# Patient Record
Sex: MALE | Race: BLACK OR AFRICAN AMERICAN
[De-identification: names, ages, dates, MRNs, and addresses within clinical notes are randomized per-mention and may not be internally consistent; named-entity substitution may affect disease eponyms.]

---

## 2017-08-26 ENCOUNTER — HOSPITAL ENCOUNTER (INPATIENT)
Dept: HOSPITAL 74 - JER | Age: 77
LOS: 10 days | Discharge: HOME | DRG: 271 | End: 2017-09-05
Attending: INTERNAL MEDICINE | Admitting: SPECIALIST
Payer: COMMERCIAL

## 2017-08-26 VITALS — BODY MASS INDEX: 29.7 KG/M2

## 2017-08-26 DIAGNOSIS — I48.0: ICD-10-CM

## 2017-08-26 DIAGNOSIS — G43.909: ICD-10-CM

## 2017-08-26 DIAGNOSIS — I25.5: ICD-10-CM

## 2017-08-26 DIAGNOSIS — N18.9: ICD-10-CM

## 2017-08-26 DIAGNOSIS — I48.4: ICD-10-CM

## 2017-08-26 DIAGNOSIS — I50.42: ICD-10-CM

## 2017-08-26 DIAGNOSIS — I42.2: ICD-10-CM

## 2017-08-26 DIAGNOSIS — I73.9: ICD-10-CM

## 2017-08-26 DIAGNOSIS — I13.0: ICD-10-CM

## 2017-08-26 DIAGNOSIS — I25.2: ICD-10-CM

## 2017-08-26 DIAGNOSIS — F17.210: ICD-10-CM

## 2017-08-26 DIAGNOSIS — I74.3: Primary | ICD-10-CM

## 2017-08-26 DIAGNOSIS — Z91.14: ICD-10-CM

## 2017-08-26 DIAGNOSIS — I25.10: ICD-10-CM

## 2017-08-26 LAB
ALBUMIN SERPL-MCNC: 2.8 G/DL (ref 3.4–5)
ALP SERPL-CCNC: 75 U/L (ref 45–117)
ALT SERPL-CCNC: 21 U/L (ref 12–78)
ANION GAP SERPL CALC-SCNC: 6 MMOL/L (ref 8–16)
AST SERPL-CCNC: 17 U/L (ref 15–37)
BASOPHILS # BLD: 0.6 % (ref 0–2)
BILIRUB SERPL-MCNC: 0.4 MG/DL (ref 0.2–1)
CALCIUM SERPL-MCNC: 8.7 MG/DL (ref 8.5–10.1)
CK SERPL-CCNC: 282 IU/L (ref 39–308)
CO2 SERPL-SCNC: 31 MMOL/L (ref 21–32)
CREAT SERPL-MCNC: 0.9 MG/DL (ref 0.7–1.3)
DEPRECATED RDW RBC AUTO: 14.9 % (ref 11.9–15.9)
EOSINOPHIL # BLD: 1 % (ref 0–4.5)
GLUCOSE SERPL-MCNC: 100 MG/DL (ref 74–106)
MCH RBC QN AUTO: 30.9 PG (ref 25.7–33.7)
MCHC RBC AUTO-ENTMCNC: 33.5 G/DL (ref 32–35.9)
MCV RBC: 92.4 FL (ref 80–96)
NEUTROPHILS # BLD: 68 % (ref 42.8–82.8)
PLATELET # BLD AUTO: 375 K/MM3 (ref 134–434)
PMV BLD: 8.2 FL (ref 7.5–11.1)
PROT SERPL-MCNC: 7.2 G/DL (ref 6.4–8.2)
TROPONIN I SERPL-MCNC: 0.63 NG/ML (ref 0–0.05)
WBC # BLD AUTO: 10.7 K/MM3 (ref 4–10)

## 2017-08-26 RX ADMIN — SODIUM CHLORIDE SCH MLS/HR: 9 INJECTION, SOLUTION INTRAVENOUS at 22:22

## 2017-08-26 NOTE — PDOC
History of Present Illness





- General


Chief Complaint: Pain


Stated Complaint: BI-LATERAL LEG PAIN


Time Seen by Provider: 08/26/17 16:16


History Source: Patient


Exam Limitations: No Limitations





- History of Present Illness


Initial Comments: 


08/26/17 17:29


CC:  5 day h/o B/L LE pain and difficulty ambulating





Patient is a 77 y.o. male with a PMH of MI who presents today c/o of a non-

qualifiable, 10/10 constant B/L LE pain.  Patient states he went to change his 

shoes on Monday, sat down and he felt B/L LE pain that has persisted since that 

time.  Patient states he has been using a dining room chair with wheels to 

ambulate around his house (patient lives alone) and decided to come to the 

hospital today because the pain became unbearable.  Patient denies any known 

trauma, shortness of breath, chest pain, nausea, vomiting, abdominal pain. 








Past surgical: s/p cardiac cath @ [facility] (01/2017)


Social: (+) nicotine - 10 cigarettes daily, (+) alcohol 2-3 pints hard liquor 

monthly; (-) marijuana/cocaine/heroin


NKDA


Patient does not have established PCP








Past History





- Past Medical History


Allergies/Adverse Reactions: 


 Allergies











Allergy/AdvReac Type Severity Reaction Status Date / Time


 


No Known Allergies Allergy   Verified 08/26/17 15:54











Home Medications: 


Ambulatory Orders





NK [No Known Home Medication]  08/26/17 








Diabetes:  (BORDERLINE)


HTN:  (BORDERLINE)


Other medical history: PT DENIES.





- Psycho/Social/Smoking Cessation Hx


Anxiety: No


Suicidal Ideation: No


Smoking History: Never smoked


Have you smoked in the past 12 months: Yes


If you are a former smoker, when did you quit?: 12.28.16


Information on smoking cessation initiated: No


Hx Alcohol Use: No


Drug/Substance Use Hx: No


Substance Use Type: None





**Review of Systems





- Review of Systems


Constitutional: No: Chills, Diaphoresis, Fever, Unexplained wgt Loss


HEENTM: No: Blurred Vision, Double Vision, Tinnitus, Hearing Loss


Respiratory: No: Cough, Orthopnea, Shortness of Breath, Hemoptysis


Cardiac (ROS): No: Chest Pain, Edema, Irregular Heart Rate, Lightheadedness, 

Palpitations


ABD/GI: No: Constipated, Diarrhea


: No: Burning, Dysuria


Musculoskeletal: Yes: Muscle Pain, Muscle Weakness.  No: Back Pain, Gout, Joint 

Pain, Neck Pain


Psychiatric: No: Anxiety, Depression


All Other Systems: Reviewed and Negative





*Physical Exam





- Vital Signs


 Last Vital Signs











Temp Pulse Resp BP Pulse Ox


 


 98.1 F   84   18   150/74   98 


 


 08/26/17 15:55  08/26/17 15:55  08/26/17 15:55  08/26/17 15:55  08/26/17 15:55














- Physical Exam


General Appearance: Yes: Nourished, Obese


HEENT: positive: EOMI, FUNMILAYO


Neck: positive: Trachea midline, Supple


Respiratory/Chest: positive: Lungs Clear, Normal Breath Sounds


Cardiovascular: positive: Regular Rate, S1, S2, Irregularly Irregular


Gastrointestinal/Abdominal: positive: Flat, Soft


Extremity: positive: Other (Weak < 2+ pedal pulses B/L; Cool LE B/L )


Integumentary: positive: Cold


Neurologic: positive: Fully Oriented, Alert





ED Treatment Course





- LABORATORY


CBC & Chemistry Diagram: 


 08/26/17 18:45





 08/26/17 18:45





Medical Decision Making





- Medical Decision Making


08/26/17 22:02


Patient is a 77 y.o. male with a PMH of MI (01/2017) and limited primary care 

who presents to our ED c/o 5 day h/o B/L LE pain and inability to ambulate.  On 

PE patient's B/L LE were cool to palpation and showed weak pulses.  Initial 

differential included vascular occlusion vs. disc herniation (less likely given 

no neurological deficit, no spinal tenderness).   As patient had a h/o MI, 

cardiac work-up was also initiated with Troponin at 0.63 and EKG showing HR 98, 

A fib with poor R wave progression in the anterior chest leads. Arterial DVT 

revealed (1) R sided occlusion from proximal superficial femoral artery and (2) 

L sided occlusion from the distal superficial femoral artery.  Patient was 

given a Heparin bolus and subsequent drip and admitted to inpatient medicine 

service, telemetry floor. 














*DC/Admit/Observation/Transfer


Diagnosis at time of Disposition: 


 Vascular occlusion





- Discharge Dispostion


Admit: Yes





- Attestations


Physician Attestion: 





08/26/17 22:42


I, Dr. Cate Sandoval, attest all medical decision making reflected in this note 

is under my auspices.

## 2017-08-26 NOTE — PDOC
Attending Attestation





- Resident


Resident Name: Cate Sandoval





- ED Attending Attestation


I have performed the following: I have examined & evaluated the patient, The 

case was reviewed & discussed with the resident, I agree w/resident's findings 

& plan, Exceptions are as noted





- HPI


HPI: 


78 yo M history CAD presents with B/L leg pain for the past ~1 week. He states 

that the pain was sudden onset, severe, limiting him from walking. He has been 

using a rolling chair in his house to get around. He denies weakness. He has 

numbness to the toes of both feet. Denies any trauma. No low back pain, chest 

pain, SOB. No prior similar symptoms.








- Physicial Exam


PE: 


GENERAL: Awake, alert, and fully oriented, in no acute distress


HEAD: No signs of trauma


EYES: PERRLA, EOMI, sclera anicteric, conjunctiva clear


ENT: Auricles normal inspection, hearing grossly normal, nares patent, 

oropharynx clear without exudates. Moist mucosa


NECK: Normal ROM, supple, no lymphadenopathy, JVD, or masses


LUNGS: Breath sounds equal, clear to auscultation bilaterally.  No wheezes, and 

no crackles


HEART: Regular rate and rhythm, normal S1 and S2, no murmurs, rubs or gallops


ABDOMEN: Soft, nontender, normoactive bowel sounds.  No guarding, no rebound.  

No masses


EXTREMITIES: Normal range of motion. Lower legs with no visible hair, +multiple 

varicosities visualized. Feet are tender to palpation, cool to the touch. Faint 

B/L DP pulses. No clubbing or cyanosis. No erythema.


NEUROLOGICAL: Cranial nerves II through XII grossly intact.  Normal speech, 

normal gait


SKIN: Warm, Dry, normal turgor, no rashes or lesions noted.








- Medical Decision Making


Patient with prior CAD, now presents with severe leg pain, unable to ambulate. 

Noted to have signs of PVD/PAD. Will obtain labs, venous and arterial dopplers.

## 2017-08-27 LAB
ALBUMIN SERPL-MCNC: 2.4 G/DL (ref 3.4–5)
ALP SERPL-CCNC: 65 U/L (ref 45–117)
ALT SERPL-CCNC: 17 U/L (ref 12–78)
ANION GAP SERPL CALC-SCNC: 10 MMOL/L (ref 8–16)
AST SERPL-CCNC: 15 U/L (ref 15–37)
BILIRUB SERPL-MCNC: 0.5 MG/DL (ref 0.2–1)
CALCIUM SERPL-MCNC: 8 MG/DL (ref 8.5–10.1)
CO2 SERPL-SCNC: 25 MMOL/L (ref 21–32)
CREAT SERPL-MCNC: 0.9 MG/DL (ref 0.7–1.3)
GLUCOSE SERPL-MCNC: 94 MG/DL (ref 74–106)
MAGNESIUM SERPL-MCNC: 2.1 MG/DL (ref 1.8–2.4)
PHOSPHATE SERPL-MCNC: 4.1 MG/DL (ref 2.5–4.9)
PROT SERPL-MCNC: 6.1 G/DL (ref 6.4–8.2)

## 2017-08-27 RX ADMIN — HEPARIN SODIUM PRN UNIT: 5000 INJECTION, SOLUTION INTRAVENOUS; SUBCUTANEOUS at 20:00

## 2017-08-27 RX ADMIN — CARVEDILOL SCH MG: 6.25 TABLET, FILM COATED ORAL at 21:17

## 2017-08-27 RX ADMIN — MORPHINE SULFATE PRN MG: 4 INJECTION, SOLUTION INTRAMUSCULAR; INTRAVENOUS at 01:33

## 2017-08-27 RX ADMIN — ATORVASTATIN CALCIUM SCH MG: 40 TABLET, FILM COATED ORAL at 21:17

## 2017-08-27 RX ADMIN — SODIUM CHLORIDE SCH MLS/HR: 9 INJECTION, SOLUTION INTRAVENOUS at 21:17

## 2017-08-27 RX ADMIN — ASPIRIN SCH MG: 81 TABLET, COATED ORAL at 10:51

## 2017-08-27 RX ADMIN — MORPHINE SULFATE PRN MG: 2 INJECTION, SOLUTION INTRAMUSCULAR; INTRAVENOUS at 20:26

## 2017-08-27 RX ADMIN — CARVEDILOL SCH MG: 6.25 TABLET, FILM COATED ORAL at 10:51

## 2017-08-27 RX ADMIN — VALSARTAN SCH MG: 80 TABLET, FILM COATED ORAL at 10:51

## 2017-08-27 RX ADMIN — MORPHINE SULFATE PRN MG: 4 INJECTION, SOLUTION INTRAMUSCULAR; INTRAVENOUS at 07:51

## 2017-08-27 NOTE — CONSULT
Consult





- Past Surgical History


Past Surgical History: Yes: Hernia Repair





- Alcohol/Substance Use


Hx Alcohol Use: Yes (SOCIALLY)


History of Substance Use: reports: None





- Smoking History


Smoking history: Current every day smoker


Have you smoked in the past 12 months: Yes


Aproximately how many cigarettes per day: 10


If you are a former smoker, when did you quit?: 12.28.16





Home Medications





- Allergies


Allergies/Adverse Reactions: 


 Allergies











Allergy/AdvReac Type Severity Reaction Status Date / Time


 


No Known Allergies Allergy   Verified 08/26/17 15:54














- Home Medications


Home Medications: 


Ambulatory Orders





NK [No Known Home Medication]  08/26/17 











Physical Exam


Vital Signs: 


 Vital Signs











Temperature  97.6 F   08/27/17 06:00


 


Pulse Rate  75   08/27/17 06:00


 


Respiratory Rate  20   08/27/17 06:00


 


Blood Pressure  144/91   08/27/17 06:00


 


O2 Sat by Pulse Oximetry (%)  96   08/27/17 00:24











Labs: 


 CBC, BMP





 08/27/17 06:30 











Assessment/Plan


Vascular Surgery 





Patient is a 77 y.o. male with a PMH of MI who presented with pain in both legs 

since last tuesday. Pt went ahead and placed johnson larios to his legs to make them 

feel better. He has afib, but does not take any meds for it -- because he has 

extensive dental work being done. He has a script for eliquis, but he never 

took it. Pt is a big time smoker - smoking 1/2 a pack a day for 60 years. 








Past surgical: s/p cardiac cath @ [facility] (01/2017)


Social: (+) nicotine - 10 cigarettes daily, (+) alcohol 2-3 pints hard liquor 

monthly; (-) marijuana/cocaine/heroin


NKDA


Patient does not have established PCP








Past History





- Past Medical History


Allergies/Adverse Reactions: 


 Allergies











Allergy/AdvReac Type Severity Reaction Status Date / Time


 


No Known Allergies Allergy   Verified 08/26/17 15:54











Home Medications: 


Ambulatory Orders





NK [No Known Home Medication]  08/26/17 








Diabetes:  (BORDERLINE)


HTN:  (BORDERLINE)


Other medical history: PT DENIES.





- Psycho/Social/Smoking Cessation Hx


Anxiety: No


Suicidal Ideation: No


Smoking History: Never smoked


Have you smoked in the past 12 months: Yes


If you are a former smoker, when did you quit?: 12.28.16


Information on smoking cessation initiated: No


Hx Alcohol Use: No


Drug/Substance Use Hx: No


Substance Use Type: None





PE


Head - NC/AT


Lung - CTA Bl 


Heart - RRR


abd - soft,nt,nd


ext - warm to distal calf, then cool. 


No mottling. Palpable femoral pulses. 


Motor and sensory intact in both limbs. 





A/P 


Acute on chronic PAD


1. Will order CTA


2. Cont IV heparin. 





Perry Chan DO

## 2017-08-27 NOTE — PN
Teaching Attending Note


Name of Resident: Henry Delgado


ATTENDING PHYSICIAN STATEMENT





I saw and evaluated the patient.


I reviewed the resident's note and discussed the case with the resident.


I agree with the resident's findings and plan as documented.








SUBJECTIVE:


77 year old male that presents to the ED on 17 c/o inability to ambulate 

and secondary to right lower extremity pain that has started 2-3 days ago and 

now has increased in intensity to 10/10 now. It is constant and exacerbated by 

walking . He denies prior episodes of pain like  this before. 





His medical history is significant for most recent hospitalization in 2017 due to acute diastolic congestive heart failure and NSTEMI  secondary to 

uncontrolled a flutter. At that time he underwent cardiac VITALY with 

cardioversion and was planned for discharge on oral anticoagulants due to high 

CHADS. However patient denies taking anticoagulants at this time. 





Initial workup during this ER visit revealed decreased LE pulses and  dopplers 

confirmed  R sided occlusion from proximal superficial femoral artery + L sided 

occlusion from the distal superficial femoral artery.  Patient was given a 

Heparin bolus and subsequent drip and admitted to inpatient medicine service, 

telemetry. 





PMH 





A Flutter 


CHF 


HTN 


Smoking 





PAST SURGICAL HISTORY:


VITALY / cardioversion 





Social History:


Smoking:  Smoke for 62 years, 1/2 ppd


Alcohol: 3-4 x /month, 


Drugs: pt denies





Family History:


mother  in her late 60s d/t cancer, had asthma


father  in his 60s, MI


brother  young in a fire


2 sisters alive and well





Allergies


No Known Allergies Allergy (Verified 17 00:59)











OBJECTIVE:


 Vital Signs











Temperature  97.9 F   17 02:00


 


Pulse Rate  84   17 02:00


 


Respiratory Rate  20   17 02:00


 


Blood Pressure  118/75   17 02:00


 


O2 Sat by Pulse Oximetry (%)  96   17 00:24








EARS, NOSE, THROAT: nares patent, oropharynx clear without exudates. Moist 

mucous membranes.


NECK: Normal range of motion, supple without lymphadenopathy, JVD, or masses.


LUNGS: Breath sounds equal, clear to auscultation bilaterally. No wheezes, and 

no crackles. No accessory muscle use.


HEART:irregular 


ABDOMEN: Soft, nontender, not distended, normoactive bowel sounds, no guarding, 

no rebound, no masses


UPPER EXTREMITIES: 2+ pulses, warm, well-perfused. No cyanosis. No clubbing. No 

peripheral edema.


LOWER EXTREMITIES: pain on palpation of RLE. 1+ pulses, cool, slow capillary 

refill. 





 CBC, BMP





 17 18:45 





 17 18:45 





EKG - a flutter 





ECHO -  - severe LV hypertrophy, moderately reduced systolic function 





ASSESSMENT AND PLAN:








1. Acute limb ischemia - likely secondary to arterial embolus secondary to 

ongoing atrial flutter /afib, lack of anticoagulation therapy and possibly 

formation of an atrial thrombus . Peripheral arterial thrombosis is less likely 

. 





- Heparin drip was initiated in ED and will continue 


- reperfusion per vascular surgery ( was contacted by ER as per ED resident ) 

will follow 


- Pain control provided 


- ECHO 





2. NSTEMI / elevated troponin - this is likely to A flutter/possible atrial 

thrombus  however CAD/ischemia can not be excluded completely . Patient has no 

chest pain . Rate is controlled. There is a very low yield in " trending 

troponins' as it would not change the management , however it will be ordered 

per hospital policy .  





- ECHO to be repeated 


- cardiology evaluation 


- heparin drip 


- telemetry 


- coreg 





3. A flutter / A fib - rate controlled. 


- Will need lifelong coag ( probable [id number] score between 4-5)

## 2017-08-27 NOTE — CONS
DATE OF CONSULTATION:  08/27/2017

 

Consultation requested by hospitalist.

 

CHIEF COMPLAINT:  Bilateral leg discomfort, evaluation of cardiac arrhythmia,

elevated troponin I level.

 

A 77-year-old male of  descent, with known history of coronary artery

disease, nonobstructive coronary artery disease on coronary angiography January 11, 2017, angina pectoris, systolic/diastolic left ventricular dysfunction with chronic

class 1 to 2 New York Heart Association classification left ventricular failure,

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

paroxysmal atypical atrial flutter, atrial tachycardia, post cardioversion,

hypertensive cardiovascular disease, chronic kidney disease, who apparently took it

upon himself to discontinue all therapies including anticoagulation therapy, who

presented to [facility] with 4 to 5 days of bilateral leg discomfort, right

greater than the left.  Patient reported sudden onset of bilateral leg discomfort as

noted above and, in addition, coldness.  Patient was unable to ambulate.  Upon

arrival to the emergency room, patient was noted to have cold legs, right greater

than the left, with absent distal pulses.  Vascular evaluation included duplex

examination, which revealed evidence of arterial occlusion bilaterally.  Patient

denied any chest discomfort.  Patient reports dyspnea with moderate physical

exertion.  Patient denies any orthopnea, paroxysmal nocturnal dyspnea, or peripheral

edema.  Patient denies any palpitation, dizziness, lightheadedness, or syncope. 

Patient, in addition, in the emergency room was noted to have evidence of atrial

fibrillation/atypical atrial flutter.

 

PAST MEDICAL HISTORY:  Nonobstructive coronary artery disease, angina pectoris,

systolic/diastolic left ventricular dysfunction, apical hypertrophic cardiomyopathy

with chronic class 1 to 2 New York Heart Association classification left ventricular

failure, paroxysmal atypical atrial flutter, atrial tachycardia post cardioversion,

CHADS-VASc score of 5 to 6, hypertensive cardiovascular disease, history of chronic

kidney disease, hernia repair, hydrocele repair.

 

SOCIAL HISTORY:  A smoker.

 

FAMILY HISTORY:  Positive coronary artery disease.

 

ALLERGIES:  None reported.

 

MEDICAL THERAPY AT HOME:  None.  As noted above, patient discontinued all therapies.

 

REVIEW OF SYSTEMS:

Head and Neck:  Denies headache, photophobia, blurring of vision.

Respiratory:  No cough or sputum production.

Cardiovascular:  As noted above.

Gastrointestinal:  Denies nausea, vomiting, diarrhea, abdominal discomfort.

Genitourinary:  No symptoms reported.

Musculoskeletal:  Bilateral leg discomfort.

 

PHYSICAL EXAMINATION:

Vital Signs:  Blood pressure is 144/91 mmHg.  Pulse rate is 75 beats per minute,

irregularly irregular.

Head and Neck:  Pupils equal, react to light and accommodation.  Extraocular muscles

are intact.  Anicteric sclerae.  Negative JVD.  No bruit appreciated. 

Chest:  Clear to auscultation and percussion.

Cardiovascular:  S1, S2 irregularly irregular.  Grade 2/6 systolic apical murmur.

Abdomen:  Soft, benign.  Normoactive bowel sound.

Extremities:  No edema.  No palpable distal pulses.  1+ bilateral femoral pulses.  No

calf tenderness. 

 

EKG reveals atypical atrial flutter, atrial tachycardia, with left axis deviation,

poor R-wave progression, and nonspecific interventricular conduction delay.  Chest

x-ray report was noted.  Vascular studies completed.  Report not available, but from

the emergency room note, evidence of bilateral occlusion.  CBC revealed white cell

count 10.7, hemoglobin 14.4, platelet count 375.  Basic metabolic profile revealed

sodium 140, potassium 4.2, BUN 17, creatinine 0.7, glucose 94.  .  Troponin I

0.63, repeat 0.58.

 

ASSESSMENT:  

1.  Bilateral leg discomfort with evidence of bilateral vascular occlusion, most

likely embolic disease related to the above-noted paroxysmal atrial

flutter/fibrillation, CHADS-VASc score of 6, currently on heparin therapy, on no

therapy at home.  Poor compliance with therapy administration and medical followup.

2.  Coronary artery disease with evidence of most likely related to the above-noted

arrhythmia.  History of nonobstructive coronary artery disease on right and left

heart cardiac catheterization and coronary angiography, angina pectoris.

3.  Left ventricular systolic/diastolic dysfunction, apical hypertrophic

cardiomyopathy, with chronic class 1 to 2 New York Heart Association classification

left ventricular failure, compensated/euvolemic.

4.  Paroxysmal apical atrial flutter, atrial tachycardia, post cardioversion,

CHADS-VASc score of 6, recurrent arrhythmia.  Patient will require long-term

anticoagulation.

5.  Hypertensive cardiovascular disease.

6.  History of chronic kidney disease.

7.  Poor compliance with medical therapy administration and medical followup.

8.  History of tobacco abuse.

 

PLAN:

1.  Continuation of heparin therapy, pending vascular evaluation and intervention,

and will require long-term anticoagulation with Coumadin or new oral anticoagulation

agent, considering the above-noted presentation and CHADS-VASc score of 6.

2.  Carvedilol and titration of dosage as tolerated.

3.  Diovan and titration of dosage as tolerated.

4.  Aspirin.

5.  Statin therapy.

6.  Recommend urgent vascular evaluation.  Dr. CK Chan was contacted and spoken to. 

He will evaluate the patient for urgent angiography and possible intervention.

7.  Patient was strongly counseled smoking cessation.

8.  Echocardiography for evaluation of left ventricular systolic function.

 

Thank you for the kind referral. 

 

 

ALIYAH SOLOMON M.D.

 

[id number]

DD: 08/27/2017 08:48

DT: 08/27/2017 14:29

Job #:  [id number]

## 2017-08-27 NOTE — PN
Progress Note (short form)





- Note


Progress Note: 


Chief Complaint: Events noted, notes reviewed, acute onset bilateral leg 

discomfort with evidence of vascular occlusion, denies any chest pain or dyspnea

, atypical atrial flutter/atrial fibrillation noted





History of Present Illness: 


Seen and examined on telemetry. Full consult dictated





- Current Medication List





 Current Medications





Aspirin (Ecotrin -)  81 mg PO DAILY [facility] hospital


Carvedilol (Coreg -)  6.25 mg PO BID [facility] hospital


Heparin Sodium (Porcine) (Heparin -)  5,000 unit IVPUSH PRN PRN


   PRN Reason: Heparin


Heparin Sodium (Porcine) 25, (000 unit/ Sodium Chloride)  500 mls @ 16 mls/hr 

IV TITR AVA; 800 UNIT/HR


   PRN Reason: Protocol


   Last Admin: 08/26/17 22:22 Dose:  16 mls/hr


Morphine Sulfate (Morphine Injection -)  2 mg IVPUSH Q4H PRN


   PRN Reason: PAIN


   Last Admin: 08/27/17 07:51 Dose:  2 mg


Valsartan (Diovan -)  80 mg PO DAILY [facility] hospital








Review of Systems


Constitutional: denies: Chills


Cardiovascular: As noted above


Respiratory: denies: Cough or Sputum Production


Gastrointestinal: denies: Nausea, Vomiting, Diarrhea, Constipation or Abdominal 

Pain


Musculoskeletal: reports: Bilateral Leg Discomfort 


Neurological: denies: Dizziness or Headache





- Objective


Vital Signs: 





 Last Vital Signs











Temp Pulse Resp BP Pulse Ox


 


 97.6 F   75   20   144/91   96 


 


 08/27/17 06:00  08/27/17 06:00  08/27/17 06:00  08/27/17 06:00  08/27/17 00:24








 Intake & Output











 08/24/17 08/25/17 08/26/17 08/27/17





 23:59 23:59 23:59 23:59


 


Intake Total    112


 


Balance    112


 


Weight   225 lb 215 lb 0.8 oz











Neck: Supple Negative JVD No Bruit


Cardiovascular: S1 S2 Irregularly Irregular Grade 2/6 Systolic Murmur


Chest: Clear to A&P Bilaterally


Gastrointestinal: Soft Benign Normal Bowel Sounds


Ext: No Edema No Palpable Distal Pulses 1+ Bilateral femoral Pulses





Labs: 





 CBC, BMP





 08/26/17 18:45 





 Hepatic Panel











Total Bilirubin  0.4 mg/dL (0.2-1.0)  D 08/26/17  18:45    


 


AST  17 U/L (15-37)  D 08/26/17  18:45    


 


ALT  21 U/L (12-78)  D 08/26/17  18:45    


 


Alkaline Phosphatase  75 U/L ()  D 08/26/17  18:45    


 


Albumin  2.8 g/dl (3.4-5.0)  L  08/26/17  18:45    








 Troponin, BNP











  08/26/17 08/27/17





  18:45 03:50


 


Troponin I  0.63 H* D  0.58 H











Assessment/Plan





ASSESSMENT:





1. Bilateral leg discomfort with evidence of vascular occlusion, most likely 

embolic disease related to PAF with [id number] score of 6 on Heparin currently

, on no therapy at home, poor compliance with therapy administration and 

medical F/U


2. CAD with evidence of demand ischemic injury, non [facility] 

coronary angiogrpahy angina pectoris


3. LV systolic/diastolic dysfunction related to apical hypertrophic cardio-

myopathy, chronic class I-II NYHA classification, compensated/euvolemic


4. Paroxysmal atypical atrial flutter/atrial tachycardia post cardioversion 

[id number] score of 6, recurrent arrhythmia, will require long term A/C


5. HTN


6. History of CKD


7. Poor compliance with medical therapy administration and medical F/U


8. Tobacco abuse





PLAN:





1. Continue Heparin pending vascular evaluation and intervention and will 

require long term A/C with Coumadin or NOAC's considering the above noted 

presentation and [id number] score 6 


2. Continue Carvedilol and titrate as tolerated


3. Continue Diovan and titrate as tolerated


4. Continue ASA


5. Add Lipitor


6. Recommend urgent vascular evaluation Dr. CK Chan contacted and will 

evaluate the patient for urgent angiography and possible intervention


7. Counselled smoking cessation and abstinence


8. Echocardiography to evaluate LV size and function





Discussed in detail with the patient 














Armand Mckenzie MD

## 2017-08-27 NOTE — HP
CHIEF COMPLAINT:


R leg pain


PCP:


Lorena


HISTORY OF PRESENT ILLNESS:


Pt is a 77M with PMH ischemic cardiomyopathy (moderate global hypokinesis & EF 

38% by echo), cardiac cath, afib who presents to ED with constant 10/10 

nonradiating intense pain of the right leg from the knee to the ankle which 

completely prevents him from walking. Pain is not associated with time of day 

and has not improved with home or hospital therapy including IV tylenol, and 

dilaudid.





Pt has a recent admission 





ER course was notable for:


(1) leukocytosis, pos trop, LE U/S, CXR,  


(2)Heparin protocol, IV tylenol, ASA


(3)





Recent Travel:


denies


PAST MEDICAL HISTORY:


Migraine, Hydrocele, Hernia, ischemic cardiomyopathy (moderate global 

hypokinesis & EF 38% by echo), cardiac cath, afib


PAST SURGICAL HISTORY:


as above


Social History:


Smoking: 10 cigarettes/day


Alcohol: 1 quart vodka every 2 weeks


Drugs: denies





Family History: MI in father at 60


Allergies





No Known Allergies Allergy (Verified 08/26/17 15:54)


 








HOME MEDICATIONS:


 Home Medications











 Medication  Instructions  Recorded


 


NK [No Known Home Medication]  08/26/17








REVIEW OF SYSTEMS


CONSTITUTIONAL: 


Absent:  fever, chills, diaphoresis, generalized weakness, malaise, loss of 

appetite, weight change


HEENT: 


Absent:  rhinorrhea, nasal congestion, throat pain, throat swelling, difficulty 

swallowing, mouth swelling, ear pain, eye pain, visual changes


CARDIOVASCULAR: 


Absent: chest pain, syncope, palpitations, irregular heart rate, lightheadedness

, peripheral edema


RESPIRATORY: 


Absent: cough, shortness of breath, dyspnea with exertion, orthopnea, wheezing, 

stridor, hemoptysis


GASTROINTESTINAL:


Absent: abdominal pain, abdominal distension, nausea, vomiting, diarrhea, 

constipation, melena, hematochezia


GENITOURINARY: 


Absent: dysuria, frequency, urgency, hesitancy, hematuria, flank pain, genital 

pain


MUSCULOSKELETAL: right leg pain


Absent: myalgia, arthralgia, joint swelling, back pain, neck pain


SKIN: 


Absent: rash, itching, pallor


HEMATOLOGIC/IMMUNOLOGIC: 


Absent: easy bleeding, easy bruising, lymphadenopathy, frequent infections


ENDOCRINE:


Absent: unexplained weight gain, unexplained weight loss, heat intolerance, 

cold intolerance


NEUROLOGIC: right big toe tingling


Absent: headache, focal weakness or paresthesias, dizziness, unsteady gait, 

seizure, mental status changes, bladder or bowel incontinence


PSYCHIATRIC: 


Absent: anxiety, depression, suicidal or homicidal ideation, hallucinations.








PHYSICAL EXAMINATION


 Vital Signs - 24 hr











  08/26/17





  23:38


 


Temperature 98.3 F


 


Pulse Rate [ 80





Apical] 


 


Respiratory 20





Rate 


 


Blood Pressure 142/76





[Left Arm] 


 


O2 Sat by Pulse 100





Oximetry (%) 











GENERAL: Awake, alert, and fully oriented, in no acute distress.


HEAD: Normal with no signs of trauma.


EYES: Pupils equal, round and reactive to light, extraocular movements intact, 

sclera anicteric, conjunctiva clear. No lid lag.


EARS, NOSE, THROAT: nares patent, oropharynx clear without exudates. Moist 

mucous membranes.


NECK: Normal range of motion, supple without lymphadenopathy, JVD, or masses.


LUNGS: Breath sounds equal, clear to auscultation bilaterally. No wheezes, and 

no crackles. No accessory muscle use.


HEART: Regular rate and rhythm, normal S1 and S2 without murmur, rub or gallop.


ABDOMEN: Soft, nontender, not distended, normoactive bowel sounds, no guarding, 

no rebound, no masses.  No hepatomegaly or  splenomegaly. 


MUSCULOSKELETAL: Normal range of motion at all joints. No bony deformities or 

tenderness. No CVA tenderness.


UPPER EXTREMITIES: 2+ pulses, warm, well-perfused. No cyanosis. No clubbing. No 

peripheral edema.


LOWER EXTREMITIES: pain on palpation of RLE. 1+ pulses, cool, slow capillary 

refill. No calf tenderness. No peripheral edema. 


NEUROLOGICAL:  Cranial nerves II-XII intact. Normal speech. Normal gait.


PSYCHIATRIC: Cooperative. Good eye contact. Appropriate mood and affect.


SKIN: Warm, dry, normal turgor, no rashes or lesions noted, normal capillary 

refill. 





ASSESSMENT/PLAN:


Pt is a 77M with PMH ischemic cardiomyopathy (moderate global hypokinesis & EF 

38% by echo), cardiac cath, afib who presents to ED with constant 10/10 

nonradiating intense pain of the right leg from the knee to the ankle which 

completely prevents him from walking.





#Leg arterial occlusion


-cold feet, diminished distal pulses


-pending U/S


-Heparin


-Morphine


-ASA


-Vascular consult





#Afib


-Unknown duration


-Cardio consult


-on AC with Heparin


-Echo result from Jan: moderate global hypokinesis, EF 38%





#HTN


-Diovan





#FEN


-not on fluid


-lytes wnl


-chol/fat controlled diet





#dispo : admit to inpt tele for afib and vascular disease





Henry Delgado MD PGY-1


























Visit type





- Emergency Visit


Emergency Visit: No





- New Patient


This patient is new to me today: No





- Critical Care


Critical Care patient: No

## 2017-08-27 NOTE — PN
Physical Exam: 


SUBJECTIVE: Patient seen and examined. He has pain in his right lower leg, but 

it is less severe.








OBJECTIVE:





 Vital Signs











 Period  Temp  Pulse  Resp  BP Sys/Varghese  Pulse Ox


 


 Last 24 Hr  97.6 F-98.7 F  75-84  20-20  104-144/59-91  











GENERAL: The patient is awake, alert, and fully oriented, in no acute distress.


LUNGS: Breath sounds equal, clear to auscultation bilaterally, no wheezes, no 

crackles, no accessory muscle use. 


HEART: Irregularly irregular, (+) 2/6 systolic murmur.


ABDOMEN: Soft, nontender, nondistended, normoactive bowel sounds, no guarding, 

no rebound, no hepatosplenomegaly, no masses.


EXTREMITIES: No edema. RLE cool from mid calf distally and right foot is cold. 

LLE cool from distal calf to foot.


PULSES: 1+ femoral B/L. B/L popliteal and B/L DP not palpable.














 Laboratory Results - last 24 hr











  08/27/17 08/27/17 08/27/17





  03:50 06:30 10:26


 


PTT (Actin FS)    32.3  D


 


Sodium   140 


 


Potassium   4.2 


 


Chloride   105 


 


Carbon Dioxide   25 


 


Anion Gap   10 


 


BUN   17 


 


Creatinine   0.9 


 


Creat Clearance w eGFR   > 60 


 


Random Glucose   94 


 


Calcium   8.0 L 


 


Phosphorus   4.1  D 


 


Magnesium   2.1 


 


Total Bilirubin   0.5  D 


 


AST   15 


 


ALT   17 


 


Alkaline Phosphatase   65 


 


Troponin I  0.58 H  


 


Total Protein   6.1 L 


 


Albumin   2.4 L 








Active Medications











Generic Name Dose Route Start Last Admin





  Trade Name Freq  PRN Reason Stop Dose Admin


 


Aspirin  81 mg 08/27/17 10:00 08/27/17 10:51





  Ecotrin -  PO   81 mg





  DAILY AVA   Administration


 


Carvedilol  6.25 mg 08/27/17 10:00 08/27/17 10:51





  Coreg -  PO   6.25 mg





  BID AVA   Administration


 


Heparin Sodium (Porcine)  5,000 unit 08/26/17 21:10  





  Heparin -  IVPUSH   





  PRN PRN   





  Heparin   


 


Heparin Sodium (Porcine) 25,  500 mls @ 16 mls/hr 08/26/17 21:15 08/26/17 22:22





  000 unit/ Sodium Chloride  IV   16 mls/hr





  TITR AVA   Administration





  Protocol   





  800 UNIT/HR   


 


Morphine Sulfate  2 mg 08/26/17 23:29 08/27/17 07:51





  Morphine Injection -  IVPUSH   2 mg





  Q4H PRN   Administration





  PAIN   


 


Valsartan  80 mg 08/27/17 10:00 08/27/17 10:51





  Diovan -  PO   80 mg





  DAILY AVA   Administration











ASSESSMENT/PLAN:


This is a 77 year old man with a history of hypertrophic cardiomyopathy, non-

obstructive CAD, chronic systolic/diastolic HF, HTN, PAF who presented to the 

ER with right leg pain.





1. RLE pain secondary to PAD, possible thromboembolic disease


   - Vascular surgery consult appreciated


   - Continue heparin IV


   - CTA of aorta with LE runoff ordered


2. Paroxysmal atrial fibrillation/flutter


   - Patient now reports he did not start taking Eliquis secondary to ongoing 

dental work


   - Continue Coreg, heparin IV


   - Will need long-term anticoagulation - will hold off on starting oral 

medication in case surgery is needed


3. CAD, non-obstructive


   - Continue aspirin, Coreg


   - Lipitor started


4. Chronic systolic and diastolic heart failure


   - Stable


   - Continue Coreg, Diovan


5. Hypertrophic cardiomyopathy


6. HTN


   - Continue Coreg, Diovan


7. Nicotine dependence


   - Discussed smoking cessation





Visit type





- Emergency Visit


Emergency Visit: Yes


ED Registration Date: 08/26/17


Care time: The patient presented to the Emergency Department on the above date 

and was hospitalized for further evaluation of their emergent condition.





- New Patient


This patient is new to me today: Yes


Date on this admission: 08/27/17





- Critical Care


Critical Care patient: No





- Discharge Referral


Referred to [facility] No

## 2017-08-27 NOTE — EKG
Test Reason : 

Blood Pressure : ***/*** mmHG

Vent. Rate : 098 BPM     Atrial Rate : 091 BPM

   P-R Int : 000 ms          QRS Dur : 100 ms

    QT Int : 368 ms       P-R-T Axes : 000 -64 107 degrees

   QTc Int : 469 ms

 

*** POOR DATA QUALITY, INTERPRETATION MAY BE ADVERSELY AFFECTED

ATRIAL FIBRILLATION

LEFT AXIS DEVIATION

INFERIOR INFARCT (CITED ON OR BEFORE 03-JAN-2017)

ANTERIOR INFARCT (CITED ON OR BEFORE 03-JAN-2017)

ABNORMAL ECG

WHEN COMPARED WITH ECG OF 05-JAN-2017 11:20,

ATRIAL FIBRILLATION HAS REPLACED SINUS RHYTHM

QUESTIONABLE CHANGE IN INITIAL FORCES OF LATERAL LEADS

NONSPECIFIC T WAVE ABNORMALITY, WORSE IN LATERAL LEADS

Confirmed by JESUS FITCH MD ([id number]) on 8/27/2017 8:33:28 PM

 

Referred By:             Confirmed By:JESUS FITCH MD

## 2017-08-28 LAB
ANION GAP SERPL CALC-SCNC: 8 MMOL/L (ref 8–16)
CALCIUM SERPL-MCNC: 7.9 MG/DL (ref 8.5–10.1)
CO2 SERPL-SCNC: 27 MMOL/L (ref 21–32)
CREAT SERPL-MCNC: 0.8 MG/DL (ref 0.7–1.3)
DEPRECATED RDW RBC AUTO: 14.8 % (ref 11.9–15.9)
GLUCOSE SERPL-MCNC: 90 MG/DL (ref 74–106)
MCH RBC QN AUTO: 30.6 PG (ref 25.7–33.7)
MCHC RBC AUTO-ENTMCNC: 33.1 G/DL (ref 32–35.9)
MCV RBC: 92.4 FL (ref 80–96)
PLATELET # BLD AUTO: 303 K/MM3 (ref 134–434)
PMV BLD: 9.2 FL (ref 7.5–11.1)
TROPONIN I SERPL-MCNC: 0.5 NG/ML (ref 0–0.05)
WBC # BLD AUTO: 9 K/MM3 (ref 4–10)

## 2017-08-28 RX ADMIN — SODIUM CHLORIDE SCH: 9 INJECTION, SOLUTION INTRAVENOUS at 21:15

## 2017-08-28 RX ADMIN — MORPHINE SULFATE PRN MG: 2 INJECTION, SOLUTION INTRAMUSCULAR; INTRAVENOUS at 20:44

## 2017-08-28 RX ADMIN — MORPHINE SULFATE PRN MG: 2 INJECTION, SOLUTION INTRAMUSCULAR; INTRAVENOUS at 08:40

## 2017-08-28 RX ADMIN — CARVEDILOL SCH MG: 6.25 TABLET, FILM COATED ORAL at 09:30

## 2017-08-28 RX ADMIN — MORPHINE SULFATE PRN MG: 2 INJECTION, SOLUTION INTRAMUSCULAR; INTRAVENOUS at 01:04

## 2017-08-28 RX ADMIN — HEPARIN SODIUM PRN UNIT: 5000 INJECTION, SOLUTION INTRAVENOUS; SUBCUTANEOUS at 17:38

## 2017-08-28 RX ADMIN — ATORVASTATIN CALCIUM SCH MG: 40 TABLET, FILM COATED ORAL at 21:34

## 2017-08-28 RX ADMIN — SODIUM CHLORIDE SCH MLS/HR: 9 INJECTION, SOLUTION INTRAVENOUS at 23:36

## 2017-08-28 RX ADMIN — CARVEDILOL SCH MG: 6.25 TABLET, FILM COATED ORAL at 21:34

## 2017-08-28 RX ADMIN — ASPIRIN SCH MG: 81 TABLET, COATED ORAL at 11:53

## 2017-08-28 RX ADMIN — SODIUM CHLORIDE SCH MLS/HR: 9 INJECTION, SOLUTION INTRAVENOUS at 17:38

## 2017-08-28 RX ADMIN — SODIUM CHLORIDE SCH MLS/HR: 9 INJECTION, SOLUTION INTRAVENOUS at 03:19

## 2017-08-28 RX ADMIN — HEPARIN SODIUM PRN UNIT: 5000 INJECTION, SOLUTION INTRAVENOUS; SUBCUTANEOUS at 08:36

## 2017-08-28 RX ADMIN — HEPARIN SODIUM PRN UNIT: 5000 INJECTION, SOLUTION INTRAVENOUS; SUBCUTANEOUS at 03:18

## 2017-08-28 RX ADMIN — VALSARTAN SCH MG: 80 TABLET, FILM COATED ORAL at 09:30

## 2017-08-28 RX ADMIN — MORPHINE SULFATE PRN MG: 2 INJECTION, SOLUTION INTRAMUSCULAR; INTRAVENOUS at 17:42

## 2017-08-28 RX ADMIN — SODIUM CHLORIDE SCH MLS/HR: 9 INJECTION, SOLUTION INTRAVENOUS at 02:02

## 2017-08-28 RX ADMIN — SODIUM CHLORIDE SCH MLS/HR: 9 INJECTION, SOLUTION INTRAVENOUS at 08:36

## 2017-08-28 NOTE — PN
Physical Exam: 


SUBJECTIVE: Patient seen and examined, EMR consulted. 





pt reports that pain is worse is worse today compared to yesterday, and still 

is located from right knee down to toes. He states that he is unable to walk 

due to the pain. He denies SOB, chest pain, dizziness, nausea, and emesis. 





OBJECTIVE:





 Vital Signs











 Period  Temp  Pulse  Resp  BP Sys/Varghese  Pulse Ox


 


 Last 24 Hr  98 F-98.9 F    18-20  /52-86  97-98











GENERAL: The patient is awake, alert, and fully oriented, in mild distress.


HEAD: Normal with no signs of trauma.


EYES: PERRL, extraocular movements intact, sclera anicteric, conjunctiva clear. 

No ptosis. 


ENT: Ears normal, nares patent, oropharynx clear without exudates, moist mucous 


membranes.


NECK: Trachea midline, full range of motion, supple. 


LUNGS: Breath sounds equal, clear to auscultation bilaterally, no wheezes, no 

crackles, no 


accessory muscle use. 


HEART: Regular rate and rhythm, S1, S2 without murmur, rub or gallop.


ABDOMEN: Soft, nontender, nondistended, normoactive bowel sounds, no guarding, 

no 


rebound, no hepatosplenomegaly, no masses.


EXTREMITIES: right lower leg is colder than left leg. posterior tibial and 

dorsalis pedis pulses were not palpated in both feet. sensation is intact b/l, 

motor strength is 4/5 on the right leg, 5/5 on the left leg. Extremely tender 

to palpation from knee to toes on right leg.  


NEUROLOGICAL: Cranial nerves II through XII grossly intact. Normal speech, gait 

not 


observed.


PSYCH: Normal mood, normal affect.














 Laboratory Results - last 24 hr











  08/27/17 08/28/17 08/28/17





  17:56 01:40 05:35


 


WBC    9.0


 


RBC    4.11


 


Hgb    12.6  D


 


Hct    38.0


 


MCV    92.4


 


MCH    30.6


 


MCHC    33.1


 


RDW    14.8


 


Plt Count    303


 


MPV    9.2  D


 


PTT (Actin FS)  43.0 H D  43.5 H 


 


Sodium   


 


Potassium   


 


Chloride   


 


Carbon Dioxide   


 


Anion Gap   


 


BUN   


 


Creatinine   


 


Random Glucose   


 


Calcium   


 


Troponin I   














  08/28/17 08/28/17 08/28/17





  05:35 05:35 15:10


 


WBC   


 


RBC   


 


Hgb   


 


Hct   


 


MCV   


 


MCH   


 


MCHC   


 


RDW   


 


Plt Count   


 


MPV   


 


PTT (Actin FS)  48.2 H   42.1 H


 


Sodium   139 


 


Potassium   4.5 


 


Chloride   104 


 


Carbon Dioxide   27 


 


Anion Gap   8 


 


BUN   14 


 


Creatinine   0.8 


 


Random Glucose   90 


 


Calcium   7.9 L 


 


Troponin I   0.50 H 








Active Medications











Generic Name Dose Route Start Last Admin





  Trade Name Freq  PRN Reason Stop Dose Admin


 


Aspirin  81 mg 08/27/17 10:00 08/28/17 11:53





  Ecotrin -  PO   81 mg





  DAILY AVA   Administration


 


Atorvastatin Calcium  40 mg 08/27/17 22:00 08/27/17 21:17





  Lipitor -  PO   40 mg





  HS AVA   Administration


 


Carvedilol  6.25 mg 08/27/17 10:00 08/28/17 09:30





  Coreg -  PO   6.25 mg





  BID AVA   Administration


 


Heparin Sodium (Porcine)  5,000 unit 08/26/17 21:10 08/27/17 12:12





  Heparin -  IVPUSH   5,000 unit





  PRN PRN   Administration





  Heparin   


 


Heparin Sodium (Porcine)  1,000 unit 08/27/17 19:54 08/28/17 17:38





  Heparin -  IVPUSH   1,000 unit





  PRN PRN   Administration





  Heparin   


 


Heparin Sodium (Porcine) 25,  500 mls @ 16 mls/hr 08/26/17 21:15 08/28/17 17:38





  000 unit/ Sodium Chloride  IV   27 mls/hr





  TITR AVA   Administration





  Protocol   





  800 UNIT/HR   


 


Morphine Sulfate  2 mg 08/27/17 20:20 08/28/17 17:42





  Morphine Injection -  IVPUSH   2 mg





  Q4H PRN   Administration





  PAIN   


 


Valsartan  80 mg 08/27/17 10:00 08/28/17 09:30





  Diovan -  PO   80 mg





  DAILY AVA   Administration











ASSESSMENT/PLAN:





77 year old man with a history of hypertrophic cardiomyopathy, non-obstructive 

CAD, chronic systolic/diastolic HF, HTN, PAF who presented to the ER with right 

leg pain likely secondary to thromboembolic disease.





#RLE pain secondary to PAD, possible thromboembolic disease


   -CTA of aorta with LE runoff


   - Vascular surgery consult appreciated, awaiting official CTA read by 

radiology. Will need angiogram, possible thrombectomy [place].


   - Continue heparin IV





#Paroxysmal atrial fibrillation/flutter


   - Patient now reports he did not start taking Eliquis secondary to ongoing 

dental work


   -troponin trended down to 0.5 from 0.63


   - Continue Coreg 6.25mg PO BID and valsartan 80mg PO qd


   - Will need long-term anticoagulation - will hold off on starting oral 

medication in case surgery is needed





#CAD, non-obstructive


   - Continue aspirin 81, Coreg 6.25mg BID, atorvastatin 40mg qd





#Chronic systolic and diastolic heart failure


   - Stable


   - Continue Coreg 6.25mg PO BID and valsartan 80mg PO qd





#Hypertrophic cardiomyopathy


   -Coreg 6.25mg PO BID and valsartan 80mg PO qd





#HTN


   - Continue Coreg 6.25mg PO BID and valsartan 80mg PO qd





#Nicotine dependence


   - Discussed smoking cessation





#FEN/GI


-not on fluids


-electrolytes wnl





#Dispo


-pending vascular 





--


Christiano Wong MD PGY1



































Problem List





- Problems


(1) Coronary artery disease


Code(s): I25.10 - ATHSCL HEART DISEASE OF NATIVE CORONARY ARTERY W/O ANG PCTRS 

  





(2) Heart failure


Code(s): I50.9 - HEART FAILURE, UNSPECIFIED   





(3) Hypertrophic cardiomyopathy


Code(s): I42.2 - OTHER HYPERTROPHIC CARDIOMYOPATHY





(4) Nicotine dependence


Code(s): F17.200 - NICOTINE DEPENDENCE, UNSPECIFIED, UNCOMPLICATED   








Visit type





- Emergency Visit


Emergency Visit: Yes


ED Registration Date: 08/26/17


Care time: The patient presented to the Emergency Department on the above date 

and was hospitalized for further evaluation of their emergent condition.





- New Patient


This patient is new to me today: Yes


Date on this admission: 08/28/17





- Critical Care


Critical Care patient: No

## 2017-08-28 NOTE — PN
Progress Note (short form)





- Note


Progress Note: 


VAscular Surgery 





awaiting official cTa read 


Looking at it - right sfa closed from origin, reconstitutes in tibial arteries. 


Left sfa closed in its mid portion and reconstitutes in tibial arteries. 


Will need angiogram, possible open thrombectomy. 





Perry Chan DO

## 2017-08-28 NOTE — PN
Progress Note, Physician


Chief Complaint: 


Events noted


Complains of left lower extremity pain


History of Present Illness: 


Patient was seen and examined. Awake and alert. Chart was reviewed


Denies chest pain, SOB or palpitations


As outlined regarding lower extremity pain





- Current Medication List


Current Medications: 


Active Medications





Aspirin (Ecotrin -)  81 mg PO DAILY [facility]


   Last Admin: 08/27/17 10:51 Dose:  81 mg


Atorvastatin Calcium (Lipitor -)  40 mg PO HS [facility]


   Last Admin: 08/27/17 21:17 Dose:  40 mg


Carvedilol (Coreg -)  6.25 mg PO BID [facility]


   Last Admin: 08/28/17 09:30 Dose:  6.25 mg


Heparin Sodium (Porcine) (Heparin -)  5,000 unit IVPUSH PRN PRN


   PRN Reason: Heparin


   Last Admin: 08/27/17 12:12 Dose:  5,000 unit


Heparin Sodium (Porcine) (Heparin -)  1,000 unit IVPUSH PRN PRN


   PRN Reason: Heparin


   Last Admin: 08/28/17 08:36 Dose:  1,000 unit


Heparin Sodium (Porcine) 25, (000 unit/ Sodium Chloride)  500 mls @ 16 mls/hr 

IV TITR AVA; 800 UNIT/HR


   PRN Reason: Protocol


   Last Admin: 08/28/17 08:36 Dose:  25 mls/hr


Morphine Sulfate (Morphine Injection -)  2 mg IVPUSH Q4H PRN


   PRN Reason: PAIN


   Last Admin: 08/28/17 08:40 Dose:  2 mg


Pneumococcal 13-Valent Conj Vacc (Prevnar 13 Syringe -)  0.5 ml IM .ONCE ONE


   Stop: 08/28/17 11:01


Valsartan (Diovan -)  80 mg PO DAILY [facility]


   Last Admin: 08/28/17 09:30 Dose:  80 mg











- Objective


Vital Signs: 


 Vital Signs











Temperature  98.1 F   08/28/17 05:40


 


Pulse Rate  96 H  08/28/17 05:40


 


Respiratory Rate  20   08/28/17 05:40


 


Blood Pressure  125/63   08/28/17 05:40


 


O2 Sat by Pulse Oximetry (%)  97   08/27/17 21:00











Neck: Yes: Supple


Cardiovascular: Yes: Regular Rate and Rhythm, Murmur (2/6 SM), S1, S2


Respiratory: Yes: CTA Bilaterally


Gastrointestinal: Yes: Normal Bowel Sounds, Soft.  No: Tenderness


Extremities: Yes: Calf Tenderness, Cool


Edema: No


Additional Findings/Remarks: 








Review of Systems


Constitutional: denies: Chills


Cardiovascular: As noted above


Respiratory: denies: Cough or Sputum Production


Gastrointestinal: denies: Nausea, Vomiting, Diarrhea, Constipation or Abdominal 

Pain


Musculoskeletal: reports: Bilateral Leg Discomfort 


Neurological: denies: Dizziness or Headache








Labs: 


 CBC, BMP





 08/28/17 05:35 





 08/28/17 05:35 











Problem List





- Problems


(1) Coronary artery disease


Code(s): I25.10 - ATHSCL HEART DISEASE OF NATIVE CORONARY ARTERY W/O ANG PCTRS 

  





(2) Hypertrophic cardiomyopathy


Code(s): I42.2 - OTHER HYPERTROPHIC CARDIOMYOPATHY





(3) Vascular occlusion


Code(s): I99.8 - OTHER DISORDER OF CIRCULATORY SYSTEM





(4) Atrial flutter


Code(s): I48.92 - UNSPECIFIED ATRIAL FLUTTER   Qualifiers: 


     Atrial flutter type: atypical        Qualified Code(s): I48.4 - Atypical 

atrial flutter  





(5) HTN (hypertension)


Code(s): I10 - ESSENTIAL (PRIMARY) HYPERTENSION   Qualifiers: 


     Hypertension type: essential hypertension        Qualified Code(s): I10 - 

Essential (primary) hypertension  





(6) Left ventricular systolic dysfunction


Code(s): I51.9 - HEART DISEASE, UNSPECIFIED








Assessment/Plan


1. Bilateral leg discomfort with evidence of vascular occlusion (occlusion of 

SFA bilaterally) likely embolic disease related to PAF with [id number] score 

of 6 on Heparin currently, on no therapy at home, poor compliance 


2. CAD with evidence of demand ischemic injury, non obstructive CAD on coronary 

angiogrpahy angina pectoris


3. LV systolic/diastolic dysfunction related to apical hypertrophic 

cardiomyopathy, chronic class I-II NYHA classification, compensated/euvolemic


4. Paroxysmal atypical atrial flutter/atrial tachycardia post cardioversion 

[id number] score of 6, recurrent arrhythmia, will require long term A/C


5. HTN


6. History of CKD


7. Poor compliance with medical therapy administration and medical F/U


8. Tobacco abuse





PLAN:


1. Continue Heparin pending vascular evaluation and intervention and will 

require long term A/C with Coumadin or NOAC's considering the above noted 

presentation and [id number] score 6 


2. Further vascular evaluation with angiography as per Vascular Surgery and 

possible thrombectomy


3. Continue Carvedilol and Diovan and titrate as tolerated


4. Continue ASA


5. Continue Lipitor


6. Counselled smoking cessation and abstinence


7. Echocardiography to evaluate LV size and function





Further plans are to follow


Olvin Hobbs MD

## 2017-08-28 NOTE — PN
Progress Note (short form)





- Note


Progress Note: 


Vascular Surgery 





Pt seen and examined. 


CTA still not read offiicially 


Will plan for open thrombectomy of bl lower ext [place]. 


Please optimize. 





Perry Chan DO

## 2017-08-29 LAB
ANION GAP SERPL CALC-SCNC: 6 MMOL/L (ref 8–16)
CALCIUM SERPL-MCNC: 8 MG/DL (ref 8.5–10.1)
CO2 SERPL-SCNC: 27 MMOL/L (ref 21–32)
CREAT SERPL-MCNC: 0.8 MG/DL (ref 0.7–1.3)
DEPRECATED RDW RBC AUTO: 14.7 % (ref 11.9–15.9)
GLUCOSE SERPL-MCNC: 80 MG/DL (ref 74–106)
MAGNESIUM SERPL-MCNC: 2.1 MG/DL (ref 1.8–2.4)
MCH RBC QN AUTO: 30.6 PG (ref 25.7–33.7)
MCHC RBC AUTO-ENTMCNC: 33 G/DL (ref 32–35.9)
MCV RBC: 92.6 FL (ref 80–96)
PHOSPHATE SERPL-MCNC: 3.5 MG/DL (ref 2.5–4.9)
PLATELET # BLD AUTO: 344 K/MM3 (ref 134–434)
PMV BLD: 8.9 FL (ref 7.5–11.1)
WBC # BLD AUTO: 9 K/MM3 (ref 4–10)

## 2017-08-29 PROCEDURE — 04CE0ZZ EXTIRPATION OF MATTER FROM RIGHT INTERNAL ILIAC ARTERY, OPEN APPROACH: ICD-10-PCS | Performed by: SURGERY

## 2017-08-29 PROCEDURE — B40FYZZ PLAIN RADIOGRAPHY OF RIGHT LOWER EXTREMITY ARTERIES USING OTHER CONTRAST: ICD-10-PCS | Performed by: SURGERY

## 2017-08-29 PROCEDURE — 04CN0ZZ EXTIRPATION OF MATTER FROM LEFT POPLITEAL ARTERY, OPEN APPROACH: ICD-10-PCS | Performed by: SURGERY

## 2017-08-29 PROCEDURE — 04CF0ZZ EXTIRPATION OF MATTER FROM LEFT INTERNAL ILIAC ARTERY, OPEN APPROACH: ICD-10-PCS | Performed by: SURGERY

## 2017-08-29 PROCEDURE — 04CS0ZZ EXTIRPATION OF MATTER FROM LEFT POSTERIOR TIBIAL ARTERY, OPEN APPROACH: ICD-10-PCS | Performed by: SURGERY

## 2017-08-29 PROCEDURE — B40GYZZ PLAIN RADIOGRAPHY OF LEFT LOWER EXTREMITY ARTERIES USING OTHER CONTRAST: ICD-10-PCS | Performed by: SURGERY

## 2017-08-29 RX ADMIN — CARVEDILOL SCH MG: 6.25 TABLET, FILM COATED ORAL at 22:44

## 2017-08-29 RX ADMIN — CARVEDILOL SCH MG: 6.25 TABLET, FILM COATED ORAL at 09:37

## 2017-08-29 RX ADMIN — HYDROMORPHONE HYDROCHLORIDE PRN MG: 1 INJECTION, SOLUTION INTRAMUSCULAR; INTRAVENOUS; SUBCUTANEOUS at 19:42

## 2017-08-29 RX ADMIN — HEPARIN SODIUM PRN UNIT: 5000 INJECTION, SOLUTION INTRAVENOUS; SUBCUTANEOUS at 09:35

## 2017-08-29 RX ADMIN — ASPIRIN SCH MG: 81 TABLET, COATED ORAL at 09:37

## 2017-08-29 RX ADMIN — HYDROMORPHONE HYDROCHLORIDE PRN MG: 1 INJECTION, SOLUTION INTRAMUSCULAR; INTRAVENOUS; SUBCUTANEOUS at 19:32

## 2017-08-29 RX ADMIN — HYDROMORPHONE HYDROCHLORIDE PRN MG: 1 INJECTION, SOLUTION INTRAMUSCULAR; INTRAVENOUS; SUBCUTANEOUS at 19:12

## 2017-08-29 RX ADMIN — VALSARTAN SCH MG: 80 TABLET, FILM COATED ORAL at 09:37

## 2017-08-29 RX ADMIN — HYDROMORPHONE HYDROCHLORIDE PRN MG: 1 INJECTION, SOLUTION INTRAMUSCULAR; INTRAVENOUS; SUBCUTANEOUS at 19:22

## 2017-08-29 RX ADMIN — SODIUM CHLORIDE SCH MLS/HR: 9 INJECTION, SOLUTION INTRAVENOUS at 09:35

## 2017-08-29 RX ADMIN — MORPHINE SULFATE PRN MG: 2 INJECTION, SOLUTION INTRAMUSCULAR; INTRAVENOUS at 01:54

## 2017-08-29 NOTE — PN
Progress Note, Physician


Chief Complaint: 


Events noted


Complains of right lower extremity pain





History of Present Illness: 


Patient was seen and examined. Awake and alert. Chart was reviewed


Denies chest pain, SOB or palpitations


As outlined regarding lower extremity pain


Await angiography





- Current Medication List


Current Medications: 


Active Medications





Aspirin (Ecotrin -)  81 mg PO DAILY [facility]


   Last Admin: 08/29/17 09:37 Dose:  81 mg


Atorvastatin Calcium (Lipitor -)  40 mg PO HS [facility]


   Last Admin: 08/28/17 21:34 Dose:  40 mg


Carvedilol (Coreg -)  6.25 mg PO BID [facility]


   Last Admin: 08/29/17 09:37 Dose:  6.25 mg


Heparin Sodium (Porcine) (Heparin -)  5,000 unit IVPUSH PRN PRN


   PRN Reason: Heparin


   Last Admin: 08/27/17 12:12 Dose:  5,000 unit


Heparin Sodium (Porcine) (Heparin -)  1,000 unit IVPUSH PRN PRN


   PRN Reason: Heparin


   Last Admin: 08/29/17 09:35 Dose:  1,000 unit


Heparin Sodium (Porcine) 25, (000 unit/ Sodium Chloride)  500 mls @ 16 mls/hr 

IV TITR AVA; 800 UNIT/HR


   PRN Reason: Protocol


   Last Admin: 08/29/17 09:35 Dose:  29 mls/hr


Morphine Sulfate (Morphine Injection -)  2 mg IVPUSH Q4H PRN


   PRN Reason: PAIN


   Last Admin: 08/29/17 01:54 Dose:  2 mg


Valsartan (Diovan -)  80 mg PO DAILY [facility]


   Last Admin: 08/29/17 09:37 Dose:  80 mg











- Objective


Vital Signs: 


 Vital Signs











Temperature  98.6 F   08/29/17 09:45


 


Pulse Rate  80   08/29/17 09:45


 


Respiratory Rate  18   08/29/17 09:45


 


Blood Pressure  120/86   08/29/17 09:45


 


O2 Sat by Pulse Oximetry (%)  96   08/28/17 20:11











Neck: Yes: Supple


Cardiovascular: Yes: Regular Rate and Rhythm, S1, S2


Respiratory: Yes: CTA Bilaterally


Gastrointestinal: Yes: Normal Bowel Sounds, Soft.  No: Tenderness


Edema: No


Additional Findings/Remarks: 








Review of Systems


Constitutional: denies: Chills


Cardiovascular: As noted above


Respiratory: denies: Cough or Sputum Production


Gastrointestinal: denies: Nausea, Vomiting, Diarrhea, Constipation or Abdominal 

Pain


Musculoskeletal: reports: Bilateral Leg Discomfort 


Neurological: denies: Dizziness or Headache








Labs: 


 CBC, BMP





 08/29/17 06:05 





 08/29/17 06:05 











Problem List





- Problems


(1) Coronary artery disease


Code(s): I25.10 - ATHSCL HEART DISEASE OF NATIVE CORONARY ARTERY W/O ANG PCTRS 

  





(2) Hypertrophic cardiomyopathy


Code(s): I42.2 - OTHER HYPERTROPHIC CARDIOMYOPATHY





(3) Vascular occlusion


Code(s): I99.8 - OTHER DISORDER OF CIRCULATORY SYSTEM





(4) Atrial flutter


Code(s): I48.92 - UNSPECIFIED ATRIAL FLUTTER   Qualifiers: 


     Atrial flutter type: atypical        Qualified Code(s): I48.4 - Atypical 

atrial flutter  





(5) HTN (hypertension)


Code(s): I10 - ESSENTIAL (PRIMARY) HYPERTENSION   Qualifiers: 


     Hypertension type: essential hypertension        Qualified Code(s): I10 - 

Essential (primary) hypertension  





(6) Left ventricular systolic dysfunction


Code(s): I51.9 - HEART DISEASE, UNSPECIFIED








Assessment/Plan


1. Bilateral leg discomfort with evidence of vascular occlusion (occlusion of 

SFA bilaterally) likely embolic disease related to PAF with [id number] score 

of 6 on Heparin currently, on no therapy at home, poor compliance 


2. CAD with evidence of demand ischemic injury, non obstructive CAD on coronary 

angiogrpahy angina pectoris


3. LV systolic/diastolic dysfunction related to apical hypertrophic 

cardiomyopathy, chronic class I-II NYHA classification, compensated/euvolemic. 

Cannot rule out infiltrative cardiomyopathy


4. Paroxysmal atypical atrial flutter/atrial tachycardia post cardioversion 

[id number] score of 6, recurrent arrhythmia, will require long term A/C


5. HTN


6. History of CKD


7. Poor compliance with medical therapy administration and medical F/U


8. Tobacco abuse





PLAN:


1. Continue Heparin pending vascular evaluation and intervention and will 

require long term A/C with Coumadin or NOAC considering the above noted 

presentation and [id number] score 6 


2. Further vascular evaluation with angiography as per Vascular Surgery and 

possible thrombectomy


3. Continue Carvedilol and Diovan and titrate as tolerated


4. Continue ASA


5. Continue Lipitor


6. Counselled smoking cessation and abstinence


7. Echocardiography report reviewed. 





Further plans are to follow


Olvin Hobbs MD

## 2017-08-29 NOTE — OP
Operative Note





- Note:


Operative Date: 08/29/17


Pre-Operative Diagnosis: bilateral lower extremity ischemia


Operation: Open thrombectomy bilateral iliac artery, sfa, popliteal artery,

tibial artery, with bilateral lower extremity angiogram


Findings: 


embolus bilateral lower ext 


Embolus sent to pathology 


Post-Operative Diagnosis: Same as Pre-op


Surgeon: Perry Chan


Anesthesia: General


Estimated Blood Loss (mls): 200


Operative Report Dictated: Yes

## 2017-08-29 NOTE — PN
Physical Exam: 


SUBJECTIVE: Patient seen and examined, EMR consulted. 





Overnight, pt had several episodes of non-sustained V-tach and PVCs, but nurse 

reports that pt was asymptomatic. Vascular note from yesterday appreciated. Pt 

still reports the same severity of right leg pain. He denies chest pain, SOB, 

or any other symptom. 








OBJECTIVE:





 Vital Signs











 Period  Temp  Pulse  Resp  BP Sys/Varghese  Pulse Ox


 


 Last 24 Hr  97.6 F-98.6 F    18-20  104-129/59-94  96-96











GENERAL: The patient is awake, alert, and fully oriented, in no acute distress.


HEAD: Normal with no signs of trauma.


EYES: PERRL, extraocular movements intact, sclera anicteric, conjunctiva clear. 

No ptosis. 


ENT: Ears normal, nares patent, oropharynx clear without exudates, moist mucous 


membranes.


NECK: Trachea midline, full range of motion, supple. 


LUNGS: Breath sounds equal, clear to auscultation bilaterally, no wheezes, no 

crackles, no 


accessory muscle use. 


HEART: irregularly irregular, no murmurs, rubs, or gallops


ABDOMEN: Soft, nontender, nondistended, normoactive bowel sounds, no guarding, 

no 


rebound, no hepatosplenomegaly, no masses.


EXTREMITIES: RLE and LLE are warmer compared to yesterday overall. Decreased 

temperature palpated at mid calf and distal. Sensation and motor strength is 

intact in both. 


NEUROLOGICAL: Cranial nerves II through XII grossly intact. Normal speech, gait 

not observed.


PSYCH: Normal mood, normal affect.


SKIN: Warm, dry, normal turgor, no rashes or lesions noted














 Laboratory Results - last 24 hr











  08/28/17 08/28/17 08/29/17





  15:10 21:54 06:05


 


WBC    9.0


 


RBC    4.14


 


Hgb    12.7


 


Hct    38.4


 


MCV    92.6


 


MCH    30.6


 


MCHC    33.0


 


RDW    14.7


 


Plt Count    344


 


MPV    8.9


 


PTT (Actin FS)  42.1 H  70.0 H D 


 


Sodium   


 


Potassium   


 


Chloride   


 


Carbon Dioxide   


 


Anion Gap   


 


BUN   


 


Creatinine   


 


Random Glucose   


 


Calcium   


 


Phosphorus   


 


Magnesium   














  08/29/17 08/29/17





  06:05 06:05


 


WBC  


 


RBC  


 


Hgb  


 


Hct  


 


MCV  


 


MCH  


 


MCHC  


 


RDW  


 


Plt Count  


 


MPV  


 


PTT (Actin FS)  49.0 H 


 


Sodium   138


 


Potassium   4.7


 


Chloride   105


 


Carbon Dioxide   27


 


Anion Gap   6 L


 


BUN   13


 


Creatinine   0.8


 


Random Glucose   80


 


Calcium   8.0 L


 


Phosphorus   3.5


 


Magnesium   2.1








Active Medications











Generic Name Dose Route Start Last Admin





  Trade Name Korin  PRN Reason Stop Dose Admin


 


Aspirin  81 mg 08/27/17 10:00 08/29/17 09:37





  Ecotrin -  PO   81 mg





  DAILY [facility]   Administration


 


Atorvastatin Calcium  40 mg 08/27/17 22:00 08/28/17 21:34





  Lipitor -  PO   40 mg





  HS AVA   Administration


 


Carvedilol  6.25 mg 08/27/17 10:00 08/29/17 09:37





  Coreg -  PO   6.25 mg





  BID [facility]   Administration


 


Heparin Sodium (Porcine)  5,000 unit 08/26/17 21:10 08/27/17 12:12





  Heparin -  IVPUSH   5,000 unit





  PRN PRN   Administration





  Heparin   


 


Heparin Sodium (Porcine)  1,000 unit 08/27/17 19:54 08/29/17 09:35





  Heparin -  IVPUSH   1,000 unit





  PRN PRN   Administration





  Heparin   


 


Heparin Sodium (Porcine) 25,  500 mls @ 16 mls/hr 08/26/17 21:15 08/29/17 09:35





  000 unit/ Sodium Chloride  IV   29 mls/hr





  TITR AVA   Administration





  Protocol   





  800 UNIT/HR   


 


Morphine Sulfate  2 mg 08/27/17 20:20 08/29/17 01:54





  Morphine Injection -  IVPUSH   2 mg





  Q4H PRN   Administration





  PAIN   


 


Valsartan  80 mg 08/27/17 10:00 08/29/17 09:37





  Diovan -  PO   80 mg





  DAILY [facility]   Administration











Echo: shows severe LVH, severe left atrial enlargement, moderate MR, moderate 

TR.





ASSESSMENT/PLAN:





77 year old man with a history of hypertrophic cardiomyopathy, non-obstructive 

CAD, chronic systolic/diastolic HF, HTN, PAF who presented to the ER with right 

leg pain likely secondary to thromboembolic disease.





#RLE pain secondary to PAD, possible thromboembolic disease


   -CTA of aorta with LE runoff


   - Vascular surgery consult appreciated, awaiting official CTA read by 

radiology, will need angiogram, pt made NPO last night for thrombectomy today.


   - Continue heparin IV





#Paroxysmal atrial fibrillation/flutter


   - Patient reported that he did not start taking Eliquis secondary to ongoing 

dental work


   -troponin trended down to 0.5 from 0.63


   - Continue Coreg 6.25mg PO BID and valsartan 80mg PO qd


   - Will need long-term anticoagulation - will hold off on starting oral 

medication until after surgery.





#CAD, non-obstructive


   - Continue aspirin 81, Coreg 6.25mg BID, atorvastatin 40mg qd





#Chronic systolic and diastolic heart failure


   - Stable


   - Continue Coreg 6.25mg PO BID and valsartan 80mg PO qd





#Hypertrophic cardiomyopathy


   -Coreg 6.25mg PO BID and valsartan 80mg PO qd





#HTN


   - Continue Coreg 6.25mg PO BID and valsartan 80mg PO qd





#Nicotine dependence


   - Discussed smoking cessation





#FEN/GI


-not on fluids


-electrolytes wnl





#Dispo


-pending thrombectomy by vascular 





--


Christiano Wong MD PGY1





Problem List





- Problems


(1) Coronary artery disease


Code(s): I25.10 - ATHSCL HEART DISEASE OF NATIVE CORONARY ARTERY W/O ANG PCTRS 

  





(2) Heart failure


Code(s): I50.9 - HEART FAILURE, UNSPECIFIED   





(3) Hypertrophic cardiomyopathy


Code(s): I42.2 - OTHER HYPERTROPHIC CARDIOMYOPATHY





(4) Nicotine dependence


Code(s): F17.200 - NICOTINE DEPENDENCE, UNSPECIFIED, UNCOMPLICATED   








Visit type





- Emergency Visit


Emergency Visit: Yes


ED Registration Date: 08/26/17


Care time: The patient presented to the Emergency Department on the above date 

and was hospitalized for further evaluation of their emergent condition.





- New Patient


This patient is new to me today: No





- Critical Care


Critical Care patient: No

## 2017-08-29 NOTE — PN
Teaching Attending Note


Name of Resident: Christiano Wong


ATTENDING PHYSICIAN STATEMENT





I saw and evaluated the patient.


I reviewed the resident's note and discussed the case with the resident.


I agree with the resident's findings and plan as documented.








SUBJECTIVE:


no fever ro chills, LE pain is better compared to yesterday





OBJECTIVE:


NAD 


CV: RRR


Lungs: CTAB 


ext: TTP to R calf , warm skin on legs and slightly cold on feet today. DP 

absent on both sides 














A/P 





This is a 77 year old man with a history of hypertrophic cardiomyopathy, non-

obstructive CAD, chronic systolic/diastolic HF, HTN, PAF who presented to the 

ER with right leg pain





1- RLE pain, due to arterial  occlusion ( thrombus vs embolus ) . 


CTA prelim report with possible thrombus in R common femoral artery and 

decreased flow on both sides. 





- COnt heparin 


- angiogram and thrmbectomy today


- long term AC choice to be d/w pt.  will call pharmacy to figure out insurance 

coverage  








2- A fib : 


- cont heparin gtt 


- needs long term AC, will discuss his options after procedure 


- cont coreg 





3- CAD: cont ASA and statin 





4- h/o chronic S/D heart failure . Euvolemic . echo reviewed,  cont to monitor 

. cont diovan 





HLOC

## 2017-08-29 NOTE — CONSULT
Consult


Consult Specialty:: Pulmonary critical care 


Referred by:: Dr. Chan 


Reason for Consultation:: s/p bilateral thrombectomy





- History of Present Illness


History of Present Illness: 


77 year old male with h/o MI (01/2017), HTN, PAF not on AC who presented to ED 

on 8/26 ? LE pain and inability to ambulate for the past 5 days. Was found to 

have bilateral femoral artery occlusion, now s/p bilateral open thrombectomy 

and angiogram. Admitted to ICU for monitoring. 





Pt initially presented on 8/26 ? severe LE pain for the past 5 days. As per 

notes states he was given a prescription for AC (Eliquis) back in january 

however didnt take it given extensive dental work. Initial work up in ER 

revealed decreased LE pulses and dopplers confirmed R sided occlusion from 

proximal superficial femoral artery and L sided occlusion from the distal 

superficial femoral artery.  He was started on heparin drip and admitted to 

telemetry service with vascular surgery following. Today underwent open 

thrombectomy of bilateral iliac artery, sfa, popliteal artery, tibial artery 

with bilateral lower extremity angiogram. He is now admitted to ICU for post-op 

monitoring. 





Active Medications





Aspirin (Ecotrin -)  81 mg PO DAILY [facility]


Atorvastatin Calcium (Lipitor -)  40 mg PO HS [facility]


Carvedilol (Coreg -)  6.25 mg PO BID [facility]


Heparin Sodium (Porcine) (Heparin -)  5,000 unit IVPUSH PRN PRN


   PRN Reason: Heparin


   Last Admin: 08/29/17 19:00 Dose:  5,000 unit


Heparin Sodium (Porcine) (Heparin -)  1,000 unit IVPUSH PRN PRN


   PRN Reason: Heparin


Hydromorphone HCl (Dilaudid Injection -)  1 mg IVPB Q6H PRN


   PRN Reason: PAIN


Heparin Sodium (Porcine) 25, (000 unit/ Sodium Chloride)  500 mls @ 20 mls/hr 

IV TITR AVA; 1,000 UNIT/HR


   PRN Reason: Protocol


Valsartan (Diovan -)  80 mg PO DAILY [facility]











- Past Medical History


Cardio/Vascular: Yes: AFIB, HTN, MI





- Past Surgical History


Past Surgical History: Yes: Hernia Repair





- Alcohol/Substance Use


Hx Alcohol Use: Yes (SOCIALLY)


History of Substance Use: reports: None





- Smoking History


Smoking history: Current every day smoker


Have you smoked in the past 12 months: Yes


Aproximately how many cigarettes per day: 10


If you are a former smoker, when did you quit?: 12.28.16





Home Medications





- Allergies


Allergies/Adverse Reactions: 


 Allergies











Allergy/AdvReac Type Severity Reaction Status Date / Time


 


No Known Allergies Allergy   Verified 08/26/17 15:54














- Home Medications


Home Medications: 


Ambulatory Orders





NK [No Known Home Medication]  08/26/17 











Physical Exam


Vital Signs: 


 Vital Signs











Temperature  97.9 F   08/29/17 21:40


 


Pulse Rate  78   08/29/17 21:40


 


Respiratory Rate  18   08/29/17 21:51


 


Blood Pressure  111/62   08/29/17 21:40


 


O2 Sat by Pulse Oximetry (%)  100   08/29/17 21:51











Cardiovascular: Yes: Pulse Irregular


Respiratory: Yes: CTA Bilaterally


Gastrointestinal: Yes: Normal Bowel Sounds, Abdomen, Obese


Extremities: Yes: Calf Tenderness (Tenderness below the knee), Cool (L cooler 

than R)


Peripheral Pulses WNL: No


Neurological: Yes: Alert, Oriented


Labs: 


 CBC, BMP





 08/29/17 06:05 





 08/29/17 06:05 





 Laboratory Results - last 24 hr











  08/28/17 08/29/17 08/29/17





  21:54 06:05 06:05


 


WBC   9.0 


 


RBC   4.14 


 


Hgb   12.7 


 


Hct   38.4 


 


MCV   92.6 


 


MCH   30.6 


 


MCHC   33.0 


 


RDW   14.7 


 


Plt Count   344 


 


MPV   8.9 


 


PTT (Actin FS)  70.0 H D   49.0 H


 


Sodium   


 


Potassium   


 


Chloride   


 


Carbon Dioxide   


 


Anion Gap   


 


BUN   


 


Creatinine   


 


Random Glucose   


 


Calcium   


 


Phosphorus   


 


Magnesium   














  08/29/17





  06:05


 


WBC 


 


RBC 


 


Hgb 


 


Hct 


 


MCV 


 


MCH 


 


MCHC 


 


RDW 


 


Plt Count 


 


MPV 


 


PTT (Actin FS) 


 


Sodium  138


 


Potassium  4.7


 


Chloride  105


 


Carbon Dioxide  27


 


Anion Gap  6 L


 


BUN  13


 


Creatinine  0.8


 


Random Glucose  80


 


Calcium  8.0 L


 


Phosphorus  3.5


 


Magnesium  2.1














Problem List





- Problems


(1) Heart failure


Code(s): I50.9 - HEART FAILURE, UNSPECIFIED   





(2) Hypertrophic cardiomyopathy


Code(s): I42.2 - OTHER HYPERTROPHIC CARDIOMYOPATHY





(3) Nicotine dependence


Code(s): F17.200 - NICOTINE DEPENDENCE, UNSPECIFIED, UNCOMPLICATED   





(4) Vascular occlusion


Code(s): I99.8 - OTHER DISORDER OF CIRCULATORY SYSTEM





(5) Atrial flutter


Code(s): I48.92 - UNSPECIFIED ATRIAL FLUTTER   Qualifiers: 


     Atrial flutter type: atypical        Qualified Code(s): I48.4 - Atypical 

atrial flutter  





(6) HTN (hypertension)


Code(s): I10 - ESSENTIAL (PRIMARY) HYPERTENSION   Qualifiers: 


     Hypertension type: essential hypertension        Qualified Code(s): I10 - 

Essential (primary) hypertension  





(7) NSTEMI (non-ST elevated myocardial infarction)


Code(s): I21.4 - NON-ST ELEVATION (NSTEMI) MYOCARDIAL INFARCTION








Assessment/Plan


Assessment: 77 year old male with h/o MI (01/2017), HTN, PAF not on AC who 

presented to ED on 8/26 with LE pain and inability to ambulate for the past 5 

days. Was found to have bilateral femoral artery occlusion, now s/p bilateral 

open thrombectomy and angiogram. Admitted to ICU for monitoring. 





PLAN: 


-vascular surgery following 


-pain meds prn 


-continue heparin drip


-cont aspirin


-frequent vascular checks 


-cont antiHTN meds

## 2017-08-30 LAB
ANION GAP SERPL CALC-SCNC: 9 MMOL/L (ref 8–16)
CALCIUM SERPL-MCNC: 7.7 MG/DL (ref 8.5–10.1)
CO2 SERPL-SCNC: 26 MMOL/L (ref 21–32)
CREAT SERPL-MCNC: 1 MG/DL (ref 0.7–1.3)
GLUCOSE SERPL-MCNC: 100 MG/DL (ref 74–106)

## 2017-08-30 RX ADMIN — CARVEDILOL SCH MG: 6.25 TABLET, FILM COATED ORAL at 09:21

## 2017-08-30 RX ADMIN — HYDROMORPHONE HYDROCHLORIDE PRN MG: 1 INJECTION, SOLUTION INTRAMUSCULAR; INTRAVENOUS; SUBCUTANEOUS at 21:08

## 2017-08-30 RX ADMIN — SODIUM CHLORIDE SCH MLS/HR: 9 INJECTION, SOLUTION INTRAVENOUS at 21:09

## 2017-08-30 RX ADMIN — ATORVASTATIN CALCIUM SCH MG: 40 TABLET, FILM COATED ORAL at 21:08

## 2017-08-30 NOTE — PN
Progress Note, Physician


Chief Complaint: 


Events noted


Post bilateral thrombectomy


Hemodynamically stable





History of Present Illness: 


Patient was seen and examined. Awake and alert. Chart was reviewed


Denies chest pain, SOB or palpitations


Lower extremity warm





- Current Medication List


Current Medications: 


Active Medications





Aspirin (Ecotrin -)  81 mg PO DAILY [facility]


   Last Admin: 08/30/17 09:21 Dose:  81 mg


Atorvastatin Calcium (Lipitor -)  40 mg PO HS [facility]


   Last Admin: 08/29/17 22:44 Dose:  40 mg


Carvedilol (Coreg -)  6.25 mg PO BID [facility]


   Last Admin: 08/30/17 09:21 Dose:  6.25 mg


Heparin Sodium (Porcine) (Heparin -)  5,000 unit IVPUSH PRN PRN


   PRN Reason: Heparin


   Last Admin: 08/29/17 19:00 Dose:  5,000 unit


Heparin Sodium (Porcine) (Heparin -)  1,000 unit IVPUSH PRN PRN


   PRN Reason: Heparin


Hydromorphone HCl (Dilaudid Injection -)  1 mg IVPB Q6H PRN


   PRN Reason: PAIN


   Last Admin: 08/30/17 09:20 Dose:  1 mg


Heparin Sodium (Porcine) 25, (000 unit/ Sodium Chloride)  500 mls @ 20 mls/hr 

IV TITR AVA; 1,000 UNIT/HR


   PRN Reason: Protocol


   Last Admin: 08/29/17 18:55 Dose:  20 mls/hr


Valsartan (Diovan -)  80 mg PO DAILY [facility]


   Last Admin: 08/30/17 09:21 Dose:  80 mg











- Objective


Vital Signs: 


 Vital Signs











Temperature  98.7 F   08/30/17 10:00


 


Pulse Rate  88   08/30/17 11:47


 


Respiratory Rate  18   08/30/17 11:47


 


Blood Pressure  93/60   08/30/17 11:47


 


O2 Sat by Pulse Oximetry (%)  100   08/30/17 08:42











Neck: Yes: Supple


Cardiovascular: Yes: Pulse Irregular, S1, S2


Respiratory: Yes: CTA Bilaterally


Gastrointestinal: Yes: Normal Bowel Sounds, Soft.  No: Tenderness


Edema: No


Additional Findings/Remarks: 








Review of Systems


Constitutional: denies: Chills


Cardiovascular: As noted above


Respiratory: denies: Cough or Sputum Production


Gastrointestinal: denies: Nausea, Vomiting, Diarrhea, Constipation or Abdominal 

Pain


Musculoskeletal: reports: Bilateral Leg Discomfort 


Neurological: denies: Dizziness or Headache








Labs: 


 CBC, BMP





 08/29/17 06:05 





 08/30/17 05:20 











Problem List





- Problems


(1) Coronary artery disease


Code(s): I25.10 - ATHSCL HEART DISEASE OF NATIVE CORONARY ARTERY W/O ANG PCTRS 

  





(2) Hypertrophic cardiomyopathy


Code(s): I42.2 - OTHER HYPERTROPHIC CARDIOMYOPATHY





(3) Vascular occlusion


Code(s): I99.8 - OTHER DISORDER OF CIRCULATORY SYSTEM





(4) Atrial flutter


Code(s): I48.92 - UNSPECIFIED ATRIAL FLUTTER   Qualifiers: 


     Atrial flutter type: atypical        Qualified Code(s): I48.4 - Atypical 

atrial flutter  





(5) HTN (hypertension)


Code(s): I10 - ESSENTIAL (PRIMARY) HYPERTENSION   Qualifiers: 


     Hypertension type: essential hypertension        Qualified Code(s): I10 - 

Essential (primary) hypertension  





(6) Left ventricular systolic dysfunction


Code(s): I51.9 - HEART DISEASE, UNSPECIFIED








Assessment/Plan


1. Bilateral leg discomfort with evidence of vascular occlusion (occlusion of 

SFA bilaterally) likely embolic disease related to PAF with [id number] score 

of 6 on Heparin currently, on no therapy at home, poor compliance  s/p 

angiogram + bilateral thrombectomy


2. CAD with evidence of demand ischemic injury, non obstructive CAD on coronary 

angiogrpahy angina pectoris


3. LV systolic/diastolic dysfunction, cannot rule out infiltrative 

cardiomyopathy, chronic class I-II NYHA classification, compensated/euvolemic.


4. Paroxysmal atypical atrial flutter/atrial tachycardia post cardioversion 

[id number] score of 6, recurrent arrhythmia, will require long term A/C


5. HTN


6. History of CKD


7. Poor compliance with medical therapy administration and medical F/U


8. Tobacco abuse





PLAN:


1. Continue Heparin and will require long term A/C with Coumadin or NOAC 

considering the above noted presentation and [id number] score 6 


2. Vascular surgery follow up


3. Continue Carvedilol and Diovan and titrate as tolerated


4. Continue ASA


5. Continue Lipitor


6. Counselled smoking cessation and abstinence


7. Further cardiac evaluation can be done as outpatient 





Further plans are to follow


Olvin Hobbs MD

## 2017-08-30 NOTE — PN
Physical Exam: 


SUBJECTIVE: Patient seen and examined by me 


- No complaints. No major events overnight. Denies fever, chills, HA, vision 

changes, lightheadness, CP, SOB, abdominal pain, weakness. 


- Endorses mild tenderness in R foot. BL leg pain much improved post-procedure. 

BL LE well-perfused w/ 2+ pulses


- Plan for transfer to med-surg today. 


- Further management per surgical team.  


- Hypotensive to high 80s in AM during rounds. Coreg held. 1L NS given. 





OBJECTIVE:


 Intake & Output











 08/27/17 08/28/17 08/29/17 08/30/17





 23:59 23:59 23:59 23:59


 


Intake Total 1202 1379 1674 180


 


Output Total  400


 


Balance 352 429 274 -220


 


Weight 97.545 kg   











 Vital Signs











 Period  Temp  Pulse  Resp  BP Sys/Varghese  Pulse Ox


 


 Last 24 Hr  97.9 F-98.6 F  71-90  16-22  /44-90  











GENERAL: The patient is awake, alert, and fully oriented, in no acute distress.


HEAD: Normal with no signs of trauma. Alopecia


EYES: PERRL, extraocular movements intact, sclera anicteric, conjunctiva clear. 

No ptosis. 


ENT: Ears normal, nares patent, oropharynx clear without exudates, moist mucous 

membranes.


NECK: Trachea midline, full range of motion, supple. 


LUNGS: Breath sounds equal, clear to auscultation bilaterally, no wheezes, no 

crackles, no accessory muscle use. 


HEART: Regular rate and rhythm, S1, S2 without murmur, rub or gallop.


ABDOMEN: Soft, nontender, nondistended, normoactive bowel sounds, no guarding, 

no rebound, no hepatosplenomegaly, no masses.


EXTREMITIES: 2+ DP, PT pulses BL. Extremities warm and well perfused


NEUROLOGICAL: Cranial nerves II through XII grossly intact. Normal speech, gait 

not observed. Preserved sensation BL in LEs. 5/5 strength in both legs. 


PSYCH: Normal mood, normal affect.


: Bilateral inguinal dressings at arterial puncture sites. No evidence of 

significant hemorrhage, hematoma or drainage. 

















 Laboratory Results - last 24 hr


 CBC, BMP





 08/29/17 06:05 





 08/30/17 05:20 














  08/29/17 08/30/17 08/30/17





  06:05 01:00 05:20


 


PTT (Actin FS)   65.5 H D 


 


Sodium  138   139


 


Potassium  4.7   5.2 H


 


Chloride  105   104


 


Carbon Dioxide  27   26


 


Anion Gap  6 L   9


 


BUN  13   17  D


 


Creatinine  0.8   1.0  D


 


Random Glucose  80   100  D


 


Calcium  8.0 L   7.7 L


 


Phosphorus  3.5  


 


Magnesium  2.1  








 Laboratory Results - last 24 hr











  08/29/17 08/30/17 08/30/17





  06:05 01:00 05:20


 


PTT (Actin FS)   65.5 H D 


 


Sodium  138   139


 


Potassium  4.7   5.2 H


 


Chloride  105   104


 


Carbon Dioxide  27   26


 


Anion Gap  6 L   9


 


BUN  13   17  D


 


Creatinine  0.8   1.0  D


 


Random Glucose  80   100  D


 


Calcium  8.0 L   7.7 L


 


Phosphorus  3.5  


 


Magnesium  2.1  











Active Medications











Generic Name Dose Route Start Last Admin





  Trade Name Freq  PRN Reason Stop Dose Admin


 


Aspirin  81 mg 08/30/17 10:00  





  Ecotrin -  PO   





  DAILY [facility]   


 


Atorvastatin Calcium  40 mg 08/29/17 22:00 08/29/17 22:44





  Lipitor -  PO   40 mg





  HS [facility]   Administration


 


Carvedilol  6.25 mg 08/29/17 22:00 08/29/17 22:44





  Coreg -  PO   6.25 mg





  BID [facility]   Administration


 


Heparin Sodium (Porcine)  5,000 unit 08/29/17 18:53 08/29/17 19:00





  Heparin -  IVPUSH   5,000 unit





  PRN PRN   Administration





  Heparin   


 


Heparin Sodium (Porcine)  1,000 unit 08/29/17 18:54  





  Heparin -  IVPUSH   





  PRN PRN   





  Heparin   


 


Hydromorphone HCl  1 mg 08/29/17 20:25  





  Dilaudid Injection -  IVPB   





  Q6H PRN   





  PAIN   


 


Heparin Sodium (Porcine) 25,  500 mls @ 20 mls/hr 08/29/17 19:00 08/29/17 18:55





  000 unit/ Sodium Chloride  IV   20 mls/hr





  TITR [facility]   Administration





  Protocol   





  1,000 UNIT/HR   


 


Valsartan  80 mg 08/30/17 10:00  





  Diovan -  PO   





  DAILY [facility]   








CTA- Abdomen (8/29): 





IMPRESSION: Occlusive clot seen throughout the right and left SFA's, popliteal 

and infrapopliteal arteries with minimal flow seen in the distal right leg via 

the right peroneal artery and on the left via the posterior tibial artery. No 

appreciable flow seen to the right and left feet. Findings are suspicious for 

emboli versus in situ thrombi. 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 

common iliac arteries. Eccentric noncalcified thrombi in the infrarenal 

abdominal aorta with suggestion of ulcerated plaque. Occluded АНДРЕЙ with 

restitution flow in the АНДРЕЙ branches via the SMA branches. Celiac trunk and SMA 

with a branches are grossly patent. Renal arteries are widely patent and no 

renal infarcts seen. Evaluation of the spleen for infarcts is limited due to 

heterogeneous phase of enhancement. Splenic artery is grossly patent. 1 cm 

right lung base focal atelectasis versus pleural-based nodule. Follow-up CT of 

the chest in 3 months is suggested. Small to moderate pericardial effusion. 

Correlation with echocardiography is suggested. Diffuse colonic diverticulosis 

most extensive in the descending and sigmoid colon. Dilated CBD up to 1 cm but 

with no intrahepatic biliary ductal dilatation. Correlation with LFTs and 

bilirubin level is needed. If clinically indicated further evaluation with MRCP 

may be obtained. Small left inguinal fat-containing hernia with small amount of 

fluid in the left inguinal canal and bilateral hydrocele. Correlation with 

clinical history is needed. If clinically indicated further evaluation with 

scrotal ultrasound may be obtained. 1 cm left renal lesion measuring around 40 

Hounsfield unit. This can be further evaluated with MRI without and with 

gadolinium. 





Echo (8/28): Normal LV size, severe LV concentric hypertrophy, mild LV 

hypokinesis. L atrium dilated. Moderate MR and TR. Mild pulm valve 

regurgitation. 





BL LE Arterial duplex (8/26): 


Evaluation of the right leg demonstrates no definite flow within right 

superficial femoral artery at the level of the proximal, mid and distal thirds. 

There is also no definite flow at the level of the right popliteal or posterior 

tibial arteries. 


Evaluation of the left leg demonstrates no definite flow within the distal 

third of the superficial femoral artery. There is also no definite flow within 

the popliteal or posterior tibial arteries. 


Impression: Bilateral arterial occlusion is seen as discussed above. 








ASSESSMENT/PLAN:


76 yo male w/ h/o MI (Jan 2017), HTN, PAF (currently not on AC), who came to ED 

on 8/26 w/ persistent LE pain and inability to ambulate. Discovered to have 

bilateral femoral arterial occlusions on arterial duplex and confirmed with CTA 

of Ab/LEs. Now s/p bilateral thrombectomy and angiogram, currently admitted to 

the ICU for post-op monitoring. Pt doing well, with no complaints, endorsing 

significant improvement in bilateral LE pain. Still endorses mild residual pain 

in R foot. LEs warm and well-perfused with palpable pulses. Remaining PE 

unremarkable. Good pain control with Dilaudid 1mg IV Q6H. Pt hypotensive during 

morning rounds, coreg held, bolused 1L NS. Will trend vitals. Plan for transfer 

to med-surg for further care. 





#Neuro


Dilaudid 1mg IV Q6h for pain control








#Cardiac


Coreg held due to hypotension. 


Valsartan 80 PO daily


Statin 40mg PO


Vascular surgery following


Q4h pulse checks





#Pulm


O2 support with 2L NC


Monitor sats


Titrate to >94% sat





#Heme


ASA 81 mg PO 


Heparin gtt


OOB today in PM if vascular surgery approves





#Renal


PO fluids


Daily lytes





#PPx


Heparin gtt for DVT ppx





FEN


Fluid: Bolus 1L NS


Electrolytes: Monitor lytes w/ daily BMP


Nutrition: Cardiac diet





Dispo: Transfer to med-surg for further management. Plan per vascular surgery 

team. 





Orlando August, PGY1





Plan discussed with attending, Dr. Middleton











Visit type





- Emergency Visit


Emergency Visit: No





- New Patient


This patient is new to me today: Yes


Date on this admission: 08/30/17





- Critical Care


Critical Care patient: Yes


Total Critical Care Time (in minutes): 35


Critical Care Statement: The care of this patient involved high complexity 

decision making to prevent further life threatening deterioration of the patient

's condition and/or to evaluate & treat vital organ system(s) failure or risk 

of failure.

## 2017-08-30 NOTE — PN
Physical Exam: 


SUBJECTIVE: Patient seen and examined, EMR consulted. 





Pt underwent a b/l thrombectomy yesterday afternoon. Overnight resident 

performed a post-op check on pt, and found the pt to be stable with groin 

incision sites showing no signs of infection. No acute events overnight. Pt 

reports no more leg pain. He states that he just feels "like something was done 

to his feet." He denies SOB, chest pain, nausea, emesis, and lightheadedness. 

He endorses having an appetite, and states that he is urinating ok without 

dysuria. 








OBJECTIVE:





 Vital Signs











 Period  Temp  Pulse  Resp  BP Sys/Varghese  Pulse Ox


 


 Last 24 Hr  97.9 F-98.7 F  71-90  16-22  /44-90  











GENERAL: The patient is awake, alert, and fully oriented, in no acute distress.


HEAD: Normal with no signs of trauma.


EYES: PERRL, extraocular movements intact, sclera anicteric, conjunctiva clear. 

No ptosis. 


ENT: Ears normal, nares patent, oropharynx clear without exudates, moist mucous 


membranes.


NECK: Trachea midline, full range of motion, supple. 


LUNGS: Breath sounds equal, clear to auscultation bilaterally, no wheezes, no 

crackles, no 


accessory muscle use. 


HEART: Regular rate and rhythm, S1, S2 without murmur, rub or gallop.


ABDOMEN: Soft, nontender, nondistended, normoactive bowel sounds, no guarding, 

no 


rebound, no hepatosplenomegaly, no masses.


EXTREMITIES: bandages over both groins, non-tender to palpation. Both legs are 

warm to touch, feet are slightly cold but markedly warmer than yesterday. 

Sensory and motor function is intact in both feet and legs. Distal pulses were 

difficult to palpate, doppler not used.


NEUROLOGICAL: Cranial nerves II through XII grossly intact. Normal speech, gait 

not observed.


PSYCH: Normal mood, normal affect.


SKIN: Warm, dry, normal turgor, no rashes or lesions noted














 Laboratory Results - last 24 hr











  08/30/17 08/30/17





  01:00 05:20


 


PTT (Actin FS)  65.5 H D 


 


Sodium   139


 


Potassium   5.2 H


 


Chloride   104


 


Carbon Dioxide   26


 


Anion Gap   9


 


BUN   17  D


 


Creatinine   1.0  D


 


Random Glucose   100  D


 


Calcium   7.7 L








Active Medications











Generic Name Dose Route Start Last Admin





  Trade Name Korin  PRN Reason Stop Dose Admin


 


Aspirin  81 mg 08/30/17 10:00 08/30/17 09:21





  Ecotrin -  PO   81 mg





  DAILY [facility]   Administration


 


Atorvastatin Calcium  40 mg 08/29/17 22:00 08/29/17 22:44





  Lipitor -  PO   40 mg





  HS [facility]   Administration


 


Carvedilol  6.25 mg 08/29/17 22:00 08/30/17 09:21





  Coreg -  PO   6.25 mg





  BID [facility]   Administration


 


Heparin Sodium (Porcine)  5,000 unit 08/29/17 18:53 08/29/17 19:00





  Heparin -  IVPUSH   5,000 unit





  PRN PRN   Administration





  Heparin   


 


Heparin Sodium (Porcine)  1,000 unit 08/29/17 18:54  





  Heparin -  IVPUSH   





  PRN PRN   





  Heparin   


 


Hydromorphone HCl  1 mg 08/29/17 20:25 08/30/17 09:20





  Dilaudid Injection -  IVPB   1 mg





  Q6H PRN   Administration





  PAIN   


 


Heparin Sodium (Porcine) 25,  500 mls @ 20 mls/hr 08/29/17 19:00 08/29/17 18:55





  000 unit/ Sodium Chloride  IV   20 mls/hr





  TITR [facility]   Administration





  Protocol   





  1,000 UNIT/HR   


 


Valsartan  80 mg 08/30/17 10:00 08/30/17 09:21





  Diovan -  PO   80 mg





  DAILY [facility]   Administration











ASSESSMENT/PLAN:





77 year old man with a history of hypertrophic cardiomyopathy, non-obstructive 

CAD, chronic systolic/diastolic HF, HTN, PAF who presented to the ER with right 

leg pain found to be 2/2 PAD/thromboembolic disease, s/p b/l leg thrombectomy, 

currently stable and improving. 





#RLE pain secondary to PAD/thromboembolic disease


   - s/p b/l thrombectomy on 8/29


   - pain control with dilaudid 1mg IV q6h PRN


   - Continue heparin IV


   - discussed risks, benefits, and different aspects of various AC drugs with 

pt. After considering all options, pt amenable to starting xarelto.


   - will touch base with vascular about best choice of AC and when to D/C pt





#Paroxysmal atrial fibrillation/flutter


   - Patient reported that he did not start taking Eliquis secondary to ongoing 

dental work


   - Continue Coreg 6.25mg PO BID and valsartan 80mg PO qd


   - AC discussed with pt, will touch base with vascular as well





#CAD, non-obstructive


   - Continue aspirin 81, Coreg 6.25mg BID, atorvastatin 40mg qd





#Chronic systolic and diastolic heart failure


   - Stable


   - Continue Coreg 6.25mg PO BID and valsartan 80mg PO qd





#Hypertrophic cardiomyopathy


   -Coreg 6.25mg PO BID and valsartan 80mg PO qd





#HTN


   - Continue Coreg 6.25mg PO BID and valsartan 80mg PO qd





#Nicotine dependence


   - Discussed smoking cessation





#FEN/GI


-not on fluids


-electrolytes wnl


-diabetic diet


-not on GI ppx





#Dispo


-pending vascular 


--


Christiano Wong MD PGY1








Problem List





- Problems


(1) Coronary artery disease


Code(s): I25.10 - ATHSCL HEART DISEASE OF NATIVE CORONARY ARTERY W/O ANG PCTRS 

  





(2) Heart failure


Code(s): I50.9 - HEART FAILURE, UNSPECIFIED   





(3) Hypertrophic cardiomyopathy


Code(s): I42.2 - OTHER HYPERTROPHIC CARDIOMYOPATHY





(4) Nicotine dependence


Code(s): F17.200 - NICOTINE DEPENDENCE, UNSPECIFIED, UNCOMPLICATED   








Visit type





- Emergency Visit


Emergency Visit: Yes


ED Registration Date: 08/26/17


Care time: The patient presented to the Emergency Department on the above date 

and was hospitalized for further evaluation of their emergent condition.





- New Patient


This patient is new to me today: No





- Critical Care


Critical Care patient: No

## 2017-08-30 NOTE — PN
Progress Note, Physician


Chief Complaint: 


Pt. comfortable, pain controlled, no anesthesia complications.








- Current Medication List


Current Medications: 


Active Medications





Aspirin (Ecotrin -)  81 mg PO DAILY [facility]


Atorvastatin Calcium (Lipitor -)  40 mg PO HS [facility]


   Last Admin: 08/29/17 22:44 Dose:  40 mg


Carvedilol (Coreg -)  6.25 mg PO BID [facility]


   Last Admin: 08/29/17 22:44 Dose:  6.25 mg


Heparin Sodium (Porcine) (Heparin -)  5,000 unit IVPUSH PRN PRN


   PRN Reason: Heparin


   Last Admin: 08/29/17 19:00 Dose:  5,000 unit


Heparin Sodium (Porcine) (Heparin -)  1,000 unit IVPUSH PRN PRN


   PRN Reason: Heparin


Hydromorphone HCl (Dilaudid Injection -)  1 mg IVPB Q6H PRN


   PRN Reason: PAIN


Heparin Sodium (Porcine) 25, (000 unit/ Sodium Chloride)  500 mls @ 20 mls/hr 

IV TITR [facility]; 1,000 UNIT/HR


   PRN Reason: Protocol


   Last Admin: 08/29/17 18:55 Dose:  20 mls/hr


Valsartan (Diovan -)  80 mg PO DAILY [facility]











- Objective


Vital Signs: 


 Vital Signs











Temperature  98.2 F   08/30/17 06:00


 


Pulse Rate  84   08/30/17 06:00


 


Respiratory Rate  16   08/30/17 06:00


 


Blood Pressure  108/78   08/30/17 06:00


 


O2 Sat by Pulse Oximetry (%)  100   08/29/17 21:51











Constitutional: Yes: Well Nourished, No Distress, Calm


Neurological: Yes: WNL, Alert, Oriented


...Motor Strength: WNL


Labs: 


 CBC, BMP





 08/29/17 06:05 





 08/30/17 05:20 











Assessment/Plan


POD#1 s/p Bilateral lower extremity open embolectomy. Doing well.


D/C from anesthesia care.

## 2017-08-30 NOTE — PN
Progress Note (short form)





- Note


Progress Note: 


POD #1





Alert. Doing well. C/o incisional (groins) tenderness. Pain controlled well via 

PRN meds. States the pain in his feet has greatly diminished s/p surgery. On 

Heparin gtt . Denies n/v/f/c, CP or SOB.





 Last Vital Signs











Temp Pulse Resp BP Pulse Ox


 


 98.2 F   84   16   108/78   100 


 


 08/30/17 06:00  08/30/17 06:00  08/30/17 06:00  08/30/17 06:00  08/29/17 21:51








 CBC, BMP





 08/29/17 06:05 





 08/30/17 05:20 





PE


Gen: alert. nad.


Abd: soft. NT. ND.


LE: b/l groin incisions intact. Minimal oozing to rt dressing. No hematoma b/l. 

Feet/toes warm b/l. Dopplerable PT/DP b/l. Mild edema to LE b/l (most likely 

from re-perfusion).





Problem List





- Problems


(1) Vascular occlusion


Assessment/Plan: 


POD #1 s/p  Open thrombectomy b/l iliac artery, sfa, popliteal artery,tibial 

artery, with b/l lower extremity angiogram





Cont Hep gtt


Cont ICU management


Downgrade to floor at ICU discretion


Code(s): I99.8 - OTHER DISORDER OF CIRCULATORY SYSTEM

## 2017-08-30 NOTE — PN
Teaching Attending Note


Name of Resident: Christiano Wong


ATTENDING PHYSICIAN STATEMENT





I saw and evaluated the patient.


I reviewed the resident's note and discussed the case with the resident.


I agree with the resident's findings and plan as documented.








SUBJECTIVE:





Patient is comfortable with no acute distress, no shortness of breath, no 

nausea or vomiting, no further lower extremity pain.


OBJECTIVE:


 Vital Signs











Temperature  98.8 F   08/30/17 13:20


 


Pulse Rate  85   08/30/17 13:20


 


Respiratory Rate  18   08/30/17 13:20


 


Blood Pressure  88/62   08/30/17 13:20


 


O2 Sat by Pulse Oximetry (%)  100   08/30/17 08:42








 CBCD











WBC  9.0 K/mm3 (4.0-10.0)   08/29/17  06:05    


 


RBC  4.14 M/mm3 (4.00-5.60)   08/29/17  06:05    


 


Hgb  12.7 GM/dL (11.7-16.9)   08/29/17  06:05    


 


Hct  38.4 % (35.4-49)   08/29/17  06:05    


 


MCV  92.6 fl (80-96)   08/29/17  06:05    


 


MCHC  33.0 g/dl (32.0-35.9)   08/29/17  06:05    


 


RDW  14.7 % (11.9-15.9)   08/29/17  06:05    


 


Plt Count  344 K/MM3 (134-434)   08/29/17  06:05    


 


MPV  8.9 fl (7.5-11.1)   08/29/17  06:05    








 CMP











Sodium  139 mmol/L (136-145)   08/30/17  05:20    


 


Potassium  5.2 mmol/L (3.5-5.1)  H  08/30/17  05:20    


 


Chloride  104 mmol/L ()   08/30/17  05:20    


 


Carbon Dioxide  26 mmol/L (21-32)   08/30/17  05:20    


 


Anion Gap  9  (8-16)   08/30/17  05:20    


 


BUN  17 mg/dL (7-18)  D 08/30/17  05:20    


 


Creatinine  1.0 mg/dL (0.7-1.3)  D 08/30/17  05:20    


 


Creat Clearance w eGFR  > 60  (>60)   08/27/17  06:30    


 


Random Glucose  100 mg/dL ()  D 08/30/17  05:20    


 


Calcium  7.7 mg/dL (8.5-10.1)  L  08/30/17  05:20    


 


Total Bilirubin  0.5 mg/dL (0.2-1.0)  D 08/27/17  06:30    


 


AST  15 U/L (15-37)   08/27/17  06:30    


 


ALT  17 U/L (12-78)   08/27/17  06:30    


 


Alkaline Phosphatase  65 U/L ()   08/27/17  06:30    


 


Total Protein  6.1 g/dl (6.4-8.2)  L  08/27/17  06:30    


 


Albumin  2.4 g/dl (3.4-5.0)  L  08/27/17  06:30    








 CARDIAC ENZYMES











Creatine Kinase  282 IU/L ()   08/26/17  18:45    


 


Troponin I  0.50 ng/ml (0.00-0.05)  H  08/28/17  05:35    








 Current Medications











Generic Name Dose Route Start Last Admin





  Trade Name Freq  PRN Reason Stop Dose Admin


 


Aspirin  81 mg 08/30/17 10:00 08/30/17 09:21





  Ecotrin -  PO   81 mg





  DAILY [facility]   Administration


 


Atorvastatin Calcium  40 mg 08/29/17 22:00 08/29/17 22:44





  Lipitor -  PO   40 mg





  HS [facility]   Administration


 


Carvedilol  6.25 mg 08/29/17 22:00 08/30/17 09:21





  Coreg -  PO   6.25 mg





  BID [facility]   Administration


 


Heparin Sodium (Porcine)  5,000 unit 08/29/17 18:53 08/29/17 19:00





  Heparin -  IVPUSH   5,000 unit





  PRN PRN   Administration





  Heparin   


 


Heparin Sodium (Porcine)  1,000 unit 08/29/17 18:54  





  Heparin -  IVPUSH   





  PRN PRN   





  Heparin   


 


Hydromorphone HCl  1 mg 08/29/17 20:25 08/30/17 09:20





  Dilaudid Injection -  IVPB   1 mg





  Q6H PRN   Administration





  PAIN   


 


Heparin Sodium (Porcine) 25,  500 mls @ 20 mls/hr 08/29/17 19:00 08/29/17 18:55





  000 unit/ Sodium Chloride  IV   20 mls/hr





  TITR [facility]   Administration





  Protocol   





  1,000 UNIT/HR   


 


Sodium Chloride  1,000 mls @ 75 mls/hr 08/30/17 13:30  





  Normal Saline -  IV   





  ASDIR [facility]   


 


Valsartan  80 mg 08/30/17 10:00 08/30/17 09:21





  Diovan -  PO   80 mg





  DAILY [facility]   Administration








 Home Medications











 Medication  Instructions  Recorded


 


NK [No Known Home Medication]  08/26/17








PE: palpable pulses of lower extremities, warm to touch.


rest of PE per resident's note





CTA report with possible thrombus in R common femoral artery and decreased flow 

on both sides. 


 


ASSESSMENT AND PLAN:


This is a 77 year old man with a history of hypertrophic cardiomyopathy, non-

obstructive CAD, chronic systolic/diastolic HF, HTN, PAF who presented to the 

ER with right leg pain





# Acute arterial occlusion of Right lower extremity s/p b/l thrombectomy (8/29) 

by Vascular DrCheo Chan , on Heparin drip. 


patient needs  long term AC , will need to check with his pharmacy which NOac 

will be covered , ON IV dilaudid prn.





# A fib :  cont heparin gtt , on coreg continue, needs long term AC





# CAD: cont ASA and statin 





#  Hx of  chronic Systolic and diastolic  heart failure . Euvolemic . echo 

reviewed,  cont to monitor . cont diovan 





DVT PX: heparin drip

## 2017-08-30 NOTE — PN
Teaching Attending Note


Name of Resident: Orlando August


ATTENDING PHYSICIAN STATEMENT





I saw and evaluated the patient.


I reviewed the resident's note and discussed the case with the resident.


I agree with the resident's findings and plan as documented.








SUBJECTIVE:





Pt seen and examined in the ICU. Denies pain, shortness of breath or chest pain.





OBJECTIVE:





 Last Vital Signs











Temp Pulse Resp BP Pulse Ox


 


 98.7 F   80   18   86/68   100 


 


 08/30/17 10:00  08/30/17 10:00  08/30/17 10:00  08/30/17 10:00  08/30/17 08:42








 Intake & Output











 08/27/17 08/28/17 08/29/17 08/30/17





 23:59 23:59 23:59 23:59


 


Intake Total 1202 1379 1674 180


 


Output Total  400


 


Balance 352 429 274 -220


 


Weight 215 lb 0.8 oz   








Gen:  NAD at rest


Heart: RRR


Lung: decreased breath sounds at the bases


Abd: soft, nontender


Ext: no edema, +DP





 CBC, BMP





 08/29/17 06:05 





 08/30/17 05:20 





Active Medications





Aspirin (Ecotrin -)  81 mg PO DAILY [facility]


   Last Admin: 08/30/17 09:21 Dose:  81 mg


Atorvastatin Calcium (Lipitor -)  40 mg PO HS [facility]


   Last Admin: 08/29/17 22:44 Dose:  40 mg


Carvedilol (Coreg -)  6.25 mg PO BID [facility]


   Last Admin: 08/30/17 09:21 Dose:  6.25 mg


Heparin Sodium (Porcine) (Heparin -)  5,000 unit IVPUSH PRN PRN


   PRN Reason: Heparin


   Last Admin: 08/29/17 19:00 Dose:  5,000 unit


Heparin Sodium (Porcine) (Heparin -)  1,000 unit IVPUSH PRN PRN


   PRN Reason: Heparin


Hydromorphone HCl (Dilaudid Injection -)  1 mg IVPB Q6H PRN


   PRN Reason: PAIN


   Last Admin: 08/30/17 09:20 Dose:  1 mg


Heparin Sodium (Porcine) 25, (000 unit/ Sodium Chloride)  500 mls @ 20 mls/hr 

IV TITR [facility]; 1,000 UNIT/HR


   PRN Reason: Protocol


   Last Admin: 08/29/17 18:55 Dose:  20 mls/hr


Sodium Chloride (Normal Saline -)  1,000 mls @ 1,000 mls/hr IV ASDIR STA


   Stop: 08/30/17 11:49


   Last Admin: 08/30/17 11:14 Dose:  1,000 mls/hr


Valsartan (Diovan -)  80 mg PO DAILY [facility]


   Last Admin: 08/30/17 09:21 Dose:  80 mg








ASSESSMENT AND PLAN:


SFA Occlusion/Emboli


s/p angiogram/bilateral open thrombectomy


Paroxysmal Atrial Fibrillation


CAD


LV Diastolic/Systolic Dysfunction


HTN


CKD


Smoker





-  continue anticoagulation


-  pulse checks


-  ASA


-  smoking cessation


-  OOB when ok with surgery


-  can monitor on floor

## 2017-08-31 LAB
ANION GAP SERPL CALC-SCNC: 9 MMOL/L (ref 8–16)
CALCIUM SERPL-MCNC: 7.7 MG/DL (ref 8.5–10.1)
CO2 SERPL-SCNC: 25 MMOL/L (ref 21–32)
CREAT SERPL-MCNC: 1 MG/DL (ref 0.7–1.3)
DEPRECATED RDW RBC AUTO: 14.8 % (ref 11.9–15.9)
GLUCOSE SERPL-MCNC: 99 MG/DL (ref 74–106)
MCH RBC QN AUTO: 31 PG (ref 25.7–33.7)
MCHC RBC AUTO-ENTMCNC: 33.1 G/DL (ref 32–35.9)
MCV RBC: 93.7 FL (ref 80–96)
PLATELET # BLD AUTO: 307 K/MM3 (ref 134–434)
PMV BLD: 9.6 FL (ref 7.5–11.1)
WBC # BLD AUTO: 10.6 K/MM3 (ref 4–10)

## 2017-08-31 RX ADMIN — VALSARTAN SCH: 80 TABLET, FILM COATED ORAL at 10:00

## 2017-08-31 RX ADMIN — HYDROMORPHONE HYDROCHLORIDE PRN MG: 1 INJECTION, SOLUTION INTRAMUSCULAR; INTRAVENOUS; SUBCUTANEOUS at 16:19

## 2017-08-31 RX ADMIN — HYDROMORPHONE HYDROCHLORIDE PRN MG: 1 INJECTION, SOLUTION INTRAMUSCULAR; INTRAVENOUS; SUBCUTANEOUS at 03:40

## 2017-08-31 RX ADMIN — SODIUM CHLORIDE SCH MLS/HR: 9 INJECTION, SOLUTION INTRAVENOUS at 09:24

## 2017-08-31 RX ADMIN — HYDROMORPHONE HYDROCHLORIDE PRN MG: 1 INJECTION, SOLUTION INTRAMUSCULAR; INTRAVENOUS; SUBCUTANEOUS at 22:23

## 2017-08-31 RX ADMIN — ATORVASTATIN CALCIUM SCH MG: 40 TABLET, FILM COATED ORAL at 22:24

## 2017-08-31 RX ADMIN — ASPIRIN SCH MG: 81 TABLET, COATED ORAL at 09:16

## 2017-08-31 RX ADMIN — RIVAROXABAN SCH MG: 20 TABLET, FILM COATED ORAL at 15:14

## 2017-08-31 RX ADMIN — HYDROMORPHONE HYDROCHLORIDE PRN MG: 1 INJECTION, SOLUTION INTRAMUSCULAR; INTRAVENOUS; SUBCUTANEOUS at 09:31

## 2017-08-31 NOTE — PATH
Surgical Pathology Report



Patient Name:  UNA YOUSSEF

Accession #:  [id number]

[facility]. Rec. #:  [id number]                                                        

   /Age/Gender:  1940 (Age: 77) / M

Account:  [id number]                                                          

             Location: [facility] PEDS/ADOL

Taken:  2017

Received:  2017

Reported:  2017

Physicians:  Perry Chan

  



Specimen(s) Received

 EMBOLUS 





Clinical History

Vascular occlusion







Final Diagnosis

BLOOD VESSEL, SITE UNSPECIFIED, THROMBECTOMY:

CLOTTED BLOOD.





***Electronically Signed***

Israel Colby M.D.





Gross Description

Received in formalin labeled "embolus," is an 8.0 x 6.2 x 1.3 cm aggregate of

multiple fragments of red-brown blood clot. Representative sections are

submitted in one cassette.

/2017

## 2017-08-31 NOTE — PN
Progress Note, Physician


Chief Complaint: 


Events noted


Post bilateral thrombectomy


Hemodynamically stable





History of Present Illness: 


Patient was seen and examined. Awake and alert. Chart was reviewed


Denies chest pain, SOB or palpitations





- Current Medication List


Current Medications: 


Active Medications





Aspirin (Ecotrin -)  81 mg PO DAILY [facility]


   Last Admin: 08/31/17 09:16 Dose:  81 mg


Atorvastatin Calcium (Lipitor -)  40 mg PO HS [facility]


   Last Admin: 08/30/17 21:08 Dose:  40 mg


Heparin Sodium (Porcine) (Heparin -)  5,000 unit IVPUSH PRN PRN


   PRN Reason: Heparin


   Last Admin: 08/31/17 09:25 Dose:  5,000 unit


Heparin Sodium (Porcine) (Heparin -)  1,000 unit IVPUSH PRN PRN


   PRN Reason: Heparin


Hydromorphone HCl (Dilaudid Injection -)  1 mg IVPB Q6H PRN


   PRN Reason: PAIN


   Last Admin: 08/31/17 09:31 Dose:  1 mg


Heparin Sodium (Porcine) 25, (000 unit/ Sodium Chloride)  500 mls @ 20 mls/hr 

IV TITR AVA; 1,000 UNIT/HR


   PRN Reason: Protocol


   Last Admin: 08/31/17 09:24 Dose:  23 mls/hr


Valsartan (Diovan -)  80 mg PO DAILY [facility]











- Objective


Vital Signs: 


 Vital Signs











Temperature  98 F   08/31/17 06:30


 


Pulse Rate  75   08/31/17 06:30


 


Respiratory Rate  17   08/31/17 06:30


 


Blood Pressure  121/55   08/31/17 06:30


 


O2 Sat by Pulse Oximetry (%)  100   08/30/17 19:30











Cardiovascular: Yes: Pulse Irregular, S1, S2


Respiratory: Yes: CTA Bilaterally


Gastrointestinal: Yes: Normal Bowel Sounds, Soft.  No: Tenderness


Edema: No


Additional Findings/Remarks: 








Review of Systems


Constitutional: denies: Chills


Cardiovascular: As noted above


Respiratory: denies: Cough or Sputum Production


Gastrointestinal: denies: Nausea, Vomiting, Diarrhea, Constipation or Abdominal 

Pain


Musculoskeletal: reports: Bilateral Leg Discomfort 


Neurological: denies: Dizziness or Headache








Labs: 


 CBC, BMP





 08/31/17 05:20 





 08/31/17 05:20 











Problem List





- Problems


(1) Coronary artery disease


Code(s): I25.10 - ATHSCL HEART DISEASE OF NATIVE CORONARY ARTERY W/O ANG PCTRS 

  Qualifiers: 


     Coronary Disease-Associated Artery/Lesion type: native artery     Native 

vs. transplanted heart: native heart     Associated angina: without angina     

   Qualified Code(s): I25.10 - Atherosclerotic heart disease of native coronary 

artery without angina pectoris  





(2) Hypertrophic cardiomyopathy


Code(s): I42.2 - OTHER HYPERTROPHIC CARDIOMYOPATHY





(3) Vascular occlusion


Code(s): I99.8 - OTHER DISORDER OF CIRCULATORY SYSTEM





(4) Atrial flutter


Code(s): I48.92 - UNSPECIFIED ATRIAL FLUTTER   Qualifiers: 


     Atrial flutter type: atypical        Qualified Code(s): I48.4 - Atypical 

atrial flutter  





(5) HTN (hypertension)


Code(s): I10 - ESSENTIAL (PRIMARY) HYPERTENSION   Qualifiers: 


     Hypertension type: essential hypertension        Qualified Code(s): I10 - 

Essential (primary) hypertension  





(6) Left ventricular systolic dysfunction


Code(s): I51.9 - HEART DISEASE, UNSPECIFIED








Assessment/Plan


1. Bilateral leg discomfort with evidence of vascular occlusion (occlusion of 

SFA bilaterally) likely embolic disease related to persistent AF with 

[id number] score of 6 on Heparin currently, on no therapy at home, poor 

compliance  s/p angiogram + bilateral thrombectomy


2. CAD with evidence of demand ischemic injury, non obstructive CAD on coronary 

angiogrpahy angina pectoris


3. LV systolic/diastolic dysfunction, cannot rule out infiltrative 

cardiomyopathy, chronic class I-II NYHA classification, compensated/euvolemic.


4. Paroxysmal atypical atrial flutter/atrial tachycardia post cardioversion 

[id number] score of 6, recurrent arrhythmia, will require long term A/C


5. HTN


6. History of CKD


7. Poor compliance with medical therapy administration and medical F/U


8. Tobacco abuse





PLAN:


1. Continue Heparin and will require long term A/C with Coumadin or NOAC 

considering the above noted presentation and [id number] score 6. Consider 

Eliquis 


2. Vascular surgery follow up


3. Continue Carvedilol and Diovan and titrate as tolerated


4. Continue ASA


5. Continue Lipitor


6. Counselled smoking cessation and abstinence


7. Further cardiac evaluation can be done as outpatient 





Further plans are to follow


Olvin Hobbs MD

## 2017-08-31 NOTE — PN
Teaching Attending Note


Name of Resident: Christiano Wong


ATTENDING PHYSICIAN STATEMENT





I saw and evaluated the patient.


I reviewed the resident's note and discussed the case with the resident.


I agree with the resident's findings and plan as documented.








SUBJECTIVE:


Patient is feeling better. no fever or chills.





OBJECTIVE:


 Vital Signs











Temperature  98.7 F   08/31/17 16:00


 


Pulse Rate  92 H  08/31/17 16:00


 


Respiratory Rate  17   08/31/17 16:00


 


Blood Pressure  99/58   08/31/17 16:00


 


O2 Sat by Pulse Oximetry (%)  98   08/31/17 10:00








 CBCD











WBC  10.6 K/mm3 (4.0-10.0)  H  08/31/17  05:20    


 


RBC  3.78 M/mm3 (4.00-5.60)  L  08/31/17  05:20    


 


Hgb  11.7 GM/dL (11.7-16.9)   08/31/17  05:20    


 


Hct  35.4 % (35.4-49)   08/31/17  05:20    


 


MCV  93.7 fl (80-96)   08/31/17  05:20    


 


MCHC  33.1 g/dl (32.0-35.9)   08/31/17  05:20    


 


RDW  14.8 % (11.9-15.9)   08/31/17  05:20    


 


Plt Count  307 K/MM3 (134-434)   08/31/17  05:20    


 


MPV  9.6 fl (7.5-11.1)   08/31/17  05:20    








 CMP











Sodium  139 mmol/L (136-145)   08/31/17  05:20    


 


Potassium  4.8 mmol/L (3.5-5.1)   08/31/17  05:20    


 


Chloride  105 mmol/L ()   08/31/17  05:20    


 


Carbon Dioxide  25 mmol/L (21-32)   08/31/17  05:20    


 


Anion Gap  9  (8-16)   08/31/17  05:20    


 


BUN  22 mg/dL (7-18)  H D 08/31/17  05:20    


 


Creatinine  1.0 mg/dL (0.7-1.3)   08/31/17  05:20    


 


Creat Clearance w eGFR  > 60  (>60)   08/27/17  06:30    


 


Random Glucose  99 mg/dL ()   08/31/17  05:20    


 


Calcium  7.7 mg/dL (8.5-10.1)  L  08/31/17  05:20    


 


Total Bilirubin  0.5 mg/dL (0.2-1.0)  D 08/27/17  06:30    


 


AST  15 U/L (15-37)   08/27/17  06:30    


 


ALT  17 U/L (12-78)   08/27/17  06:30    


 


Alkaline Phosphatase  65 U/L ()   08/27/17  06:30    


 


Total Protein  6.1 g/dl (6.4-8.2)  L  08/27/17  06:30    


 


Albumin  2.4 g/dl (3.4-5.0)  L  08/27/17  06:30    








 CARDIAC ENZYMES











Creatine Kinase  282 IU/L ()   08/26/17  18:45    


 


Troponin I  0.50 ng/ml (0.00-0.05)  H  08/28/17  05:35    








 Current Medications











Generic Name Dose Route Start Last Admin





  Trade Name Korin  PRN Reason Stop Dose Admin


 


Aspirin  81 mg 08/31/17 10:00 08/31/17 09:16





  Ecotrin -  PO   81 mg





  DAILY AVA   Administration


 


Atorvastatin Calcium  40 mg 08/30/17 22:00 08/30/17 21:08





  Lipitor -  PO   40 mg





  HS AVA   Administration


 


Heparin Sodium (Porcine)  5,000 unit 08/30/17 19:28 08/31/17 09:25





  Heparin -  IVPUSH   5,000 unit





  PRN PRN   Administration





  Heparin   


 


Heparin Sodium (Porcine)  1,000 unit 08/30/17 19:28  





  Heparin -  IVPUSH   





  PRN PRN   





  Heparin   


 


Hydromorphone HCl  1 mg 08/30/17 19:28 08/31/17 16:19





  Dilaudid Injection -  IVPB   1 mg





  Q6H PRN   Administration





  PAIN   


 


Heparin Sodium (Porcine) 25,  500 mls @ 20 mls/hr 08/30/17 19:28 08/31/17 09:24





  000 unit/ Sodium Chloride  IV   23 mls/hr





  TITR AVA   Administration





  Protocol   





  1,000 UNIT/HR   


 


Rivaroxaban  20 mg 08/31/17 14:00 08/31/17 15:14





  Xarelto -  PO   20 mg





  DAILY AVA   Administration


 


Valsartan  80 mg 08/31/17 10:00 08/31/17 10:00





  Diovan -  PO   Not Given





  DAILY AVA   











 


 Home Medications











 Medication  Instructions  Recorded


 


Rivaroxaban [Xarelto -] 20 mg PO DAILY #30 tablet 08/31/17

















PE: palpable pulses of lower extremities, warm to touch. Bl shabana of LEs site 

is clean


rest of PE per resident's note





CTA report with possible thrombus in R common femoral artery and decreased flow 

on both sides. 


 


ASSESSMENT AND PLAN:


This is a 77 year old man with a history of hypertrophic cardiomyopathy, non-

obstructive CAD, chronic systolic/diastolic HF, HTN, PAF who presented to the 

ER with right leg pain





# Acute arterial occlusion of Right lower extremity s/p b/l thrombectomy (8/29) 

by Vascular Dr.Nirav Chan , will discontinue Heparin drip and start the 

patient on Xarelto ,patient needs  long term AC. As per Vascular to send the 

patient on Xarelto , his insurance covers Xarelto.





# A fib : on Xarelto discontinue heparin drip  , on coreg continue, needs long 

term AC





# CAD: cont ASA and statin 





#  Hx of  chronic Systolic and diastolic  heart failure . Euvolemic . echo 

reviewed,  cont to monitor . cont diovan 





DVT PX: Xarelto

## 2017-08-31 NOTE — PN
Progress Note (short form)





- Note


Progress Note: 


Vascular Surgery 





Pt seen and examined. 


Bl lower ext warm. Minimal pain. 


Pt needs PT to ambulate 


Pt on xarelto for AC





Perry marrero DO

## 2017-08-31 NOTE — PN
Physical Exam: 


SUBJECTIVE: Patient seen and examined, EMR consulted. 





Overnight pt had mild groin pain at site of thrombectomy procedure, was given 

1mg dilaudid IV and pain resolved. Pt reports no complaints this am, and he 

denies SOB, chest pain, or leg pain. He does endorse some new right ankle 

swelling. Pt states that he has not walked yet.








OBJECTIVE:





 Vital Signs











 Period  Temp  Pulse  Resp  BP Sys/Varghese  Pulse Ox


 


 Last 24 Hr  98 F-99.6 F  75-98  16-20  /37-87  











GENERAL: The patient is awake, alert, and fully oriented, in no acute distress.


HEAD: Normal with no signs of trauma.


EYES: PERRL, extraocular movements intact, sclera anicteric, conjunctiva clear. 

No ptosis. 


ENT: Ears normal, nares patent, oropharynx clear without exudates, moist mucous 


membranes.


NECK: Trachea midline, full range of motion, supple. 


LUNGS: Breath sounds equal, clear to auscultation bilaterally, no wheezes, no 

crackles, no 


accessory muscle use. 


HEART: Regular rate and rhythm, S1, S2 without murmur, rub or gallop.


ABDOMEN: Soft, nontender, nondistended, normoactive bowel sounds, no guarding, 

no 


rebound, no hepatosplenomegaly, no masses.


EXTREMITIES: b/l approximately 8cm groin incisions closed with staples are clean

, dry, and intact without erythema or other signs of infection. both legs are 

warm to touch with intact motor and sensory function. Right foot and ankle 

demonstrates 1+ pitting edema. b/l 1+ dorsalis pedis pulses palpated.   


NEUROLOGICAL: Cranial nerves II through XII grossly intact. Normal speech, gait 

not 


observed.


PSYCH: Normal mood, normal affect.


SKIN: Warm, dry, normal turgor, no rashes or lesions noted














 Laboratory Results - last 24 hr











  08/31/17 08/31/17 08/31/17





  05:20 05:20 05:20


 


WBC   10.6 H 


 


RBC   3.78 L 


 


Hgb   11.7 


 


Hct   35.4 


 


MCV   93.7 


 


MCH   31.0 


 


MCHC   33.1 


 


RDW   14.8 


 


Plt Count   307 


 


MPV   9.6 


 


PTT (Actin FS)  34.4  D  


 


Sodium    139


 


Potassium    4.8


 


Chloride    105


 


Carbon Dioxide    25


 


Anion Gap    9


 


BUN    22 H D


 


Creatinine    1.0


 


Random Glucose    99


 


Calcium    7.7 L








Active Medications











Generic Name Dose Route Start Last Admin





  Trade Name Nedq  PRN Reason Stop Dose Admin


 


Aspirin  81 mg 08/31/17 10:00 08/31/17 09:16





  Ecotrin -  PO   81 mg





  DAILY AVA   Administration


 


Atorvastatin Calcium  40 mg 08/30/17 22:00 08/30/17 21:08





  Lipitor -  PO   40 mg





  HS AVA   Administration


 


Heparin Sodium (Porcine)  5,000 unit 08/30/17 19:28 08/31/17 09:25





  Heparin -  IVPUSH   5,000 unit





  PRN PRN   Administration





  Heparin   


 


Heparin Sodium (Porcine)  1,000 unit 08/30/17 19:28  





  Heparin -  IVPUSH   





  PRN PRN   





  Heparin   


 


Hydromorphone HCl  1 mg 08/30/17 19:28 08/31/17 09:31





  Dilaudid Injection -  IVPB   1 mg





  Q6H PRN   Administration





  PAIN   


 


Heparin Sodium (Porcine) 25,  500 mls @ 20 mls/hr 08/30/17 19:28 08/31/17 09:24





  000 unit/ Sodium Chloride  IV   23 mls/hr





  TITR AVA   Administration





  Protocol   





  1,000 UNIT/HR   


 


Rivaroxaban  20 mg 08/31/17 14:00  





  Xarelto -  PO   





  DAILY [facility]   


 


Valsartan  80 mg 08/31/17 10:00  





  Diovan -  PO   





  DAILY [facility]   











ASSESSMENT/PLAN:





77 year old man with a history of hypertrophic cardiomyopathy, non-obstructive 

CAD, chronic systolic/diastolic HF, HTN, PAF who presented to the ER with right 

leg pain found to be 2/2 PAD/thromboembolic disease, post-op day #2 s/p b/l leg 

thrombectomy, currently stable and improving. 





#RLE pain secondary to PAD/thromboembolic disease


   - s/p b/l thrombectomy on 8/29


   - pain control with dilaudid 1mg IV q6h PRN


   - cardio agrees that xarelto is a suitable option for pt


   - heparin IV discontinued, xarelto started


   - awaiting prior authorization for xarelto to discharge pt home on it


   - f/u PT evaluation 





#Paroxysmal atrial fibrillation/flutter


   - Patient reported that he did not start taking Eliquis secondary to ongoing 

dental work


   - Continue Coreg 6.25mg PO BID and valsartan 80mg PO qd


   - xarelto started





#CAD, non-obstructive


   - Continue aspirin 81, Coreg 6.25mg BID, atorvastatin 40mg qd





#Chronic systolic and diastolic heart failure


   - Stable


   - Continue Coreg 6.25mg PO BID and valsartan 80mg PO qd





#Hypertrophic cardiomyopathy


   -Coreg 6.25mg PO BID and valsartan 80mg PO qd





#HTN


   - Continue Coreg 6.25mg PO BID and valsartan 80mg PO qd





#Nicotine dependence


   - Discussed smoking cessation





#FEN/GI


-not on fluids


-electrolytes wnl


-diabetic diet


-not on GI ppx





#Dispo


-pending PT eval and xarelto prior authorization





--


Christiano Wong MD PGY1





Problem List





- Problems


(1) Coronary artery disease


Code(s): I25.10 - ATHSCL HEART DISEASE OF NATIVE CORONARY ARTERY W/O ANG PCTRS 

  Qualifiers: 


     Coronary Disease-Associated Artery/Lesion type: native artery     Native 

vs. transplanted heart: native heart     Associated angina: without angina     

   Qualified Code(s): I25.10 - Atherosclerotic heart disease of native coronary 

artery without angina pectoris  





(2) Heart failure


Code(s): I50.9 - HEART FAILURE, UNSPECIFIED   





(3) Hypertrophic cardiomyopathy


Code(s): I42.2 - OTHER HYPERTROPHIC CARDIOMYOPATHY





(4) Nicotine dependence


Code(s): F17.200 - NICOTINE DEPENDENCE, UNSPECIFIED, UNCOMPLICATED   








Visit type





- Emergency Visit


Emergency Visit: Yes


ED Registration Date: 08/26/17


Care time: The patient presented to the Emergency Department on the above date 

and was hospitalized for further evaluation of their emergent condition.





- New Patient


This patient is new to me today: No





- Critical Care


Critical Care patient: No

## 2017-09-01 RX ADMIN — HYDROMORPHONE HYDROCHLORIDE PRN MG: 1 INJECTION, SOLUTION INTRAMUSCULAR; INTRAVENOUS; SUBCUTANEOUS at 06:42

## 2017-09-01 RX ADMIN — VALSARTAN SCH MG: 80 TABLET, FILM COATED ORAL at 10:55

## 2017-09-01 RX ADMIN — HYDROMORPHONE HYDROCHLORIDE PRN MG: 1 INJECTION, SOLUTION INTRAMUSCULAR; INTRAVENOUS; SUBCUTANEOUS at 17:07

## 2017-09-01 RX ADMIN — ATORVASTATIN CALCIUM SCH MG: 40 TABLET, FILM COATED ORAL at 21:10

## 2017-09-01 RX ADMIN — CARVEDILOL SCH MG: 6.25 TABLET, FILM COATED ORAL at 10:55

## 2017-09-01 RX ADMIN — RIVAROXABAN SCH MG: 20 TABLET, FILM COATED ORAL at 10:55

## 2017-09-01 RX ADMIN — ASPIRIN SCH MG: 81 TABLET, COATED ORAL at 10:54

## 2017-09-01 RX ADMIN — CARVEDILOL SCH MG: 6.25 TABLET, FILM COATED ORAL at 21:10

## 2017-09-01 NOTE — PN
Progress Note, Physician


Chief Complaint: 


Events noted


Post bilateral thrombectomy


Hemodynamically stable


Not in distress


History of Present Illness: 


Patient was seen and examined. Awake and alert. Chart was reviewed


Denies chest pain, SOB or palpitations





- Current Medication List


Current Medications: 


Active Medications





Aspirin (Ecotrin -)  81 mg PO DAILY [facility]


   Last Admin: 08/31/17 09:16 Dose:  81 mg


Atorvastatin Calcium (Lipitor -)  40 mg PO HS [facility]


   Last Admin: 08/31/17 22:24 Dose:  40 mg


Carvedilol (Coreg -)  6.25 mg PO BID [facility]


Hydromorphone HCl (Dilaudid Injection -)  1 mg IVPB Q6H PRN


   PRN Reason: PAIN


   Last Admin: 09/01/17 06:42 Dose:  1 mg


Rivaroxaban (Xarelto -)  20 mg PO DAILY [facility]


   Last Admin: 08/31/17 15:14 Dose:  20 mg


Valsartan (Diovan -)  80 mg PO DAILY [facility]


   Last Admin: 08/31/17 10:00 Dose:  Not Given











- Objective


Vital Signs: 


 Vital Signs











Temperature  97.7 F   09/01/17 06:00


 


Pulse Rate  85   09/01/17 06:00


 


Respiratory Rate  18   09/01/17 06:00


 


Blood Pressure  99/65   09/01/17 06:00


 


O2 Sat by Pulse Oximetry (%)  92 L  08/31/17 21:00











Neck: Yes: Supple


Cardiovascular: Yes: Pulse Irregular, S1, S2


Respiratory: Yes: CTA Bilaterally


Gastrointestinal: Yes: Normal Bowel Sounds, Soft.  No: Tenderness


Edema: No


Additional Findings/Remarks: 








Review of Systems


Constitutional: denies: Chills


Cardiovascular: As noted above


Respiratory: denies: Cough or Sputum Production


Gastrointestinal: denies: Nausea, Vomiting, Diarrhea, Constipation or Abdominal 

Pain


Musculoskeletal: reports: Bilateral Leg Discomfort - improved


Neurological: denies: Dizziness or Headache








Labs: 


 CBC, BMP





 08/31/17 05:20 





 08/31/17 05:20 











Problem List





- Problems


(1) Coronary artery disease


Code(s): I25.10 - ATHSCL HEART DISEASE OF NATIVE CORONARY ARTERY W/O ANG PCTRS 

  Qualifiers: 


     Coronary Disease-Associated Artery/Lesion type: native artery     Native 

vs. transplanted heart: native heart     Associated angina: without angina     

   Qualified Code(s): I25.10 - Atherosclerotic heart disease of native coronary 

artery without angina pectoris  





(2) Hypertrophic cardiomyopathy


Code(s): I42.2 - OTHER HYPERTROPHIC CARDIOMYOPATHY





(3) Vascular occlusion


Code(s): I99.8 - OTHER DISORDER OF CIRCULATORY SYSTEM





(4) Atrial flutter


Code(s): I48.92 - UNSPECIFIED ATRIAL FLUTTER   Qualifiers: 


     Atrial flutter type: atypical        Qualified Code(s): I48.4 - Atypical 

atrial flutter  





(5) HTN (hypertension)


Code(s): I10 - ESSENTIAL (PRIMARY) HYPERTENSION   Qualifiers: 


     Hypertension type: essential hypertension        Qualified Code(s): I10 - 

Essential (primary) hypertension  





(6) Left ventricular systolic dysfunction


Code(s): I51.9 - HEART DISEASE, UNSPECIFIED








Assessment/Plan


1. Bilateral leg discomfort with evidence of vascular occlusion (occlusion of 

SFA bilaterally) likely embolic disease related to persistent AF with 

[id number] score of 6 on no therapy at home, poor compliance  s/p angiogram + 

bilateral thrombectomy now on Xarelto


2. CAD with evidence of demand ischemic injury, non obstructive CAD on coronary 

angiogrpahy angina pectoris


3. LV systolic/diastolic dysfunction, cannot rule out infiltrative 

cardiomyopathy, chronic class I-II NYHA classification, compensated/euvolemic.


4. Paroxysmal atypical atrial flutter/atrial tachycardia post cardioversion 

[id number] score of 6, recurrent arrhythmia (AF)


5. HTN


6. History of CKD


7. Poor compliance with medical therapy administration and medical F/U


8. Tobacco abuse





PLAN:


1. Currently on Xarelto (as he wants once a day regimen for compliance) 

considering the above noted presentation and [id number] score 6. 


2. Vascular surgery follow up - post op care


3. Continue Carvedilol and Diovan and titrate as tolerated


4. Continue ASA


5. Continue Lipitor


6. Counselled smoking cessation and abstinence


7. Further cardiac evaluation can be done as outpatient 





Further plans are to follow


Olvin Hobbs MD

## 2017-09-01 NOTE — PN
Physical Exam: 


SUBJECTIVE: Patient seen and examined.





Pt was able to walk about 30 feet with PT yesterday without pain. No acute 

events overnight. Pt reports no new complaints. He denies SOB, chest pain, or 

leg pain. He states that his feet are still slightly swollen since the 

procedure.  








OBJECTIVE:





 Vital Signs











 Period  Temp  Pulse  Resp  BP Sys/Varghese  Pulse Ox


 


 Last 24 Hr  97.7 F-99.3 F    16-20  /50-80  92











GENERAL: The patient is awake, alert, and fully oriented, in no acute distress.


HEAD: Normal with no signs of trauma.


EYES: PERRL, extraocular movements intact, sclera anicteric, conjunctiva clear. 

No ptosis. 


ENT: Ears normal, nares patent, oropharynx clear without exudates, moist mucous 


membranes.


NECK: Trachea midline, full range of motion, supple. 


LUNGS: Breath sounds equal, clear to auscultation bilaterally, no wheezes, no 

crackles, no 


accessory muscle use. 


HEART: Regular rate and rhythm, S1, S2 without murmur, rub or gallop.


ABDOMEN: Soft, nontender, nondistended, normoactive bowel sounds, no guarding, 

no rebound, no hepatosplenomegaly, no masses. B/l groin incision sites are 

about 8cm with staples, are clean, dry, and intact without signs of infection.


EXTREMITIES: B/l lower extremities are warm to touch without tenderness to 

palpation. motor function and sensory function are normal. dorsalis pedis 

pulses 1+ b/l. 


NEUROLOGICAL: Cranial nerves II through XII grossly intact. Normal speech, gait 

not 


observed.


PSYCH: Normal mood, normal affect.


SKIN: Warm, dry, normal turgor, no rashes or lesions noted














 Laboratory Results - last 24 hr











  08/31/17 09/01/17





  14:45 07:00


 


PTT (Actin FS)  36.1 H  35.0 H








Active Medications











Generic Name Dose Route Start Last Admin





  Trade Name Freq  PRN Reason Stop Dose Admin


 


Aspirin  81 mg 08/31/17 10:00 09/01/17 10:54





  Ecotrin -  PO   81 mg





  DAILY [facility]   Administration


 


Atorvastatin Calcium  40 mg 08/30/17 22:00 08/31/17 22:24





  Lipitor -  PO   40 mg





  HS [facility]   Administration


 


Carvedilol  6.25 mg 09/01/17 10:00 09/01/17 10:55





  Coreg -  PO   6.25 mg





  BID [facility]   Administration


 


Enoxaparin Sodium  100 mg 09/02/17 09:00  





  Lovenox -  SQ   





  BID [facility], 1928[facility]   


 


Hydromorphone HCl  1 mg 08/30/17 19:28 09/01/17 06:42





  Dilaudid Injection -  IVPB   1 mg





  Q6H PRN   Administration





  PAIN   


 


Valsartan  80 mg 08/31/17 10:00 09/01/17 10:55





  Diovan -  PO   80 mg





  DAILY [facility]   Administration


 


Warfarin Sodium  5 mg 09/01/17 18:00  





  Coumadin -  PO   





  DAILY@1800 [facility], [facility]   











ASSESSMENT/PLAN:





77 year old man with a history of hypertrophic cardiomyopathy, non-obstructive 

CAD, chronic systolic/diastolic HF, HTN, PAF who presented to the ER with right 

leg pain found to be 2/2 PAD/thromboembolic disease, post-op day #3 s/p b/l leg 

thrombectomy, currently stable and improving. 





#RLE pain secondary to PAD/thromboembolic disease


   - s/p b/l thrombectomy on 8/29


   - pain control with dilaudid 1mg IV q6h PRN


   - pt's insurance does not cover xarelto or other NOACs at affordable price 

for pt, so coumadin will be used for long-term AC.  


   -consulted with cardio. coumadin 5mg started tonight and lovenox 1mg/kg BID 

started lawrence morning.


   -serial INRs until therapeutic INR  


   - f/u PT evaluation 





#Paroxysmal atrial fibrillation/flutter


   - Patient reported that he did not start taking Eliquis secondary to ongoing 

dental work


   - Continue Coreg 6.25mg PO BID and valsartan 80mg PO qd





#CAD, non-obstructive


   - Continue aspirin 81, Coreg 6.25mg BID, atorvastatin 40mg qd





#Chronic systolic and diastolic heart failure


   - Stable


   - Continue Coreg 6.25mg PO BID and valsartan 80mg PO qd





#Hypertrophic cardiomyopathy


   -Coreg 6.25mg PO BID and valsartan 80mg PO qd





#HTN


   - Continue Coreg 6.25mg PO BID and valsartan 80mg PO qd





#Nicotine dependence


   - Discussed smoking cessation





#FEN/GI


-not on fluids


-electrolytes wnl


-diabetic diet


-not on GI ppx





#Dispo


-pending PT eval and obtaining therapeutic INR for d/c on coumadin





--


Christiano Wong MD PGY1





Problem List





- Problems


(1) Coronary artery disease


Code(s): I25.10 - ATHSCL HEART DISEASE OF NATIVE CORONARY ARTERY W/O ANG PCTRS 

  Qualifiers: 


     Coronary Disease-Associated Artery/Lesion type: native artery     Native 

vs. transplanted heart: native heart     Associated angina: without angina     

   Qualified Code(s): I25.10 - Atherosclerotic heart disease of native coronary 

artery without angina pectoris  





(2) Heart failure


Code(s): I50.9 - HEART FAILURE, UNSPECIFIED   





(3) Hypertrophic cardiomyopathy


Code(s): I42.2 - OTHER HYPERTROPHIC CARDIOMYOPATHY





(4) Nicotine dependence


Code(s): F17.200 - NICOTINE DEPENDENCE, UNSPECIFIED, UNCOMPLICATED   








Visit type





- Emergency Visit


Emergency Visit: Yes


ED Registration Date: 08/26/17


Care time: The patient presented to the Emergency Department on the above date 

and was hospitalized for further evaluation of their emergent condition.





- New Patient


This patient is new to me today: No





- Critical Care


Critical Care patient: No

## 2017-09-01 NOTE — PN
Teaching Attending Note


Name of Resident: Christiano Wong


ATTENDING PHYSICIAN STATEMENT





I saw and evaluated the patient.


I reviewed the resident's note and discussed the case with the resident.


I agree with the resident's findings and plan as documented.








SUBJECTIVE:


Feeling better, legs are improving.





OBJECTIVE:


 Vital Signs











Temperature  98.9 F   09/01/17 14:00


 


Pulse Rate  93 H  09/01/17 15:35


 


Respiratory Rate  20   09/01/17 15:35


 


Blood Pressure  118/53   09/01/17 15:35


 


O2 Sat by Pulse Oximetry (%)  92 L  08/31/17 21:00








 CBCD











WBC  10.6 K/mm3 (4.0-10.0)  H  08/31/17  05:20    


 


RBC  3.78 M/mm3 (4.00-5.60)  L  08/31/17  05:20    


 


Hgb  11.7 GM/dL (11.7-16.9)   08/31/17  05:20    


 


Hct  35.4 % (35.4-49)   08/31/17  05:20    


 


MCV  93.7 fl (80-96)   08/31/17  05:20    


 


MCHC  33.1 g/dl (32.0-35.9)   08/31/17  05:20    


 


RDW  14.8 % (11.9-15.9)   08/31/17  05:20    


 


Plt Count  307 K/MM3 (134-434)   08/31/17  05:20    


 


MPV  9.6 fl (7.5-11.1)   08/31/17  05:20    








 CMP











Sodium  139 mmol/L (136-145)   08/31/17  05:20    


 


Potassium  4.8 mmol/L (3.5-5.1)   08/31/17  05:20    


 


Chloride  105 mmol/L ()   08/31/17  05:20    


 


Carbon Dioxide  25 mmol/L (21-32)   08/31/17  05:20    


 


Anion Gap  9  (8-16)   08/31/17  05:20    


 


BUN  22 mg/dL (7-18)  H D 08/31/17  05:20    


 


Creatinine  1.0 mg/dL (0.7-1.3)   08/31/17  05:20    


 


Creat Clearance w eGFR  > 60  (>60)   08/27/17  06:30    


 


Random Glucose  99 mg/dL ()   08/31/17  05:20    


 


Calcium  7.7 mg/dL (8.5-10.1)  L  08/31/17  05:20    


 


Total Bilirubin  0.5 mg/dL (0.2-1.0)  D 08/27/17  06:30    


 


AST  15 U/L (15-37)   08/27/17  06:30    


 


ALT  17 U/L (12-78)   08/27/17  06:30    


 


Alkaline Phosphatase  65 U/L ()   08/27/17  06:30    


 


Total Protein  6.1 g/dl (6.4-8.2)  L  08/27/17  06:30    


 


Albumin  2.4 g/dl (3.4-5.0)  L  08/27/17  06:30    








 CARDIAC ENZYMES











Creatine Kinase  282 IU/L ()   08/26/17  18:45    


 


Troponin I  0.50 ng/ml (0.00-0.05)  H  08/28/17  05:35    








 Current Medications











Generic Name Dose Route Start Last Admin





  Trade Name Korin  PRN Reason Stop Dose Admin


 


Aspirin  81 mg 08/31/17 10:00 09/01/17 10:54





  Ecotrin -  PO   81 mg





  DAILY [facility]   Administration


 


Atorvastatin Calcium  40 mg 08/30/17 22:00 08/31/17 22:24





  Lipitor -  PO   40 mg





  HS [facility]   Administration


 


Carvedilol  6.25 mg 09/01/17 10:00 09/01/17 10:55





  Coreg -  PO   6.25 mg





  BID [facility]   Administration


 


Enoxaparin Sodium  100 mg 09/02/17 09:00  





  Lovenox -  SQ   





  BID@0900,2100 [facility]   


 


Hydromorphone HCl  1 mg 08/30/17 19:28 09/01/17 17:07





  Dilaudid Injection -  IVPB   1 mg





  Q6H PRN   Administration





  PAIN   


 


Valsartan  80 mg 08/31/17 10:00 09/01/17 10:55





  Diovan -  PO   80 mg





  DAILY [facility]   Administration


 


Warfarin Sodium  5 mg 09/01/17 18:00  





  Coumadin -  PO   





  DAILY@1800 [facility]   








 Home Medications











 Medication  Instructions  Recorded


 


Rivaroxaban [Xarelto -] 20 mg PO DAILY #30 tablet 08/31/17

















PE: palpable pulses of lower extremities, warm to touch. Bl shabana of LEs site 

is clean


rest of PE per resident's note





CTA report with possible thrombus in R common femoral artery and decreased flow 

on both sides. 


 


ASSESSMENT AND PLAN:


This is a 77 year old man with a history of hypertrophic cardiomyopathy, non-

obstructive CAD, chronic systolic/diastolic HF, HTN, PAF who presented to the 

ER with right leg pain





# Acute arterial occlusion of Right lower extremity s/p b/l thrombectomy (8/29) 

by Vascular Dr.Nirav Chan , will discontinue Heparin drip and start the 

patient on Coumadin with Lovenox bridging  now since insurance is not covering 

the Xarelto ,patient needs  long term AC. As per Vascular to send the patient 

home on coumadin, Lovenox  to bridge





# A fib : on Coumadin/lovenox , coreg continue, needs long term AC





# CAD: cont ASA and statin 





#  Hx of  chronic Systolic and diastolic  heart failure . Euvolemic . echo 

reviewed,  cont to monitor . cont diovan 





DVT PX: lovenox, coumadin

## 2017-09-02 LAB
INR BLD: 1.44 (ref 0.82–1.09)
PT PNL PPP: 16 SEC (ref 9.98–11.88)

## 2017-09-02 RX ADMIN — ATORVASTATIN CALCIUM SCH MG: 40 TABLET, FILM COATED ORAL at 21:19

## 2017-09-02 RX ADMIN — HYDROMORPHONE HYDROCHLORIDE PRN MG: 1 INJECTION, SOLUTION INTRAMUSCULAR; INTRAVENOUS; SUBCUTANEOUS at 00:23

## 2017-09-02 RX ADMIN — VALSARTAN SCH MG: 80 TABLET, FILM COATED ORAL at 09:17

## 2017-09-02 RX ADMIN — ASPIRIN SCH MG: 81 TABLET, COATED ORAL at 09:16

## 2017-09-02 RX ADMIN — ENOXAPARIN SODIUM SCH MG: 100 INJECTION SUBCUTANEOUS at 21:19

## 2017-09-02 RX ADMIN — ENOXAPARIN SODIUM SCH MG: 100 INJECTION SUBCUTANEOUS at 09:16

## 2017-09-02 RX ADMIN — CARVEDILOL SCH MG: 6.25 TABLET, FILM COATED ORAL at 09:16

## 2017-09-02 RX ADMIN — CARVEDILOL SCH MG: 6.25 TABLET, FILM COATED ORAL at 21:19

## 2017-09-02 RX ADMIN — HYDROMORPHONE HYDROCHLORIDE PRN MG: 1 INJECTION, SOLUTION INTRAMUSCULAR; INTRAVENOUS; SUBCUTANEOUS at 08:22

## 2017-09-02 RX ADMIN — HYDROMORPHONE HYDROCHLORIDE PRN MG: 1 INJECTION, SOLUTION INTRAMUSCULAR; INTRAVENOUS; SUBCUTANEOUS at 15:36

## 2017-09-02 NOTE — PN
Progress Note, Physician


Chief Complaint: 


Events noted


Post bilateral thrombectomy


Hemodynamically stable


Not in distress


History of Present Illness: 


Patient was seen and examined. Awake and alert. Chart was reviewed


Denies chest pain, SOB or palpitations





- Current Medication List


Current Medications: 


Active Medications





Aspirin (Ecotrin -)  81 mg PO DAILY [facility]


   Last Admin: 09/02/17 09:16 Dose:  81 mg


Atorvastatin Calcium (Lipitor -)  40 mg PO HS [facility]


   Last Admin: 09/01/17 21:10 Dose:  40 mg


Carvedilol (Coreg -)  6.25 mg PO BID [facility]


   Last Admin: 09/02/17 09:16 Dose:  6.25 mg


Enoxaparin Sodium (Lovenox -)  100 mg SQ BID@0900,2100 [facility]


   Last Admin: 09/02/17 09:16 Dose:  100 mg


Hydromorphone HCl (Dilaudid Injection -)  1 mg IVPB Q6H PRN


   PRN Reason: PAIN


   Last Admin: 09/02/17 08:22 Dose:  1 mg


Valsartan (Diovan -)  80 mg PO DAILY [facility]


   Last Admin: 09/02/17 09:17 Dose:  80 mg


Warfarin Sodium (Coumadin -)  5 mg PO DAILY@1800 [facility]


   Last Admin: 09/01/17 18:20 Dose:  5 mg











- Objective


Vital Signs: 


 Vital Signs











Temperature  98.6 F   09/02/17 10:00


 


Pulse Rate  87   09/02/17 10:00


 


Respiratory Rate  20   09/02/17 10:00


 


Blood Pressure  116/75   09/02/17 10:00


 


O2 Sat by Pulse Oximetry (%)  92 L  08/31/17 21:00











Neck: Yes: Supple


Cardiovascular: Yes: Pulse Irregular, S1, S2


Respiratory: Yes: CTA Bilaterally


Gastrointestinal: Yes: Normal Bowel Sounds, Soft.  No: Tenderness


Edema: Yes


Edema: RLE: Trace


Additional Findings/Remarks: 








Review of Systems


Constitutional: denies: Chills


Cardiovascular: As noted above


Respiratory: denies: Cough or Sputum Production


Gastrointestinal: denies: Nausea, Vomiting, Diarrhea, Constipation or Abdominal 

Pain


Musculoskeletal: reports: Bilateral Leg Discomfort - improved


Neurological: denies: Dizziness or Headache











Labs: 


 INR, PTT











INR  1.44  (0.82-1.09)  H D 09/02/17  06:00    














Problem List





- Problems


(1) Coronary artery disease


Code(s): I25.10 - ATHSCL HEART DISEASE OF NATIVE CORONARY ARTERY W/O ANG PCTRS 

  Qualifiers: 


     Coronary Disease-Associated Artery/Lesion type: native artery     Native 

vs. transplanted heart: native heart     Associated angina: without angina     

   Qualified Code(s): I25.10 - Atherosclerotic heart disease of native coronary 

artery without angina pectoris  





(2) Hypertrophic cardiomyopathy


Code(s): I42.2 - OTHER HYPERTROPHIC CARDIOMYOPATHY





(3) Vascular occlusion


Code(s): I99.8 - OTHER DISORDER OF CIRCULATORY SYSTEM





(4) Atrial flutter


Code(s): I48.92 - UNSPECIFIED ATRIAL FLUTTER   Qualifiers: 


     Atrial flutter type: atypical        Qualified Code(s): I48.4 - Atypical 

atrial flutter  





(5) HTN (hypertension)


Code(s): I10 - ESSENTIAL (PRIMARY) HYPERTENSION   Qualifiers: 


     Hypertension type: essential hypertension        Qualified Code(s): I10 - 

Essential (primary) hypertension  





(6) Left ventricular systolic dysfunction


Code(s): I51.9 - HEART DISEASE, UNSPECIFIED








Assessment/Plan


1. Bilateral leg discomfort with evidence of vascular occlusion (occlusion of 

SFA bilaterally) likely embolic disease related to persistent AF with 

[id number] score of 6 on no therapy at home, poor compliance  s/p angiogram + 

bilateral thrombectomy now on Xarelto


2. CAD with evidence of demand ischemic injury, non obstructive CAD on coronary 

angiogrpahy angina pectoris


3. LV systolic/diastolic dysfunction, cannot rule out infiltrative 

cardiomyopathy, chronic class I-II NYHA classification, compensated/euvolemic.


4. Paroxysmal atypical atrial flutter/atrial tachycardia post cardioversion 

[id number] score of 6, recurrent arrhythmia (AF)


5. HTN


6. History of CKD


7. Poor compliance with medical therapy administration and medical F/U


8. Tobacco abuse





PLAN:


1. Currently on Xarelto (as he wants once a day regimen for compliance) 

considering the above noted presentation and [id number] score 6. 


2. Vascular surgery follow up - Conitnue post op care


3. Continue Carvedilol and Diovan and titrate as tolerated


4. Continue ASA


5. Continue Lipitor


6. Counselled smoking cessation and abstinence


7. Further cardiac evaluation can be done as outpatient 





Further plans are to follow


Olvin Hobbs MD

## 2017-09-02 NOTE — PN
Progress Note (short form)





- Note


Progress Note: 


Patient is doing better, with no acute distress, legs are better





 Vital Signs











Temperature  97.5 F L  09/02/17 14:00


 


Pulse Rate  80   09/02/17 14:00


 


Respiratory Rate  18   09/02/17 14:00


 


Blood Pressure  137/95   09/02/17 14:00


 


O2 Sat by Pulse Oximetry (%)  96   09/02/17 09:00








GENERAL: The patient is awake, alert, and fully oriented, in no acute distress.


HEAD: Normal with no signs of trauma.


EYES: PERRL, extraocular movements intact, sclera anicteric, conjunctiva clear. 


ENT: Ears normal,  oropharynx clear without exudates, moist mucous membranes.


NECK: Trachea midline, full range of motion, supple. 


LUNGS: Breath sounds equal, clear to auscultation bilaterally, no wheezes, no 

crackles, no accessory muscle use. 


HEART: Regular rate and rhythm, S1, S2 without murmur, rub or gallop.


ABDOMEN: Soft, nontender, nondistended, normoactive bowel sounds, no guarding, 

no rebound, no hepatosplenomegaly, no masses.


EXTREMITIES: b/l approximately 8cm groin incisions closed with staples are clean

, dry, and intact without erythema or other signs of infection. both legs are 

warm to touch with intact motor and sensory function. pulses are POsitive  


NEUROLOGICAL: Cranial nerves II through XII grossly intact. Normal speech, gait 

not observed.


PSYCH: Normal mood, normal affect.


SKIN: Warm, dry, normal turgor, no rashes or lesions noted





 CBCD











WBC  10.6 K/mm3 (4.0-10.0)  H  08/31/17  05:20    


 


RBC  3.78 M/mm3 (4.00-5.60)  L  08/31/17  05:20    


 


Hgb  11.7 GM/dL (11.7-16.9)   08/31/17  05:20    


 


Hct  35.4 % (35.4-49)   08/31/17  05:20    


 


MCV  93.7 fl (80-96)   08/31/17  05:20    


 


MCHC  33.1 g/dl (32.0-35.9)   08/31/17  05:20    


 


RDW  14.8 % (11.9-15.9)   08/31/17  05:20    


 


Plt Count  307 K/MM3 (134-434)   08/31/17  05:20    


 


MPV  9.6 fl (7.5-11.1)   08/31/17  05:20    








 CMP











Sodium  139 mmol/L (136-145)   08/31/17  05:20    


 


Potassium  4.8 mmol/L (3.5-5.1)   08/31/17  05:20    


 


Chloride  105 mmol/L ()   08/31/17  05:20    


 


Carbon Dioxide  25 mmol/L (21-32)   08/31/17  05:20    


 


Anion Gap  9  (8-16)   08/31/17  05:20    


 


BUN  22 mg/dL (7-18)  H D 08/31/17  05:20    


 


Creatinine  1.0 mg/dL (0.7-1.3)   08/31/17  05:20    


 


Creat Clearance w eGFR  > 60  (>60)   08/27/17  06:30    


 


Random Glucose  99 mg/dL ()   08/31/17  05:20    


 


Calcium  7.7 mg/dL (8.5-10.1)  L  08/31/17  05:20    


 


Total Bilirubin  0.5 mg/dL (0.2-1.0)  D 08/27/17  06:30    


 


AST  15 U/L (15-37)   08/27/17  06:30    


 


ALT  17 U/L (12-78)   08/27/17  06:30    


 


Alkaline Phosphatase  65 U/L ()   08/27/17  06:30    


 


Total Protein  6.1 g/dl (6.4-8.2)  L  08/27/17  06:30    


 


Albumin  2.4 g/dl (3.4-5.0)  L  08/27/17  06:30    








 CARDIAC ENZYMES











Creatine Kinase  282 IU/L ()   08/26/17  18:45    


 


Troponin I  0.50 ng/ml (0.00-0.05)  H  08/28/17  05:35    








 Current Medications











Generic Name Dose Route Start Last Admin





  Trade Name Freq  PRN Reason Stop Dose Admin


 


Aspirin  81 mg 08/31/17 10:00 09/02/17 09:16





  Ecotrin -  PO   81 mg





  DAILY [facility]   Administration


 


Atorvastatin Calcium  40 mg 08/30/17 22:00 09/01/17 21:10





  Lipitor -  PO   40 mg





  HS [facility]   Administration


 


Carvedilol  6.25 mg 09/01/17 10:00 09/02/17 09:16





  Coreg -  PO   6.25 mg





  BID [facility]   Administration


 


Enoxaparin Sodium  100 mg 09/02/17 09:00 09/02/17 09:16





  Lovenox -  SQ   100 mg





  BID@0900,2100 [facility]   Administration


 


Hydromorphone HCl  1 mg 08/30/17 19:28 09/02/17 15:36





  Dilaudid Injection -  IVPB   1 mg





  Q6H PRN   Administration





  PAIN   


 


Valsartan  80 mg 08/31/17 10:00 09/02/17 09:17





  Diovan -  PO   80 mg





  DAILY [facility]   Administration


 


Warfarin Sodium  7.5 mg 09/02/17 18:00  





  Coumadin -  PO 09/02/17 18:01  





  ONCE@1800 [facility]   








 Home Medications











 Medication  Instructions  Recorded


 


Rivaroxaban [Xarelto -] 20 mg PO DAILY #30 tablet 08/31/17











INR 1.44


CTA report with possible thrombus in R common femoral artery and decreased flow 

on both sides. 


 


ASSESSMENT AND PLAN:


This is a 77 year old man with a history of hypertrophic cardiomyopathy, non-

obstructive CAD, chronic systolic/diastolic HF, HTN, PAF who presented to the 

ER with right leg pain





# Acute arterial occlusion of Right lower extremity s/p b/l thrombectomy (8/29) 

by Vascular Dr.Nirav Chan , will discontinue Heparin drip and start the 

patient on coumadin with bridgening Lovenox ,patient needs  long term AC. As 

per Vascular to send the patient on coumadin 





# A fib : on coumadin 7.5mg tonight, PT/INR daily discontinue heparin drip  , 

on coreg continue, needs long term AC





# CAD: cont ASA and statin 





#  Hx of  chronic Systolic and diastolic  heart failure . Euvolemic . echo 

reviewed,  cont to monitor . cont diovan 





DVT PX: 





Visit type





- Emergency Visit


Emergency Visit: Yes


ED Registration Date: 08/26/17


Care time: The patient presented to the Emergency Department on the above date 

and was hospitalized for further evaluation of their emergent condition.





- New Patient


This patient is new to me today: No





- Critical Care


Critical Care patient: No

## 2017-09-03 LAB
INR BLD: 1.42 (ref 0.82–1.09)
PT PNL PPP: 15.7 SEC (ref 9.98–11.88)

## 2017-09-03 RX ADMIN — ENOXAPARIN SODIUM SCH MG: 100 INJECTION SUBCUTANEOUS at 09:12

## 2017-09-03 RX ADMIN — CARVEDILOL SCH MG: 6.25 TABLET, FILM COATED ORAL at 09:12

## 2017-09-03 RX ADMIN — WARFARIN SCH MG: 10 TABLET ORAL at 17:06

## 2017-09-03 RX ADMIN — ENOXAPARIN SODIUM SCH MG: 100 INJECTION SUBCUTANEOUS at 21:47

## 2017-09-03 RX ADMIN — ASPIRIN SCH MG: 81 TABLET, COATED ORAL at 09:12

## 2017-09-03 RX ADMIN — VALSARTAN SCH MG: 80 TABLET, FILM COATED ORAL at 09:12

## 2017-09-03 RX ADMIN — CARVEDILOL SCH MG: 6.25 TABLET, FILM COATED ORAL at 21:48

## 2017-09-03 RX ADMIN — ATORVASTATIN CALCIUM SCH MG: 40 TABLET, FILM COATED ORAL at 21:48

## 2017-09-03 NOTE — PN
Progress Note, Physician


Chief Complaint: 


Hemodynamically stable


Not in distress


History of Present Illness: 


Patient was seen and examined. Awake and alert. Chart was reviewed


Denies chest pain, SOB or palpitations





- Current Medication List


Current Medications: 


Active Medications





Aspirin (Ecotrin -)  81 mg PO DAILY [facility]


   Last Admin: 09/03/17 09:12 Dose:  81 mg


Atorvastatin Calcium (Lipitor -)  40 mg PO HS [facility]


   Last Admin: 09/02/17 21:19 Dose:  40 mg


Carvedilol (Coreg -)  6.25 mg PO BID [facility]


   Last Admin: 09/03/17 09:12 Dose:  6.25 mg


Enoxaparin Sodium (Lovenox -)  100 mg SQ BID@0900,2100 [facility]


   Last Admin: 09/03/17 09:12 Dose:  100 mg


Valsartan (Diovan -)  80 mg PO DAILY [facility]


   Last Admin: 09/03/17 09:12 Dose:  80 mg











- Objective


Vital Signs: 


 Vital Signs











Temperature  97.8 F   09/03/17 09:20


 


Pulse Rate  70   09/03/17 09:20


 


Respiratory Rate  16   09/03/17 09:20


 


Blood Pressure  100/69   09/03/17 09:20


 


O2 Sat by Pulse Oximetry (%)  98   09/02/17 20:40











Cardiovascular: Yes: Pulse Irregular, S1, S2


Respiratory: Yes: CTA Bilaterally


Gastrointestinal: Yes: Normal Bowel Sounds, Soft.  No: Tenderness


Edema: Yes


Edema: RLE: Trace


Additional Findings/Remarks: 








Review of Systems


Constitutional: denies: Chills


Cardiovascular: As noted above


Respiratory: denies: Cough or Sputum Production


Gastrointestinal: denies: Nausea, Vomiting, Diarrhea, Constipation or Abdominal 

Pain


Musculoskeletal: reports: Bilateral Leg Discomfort - improved


Neurological: denies: Dizziness or Headache








Labs: 


 INR, PTT











INR  1.42  (0.82-1.09)  H  09/03/17  06:30    














Problem List





- Problems


(1) Coronary artery disease


Code(s): I25.10 - ATHSCL HEART DISEASE OF NATIVE CORONARY ARTERY W/O ANG PCTRS 

  Qualifiers: 


     Coronary Disease-Associated Artery/Lesion type: native artery     Native 

vs. transplanted heart: native heart     Associated angina: without angina     

   Qualified Code(s): I25.10 - Atherosclerotic heart disease of native coronary 

artery without angina pectoris  





(2) Hypertrophic cardiomyopathy


Code(s): I42.2 - OTHER HYPERTROPHIC CARDIOMYOPATHY





(3) Vascular occlusion


Code(s): I99.8 - OTHER DISORDER OF CIRCULATORY SYSTEM





(4) Atrial flutter


Code(s): I48.92 - UNSPECIFIED ATRIAL FLUTTER   Qualifiers: 


     Atrial flutter type: atypical        Qualified Code(s): I48.4 - Atypical 

atrial flutter  





(5) HTN (hypertension)


Code(s): I10 - ESSENTIAL (PRIMARY) HYPERTENSION   Qualifiers: 


     Hypertension type: essential hypertension        Qualified Code(s): I10 - 

Essential (primary) hypertension  





(6) Left ventricular systolic dysfunction


Code(s): I51.9 - HEART DISEASE, UNSPECIFIED








Assessment/Plan


1. Bilateral leg discomfort with evidence of vascular occlusion (occlusion of 

SFA bilaterally) likely embolic disease related to persistent AF with 

[id number] score of 6 on no therapy at home, poor compliance  s/p angiogram + 

bilateral thrombectomy on Xarelto


2. CAD with evidence of demand ischemic injury, non obstructive CAD on coronary 

angiogrpahy angina pectoris


3. LV systolic/diastolic dysfunction, cannot rule out infiltrative 

cardiomyopathy, chronic class I-II NYHA classification, compensated/euvolemic.


4. Paroxysmal atypical atrial flutter/atrial tachycardia post cardioversion 

[id number] score of 6, recurrent arrhythmia (AF)


5. HTN


6. History of CKD


7. Poor compliance with medical therapy administration and medical F/U


8. Tobacco abuse





PLAN:


1. Currently on Xarelto (as he wants once a day regimen for compliance) 

considering the above noted presentation and [id number] score 6. 


2. Vascular surgery follow up - Conitnue post op care


3. Continue Carvedilol and Diovan and titrate as tolerated


4. Continue ASA


5. Continue Lipitor


6. Counselled smoking cessation and abstinence


7. Further cardiac evaluation can be done as outpatient - consider synchronized 

cardioversion at some point, but can be done as outpatient





Further plans are to follow


Olvin Hobbs MD

## 2017-09-04 LAB
INR BLD: 1.86 (ref 0.82–1.09)
PT PNL PPP: 20.7 SEC (ref 9.98–11.88)

## 2017-09-04 RX ADMIN — WARFARIN SCH MG: 10 TABLET ORAL at 17:12

## 2017-09-04 RX ADMIN — VALSARTAN SCH: 80 TABLET, FILM COATED ORAL at 09:32

## 2017-09-04 RX ADMIN — ENOXAPARIN SODIUM SCH MG: 100 INJECTION SUBCUTANEOUS at 21:56

## 2017-09-04 RX ADMIN — CARVEDILOL SCH: 6.25 TABLET, FILM COATED ORAL at 09:32

## 2017-09-04 RX ADMIN — ATORVASTATIN CALCIUM SCH MG: 40 TABLET, FILM COATED ORAL at 21:56

## 2017-09-04 RX ADMIN — ASPIRIN SCH MG: 81 TABLET, COATED ORAL at 09:28

## 2017-09-04 RX ADMIN — CARVEDILOL SCH MG: 6.25 TABLET, FILM COATED ORAL at 21:56

## 2017-09-04 RX ADMIN — ENOXAPARIN SODIUM SCH MG: 100 INJECTION SUBCUTANEOUS at 09:27

## 2017-09-04 NOTE — PN
Progress Note, Physician


Chief Complaint: 


Hemodynamically stable


Not in distress


History of Present Illness: 


Patient was seen and examined. Awake and alert. Chart was reviewed


Denies chest pain, SOB or palpitations





- Current Medication List


Current Medications: 


Active Medications





Aspirin (Ecotrin -)  81 mg PO DAILY [facility]


   Last Admin: 09/04/17 09:28 Dose:  81 mg


Atorvastatin Calcium (Lipitor -)  40 mg PO HS [facility]


   Last Admin: 09/03/17 21:48 Dose:  40 mg


Carvedilol (Coreg -)  6.25 mg PO BID [facility]


   Last Admin: 09/04/17 09:32 Dose:  Not Given


Enoxaparin Sodium (Lovenox -)  100 mg SQ BID@0900,2100 [facility]


   Last Admin: 09/04/17 09:27 Dose:  100 mg


Tramadol HCl (Ultram -)  25 mg PO Q6H PRN


   PRN Reason: PAIN


   Last Admin: 09/04/17 04:07 Dose:  25 mg


Valsartan (Diovan -)  80 mg PO DAILY [facility]


   Last Admin: 09/04/17 09:32 Dose:  Not Given


Warfarin Sodium (Coumadin -)  10 mg PO DAILY@1800 [facility]


   Last Admin: 09/03/17 17:06 Dose:  10 mg











- Objective


Vital Signs: 


 Vital Signs











Temperature  98.5 F   09/04/17 05:00


 


Pulse Rate  67   09/04/17 10:05


 


Respiratory Rate  16   09/04/17 05:00


 


Blood Pressure  105/56   09/04/17 05:00


 


O2 Sat by Pulse Oximetry (%)  95   09/04/17 10:05











Neck: Yes: Supple


Cardiovascular: Yes: Pulse Irregular, S1, S2


Respiratory: Yes: CTA Bilaterally


Gastrointestinal: Yes: Normal Bowel Sounds, Soft.  No: Tenderness


Edema: No


Additional Findings/Remarks: 








Review of Systems


Constitutional: denies: Chills


Cardiovascular: As noted above


Respiratory: denies: Cough or Sputum Production


Gastrointestinal: denies: Nausea, Vomiting, Diarrhea, Constipation or Abdominal 

Pain


Musculoskeletal: reports: Bilateral Leg Discomfort - improved


Neurological: denies: Dizziness or Headache








Labs: 


 INR, PTT











INR  1.86  (0.82-1.09)  H D 09/04/17  06:00    














Problem List





- Problems


(1) Coronary artery disease


Code(s): I25.10 - ATHSCL HEART DISEASE OF NATIVE CORONARY ARTERY W/O ANG PCTRS 

  Qualifiers: 


     Coronary Disease-Associated Artery/Lesion type: native artery     Native 

vs. transplanted heart: native heart     Associated angina: without angina     

   Qualified Code(s): I25.10 - Atherosclerotic heart disease of native coronary 

artery without angina pectoris  





(2) Hypertrophic cardiomyopathy


Code(s): I42.2 - OTHER HYPERTROPHIC CARDIOMYOPATHY





(3) Vascular occlusion


Code(s): I99.8 - OTHER DISORDER OF CIRCULATORY SYSTEM





(4) Atrial flutter


Code(s): I48.92 - UNSPECIFIED ATRIAL FLUTTER   Qualifiers: 


     Atrial flutter type: atypical        Qualified Code(s): I48.4 - Atypical 

atrial flutter  





(5) HTN (hypertension)


Code(s): I10 - ESSENTIAL (PRIMARY) HYPERTENSION   Qualifiers: 


     Hypertension type: essential hypertension        Qualified Code(s): I10 - 

Essential (primary) hypertension  





(6) Left ventricular systolic dysfunction


Code(s): I51.9 - HEART DISEASE, UNSPECIFIED








Assessment/Plan


1. Bilateral leg discomfort with evidence of vascular occlusion (occlusion of 

SFA bilaterally) likely embolic disease related to persistent AF with 

[id number] score of 6 on no therapy at home, poor compliance  s/p angiogram + 

bilateral thrombectomy on Xarelto


2. CAD with evidence of demand ischemic injury, non obstructive CAD on coronary 

angiogrpahy angina pectoris


3. LV systolic/diastolic dysfunction, cannot rule out infiltrative 

cardiomyopathy, chronic class I-II NYHA classification, compensated/euvolemic.


4. Paroxysmal atypical atrial flutter/atrial tachycardia post cardioversion 

[id number] score of 6, recurrent arrhythmia (AF)


5. HTN


6. History of CKD


7. Poor compliance with medical therapy administration and medical F/U


8. Tobacco abuse





PLAN:


1. Currently on Xarelto (as he wants once a day regimen for compliance) 

considering the above noted presentation and [id number] score 6. 


2. Post op care


3. Continue Carvedilol and Diovan and titrate as tolerated


4. Continue ASA


5. Continue Lipitor


6. Counselled smoking cessation and abstinence


7. Further cardiac evaluation can be done as outpatient - consider synchronized 

cardioversion at some point, but can be done as outpatient





Further plans are to follow


Olvin Hobbs MD

## 2017-09-04 NOTE — PN
Progress Note (short form)





- Note


Progress Note: 


 


Comfortable with no acute distress, no shortness of breath

















Temperature  98.7 F   09/04/17 14:00


 


Pulse Rate  81   09/04/17 14:00


 


Respiratory Rate  18   09/04/17 14:00


 


Blood Pressure  106/68   09/04/17 14:00


 


O2 Sat by Pulse Oximetry (%)  95   09/04/17 10:05








GENERAL: The patient is awake, alert, and fully oriented, in no acute distress.


HEAD: Normal with no signs of trauma.


EYES: PERRL, extraocular movements intact, sclera anicteric, conjunctiva clear. 


ENT: Ears normal,  oropharynx clear without exudates, moist mucous membranes.


NECK: Trachea midline, full range of motion, supple. 


LUNGS: Breath sounds equal, clear to auscultation bilaterally, no wheezes, no 

crackles, no accessory muscle use. 


HEART: Regular rate and rhythm, S1, S2 without murmur, rub or gallop.


ABDOMEN: Soft, nontender, nondistended, normoactive bowel sounds, no guarding, 

no rebound, no hepatosplenomegaly, no masses.


EXTREMITIES: b/l approximately 8cm groin incisions closed with staples are clean

, dry, and intact without erythema or other signs of infection. both legs are 

warm to touch with intact motor and sensory function. pulses are POsitive  


NEUROLOGICAL: Cranial nerves II through XII grossly intact. Normal speech, gait 

not observed.


PSYCH: Normal mood, normal affect.


SKIN: Warm, dry, normal turgor, no rashes or lesions noted


 CBCD











WBC  10.6 K/mm3 (4.0-10.0)  H  08/31/17  05:20    


 


RBC  3.78 M/mm3 (4.00-5.60)  L  08/31/17  05:20    


 


Hgb  11.7 GM/dL (11.7-16.9)   08/31/17  05:20    


 


Hct  35.4 % (35.4-49)   08/31/17  05:20    


 


MCV  93.7 fl (80-96)   08/31/17  05:20    


 


MCHC  33.1 g/dl (32.0-35.9)   08/31/17  05:20    


 


RDW  14.8 % (11.9-15.9)   08/31/17  05:20    


 


Plt Count  307 K/MM3 (134-434)   08/31/17  05:20    


 


MPV  9.6 fl (7.5-11.1)   08/31/17  05:20    








 CMP











Sodium  139 mmol/L (136-145)   08/31/17  05:20    


 


Potassium  4.8 mmol/L (3.5-5.1)   08/31/17  05:20    


 


Chloride  105 mmol/L ()   08/31/17  05:20    


 


Carbon Dioxide  25 mmol/L (21-32)   08/31/17  05:20    


 


Anion Gap  9  (8-16)   08/31/17  05:20    


 


BUN  22 mg/dL (7-18)  H D 08/31/17  05:20    


 


Creatinine  1.0 mg/dL (0.7-1.3)   08/31/17  05:20    


 


Creat Clearance w eGFR  > 60  (>60)   08/27/17  06:30    


 


Random Glucose  99 mg/dL ()   08/31/17  05:20    


 


Calcium  7.7 mg/dL (8.5-10.1)  L  08/31/17  05:20    


 


Total Bilirubin  0.5 mg/dL (0.2-1.0)  D 08/27/17  06:30    


 


AST  15 U/L (15-37)   08/27/17  06:30    


 


ALT  17 U/L (12-78)   08/27/17  06:30    


 


Alkaline Phosphatase  65 U/L ()   08/27/17  06:30    


 


Total Protein  6.1 g/dl (6.4-8.2)  L  08/27/17  06:30    


 


Albumin  2.4 g/dl (3.4-5.0)  L  08/27/17  06:30    








 CARDIAC ENZYMES











Creatine Kinase  282 IU/L ()   08/26/17  18:45    


 


Troponin I  0.50 ng/ml (0.00-0.05)  H  08/28/17  05:35    








 Current Medications











Generic Name Dose Route Start Last Admin





  Trade Name Freq  PRN Reason Stop Dose Admin


 


Aspirin  81 mg 08/31/17 10:00 09/04/17 09:28





  Ecotrin -  PO   81 mg





  DAILY [facility]   Administration


 


Atorvastatin Calcium  40 mg 08/30/17 22:00 09/03/17 21:48





  Lipitor -  PO   40 mg





  HS [facility]   Administration


 


Carvedilol  6.25 mg 09/01/17 10:00 09/04/17 09:32





  Coreg -  PO   Not Given





  BID [facility]   


 


Enoxaparin Sodium  100 mg 09/02/17 09:00 09/04/17 09:27





  Lovenox -  SQ   100 mg





  BID@0900,2100 [facility]   Administration


 


Tramadol HCl  25 mg 09/03/17 14:41 09/04/17 04:07





  Ultram -  PO   25 mg





  Q6H PRN   Administration





  PAIN   


 


Valsartan  80 mg 08/31/17 10:00 09/04/17 09:32





  Diovan -  PO   Not Given





  DAILY [facility]   


 


Warfarin Sodium  10 mg 09/03/17 18:00 09/04/17 17:12





  Coumadin -  PO   10 mg





  DAILY@1800 [facility]   Administration








INR 1.44-->1.42 -->1.86 today


CTA report with possible thrombus in R common femoral artery and decreased flow 

on both sides. 


 


ASSESSMENT AND PLAN:


This is a 77 year old man with a history of hypertrophic cardiomyopathy, non-

obstructive CAD, chronic systolic/diastolic HF, HTN, PAF who presented to the 

ER with right leg pain





# Acute arterial occlusion of Right lower extremity s/p b/l thrombectomy (8/29) 

by Vascular Dr.Nirav Chan ,On lovenox sq and coumadin 10mg daily to  bridge 

Lovenox with coumadin  ,patient needs  long term AC. As per Vascular to send 

the patient on coumadin 





# A fib : on coumadin 10mg  tonight, PT/INR daily off  heparin drip  , on coreg 

continue, needs long term AC





# CAD: cont ASA and statin 





#  Hx of  chronic Systolic and diastolic  heart failure . Euvolemic . echo 

reviewed,  cont to monitor . cont diovan 





DVT PX: Coumadin





Visit type





- Emergency Visit


Emergency Visit: Yes


ED Registration Date: 08/26/17


Care time: The patient presented to the Emergency Department on the above date 

and was hospitalized for further evaluation of their emergent condition.





- New Patient


This patient is new to me today: No





- Critical Care


Critical Care patient: No

## 2017-09-05 VITALS — TEMPERATURE: 98.7 F

## 2017-09-05 VITALS — HEART RATE: 71 BPM | SYSTOLIC BLOOD PRESSURE: 117 MMHG | DIASTOLIC BLOOD PRESSURE: 60 MMHG

## 2017-09-05 LAB
ALBUMIN SERPL-MCNC: 2.3 G/DL (ref 3.4–5)
ALP SERPL-CCNC: 65 U/L (ref 45–117)
ALT SERPL-CCNC: 20 U/L (ref 12–78)
ANION GAP SERPL CALC-SCNC: 6 MMOL/L (ref 8–16)
AST SERPL-CCNC: 13 U/L (ref 15–37)
BILIRUB SERPL-MCNC: 0.3 MG/DL (ref 0.2–1)
CALCIUM SERPL-MCNC: 8.3 MG/DL (ref 8.5–10.1)
CO2 SERPL-SCNC: 30 MMOL/L (ref 21–32)
CREAT SERPL-MCNC: 0.9 MG/DL (ref 0.7–1.3)
DEPRECATED RDW RBC AUTO: 14.8 % (ref 11.9–15.9)
GLUCOSE SERPL-MCNC: 94 MG/DL (ref 74–106)
INR BLD: 2.71 (ref 0.82–1.09)
MAGNESIUM SERPL-MCNC: 2 MG/DL (ref 1.8–2.4)
MCH RBC QN AUTO: 30.9 PG (ref 25.7–33.7)
MCHC RBC AUTO-ENTMCNC: 33.6 G/DL (ref 32–35.9)
MCV RBC: 91.9 FL (ref 80–96)
PHOSPHATE SERPL-MCNC: 3.8 MG/DL (ref 2.5–4.9)
PLATELET # BLD AUTO: 353 K/MM3 (ref 134–434)
PMV BLD: 8.4 FL (ref 7.5–11.1)
PROT SERPL-MCNC: 6.1 G/DL (ref 6.4–8.2)
PT PNL PPP: 30.4 SEC (ref 9.98–11.88)
WBC # BLD AUTO: 8.5 K/MM3 (ref 4–10)

## 2017-09-05 RX ADMIN — CARVEDILOL SCH MG: 6.25 TABLET, FILM COATED ORAL at 09:13

## 2017-09-05 RX ADMIN — VALSARTAN SCH: 80 TABLET, FILM COATED ORAL at 09:13

## 2017-09-05 RX ADMIN — ENOXAPARIN SODIUM SCH MG: 100 INJECTION SUBCUTANEOUS at 09:13

## 2017-09-05 RX ADMIN — ASPIRIN SCH MG: 81 TABLET, COATED ORAL at 09:13

## 2017-09-05 NOTE — PN
Teaching Attending Note


Name of Resident: Christiano Wong


ATTENDING PHYSICIAN STATEMENT





I saw and evaluated the patient.


I reviewed the resident's note and discussed the case with the resident.


I agree with the resident's findings and plan as documented.








SUBJECTIVE:








OBJECTIVE:


 Vital Signs











Temperature  98.7 F   09/05/17 14:34


 


Pulse Rate  71   09/05/17 14:34


 


Respiratory Rate  18   09/05/17 14:34


 


Blood Pressure  117/60   09/05/17 14:34


 


O2 Sat by Pulse Oximetry (%)  95   09/05/17 10:37








 CBCD











WBC  8.5 K/mm3 (4.0-10.0)   09/05/17  06:30    


 


RBC  3.84 M/mm3 (4.00-5.60)  L  09/05/17  06:30    


 


Hgb  11.9 GM/dL (11.7-16.9)   09/05/17  06:30    


 


Hct  35.3 % (35.4-49)  L  09/05/17  06:30    


 


MCV  91.9 fl (80-96)   09/05/17  06:30    


 


MCHC  33.6 g/dl (32.0-35.9)   09/05/17  06:30    


 


RDW  14.8 % (11.9-15.9)   09/05/17  06:30    


 


Plt Count  353 K/MM3 (134-434)   09/05/17  06:30    


 


MPV  8.4 fl (7.5-11.1)  D 09/05/17  06:30    








 CMP











Sodium  141 mmol/L (136-145)   09/05/17  06:30    


 


Potassium  4.8 mmol/L (3.5-5.1)   09/05/17  06:30    


 


Chloride  105 mmol/L ()   09/05/17  06:30    


 


Carbon Dioxide  30 mmol/L (21-32)   09/05/17  06:30    


 


Anion Gap  6  (8-16)  L  09/05/17  06:30    


 


BUN  16 mg/dL (7-18)  D 09/05/17  06:30    


 


Creatinine  0.9 mg/dL (0.7-1.3)   09/05/17  06:30    


 


Creat Clearance w eGFR  > 60  (>60)   09/05/17  06:30    


 


Random Glucose  94 mg/dL ()   09/05/17  06:30    


 


Calcium  8.3 mg/dL (8.5-10.1)  L  09/05/17  06:30    


 


Total Bilirubin  0.3 mg/dL (0.2-1.0)  D 09/05/17  06:30    


 


AST  13 U/L (15-37)  L  09/05/17  06:30    


 


ALT  20 U/L (12-78)   09/05/17  06:30    


 


Alkaline Phosphatase  65 U/L ()   09/05/17  06:30    


 


Total Protein  6.1 g/dl (6.4-8.2)  L  09/05/17  06:30    


 


Albumin  2.3 g/dl (3.4-5.0)  L  09/05/17  06:30    








 CARDIAC ENZYMES











Creatine Kinase  282 IU/L ()   08/26/17  18:45    


 


Troponin I  0.50 ng/ml (0.00-0.05)  H  08/28/17  05:35    








 Home Medications











 Medication  Instructions  Recorded


 


Aspirin Coated [Ecotrin -] 81 mg PO DAILY #30 tab 09/05/17


 


Atorvastatin Ca [Lipitor] 40 mg PO HS #30 tablet 09/05/17


 


Cane 1 each MC ONCE #1 each 09/05/17


 


Carvedilol [Coreg -] 6.25 mg PO BID #60 tablet 09/05/17


 


Valsartan [Diovan] 80 mg PO DAILY #30 tablet 09/05/17


 


Warfarin Na [Coumadin -] 5 mg PO DAILY@1800 #30 tablet 09/05/17

















INR 1.44-->1.42 -->1.86 today


CTA report with possible thrombus in R common femoral artery and decreased flow 

on both sides. 


 


ASSESSMENT AND PLAN:


This is a 77 year old man with a history of hypertrophic cardiomyopathy, non-

obstructive CAD, chronic systolic/diastolic HF, HTN, PAF who presented to the 

ER with right leg pain





# Acute arterial occlusion of Right lower extremity s/p b/l thrombectomy (8/29) 

by V[facility] Dr.Nirav Chan ,On lovenox sq and coumadin 10mg daily to  bridge 

Lovenox with coumadin  ,patient needs  long term AC. As per [facility]ascular to send 

the patient on coumadin 





# A fib : on coumadin 10mg  tonight, PT/INR daily off  heparin drip  , on coreg 

continue, needs long term AC





# CAD: cont ASA and statin 





#  Hx of  chronic Systolic and diastolic  heart failure . Euvolemic . echo 

reviewed,  cont to monitor . cont diovan 





DVT PX: Coumadin

## 2017-09-05 NOTE — DS
Physical Exam: 


SUBJECTIVE: Patient seen and examined.





No acute events overnight. This am, pt reports no complaints. The swelling in 

his feet has decreased after elevating his legs while lying down. He reports 

that he has been walking down the halls unassisted without any pain or 

problems. He denies SOB, chest pain, n/v/d/c, or dysuria. 








OBJECTIVE:





 Vital Signs











 Period  Temp  Pulse  Resp  BP Sys/Varghese  Pulse Ox


 


 Last 24 Hr  98.1 F-98.7 F  71-92  16-18  106-118/62-73  95-98








PHYSICAL EXAM





GENERAL: The patient is awake, alert, and fully oriented, in no acute distress.


HEAD: Normal with no signs of trauma.


EYES: PERRL, extraocular movements intact, sclera anicteric, conjunctiva clear. 


ENT: Ears normal, nares patent, oropharynx clear without exudates, moist mucous 

membranes.


NECK: Trachea midline, full range of motion, supple. 


LUNGS: Breath sounds equal, clear to auscultation bilaterally, no wheezes, no 

crackles, no accessory muscle use. 


HEART: irregularly irregular without murmur, rub or gallop.


ABDOMEN: Soft, nontender, nondistended, normoactive bowel sounds, no guarding, 

no rebound, no hepatosplenomegaly, no masses.


EXTREMITIES: 1+ pulses, warm, well-perfused, 1+ edema in b/l ankles. 


NEUROLOGICAL: Cranial nerves II through XII grossly intact. Normal speech, gait 

not observed.


PSYCH: Normal mood, normal affect.


SKIN: Warm, dry, normal turgor, no rashes or lesions noted.





LABS


 Laboratory Results - last 24 hr











  09/05/17 09/05/17 09/05/17





  06:30 06:30 06:30


 


WBC   8.5 


 


RBC   3.84 L 


 


Hgb   11.9 


 


Hct   35.3 L 


 


MCV   91.9 


 


MCH   30.9 


 


MCHC   33.6 


 


RDW   14.8 


 


Plt Count   353 


 


MPV   8.4  D 


 


INR   


 


PTT (Actin FS)  38.9 H  


 


Sodium    141


 


Potassium    4.8


 


Chloride    105


 


Carbon Dioxide    30


 


Anion Gap    6 L


 


BUN    16  D


 


Creatinine    0.9


 


Creat Clearance w eGFR    > 60


 


Random Glucose    94


 


Calcium    8.3 L


 


Phosphorus    3.8


 


Magnesium    2.0


 


Total Bilirubin    0.3  D


 


AST    13 L


 


ALT    20


 


Alkaline Phosphatase    65


 


Total Protein    6.1 L


 


Albumin    2.3 L














  09/05/17





  08:38


 


WBC 


 


RBC 


 


Hgb 


 


Hct 


 


MCV 


 


MCH 


 


MCHC 


 


RDW 


 


Plt Count 


 


MPV 


 


INR  2.71 H D


 


PTT (Actin FS) 


 


Sodium 


 


Potassium 


 


Chloride 


 


Carbon Dioxide 


 


Anion Gap 


 


BUN 


 


Creatinine 


 


Creat Clearance w eGFR 


 


Random Glucose 


 


Calcium 


 


Phosphorus 


 


Magnesium 


 


Total Bilirubin 


 


AST 


 


ALT 


 


Alkaline Phosphatase 


 


Total Protein 


 


Albumin 











HOSPITAL COURSE:





77M w/ hx of p-afib, HTN, CAD, CHF, and hypertrophic cardiomyopathy who 

presented with right leg pain, was found to have b/l significant PAD/

thromboembolic disease, and underwent a b/l thrombectomy with marked 

improvement. Pt was to be transitioned to a NOAC, but his insurance did not 

cover it. Instead, pt was bridged to coumadin with lovenox. Today, pt's INR is 

therapeutic at 2.71, he has no complaints, his vitals are stable, he is able to 

ambulate without leg pain, and he is stable for discharge. Pt is being sent 

home on coumadin 5mg with an appointment in the [facility] coumadin clinic on 9/ 8/2017 and referrals to a PCP and hematologist. 





Date of Admission:08/26/17





Date of Discharge: 09/05/17














<Christiano Wong - Last Filed: 09/05/17 13:49>


Physical Exam: 


SUBJECTIVE: Patient seen and examined








Correction his appointment is at [facility] on 9/7/2017. [facility] 

[facility] 











Minutes to complete discharge: 50





<Brad Sadler - Last Filed: 09/07/17 08:44>





Discharge Summary


Reason For Visit: VASCULAR OCCLUSION


Current Active Problems





Chronic thromboembolic disease (Acute) 


Nicotine dependence (Acute) 


Vascular occlusion (Acute) 


Coronary artery disease (Chronic) 


HTN (hypertension) (Chronic) 


Hypertrophic cardiomyopathy (Chronic) 


Paroxysmal atrial fibrillation (Chronic) 


Peripheral arterial disease (Chronic) 











- Home Medications


Comprehensive Discharge Medication List: 


Ambulatory Orders





Aspirin Coated [Ecotrin -] 81 mg PO DAILY #30 tab 09/05/17 


Atorvastatin Ca [Lipitor] 40 mg PO HS #30 tablet 09/05/17 


Cane 1 each MC ONCE #1 each 09/05/17 


Carvedilol [Coreg -] 6.25 mg PO BID #60 tablet 09/05/17 


Valsartan [Diovan] 80 mg PO DAILY #30 tablet 09/05/17 


Warfarin Na [Coumadin -] 5 mg PO DAILY@1800 #30 tablet 09/05/17 











<Christiano Wong - Last Filed: 09/05/17 13:49>





- Home Medications


Comprehensive Discharge Medication List: 


Ambulatory Orders





Aspirin Coated [Ecotrin -] 81 mg PO DAILY #30 tab 09/05/17 


Atorvastatin Ca [Lipitor] 40 mg PO HS #30 tablet 09/05/17 


Cane 1 each MC ONCE #1 each 09/05/17 


Carvedilol [Coreg -] 6.25 mg PO BID #60 tablet 09/05/17 


Valsartan [Diovan] 80 mg PO DAILY #30 tablet 09/05/17 


Warfarin Na [Coumadin -] 5 mg PO DAILY@1800 #30 tablet 09/05/17 











<Brad Sadler - Last Filed: 09/07/17 08:44>


Condition: Improved





- Instructions


Diet, Activity, Other Instructions: 


You were found to have arterial clots in both legs, and you received a 

procedure called a thrombectomy to remove those clots. Because of your 

peripheral arterial disease and atrial fibrillation, you need long term 

anticoagulation, and you were started on coumadin. 





You need to follow up with the coumadin clinic at [facility] to check your INR 

which tells us that your coumadin dosage is therapeutic. Your appointment is on 

9/8 at 9am, and it is located at [address] [place] [address], 4th floor, room 460, [place] 

[place] [address] ([phone]) 





Follow up with your new PCP, Dr. Norton on 09/07/2017 at 12pm at [facility], [address] [place] [address] ([phone]).





Please see Dr. Je Cuellar, a hematologist, within 2 weeks.  


Referrals: 


Je Cuellar MD [Staff Physician] - 


Rosalie Staley MD [Staff Physician] - 


Disposition: HOME





Problem List





- Problems


(1) Coronary artery disease


Code(s): I25.10 - ATHSCL HEART DISEASE OF NATIVE CORONARY ARTERY W/O ANG PCTRS 

  Qualifiers: 


     Coronary Disease-Associated Artery/Lesion type: native artery     Native 

vs. transplanted heart: native heart     Associated angina: without angina     

   Qualified Code(s): I25.10 - Atherosclerotic heart disease of native coronary 

artery without angina pectoris  





(2) Heart failure


Code(s): I50.9 - HEART FAILURE, UNSPECIFIED   





(3) Hypertrophic cardiomyopathy


Code(s): I42.2 - OTHER HYPERTROPHIC CARDIOMYOPATHY





(4) Nicotine dependence


Code(s): F17.200 - NICOTINE DEPENDENCE, UNSPECIFIED, UNCOMPLICATED   








<Christiano Wong - Last Filed: 09/05/17 13:49>


This patient is new to me today: No


Emergency Visit: Yes


ED Registration Date: 08/26/17


Care time: The patient presented to the Emergency Department on the above date 

and was hospitalized for further evaluation of their emergent condition.


Critical Care patient: No





- Discharge Referral


Referred to [facility] No


Physician Referral: Bryan Carrillo MD ([facility])





<Christiano Wong - Last Filed: 09/05/17 13:49>

## 2017-09-05 NOTE — PN
Progress Note (short form)





- Note


Progress Note: 


Chief Complaint: Events noted, notes reviewed, denies any leg discomfort, 

denies any chest pain or dyspnea, atypical atrial flutter/atrial fibrillation 

on A/C





History of Present Illness: 


Seen and examined on telemetry. Events noted, notes reviewed, denies any leg 

discomfort, denies any chest pain or dyspnea, atypical atrial flutter/atrial 

fibrillation on A/C





- Current Medication List





 Current Medications





Aspirin (Ecotrin -)  81 mg PO DAILY [facility]


   Last Admin: 09/04/17 09:28 Dose:  81 mg


Atorvastatin Calcium (Lipitor -)  40 mg PO HS [facility]


   Last Admin: 09/04/17 21:56 Dose:  40 mg


Carvedilol (Coreg -)  6.25 mg PO BID [facility]


   Last Admin: 09/04/17 21:56 Dose:  6.25 mg


Enoxaparin Sodium (Lovenox -)  100 mg SQ BID@0900,2100 [facility]


   Last Admin: 09/04/17 21:56 Dose:  100 mg


Tramadol HCl (Ultram -)  25 mg PO Q6H PRN


   PRN Reason: PAIN


   Last Admin: 09/04/17 21:56 Dose:  25 mg


Valsartan (Diovan -)  80 mg PO DAILY [facility]


   Last Admin: 09/04/17 09:32 Dose:  Not Given


Warfarin Sodium (Coumadin -)  10 mg PO DAILY@1800 [facility]


   Last Admin: 09/04/17 17:12 Dose:  10 mg





Review of Systems


Constitutional: denies: Chills


Cardiovascular: As noted above


Respiratory: denies: Cough or Sputum Production


Gastrointestinal: denies: Nausea, Vomiting, Diarrhea, Constipation or Abdominal 

Pain


Musculoskeletal: denies: Leg Discomfort 


Neurological: denies: Dizziness or Headache





- Objective


Vital Signs: 





 Last Vital Signs











Temp Pulse Resp BP Pulse Ox


 


 98.1 F   83   16   110/63   95 


 


 09/05/17 06:00  09/05/17 06:00  09/05/17 06:00  09/05/17 06:00  09/04/17 21:00








 Intake & Output











 09/02/17 09/03/17 09/04/17 09/05/17





 23:59 23:59 23:59 23:59


 


Intake Total 860 320 850 150


 


Output Total 2200 2750 2450 150


 


Balance -1340 -2430 -1600 0


 


Weight 223 lb 6.4 oz   











Neck: Supple Negative JVD No Bruit


Cardiovascular: S1 S2 Irregularly Irregular Grade 2/6 Systolic Murmur


Chest: Clear to A&P Bilaterally


Gastrointestinal: Soft Benign Normal Bowel Sounds


Ext: No Edema 1+ Bilateral femoral Pulses





Labs: 


Blood test from this AM pending





Assessment/Plan





ASSESSMENT:





1. Bilateral leg discomfort with evidence of vascular occlusion, bilateral SFA 

occlusion post thrombectomy, most likely embolic disease related to PAF with 

[id number] score of 6 on Lovenox/Coumadin, history of poor compliance with 

therapy administration and medical F/U


2. CAD with evidence of demand ischemic injury, non obstructive CAD on R&c 

coronary angiogrpahy angina pectoris


3. LV systolic/diastolic dysfunction related to apical hypertrophic cardio-

myopathy, chronic class I-II NYHA classification, compensated/euvolemic


4. Paroxysmal atypical atrial flutter/atrial tachycardia post cardioversion 

[id number] score of 6, recurrent arrhythmia, will require long term A/C


5. HTN


6. History of CKD


7. Poor compliance with medical therapy administration and medical F/U


8. Tobacco abuse





PLAN:





1. Continue Lovenox/Coumadin and patient will require long term A/C (NOAC's 

deferred related to cost issue) considering the above noted presentation and 

[id number] score 6 


2. Continue Carvedilol 


3. Continue Diovan 


4. Continue ASA in conjunction with Coumadin


5. Continue Lipitor


6. Counselled smoking cessation and abstinence


7. Plan to D/C home once INR is therapeutic, between 2-3 and advised F/U in the 

office, will consider outpatient DCCV for the above noted atrial arrhythmia 

after 3-4 weeks of adequate A/C 





Discussed in detail with the patient 














Armand Mckenzie MD

## 2017-09-14 NOTE — OP
DATE OF OPERATION:  08/29/2017

 

PREOPERATIVE DIAGNOSIS:  Bilateral lower extremity ischemia.

 

POSTOPERATIVE DIAGNOSIS:  Bilateral lower extremity ischemia.

 

PROCEDURES:  Open thrombectomy, bilateral iliac arteries, superficial femoral artery,

popliteal artery, tibial artery, with bilateral lower extremity angiogram.

 

FINDINGS:  Embolus, bilateral lower extremities.  Embolus sent to Pathology.

 

SURGEON:  Perry Still DO

 

ANESTHESIA:  General.

 

ESTIMATED BLOOD LOSS:  200 mL.

 

INDICATIONS FOR PROCEDURE:  The patient is a 77-year-old male who comes in with about

a 7-to-8-day history of bilateral lower extremity pain upon walking.  He claims that

last Tuesday, he started developing some pain in his leg and that now the pain is

worse, that he cannot walk anymore.  He decided to come in to the hospital.  Vascular

Surgery was consulted.  The patient was seen and examined.  The patient had bilateral

lower extremities that were warm, but cool below the knee.  The patient had good

motor and sensory function intact.  It was decided that the patient would need a CTA.

 A CTA was performed showing that the patient has bilateral SFA disease with

occlusions.  The patient had an EKG performed showing that the patient does have

active atrial fibrillation and the patient is not on any medications for the atrial

fibrillation.  It was highly suspected that the patient has bilateral lower extremity

embolus from his heart and it was decided that he would need open thrombectomy.  The

patient understood that he would be getting two transverse incisions in each groin

and we will try to get all of the clot out.  The patient was consented for the

procedure, understanding all risks, benefits and alternatives.

 

DESCRIPTION OF PROCEDURE:  He was then brought to the operating room.  Once in the

operating room, he was laid on the operating table in a supine manner and the

bilateral lower extremities were prepped and draped in a sterile surgical manner and

we prepped all the way up to the belly button.

 

We then went ahead and started on the right side.  Under ultrasound guidance, we then

marked off our common femoral artery bifurcation and it was marked and a transverse

incision was drawn above and below that jennifer.  We then went ahead and made a 7-cm

incision using a 15 blade.  The patient did receive general anesthesia.  Once we made

the incision, Bovie electrocautery was used and we were able to go through all the

subcutaneous tissue and get down into the femoral sheath.  The femoral sheath was

then dissected and we dissected out our common femoral artery, SFA, profunda and

vessel loops were placed around it.  We then went ahead and administered 5000 units

of IV heparin.  We then went ahead and got proximal and distal control on our

arteries.  Using a number-11 blade, we made a transverse incision on the common

femoral artery.  We then went ahead and used a number 3 Nelson and placed the

Nelson down to the knee under fluoroscopy, and when we brought the Nelson back,

there was a tremendous amount of embolus and clot that was removed.  We did this with

three passes and then there was good backbleeding in our SFA.  We opened our profunda

and the profunda had good backbleeding.  We then went ahead and placed a number 3

Nelson up in the iliac arteries and we were able to open the inflow as clot was

removed.  We then went ahead and placed a short 6-Vatican citizen sheath into the common

femoral artery and shot an angiogram of the right lower extremity showing that the

patient had good blood flow down into the foot.  At this point, we went ahead and

using 6-0 Prolene double-arm, we did a running stitch and closed our arteriotomy. 

Once closed, we then opened the distal artery first and then the proximal artery and

there was a good pulse in our artery.  At this point, we put some wet gauze into the

groin.

 

We went ahead and moved over to the left groin and in the same manner, using

ultrasound guidance, we then marked our common femoral artery bifurcation and using a

skin marker, we were able to draw a vertical incision.  Using a 15-blade, we then

made a 7-cm incision in the left groin.  Bovie electrocautery was used to control

hemostasis.  Using Bovie electrocautery, we were able to dissect all the way down to

the femoral sheath.  The femoral sheath was then dissected and we isolated the common

femoral artery, the femoral artery and the SFA using vessel loops.  We then went

ahead and gave an additional 2000 units of IV heparin.  We then went ahead and got

proximal and distal control on our arteries.  At this point, we then went ahead and

made a transverse incision using a number-11 blade on the common femoral artery.  In

the same manner, we went ahead and used a number 3 Nelson and we were able to place

it down under fluoroscopy all the way into the tibial vessels such as the peroneal

artery and the posterior tibial artery, as well.  We were able to perform

thrombectomy of the tibials, the popliteal, the SFA and all the clot was removed. 

Multiple passes were made and we had good backbleeding in the SFA then.  We then went

ahead and used our number 3 Nelson and placed it up into the iliac arteries and we

were able to remove embolus and clot from there, as well, and now we had good inflow.

 At this point, we placed a short 6-V[place] citizen sheath into the arteriotomy and shot an

angiogram of the left lower extremity and there was good flow into the foot and the

blood vessels were patent.  At this point, we went ahead and using a 6-0 Prolene

double-arm, we made a running stitch and we closed our arteriotomy.  We then opened

the distal artery first and then the proximal artery and there was no bleeding.  We

then irrigated both groin incisions copiously.  We then used 3-0 Vicryl and the

subcutaneous tissue was approximated in an interrupted manner for both groins and the

skin was closed with skin staples.  The areas were then dried, 4 x 4's and Tegaderms

were placed.  The patient tolerated the procedure with no complications.  At the end

of the procedure, the patient had good dopplerable DP and PT pulses.  The patient was

transferred to PACU in stable condition and was started on IV heparin.

 

 

PERRY STILL DO [id number]

DD: 09/13/2017 12:56

DT: 09/14/2017 09:35

Job #:  [id number]

## 2017-12-24 ENCOUNTER — HOSPITAL ENCOUNTER (INPATIENT)
Dept: HOSPITAL 74 - JER | Age: 77
LOS: 3 days | Discharge: HOME | DRG: 916 | End: 2017-12-27
Attending: INTERNAL MEDICINE | Admitting: INTERNAL MEDICINE
Payer: COMMERCIAL

## 2017-12-24 VITALS — BODY MASS INDEX: 30.3 KG/M2

## 2017-12-24 DIAGNOSIS — I25.10: ICD-10-CM

## 2017-12-24 DIAGNOSIS — I48.92: ICD-10-CM

## 2017-12-24 DIAGNOSIS — T78.09XA: Primary | ICD-10-CM

## 2017-12-24 DIAGNOSIS — I50.32: ICD-10-CM

## 2017-12-24 DIAGNOSIS — I24.8: ICD-10-CM

## 2017-12-24 DIAGNOSIS — F10.10: ICD-10-CM

## 2017-12-24 DIAGNOSIS — R79.1: ICD-10-CM

## 2017-12-24 DIAGNOSIS — I48.1: ICD-10-CM

## 2017-12-24 DIAGNOSIS — I25.5: ICD-10-CM

## 2017-12-24 DIAGNOSIS — E11.9: ICD-10-CM

## 2017-12-24 DIAGNOSIS — F17.210: ICD-10-CM

## 2017-12-24 DIAGNOSIS — I11.0: ICD-10-CM

## 2017-12-24 LAB
ALBUMIN SERPL-MCNC: 3 G/DL (ref 3.4–5)
ALP SERPL-CCNC: 92 U/L (ref 45–117)
ALT SERPL-CCNC: 18 U/L (ref 12–78)
ANION GAP SERPL CALC-SCNC: 7 MMOL/L (ref 8–16)
AST SERPL-CCNC: 16 U/L (ref 15–37)
BASOPHILS # BLD: 0.4 % (ref 0–2)
BILIRUB SERPL-MCNC: 0.2 MG/DL (ref 0.2–1)
BNP SERPL-MCNC: 2326.12 PG/ML (ref 5–450)
BUN SERPL-MCNC: 13 MG/DL (ref 7–18)
CALCIUM SERPL-MCNC: 7.6 MG/DL (ref 8.5–10.1)
CHLORIDE SERPL-SCNC: 112 MMOL/L (ref 98–107)
CO2 SERPL-SCNC: 24 MMOL/L (ref 21–32)
CREAT SERPL-MCNC: 1.1 MG/DL (ref 0.7–1.3)
DEPRECATED RDW RBC AUTO: 15.2 % (ref 11.9–15.9)
EOSINOPHIL # BLD: 0.5 % (ref 0–4.5)
GLUCOSE SERPL-MCNC: 122 MG/DL (ref 74–106)
HCT VFR BLD CALC: 41.4 % (ref 35.4–49)
HGB BLD-MCNC: 13.6 GM/DL (ref 11.7–16.9)
INR BLD: 7.33 (ref 0.82–1.09)
LYMPHOCYTES # BLD: 18.7 % (ref 8–40)
MAGNESIUM SERPL-MCNC: 1.9 MG/DL (ref 1.8–2.4)
MCH RBC QN AUTO: 30.4 PG (ref 25.7–33.7)
MCHC RBC AUTO-ENTMCNC: 32.7 G/DL (ref 32–35.9)
MCV RBC: 92.8 FL (ref 80–96)
MONOCYTES # BLD AUTO: 5.6 % (ref 3.8–10.2)
NEUTROPHILS # BLD: 74.8 % (ref 42.8–82.8)
PLATELET # BLD AUTO: 232 K/MM3 (ref 134–434)
PMV BLD: 8.7 FL (ref 7.5–11.1)
POTASSIUM SERPLBLD-SCNC: 4.4 MMOL/L (ref 3.5–5.1)
PROT SERPL-MCNC: 6.3 G/DL (ref 6.4–8.2)
PT PNL PPP: 82.8 SEC (ref 9.98–11.88)
RBC # BLD AUTO: 4.46 M/MM3 (ref 4–5.6)
SODIUM SERPL-SCNC: 143 MMOL/L (ref 136–145)
WBC # BLD AUTO: 9.2 K/MM3 (ref 4–10)

## 2017-12-24 PROCEDURE — G0378 HOSPITAL OBSERVATION PER HR: HCPCS

## 2017-12-24 NOTE — PDOC
History of Present Illness





<Carter Nye - Last Filed: 12/24/17 22:31>





- General


History Source: Patient


Exam Limitations: No Limitations





- History of Present Illness


Initial Comments: 





12/24/17 22:59


The patient is a 77 year old M hx NSTEMI, AF on coumadin, HTN, CAD, CHF, HOCM, 

chronic thromboembolic disease, who presents to the emergency department with 

allergic reaction.   He reports he ate chinese food from a new restaurant prior 

to the onset of his symptoms. Patient has bilateral swelling of the eyes after 

which he developed shortness of breath and activated EMS. PD arrived on the 

scene first and gave epi at 7:25p. EMS then transported pt to ED. Patient 

reports he consumed a pint and half of alcohol today. 





He denies any recent fevers, chills, headache or dizziness. He denies any 

recent nausea, vomit, diarrhea or constipation. He denies any recent chest 

pain. He denies any recent dysuria, frequency, urgency or hematuria.





Allergies: NKA


Past surgical history: None reported.


Social History: Denies recreational drug use.


Primary Care Physician: Dr. Rosalie Rodriguez 








<Sarah Hood - Last Filed: 12/24/17 23:01>





- General


Stated Complaint: ALLERGIC REACTION


Time Seen by Provider: 12/24/17 19:58





Past History





- Past Medical History


Anemia: No


Asthma: No


Cancer: No


Cardiac Disorders: Yes (PAROXYSMAL A.FIBRILLATION/A.FLUTTER)


CVA: No


COPD: No


CHF: No


Dementia: No


Diabetes: Yes (BORDERLINE)


GI Disorders: No


 Disorders: No


HTN: Yes (BORDERLINE)


Hypercholesterolemia: Yes


Liver Disease: No


Seizures: No


Thyroid Disease: No





- Surgical History


Abdominal Surgery: Yes (HERNIA REPAIR)


Cardiac Surgery: Yes (CARDIAC CATHERIZATION/CORONARY ANGIOGRAPHY)


Cholecystectomy: No


Lung Surgery: No


Neurologic Surgery: No


Orthopedic Surgery: No





- Suicide/Smoking/Psychosocial Hx


Smoking History: Current every day smoker


Have you smoked in the past 12 months: Yes


Number of Cigarettes Smoked Daily: 10


If you are a former smoker, when did you quit?: 12.28.16


Hx Alcohol Use: Yes (SOCIALLY)


Drug/Substance Use Hx: No


Substance Use Type: None


Hx Substance Use Treatment: No





<Carter Nye - Last Filed: 12/24/17 22:31>





<Sarah Hood - Last Filed: 12/24/17 23:01>





- Past Medical History


Allergies/Adverse Reactions: 


 Allergies











Allergy/AdvReac Type Severity Reaction Status Date / Time


 


No Known Allergies Allergy   Verified 08/26/17 15:54











Home Medications: 


Ambulatory Orders





Aspirin Coated [Ecotrin -] 81 mg PO DAILY #30 tab 09/05/17 


Atorvastatin Ca [Lipitor] 40 mg PO HS #30 tablet 09/05/17 


Carvedilol [Coreg -] 6.25 mg PO BID #60 tablet 09/05/17 


Valsartan [Diovan] 80 mg PO DAILY #30 tablet 09/05/17 


Warfarin Sodium [Jantoven] 6 mg PO DAILY 10/23/17 











**Review of Systems





- Review of Systems


Able to Perform ROS?: Yes


Comments:: 





12/24/17 23:00


GENERAL/CONSTITUTIONAL: No fever or chills. No weakness.


HEAD, EYES, EARS, NOSE AND THROAT: +Bilateral eye swelling. No change in 

vision. No ear pain or discharge. No sore throat.


GASTROINTESTINAL: No nausea, vomiting, diarrhea or constipation.


GENITOURINARY: No dysuria, frequency, or change in urination.


CARDIOVASCULAR: +Shortness of breath. No chest pain.


RESPIRATORY: No cough, wheezing, or hemoptysis.


MUSCULOSKELETAL: No joint or muscle swelling or pain. No neck or back pain.


SKIN: No rash


NEUROLOGIC: No headache, vertigo, loss of consciousness, or change in strength/

sensation.


ENDOCRINE: No increased thirst. No abnormal weight change.


HEMATOLOGIC/LYMPHATIC: No anemia, easy bleeding, or history of blood clots.


ALLERGIC/IMMUNOLOGIC: +Allergic Reaction.





All Other Systems: Reviewed and Negative





<Sarah Hood - Last Filed: 12/24/17 23:01>





*Physical Exam





- Vital Signs


 Last Vital Signs











Temp Pulse Resp BP Pulse Ox


 


 98.3 F   103 H  18   136/93   100 


 


 12/24/17 20:10  12/24/17 20:10  12/24/17 20:10  12/24/17 20:10  12/24/17 20:10














- Physical Exam


Comments: 





12/24/17 23:00


GENERAL: Alert in resp distress


HEAD: No signs of trauma


EYES: +Significant bilateral eyelid watery edema. 


ENT: +Mild glossal edema.  Malampati II


NECK: Normal ROM, supple, no lymphadenopathy, JVD, or masses


LUNGS: moderate air movement, diffuse wheezing, increased WOB


HEART: +Tachycardic. +Irregularly irregular heart rate of 110. Normal S1 and S2

, no murmurs, rubs or gallops


ABDOMEN: Soft, nontender, normoactive bowel sounds. No guarding, no rebound. No 

masses


EXTREMITIES: Normal range of motion, no edema. No clubbing or cyanosis. No cords

, erythema, or tenderness


BACK: No midline spinal tenderness in cervical/thoracic/lumbar region


NEUROLOGICAL: Cranial nerves grossly intact, negative pronator drift, 5/5 

strength in all 4 extremities, normal sensation to light touch in all 4 

extremities.


SKIN: Warm, Dry, normal turgor, +erythematous edematous wheals to sternum and 

forehead








<Sarah Hood - Last Filed: 12/24/17 23:01>





ED Treatment Course





- LABORATORY


CBC & Chemistry Diagram: 


 12/24/17 20:42





 12/24/17 20:42





- RADIOLOGY


Radiology Studies Ordered: 














 Category Date Time Status


 


 CHEST X-RAY PORTABLE* [RAD] Stat Radiology  12/24/17 20:01 Ordered














<Carter Nye - Last Filed: 12/24/17 22:31>





- LABORATORY


CBC & Chemistry Diagram: 


 12/24/17 20:42





 12/24/17 20:42





- ADDITIONAL ORDERS


Additional order review: 


 Laboratory  Results











  12/24/17 12/24/17 12/24/17





  20:42 20:42 20:42


 


PT with INR    82.80 H


 


INR    7.33 H* D


 


PTT (Actin FS)   


 


Sodium   


 


Potassium   


 


Chloride   


 


Carbon Dioxide   


 


Anion Gap   


 


BUN   


 


Creatinine   


 


Creat Clearance w eGFR   


 


Random Glucose   


 


Calcium   


 


Magnesium   Cancelled 


 


Total Bilirubin   


 


AST   


 


ALT   


 


Alkaline Phosphatase   


 


Troponin I   Cancelled 


 


B-Natriuretic Peptide   


 


Total Protein   


 


Albumin   


 


Alcohol, Quantitative  < 5.0  


 


Blood Type   


 


Antibody Screen   














  12/24/17 12/24/17 12/24/17





  20:42 20:42 20:42


 


PT with INR   


 


INR   


 


PTT (Actin FS)   


 


Sodium    143


 


Potassium    4.4


 


Chloride    112 H


 


Carbon Dioxide    24


 


Anion Gap    7 L


 


BUN    13


 


Creatinine    1.1  D


 


Creat Clearance w eGFR    > 60


 


Random Glucose    122 H D


 


Calcium    7.6 L


 


Magnesium    1.9


 


Total Bilirubin    0.2  D


 


AST    16  D


 


ALT    18


 


Alkaline Phosphatase    92  D


 


Troponin I    0.59 H


 


B-Natriuretic Peptide  Cancelled   2326.12 H


 


Total Protein    6.3 L


 


Albumin    3.0 L D


 


Alcohol, Quantitative   


 


Blood Type   B POSITIVE 


 


Antibody Screen   Negative 














  12/24/17





  20:42


 


PT with INR 


 


INR 


 


PTT (Actin FS)  46.6 H


 


Sodium 


 


Potassium 


 


Chloride 


 


Carbon Dioxide 


 


Anion Gap 


 


BUN 


 


Creatinine 


 


Creat Clearance w eGFR 


 


Random Glucose 


 


Calcium 


 


Magnesium 


 


Total Bilirubin 


 


AST 


 


ALT 


 


Alkaline Phosphatase 


 


Troponin I 


 


B-Natriuretic Peptide 


 


Total Protein 


 


Albumin 


 


Alcohol, Quantitative 


 


Blood Type 


 


Antibody Screen 








 











  12/24/17





  20:42


 


RBC  4.46


 


MCV  92.8


 


MCHC  32.7


 


RDW  15.2


 


MPV  8.7


 


Neutrophils %  74.8


 


Lymphocytes %  18.7


 


Monocytes %  5.6


 


Eosinophils %  0.5


 


Basophils %  0.4














- Medications


Given in the ED: 


ED Medications














Discontinued Medications














Generic Name Dose Route Start Last Admin





  Trade Name Freq  PRN Reason Stop Dose Admin


 


Aspirin  325 mg  12/24/17 22:22  12/24/17 22:44





  Asa -  PO  12/24/17 22:23  325 mg





  ONCE ONE   Administration














<Sarah Hood - Last Filed: 12/24/17 23:01>





Medical Decision Making





- Critical Care Time


Total Critical Care Time (minutes): 60


Critical Care Statement: The care of this patient involved high complexity 

decision making to prevent further life threatening deterioration of the patient

's condition and/or to evaluate & treat vital organ system(s) failure or risk 

of failure.





- Medical Decision Making





12/24/17 20:47


77-year-old male with multiple medical problems presents with anaphylaxis 

status post epi. On arrival to the emergency department patient received 

steroids, Benadryl, Pepcid and fluids and placed on the monitor. Patient 

initially satting 100%, but given wheezing, respiratory distress and increased 

work of breathing, will give nebs and keep epi at the bedside as well as airway 

equipment.We'll also order a chest x-ray and send labs.





12/24/17 22:50


Lung sounds much improved after nebs and period of observation. The patient 

reports improvement in his shortness of breath. Lungs sound much more clear 

with only mild wheezing now. Labs remarkable for troponin of 0.6, upon review 

of EMR the patient frequently has an elevated troponin however epinephrine may 

have contributed to this. Patient also has an elevated BNP and chest x-ray 

concerning for CHF. Will admit the patient for further observation and 

management. Discussed the case with the admitting nurse practitioner.





Case discussed in detail with admitting physician Dr. Mccrary including history, 

physical exam and ancillary studies.





Admitting physician has assumed care for the patient, will follow all pending 

diagnostics and will complete the evaluation and treatment.  





<Carter Nye - Last Filed: 12/24/17 22:31>





*DC/Admit/Observation/Transfer





- Discharge Dispostion


Admit: Yes





- Attestations


Physician Attestion: 





12/24/17 22:53








I, Dr. Carter Nye MD, attest that this document has been prepared under my 

direction and personally reviewed by me in its entirety.   I further attest, 

that it accurately reflects all work, treatment, procedures and medical decision

-making performed by me.  





<Carter Nye - Last Filed: 12/24/17 22:31>





- Attestations


Scribe Attestion: 





12/24/17 23:01





Documentation prepared by Sarah Hood, acting as medical scribe for 

Carter Nye MD.





<Sarah Hood - Last Filed: 12/24/17 23:01>


Diagnosis at time of Disposition: 


 Anaphylaxis, NSTEMI (non-ST elevated myocardial infarction), Pulmonary 

vascular congestion








- Discharge Dispostion


Condition at time of disposition: Stable





- Referrals


Referrals: 


Rosalie Staley MD [Primary Care Provider] - 





- Patient Instructions





- Post Discharge Activity

## 2017-12-25 LAB
ANION GAP SERPL CALC-SCNC: 12 MMOL/L (ref 8–16)
BASOPHILS # BLD: 0.1 % (ref 0–2)
BUN SERPL-MCNC: 17 MG/DL (ref 7–18)
CALCIUM SERPL-MCNC: 8.2 MG/DL (ref 8.5–10.1)
CHLORIDE SERPL-SCNC: 111 MMOL/L (ref 98–107)
CHOLEST SERPL-MCNC: 133 MG/DL (ref 50–200)
CO2 SERPL-SCNC: 20 MMOL/L (ref 21–32)
CREAT SERPL-MCNC: 1.3 MG/DL (ref 0.7–1.3)
DEPRECATED RDW RBC AUTO: 15.3 % (ref 11.9–15.9)
EOSINOPHIL # BLD: 0.1 % (ref 0–4.5)
GLUCOSE SERPL-MCNC: 234 MG/DL (ref 74–106)
HCT VFR BLD CALC: 41.7 % (ref 35.4–49)
HDLC SERPL-MCNC: 48 MG/DL (ref 40–60)
HGB BLD-MCNC: 13.4 GM/DL (ref 11.7–16.9)
INR BLD: 7.37 (ref 0.82–1.09)
LDLC SERPL CALC-MCNC: 71 MG/DL (ref 5–100)
LYMPHOCYTES # BLD: 7.6 % (ref 8–40)
MAGNESIUM SERPL-MCNC: 1.8 MG/DL (ref 1.8–2.4)
MCH RBC QN AUTO: 30 PG (ref 25.7–33.7)
MCHC RBC AUTO-ENTMCNC: 32.2 G/DL (ref 32–35.9)
MCV RBC: 93.1 FL (ref 80–96)
MONOCYTES # BLD AUTO: 0.6 % (ref 3.8–10.2)
NEUTROPHILS # BLD: 91.6 % (ref 42.8–82.8)
PHOSPHATE SERPL-MCNC: 2.5 MG/DL (ref 2.5–4.9)
PLATELET # BLD AUTO: 206 K/MM3 (ref 134–434)
PMV BLD: 8.8 FL (ref 7.5–11.1)
POTASSIUM SERPLBLD-SCNC: 4.5 MMOL/L (ref 3.5–5.1)
PT PNL PPP: 83.3 SEC (ref 9.98–11.88)
RBC # BLD AUTO: 4.48 M/MM3 (ref 4–5.6)
SODIUM SERPL-SCNC: 143 MMOL/L (ref 136–145)
TRIGL SERPL-MCNC: 88 MG/DL (ref 35–160)
WBC # BLD AUTO: 10.9 K/MM3 (ref 4–10)

## 2017-12-25 RX ADMIN — CARVEDILOL SCH MG: 6.25 TABLET, FILM COATED ORAL at 21:36

## 2017-12-25 RX ADMIN — VALSARTAN SCH MG: 80 TABLET, FILM COATED ORAL at 13:53

## 2017-12-25 RX ADMIN — FAMOTIDINE SCH MLS/HR: 10 INJECTION, SOLUTION INTRAVENOUS at 21:36

## 2017-12-25 RX ADMIN — FAMOTIDINE SCH MLS/HR: 10 INJECTION, SOLUTION INTRAVENOUS at 13:53

## 2017-12-25 RX ADMIN — ATORVASTATIN CALCIUM SCH MG: 40 TABLET, FILM COATED ORAL at 21:35

## 2017-12-25 RX ADMIN — ASPIRIN 81 MG SCH MG: 81 TABLET ORAL at 13:53

## 2017-12-25 NOTE — EKG
Test Reason : 

Blood Pressure : ***/*** mmHG

Vent. Rate : 092 BPM     Atrial Rate : 129 BPM

   P-R Int : 000 ms          QRS Dur : 144 ms

    QT Int : 434 ms       P-R-T Axes : 000 256 -23 degrees

   QTc Int : 536 ms

 

*** SUSPECT ARM LEAD REVERSAL, INTERPRETATION ASSUMES NO REVERSAL

ATRIAL FIBRILLATION

RIGHT BUNDLE BRANCH BLOCK

POSSIBLE LATERAL INFARCT (CITED ON OR BEFORE 03-JAN-2017)

ABNORMAL ECG

WHEN COMPARED WITH ECG OF 26-AUG-2017 21:03,

RIGHT BUNDLE BRANCH BLOCK IS NOW PRESENT

CRITERIA FOR INFERIOR INFARCT ARE NO LONGER PRESENT

QUESTIONABLE CHANGE IN INITIAL FORCES OF ANTEROLATERAL LEADS

Confirmed by VAL ADAMSON MD ([id number]) on 12/25/2017 3:25:55 PM

 

Referred By:             Confirmed By:VAL ADAMSON MD

## 2017-12-25 NOTE — HP
CHIEF COMPLAINT:  Swollen Eyes, SOB





PCP: Dr. Nathalie Ramos





HISTORY OF PRESENT ILLNESS:


This is a 78 y/o man with a PMHx of Paroxysmal Afib (on Coumadin), Diabetes 

Mellitus, Ischemic Cardiomyopathy, Alcohol Abuse. Who presents to the ED with 

anaphylaxis after eating Chinese Food- shrimp and boneless spare ribs. Patient 

reports having itchy eyes, and after rubbing them he noted both eyes were 

swollen. Patient reports having tongue and lip swelling. he called EMS, on 

their arrival he was given Epinehrine x1 enroute, Patient also reports being 

constipated then having foul smelling stools. Patient does not recall  having 

baseline Colonoscopy. Patient denies fever, chills, dizziness, CP, N/V/D, 

dysuria.





ER course was notable for:


(1) Supratherapeutic INR 7.33


(2) NSTEMI- Troponin I 0.59 (less then baseline)


(3) Anaphylaxis Protocol- Solumederol, Pepcid, Benadryl given in ED





Recent Travel: None





PAST MEDICAL HISTORY:


See HPI





PAST SURGICAL HISTORY:


Hernia Repair





Social History:


Smoking: Cigarettes < 10/PPD greater than 60+ yrs


Alcohol: Hx Alcohol Abuse- pt reports drinking socially


Drugs:   None





Family History:


Father: MI  age 60


Mother: Stroke





Allergies





No Known Allergies Allergy (Verified 17 15:54)


 








HOME MEDICATIONS:


 Home Medications











 Medication  Instructions  Recorded


 


Aspirin Coated [Ecotrin -] 81 mg PO DAILY #30 tab 17


 


Atorvastatin Ca [Lipitor] 40 mg PO HS #30 tablet 17


 


Carvedilol [Coreg -] 6.25 mg PO BID #60 tablet 17


 


Valsartan [Diovan] 80 mg PO DAILY #30 tablet 17


 


Warfarin Sodium [Jantoven] 6 mg PO DAILY 10/23/17








REVIEW OF SYSTEMS


CONSTITUTIONAL: generalized weakness, malaise, loss of appetite, 


Absent:  fever, chills, diaphoresis, weight change


HEENT: swollen eyes, throat/tonsilar swelling


Absent:  rhinorrhea, nasal congestion, throat pain, throat swelling, difficulty 

swallowing, mouth swelling, ear pain, eye pain, visual changes


CARDIOVASCULAR: 


Absent: chest pain, syncope, palpitations, irregular heart rate, lightheadedness

, peripheral edema


RESPIRATORY: 


Absent: cough, shortness of breath, dyspnea with exertion, orthopnea, wheezing, 

stridor, hemoptysis


GASTROINTESTINAL:


Absent: abdominal pain, abdominal distension, nausea, vomiting, diarrhea, 

constipation, melena, hematochezia


GENITOURINARY: 


Absent: dysuria, frequency, urgency, hesitancy, hematuria, flank pain, genital 

pain


MUSCULOSKELETAL: 


Absent: myalgia, arthralgia, joint swelling, back pain, neck pain


SKIN: 


Absent: rash, itching, pallor


HEMATOLOGIC/IMMUNOLOGIC: 


Absent: easy bleeding, easy bruising, lymphadenopathy, frequent infections


ENDOCRINE:


Absent: unexplained weight gain, unexplained weight loss, heat intolerance, 

cold intolerance


NEUROLOGIC: 


Absent: headache, focal weakness or paresthesias, dizziness, unsteady gait, 

seizure, mental status changes, bladder or bowel incontinence


PSYCHIATRIC: 


Absent: anxiety, depression, suicidal or homicidal ideation, hallucinations.








PHYSICAL EXAMINATION


 Vital Signs - 24 hr











  17





  20:10 22:33


 


Temperature 98.3 F 97.8 F


 


Pulse Rate 103 H 


 


Pulse Rate [  66





Left Radial]  


 


Respiratory 18 18





Rate  


 


Blood Pressure 136/93 


 


Blood Pressure  141/86





[Left Arm]  


 


O2 Sat by Pulse 100 100





Oximetry (%)  











GENERAL: Asleep but arousable, and fully oriented, in no acute distress.


HEAD: Normal with no signs of trauma.


EYES: Pupils equal, round and reactive to light, extraocular movements intact, 

sclera anicteric, conjunctiva injected. No lid lag.


EARS, NOSE, THROAT: Ears normal, nares patent, oropharynx clear without 

exudates. Dry mucous membranes.


NECK: Normal range of motion, supple without lymphadenopathy, JVD, or masses.


LUNGS: Scattered diffuse wheeze bilaterally. no crackles. No accessory muscle 

use.


HEART: Regular rate and rhythm, normal S1 and S2 without murmur, rub or gallop.


ABDOMEN: Soft, nontender, not distended, normoactive bowel sounds, no guarding, 

no rebound, no masses.  No hepatomegaly or  splenomegaly. 


MUSCULOSKELETAL: Normal range of motion at all joints. No bony deformities or 

tenderness. No CVA tenderness.


UPPER EXTREMITIES: 2+ pulses, warm, well-perfused. No cyanosis. No clubbing. No 

peripheral edema.


LOWER EXTREMITIES: 2+ pulses, warm, well-perfused. No calf tenderness. +1 L>R 

peripheral edema. 


NEUROLOGICAL:  Cranial nerves II-XII intact. Normal speech. Gait not observed.


PSYCHIATRIC: Cooperative. Good eye contact. Appropriate mood and affect.


SKIN: Warm, dry, normal turgor, no rashes or lesions noted, normal capillary 

refill. 





 Laboratory Results - last 24 hr











  17





  20:42 20:42 20:42


 


WBC  9.2  


 


RBC  4.46  


 


Hgb  13.6  D  


 


Hct  41.4  D  


 


MCV  92.8  


 


MCH  30.4  


 


MCHC  32.7  


 


RDW  15.2  


 


Plt Count  232  D  


 


MPV  8.7  


 


Neutrophils %  74.8  


 


Lymphocytes %  18.7  


 


Monocytes %  5.6  


 


Eosinophils %  0.5  


 


Basophils %  0.4  


 


PT with INR   


 


INR   


 


PTT (Actin FS)   46.6 H 


 


Sodium    143


 


Potassium    4.4


 


Chloride    112 H


 


Carbon Dioxide    24


 


Anion Gap    7 L


 


BUN    13


 


Creatinine    1.1  D


 


Creat Clearance w eGFR    > 60


 


Random Glucose    122 H D


 


Calcium    7.6 L


 


Magnesium    1.9


 


Total Bilirubin    0.2  D


 


AST    16  D


 


ALT    18


 


Alkaline Phosphatase    92  D


 


Troponin I    0.59 H


 


B-Natriuretic Peptide    2326.12 H


 


Total Protein    6.3 L


 


Albumin    3.0 L D


 


Alcohol, Quantitative   


 


Blood Type   


 


Antibody Screen   














  17





  20:42 20:42 20:42


 


WBC   


 


RBC   


 


Hgb   


 


Hct   


 


MCV   


 


MCH   


 


MCHC   


 


RDW   


 


Plt Count   


 


MPV   


 


Neutrophils %   


 


Lymphocytes %   


 


Monocytes %   


 


Eosinophils %   


 


Basophils %   


 


PT with INR    82.80 H


 


INR    7.33 H* D


 


PTT (Actin FS)   


 


Sodium   


 


Potassium   


 


Chloride   


 


Carbon Dioxide   


 


Anion Gap   


 


BUN   


 


Creatinine   


 


Creat Clearance w eGFR   


 


Random Glucose   


 


Calcium   


 


Magnesium   


 


Total Bilirubin   


 


AST   


 


ALT   


 


Alkaline Phosphatase   


 


Troponin I   


 


B-Natriuretic Peptide   Cancelled 


 


Total Protein   


 


Albumin   


 


Alcohol, Quantitative   


 


Blood Type  B POSITIVE  


 


Antibody Screen  Negative  














  17





  20:42 20:42


 


WBC  


 


RBC  


 


Hgb  


 


Hct  


 


MCV  


 


MCH  


 


MCHC  


 


RDW  


 


Plt Count  


 


MPV  


 


Neutrophils %  


 


Lymphocytes %  


 


Monocytes %  


 


Eosinophils %  


 


Basophils %  


 


PT with INR  


 


INR  


 


PTT (Actin FS)  


 


Sodium  


 


Potassium  


 


Chloride  


 


Carbon Dioxide  


 


Anion Gap  


 


BUN  


 


Creatinine  


 


Creat Clearance w eGFR  


 


Random Glucose  


 


Calcium  


 


Magnesium  Cancelled 


 


Total Bilirubin  


 


AST  


 


ALT  


 


Alkaline Phosphatase  


 


Troponin I  Cancelled 


 


B-Natriuretic Peptide  


 


Total Protein  


 


Albumin  


 


Alcohol, Quantitative   < 5.0


 


Blood Type  


 


Antibody Screen  











ASSESSMENT/PLAN:


This is a 78 y/o man with a PMHx of Paroxysmal Afib/Flutter (on Coumadin), DM, 

Ischemic Cardiomyopathy, Alcohol Abuse. Placed in Tele Observation Anaphylaxis, 

NSTEMI.





Plan:





1. Anaphylaxis- Possibly from Shrimp vs Sparerib ingestion vs Ethanol Abuse. 

Patient received Epinephrine, Solumederol, Pepcid, Benadryl, per protocol. 

Continue Benadryl, Pepcid, Prednisone, Continue Cardiac monitoring, O2, 





2. NSTEMI- Serial Enzymes, Appreciate Cardiology Consult, Asa, EKG - Afib rate 

controlled. RBBB, Lateral infarct, age undetermined.





3. Supratherapeutic INR- Possibly due to Chronic Alcohol Abuse. Will Hold 

Coumadin, patient is not actively bleeding, no need for Vit K at this time, 

serial INRs.





4. A-Fib- Hold Coumadin secondary to Supratherapeutic INR, EKG- Afib rate 

controlled





5. Ischemic Cardiomyopathy- Continue home meds, continue to monitor and treat 

with interventions accordingly





6. Diabetes Mellitus- BGMs, Hold ISS secondary to NPO, for anaphylaxis will 

adjust when meals resumed.





7. Alcohol Abuse- ETOH level- pending, Alcohol cessation discussed with patient 

who is not amendable. CIWAr- 1, Librium prn 





8. FEN- NS@42ml/hr, Replete lytes prn, NPO





9. DVT Prophylaxis- SCDs, no ACs- Supratherapeutic INR





Code Status: Full Code





Dispo: Observation





Problem List





- Problem


(1) Anaphylaxis


Code(s): T78.2XXA - ANAPHYLACTIC SHOCK, UNSPECIFIED, INITIAL ENCOUNTER   





(2) NSTEMI (non-ST elevated myocardial infarction)


Code(s): I21.4 - NON-ST ELEVATION (NSTEMI) MYOCARDIAL INFARCTION   





(3) Left ventricular systolic dysfunction


Code(s): I51.9 - HEART DISEASE, UNSPECIFIED   





(4) Coronary artery disease


Code(s): I25.10 - ATHSCL HEART DISEASE OF NATIVE CORONARY ARTERY W/O ANG PCTRS 

  


Qualifiers: 


   Coronary Disease-Associated Artery/Lesion type: native artery   Native vs. 

transplanted heart: native heart   Associated angina: without angina   

Qualified Code(s): I25.10 - Atherosclerotic heart disease of native coronary 

artery without angina pectoris   





(5) HTN (hypertension)


Code(s): I10 - ESSENTIAL (PRIMARY) HYPERTENSION   


Qualifiers: 


   Hypertension type: essential hypertension   Qualified Code(s): I10 - 

Essential (primary) hypertension   





(6) Hypertrophic cardiomyopathy


Code(s): I42.2 - OTHER HYPERTROPHIC CARDIOMYOPATHY   





(7) Paroxysmal atrial fibrillation


Code(s): I48.0 - PAROXYSMAL ATRIAL FIBRILLATION   





(8) DVT prophylaxis


Code(s): FDW5592 -    





Visit type





- Emergency Visit


Emergency Visit: Yes


ED Registration Date: 17


Care time: The patient presented to the Emergency Department on the above date 

and was hospitalized for further evaluation of their emergent condition.





- New Patient


This patient is new to me today: Yes


Date on this admission: 17

## 2017-12-25 NOTE — CON.CARD
Consult


Consult Specialty:: Cardiology


Referred by:: Hospitalist Medicine


Reason for Consultation:: Afib





- History of Present Illness


Chief Complaint: Food allergy


History of Present Illness: 


This is a 76 y/o man with a PMHx of Persistent Afib [id number] with 

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 

Hypertrophic Cardiomyopathy, Alcohol Abuse, nonobstructive CAD who presents to 

the ED with anaphylaxis after eating Chinese Food- shrimp and boneless spare 

ribs. Patient reports having itchy eyes, and after rubbing them he noted both 

eyes were swollen. Patient reports having tongue and lip swelling, called EMS, 

on their arrival he was given Epinehrine x1 enroute, and anaphylaxis protocol 

in ER, noted to have supratherapeutic INR 7.33. He is asymptomatic from 

cardiovascular standpoint and denies chest pain, dyspnea, near or true syncope, 

orthopnea, PND, LE edema or change in exercise capacity.














- History Source


History Provided By: Patient


Limitations to Obtaining History: No Limitations





- Past Medical History


Cardio/Vascular: Yes: AFIB, HTN, MI





- Past Surgical History


Past Surgical History: Yes: Hernia Repair





- Alcohol/Substance Use


Hx Alcohol Use: Yes (SOCIALLY)


History of Substance Use: reports: None





- Smoking History


Smoking history: Current every day smoker


Have you smoked in the past 12 months: Yes


Aproximately how many cigarettes per day: 10


If you are a former smoker, when did you quit?: 12.28.16





Home Medications





- Allergies


Allergies/Adverse Reactions: 


 Allergies











Allergy/AdvReac Type Severity Reaction Status Date / Time


 


No Known Allergies Allergy   Verified 08/26/17 15:54














- Home Medications


Home Medications: 


Ambulatory Orders





Aspirin Coated [Ecotrin -] 81 mg PO DAILY #30 tab 09/05/17 


Atorvastatin Ca [Lipitor] 40 mg PO HS #30 tablet 09/05/17 


Carvedilol [Coreg -] 6.25 mg PO BID #60 tablet 09/05/17 


Valsartan [Diovan] 80 mg PO DAILY #30 tablet 09/05/17 


Warfarin Sodium [Jantoven] 6 mg PO DAILY 10/23/17 











Review of Systems


Unable to obtain ROS, reason: Eye lid sweliing


Vital Signs: 


 Vital Signs











Temperature  97.8 F   12/24/17 22:33


 


Pulse Rate  81   12/25/17 14:27


 


Respiratory Rate  18   12/25/17 14:27


 


Blood Pressure  138/72   12/25/17 14:27


 


O2 Sat by Pulse Oximetry (%)  100   12/25/17 14:27











Constitutional: Yes: No Distress, Calm


Neck: Yes: Supple


Respiratory: Yes: Regular, Diminished


Gastrointestinal: Yes: Normal Bowel Sounds, Soft


Cardiovascular: Yes: Pulse Irregular


JVD: No


Carotid Bruit: No


Heart Sounds: Yes: S1, S2


Murmur: Yes: Systolic Murmur, Grade 1


Edema: No





- Other Data


Labs, Other Data: 


 CBC, BMP





 12/25/17 08:15 





 12/25/17 08:15 





 INR, PTT











INR  7.37  (0.82-1.09)  H*  12/25/17  08:15    








 Troponin, BNP











  12/24/17 12/24/17 12/24/17





  20:42 20:42 20:42


 


Troponin I  0.59 H   Cancelled


 


B-Natriuretic Peptide  2326.12 H  Cancelled 














  12/25/17 12/25/17





  05:00 08:15


 


Troponin I  0.66 H*  0.64 H*


 


B-Natriuretic Peptide  








 Troponin, BNP











  12/24/17 12/24/17 12/24/17





  20:42 20:42 20:42


 


Troponin I  0.59 H   Cancelled


 


B-Natriuretic Peptide  2326.12 H  Cancelled 














  12/25/17 12/25/17





  05:00 08:15


 


Troponin I  0.66 H*  0.64 H*


 


B-Natriuretic Peptide  














Afib @ 92 RBBB





Problem List





- Problems


(1) Demand ischemia


Code(s): I24.8 - OTHER FORMS OF ACUTE ISCHEMIC HEART DISEASE   





(2) Anaphylaxis


Code(s): T78.2XXA - ANAPHYLACTIC SHOCK, UNSPECIFIED, INITIAL ENCOUNTER   


Qualifiers: 


   Encounter type: initial encounter   Qualified Code(s): T78.2XXA - 

Anaphylactic shock, unspecified, initial encounter   





(3) Chronic thromboembolic disease


Code(s): I74.9 - EMBOLISM AND THROMBOSIS OF UNSPECIFIED ARTERY   





(4) Coronary artery disease


Code(s): I25.10 - ATHSCL HEART DISEASE OF NATIVE CORONARY ARTERY W/O ANG PCTRS 

  


Qualifiers: 


   Coronary Disease-Associated Artery/Lesion type: native artery   Native vs. 

transplanted heart: native heart   Associated angina: without angina   

Qualified Code(s): I25.10 - Atherosclerotic heart disease of native coronary 

artery without angina pectoris   





(5) HTN (hypertension)


Code(s): I10 - ESSENTIAL (PRIMARY) HYPERTENSION   


Qualifiers: 


   Hypertension type: essential hypertension   Qualified Code(s): I10 - 

Essential (primary) hypertension   





(6) Hypertrophic cardiomyopathy


Code(s): I42.2 - OTHER HYPERTROPHIC CARDIOMYOPATHY   





(7) Peripheral arterial disease


Code(s): I73.9 - PERIPHERAL VASCULAR DISEASE, UNSPECIFIED   





(8) Anticoagulant long-term use


Code(s): Z79.01 - LONG TERM (CURRENT) USE OF ANTICOAGULANTS   





(9) Persistent atrial fibrillation


Code(s): I48.1 - PERSISTENT ATRIAL FIBRILLATION   





(10) Diastolic dysfunction without heart failure


Code(s): I51.9 - HEART DISEASE, UNSPECIFIED   





Assessment/Plan


R&LHc at [facility] 1/11/2017 showing nonobstructive CAD, severe LV apical 

hypertrophic cardiomyopathy, mildly elevated right sided pressures, Mynx 

deployed right CFA access site. Study is consistent with apical hypertrophy (

spade-like) variant of hypertrophic cardiomyopathy, planned for optimal medical 

therapy. 





1. Food anaphylaxis resolved


2. H/o bilateral SFA occlusion post thrombectomy, most likely embolic disease 

related to afib with [id number] score of 6, history of poor compliance with 

therapy administration and medical F/U


3. CAD with evidence of demand ischemic injury, non obstructive CAD on R&LHc 

coronary angiogrpahy angina pectoris


4. 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 

chronic class I-II NYHA classification, compensated/euvolemic


5. Persistent atrial fibrillation [id number] score of 6, with supratherapeutic 

INR


6. HTN


7. Poor compliance with medical therapy administration and medical F/U


8. Tobacco abuse





PLAN:





1. Hold coumadin per INR and [id number] score 6 


2. Continue Carvedilol 6.25 bid


3. Continue Diovan 80 qd


4. Continue ASA in conjunction with Coumadin


5. Continue Lipitor 40 qhs


6. Counselled smoking cessation and abstinence


7. Will consider outpatient DCCV for the above noted atrial arrhythmia after 3-

4 weeks of adequate A/C if able to arrange transportation


8. Thank you for consultative opportunity

## 2017-12-25 NOTE — HOSP
Subjective





- Review of Symptoms


Events since last encounter: 








Patient stated that after eating Chinese food started to develop swelling of 

the eyes with itchiness. He had food from this place before but had different 

food this time.





 Vital Signs











Temperature  97.8 F   12/24/17 22:33


 


Pulse Rate  66   12/24/17 22:33


 


Respiratory Rate  18   12/24/17 22:33


 


Blood Pressure  141/86   12/24/17 22:33


 


O2 Sat by Pulse Oximetry (%)  100   12/24/17 22:33








GENERAL: The patient is awake, alert, and fully oriented, in no acute distress.


HEAD: Normal with no signs of trauma.


EYES: PERRL, extraocular movements intact, sclera anicteric, conjunctiva clear. 

eyelids are swollen bl, able to see the cornea bl


ENT: Ears normal,  oropharynx clear without exudates, moist mucous membranes.


NECK: Trachea midline, full range of motion, supple. 


LUNGS: Breath sounds equal, clear to auscultation bilaterally, no wheezes, no 

crackles, no accessory muscle use. 


HEART: Regular rate and rhythm, S1, S2 , ELIU 2/6.


ABDOMEN: Soft, nontender, nondistended, normoactive bowel sounds, no guarding, 

no rebound, no hepatosplenomegaly, no mass APPRECIATED.


EXTREMITIES: pulses are positive


NEUROLOGICAL: Cranial nerves II through XII grossly intact. Normal speech, gait 

not observed.


PSYCH: Normal mood, normal affect.


SKIN: Warm, dry, normal turgor, no rashes or lesions noted





 CBCD











WBC  10.9 K/mm3 (4.0-10.0)  H  12/25/17  08:15    


 


RBC  4.48 M/mm3 (4.00-5.60)   12/25/17  08:15    


 


Hgb  13.4 GM/dL (11.7-16.9)   12/25/17  08:15    


 


Hct  41.7 % (35.4-49)   12/25/17  08:15    


 


MCV  93.1 fl (80-96)   12/25/17  08:15    


 


MCHC  32.2 g/dl (32.0-35.9)   12/25/17  08:15    


 


RDW  15.3 % (11.9-15.9)   12/25/17  08:15    


 


Plt Count  206 K/MM3 (134-434)   12/25/17  08:15    


 


MPV  8.8 fl (7.5-11.1)   12/25/17  08:15    








 CMP











Sodium  143 mmol/L (136-145)   12/25/17  08:15    


 


Potassium  4.5 mmol/L (3.5-5.1)   12/25/17  08:15    


 


Chloride  111 mmol/L ()  H  12/25/17  08:15    


 


Carbon Dioxide  20 mmol/L (21-32)  L  12/25/17  08:15    


 


Anion Gap  12  (8-16)   12/25/17  08:15    


 


BUN  17 mg/dL (7-18)  D 12/25/17  08:15    


 


Creatinine  1.3 mg/dL (0.7-1.3)   12/25/17  08:15    


 


Creat Clearance w eGFR  > 60  (>60)   12/24/17  20:42    


 


Random Glucose  234 mg/dL ()  H D 12/25/17  08:15    


 


Calcium  8.2 mg/dL (8.5-10.1)  L  12/25/17  08:15    


 


Total Bilirubin  0.2 mg/dL (0.2-1.0)  D 12/24/17  20:42    


 


AST  16 U/L (15-37)  D 12/24/17  20:42    


 


ALT  18 U/L (12-78)   12/24/17  20:42    


 


Alkaline Phosphatase  92 U/L ()  D 12/24/17  20:42    


 


Total Protein  6.3 g/dl (6.4-8.2)  L  12/24/17  20:42    


 


Albumin  3.0 g/dl (3.4-5.0)  L D 12/24/17  20:42    








 CARDIAC ENZYMES











Troponin I  0.64 ng/ml (0.00-0.05)  H*  12/25/17  08:15    








 Current Medications











Generic Name Dose Route Start Last Admin





  Trade Name Freq  PRN Reason Stop Dose Admin


 


Aspirin  81 mg  12/25/17 10:00  





  Asa -  PO   





  DAILY [facility]   


 


Atorvastatin Calcium  40 mg  12/25/17 22:00  





  Lipitor -  PO   





  HS AVA   


 


Carvedilol  6.25 mg  12/25/17 22:00  





  Coreg -  PO   





  BID AVA   


 


Diphenhydramine HCl  12.5 mg  12/25/17 06:34  





  Benadryl Injection -  IVPUSH   





  Q4H PRN   





  FOR ITCHING   


 


Famotidine  20 mg in 12 mls @ 144 mls/hr  12/25/17 10:00  





  Pepcid 20 Mg/12 Ml Push  IVPUSH   





  BID AVA   


 


Sodium Chloride  1,000 mls @ 42 mls/hr  12/25/17 08:00  





  Normal Saline -  IV   





  ASDIR AVA   


 


Prednisone  60 mg  12/26/17 10:00  





  Deltasone -  PO   





  DAILY AVA   


 


Valsartan  80 mg  12/25/17 12:00  





  Diovan -  PO   





  DAILY [facility]   








 Home Medications











 Medication  Instructions  Recorded


 


Aspirin Coated [Ecotrin -] 81 mg PO DAILY #30 tab 09/05/17


 


Atorvastatin Ca [Lipitor] 40 mg PO HS #30 tablet 09/05/17


 


Carvedilol [Coreg -] 6.25 mg PO BID #60 tablet 09/05/17


 


Valsartan [Diovan] 80 mg PO DAILY #30 tablet 09/05/17


 


Warfarin Sodium [Jantoven] 6 mg PO DAILY 10/23/17











PE: eyes are less swollen today


rest PE per resident's note





A/P:


This is a 77 year old man with a history of hypertrophic cardiomyopathy, non-

obstructive CAD, chronic systolic/diastolic HF, HTN, PAF who presented to the 

ER with right leg pain





#Anaphylaxis- resolving. acute reaction post eating Chinese food can't r/o food 

allergy, follow with Immunologist upon discharge, to have allergy testing. on 

steroid, benadryl, and pepcid. eyes less swollen, no SOB


UPON discharge please give  prescriptions for Epipen, zantac,  prednisone 40mg( 

5 day supply), and benadryl





#demand ischemia:trops: 0.59 --> 0.66 --> 0.64, cardio consult appreciated. 

Will consider DCCV after 3-4 weeks of AC


continue ASA, carvedilol, valsartan, lipitor





#Supratherapeutic INR continue to hold coumadin 7.37-->6.19, daily PT/INR


   


# A-Fib rate controlled





#Diabetes Mellitus on BGMs, ISS





#Alcohol Abuse counselled 





DVT Px: Coumadin supratherapeutic INR





Physical Examination


Vital Signs: 


 Vital Signs











Temperature  97.8 F   12/24/17 22:33


 


Pulse Rate  66   12/24/17 22:33


 


Respiratory Rate  18   12/24/17 22:33


 


Blood Pressure  141/86   12/24/17 22:33


 


O2 Sat by Pulse Oximetry (%)  100   12/24/17 22:33











Labs: 


 CBC, BMP





 12/25/17 08:15 





 12/25/17 08:15

## 2017-12-26 LAB
ALBUMIN SERPL-MCNC: 2.8 G/DL (ref 3.4–5)
ALP SERPL-CCNC: 76 U/L (ref 45–117)
ALT SERPL-CCNC: 22 U/L (ref 12–78)
ANION GAP SERPL CALC-SCNC: 9 MMOL/L (ref 8–16)
AST SERPL-CCNC: 22 U/L (ref 15–37)
BASOPHILS # BLD: 0.2 % (ref 0–2)
BILIRUB SERPL-MCNC: 0.2 MG/DL (ref 0.2–1)
BUN SERPL-MCNC: 21 MG/DL (ref 7–18)
CALCIUM SERPL-MCNC: 8.1 MG/DL (ref 8.5–10.1)
CHLORIDE SERPL-SCNC: 110 MMOL/L (ref 98–107)
CO2 SERPL-SCNC: 24 MMOL/L (ref 21–32)
CREAT SERPL-MCNC: 0.9 MG/DL (ref 0.7–1.3)
DEPRECATED RDW RBC AUTO: 15.2 % (ref 11.9–15.9)
EOSINOPHIL # BLD: 0.1 % (ref 0–4.5)
GLUCOSE SERPL-MCNC: 106 MG/DL (ref 74–106)
HCT VFR BLD CALC: 36.2 % (ref 35.4–49)
HGB BLD-MCNC: 12 GM/DL (ref 11.7–16.9)
INR BLD: 6.19 (ref 0.82–1.09)
LYMPHOCYTES # BLD: 15.3 % (ref 8–40)
MAGNESIUM SERPL-MCNC: 1.9 MG/DL (ref 1.8–2.4)
MCH RBC QN AUTO: 30.7 PG (ref 25.7–33.7)
MCHC RBC AUTO-ENTMCNC: 33.1 G/DL (ref 32–35.9)
MCV RBC: 92.6 FL (ref 80–96)
MONOCYTES # BLD AUTO: 6.5 % (ref 3.8–10.2)
NEUTROPHILS # BLD: 77.9 % (ref 42.8–82.8)
PHOSPHATE SERPL-MCNC: 2.7 MG/DL (ref 2.5–4.9)
PLATELET # BLD AUTO: 214 K/MM3 (ref 134–434)
PMV BLD: 9.1 FL (ref 7.5–11.1)
POTASSIUM SERPLBLD-SCNC: 4.8 MMOL/L (ref 3.5–5.1)
PROT SERPL-MCNC: 5.7 G/DL (ref 6.4–8.2)
PT PNL PPP: 70 SEC (ref 9.98–11.88)
RBC # BLD AUTO: 3.91 M/MM3 (ref 4–5.6)
SODIUM SERPL-SCNC: 143 MMOL/L (ref 136–145)
WBC # BLD AUTO: 11.8 K/MM3 (ref 4–10)

## 2017-12-26 RX ADMIN — VALSARTAN SCH MG: 80 TABLET, FILM COATED ORAL at 09:34

## 2017-12-26 RX ADMIN — FAMOTIDINE SCH MLS/HR: 10 INJECTION, SOLUTION INTRAVENOUS at 21:10

## 2017-12-26 RX ADMIN — ASPIRIN 81 MG SCH MG: 81 TABLET ORAL at 09:34

## 2017-12-26 RX ADMIN — ATORVASTATIN CALCIUM SCH MG: 40 TABLET, FILM COATED ORAL at 21:10

## 2017-12-26 RX ADMIN — INSULIN ASPART SCH UNITS: 100 INJECTION, SOLUTION INTRAVENOUS; SUBCUTANEOUS at 21:15

## 2017-12-26 RX ADMIN — FAMOTIDINE SCH MLS/HR: 10 INJECTION, SOLUTION INTRAVENOUS at 09:39

## 2017-12-26 RX ADMIN — CARVEDILOL SCH MG: 6.25 TABLET, FILM COATED ORAL at 09:35

## 2017-12-26 RX ADMIN — INSULIN ASPART SCH: 100 INJECTION, SOLUTION INTRAVENOUS; SUBCUTANEOUS at 16:55

## 2017-12-26 RX ADMIN — PREDNISONE SCH MG: 20 TABLET ORAL at 09:34

## 2017-12-26 RX ADMIN — CARVEDILOL SCH MG: 6.25 TABLET, FILM COATED ORAL at 21:10

## 2017-12-26 NOTE — PN
Physical Exam: 


SUBJECTIVE: Patient seen and examined.





No acute events overnight. Pt reports that his eyes are less swollen. He denies 

SOB, swollen tongue, pruritis, or any other subjective complaints. 








OBJECTIVE:





 Vital Signs











 Period  Temp  Pulse  Resp  BP Sys/Varghese  Pulse Ox


 


 Last 24 Hr  97.5 F-98.1 F  64-74  18-18  101-139/60-87  98-99











GENERAL: elderly pleasant male, sitting up in NAD, AAOx3


HEENT: eyelids still swollen but decreased from yesterday, tongue and lips 

normal in size


NECK: Trachea midline, full range of motion, supple. 


LUNGS: Breath sounds equal, clear to auscultation bilaterally, no wheezes, no 

crackles, no 


accessory muscle use. 


HEART: Regular rate and rhythm, S1, S2 without murmur, rub or gallop.


ABDOMEN: Soft, nontender, nondistended, normoactive bowel sounds, no guarding, 

no 


rebound, no hepatosplenomegaly, no masses.


EXTREMITIES: 2+ pulses, warm, well-perfused, no edema. 


NEUROLOGICAL: Cranial nerves II through XII grossly intact. Normal speech, gait 

not 


observed.


PSYCH: Normal mood, normal affect.


SKIN: Warm, dry, normal turgor, no rashes or lesions noted














 Laboratory Results - last 24 hr











  12/26/17 12/26/17 12/26/17





  05:47 06:40 06:40


 


WBC   11.8 H 


 


RBC   3.91 L 


 


Hgb   12.0  D 


 


Hct   36.2 


 


MCV   92.6 


 


MCH   30.7 


 


MCHC   33.1 


 


RDW   15.2 


 


Plt Count   214 


 


MPV   9.1 


 


Neutrophils %   77.9 


 


Lymphocytes %   15.3  D 


 


Monocytes %   6.5  D 


 


Eosinophils %   0.1 


 


Basophils %   0.2 


 


PT with INR   


 


INR   


 


Sodium    143


 


Potassium    4.8


 


Chloride    110 H


 


Carbon Dioxide    24


 


Anion Gap    9


 


BUN    21 H D


 


Creatinine    0.9  D


 


Creat Clearance w eGFR    > 60


 


POC Glucometer  113  


 


Random Glucose    106  D


 


Calcium    8.1 L


 


Phosphorus    2.7


 


Magnesium    1.9


 


Total Bilirubin    0.2


 


AST    22  D


 


ALT    22  D


 


Alkaline Phosphatase    76


 


Total Protein    5.7 L


 


Albumin    2.8 L














  12/26/17





  06:40


 


WBC 


 


RBC 


 


Hgb 


 


Hct 


 


MCV 


 


MCH 


 


MCHC 


 


RDW 


 


Plt Count 


 


MPV 


 


Neutrophils % 


 


Lymphocytes % 


 


Monocytes % 


 


Eosinophils % 


 


Basophils % 


 


PT with INR  70.00 H


 


INR  6.19 H*


 


Sodium 


 


Potassium 


 


Chloride 


 


Carbon Dioxide 


 


Anion Gap 


 


BUN 


 


Creatinine 


 


Creat Clearance w eGFR 


 


POC Glucometer 


 


Random Glucose 


 


Calcium 


 


Phosphorus 


 


Magnesium 


 


Total Bilirubin 


 


AST 


 


ALT 


 


Alkaline Phosphatase 


 


Total Protein 


 


Albumin 








Active Medications











Generic Name Dose Route Start Last Admin





  Trade Name Nedq  PRN Reason Stop Dose Admin


 


Aspirin  81 mg  12/25/17 10:00  12/26/17 09:34





  Asa -  PO   81 mg





  DAILY AVA   Administration


 


Atorvastatin Calcium  40 mg  12/25/17 22:00  12/25/17 21:35





  Lipitor -  PO   40 mg





  HS AVA   Administration


 


Carvedilol  6.25 mg  12/25/17 22:00  12/26/17 09:35





  Coreg -  PO   6.25 mg





  BID AVA   Administration


 


Diphenhydramine HCl  12.5 mg  12/25/17 06:34  





  Benadryl Injection -  IVPUSH   





  Q4H PRN   





  FOR ITCHING   


 


Famotidine  20 mg in 12 mls @ 144 mls/hr  12/25/17 10:00  12/26/17 09:39





  Pepcid 20 Mg/12 Ml Push  IVPUSH   144 mls/hr





  BID AVA   Administration


 


Sodium Chloride  1,000 mls @ 42 mls/hr  12/25/17 08:00  12/26/17 08:06





  Normal Saline -  IV   42 mls/hr





  ASDIR AVA   Administration


 


Prednisone  60 mg  12/26/17 10:00  12/26/17 09:34





  Deltasone -  PO   60 mg





  DAILY AVA   Administration


 


Valsartan  80 mg  12/25/17 12:00  12/26/17 09:34





  Diovan -  PO   80 mg





  DAILY AVA   Administration











ASSESSMENT/PLAN:





77M w/ hx of Paroxysmal Afib/Flutter (on Coumadin), DM, Ischemic Cardiomyopathy

, and Alcohol Abuse who presented with SOB and swollen eyes after eating chines 

food, found to have anaphylaxis, demand ischemia, and supratherapeutic INR. 





#Anaphylaxis- resolving. eyes less swollen, no SOB


   -likely from chinese food


   -continue Benadryl, Pepcid, Prednisone


   -Continue cardiac monitoring 


   -discharge with prescriptions for epipen, zantac, 5 day supply of prednisone 

40mg, and benadryl





#demand ischemia


   -trops: 0.59 --> 0.66 --> 0.64


   -cardio on board, recs appreciated. Will consider DCCV after 3-4 weeks of AC


   -continue ASA, carvedilol, valsartan, lipitor





#Supratherapeutic INR


   -Possibly due to Chronic Alcohol Abuse


   -INR of 6.19 down from 7.37


   -continue to hold coumadin until therapeutic INR


   -trend INR





4. A-Fib


   -Afib rate controlled





#Diabetes Mellitus


   -BGMs


   -ISS





#Alcohol Abuse


   -counselled 





#FEN/ppx


   - NS@42ml/hr


   - electrolytes wnl


   - sodium controlled diet


   - famotidine


   - pt has supratherapeutic INR on coumadin





Code Status: Full Code





-Christiano Wong MD PGY1








Visit type





- Emergency Visit


Emergency Visit: Yes


ED Registration Date: 12/25/17


Care time: The patient presented to the Emergency Department on the above date 

and was hospitalized for further evaluation of their emergent condition.





- New Patient


This patient is new to me today: Yes


Date on this admission: 12/26/17





- Critical Care


Critical Care patient: No

## 2017-12-26 NOTE — PN
Teaching Attending Note


Name of Resident: Christiano Wong





ATTENDING PHYSICIAN STATEMENT





I saw and evaluated the patient.


I reviewed the resident's note and discussed the case with the resident.


I agree with the resident's findings and plan as documented.








SUBJECTIVE:


Patient feels better today. eyes are better.





OBJECTIVE:


 Vital Signs











Temperature  98.1 F   12/26/17 19:57


 


Pulse Rate  93 H  12/26/17 19:57


 


Respiratory Rate  18   12/26/17 19:58


 


Blood Pressure  117/68   12/26/17 19:57


 


O2 Sat by Pulse Oximetry (%)  99   12/26/17 19:58








 CBCD











WBC  11.8 K/mm3 (4.0-10.0)  H  12/26/17  06:40    


 


RBC  3.91 M/mm3 (4.00-5.60)  L  12/26/17  06:40    


 


Hgb  12.0 GM/dL (11.7-16.9)  D 12/26/17  06:40    


 


Hct  36.2 % (35.4-49)   12/26/17  06:40    


 


MCV  92.6 fl (80-96)   12/26/17  06:40    


 


MCHC  33.1 g/dl (32.0-35.9)   12/26/17  06:40    


 


RDW  15.2 % (11.9-15.9)   12/26/17  06:40    


 


Plt Count  214 K/MM3 (134-434)   12/26/17  06:40    


 


MPV  9.1 fl (7.5-11.1)   12/26/17  06:40    








 CMP











Sodium  143 mmol/L (136-145)   12/26/17  06:40    


 


Potassium  4.8 mmol/L (3.5-5.1)   12/26/17  06:40    


 


Chloride  110 mmol/L ()  H  12/26/17  06:40    


 


Carbon Dioxide  24 mmol/L (21-32)   12/26/17  06:40    


 


Anion Gap  9  (8-16)   12/26/17  06:40    


 


BUN  21 mg/dL (7-18)  H D 12/26/17  06:40    


 


Creatinine  0.9 mg/dL (0.7-1.3)  D 12/26/17  06:40    


 


Creat Clearance w eGFR  > 60  (>60)   12/26/17  06:40    


 


Random Glucose  106 mg/dL ()  D 12/26/17  06:40    


 


Calcium  8.1 mg/dL (8.5-10.1)  L  12/26/17  06:40    


 


Total Bilirubin  0.2 mg/dL (0.2-1.0)   12/26/17  06:40    


 


AST  22 U/L (15-37)  D 12/26/17  06:40    


 


ALT  22 U/L (12-78)  D 12/26/17  06:40    


 


Alkaline Phosphatase  76 U/L ()   12/26/17  06:40    


 


Total Protein  5.7 g/dl (6.4-8.2)  L  12/26/17  06:40    


 


Albumin  2.8 g/dl (3.4-5.0)  L  12/26/17  06:40    








 CARDIAC ENZYMES











Troponin I  0.64 ng/ml (0.00-0.05)  H*  12/25/17  08:15    








 Current Medications











Generic Name Dose Route Start Last Admin





  Trade Name Freq  PRN Reason Stop Dose Admin


 


Aspirin  81 mg  12/25/17 10:00  12/26/17 09:34





  Asa -  PO   81 mg





  DAILY AVA   Administration


 


Atorvastatin Calcium  40 mg  12/25/17 22:00  12/26/17 21:10





  Lipitor -  PO   40 mg





  HS AVA   Administration


 


Carvedilol  6.25 mg  12/25/17 22:00  12/26/17 21:10





  Coreg -  PO   6.25 mg





  BID AVA   Administration


 


Diphenhydramine HCl  12.5 mg  12/25/17 06:34  





  Benadryl Injection -  IVPUSH   





  Q4H PRN   





  FOR ITCHING   


 


Famotidine  20 mg in 12 mls @ 144 mls/hr  12/25/17 10:00  12/26/17 21:10





  Pepcid 20 Mg/12 Ml Push  IVPUSH   144 mls/hr





  BID AVA   Administration


 


Sodium Chloride  1,000 mls @ 42 mls/hr  12/25/17 08:00  12/26/17 08:06





  Normal Saline -  IV   42 mls/hr





  ASDIR AVA   Administration


 


Insulin Aspart  1 vial  12/26/17 16:30  12/26/17 21:15





  Novolog Vial Sliding Scale -  SQ   2 units





  ACHS AVA   Administration





  Protocol   


 


Prednisone  60 mg  12/26/17 10:00  12/26/17 09:34





  Deltasone -  PO   60 mg





  DAILY AVA   Administration


 


Valsartan  80 mg  12/25/17 12:00  12/26/17 09:34





  Diovan -  PO   80 mg





  DAILY AVA   Administration








 Home Medications











 Medication  Instructions  Recorded


 


Aspirin Coated [Ecotrin -] 81 mg PO DAILY #30 tab 09/05/17


 


Atorvastatin Ca [Lipitor] 40 mg PO HS #30 tablet 09/05/17


 


Carvedilol [Coreg -] 6.25 mg PO BID #60 tablet 09/05/17


 


Valsartan [Diovan] 80 mg PO DAILY #30 tablet 09/05/17


 


Warfarin Sodium [Jantoven] 6 mg PO DAILY 10/23/17











 Laboratory Tests











  12/24/17 12/24/17 12/25/17





  20:42 20:42 08:15


 


INR   7.33 H* D  7.37 H*


 


PTT (Actin FS)  46.6 H  














  12/26/17





  06:40


 


INR  6.19 H*


 


PTT (Actin FS) 














PE: eyes are less swollen today


rest PE per resident's note





ASSESSMENT AND PLAN:


This is a 77 year old man with a history of hypertrophic cardiomyopathy, non-

obstructive CAD, chronic systolic/diastolic HF, HTN, PAF who presented to the 

ER with right leg pain





#Anaphylaxis- resolving. acute reaction post eating Chinese food can't r/o food 

allergy, follow with Immunologist upon discharge, to have allergy testing. on 

steroid, benadryl, and pepcid. eyes less swollen, no SOB


UPON discharge please give  prescriptions for Epipen, zantac,  prednisone 40mg( 

5 day supply), and benadryl





#demand ischemia:trops: 0.59 --> 0.66 --> 0.64, cardio consult appreciated. 

Will consider DCCV after 3-4 weeks of AC


continue ASA, carvedilol, valsartan, lipitor





#Supratherapeutic INR continue to hold coumadin 7.37-->6.19, daily PT/INR


   


# A-Fib rate controlled on coumadin with supratherapeutic INR , will trend it 

down





#Diabetes Mellitus on BGMs, ISS





#Alcohol Abuse counselled 





DVT Px: Coumadin supratherapeutic INR

## 2017-12-26 NOTE — PN
Progress Note (short form)





- Note


Progress Note: 


Chief Complaint: Events noted, notes reviewed, denies any chest pain or dyspnea

, atrial flutter/atrial fibrillation persists rate controlled on A/C





History of Present Illness: 


Seen and examined on telemetry. Events noted, notes reviewed, denies any chest 

pain or dyspnea, atrial flutter/atrial fibrillation persists rate controlled on 

A/C





R&c coronary angiography performed 1/11/2017 revealed non-obstructive CAD, 

severe LV apical hypertrophic cardiomyopathy, mildly elevated right sided 

pressures/study is consistent with apical hypertrophy (spade-like) variant of 

hypertrophic cardiomyopathy 





- Current Medication List


 Current Medications





Aspirin (Asa -)  81 mg PO DAILY [facility]


   Last Admin: 12/25/17 13:53 Dose:  81 mg


Atorvastatin Calcium (Lipitor -)  40 mg PO HS [facility]


   Last Admin: 12/25/17 21:35 Dose:  40 mg


Carvedilol (Coreg -)  6.25 mg PO BID [facility]


   Last Admin: 12/25/17 21:36 Dose:  6.25 mg


Diphenhydramine HCl (Benadryl Injection -)  12.5 mg IVPUSH Q4H PRN


   PRN Reason: FOR ITCHING


Famotidine (Pepcid 20 Mg/12 Ml Push)  20 mg in 12 mls @ 144 mls/hr IVPUSH BID 

[facility]


   Last Admin: 12/25/17 21:36 Dose:  144 mls/hr


Sodium Chloride (Normal Saline -)  1,000 mls @ 42 mls/hr IV ASDIR [facility]


Prednisone (Deltasone -)  60 mg PO DAILY [facility]


Valsartan (Diovan -)  80 mg PO DAILY [facility]


   Last Admin: 12/25/17 13:53 Dose:  80 mg





Review of Systems


Constitutional: denies: Chills


Cardiovascular: As noted above


Respiratory: denies: Cough or Sputum Production


Gastrointestinal: denies: Nausea, Vomiting, Diarrhea, Constipation or Abdominal 

Pain


Musculoskeletal: No Symptoms Reported 


Neurological: denies: Dizziness or Headache





- Objective


Vital Signs: 


 Last Vital Signs











Temp Pulse Resp BP Pulse Ox


 


 98.1 F   74   18   101/60   98 


 


 12/26/17 05:50  12/26/17 05:50  12/26/17 05:50  12/26/17 05:50  12/25/17 23:50








 Intake & Output











 12/23/17 12/24/17 12/25/17 12/26/17





 23:59 23:59 23:59 23:59


 


Weight  230 lb 230 lb 











Neck: Supple Negative JVD No Bruit


Cardiovascular: S1 S2 Irregularly Irregular Grade 2/6 Systolic Murmur


Chest: Clear to A&P Bilaterally


Gastrointestinal: Soft Benign Normal Bowel Sounds


Ext: No Edema 1+ Bilateral femoral Pulses





Labs: 


 CBC, BMP





 12/26/17 06:40 





 12/26/17 06:40 








Assessment/Plan





ASSESSMENT:





1. Food anaphylaxis resolved


2. 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 

disease related to persistent atrial fibrillation with [id number] score of 6, 

history of poor compliance with therapy administration and medical F/U


3. CAD with evidence of demand ischemic injury, non obstructive CAD on R&LHc 

coronary angiogrpahy angina pectoris


4. 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 

chronic class I-II NYHA classification, compensated/euvolemic


5. Persistent atrial fibrillation [id number] score of 6 on Coumadin therapy, 

with supra-therapeutic INR


6. HTN


7. Poor compliance with medical therapy administration and medical F/U


8. Tobacco abuse





PLAN:





1. Hold Coumadin as per INR and resume once INR is appropriate, therapy to be 

continued unless it is absolutely contraindicated considering his [id number] 

score of 6 


2. Continue Carvedilol


3. Continue Diovan 


4. Continue ASA in conjunction with Coumadin and close monitoring of CBC and INR


5. Continue Lipitor 


6. Patient counselled smoking cessation and abstinence


7. Will consider outpatient DCCV for the above noted atrial arrhythmia after 3-

4 weeks of adequate A/C 





Discussed in detail with the patient 














Armand Mckenzie MD

## 2017-12-27 VITALS — DIASTOLIC BLOOD PRESSURE: 72 MMHG | HEART RATE: 76 BPM | SYSTOLIC BLOOD PRESSURE: 117 MMHG | TEMPERATURE: 97.6 F

## 2017-12-27 LAB
ANION GAP SERPL CALC-SCNC: 10 MMOL/L (ref 8–16)
BUN SERPL-MCNC: 24 MG/DL (ref 7–18)
CALCIUM SERPL-MCNC: 8.3 MG/DL (ref 8.5–10.1)
CHLORIDE SERPL-SCNC: 109 MMOL/L (ref 98–107)
CO2 SERPL-SCNC: 25 MMOL/L (ref 21–32)
CREAT SERPL-MCNC: 1 MG/DL (ref 0.7–1.3)
DEPRECATED RDW RBC AUTO: 15.2 % (ref 11.9–15.9)
GLUCOSE SERPL-MCNC: 97 MG/DL (ref 74–106)
HCT VFR BLD CALC: 40.5 % (ref 35.4–49)
HGB BLD-MCNC: 12.9 GM/DL (ref 11.7–16.9)
INR BLD: 3.27 (ref 0.82–1.09)
MCH RBC QN AUTO: 29.7 PG (ref 25.7–33.7)
MCHC RBC AUTO-ENTMCNC: 31.9 G/DL (ref 32–35.9)
MCV RBC: 92.9 FL (ref 80–96)
PLATELET # BLD AUTO: 229 K/MM3 (ref 134–434)
PMV BLD: 9 FL (ref 7.5–11.1)
POTASSIUM SERPLBLD-SCNC: 4.7 MMOL/L (ref 3.5–5.1)
PT PNL PPP: 36.9 SEC (ref 9.98–11.88)
RBC # BLD AUTO: 4.36 M/MM3 (ref 4–5.6)
SODIUM SERPL-SCNC: 144 MMOL/L (ref 136–145)
WBC # BLD AUTO: 12.3 K/MM3 (ref 4–10)

## 2017-12-27 RX ADMIN — PREDNISONE SCH MG: 20 TABLET ORAL at 09:42

## 2017-12-27 RX ADMIN — INSULIN ASPART SCH: 100 INJECTION, SOLUTION INTRAVENOUS; SUBCUTANEOUS at 06:32

## 2017-12-27 RX ADMIN — ASPIRIN 81 MG SCH MG: 81 TABLET ORAL at 09:41

## 2017-12-27 RX ADMIN — CARVEDILOL SCH MG: 6.25 TABLET, FILM COATED ORAL at 09:41

## 2017-12-27 RX ADMIN — FAMOTIDINE SCH MLS/HR: 10 INJECTION, SOLUTION INTRAVENOUS at 11:55

## 2017-12-27 RX ADMIN — INSULIN ASPART SCH: 100 INJECTION, SOLUTION INTRAVENOUS; SUBCUTANEOUS at 11:55

## 2017-12-27 RX ADMIN — VALSARTAN SCH MG: 80 TABLET, FILM COATED ORAL at 09:41

## 2017-12-27 NOTE — PN
Teaching Attending Note


Name of Resident: Christiano Wong





ATTENDING PHYSICIAN STATEMENT





I saw and evaluated the patient.


I reviewed the resident's note and discussed the case with the resident.


I agree with the resident's findings and plan as documented.








SUBJECTIVE:


No fever or chills, denies SOB or swelling in mouth or throat. 





OBJECTIVE:


NAD 


CV: irreg irreg 


Lung s: ACTB 


ext: no edema 


HEENT: no facial swelling , no edema in oropharynx,  tongue or ubvula 





ASSESSMENT AND PLAN:





Pleasant 78 y/o man with h/o CAD, chronic heart failure, , hypertrophic 

cardiomyopathy , HTN, A fib who presented with swelling in face and was found 

to have an allergic reaction 





1- Allergic reaction  possible angioedema vs anaphylaxis . 


possible reaction to ARB. 


dc valsartan


cont steroids , H1, and H2 blockers x 1 more week 


provide with epi-pen 


allergy testing as out pt


pt was instructed not to take ARB or ACEI as outpt  





2- A fib : 


- cont AC 


- possible cardioversion as out pt 


- INR 3.2, will resume coumadin at a lower dose tonight .  PCP called and 

confirmed that pt dose was increased to 9 mg from 6 . he was advised to take 6 

q PM and INR in 2 days ( Friday , ) to be faxed to PC P


- cont coreg 





3- CAD , with trop leak: 


cont ASa and BB 





4- HTN: cont BB and start norvasc in place of Valsartan 





5- dc home . f/u with card, PCP , allergist.

## 2017-12-27 NOTE — PN
Progress Note, Physician


History of Present Illness: 


Facial swelling resolved, remains in rate-controlled afib. INR decreasing.








- Current Medication List


Current Medications: 


Active Medications





Aspirin (Asa -)  81 mg PO DAILY [facility]


   Last Admin: 12/27/17 09:41 Dose:  81 mg


Atorvastatin Calcium (Lipitor -)  40 mg PO HS [facility]


   Last Admin: 12/26/17 21:10 Dose:  40 mg


Carvedilol (Coreg -)  6.25 mg PO BID [facility]


   Last Admin: 12/27/17 09:41 Dose:  6.25 mg


Diphenhydramine HCl (Benadryl Injection -)  12.5 mg IVPUSH Q4H PRN


   PRN Reason: FOR ITCHING


Famotidine (Pepcid 20 Mg/12 Ml Push)  20 mg in 12 mls @ 144 mls/hr IVPUSH BID 

[facility]


   Last Admin: 12/26/17 21:10 Dose:  144 mls/hr


Sodium Chloride (Normal Saline -)  1,000 mls @ 42 mls/hr IV ASDIR [facility]


   Last Admin: 12/26/17 08:06 Dose:  42 mls/hr


Insulin Aspart (Novolog Vial Sliding Scale -)  1 vial SQ ACHS [facility]


   PRN Reason: Protocol


   Last Admin: 12/27/17 06:32 Dose:  Not Given


Prednisone (Deltasone -)  60 mg PO DAILY [facility]


   Last Admin: 12/27/17 09:42 Dose:  60 mg


Valsartan (Diovan -)  80 mg PO DAILY [facility]


   Last Admin: 12/27/17 09:41 Dose:  80 mg











- Objective


Vital Signs: 


 Vital Signs











Temperature  97.7 F   12/27/17 06:00


 


Pulse Rate  69   12/27/17 06:00


 


Respiratory Rate  19   12/27/17 06:00


 


Blood Pressure  134/74   12/27/17 06:00


 


O2 Sat by Pulse Oximetry (%)  99   12/27/17 00:00











Constitutional: Yes: No Distress, Calm


Neck: Yes: Supple


Cardiovascular: Yes: Pulse Irregular


Respiratory: Yes: Regular, CTA Bilaterally


Gastrointestinal: Yes: Normal Bowel Sounds, Soft


Edema: No


Labs: 


 CBC, BMP





 12/27/17 05:05 





 12/27/17 05:05 





 INR, PTT











INR  3.27  (0.82-1.09)  H D 12/27/17  05:05    














- ....Imaging


EKG: Report Reviewed (Tele: Rate-controlled afib)





Problem List





- Problems


(1) Demand ischemia


Code(s): I24.8 - OTHER FORMS OF ACUTE ISCHEMIC HEART DISEASE   





(2) Anaphylaxis


Code(s): T78.2XXA - ANAPHYLACTIC SHOCK, UNSPECIFIED, INITIAL ENCOUNTER   


Qualifiers: 


   Encounter type: initial encounter   Qualified Code(s): T78.2XXA - 

Anaphylactic shock, unspecified, initial encounter   





(3) Chronic thromboembolic disease


Code(s): I74.9 - EMBOLISM AND THROMBOSIS OF UNSPECIFIED ARTERY   





(4) Coronary artery disease


Code(s): I25.10 - ATHSCL HEART DISEASE OF NATIVE CORONARY ARTERY W/O ANG PCTRS 

  


Qualifiers: 


   Coronary Disease-Associated Artery/Lesion type: native artery   Native vs. 

transplanted heart: native heart   Associated angina: without angina   

Qualified Code(s): I25.10 - Atherosclerotic heart disease of native coronary 

artery without angina pectoris   





(5) HTN (hypertension)


Code(s): I10 - ESSENTIAL (PRIMARY) HYPERTENSION   


Qualifiers: 


   Hypertension type: essential hypertension   Qualified Code(s): I10 - 

Essential (primary) hypertension   





(6) Hypertrophic cardiomyopathy


Code(s): I42.2 - OTHER HYPERTROPHIC CARDIOMYOPATHY   





(7) Peripheral arterial disease


Code(s): I73.9 - PERIPHERAL VASCULAR DISEASE, UNSPECIFIED   





(8) Anticoagulant long-term use


Code(s): Z79.01 - LONG TERM (CURRENT) USE OF ANTICOAGULANTS   





(9) Persistent atrial fibrillation


Code(s): I48.1 - PERSISTENT ATRIAL FIBRILLATION   





(10) Diastolic dysfunction without heart failure


Code(s): I51.9 - HEART DISEASE, UNSPECIFIED   





Assessment/Plan


R&LHc at [facility] 1/11/2017 showing nonobstructive CAD, severe LV apical 

hypertrophic cardiomyopathy, mildly elevated right sided pressures, Mynx 

deployed right CFA access site. Study is consistent with apical hypertrophy (

spade-like) variant of hypertrophic cardiomyopathy, planned for optimal medical 

therapy. 





1. Food anaphylaxis resolved


2. 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 

disease related to persistent atrial fibrillation with [id number] score of 6, 

history of poor compliance with therapy administration and medical F/U


3. CAD with evidence of demand ischemic injury, non obstructive CAD on R&LHc 

coronary angiogrpahy angina pectoris


4. 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 

chronic class I-II NYHA classification, compensated/euvolemic


5. Persistent atrial fibrillation [id number] score of 6 on Coumadin therapy, 

with supratherapeutic INR


6. HTN


7. Poor compliance with medical therapy administration and medical F/U


8. Tobacco abuse





PLAN:





1. Resume coumadin tomorrow per INR 2-3 given [id number] score 6 


2. Continue Carvedilol 6.25 bid


3. Continue Diovan 80 qd


4. Continue ASA 81 qd in conjunction with Coumadin


5. Continue Lipitor 40 qhs


6. Counselled smoking cessation and abstinence


7. Will consider outpatient DCCV for the above noted atrial arrhythmia after 3-

4 weeks of adequate A/C 


8. May d/c with f/u with Dr. Hobbs [phone]

## 2017-12-28 NOTE — DS
Physical Exam: 


SUBJECTIVE: Patient seen and examined





No acute events overnight. Pt reports that his eyes are no longer swollen. He 

denies SOB, swollen tongue, pruritis, or any other subjective complaints. 





OBJECTIVE:





 Vital Signs











 Period  Temp  Pulse  Resp  BP Sys/Varghese  Pulse Ox


 


 Last 24 Hr  97.6 F  76  18-19  117/72  99








PHYSICAL EXAM





GENERAL: elderly pleasant male, sitting up in NAD, AAOx3


HEENT: eyelid swelling almost completely resolved, tongue and lips normal in 

size


NECK: Trachea midline, full range of motion, supple. 


LUNGS: mild expiratory rhonchi 


HEART: Regular rate and rhythm, S1, S2 without murmur, rub or gallop.


ABDOMEN: Soft, nontender, nondistended, normoactive bowel sounds, no guarding, 

no 


rebound, no hepatosplenomegaly, no masses.


EXTREMITIES: 2+ pulses, warm, well-perfused, no edema. 


NEUROLOGICAL: Cranial nerves II through XII grossly intact. Normal speech, gait 

not 


observed.


PSYCH: Normal mood, normal affect.


SKIN: Warm, dry, normal turgor, no rashes or lesions noted








LABS


 Laboratory Results - last 24 hr











  12/27/17 12/27/17 12/27/17





  05:05 05:05 05:05


 


WBC  12.3 H  


 


RBC  4.36  


 


Hgb  12.9  


 


Hct  40.5  


 


MCV  92.9  


 


MCH  29.7  


 


MCHC  31.9 L  


 


RDW  15.2  


 


Plt Count  229  


 


MPV  9.0  


 


PT with INR   36.90 H 


 


INR   3.27 H D 


 


Sodium    144


 


Potassium    4.7


 


Chloride    109 H


 


Carbon Dioxide    25


 


Anion Gap    10


 


BUN    24 H


 


Creatinine    1.0


 


POC Glucometer   


 


Random Glucose    97


 


Calcium    8.3 L














  12/27/17





  11:29


 


WBC 


 


RBC 


 


Hgb 


 


Hct 


 


MCV 


 


MCH 


 


MCHC 


 


RDW 


 


Plt Count 


 


MPV 


 


PT with INR 


 


INR 


 


Sodium 


 


Potassium 


 


Chloride 


 


Carbon Dioxide 


 


Anion Gap 


 


BUN 


 


Creatinine 


 


POC Glucometer  98


 


Random Glucose 


 


Calcium 











HOSPITAL COURSE:





Date of Admission:12/25/17





Date of Discharge: 12/28/17





77M w/ hx of Paroxysmal Afib/Flutter (on Coumadin), DM, Ischemic Cardiomyopathy

, and Alcohol Abuse who presented with SOB and swollen eyes after eating 

chinese food, found to have anaphylaxis, demand ischemia, and supratherapeutic 

INR. His anaphylaxis was treated with benadryl, Pepcid, Prednisone, and his 

symptoms resolved. His troponins peaked at 0.66. He was seen by cardio who 

stated that they will consider DCCV after 3-4 weeks of AC. Pt's coumadin dose 

was held and restarted at 6mg once INR came down.





Today, pt has normal vitals, no subjective complaints, markedly improved 

physical exam, and is ready for discharge home. Pt instructed to continue 

taking benadryl, ranitidine, and prednisone for 1 more week, was given an epi-

pen for the future, and told to f/u with an allergist. Pt instructed to avoid 

chinese food, and to stop taking ACEI and ARBs in future. Pt restarted on 

coumadin 6mg, and given a script for INR check in 2 days and told to f/u with 

PCP. Pt also given a glucometer and lancets, told to check his sugar levels 

after he finishes his prednisone, and told to go back to his PCP if his sugar 

levels are elevated.





-Christiano Wong MD PGY1


Minutes to complete discharge: 37





Discharge Summary


Reason For Visit: ANAPHYLAXIS, MI, PULMONARY VENOUS CONGES


Condition: Improved





- Instructions


Diet, Activity, Other Instructions: 


You presented to the hospital with shortness of breath and swollen eyes after 

eating chinese food and were found to have anaphylaxis (severe allergic reaction

) and a very high INR (coumadin level). You were treated with prednisone, 

famotidine, and benadryl, and your eye swelling and breathing improved. Your 

coumadin medication was held, and your INR has decreased to an almost normal 

level. 





Continue taking all previous medications except:





1. Stop taking valsartan. Do not take any ace inhibitors or angiotensin 

receptor blockers including valsartan, losartan,  lisinopril, and meds from the 

same family 





2. Start taking norvasc 10mg once a day for your hypertension





3. Continue taking benadryl 25mg twice a day for 7 days for your anaphylaxis





4. Continue taking ranitidine 150mg twice a day for 7 days for your anaphylaxis





5. Continue taking prednisone 60mg once a day for 7 days for your anaphylaxis. 

After that, take 40mg once a day for 3 days followed by 20mg once a day for 3 

days.





6. Use an epipen if you develop any similar allergic symptoms in the future.





7. Stop taking 9mg of coumadin. Start taking 6mg of coumadin tonight . Please 

get your INR checked in 2 days and fax results to your PCP, Dr. Galvan. 

your dose might need to be changed depending on next I NR  





Followups:





1. Please follow up with your PCP within one week.





2. Please follow up with an allergist, Dr. Milton, to evaluate you for any 

specific allergies. 





Avoid: 





Chinese food as it might have caused your anaphylactic reaction.








If you develop any shortness of breath, chest pain, or any other concerning 

symptoms, return to the ED.


Referrals: 


Renuka Milton MD [Staff Physician] - 


Olvin Hobbs MD [Staff Physician] - 


Rosalie Staley MD [Primary Care Provider] - 


Disposition: HOME





- Home Medications


Comprehensive Discharge Medication List: 


Ambulatory Orders





Aspirin Coated [Ecotrin -] 81 mg PO DAILY #30 tab 09/05/17 


Atorvastatin Ca [Lipitor] 40 mg PO HS #30 tablet 09/05/17 


Carvedilol [Coreg -] 6.25 mg PO BID #60 tablet 09/05/17 


Amlodipine Besylate [Norvasc -] 10 mg PO DAILY #30 tablet 12/27/17 


Diphenhydramine HCl [Benadryl Capsule -] 25 mg PO Q12H PRN #14 capsule 12/27/17 


Epinephrine [Epipen] 0.3 mg IJ ONCE #1 auto.injct 12/27/17 


Lancets 1 each MC TID #20 each 12/27/17 


Miscellaneous Drug Not In Syst [Outpatient Lab Test] 1 each MC ASDIR #1 misc 12/ 27/17 


Miscellaneous Medical Supply [Glucometer Device] 1 each SQ ASDIR #1 kit 12/27/ 17 


Miscellaneous Medical Supply [Glucometer Test Strips #50] 1 each SQ ASDIR #1 

box 12/27/17 


Prednisone 10 mg PO DAILY #17 tablet 12/27/17 


Ranitidine HCl 150 mg PO BID #14 tablet 12/27/17 


Warfarin Sodium [Coumadin] 6 mg PO DAILY #14 tablet 12/27/17 








This patient is new to me today: No


Emergency Visit: Yes


ED Registration Date: 12/25/17


Care time: The patient presented to the Emergency Department on the above date 

and was hospitalized for further evaluation of their emergent condition.


Critical Care patient: No





- Discharge Referral


Referred to [facility] No

## 2018-05-25 ENCOUNTER — HOSPITAL ENCOUNTER (INPATIENT)
Dept: HOSPITAL 74 - JER | Age: 78
LOS: 13 days | Discharge: HOME | DRG: 253 | End: 2018-06-07
Attending: INTERNAL MEDICINE | Admitting: HOSPITALIST
Payer: COMMERCIAL

## 2018-05-25 VITALS — BODY MASS INDEX: 28.3 KG/M2

## 2018-05-25 DIAGNOSIS — Z72.0: ICD-10-CM

## 2018-05-25 DIAGNOSIS — N18.9: ICD-10-CM

## 2018-05-25 DIAGNOSIS — I48.92: ICD-10-CM

## 2018-05-25 DIAGNOSIS — I73.9: ICD-10-CM

## 2018-05-25 DIAGNOSIS — I74.3: Primary | ICD-10-CM

## 2018-05-25 DIAGNOSIS — I42.2: ICD-10-CM

## 2018-05-25 DIAGNOSIS — N17.9: ICD-10-CM

## 2018-05-25 DIAGNOSIS — L03.116: ICD-10-CM

## 2018-05-25 DIAGNOSIS — F10.10: ICD-10-CM

## 2018-05-25 DIAGNOSIS — E87.5: ICD-10-CM

## 2018-05-25 DIAGNOSIS — I25.10: ICD-10-CM

## 2018-05-25 DIAGNOSIS — I12.9: ICD-10-CM

## 2018-05-25 DIAGNOSIS — R79.1: ICD-10-CM

## 2018-05-25 LAB
ANION GAP SERPL CALC-SCNC: 9 MMOL/L (ref 8–16)
APPEARANCE UR: CLEAR
BASOPHILS # BLD: 0.5 % (ref 0–2)
BILIRUB UR STRIP.AUTO-MCNC: NEGATIVE MG/DL
BNP SERPL-MCNC: 7675.8 PG/ML (ref 5–450)
BUN SERPL-MCNC: 16 MG/DL (ref 7–18)
CALCIUM SERPL-MCNC: 8 MG/DL (ref 8.5–10.1)
CHLORIDE SERPL-SCNC: 104 MMOL/L (ref 98–107)
CO2 SERPL-SCNC: 26 MMOL/L (ref 21–32)
COLOR UR: YELLOW
CREAT SERPL-MCNC: 1.7 MG/DL (ref 0.7–1.3)
DEPRECATED RDW RBC AUTO: 15 % (ref 11.9–15.9)
EOSINOPHIL # BLD: 0.2 % (ref 0–4.5)
GLUCOSE SERPL-MCNC: 92 MG/DL (ref 74–106)
HCT VFR BLD CALC: 37.5 % (ref 35.4–49)
HGB BLD-MCNC: 12.4 GM/DL (ref 11.7–16.9)
INR BLD: 3.97 (ref 0.82–1.09)
KETONES UR QL STRIP: NEGATIVE
LEUKOCYTE ESTERASE UR QL STRIP.AUTO: NEGATIVE
LYMPHOCYTES # BLD: 11.3 % (ref 8–40)
MCH RBC QN AUTO: 30.9 PG (ref 25.7–33.7)
MCHC RBC AUTO-ENTMCNC: 33.1 G/DL (ref 32–35.9)
MCV RBC: 93.4 FL (ref 80–96)
MONOCYTES # BLD AUTO: 12 % (ref 3.8–10.2)
MUCOUS THREADS URNS QL MICRO: (no result)
NEUTROPHILS # BLD: 76 % (ref 42.8–82.8)
NITRITE UR QL STRIP: NEGATIVE
PH UR: 7 [PH] (ref 5–8)
PLATELET # BLD AUTO: 320 K/MM3 (ref 134–434)
PMV BLD: 8.7 FL (ref 7.5–11.1)
POTASSIUM SERPLBLD-SCNC: 4.8 MMOL/L (ref 3.5–5.1)
PROT UR QL STRIP: (no result)
PROT UR QL STRIP: NEGATIVE
PT PNL PPP: 44.9 SEC (ref 9.7–13)
RBC # BLD AUTO: 4.01 M/MM3 (ref 4–5.6)
RBC # UR STRIP: NEGATIVE /UL
SODIUM SERPL-SCNC: 139 MMOL/L (ref 136–145)
SP GR UR: 1.02 (ref 1–1.03)
UROBILINOGEN UR STRIP-MCNC: (no result) MG/DL (ref 0.2–1)
WBC # BLD AUTO: 10.8 K/MM3 (ref 4–10)

## 2018-05-25 NOTE — PDOC
History of Present Illness





- General


History Source: Patient


Exam Limitations: No Limitations





- History of Present Illness


Initial Comments: 





05/25/18 23:51


The patient is a 78 year old male, with a significant PMH of alcohol abuse, 

NSTEMI, AF on coumadin, HTN, CAD, CHF, HOCM, and chronic thromboembolic disease 

who presents to the emergency department with pain to the left side of his body 

for the past three days. The patient reports the pain radiates from his toes on 

his left foot up to his left underarm. The patient is complaining of associated 

numbness and tingling to his left toes. The patient endorses difficulty 

ambulating secondary to his pain. Patient is argumentative and easily aggitated 

during examination. 





The patient denies chest pain, shortness of breath, headache and dizziness. 


Denies fever, chills, nausea, vomit, diarrhea and constipation. 


Denies dysuria, frequency, urgency and hematuria.








Allergies: NKA


Past surgical: s/p cardiac cath @ [facility] (01/2017)


Social: (+) nicotine - 10 cigarettes daily, (+) alcohol 2-3 pints hard liquor 

monthly; (-) marijuana/cocaine/heroin





<Jenniffer Wang - Last Filed: 05/26/18 04:03>





<Liliana Miles - Last Filed: 05/26/18 07:52>





- General


Chief Complaint: Pain


Stated Complaint: WEAKNESS


Time Seen by Provider: 05/25/18 22:09





Past History





<Jenniffer Wang - Last Filed: 05/26/18 04:03>





- Past Medical History


Anemia: No


Asthma: No


Cancer: No


Cardiac Disorders: Yes (PAROXYSMAL A.FIBRILLATION/A.FLUTTER)


CVA: No


COPD: No


CHF: No


Dementia: No


Diabetes: Yes (BORDERLINE)


GI Disorders: No


 Disorders: No


HTN: Yes (BORDERLINE)


Hypercholesterolemia: Yes


Liver Disease: No


Seizures: No


Thyroid Disease: No





- Surgical History


Abdominal Surgery: Yes (HERNIA REPAIR)


Cardiac Surgery: Yes (CARDIAC CATHERIZATION/CORONARY ANGIOGRAPHY)


Cholecystectomy: No


Lung Surgery: No


Neurologic Surgery: No


Orthopedic Surgery: No





- Suicide/Smoking/Psychosocial Hx


Smoking History: Never smoked


Have you smoked in the past 12 months: No


Number of Cigarettes Smoked Daily: 10


If you are a former smoker, when did you quit?: 12.28.16


Information on smoking cessation initiated: No


Hx Alcohol Use: No


Drug/Substance Use Hx: No


Substance Use Type: None


Hx Substance Use Treatment: No





<Liliana Miles - Last Filed: 05/26/18 07:52>





- Past Medical History


Allergies/Adverse Reactions: 


 Allergies











Allergy/AdvReac Type Severity Reaction Status Date / Time


 


valsartan Allergy Severe  Verified 05/25/18 21:18











Home Medications: 


Ambulatory Orders





Aspirin [Aspirin EC] 81 mg PO DAILY 05/25/18 


Atorvastatin Calcium 40 mg PO DAILY 05/25/18 


Carvedilol 6.25 mg PO DAILY 05/25/18 


Warfarin Sodium 6 mg PO DAILY 05/25/18 











**Review of Systems





- Review of Systems


Able to Perform ROS?: Yes


Comments:: 





05/25/18 23:52


See HPI. All other systems reviewed and unremarkable.





<Jenniffer Wang - Last Filed: 05/26/18 04:03>





*Physical Exam





- Vital Signs


 Last Vital Signs











Temp Pulse Resp BP Pulse Ox


 


 97.8 F   82   18   104/60   97 


 


 05/25/18 21:14  05/25/18 21:14  05/25/18 21:14  05/25/18 21:14  05/25/18 21:14














- Physical Exam


Comments: 





05/26/18 04:03


"


General Physical Exam:


NAD


EOMI, FUNMILAYO


MMM, OP WNL


NCAT, no midline cervical tenderness


RRR, nl s1/s2, no m/r/g


(+) Diffuse crackles. no w/r/r


Soft, NTND


(+) LLE cooler than right. (+) FROM of ankles and toes (+) 1+ pitting edema of 

the BL lower extremities. WWP, no rash


Neuro grossly intact, gait WNL, moving all 4


A&O x 3, mood/affect WNL.


"





<Jenniffer Wang - Last Filed: 05/26/18 04:03>





- Vital Signs


 Last Vital Signs











Temp Pulse Resp BP Pulse Ox


 


 97.8 F   82   18   104/60   97 


 


 05/25/18 21:14  05/25/18 21:14  05/25/18 21:14  05/25/18 21:14  05/25/18 21:14














<Liliana Miles - Last Filed: 05/26/18 07:52>





ED Treatment Course





- LABORATORY


CBC & Chemistry Diagram: 


 05/25/18 22:50





 05/25/18 22:50





- ADDITIONAL ORDERS


Additional order review: 


 Laboratory  Results











  05/25/18 05/25/18 05/25/18





  22:50 22:50 22:50


 


PT with INR  44.90 H  


 


INR  3.97 H  


 


Sodium    139


 


Potassium    4.8


 


Chloride    104


 


Carbon Dioxide    26


 


Anion Gap    9


 


BUN    16  D


 


Creatinine    1.7 H D


 


Random Glucose    92


 


Calcium    8.0 L


 


Urine Color   Yellow 


 


Urine Appearance   Clear 


 


Urine pH   7.0  D 


 


Ur Specific Gravity   1.019 


 


Urine Protein   2+ H 


 


Urine Glucose (UA)   Negative 


 


Urine Ketones   Negative 


 


Urine Blood   Negative 


 


Urine Nitrite   Negative 


 


Urine Bilirubin   Negative 


 


Urine Urobilinogen   4.0 e.u/dl 


 


Ur Leukocyte Esterase   Negative 


 


Urine WBC (Auto)   2 


 


Urine RBC (Auto)   5 


 


Urine Mucus   Rare 








 











  05/25/18





  22:50


 


RBC  4.01


 


MCV  93.4


 


MCHC  33.1


 


RDW  15.0


 


MPV  8.7


 


Neutrophils %  76.0


 


Lymphocytes %  11.3  D


 


Monocytes %  12.0 H D


 


Eosinophils %  0.2  D


 


Basophils %  0.5














- Medications


Given in the ED: 


ED Medications














Discontinued Medications














Generic Name Dose Route Start Last Admin





  Trade Name Freq  PRN Reason Stop Dose Admin


 


Morphine Sulfate  4 mg  05/25/18 22:34  05/25/18 23:09





  Morphine Injection -  IVPUSH  05/25/18 22:35  4 mg





  ONCE ONE   Administration





     





     





     





     














<Jenniffer Wang - Last Filed: 05/26/18 04:03>





- LABORATORY


CBC & Chemistry Diagram: 


 05/25/18 22:50





 05/25/18 22:50





- RADIOLOGY


Radiology Studies Ordered: 














 Category Date Time Status


 


 CHEST PA & LAT [RAD] Stat Radiology  05/25/18 22:33 Ordered














<Liliana Miles - Last Filed: 05/26/18 07:52>





Medical Decision Making





- Medical Decision Making





05/26/18 02:07


78yoM hx of etoh abuse, ICM, NSTEMI, prior artieral thromboembolic disease to 

lower extremities presnets w/ L side pain from axilla to toes, tingling to toes 

on L. Prior presentations always w/ elevated troponins. 


- labs


- ekg


- pain control


-reeval





 Laboratory Tests











  05/25/18 05/25/18 05/25/18





  22:50 22:50 22:50


 


WBC  10.8 H  


 


Hgb  12.4  


 


Hct  37.5  


 


Plt Count  320  D  


 


INR    3.97 H


 


Creatinine   1.7 H D 


 


Calcium   8.0 L 


 


Troponin I   


 


B-Natriuretic Peptide   


 


Alcohol, Quantitative   














  05/25/18 05/25/18





  22:50 22:50


 


WBC  


 


Hgb  


 


Hct  


 


Plt Count  


 


INR  


 


Creatinine  


 


Calcium  


 


Troponin I  0.37 H D 


 


B-Natriuretic Peptide  7675.8 H 


 


Alcohol, Quantitative   < 5.0








Troponin lower than usual. 


BNP higher than usual


Cr c/w HAM. 





Pt w/ cardiac stretch and HAM. 


plan for admission


pt is still pending UA, CXR. 


05/26/18 02:09








<Liliana Miles - Last Filed: 05/26/18 07:52>





*DC/Admit/Observation/Transfer





- Attestations


Scribe Attestion: 





05/25/18 23:52





Documentation prepared by Jenniffer Wang, acting as medical scribe for Liliana Miles MD.





<Jenniffer Wang - Last Filed: 05/26/18 04:03>





- Discharge Dispostion


Decision to Admit order: Yes





<Liliana Miles - Last Filed: 05/26/18 07:52>





- Discharge Dispostion


Condition at time of disposition: Fair





- Referrals


Referrals: 


ON STAFF,NOT [Primary Care Provider] - 





- Patient Instructions





- Post Discharge Activity

## 2018-05-26 LAB
ALBUMIN SERPL-MCNC: 2.2 G/DL (ref 3.4–5)
ALP SERPL-CCNC: 73 U/L (ref 45–117)
ALT SERPL-CCNC: 70 U/L (ref 12–78)
ANION GAP SERPL CALC-SCNC: 7 MMOL/L (ref 8–16)
ANION GAP SERPL CALC-SCNC: 7 MMOL/L (ref 8–16)
APTT BLD: 39.9 SECONDS (ref 26.9–34.4)
AST SERPL-CCNC: 85 U/L (ref 15–37)
BASOPHILS # BLD: 0.6 % (ref 0–2)
BILIRUB SERPL-MCNC: 0.6 MG/DL (ref 0.2–1)
BNP SERPL-MCNC: 8969.4 PG/ML (ref 5–450)
BUN SERPL-MCNC: 18 MG/DL (ref 7–18)
BUN SERPL-MCNC: 22 MG/DL (ref 7–18)
CALCIUM SERPL-MCNC: 7.3 MG/DL (ref 8.5–10.1)
CALCIUM SERPL-MCNC: 7.5 MG/DL (ref 8.5–10.1)
CHLORIDE SERPL-SCNC: 108 MMOL/L (ref 98–107)
CHLORIDE SERPL-SCNC: 108 MMOL/L (ref 98–107)
CO2 SERPL-SCNC: 25 MMOL/L (ref 21–32)
CO2 SERPL-SCNC: 25 MMOL/L (ref 21–32)
CREAT SERPL-MCNC: 1.9 MG/DL (ref 0.7–1.3)
CREAT SERPL-MCNC: 1.9 MG/DL (ref 0.7–1.3)
CREAT UR-MCNC: 345 MG/DL (ref 20–370)
DEPRECATED RDW RBC AUTO: 15.1 % (ref 11.9–15.9)
DEPRECATED RDW RBC AUTO: 15.2 % (ref 11.9–15.9)
EOSINOPHIL # BLD: 0.3 % (ref 0–4.5)
GLUCOSE SERPL-MCNC: 85 MG/DL (ref 74–106)
GLUCOSE SERPL-MCNC: 86 MG/DL (ref 74–106)
HCT VFR BLD CALC: 34.2 % (ref 35.4–49)
HCT VFR BLD CALC: 36.4 % (ref 35.4–49)
HGB BLD-MCNC: 11.3 GM/DL (ref 11.7–16.9)
HGB BLD-MCNC: 12 GM/DL (ref 11.7–16.9)
INR BLD: 4.15 (ref 0.82–1.09)
INR BLD: 4.88 (ref 0.82–1.09)
LYMPHOCYTES # BLD: 10.1 % (ref 8–40)
MCH RBC QN AUTO: 30.6 PG (ref 25.7–33.7)
MCH RBC QN AUTO: 30.6 PG (ref 25.7–33.7)
MCHC RBC AUTO-ENTMCNC: 32.9 G/DL (ref 32–35.9)
MCHC RBC AUTO-ENTMCNC: 33 G/DL (ref 32–35.9)
MCV RBC: 92.7 FL (ref 80–96)
MCV RBC: 92.8 FL (ref 80–96)
MONOCYTES # BLD AUTO: 13 % (ref 3.8–10.2)
NEUTROPHILS # BLD: 76 % (ref 42.8–82.8)
PLATELET # BLD AUTO: 293 K/MM3 (ref 134–434)
PLATELET # BLD AUTO: 308 K/MM3 (ref 134–434)
PMV BLD: 8.2 FL (ref 7.5–11.1)
PMV BLD: 8.5 FL (ref 7.5–11.1)
POTASSIUM SERPLBLD-SCNC: 4.2 MMOL/L (ref 3.5–5.1)
POTASSIUM SERPLBLD-SCNC: 4.3 MMOL/L (ref 3.5–5.1)
PROT SERPL-MCNC: 6 G/DL (ref 6.4–8.2)
PT PNL PPP: 46.9 SEC (ref 9.7–13)
PT PNL PPP: 55.2 SEC (ref 9.7–13)
RBC # BLD AUTO: 3.69 M/MM3 (ref 4–5.6)
RBC # BLD AUTO: 3.92 M/MM3 (ref 4–5.6)
SODIUM SERPL-SCNC: 140 MMOL/L (ref 136–145)
SODIUM SERPL-SCNC: 140 MMOL/L (ref 136–145)
WBC # BLD AUTO: 14.3 K/MM3 (ref 4–10)
WBC # BLD AUTO: 14.5 K/MM3 (ref 4–10)

## 2018-05-26 RX ADMIN — ASPIRIN SCH MG: 81 TABLET, COATED ORAL at 09:43

## 2018-05-26 RX ADMIN — SODIUM CHLORIDE SCH MLS/HR: 9 INJECTION, SOLUTION INTRAVENOUS at 11:58

## 2018-05-26 RX ADMIN — ATORVASTATIN CALCIUM SCH MG: 40 TABLET, FILM COATED ORAL at 09:43

## 2018-05-26 RX ADMIN — ACETAMINOPHEN PRN MG: 325 TABLET ORAL at 21:29

## 2018-05-26 RX ADMIN — SODIUM CHLORIDE SCH MLS/HR: 9 INJECTION, SOLUTION INTRAVENOUS at 06:32

## 2018-05-26 RX ADMIN — NICOTINE SCH: 14 PATCH, EXTENDED RELEASE TRANSDERMAL at 11:41

## 2018-05-26 RX ADMIN — CARVEDILOL SCH MG: 6.25 TABLET, FILM COATED ORAL at 09:43

## 2018-05-26 NOTE — HOSP
Subjective





- Review of Symptoms


Subjective: 





evaluated pt who is complaining of L foot pain. progressively worsening over 

the past week. worse on exertion. noted some swelling and came to the ER. 

states medications have been changing frequently and get confused with his 

medications but states he take coumadin 1.5mg MWF and 1mg on the rest of the 

days. 





 Current Medications











Generic Name Dose Route Start Last Admin





  Trade Name Korin  PRN Reason Stop Dose Admin


 


Aspirin  81 mg  05/26/18 10:00  05/26/18 09:43





  Ecotrin -  PO   81 mg





  DAILY AVA   Administration





     





     





     





     


 


Atorvastatin Calcium  40 mg  05/26/18 10:00  05/26/18 09:43





  Lipitor -  PO   40 mg





  DAILY AVA   Administration





     





     





     





     


 


Carvedilol  6.25 mg  05/26/18 10:00  05/26/18 09:43





  Coreg -  PO   6.25 mg





  DAILY AVA   Administration





     





     





     





     


 


Ceftriaxone Sodium 1 gm/  50 mls @ 200 mls/hr  05/26/18 10:00  05/26/18 09:43





  Dextrose  IVPB   200 mls/hr





  DAILY AVA   Administration





     





     





  Protocol   





     


 


Sodium Chloride  1,000 mls @ 100 mls/hr  05/26/18 18:30  





  Normal Saline -  IV   





  ASDIR AVA   





     





     





     





     


 


Morphine Sulfate  2 mg  05/26/18 06:38  





  Morphine Sulfate  IVPUSH   





  Q4H PRN   





  PAIN LEVEL 4 - 6   





     





     





     


 


Nicotine  14 mg  05/26/18 10:00  05/26/18 11:41





  Nicoderm Patch -  TD   Not Given





  DAILY AVA   





     





     





     





     








 Last Vital Signs











Temp Pulse Resp BP Pulse Ox


 


 98.7 F   97 H  20   122/77   98 


 


 05/26/18 18:05  05/26/18 18:05  05/26/18 18:05  05/26/18 18:05  05/26/18 14:05








General NAD


Extremities L foot is exquisitely tender to light touch, both feet are cold. 

unable to appreciate pulses on either foot. R groin pulse palpable. L groin 

barely palpable





 CBCD











WBC  14.5 K/mm3 (4.0-10.0)  H D 05/26/18  08:25    


 


RBC  3.92 M/mm3 (4.00-5.60)  L  05/26/18  08:25    


 


Hgb  12.0 GM/dL (11.7-16.9)   05/26/18  08:25    


 


Hct  36.4 % (35.4-49)   05/26/18  08:25    


 


MCV  92.8 fl (80-96)   05/26/18  08:25    


 


MCHC  33.0 g/dl (32.0-35.9)   05/26/18  08:25    


 


RDW  15.1 % (11.9-15.9)   05/26/18  08:25    


 


Plt Count  293 K/MM3 (134-434)   05/26/18  08:25    


 


MPV  8.2 fl (7.5-11.1)   05/26/18  08:25    








 CMP











Sodium  140 mmol/L (136-145)   05/26/18  08:25    


 


Potassium  4.3 mmol/L (3.5-5.1)   05/26/18  08:25    


 


Chloride  108 mmol/L ()  H  05/26/18  08:25    


 


Carbon Dioxide  25 mmol/L (21-32)   05/26/18  08:25    


 


Anion Gap  7  (8-16)  L  05/26/18  08:25    


 


BUN  18 mg/dL (7-18)   05/26/18  08:25    


 


Creatinine  1.9 mg/dL (0.7-1.3)  H  05/26/18  08:25    


 


Calcium  7.5 mg/dL (8.5-10.1)  L  05/26/18  08:25    








A/P 79yo M with PMH continuous ETOH abuse, HTN, CAD, Afib on coumadin, PAD s/p B

/L thrombectomy 2017 presented to the ER for L foot pain


1. Acute L foot pain- concern for ischemia. foot is cold, tender without pulse 

on doppler. unable to obtain CTA due to HAM. will obtain arterial and vascular 

doppler. pt has supratherapeutic INR. spoke with Dr Chan who is aware and will 

see patient int he AM. was started on Ceftriaxone for suspected cellulitis but 

have low suspicion at this time. will cont for the time being


2. HAM- normal baseline kidney function. will increase IVF, monitor respiratory 

status closely. check Ulytes, Ucr


3. Elevated troponin- likely due to HAM. flat trend of troponin 0.37, 0.35. no 

cardiac workup indicated at this time


4. Supratherapeutic INR- will hold coumadin. monitor INR daily. start heparin 

gtt once <2 if surgery is necessary


5. SIRS- with possible cellulitis (Tm 100.6 with leukocytosis). likely reactive 

however will treat at this time. switch ceftriaxone to clindamycin. monitor. 

consider stopping if there is ischemia


6. DVT ppx- elevated INR








Physical Examination


Vital Signs: 


 Vital Signs











Temperature  98.7 F   05/26/18 18:05


 


Pulse Rate  97 H  05/26/18 18:05


 


Respiratory Rate  20   05/26/18 18:05


 


Blood Pressure  122/77   05/26/18 18:05


 


O2 Sat by Pulse Oximetry (%)  98   05/26/18 14:05











Labs: 


 CBC, BMP





 05/26/18 08:25 





 05/26/18 08:25

## 2018-05-26 NOTE — PN
Teaching Attending Note


Name of Resident: Sylvia Tejada





ATTENDING PHYSICIAN STATEMENT





I saw and evaluated the patient.


I reviewed the resident's note and discussed the case with the resident.


I agree with the resident's findings and plan as documented.








SUBJECTIVE:








OBJECTIVE:








ASSESSMENT AND PLAN:


this is a 79 y/o male patient presented to the ER with leg swelling and pain in 

the left leg, according to the patient this tends to occur from time to time, 

he stated that the pain can be worse and more swelling than today. patient was 

noted to have HAM with his symptoms. being admitted for med/surg for the 

management of the lower ext pain possible cellulits vs chronic limb ischemia.  





plan:


patient with suprathrepeutic INR - hold warfarin for today 


repeat INR tomorrow


doppler of the lower ext 


X-Ray of the foot 


vascular consult 


Creatinine Kinase level 


start ceftriaxone daily 


IVF hydration for HAM 


c/w medication

## 2018-05-26 NOTE — EKG
Test Reason : 

Blood Pressure : ***/*** mmHG

Vent. Rate : 093 BPM     Atrial Rate : 322 BPM

   P-R Int : 000 ms          QRS Dur : 142 ms

    QT Int : 418 ms       P-R-T Axes : 000 252 047 degrees

   QTc Int : 519 ms

 

*** SUSPECT ARM LEAD REVERSAL, INTERPRETATION ASSUMES NO REVERSAL

ATRIAL FLUTTER WITH VARIABLE A-V BLOCK

RIGHT BUNDLE BRANCH BLOCK

ABNORMAL ECG

WHEN COMPARED WITH ECG OF 24-DEC-2017 21:35,

ATRIAL FLUTTER HAS REPLACED ATRIAL FIBRILLATION

BORDERLINE CRITERIA FOR LATERAL INFARCT ARE NO LONGER PRESENT

NONSPECIFIC T WAVE ABNORMALITY NO LONGER EVIDENT IN ANTERIOR LEADS

Confirmed by SISI ALBERT, PREM ([id number]) on 5/26/2018 8:53:48 AM

 

Referred By:             Confirmed By:PREM LAIRD MD

## 2018-05-27 LAB
ANION GAP SERPL CALC-SCNC: 10 MMOL/L (ref 8–16)
APPEARANCE UR: CLEAR
BACTERIA #/AREA URNS HPF: (no result) /HPF
BASOPHILS # BLD: 0.3 % (ref 0–2)
BILIRUB UR STRIP.AUTO-MCNC: NEGATIVE MG/DL
BUN SERPL-MCNC: 22 MG/DL (ref 7–18)
CALCIUM SERPL-MCNC: 7.1 MG/DL (ref 8.5–10.1)
CHLORIDE SERPL-SCNC: 110 MMOL/L (ref 98–107)
CO2 SERPL-SCNC: 22 MMOL/L (ref 21–32)
COLOR UR: YELLOW
CREAT SERPL-MCNC: 1.8 MG/DL (ref 0.7–1.3)
DEPRECATED RDW RBC AUTO: 15.3 % (ref 11.9–15.9)
EOSINOPHIL # BLD: 0.2 % (ref 0–4.5)
GLUCOSE SERPL-MCNC: 78 MG/DL (ref 74–106)
HCT VFR BLD CALC: 32.7 % (ref 35.4–49)
HGB BLD-MCNC: 11 GM/DL (ref 11.7–16.9)
INR BLD: 5.08 (ref 0.82–1.09)
KETONES UR QL STRIP: (no result)
LEUKOCYTE ESTERASE UR QL STRIP.AUTO: NEGATIVE
LYMPHOCYTES # BLD: 8.4 % (ref 8–40)
MCH RBC QN AUTO: 31.2 PG (ref 25.7–33.7)
MCHC RBC AUTO-ENTMCNC: 33.5 G/DL (ref 32–35.9)
MCV RBC: 93.2 FL (ref 80–96)
MONOCYTES # BLD AUTO: 12 % (ref 3.8–10.2)
MUCOUS THREADS URNS QL MICRO: (no result)
NEUTROPHILS # BLD: 79.1 % (ref 42.8–82.8)
NITRITE UR QL STRIP: NEGATIVE
PH UR: 6 [PH] (ref 5–8)
PLATELET # BLD AUTO: 266 K/MM3 (ref 134–434)
PMV BLD: 8.5 FL (ref 7.5–11.1)
POTASSIUM SERPLBLD-SCNC: 4.2 MMOL/L (ref 3.5–5.1)
PROT UR QL STRIP: (no result)
PROT UR QL STRIP: NEGATIVE
PT PNL PPP: 57.4 SEC (ref 9.7–13)
RBC # BLD AUTO: 3.51 M/MM3 (ref 4–5.6)
RBC # UR STRIP: (no result) /UL
SODIUM SERPL-SCNC: 142 MMOL/L (ref 136–145)
SP GR UR: 1.02 (ref 1–1.03)
UROBILINOGEN UR STRIP-MCNC: (no result) MG/DL (ref 0.2–1)
WBC # BLD AUTO: 14.7 K/MM3 (ref 4–10)

## 2018-05-27 RX ADMIN — CLINDAMYCIN PHOSPHATE SCH: 300 INJECTION, SOLUTION INTRAVENOUS at 17:48

## 2018-05-27 RX ADMIN — ACETAMINOPHEN PRN MG: 325 TABLET ORAL at 16:48

## 2018-05-27 RX ADMIN — SODIUM CHLORIDE SCH: 9 INJECTION, SOLUTION INTRAVENOUS at 20:44

## 2018-05-27 RX ADMIN — SODIUM CHLORIDE SCH MLS/HR: 9 INJECTION, SOLUTION INTRAVENOUS at 01:19

## 2018-05-27 RX ADMIN — ASPIRIN SCH MG: 81 TABLET, COATED ORAL at 10:24

## 2018-05-27 RX ADMIN — NICOTINE SCH: 14 PATCH, EXTENDED RELEASE TRANSDERMAL at 10:24

## 2018-05-27 RX ADMIN — CLINDAMYCIN PHOSPHATE SCH MLS/HR: 300 INJECTION, SOLUTION INTRAVENOUS at 01:19

## 2018-05-27 RX ADMIN — CLINDAMYCIN PHOSPHATE SCH MLS/HR: 300 INJECTION, SOLUTION INTRAVENOUS at 10:23

## 2018-05-27 RX ADMIN — ATORVASTATIN CALCIUM SCH MG: 40 TABLET, FILM COATED ORAL at 10:23

## 2018-05-27 RX ADMIN — CLINDAMYCIN PHOSPHATE SCH MLS/HR: 300 INJECTION, SOLUTION INTRAVENOUS at 16:48

## 2018-05-27 RX ADMIN — SODIUM CHLORIDE SCH MLS/HR: 9 INJECTION, SOLUTION INTRAVENOUS at 01:32

## 2018-05-27 RX ADMIN — CARVEDILOL SCH MG: 6.25 TABLET, FILM COATED ORAL at 10:23

## 2018-05-27 RX ADMIN — ACETAMINOPHEN PRN MG: 325 TABLET ORAL at 02:31

## 2018-05-27 RX ADMIN — SODIUM CHLORIDE SCH: 9 INJECTION, SOLUTION INTRAVENOUS at 01:19

## 2018-05-27 NOTE — PN
Progress Note (short form)





- Note


Progress Note: 





Vascular surgery 





Pt seen and examined. 


Complains of left lower ext pain for over a month. The pain is 


off and on. 


Last august pt had bl lower ext open thrombectomy due to embolus. Pt was in 

afib. 


Pt now on AC with INR of 5. 


Cr is 1.9 yest. 





On physical exam 


bl lower ext with motor and sensory intact. 


Pt sitting up in bed having breakfast. 


Both legs are warm. 


No palpable pulses. 


Will get doppler to check pulses. 





Pt will need imaging - pt being hydrated to bring 


Cr down. Once normal can do angiogram or cta. 


Also INR is 5, will need to bring down before any 


intervention. 


Left leg is stable. 


will follow





Perry marrero DO

## 2018-05-27 NOTE — PN
Teaching Attending Note


Name of Resident: uRma Guevara





ATTENDING PHYSICIAN STATEMENT





I saw and evaluated the patient.


I reviewed the resident's note and discussed the case with the resident.


I agree with the resident's findings and plan as documented.








SUBJECTIVE:states L foot pain is better than yesterday. refuses to walk on it 

in fear of causing pain. denies CP, SOB, fever, chills, cough, N/V/C/D








OBJECTIVE:





 Last Vital Signs











Temp Pulse Resp BP Pulse Ox


 


 98.9 F   97 H  20   113/61   95 


 


 05/27/18 06:00  05/27/18 06:00  05/27/18 06:00  05/27/18 06:00  05/26/18 21:00








Refused my physical exam as he was already examined today


General NAD








ASSESSMENT AND PLAN:


79yo M with PMH continuous ETOH abuse, HTN, CAD, Afib on coumadin, PAD s/p B/L 

thrombectomy 2017 presented to the ER for L foot pain


1. Acute L foot pain- concern for ischemia.awaiting arterial dopplers. will 

need CTA once renal function improves. spoke with vascular surgery and no 

intervention at this time awaiting imaging studies and for INR to trend down. 

will keep Clinda for cellulitis although low suspicion.


2. SEpsis due to suspected cellulitis- Tm 101.1. sepsis workup sent. will f/u. 

possible cellulitis on admission and on clindamycin will cont for now until Cx 

reported. will likely stop in 24-48H if no repeat fevers and leukcoytosis 

improves. 


3. HAM- normal baseline kidney function. Fena 0.24. slow trend down. will 

monitor for now. if no improvement by tomorrow will consider nephro consult. 

cont IVF for hydration


4. Elevated troponin- likely due to HAM. flat trend of troponin 0.37, 0.35. no 

cardiac workup indicated at this time


5. Supratherapeutic INR- he last took medications day of admission (friday) 

took 9mg. INR trending up. will cont hold at this time. will wait on giving 

vitamin K in setting of afib 


6. DVT ppx- elevated INR

## 2018-05-27 NOTE — PN
Physical Exam: 


SUBJECTIVE: Patient seen and examined at home. Overnight, pt febrile to 101 F - 

received Tylenol x 1. Today, pt eating breakfast, states that the pain in his L 

foot is the same as yesterday. Denies HA, fever, chills, SOB, chest pain or 

pressure, or changes in urinary or bowel function.





OBJECTIVE:





 Vital Signs











 Period  Temp  Pulse  Resp  BP Sys/Varghese  Pulse Ox


 


 Last 24 Hr  98.7 F-101.1 F    18-20  110-123/61-77  95-98











GENERAL: Pleasant. awake, alert, and fully oriented, in no acute distress.


HEAD: Normal with no signs of trauma.


EYES: PERRL, extraocular movements intact, sclera anicteric, conjunctiva clear. 


ENT: Ears normal, nares patent, oropharynx clear without exudates, moist mucous 

membranes


NECK: Trachea midline, supple. 


LUNGS: decreased breath sounds, clear to auscultation bilaterally, no wheezes, 

no crackles, no accessory muscle use. 


HEART: Regular rate and rhythm, S1, S2 without murmur, rub or gallop.


ABDOMEN: Soft, nontender, nondistended, normoactive bowel sounds, no guarding


LOWER EXTREMITIES: unable to appreciate dp or pt pulses in lower extremities. +

cool, without edema. +L foot-mottled with discoloration, diffusely TTP - 

increased in forefoot. Difficult to assess ROM and motor strength d/t foot pain


NEUROLOGICAL: Cranial nerves II through XII grossly intact. Normal speech. Gait 

not observed


PSYCH: Normal mood, normal affect.


SKIN: Warm, dry, normal turgor, no rashes or lesions noted








 Laboratory Results - last 24 hr











  05/26/18 05/26/18 05/26/18





  13:25 21:19 21:19


 


WBC   14.3 H 


 


RBC   3.69 L 


 


Hgb   11.3 L 


 


Hct   34.2 L 


 


MCV   92.7 


 


MCH   30.6 


 


MCHC   32.9 


 


RDW   15.2 


 


Plt Count   308 


 


MPV   8.5 


 


Neutrophils %   76.0 


 


Lymphocytes %   10.1 


 


Monocytes %   13.0 H 


 


Eosinophils %   0.3 


 


Basophils %   0.6 


 


Nucleated RBC %   0 


 


PT with INR    55.20 H


 


INR    4.88 H*


 


PTT (Actin FS)    39.9 H


 


Sodium   


 


Ur Random Sodium  61  


 


Ur Random Potassium  87.1  


 


Ur Random Chloride  84  


 


Urine Creatinine  345.0  














  05/26/18 05/26/18 05/27/18





  21:19 21:19 06:00


 


WBC    14.7 H


 


RBC    3.51 L


 


Hgb    11.0 L


 


Hct    32.7 L


 


MCV    93.2


 


MCH    31.2


 


MCHC    33.5


 


RDW    15.3


 


Plt Count    266


 


MPV    8.5


 


Neutrophils %    79.1


 


Lymphocytes %    8.4


 


Monocytes %    12.0 H


 


Eosinophils %    0.2


 


Basophils %    0.3


 


Nucleated RBC %    0


 


Sodium  140  


 


Potassium  4.2  


 


Chloride  108 H  


 


Carbon Dioxide  25  


 


Anion Gap  7 L  


 


BUN  22 H D  


 


Creatinine  1.9 H  


 


Creat Clearance w eGFR  34.37  


 


Random Glucose  85  


 


Lactic Acid   1.0 


 


Calcium  7.3 L  


 


Total Bilirubin  0.6  D  


 


AST  85 H D  


 


ALT  70  D  


 


Alkaline Phosphatase  73  


 


Total Protein  6.0 L  


 


Albumin  2.2 L D  


 


Urine Color   


 


Urine Creatinine   














  05/27/18 05/27/18 05/27/18





  06:00 06:00 10:30


 


Nucleated RBC %   


 


PT with INR  57.40 H  


 


INR  5.08 H*  


 


PTT (Actin FS)   


 


Sodium   142 


 


Potassium   4.2 


 


Chloride   110 H 


 


Carbon Dioxide   22 


 


Anion Gap   10 


 


BUN   22 H 


 


Creatinine   1.8 H 


 


Creat Clearance w eGFR   


 


Random Glucose   78 


 


Lactic Acid   


 


Calcium   7.1 L 


 


Albumin   


 


Urine Color    Yellow


 


Urine Appearance    Clear


 


Urine pH    6.0


 


Ur Specific Gravity    1.021


 


Urine Protein    1+ H


 


Urine Glucose (UA)    Negative


 


Urine Ketones    Trace H


 


Urine Blood    1+ H


 


Urine Nitrite    Negative


 


Urine Bilirubin    Negative


 


Urine Urobilinogen    4.0 e.u/dl


 


Ur Leukocyte Esterase    Negative


 


Urine WBC (Auto)    3


 


Urine RBC (Auto)    9


 


Urine Bacteria    Few


 


Urine Mucus    Rare








Active Medications











Generic Name Dose Route Start Last Admin





  Trade Name Korin  PRN Reason Stop Dose Admin


 


Acetaminophen  650 mg  05/26/18 20:32  05/27/18 02:31





  Tylenol -  PO   650 mg





  Q6H PRN   Administration





  FEVER   


 


Aspirin  81 mg  05/26/18 10:00  05/27/18 10:24





  Ecotrin -  PO   81 mg





  DAILY AVA   Administration





     


 


Atorvastatin Calcium  40 mg  05/26/18 10:00  05/27/18 10:23





  Lipitor -  PO   40 mg





  DAILY AVA   Administration


 


Carvedilol  6.25 mg  05/26/18 10:00  05/27/18 10:23





  Coreg -  PO   6.25 mg





  DAILY AVA   Administration


 


Sodium Chloride  1,000 mls @ 100 mls/hr  05/26/18 18:30  05/27/18 01:32





  Normal Saline -  IV   100 mls/hr





  ASDIR AVA   Administration





     


 


Clindamycin Phosphate  300 mg in 50 mls @ 100 mls/hr  05/27/18 02:00  05/27/18 

10:23





  Cleocin 300 Mg Premix Ivpb  IVPB   100 mls/hr





  Q8H-IV AVA   Administration





     





  Protocol   


 


Morphine Sulfate  2 mg  05/26/18 06:38  





  Morphine Sulfate  IVPUSH   





  Q4H PRN   





  PAIN LEVEL 4 - 6   


 


Nicotine  14 mg  05/26/18 10:00  05/27/18 10:24





  Nicoderm Patch -  TD   Not Given





  DAILY AVA   








IMAGING





-5/26/18: Duplex, arterial duplex lower ext: no evidence of DVT in both lower 

extremities. compared to prior b/l lower extremity duplex arterial US from 8/26/ 17, in the RLE there is now normal triphasic waveform in the common femoral 

artery, superficial femoral, popliteal, posterior tibial a. atheromatous 

plaques are present throughout. in the LLE, there is normal triphasic waveform 

in L common femoral a. there is no definite flow within the L superficial 

femoral, popliteal, and posterior tibial a. consistent with occlusion. diffuse 

atheromatous plaques are present throughout.





-5/26/18: CXR: since prior study 5/26/18, new congestive changes with 

persistently elevated L hemidiaphragm and some bibasilar atelctatic/

infiltrative changes with the L more affected than the R. there may be some L 

fluid. correlation recommended.





-5/26/18: L foot XR: pes planus deformity, exostosis/degenertive change of 

navicular bone. bunion formation of 1st MTP with extenive arthritic changes. 

the toes are partially flexed with arthritic changes. no sign of calcaneal 

spurring. blastic changes are not seen. there appears to be a hypodene/cystic 

change at the base of the proximal phalynx of the 2nd metatarsal. correlation 

recommended. if symptoms persist, further imaging and ortho consultation may be 

needed.





Microbiology





-5/27/18: urine cx- pending


-5/27/18: blood cx - pending 





ASSESSMENT/PLAN:





79 y/o M with PMH alcohol abuse, HTN, CAD, afib (on coumadin), PAD s/p 

thrombectomy 2017, who presented to the ED with acute L foot pain. Pt admitted 

for acute L foot pain, r/o ischemia.





#Acute L foot pain, r/o ischemia


-pt without palpable pt, dp pulses in lower extremities 


-duplex, arterial duplex lower extremities: no definite flow within the L 

superficial femoral, popliteal, posterior tibial a


-consistent with occlusion


-will get CTA once HAM resolves and INR trends down; currently Cr 1.8 (from 1.9)

, INR 5.08


-Vascular- Dr. Chan; no intervention until above





#Possible cellulitis


-currently with white count (14.7 from 14.3), trending up. with mild tachy 97HR


-continue clinda 300mg q8h (Today is Day 1)


-F/u ucx, blood cx 





#HAM likely 2/2 prerenal 


-FEna 0.2


-Continue  cc/hr


-Continue to monitor; once normalizes can undergo CTA





#Supratherapeutic INR


-Has been trending up; 4.88>5.08


-expect will take 2-3 days before comes down


-will not use vit K at this time as no sign active bleeding, and INR not >9


-additionally, as pt with afib do not want to reverse quickly or will increase 

chance of clot





#F/E/N


Encourage PO intake


Continue to follow lytes


Low Na controlled diet 





#PPX


-Supratherapeutic INR





#Dispo


-cont'd monitoring on med surg


-await normalization INR, HAM before CTA and vascular intervention.








Visit type





- Emergency Visit


Emergency Visit: No





- New Patient


This patient is new to me today: Yes


Date on this admission: 05/27/18





- Critical Care


Critical Care patient: No

## 2018-05-28 LAB
ANION GAP SERPL CALC-SCNC: 8 MMOL/L (ref 8–16)
BASOPHILS # BLD: 0.3 % (ref 0–2)
BUN SERPL-MCNC: 21 MG/DL (ref 7–18)
CALCIUM SERPL-MCNC: 7 MG/DL (ref 8.5–10.1)
CHLORIDE SERPL-SCNC: 110 MMOL/L (ref 98–107)
CO2 SERPL-SCNC: 23 MMOL/L (ref 21–32)
CREAT SERPL-MCNC: 1.6 MG/DL (ref 0.7–1.3)
DEPRECATED RDW RBC AUTO: 15.3 % (ref 11.9–15.9)
EOSINOPHIL # BLD: 0.9 % (ref 0–4.5)
GLUCOSE SERPL-MCNC: 86 MG/DL (ref 74–106)
HCT VFR BLD CALC: 30.4 % (ref 35.4–49)
HGB BLD-MCNC: 10 GM/DL (ref 11.7–16.9)
INR BLD: 4.28 (ref 0.82–1.09)
LYMPHOCYTES # BLD: 10.7 % (ref 8–40)
MCH RBC QN AUTO: 30.7 PG (ref 25.7–33.7)
MCHC RBC AUTO-ENTMCNC: 33 G/DL (ref 32–35.9)
MCV RBC: 93.2 FL (ref 80–96)
MONOCYTES # BLD AUTO: 11 % (ref 3.8–10.2)
NEUTROPHILS # BLD: 77.1 % (ref 42.8–82.8)
PLATELET # BLD AUTO: 264 K/MM3 (ref 134–434)
PMV BLD: 8.9 FL (ref 7.5–11.1)
POTASSIUM SERPLBLD-SCNC: 4.4 MMOL/L (ref 3.5–5.1)
PT PNL PPP: 48.4 SEC (ref 9.7–13)
RBC # BLD AUTO: 3.26 M/MM3 (ref 4–5.6)
SODIUM SERPL-SCNC: 141 MMOL/L (ref 136–145)
WBC # BLD AUTO: 12 K/MM3 (ref 4–10)

## 2018-05-28 RX ADMIN — ASPIRIN SCH MG: 81 TABLET, COATED ORAL at 10:14

## 2018-05-28 RX ADMIN — ATORVASTATIN CALCIUM SCH MG: 40 TABLET, FILM COATED ORAL at 10:14

## 2018-05-28 RX ADMIN — NICOTINE SCH MG: 14 PATCH, EXTENDED RELEASE TRANSDERMAL at 10:14

## 2018-05-28 RX ADMIN — ACETAMINOPHEN PRN MG: 325 TABLET ORAL at 02:32

## 2018-05-28 RX ADMIN — SODIUM CHLORIDE SCH MLS/HR: 9 INJECTION, SOLUTION INTRAVENOUS at 15:26

## 2018-05-28 RX ADMIN — NICOTINE SCH: 14 PATCH, EXTENDED RELEASE TRANSDERMAL at 10:25

## 2018-05-28 RX ADMIN — CLINDAMYCIN PHOSPHATE SCH MLS/HR: 300 INJECTION, SOLUTION INTRAVENOUS at 01:05

## 2018-05-28 RX ADMIN — CARVEDILOL SCH MG: 6.25 TABLET, FILM COATED ORAL at 10:14

## 2018-05-28 NOTE — PN
Progress Note (short form)





- Note


Progress Note: 





continues to haev L foot pain but controlled iwth pain medication. denies Cp, 

SOB, fever, chills, N/V/C/D or cough


 Current Medications











Generic Name Dose Route Start Last Admin





  Trade Name Freq  PRN Reason Stop Dose Admin


 


Acetaminophen  650 mg  05/26/18 20:32  05/28/18 02:32





  Tylenol -  PO   650 mg





  Q6H PRN   Administration





  FEVER   





     





     





     


 


Aspirin  81 mg  05/26/18 10:00  05/27/18 10:24





  Ecotrin -  PO   81 mg





  DAILY AVA   Administration





     





     





     





     


 


Atorvastatin Calcium  40 mg  05/26/18 10:00  05/27/18 10:23





  Lipitor -  PO   40 mg





  DAILY AVA   Administration





     





     





     





     


 


Carvedilol  6.25 mg  05/26/18 10:00  05/27/18 10:23





  Coreg -  PO   6.25 mg





  DAILY AVA   Administration





     





     





     





     


 


Sodium Chloride  1,000 mls @ 100 mls/hr  05/26/18 18:30  05/27/18 20:44





  Normal Saline -  IV   Not Given





  ASDIR AVA   





     





     





     





     


 


Clindamycin Phosphate  300 mg in 50 mls @ 100 mls/hr  05/27/18 02:00  05/28/18 

01:05





  Cleocin 300 Mg Premix Ivpb  IVPB   100 mls/hr





  Q8H-IV AVA   Administration





     





     





  Protocol   





     


 


Morphine Sulfate  2 mg  05/26/18 06:38  05/28/18 01:13





  Morphine Sulfate  IVPUSH   2 mg





  Q4H PRN   Administration





  PAIN LEVEL 4 - 6   





     





     





     


 


Nicotine  14 mg  05/26/18 10:00  05/27/18 10:24





  Nicoderm Patch -  TD   Not Given





  DAILY [facility]   





     





     





     





     








 Last Vital Signs











Temp Pulse Resp BP Pulse Ox


 


 99.4 F   90   20   106/62   98 


 


 05/28/18 06:00  05/28/18 06:00  05/28/18 06:00  05/28/18 06:00  05/27/18 21:00











General NAD


CV S1 S2+


Lungs CTA B/L no wheezng/rales/rhonchi


Abdomen soft NT/ND obese


Extremities LLE cool, unable to palpate pulse, tender foot





 CBCD











WBC  12.0 K/mm3 (4.0-10.0)  H  05/28/18  07:00    


 


RBC  3.26 M/mm3 (4.00-5.60)  L  05/28/18  07:00    


 


Hgb  10.0 GM/dL (11.7-16.9)  L  05/28/18  07:00    


 


Hct  30.4 % (35.4-49)  L  05/28/18  07:00    


 


MCV  93.2 fl (80-96)   05/28/18  07:00    


 


MCHC  33.0 g/dl (32.0-35.9)   05/28/18  07:00    


 


RDW  15.3 % (11.9-15.9)   05/28/18  07:00    


 


Plt Count  264 K/MM3 (134-434)   05/28/18  07:00    


 


MPV  8.9 fl (7.5-11.1)   05/28/18  07:00    








 CMP











Sodium  141 mmol/L (136-145)   05/28/18  07:00    


 


Potassium  4.4 mmol/L (3.5-5.1)   05/28/18  07:00    


 


Chloride  110 mmol/L ()  H  05/28/18  07:00    


 


Carbon Dioxide  23 mmol/L (21-32)   05/28/18  07:00    


 


Anion Gap  8  (8-16)   05/28/18  07:00    


 


BUN  21 mg/dL (7-18)  H  05/28/18  07:00    


 


Creatinine  1.6 mg/dL (0.7-1.3)  H  05/28/18  07:00    


 


Creat Clearance w eGFR  34.37  (>60)   05/26/18  21:19    


 


Calcium  7.0 mg/dL (8.5-10.1)  L  05/28/18  07:00    


 


Total Bilirubin  0.6 mg/dL (0.2-1.0)  D 05/26/18  21:19    


 


AST  85 U/L (15-37)  H D 05/26/18  21:19    


 


ALT  70 U/L (12-78)  D 05/26/18  21:19    


 


Alkaline Phosphatase  73 U/L ()   05/26/18  21:19    


 


Total Protein  6.0 g/dl (6.4-8.2)  L  05/26/18  21:19    


 


Albumin  2.2 g/dl (3.4-5.0)  L D 05/26/18  21:19    














ASSESSMENT AND PLAN:


79yo M with PMH continuous ETOH abuse, HTN, CAD, Afib on coumadin, PAD s/p B/L 

thrombectomy 2017 presented to the ER for L foot pain


1. Acute L foot pain- arterial doppler showing obstruction in superficial 

femoral. popliteal and posterior tibial. INR supratherapeutic yesterday 

awaiting todays labs. will need intervention by Vascular. will await plans. 


2. SEpsis due to suspected cellulitis- afebrile. low suspicon for cellulitis. 

will d/c abx. monitor off abx. 


3. HAM- normal baseline kidney function. Fena 0.24. slowly trend down. monitor 

breathing status while on IVF. avoid nephrotoxic medications


4. Elevated troponin- likely due to HAM. flat trend of troponin 0.37, 0.35. no 

cardiac workup indicated at this time


5. Supratherapeutic INR- he last took medications day of admission (friday) 

took 9mg. awaiting INR level today. concern giving vitamin K. consider 

hematology workup to determine why pt is developing blockage while 

anticoagulated. will need to call PMD to evaluate if has had periods of 

subtherapeutic iNR.


6. DVT ppx- elevated INR





Visit type





- Emergency Visit


Emergency Visit: Yes


ED Registration Date: 05/26/18


Care time: The patient presented to the Emergency Department on the above date 

and was hospitalized for further evaluation of their emergent condition.





- New Patient


This patient is new to me today: No





- Critical Care


Critical Care patient: No





- Discharge Referral


Referred to [facility] No

## 2018-05-28 NOTE — PN
Progress Note (short form)





- Note


Progress Note: 





Vascular Surgery 





Arterial study reviewed. 


Left leg occlusion of sfa. 


Cr is 1.6


INR - 4.28





Pt will probably need angiogram of left lower ext. 


Cont to hydrate to bring Cr down. 


INR coming down . 





Perry Chan DO

## 2018-05-29 LAB
ANION GAP SERPL CALC-SCNC: 8 MMOL/L (ref 8–16)
BASOPHILS # BLD: 0.3 % (ref 0–2)
BUN SERPL-MCNC: 18 MG/DL (ref 7–18)
CALCIUM SERPL-MCNC: 7.4 MG/DL (ref 8.5–10.1)
CHLORIDE SERPL-SCNC: 109 MMOL/L (ref 98–107)
CO2 SERPL-SCNC: 24 MMOL/L (ref 21–32)
CREAT SERPL-MCNC: 1.5 MG/DL (ref 0.7–1.3)
DEPRECATED RDW RBC AUTO: 15.7 % (ref 11.9–15.9)
EOSINOPHIL # BLD: 0.9 % (ref 0–4.5)
GLUCOSE SERPL-MCNC: 84 MG/DL (ref 74–106)
HCT VFR BLD CALC: 31.7 % (ref 35.4–49)
HGB BLD-MCNC: 10.4 GM/DL (ref 11.7–16.9)
INR BLD: 2.58 (ref 0.82–1.09)
LYMPHOCYTES # BLD: 14.2 % (ref 8–40)
MCH RBC QN AUTO: 30.7 PG (ref 25.7–33.7)
MCHC RBC AUTO-ENTMCNC: 32.8 G/DL (ref 32–35.9)
MCV RBC: 93.5 FL (ref 80–96)
MONOCYTES # BLD AUTO: 11.1 % (ref 3.8–10.2)
NEUTROPHILS # BLD: 73.5 % (ref 42.8–82.8)
PLATELET # BLD AUTO: 287 K/MM3 (ref 134–434)
PMV BLD: 8.6 FL (ref 7.5–11.1)
POTASSIUM SERPLBLD-SCNC: 4.4 MMOL/L (ref 3.5–5.1)
PT PNL PPP: 29.1 SEC (ref 9.7–13)
RBC # BLD AUTO: 3.39 M/MM3 (ref 4–5.6)
SODIUM SERPL-SCNC: 141 MMOL/L (ref 136–145)
WBC # BLD AUTO: 9.2 K/MM3 (ref 4–10)

## 2018-05-29 RX ADMIN — SODIUM CHLORIDE SCH MLS/HR: 9 INJECTION, SOLUTION INTRAVENOUS at 18:17

## 2018-05-29 RX ADMIN — SODIUM CHLORIDE SCH: 9 INJECTION, SOLUTION INTRAVENOUS at 01:54

## 2018-05-29 RX ADMIN — SODIUM CHLORIDE SCH MLS/HR: 9 INJECTION, SOLUTION INTRAVENOUS at 07:45

## 2018-05-29 RX ADMIN — ASPIRIN SCH MG: 81 TABLET, COATED ORAL at 09:24

## 2018-05-29 RX ADMIN — NICOTINE SCH: 14 PATCH, EXTENDED RELEASE TRANSDERMAL at 09:25

## 2018-05-29 RX ADMIN — ATORVASTATIN CALCIUM SCH MG: 40 TABLET, FILM COATED ORAL at 09:24

## 2018-05-29 RX ADMIN — CARVEDILOL SCH MG: 6.25 TABLET, FILM COATED ORAL at 09:25

## 2018-05-29 NOTE — PN
Physical Exam: 


SUBJECTIVE: Patient seen and examined at bedside. Overnight, pt received 

morphine for lower extremity pain. This AM, pt drowsy and confused, unaware of 

his location. Otherwise denies HA, fever, chills, SOB, or changes in urinary or 

bowel function. 





OBJECTIVE:





 Vital Signs











 Period  Temp  Pulse  Resp  BP Sys/Varghese  Pulse Ox


 


 Last 24 Hr  97.8 F-98.9 F    16-20  133-139/63-78  96











GENERAL: The patient is sitting comfortably in bed. AAOx2 (self, time). In no 

acute distress 


HEAD: Normal with no signs of trauma.


EYES: PERRL, extraocular movements intact, sclera anicteric, conjunctiva clear. 


ENT: Ears normal, nares patent, oropharynx clear without exudates, moist mucous 

membranes.


NECK: Trachea midline, supple. 


LUNGS: Breath sounds equal, clear to auscultation bilaterally, no wheezes, no 

crackles, no accessory muscle use. 


HEART: Regular rate and rhythm- in sinus, S1, S2 without murmur, rub or gallop.


ABDOMEN: Soft, nontender, nondistended, normoactive bowel sounds, no guarding, 

no rebound


EXTREMITIES: no dp, or pt pulses appreciable, +cool to touch, without edema. +

LLE- mottled - jace in forefoot. +diffusely TTP


NEUROLOGICAL: Cranial nerves II through XII grossly intact. Normal speech


PSYCH: Normal mood, normal affect.


SKIN: Warm, dry, normal turgor








 Laboratory Results - last 24 hr











  05/29/18 05/29/18 05/29/18





  06:43 06:43 06:43


 


WBC  9.2  


 


RBC  3.39 L  


 


Hgb  10.4 L  


 


Hct  31.7 L  


 


MCV  93.5  


 


MCH  30.7  


 


MCHC  32.8  


 


RDW  15.7  


 


Plt Count  287  


 


MPV  8.6  


 


Neutrophils %  73.5  


 


Lymphocytes %  14.2  D  


 


Monocytes %  11.1 H  


 


Eosinophils %  0.9  


 


Basophils %  0.3  


 


Nucleated RBC %  0  


 


PT with INR   29.10 H 


 


INR   2.58 H D 


 


Sodium    141


 


Potassium    4.4


 


Chloride    109 H


 


Carbon Dioxide    24


 


Anion Gap    8


 


BUN    18


 


Creatinine    1.5 H


 


Random Glucose    84


 


Calcium    7.4 L








Active Medications











Generic Name Dose Route Start Last Admin





  Trade Name Freq  PRN Reason Stop Dose Admin


 


Acetaminophen  650 mg  05/26/18 20:32  05/28/18 02:32





  Tylenol -  PO   650 mg





  Q6H PRN   Administration





  FEVER   


 


Aspirin  81 mg  05/26/18 10:00  05/29/18 09:24





  Ecotrin -  PO   81 mg





  DAILY AVA   Administration


 


Atorvastatin Calcium  40 mg  05/26/18 10:00  05/29/18 09:24





  Lipitor -  PO   40 mg





  DAILY AVA   Administration





     


 


Carvedilol  6.25 mg  05/26/18 10:00  05/29/18 09:25





  Coreg -  PO   6.25 mg





  DAILY AVA   Administration


 


Sodium Chloride  1,000 mls @ 100 mls/hr  05/26/18 18:30  05/29/18 07:45





  Normal Saline -  IV   100 mls/hr





  ASDIR AVA   Administration


 


Morphine Sulfate  2 mg  05/26/18 06:38  05/29/18 06:44





  Morphine Sulfate  IVPUSH   2 mg





  Q4H PRN   Administration





  PAIN LEVEL 4 - 6   


 


Nicotine  14 mg  05/26/18 10:00  05/29/18 09:25





  Nicoderm Patch -  TD   Not Given





  DAILY [facility]   








MAGING





-5/26/18: Duplex, arterial duplex lower ext: no evidence of DVT in both lower 

extremities. compared to prior b/l lower extremity duplex arterial US from 8/26/ 17, in the RLE there is now normal triphasic waveform in the common femoral 

artery, superficial femoral, popliteal, posterior tibial a. atheromatous 

plaques are present throughout. in the LLE, there is normal triphasic waveform 

in L common femoral a. there is no definite flow within the L superficial 

femoral, popliteal, and posterior tibial a. consistent with occlusion. diffuse 

atheromatous plaques are present throughout.





-5/26/18: CXR: since prior study 5/26/18, new congestive changes with 

persistently elevated L hemidiaphragm and some bibasilar atelctatic/

infiltrative changes with the L more affected than the R. there may be some L 

fluid. correlation recommended.





-5/26/18: L foot XR: pes planus deformity, exostosis/degenerative change of 

navicular bone. bunion formation of 1st MTP with extenive arthritic changes. 

the toes are partially flexed with arthritic changes. no sign of calcaneal 

spurring. blastic changes are not seen. there appears to be a hypodene/cystic 

change at the base of the proximal phalynx of the 2nd metatarsal. correlation 

recommended. if symptoms persist, further imaging and ortho consultation may be 

needed.





Microbiology





05/27/18 10:30   Urine - Urine Clean Catch   Urine Culture - Final


                              NO GROWTH OBTAINED


05/26/18 21:40   Blood - Peripheral Venous   Blood Culture - Preliminary


                              NO GROWTH OBTAINED AFTER 48 HOURS, INCUBATION TO 

CONTINUE


                              FOR 3 DAYS.


05/26/18 21:19   Blood - Peripheral Venous   Blood Culture - Preliminary


                              NO GROWTH OBTAINED AFTER 48 HOURS, INCUBATION TO 

CONTINUE


                              FOR 3 DAYS.





ASSESSMENT/PLAN:





77 y/o M with PMH alcohol abuse, HTN, CAD, afib (on coumadin), PAD s/p 

thrombectomy 2017, who presented to the ED with acute L foot pain. Pt admitted 

for acute L foot pain, r/o ischemia.





#Acute L foot pain, r/o ischemia


-pt without palpable pt, dp pulses in lower extremities 


-duplex, arterial duplex lower extremities: no definite flow within the L 

superficial femoral, popliteal, posterior tibial a


-consistent with occlusion


-will get CTA once HAM resolves and INR trends down; currently Cr 1.5 (from 1.6)

, INR 2.58


-will start hep gtt once INR <2


-Vascular- Dr. Chan





#Possible cellulitis


-improved white count (9.2-WNL from 12)


-received clinda -currently monitoring off abx


-ucx, blood cx - NGTD 





#HAM likely 2/2 prerenal 


-FEna 0.2


-Continue  cc/hr


-Continue to monitor; once normalizes can undergo CTA. 


-slowly trending down 





#Supratherapeutic INR- improving


-Has improved gradually; currently 2.58. Follow level tomorrow 


-will not use vit K at this time as no sign active bleeding, and INR not >9


-additionally, as pt with afib do not want to reverse quickly or will increase 

chance of clot





#F/E/N


Encourage PO intake


Continue to follow lytes


Low Na controlled diet 





#PPX


-Supratherapeutic INR





#Dispo


-cont'd monitoring on med surg


-await normalization INR, HAM before CTA and vascular intervention.








Visit type





- Emergency Visit


Emergency Visit: No





- New Patient


This patient is new to me today: No





- Critical Care


Critical Care patient: No

## 2018-05-29 NOTE — PN
Progress Note (short form)





- Note


Progress Note: 





VAscular Surgery 





Pt seen and examined. 


INR is 2.58. Please start IV heparin once 


INR falls below 2. 





Creatinine today is 1.5. Slowly coming down. 


Once both values are normal, will proceed for angiogram. 








Perry Chan DO

## 2018-05-29 NOTE — PN
Teaching Attending Note


Name of Resident: Ruma Guevara





ATTENDING PHYSICIAN STATEMENT





I saw and evaluated the patient.


I reviewed the resident's note and discussed the case with the resident.


I agree with the resident's findings and plan as documented.








SUBJECTIVE: Pain in left foot is controlled.








OBJECTIVE:


 Vital Signs











 Period  Temp  Pulse  Resp  BP Sys/Varghese  Pulse Ox


 


 Last 24 Hr  97.8 F-98.9 F    16-20  133-139/63-78  96








HEART: Irregular


LUNGS: Clear


ABDOMEN: Soft, non-tender, non-distended, normal BS


EXTREMITIES: Left foot swollen, cold and tender. Right foot cool.





 Laboratory Results - last 24 hr











  05/29/18 05/29/18 05/29/18





  06:43 06:43 06:43


 


WBC  9.2  


 


RBC  3.39 L  


 


Hgb  10.4 L  


 


Hct  31.7 L  


 


MCV  93.5  


 


MCH  30.7  


 


MCHC  32.8  


 


RDW  15.7  


 


Plt Count  287  


 


MPV  8.6  


 


Neutrophils %  73.5  


 


Lymphocytes %  14.2  D  


 


Monocytes %  11.1 H  


 


Eosinophils %  0.9  


 


Basophils %  0.3  


 


Nucleated RBC %  0  


 


PT with INR   29.10 H 


 


INR   2.58 H D 


 


Sodium    141


 


Potassium    4.4


 


Chloride    109 H


 


Carbon Dioxide    24


 


Anion Gap    8


 


BUN    18


 


Creatinine    1.5 H


 


Random Glucose    84


 


Calcium    7.4 L








 Current Medications











Generic Name Dose Route Start Last Admin





  Trade Name Freq  PRN Reason Stop Dose Admin


 


Acetaminophen  650 mg  05/26/18 20:32  05/28/18 02:32





  Tylenol -  PO   650 mg





  Q6H PRN   Administration





  FEVER   





     





     





     


 


Aspirin  81 mg  05/26/18 10:00  05/29/18 09:24





  Ecotrin -  PO   81 mg





  DAILY AVA   Administration





     





     





     





     


 


Atorvastatin Calcium  40 mg  05/26/18 10:00  05/29/18 09:24





  Lipitor -  PO   40 mg





  DAILY AVA   Administration





     





     





     





     


 


Carvedilol  6.25 mg  05/26/18 10:00  05/29/18 09:25





  Coreg -  PO   6.25 mg





  DAILY AVA   Administration





     





     





     





     


 


Sodium Chloride  1,000 mls @ 100 mls/hr  05/26/18 18:30  05/29/18 07:45





  Normal Saline -  IV   100 mls/hr





  ASDIR AVA   Administration





     





     





     





     


 


Morphine Sulfate  2 mg  05/26/18 06:38  05/29/18 06:44





  Morphine Sulfate  IVPUSH   2 mg





  Q4H PRN   Administration





  PAIN LEVEL 4 - 6   





     





     





     


 


Nicotine  14 mg  05/26/18 10:00  05/29/18 09:25





  Nicoderm Patch -  TD   Not Given





  DAILY [facility]   





     





     





     





     














ASSESSMENT AND PLAN:


This is a 78 year old man with a history of alcohol abuse, HTN, CAD, atrial fib

, PAD who presented to the ED with left foot pain.





1. PAD with left foot ischemia


   - Arterial duplex shows occlusion of left SFA, popliteal artery, PTA


   - Plan for angiogram once INR and creatinine improve


2. SIRS secondary to left foot ischemia


   - No evidence of sepsis


3. Acute kidney injury


   - Improving


   - Continue IV fluid


   - Continue to monitor creatinine


4. CAD with elevated troponin


   - No chest pain, SOB


   - Continue aspirin, Coreg, Lipitor


   - Cardiology evaluation prior to invasive procedures/surgery


5. Permanent atrial fibrillation


   - Continue Coreg


   - Coumadin held secondary to high INR/planned procedure


   - Start heparin IV drip if when INR < 2.0


6. HTN


   - Continue Coreg


7. Nicotine dependence


   - Continue nicotine patch


8. History of alcohol abuse

## 2018-05-30 LAB
ANION GAP SERPL CALC-SCNC: 7 MMOL/L (ref 8–16)
BASOPHILS # BLD: 0.4 % (ref 0–2)
BUN SERPL-MCNC: 16 MG/DL (ref 7–18)
CALCIUM SERPL-MCNC: 7.8 MG/DL (ref 8.5–10.1)
CHLORIDE SERPL-SCNC: 110 MMOL/L (ref 98–107)
CO2 SERPL-SCNC: 24 MMOL/L (ref 21–32)
CREAT SERPL-MCNC: 1.3 MG/DL (ref 0.7–1.3)
DEPRECATED RDW RBC AUTO: 15.4 % (ref 11.9–15.9)
EOSINOPHIL # BLD: 0.7 % (ref 0–4.5)
GLUCOSE SERPL-MCNC: 90 MG/DL (ref 74–106)
HCT VFR BLD CALC: 31.1 % (ref 35.4–49)
HGB BLD-MCNC: 10.5 GM/DL (ref 11.7–16.9)
INR BLD: 1.79 (ref 0.82–1.09)
LYMPHOCYTES # BLD: 13.5 % (ref 8–40)
MAGNESIUM SERPL-MCNC: 2.2 MG/DL (ref 1.8–2.4)
MCH RBC QN AUTO: 31.4 PG (ref 25.7–33.7)
MCHC RBC AUTO-ENTMCNC: 33.7 G/DL (ref 32–35.9)
MCV RBC: 93.1 FL (ref 80–96)
MONOCYTES # BLD AUTO: 10.6 % (ref 3.8–10.2)
NEUTROPHILS # BLD: 74.8 % (ref 42.8–82.8)
PHOSPHATE SERPL-MCNC: 3.3 MG/DL (ref 2.5–4.9)
PLATELET # BLD AUTO: 321 K/MM3 (ref 134–434)
PMV BLD: 8.4 FL (ref 7.5–11.1)
POTASSIUM SERPLBLD-SCNC: 5 MMOL/L (ref 3.5–5.1)
PT PNL PPP: 20.2 SEC (ref 9.7–13)
RBC # BLD AUTO: 3.34 M/MM3 (ref 4–5.6)
SODIUM SERPL-SCNC: 141 MMOL/L (ref 136–145)
WBC # BLD AUTO: 9.4 K/MM3 (ref 4–10)

## 2018-05-30 RX ADMIN — HEPARIN SODIUM SCH MLS/HR: 5000 INJECTION, SOLUTION INTRAVENOUS at 16:51

## 2018-05-30 RX ADMIN — CARVEDILOL SCH MG: 6.25 TABLET, FILM COATED ORAL at 22:04

## 2018-05-30 RX ADMIN — SODIUM CHLORIDE SCH: 9 INJECTION, SOLUTION INTRAVENOUS at 22:00

## 2018-05-30 RX ADMIN — CARVEDILOL SCH: 6.25 TABLET, FILM COATED ORAL at 11:56

## 2018-05-30 RX ADMIN — ASPIRIN SCH MG: 81 TABLET, COATED ORAL at 11:54

## 2018-05-30 RX ADMIN — NICOTINE SCH MG: 14 PATCH, EXTENDED RELEASE TRANSDERMAL at 11:54

## 2018-05-30 RX ADMIN — SODIUM CHLORIDE SCH MLS/HR: 9 INJECTION, SOLUTION INTRAVENOUS at 16:53

## 2018-05-30 RX ADMIN — ATORVASTATIN CALCIUM SCH MG: 40 TABLET, FILM COATED ORAL at 11:54

## 2018-05-30 NOTE — CON.CARD
Consult


Consult Specialty:: Cardiology


Referred by:: Hospitalist Medicine


Reason for Consultation:: Pre-procedural cardiovascular evaluation





- History of Present Illness


Chief Complaint: Left LE pain and swelling


History of Present Illness: 


This is a 79 y/o man with a PMHx of Persistent Aflutter [id number] with 

peripheral arterial embolism (on Coumadin) with supratherapeutic INR, Diabetes 

Mellitus, Apical Hypertrophic Cardiomyopathy, Alcohol Abuse, nonobstructive CAD 

h/o anaphylaxis after eating Chinese Food- shrimp and boneless spare ribs 

presented for intermittent LLE pain. He is asymptomatic from cardiovascular 

standpoint and denies chest pain, dyspnea, near or true syncope, orthopnea, PND

, or edema, exercise capacity limited by left claudication. Noted with INR 5, 

Cr 1.9, imaging shows left SFA occlusion, without DVT bilaterally.











- History Source


History Provided By: Patient


Limitations to Obtaining History: No Limitations





- Past Medical History


Cardio/Vascular: Yes: AFIB, HTN, MI





- Past Surgical History


Past Surgical History: Yes: Hernia Repair





- Alcohol/Substance Use


Hx Alcohol Use: Yes (Social Drinker)


History of Substance Use: reports: None





- Smoking History


Smoking history: Current some day smoker


Have you smoked in the past 12 months: Yes


Aproximately how many cigarettes per day: 10


If you are a former smoker, when did you quit?: 12.28.16





Home Medications





- Allergies


Allergies/Adverse Reactions: 


 Allergies











Allergy/AdvReac Type Severity Reaction Status Date / Time


 


valsartan Allergy Severe  Verified 05/25/18 21:18














- Home Medications


Home Medications: 


Ambulatory Orders





Aspirin [Aspirin EC] 81 mg PO DAILY 05/25/18 


Atorvastatin Calcium 40 mg PO DAILY 05/25/18 


Carvedilol 6.25 mg PO DAILY 05/25/18 


Warfarin Sodium 6 mg PO DAILY 05/25/18 


Albuterol Sulfate Inhaler - [Ventolin HFA Inhaler -] 90 mcg PO QID PRN 05/26/18 


Fluticasone/Vilanterol [Breo Ellipta 200-25 Mcg INH] 1 each IH DAILY 05/26/18 


Ipratropium/Albuterol Sulfate [Combivent Respimat Inhal Spray] 4 gm IH QID 05/26 /18 











Review of Systems





- Review of Systems


Musculoskeletal: reports: Extremity Pain (Left lower extremity)


Vital Signs: 


 Vital Signs











Temperature  98.2 F   05/30/18 06:59


 


Pulse Rate  84   05/30/18 06:59


 


Respiratory Rate  20   05/30/18 06:59


 


Blood Pressure  113/60   05/30/18 06:59


 


O2 Sat by Pulse Oximetry (%)  96   05/29/18 21:00











Constitutional: Yes: No Distress, Calm


Neck: Yes: Supple


Respiratory: Yes: Regular, CTA Bilaterally


Gastrointestinal: Yes: Normal Bowel Sounds, Soft


Cardiovascular: Yes: Regular Rate and Rhythm


JVD: No


Carotid Bruit: No


Heart Sounds: Yes: S1, S2


Edema: No





- Other Data


Labs, Other Data: 


 CBC, BMP





 05/30/18 06:00 





 05/30/18 06:00 





 INR, PTT











INR  2.58  (0.82-1.09)  H D 05/29/18  06:43    














Aflutter @ 93 RBBB


Ejection Fraction %: LVEF > or = 40 %





Imaging





- Results


Chest X-ray: Report Reviewed (NAD)


Ultrasound: Report Reviewed (Left SFA occlusion, no DVT bilaterally)





Problem List





- Problems


(1) Pre-procedural cardiovascular examination


Code(s): Z01.810 - ENCOUNTER FOR PREPROCEDURAL CARDIOVASCULAR EXAMINATION   





(2) Acute-on-chronic kidney injury


Code(s): N17.9 - ACUTE KIDNEY FAILURE, UNSPECIFIED; N18.9 - CHRONIC KIDNEY 

DISEASE, UNSPECIFIED   


Qualifiers: 


   Chronic kidney disease stage: stage 2 (mild) 





(3) Demand ischemia


Code(s): I24.8 - OTHER FORMS OF ACUTE ISCHEMIC HEART DISEASE   





(4) Anticoagulant long-term use


Code(s): Z79.01 - LONG TERM (CURRENT) USE OF ANTICOAGULANTS   





(5) Coronary artery disease


Code(s): I25.10 - ATHSCL HEART DISEASE OF NATIVE CORONARY ARTERY W/O ANG PCTRS 

  


Qualifiers: 


   Coronary Disease-Associated Artery/Lesion type: native artery   Native vs. 

transplanted heart: native heart   Associated angina: without angina   

Qualified Code(s): I25.10 - Atherosclerotic heart disease of native coronary 

artery without angina pectoris   





(6) Diastolic dysfunction without heart failure


Code(s): I51.9 - HEART DISEASE, UNSPECIFIED   





(7) HTN (hypertension)


Code(s): I10 - ESSENTIAL (PRIMARY) HYPERTENSION   


Qualifiers: 


   Hypertension type: essential hypertension   Qualified Code(s): I10 - 

Essential (primary) hypertension   





(8) Hypertrophic cardiomyopathy


Code(s): I42.2 - OTHER HYPERTROPHIC CARDIOMYOPATHY   





(9) Peripheral arterial disease


Code(s): I73.9 - PERIPHERAL VASCULAR DISEASE, UNSPECIFIED   





(10) Atrial flutter


Code(s): I48.92 - UNSPECIFIED ATRIAL FLUTTER   


Qualifiers: 


   Atrial flutter type: typical   Qualified Code(s): I48.3 - Typical atrial 

flutter   





(11) Vascular occlusion


Code(s): I99.8 - OTHER DISORDER OF CIRCULATORY SYSTEM   





Assessment/Plan





R&LHc at [facility] 1/11/2017 showing nonobstructive CAD, severe LV apical 

hypertrophic cardiomyopathy, mildly elevated right sided pressures, Mynx 

deployed right CFA access site. Study is consistent with apical hypertrophy (

spade-like) variant of hypertrophic cardiomyopathy, planned for optimal medical 

therapy. 





1. PAD with left limb ischemia referable to SFA occlusion


2. 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 

disease related to persistent atrial fibrillation with [id number] score of 6, 

history of poor compliance with therapy administration and medical F/U


3. CAD with demand ischemic injury, non obstructive CAD on R&LHc coronary 

angiogrpahy angina pectoris


4. 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 

chronic class I-II NYHA classification, compensated/euvolemic


5. Persistent atrial flutter [id number] score of 6 on Coumadin therapy, with 

therapeutic INR


6. HTN


7. Acute on CKD improving


8. Poor compliance with medical therapy administration and medical F/U


9. Tobacco abuse


10. H/o food anaphylaxis 





PLAN:





1. Given cardiac cath 1/11/2017 showing nonobstructive CAD without new sxs 

suggestive of acute coronary syndrome, decompensated CHF or malignant arrhythmia

, may proceed with endovascular and open peripheral intervention as clinically 

warranted without further testing once INR and Cr acceptable.


2. Hold coumadin pre-procedure, but resume once post-op hemostasis assured 

given [id number] score 6, start heparin gtt once INR<2.0


3. Increase Carvedilol 6.25 bid


4. Judicious hydration and hold Diovan 80 qd pending renal fxn stabilization


5. Hold ASA 81 qd pre-procedure,  but resume once post-op hemostasis assured


6. Continue Lipitor 40 qhs


7. Counselled smoking cessation and abstinence


8. Will consider outpatient DCCV for the above noted atrial arrhythmia after 3-

4 weeks of adequate A/C 


9. Thank you for consultative opportunity

## 2018-05-30 NOTE — PN
Progress Note (short form)





- Note


Progress Note: 





Vascular surgery 





Will do Left lower ext angiogram on friday afternoon. 


Cont IV heparin. 


Cr coming down nicely. 





Perry Chan DO

## 2018-05-30 NOTE — PN
Teaching Attending Note


Name of Resident: Ruma Guevara





ATTENDING PHYSICIAN STATEMENT





I saw and evaluated the patient.


I reviewed the resident's note and discussed the case with the resident.


I agree with the resident's findings and plan as documented.








SUBJECTIVE:refused interview and exam 











ASSESSMENT AND PLAN:





77 y/o man with h/o PAD, CAD, HTN, hypertrophic cardiomyopathy,  b/l SFA 

occlsion s/p thrombectomy, A fib, non compliance and other medical problems who 

presented with foot painand was found to have L LE arterial occlusion








1- acute Limb ischemia :  L SFA , popliteal, and posterior tibial arteries 

occlusion . 


unfortunately refused exam


-INR 1,.7 . start heparin gtt . 


- Cr NL. needs angiogram. vascular notified 











2- HAM: likely prerenal, might have some degree of CKD. 


- cr improved with IVF 


- monitor and cont IVF 





3- A fib : cont coreg and heparin gtt . 


f/u wit card as out pt 





4- CAD: last cath with non obstructive disease .


-cont lipitpr , asa and coreg 





5-Nicotine dependence : cont patch 





dispo : OC

## 2018-05-30 NOTE — PN
Progress Note (short form)





- Note


Progress Note: 


78M h/o occlusion of the left SFA, seen at bedside.  Pt complaining of Left leg 

pain.  





 CBC, BMP





 05/30/18 06:00 





 05/30/18 06:00 





 Last Vital Signs











Temp Pulse Resp BP Pulse Ox


 


 98.4 F   101 H  18   135/72   96 


 


 05/30/18 15:14  05/30/18 15:14  05/30/18 15:14  05/30/18 15:14  05/29/18 21:00











PE:


Pt refused physical exam.











Problem List





- Problems


(1) Demand ischemia


Assessment/Plan: 


Plan


   -INR down to 1.8 should place on heparin for anticoagulation. Creat 1.6


   -will attempt to schedule pt for angiogram tomorrow or friday. 


   -


Will discuss with Dr. Chan


Code(s): I24.8 - OTHER FORMS OF ACUTE ISCHEMIC HEART DISEASE

## 2018-05-30 NOTE — PN
Physical Exam: 


SUBJECTIVE: Patient seen and examined at bedside. No acute events overnight. 

This AM, pt lethargic and confused. However, without physical complaint. Denies 

HA, fever, chills, SOB, chest pain or pressure, or changes in urinary or bowel 

function. 





OBJECTIVE:





 Vital Signs











 Period  Temp  Pulse  Resp  BP Sys/Varghese  Pulse Ox


 


 Last 24 Hr  98.2 F-98.7 F    16-20  113-145/60-76  96











GENERAL: The patient is lethargic. AAOx2 (self and place), in no acute distress.


HEAD: Normal with no signs of trauma.


EYES: PERRL, extraocular movements intact, sclera anicteric, conjunctiva clear. 


ENT: Ears normal, nares patent, oropharynx clear without exudates, moist mucous 

membranes.


NECK: Trachea midline, supple. 


LUNGS: Breath sounds equal, clear to auscultation bilaterally, no wheezes, no 

crackles, no accessory muscle use. 


HEART: Regular rate and rhythm- sinus, S1, S2 without murmur, rub or gallop.


ABDOMEN: Soft, nontender, nondistended, normoactive bowel sounds, no guarding


EXTREMITIES: LLE - unable to appreciate dp or pt pulses. Mottled LLE, tender 

forefoot- unchanged.  1+ pulses dp, pt RLE, however faint.


NEUROLOGICAL: Cranial nerves II through XII grossly intact. 


PSYCH: Confused 


SKIN: Warm, dry, normal turgor








 Laboratory Results - last 24 hr











  05/30/18 05/30/18 05/30/18





  06:00 06:00 09:50


 


WBC  9.4  


 


RBC  3.34 L  


 


Hgb  10.5 L  


 


Hct  31.1 L  


 


MCV  93.1  


 


MCH  31.4  


 


MCHC  33.7  


 


RDW  15.4  


 


Plt Count  321  


 


MPV  8.4  


 


Neutrophils %  74.8  


 


Lymphocytes %  13.5  


 


Monocytes %  10.6 H  


 


Eosinophils %  0.7  


 


Basophils %  0.4  


 


Nucleated RBC %  0  


 


PT with INR    20.20 H


 


INR    1.79 H D


 


Sodium   141 


 


Potassium   5.0 


 


Chloride   110 H 


 


Carbon Dioxide   24 


 


Anion Gap   7 L 


 


BUN   16 


 


Creatinine   1.3 


 


Random Glucose   90 


 


Calcium   7.8 L 


 


Phosphorus   3.3  D 


 


Magnesium   2.2 








Active Medications











Generic Name Dose Route Start Last Admin





  Trade Name Freq  PRN Reason Stop Dose Admin


 


Acetaminophen  650 mg  05/26/18 20:32  05/28/18 02:32





  Tylenol -  PO   650 mg





  Q6H PRN   Administration





  FEVER   


 


Aspirin  81 mg  05/26/18 10:00  05/30/18 11:54





  Ecotrin -  PO   81 mg





  DAILY AVA   Administration





     


 


Atorvastatin Calcium  40 mg  05/26/18 10:00  05/30/18 11:54





  Lipitor -  PO   40 mg





  DAILY AVA   Administration





     


 


Carvedilol  6.25 mg  05/30/18 22:00  





  Coreg -  PO   





  BID AVA   





     





     


 


Heparin Sodium (Porcine)  1,000 unit  05/30/18 12:01  





  Heparin -  IVPUSH   





  PRN PRN   





  Heparin   


 


Heparin Sodium (Porcine)  5,000 unit  05/30/18 12:01  





  Heparin -  IVPUSH   





  PRN PRN   





  Heparin   


 


Sodium Chloride  1,000 mls @ 100 mls/hr  05/26/18 18:30  05/30/18 16:53





  Normal Saline -  IV   100 mls/hr





  ASDIR AVA   Administration





     





     


 


HEPARIN SOD,PORK IN 0.45% NACL  25,000 units in 500 mls @ 20 mls/hr  05/30/18 12

:23  05/30/18 16:51





  Heparin-1/2ns 25,000 Units/500  IVPB   1,000 unit/hr





  TITR AVA   20 mls/hr





     Administration





  Protocol   





  1,000 UNIT/HR   


 


Morphine Sulfate  2 mg  05/26/18 06:38  05/30/18 12:13





  Morphine Sulfate  IVPUSH   2 mg





  Q4H PRN   Administration





  PAIN LEVEL 4 - 6   


 


Nicotine  14 mg  05/26/18 10:00  05/30/18 11:54





  Nicoderm Patch -  TD   14 mg





  DAILY AVA   Administration





IMAGING





-5/26/18: Duplex, arterial duplex lower ext: no evidence of DVT in both lower 

extremities. compared to prior b/l lower extremity duplex arterial US from 8/26/ 17, in the RLE there is now normal triphasic waveform in the common femoral 

artery, superficial femoral, popliteal, posterior tibial a. atheromatous 

plaques are present throughout. in the LLE, there is normal triphasic waveform 

in L common femoral a. there is no definite flow within the L superficial 

femoral, popliteal, and posterior tibial a. consistent with occlusion. diffuse 

atheromatous plaques are present throughout.





-5/26/18: CXR: since prior study 5/26/18, new congestive changes with 

persistently elevated L hemidiaphragm and some bibasilar atelctatic/

infiltrative changes with the L more affected than the R. there may be some L 

fluid. correlation recommended.





-5/26/18: L foot XR: pes planus deformity, exostosis/degenerative change of 

navicular bone. bunion formation of 1st MTP with extenive arthritic changes. 

the toes are partially flexed with arthritic changes. no sign of calcaneal 

spurring. blastic changes are not seen. there appears to be a hypodene/cystic 

change at the base of the proximal phalynx of the 2nd metatarsal. correlation 

recommended. if symptoms persist, further imaging and ortho consultation may be 

needed.





Microbiology





05/27/18 10:30   Urine - Urine Clean Catch   Urine Culture - Final


                              NO GROWTH OBTAINED


05/26/18 21:40   Blood - Peripheral Venous   Blood Culture - Preliminary


                              NO GROWTH OBTAINED AFTER 48 HOURS, INCUBATION TO 

CONTINUE


                              FOR 3 DAYS.


05/26/18 21:19   Blood - Peripheral Venous   Blood Culture - Preliminary


                              NO GROWTH OBTAINED AFTER 48 HOURS, INCUBATION TO 

CONTINUE


                              FOR 3 DAYS.





ASSESSMENT/PLAN:





77 y/o M with PMH alcohol abuse, HTN, CAD, afib (on coumadin), PAD s/p 

thrombectomy 2017, who presented to the ED with acute L foot pain. Pt admitted 

for acute L foot pain, r/o ischemia.





#Acute L foot pain, r/o ischemia


-pt without palpable pt, dp pulses in lower extremities 


-duplex, arterial duplex lower extremities: no definite flow within the L 

superficial femoral, popliteal, posterior tibial a


-consistent with occlusion


-INR 1.79; started on hep gtt (5/30/18)


-Continue to follow INR, PTT 


-improving HAM, recent Cr 1.3


-as per cardio, before procedure hold diovan, coumadin, aspirin 81mg


-D/w Dr. Chan; will go for CTA, procedure - Friday afternoon





#HAM likely 2/2 prerenal 


-FEna 0.2


-Continue  cc/hr


-Continue to monitor; once normalizes can undergo CTA. 


-trending down; recent Cr 1.3 





#Afib - currently in sinus


-continue coreg 6.25mg PO BID


-F/u with cardiology as outpt





#HLD


-Continue lipitor 40mg PO qd


-F/u lipid panel





#Smoking cessation


-Continue nicotine patch 14mg TD daily





#Supratherapeutic INR-resolved





#Possible cellulitis-resolved


-received clinda -currently monitoring off abx


-ucx, blood cx - NGTD 





#F/E/N


Encourage PO intake


Continue to follow lytes


Low Na controlled diet 





#PPX


-DVT: on hep gtt 





#Dispo


-cont'd monitoring on med surg


-for vascular intervention - Friday 


-will need outpatient DC cardioversion 3-4 weeks after on a/c


-as well as cardio f/u 








Visit type





- Emergency Visit


Emergency Visit: No





- New Patient


This patient is new to me today: No





- Critical Care


Critical Care patient: No

## 2018-05-31 LAB
ANION GAP SERPL CALC-SCNC: 4 MMOL/L (ref 8–16)
ANION GAP SERPL CALC-SCNC: 6 MMOL/L (ref 8–16)
BUN SERPL-MCNC: 14 MG/DL (ref 7–18)
BUN SERPL-MCNC: 14 MG/DL (ref 7–18)
CALCIUM SERPL-MCNC: 7.7 MG/DL (ref 8.5–10.1)
CALCIUM SERPL-MCNC: 7.9 MG/DL (ref 8.5–10.1)
CHLORIDE SERPL-SCNC: 108 MMOL/L (ref 98–107)
CHLORIDE SERPL-SCNC: 110 MMOL/L (ref 98–107)
CHOLEST SERPL-MCNC: 76 MG/DL (ref 50–200)
CO2 SERPL-SCNC: 26 MMOL/L (ref 21–32)
CO2 SERPL-SCNC: 27 MMOL/L (ref 21–32)
CREAT SERPL-MCNC: 1.2 MG/DL (ref 0.7–1.3)
CREAT SERPL-MCNC: 1.3 MG/DL (ref 0.7–1.3)
GLUCOSE SERPL-MCNC: 94 MG/DL (ref 74–106)
GLUCOSE SERPL-MCNC: 95 MG/DL (ref 74–106)
HDLC SERPL-MCNC: 23 MG/DL (ref 40–60)
INR BLD: 1.46 (ref 0.82–1.09)
MAGNESIUM SERPL-MCNC: 2.1 MG/DL (ref 1.8–2.4)
PHOSPHATE SERPL-MCNC: 3.5 MG/DL (ref 2.5–4.9)
POTASSIUM SERPLBLD-SCNC: 5 MMOL/L (ref 3.5–5.1)
POTASSIUM SERPLBLD-SCNC: 5.4 MMOL/L (ref 3.5–5.1)
PT PNL PPP: 16.5 SEC (ref 9.7–13)
SODIUM SERPL-SCNC: 140 MMOL/L (ref 136–145)
SODIUM SERPL-SCNC: 141 MMOL/L (ref 136–145)
TRIGL SERPL-MCNC: 120 MG/DL (ref 35–160)

## 2018-05-31 RX ADMIN — SODIUM CHLORIDE SCH: 9 INJECTION, SOLUTION INTRAVENOUS at 21:02

## 2018-05-31 RX ADMIN — SODIUM CHLORIDE SCH MLS/HR: 9 INJECTION, SOLUTION INTRAVENOUS at 17:55

## 2018-05-31 RX ADMIN — HEPARIN SODIUM PRN UNIT: 5000 INJECTION, SOLUTION INTRAVENOUS; SUBCUTANEOUS at 15:05

## 2018-05-31 RX ADMIN — HEPARIN SODIUM PRN UNIT: 5000 INJECTION, SOLUTION INTRAVENOUS; SUBCUTANEOUS at 23:42

## 2018-05-31 RX ADMIN — NICOTINE SCH: 14 PATCH, EXTENDED RELEASE TRANSDERMAL at 11:10

## 2018-05-31 RX ADMIN — CARVEDILOL SCH MG: 6.25 TABLET, FILM COATED ORAL at 11:10

## 2018-05-31 RX ADMIN — ATORVASTATIN CALCIUM SCH MG: 40 TABLET, FILM COATED ORAL at 11:10

## 2018-05-31 RX ADMIN — ASPIRIN SCH MG: 81 TABLET, COATED ORAL at 11:10

## 2018-05-31 RX ADMIN — CARVEDILOL SCH MG: 6.25 TABLET, FILM COATED ORAL at 21:02

## 2018-05-31 NOTE — PN
Physical Exam: 


SUBJECTIVE: Patient seen and examined at bedside. Overnight, pt with mood 

swings. As per nurse, pt acting bizarre, ripping out IV and handing it to her. 

Today, pt continues to be confused. States that his LLE pain is unchanged. 

Denies HA, fever, chills, SOB, chest pain or pressure, or changes in urinary or 

bowel function. 





OBJECTIVE:





 Vital Signs











 Period  Temp  Pulse  Resp  BP Sys/Varghese  Pulse Ox


 


 Last 24 Hr  97.9 F-98.2 F  70-96  16-18  110-140/60-84  96











GENERAL: The patient is sitting comfortably. awake, alert, and fully oriented, 

in no acute distress.


HEAD: Normal with no signs of trauma.


EYES: PERRL, extraocular movements intact, sclera anicteric, conjunctiva clear.


ENT: Ears normal, nares patent, oropharynx clear without exudates, moist mucous 

membranes.


NECK: Trachea midline, supple. 


LUNGS: Breath sounds equal, clear to auscultation bilaterally, no wheezes, no 

crackles, no accessory muscle use. 


HEART: Regular rate and rhythm, S1, S2 without murmur, rub or gallop.


ABDOMEN: Soft, nontender, nondistended, normoactive bowel sounds, no guarding, 

no rebound


EXTREMITIES: without palpable LLE pulses, +LLE-mottled, painful forefoot. would 

not allow ROM 


NEUROLOGICAL: Cranial nerves II through XII grossly intact. Normal speech


PSYCH: confused 


SKIN: Warm, dry, normal turgor, no rashes or lesions noted








 Laboratory Results - last 24 hr











  05/31/18 05/31/18 05/31/18





  06:20 09:15 10:28


 


PT with INR   16.50 H 


 


INR   1.46 H 


 


PTT (Actin FS)    41.7 H


 


Sodium  141  


 


Potassium  5.4 H  


 


Chloride  110 H  


 


Carbon Dioxide  27  


 


Anion Gap  4 L  


 


BUN  14  


 


Creatinine  1.3  


 


Random Glucose  94  


 


Calcium  7.7 L  


 


Phosphorus  3.5  


 


Magnesium  2.1  


 


Triglycerides  120  D  


 


Cholesterol  76  D  


 


Total LDL Cholesterol  51  D  


 


HDL Cholesterol  23 L D  














  05/31/18





  12:20


 


PT with INR 


 


Sodium  140


 


Potassium  5.0


 


Chloride  108 H


 


Carbon Dioxide  26


 


Anion Gap  6 L


 


BUN  14


 


Creatinine  1.2


 


Random Glucose  95


 


Calcium  7.9 L


 


Phosphorus 


 


HDL Cholesterol 








Active Medications











Generic Name Dose Route Start Last Admin





  Trade Name Freq  PRN Reason Stop Dose Admin


 


Acetaminophen  650 mg  05/26/18 20:32  05/28/18 02:32





  Tylenol -  PO   650 mg





  Q6H PRN   Administration





  FEVER   


 


Aspirin  81 mg  05/26/18 10:00  05/31/18 11:10





  Ecotrin -  PO   81 mg





  DAILY [facility]   Administration





     


 


Atorvastatin Calcium  40 mg  05/26/18 10:00  05/31/18 11:10





  Lipitor -  PO   40 mg





  DAILY [facility]   Administration


 


Carvedilol  6.25 mg  05/30/18 22:00  05/31/18 11:10





  Coreg -  PO   6.25 mg





  BID [facility]   Administration





     


 


Heparin Sodium (Porcine)  1,000 unit  05/30/18 12:01  05/31/18 15:05





  Heparin -  IVPUSH   1,000 unit





  PRN PRN   Administration





  Heparin   


 


Heparin Sodium (Porcine)  5,000 unit  05/30/18 12:01  





  Heparin -  IVPUSH   





  PRN PRN   





  Heparin   


 


Sodium Chloride  1,000 mls @ 100 mls/hr  05/26/18 18:30  05/30/18 22:00





  Normal Saline -  IV   Not Given





  ASDIR [facility]   





     





     


 


HEPARIN SOD,PORK IN 0.45% NACL  25,000 units in 500 mls @ 20 mls/hr  05/30/18 12

:23  05/30/18 16:51





  Heparin-1/2ns 25,000 Units/500  IVPB   1,000 unit/hr





  TITR AVA   20 mls/hr





     Administration





  Protocol   





  1,000 UNIT/HR   


 


Morphine Sulfate  2 mg  05/26/18 06:38  05/31/18 03:12





  Morphine Sulfate  IVPUSH   2 mg





  Q4H PRN   Administration





  PAIN LEVEL 4 - 6   





     


 


Nicotine  14 mg  05/26/18 10:00  05/31/18 11:10





  Nicoderm Patch -  TD   Not Given





  DAILY [facility]   





IMAGING





-5/26/18: Duplex, arterial duplex lower ext: no evidence of DVT in both lower 

extremities. compared to prior b/l lower extremity duplex arterial US from 8/26/ 17, in the RLE there is now normal triphasic waveform in the common femoral 

artery, superficial femoral, popliteal, posterior tibial a. atheromatous 

plaques are present throughout. in the LLE, there is normal triphasic waveform 

in L common femoral a. there is no definite flow within the L superficial 

femoral, popliteal, and posterior tibial a. consistent with occlusion. diffuse 

atheromatous plaques are present throughout.





-5/26/18: CXR: since prior study 5/26/18, new congestive changes with 

persistently elevated L hemidiaphragm and some bibasilar atelctatic/

infiltrative changes with the L more affected than the R. there may be some L 

fluid. correlation recommended.





-5/26/18: L foot XR: pes planus deformity, exostosis/degenerative change of 

navicular bone. bunion formation of 1st MTP with extenive arthritic changes. 

the toes are partially flexed with arthritic changes. no sign of calcaneal 

spurring. blastic changes are not seen. there appears to be a hypodene/cystic 

change at the base of the proximal phalynx of the 2nd metatarsal. correlation 

recommended. if symptoms persist, further imaging and ortho consultation may be 

needed.





Microbiology





05/27/18 10:30   Urine - Urine Clean Catch   Urine Culture - Final


                              NO GROWTH OBTAINED


05/26/18 21:40   Blood - Peripheral Venous   Blood Culture - Preliminary


                              NO GROWTH OBTAINED AFTER 96 HOURS, INCUBATION TO 

CONTINUE


                              FOR 1 DAYS.


05/26/18 21:19   Blood - Peripheral Venous   Blood Culture - Preliminary


                              NO GROWTH OBTAINED AFTER 96 HOURS, INCUBATION TO 

CONTINUE


                              FOR 1 DAYS.





ASSESSMENT/PLAN:





79 y/o M with PMH alcohol abuse, HTN, CAD, afib (on coumadin), PAD s/p 

thrombectomy 2017, who presented to the ED with acute L foot pain. Pt admitted 

for acute L foot pain, r/o ischemia.





#Acute L foot pain, r/o ischemia


-pt without palpable pt, dp pulses in lower extremities 


-duplex, arterial duplex lower extremities: no definite flow within the L 

superficial femoral, popliteal, posterior tibial a


-consistent with occlusion


-current INR 1.46; on hep gtt (started 5/30/18)


-Continue to follow INR, PTT 


-improving HAM, recent Cr 1.3


-as per cardio, before procedure hold diovan, coumadin, aspirin 81mg


-D/w Dr. Chan; will go for CTA, procedure - Friday afternoon





#HAM likely 2/2 prerenal 


-FEna 0.2


-Continue  cc/hr


-Continue to monitor; once normalizes can undergo CTA. 


-trending down; recent Cr 1.2 





#Hyperkalemia- resolving


-Initial K 5.4, has improved to 5


-will continue to monitor level 





#Afib - currently in sinus


-continue coreg 6.25mg PO BID


-F/u with cardiology as outpt





#HLD


-Continue lipitor 40mg PO qd





#Smoking cessation


-Continue nicotine patch 14mg TD daily





#Supratherapeutic INR-resolved





#Possible cellulitis-resolved


-received clinda -currently monitoring off abx


-ucx, blood cx - NGTD 





#F/E/N


Encourage PO intake


Continue to follow lytes


Low Na controlled diet 





#PPX


-DVT: on hep gtt 





#Dispo


-cont'd monitoring on med surg


-for vascular intervention - Friday afternoon


-will need outpatient DC cardioversion 3-4 weeks after on a/c


-as well as cardio f/u 





Visit type





- Emergency Visit


Emergency Visit: No





- New Patient


This patient is new to me today: No





- Critical Care


Critical Care patient: No

## 2018-05-31 NOTE — PN
Teaching Attending Note


Name of Resident: Ruma Guevara





ATTENDING PHYSICIAN STATEMENT





I saw and evaluated the patient.


I reviewed the resident's note and discussed the case with the resident.


I agree with the resident's findings and plan as documented.








patient Refused interview and exam again today 





ASSESSMENT AND PLAN:








77 y/o man with h/o PAD, CAD, HTN, hypertrophic cardiomyopathy,  b/l SFA 

occlsion s/p thrombectomy, A fib, non compliance and other medical problems who 

presented with foot pain and was found to have L LE arterial occlusion








1- Acute Limb ischemia :  L SFA , popliteal, and posterior tibial arteries 

occlusion . 


Continue to refuse exam 


- INR 1.4. cont heparin gtt 


- Cr NL.


- angio to left LE tomorrow


- LDl 51. connt lipitor  





2- HAM: likely prerenal, might have some degree of CKD. 


- cr improved with IVF. cont  








3- A fib : cont coreg and heparin gtt . 


f/u wit card as out pt 





4- CAD: last cath with non obstructive disease .


-cont lipitpr , asa and coreg 





5-Nicotine dependence : cont patch 





dispo : HLOC

## 2018-05-31 NOTE — PN
Progress Note, Physician


History of Present Illness: 


Awaiting LLE angio for left CLI now that INR and Cr acceptable.











- Current Medication List


Current Medications: 


Active Medications





Acetaminophen (Tylenol -)  650 mg PO Q6H PRN


   PRN Reason: FEVER


   Last Admin: 05/28/18 02:32 Dose:  650 mg


Aspirin (Ecotrin -)  81 mg PO DAILY [facility]


   Last Admin: 05/30/18 11:54 Dose:  81 mg


Atorvastatin Calcium (Lipitor -)  40 mg PO DAILY [facility]


   Last Admin: 05/30/18 11:54 Dose:  40 mg


Carvedilol (Coreg -)  6.25 mg PO BID [facility]


   Last Admin: 05/30/18 22:04 Dose:  6.25 mg


Heparin Sodium (Porcine) (Heparin -)  1,000 unit IVPUSH PRN PRN


   PRN Reason: Heparin


Heparin Sodium (Porcine) (Heparin -)  5,000 unit IVPUSH PRN PRN


   PRN Reason: Heparin


Sodium Chloride (Normal Saline -)  1,000 mls @ 100 mls/hr IV ASDIR [facility]


   Last Admin: 05/30/18 22:00 Dose:  Not Given


HEPARIN SOD,PORK IN 0.45% NACL (Heparin-1/2ns 25,000 Units/500)  25,000 units 

in 500 mls @ 20 mls/hr IVPB TITR [facility]; Protocol


   Last Admin: 05/30/18 16:51 Dose:  1,000 unit/hr, 20 mls/hr


Morphine Sulfate (Morphine Sulfate)  2 mg IVPUSH Q4H PRN


   PRN Reason: PAIN LEVEL 4 - 6


   Last Admin: 05/31/18 03:12 Dose:  2 mg


Nicotine (Nicoderm Patch -)  14 mg TD DAILY [facility]


   Last Admin: 05/30/18 11:54 Dose:  14 mg











- Objective


Vital Signs: 


 Vital Signs











Temperature  97.9 F   05/31/18 06:00


 


Pulse Rate  90   05/31/18 06:00


 


Respiratory Rate  18   05/31/18 06:00


 


Blood Pressure  110/60   05/31/18 06:00


 


O2 Sat by Pulse Oximetry (%)  96   05/30/18 21:00











Constitutional: Yes: No Distress, Calm


Neck: Yes: Supple


Cardiovascular: Yes: Pulse Irregular


Respiratory: Yes: Regular, CTA Bilaterally


Gastrointestinal: Yes: Normal Bowel Sounds, Soft


Edema: No


Labs: 


 CBC, BMP





 05/30/18 06:00 





 05/31/18 06:20 





 INR, PTT











INR  1.79  (0.82-1.09)  H D 05/30/18  09:50    














Problem List





- Problems


(1) Pre-procedural cardiovascular examination


Code(s): Z01.810 - ENCOUNTER FOR PREPROCEDURAL CARDIOVASCULAR EXAMINATION   





(2) Acute-on-chronic kidney injury


Code(s): N17.9 - ACUTE KIDNEY FAILURE, UNSPECIFIED; N18.9 - CHRONIC KIDNEY 

DISEASE, UNSPECIFIED   


Qualifiers: 


   Chronic kidney disease stage: stage 2 (mild) 





(3) Demand ischemia


Code(s): I24.8 - OTHER FORMS OF ACUTE ISCHEMIC HEART DISEASE   





(4) Anticoagulant long-term use


Code(s): Z79.01 - LONG TERM (CURRENT) USE OF ANTICOAGULANTS   





(5) Coronary artery disease


Code(s): I25.10 - ATHSCL HEART DISEASE OF NATIVE CORONARY ARTERY W/O ANG PCTRS 

  


Qualifiers: 


   Coronary Disease-Associated Artery/Lesion type: native artery   Native vs. 

transplanted heart: native heart   Associated angina: without angina   

Qualified Code(s): I25.10 - Atherosclerotic heart disease of native coronary 

artery without angina pectoris   





(6) Diastolic dysfunction without heart failure


Code(s): I51.9 - HEART DISEASE, UNSPECIFIED   





(7) HTN (hypertension)


Code(s): I10 - ESSENTIAL (PRIMARY) HYPERTENSION   


Qualifiers: 


   Hypertension type: essential hypertension   Qualified Code(s): I10 - 

Essential (primary) hypertension   





(8) Hypertrophic cardiomyopathy


Code(s): I42.2 - OTHER HYPERTROPHIC CARDIOMYOPATHY   





(9) Peripheral arterial disease


Code(s): I73.9 - PERIPHERAL VASCULAR DISEASE, UNSPECIFIED   





(10) Atrial flutter


Code(s): I48.92 - UNSPECIFIED ATRIAL FLUTTER   


Qualifiers: 


   Atrial flutter type: typical   Qualified Code(s): I48.3 - Typical atrial 

flutter   





(11) Vascular occlusion


Code(s): I99.8 - OTHER DISORDER OF CIRCULATORY SYSTEM   





Assessment/Plan





R&LHc at [facility] 1/11/2017 showing nonobstructive CAD, severe LV apical 

hypertrophic cardiomyopathy, mildly elevated right sided pressures, Mynx 

deployed right CFA access site. Study is consistent with apical hypertrophy (

spade-like) variant of hypertrophic cardiomyopathy, planned for optimal medical 

therapy. 





1. PAD with left limb ischemia referable to SFA occlusion


2. 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 

disease related to persistent atrial fibrillation with [id number] score of 6, 

history of poor compliance with therapy administration and medical F/U


3. CAD with demand ischemic injury, non obstructive CAD on [facility] coronary 

angiogrpahy angina pectoris


4. 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 

chronic class I-II NYHA classification, compensated/euvolemic


5. Persistent atrial flutter [id number] score of 6 on Coumadin therapy, with 

subtherapeutic INR


6. HTN


7. Acute on CKD improving


8. Poor compliance with medical therapy administration and medical F/U


9. Tobacco abuse


10. H/o food anaphylaxis 





PLAN:





1. Given cardiac cath 1/11/2017 showing nonobstructive CAD without new sxs 

suggestive of acute coronary syndrome, decompensated CHF or malignant arrhythmia

, may proceed with endovascular and open peripheral intervention as clinically 

warranted without further testing now that INR and Cr acceptable.


2. Hold coumadin pre-procedure, but resume once post-op hemostasis assured 

given [id number] score 6, placed on heparin gtt with INR<2.0


3. Continue Carvedilol 6.25 bid


4. Judicious hydration and hold Diovan 80 qd pending renal fxn stabilization


5. Continue ASA 81 qd 


6. Continue Lipitor 40 qhs


7. Counselled smoking cessation and abstinence


8. Will consider outpatient DCCV for the above noted atrial arrhythmia after 3-

4 weeks of adequate A/C

## 2018-06-01 LAB
ANION GAP SERPL CALC-SCNC: 6 MMOL/L (ref 8–16)
BUN SERPL-MCNC: 15 MG/DL (ref 7–18)
CALCIUM SERPL-MCNC: 7.9 MG/DL (ref 8.5–10.1)
CHLORIDE SERPL-SCNC: 110 MMOL/L (ref 98–107)
CO2 SERPL-SCNC: 24 MMOL/L (ref 21–32)
CREAT SERPL-MCNC: 1.2 MG/DL (ref 0.7–1.3)
DEPRECATED RDW RBC AUTO: 15.6 % (ref 11.9–15.9)
DEPRECATED RDW RBC AUTO: 15.7 % (ref 11.9–15.9)
GLUCOSE SERPL-MCNC: 83 MG/DL (ref 74–106)
HCT VFR BLD CALC: 30.5 % (ref 35.4–49)
HCT VFR BLD CALC: 31.9 % (ref 35.4–49)
HGB BLD-MCNC: 10.1 GM/DL (ref 11.7–16.9)
HGB BLD-MCNC: 10.6 GM/DL (ref 11.7–16.9)
INR BLD: 1.4 (ref 0.82–1.09)
MCH RBC QN AUTO: 30.5 PG (ref 25.7–33.7)
MCH RBC QN AUTO: 31.2 PG (ref 25.7–33.7)
MCHC RBC AUTO-ENTMCNC: 33.1 G/DL (ref 32–35.9)
MCHC RBC AUTO-ENTMCNC: 33.1 G/DL (ref 32–35.9)
MCV RBC: 92.3 FL (ref 80–96)
MCV RBC: 94.1 FL (ref 80–96)
PLATELET # BLD AUTO: 306 K/MM3 (ref 134–434)
PLATELET # BLD AUTO: 309 K/MM3 (ref 134–434)
PMV BLD: 8.5 FL (ref 7.5–11.1)
PMV BLD: 8.5 FL (ref 7.5–11.1)
POTASSIUM SERPLBLD-SCNC: 5 MMOL/L (ref 3.5–5.1)
PT PNL PPP: 15.8 SEC (ref 9.7–13)
RBC # BLD AUTO: 3.31 M/MM3 (ref 4–5.6)
RBC # BLD AUTO: 3.39 M/MM3 (ref 4–5.6)
SODIUM SERPL-SCNC: 140 MMOL/L (ref 136–145)
WBC # BLD AUTO: 11.4 K/MM3 (ref 4–10)
WBC # BLD AUTO: 9 K/MM3 (ref 4–10)

## 2018-06-01 RX ADMIN — ASPIRIN SCH MG: 81 TABLET, COATED ORAL at 10:07

## 2018-06-01 RX ADMIN — CARVEDILOL SCH MG: 6.25 TABLET, FILM COATED ORAL at 10:07

## 2018-06-01 RX ADMIN — MUPIROCIN SCH APPLIC: 20 OINTMENT TOPICAL at 21:20

## 2018-06-01 RX ADMIN — NICOTINE SCH MG: 14 PATCH, EXTENDED RELEASE TRANSDERMAL at 10:08

## 2018-06-01 RX ADMIN — SODIUM CHLORIDE SCH MLS: 9 INJECTION, SOLUTION INTRAVENOUS at 16:35

## 2018-06-01 RX ADMIN — NICOTINE SCH: 14 PATCH, EXTENDED RELEASE TRANSDERMAL at 10:08

## 2018-06-01 RX ADMIN — SODIUM CHLORIDE SCH MLS/HR: 9 INJECTION, SOLUTION INTRAVENOUS at 13:57

## 2018-06-01 RX ADMIN — HEPARIN SODIUM SCH: 5000 INJECTION, SOLUTION INTRAVENOUS at 10:05

## 2018-06-01 RX ADMIN — CARVEDILOL SCH MG: 6.25 TABLET, FILM COATED ORAL at 21:20

## 2018-06-01 RX ADMIN — SODIUM CHLORIDE SCH MLS/HR: 9 INJECTION, SOLUTION INTRAVENOUS at 04:20

## 2018-06-01 RX ADMIN — ATORVASTATIN CALCIUM SCH MG: 40 TABLET, FILM COATED ORAL at 10:08

## 2018-06-01 NOTE — PN
Progress Note, Physician


History of Present Illness: 


Awaiting LLE angio for left CLI now that INR and Cr acceptable.











- Current Medication List


Current Medications: 


Active Medications





Acetaminophen (Tylenol -)  650 mg PO Q6H PRN


   PRN Reason: FEVER


   Last Admin: 05/28/18 02:32 Dose:  650 mg


Aspirin (Ecotrin -)  81 mg PO DAILY [facility]


   Last Admin: 06/01/18 10:07 Dose:  81 mg


Atorvastatin Calcium (Lipitor -)  40 mg PO DAILY [facility]


   Last Admin: 06/01/18 10:08 Dose:  40 mg


Carvedilol (Coreg -)  6.25 mg PO BID [facility]


   Last Admin: 06/01/18 10:07 Dose:  6.25 mg


Heparin Sodium (Porcine) (Heparin -)  1,000 unit IVPUSH PRN PRN


   PRN Reason: Heparin


   Last Admin: 05/31/18 23:42 Dose:  1,000 unit


Heparin Sodium (Porcine) (Heparin -)  5,000 unit IVPUSH PRN PRN


   PRN Reason: Heparin


Sodium Chloride (Normal Saline -)  1,000 mls @ 100 mls/hr IV ASDIR [facility]


   Last Admin: 06/01/18 04:20 Dose:  100 mls/hr


Heparin Sodium (Porcine) 25, (000 unit/ Sodium Chloride)  500 mls @ 20 mls/hr 

IV TITR [facility]; Protocol


   Last Titration: 06/01/18 07:39 Dose:  1,300 unit/hr, 26 mls/hr


Morphine Sulfate (Morphine Sulfate)  2 mg IVPUSH Q4H PRN


   PRN Reason: PAIN LEVEL 4 - 6


   Last Admin: 06/01/18 03:33 Dose:  2 mg


Nicotine (Nicoderm Patch -)  14 mg TD DAILY [facility]


   Last Admin: 06/01/18 10:08 Dose:  Not Given











- Objective


Vital Signs: 


 Vital Signs











Temperature  98.8 F   06/01/18 10:00


 


Pulse Rate  85   06/01/18 10:00


 


Respiratory Rate  20   06/01/18 10:00


 


Blood Pressure  138/77   06/01/18 10:00


 


O2 Sat by Pulse Oximetry (%)  96   05/30/18 21:00











Constitutional: Yes: No Distress, Calm, Thin


Neck: Yes: Supple


Cardiovascular: Yes: Regular Rate and Rhythm


Respiratory: Yes: Regular, CTA Bilaterally


Gastrointestinal: Yes: Normal Bowel Sounds, Soft


Edema: No


Labs: 


 CBC, BMP





 06/01/18 06:10 





 06/01/18 06:10 





 INR, PTT











INR  1.40  (0.82-1.09)  H  06/01/18  09:30    














Problem List





- Problems


(1) Pre-procedural cardiovascular examination


Code(s): Z01.810 - ENCOUNTER FOR PREPROCEDURAL CARDIOVASCULAR EXAMINATION   





(2) Acute-on-chronic kidney injury


Code(s): N17.9 - ACUTE KIDNEY FAILURE, UNSPECIFIED; N18.9 - CHRONIC KIDNEY 

DISEASE, UNSPECIFIED   


Qualifiers: 


   Chronic kidney disease stage: stage 2 (mild) 





(3) Demand ischemia


Code(s): I24.8 - OTHER FORMS OF ACUTE ISCHEMIC HEART DISEASE   





(4) Anticoagulant long-term use


Code(s): Z79.01 - LONG TERM (CURRENT) USE OF ANTICOAGULANTS   





(5) Coronary artery disease


Code(s): I25.10 - ATHSCL HEART DISEASE OF NATIVE CORONARY ARTERY W/O ANG PCTRS 

  


Qualifiers: 


   Coronary Disease-Associated Artery/Lesion type: native artery   Native vs. 

transplanted heart: native heart   Associated angina: without angina   

Qualified Code(s): I25.10 - Atherosclerotic heart disease of native coronary 

artery without angina pectoris   





(6) Diastolic dysfunction without heart failure


Code(s): I51.9 - HEART DISEASE, UNSPECIFIED   





(7) HTN (hypertension)


Code(s): I10 - ESSENTIAL (PRIMARY) HYPERTENSION   


Qualifiers: 


   Hypertension type: essential hypertension   Qualified Code(s): I10 - 

Essential (primary) hypertension   





(8) Hypertrophic cardiomyopathy


Code(s): I42.2 - OTHER HYPERTROPHIC CARDIOMYOPATHY   





(9) Peripheral arterial disease


Code(s): I73.9 - PERIPHERAL VASCULAR DISEASE, UNSPECIFIED   





(10) Atrial flutter


Code(s): I48.92 - UNSPECIFIED ATRIAL FLUTTER   


Qualifiers: 


   Atrial flutter type: typical   Qualified Code(s): I48.3 - Typical atrial 

flutter   





(11) Vascular occlusion


Code(s): I99.8 - OTHER DISORDER OF CIRCULATORY SYSTEM   





Assessment/Plan





R&LHc at [facility] 1/11/2017 showing nonobstructive CAD, severe LV apical 

hypertrophic cardiomyopathy, mildly elevated right sided pressures, Mynx 

deployed right CFA access site. Study is consistent with apical hypertrophy (

spade-like) variant of hypertrophic cardiomyopathy, planned for optimal medical 

therapy. 





1. PAD with left limb ischemia referable to SFA occlusion


2. 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 

disease related to persistent atrial fibrillation with [id number] score of 6, 

history of poor compliance with therapy administration and medical F/U


3. CAD with demand ischemic injury, non obstructive CAD on R&c coronary 

angiogrpahy angina pectoris


4. 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 

chronic class I-II NYHA classification, compensated/euvolemic


5. Persistent atrial flutter [id number] score of 6 on Coumadin therapy, with 

subtherapeutic INR


6. HTN


7. Acute on CKD improving


8. Poor compliance with medical therapy administration and medical F/U


9. Tobacco abuse


10. H/o food anaphylaxis 





PLAN:





1. Given cardiac cath 1/11/2017 showing nonobstructive CAD without new sxs 

suggestive of acute coronary syndrome, decompensated CHF or malignant arrhythmia

, may proceed with endovascular and open peripheral intervention as clinically 

warranted without further testing now that INR and Cr acceptable.


2. Hold coumadin pre-procedure, but resume once post-op hemostasis assured 

given [id number] score 6, placed on heparin gtt with INR<2.0


3. Continue Carvedilol 6.25 bid


4. Judicious hydration and hold Diovan 80 qd pending renal fxn stabilization


5. Continue ASA 81 qd 


6. Continue Lipitor 40 qhs


7. Counselled smoking cessation and abstinence


8. Will consider outpatient DCCV for the above noted atrial arrhythmia after 3-

4 weeks of adequate A/C

## 2018-06-01 NOTE — PN
Physical Exam: 


SUBJECTIVE: Patient seen and examined at bedside. Overnight, pt requesting 

morphine for pain. This AM, pt with continued LLE pain that is unchanged. Went 

to OR today with Dr. Chan; pt with extensive clot, received tPA and hep gtt 

simultaneously while in PACU. Will return to OR tomorrow for continued 

procedure.





OBJECTIVE:





 Vital Signs











 Period  Temp  Pulse  Resp  BP Sys/Varghese  Pulse Ox


 


 Last 24 Hr  96.0 F-98.8 F  78-98  12-20  131-160/74-93  








Examination from AM:


GENERAL: The patient is awake,confused, in no acute distress.


HEAD: Normal with no signs of trauma.


EYES: PERRL, extraocular movements intact, sclera anicteric, conjunctiva clear. 


ENT: Ears normal, nares patent, oropharynx clear without exudates


NECK: Trachea midline, supple. 


LUNGS: Breath sounds equal, clear to auscultation bilaterally


HEART: Regular rate and rhythm- in sinus, S1, S2 without murmur, rub or gallop.


ABDOMEN: Soft, nontender, nondistended, normoactive bowel sounds


EXTREMITIES: non palpable LLE dp pulse, +LLE mottled- increased pain in forefoot


NEUROLOGICAL: would not permit assessment 


PSYCH: Normal mood, normal affect.


SKIN: Warm, dry, normal turgor, no rashes or lesions








 Laboratory Results - last 24 hr











  05/31/18 06/01/18 06/01/18





  22:30 06:10 06:10


 


WBC   9.0 


 


RBC   3.39 L 


 


Hgb   10.6 L 


 


Hct   31.9 L 


 


MCV   94.1 


 


MCH   31.2 


 


MCHC   33.1 


 


RDW   15.7 


 


Plt Count   309 


 


MPV   8.5 


 


PT with INR   


 


INR   


 


PTT (Actin FS)  44.6 H   46.3 H


 


Sodium   














  06/01/18 06/01/18 06/01/18





  06:10 09:30 12:50


 


RDW   


 


PT with INR   15.80 H 


 


INR   1.40 H 


 


PTT (Actin FS)    55.5 H


 


Sodium  140  


 


Potassium  5.0  


 


Chloride  110 H  


 


Carbon Dioxide  24  


 


Anion Gap  6 L  


 


BUN  15  


 


Creatinine  1.2  


 


Random Glucose  83  


 


Calcium  7.9 L  








Active Medications











Generic Name Dose Route Start Last Admin





  Trade Name Freq  PRN Reason Stop Dose Admin


 


Acetaminophen  650 mg  06/01/18 17:44  





  Tylenol -  PO   





  Q6H PRN   





  FEVER   





     


 


Aspirin  81 mg  06/02/18 10:00  





  Ecotrin -  PO   





  DAILY [facility]   





     





     


 


Atorvastatin Calcium  40 mg  06/02/18 22:00  





  Lipitor -  PO   





  HS [facility]   





     


 


Carvedilol  6.25 mg  06/01/18 22:00  





  Coreg -  PO   





  BID [facility]   





     


 


Chlorhexidine Gluconate  1 applic  06/01/18 22:00  





  Hibiclens For Decolonization -  TP   





  HS [facility]   





     


 


Alteplase, Recombinant 25 mg/  250 mls @ 250 mls/hr  06/01/18 17:44  





  Sodium Chloride  IVPB  06/01/18 18:43  





  ONCE ONE   





     


 


Lactated Ringer's  1,000 mls @ 75 mls/hr  06/01/18 17:44  





  Lactated Ringers Solution  IV   





  ASDIR [facility]   





     


 


Morphine Sulfate  2 mg  06/01/18 17:44  





  Morphine Sulfate  IVPUSH   





  Q4H PRN   





  PAIN LEVEL 6-10   





     


 


Mupirocin  1 applic  06/01/18 22:00  





  Bactroban Ointment (For Decolonization) -  NS  06/06/18 21:59  





  BID [facility]   





     


 


Nicotine  14 mg  06/02/18 10:00  





  Nicoderm Patch -  TD   





  DAILY [facility]   





     


 


Ondansetron HCl  4 mg  06/01/18 17:44  





  Zofran Injection  IVPUSH  06/02/18 03:00  





  Q6H PRN   





  NAUSEA AND/OR VOMITING   





     


 


Oxycodone HCl  5 mg  06/01/18 17:44  





  Roxicodone -  PO  06/02/18 16:50  





  Q4H PRN   





  PAIN LEVEL 1-5   





     


 


Promethazine HCl  12.5 mg  06/01/18 17:44  





  Phenergan Injection -  IVPB  06/02/18 03:00  





  Q6H PRN   





  NAUSEA-FOR RESCUE AFTER 15 MIN   








IMAGING





-5/26/18: Duplex, arterial duplex lower ext: no evidence of DVT in both lower 

extremities. compared to prior b/l lower extremity duplex arterial US from 8/26/ 17, in the RLE there is now normal triphasic waveform in the common femoral 

artery, superficial femoral, popliteal, posterior tibial a. atheromatous 

plaques are present throughout. in the LLE, there is normal triphasic waveform 

in L common femoral a. there is no definite flow within the L superficial 

femoral, popliteal, and posterior tibial a. consistent with occlusion. diffuse 

atheromatous plaques are present throughout.





-5/26/18: CXR: since prior study 5/26/18, new congestive changes with 

persistently elevated L hemidiaphragm and some bibasilar atelctatic/

infiltrative changes with the L more affected than the R. there may be some L 

fluid. correlation recommended.





-5/26/18: L foot XR: pes planus deformity, exostosis/degenerative change of 

navicular bone. bunion formation of 1st MTP with extenive arthritic changes. 

the toes are partially flexed with arthritic changes. no sign of calcaneal 

spurring. blastic changes are not seen. there appears to be a hypodene/cystic 

change at the base of the proximal phalynx of the 2nd metatarsal. correlation 

recommended. if symptoms persist, further imaging and ortho consultation may be 

needed.





Microbiology





05/27/18 10:30   Urine - Urine Clean Catch   Urine Culture - Final


                              NO GROWTH OBTAINED


05/26/18 21:40   Blood - Peripheral Venous   Blood Culture - Preliminary


                              NO GROWTH OBTAINED AFTER 96 HOURS, INCUBATION TO 

CONTINUE


                              FOR 1 DAYS.


05/26/18 21:19   Blood - Peripheral Venous   Blood Culture - Preliminary


                              NO GROWTH OBTAINED AFTER 96 HOURS, INCUBATION TO 

CONTINUE


                              FOR 1 DAYS.


ASSESSMENT/PLAN:





77 y/o M with PMH alcohol abuse, HTN, CAD, afib (on coumadin), PAD s/p 

thrombectomy 2017, who presented to the ED with acute L foot pain. Pt admitted 

for acute L foot pain, r/o ischemia.





#Acute L foot pain, r/o ischemia


-PO Day 0 s/p aortogram, LLE angiogram, thrombolysis


-extensive clot needed continuation of hep gtt (started 5/30/18) and tPA


-to return to OR tomorrow for procedure


-when stable and approved by vasc, will bridge with coumadin


-Continue to follow INR, PTT 


-d/w Dr. Chan





#HAM likely 2/2 prerenal 


-FEna 0.2


-Continue IV LR 75cc/hr


-received contrast for procedure





#Hyperkalemia- resolved





#Afib - currently in sinus


-continue coreg 6.25mg PO BID


-F/u with cardiology as outpt





#HLD


-Continue lipitor 40mg PO qd





#Smoking cessation


-Continue nicotine patch 14mg TD daily





#Supratherapeutic INR-resolved





#Possible cellulitis-resolved


-received clinda -currently monitoring off abx


-ucx, blood cx - NGTD 





#F/E/N


LR 75 cc/hr


Continue to follow sushma


NPO for procedure tomorrow





#PPX


hep gtt/tPA





#Dispo


-continued monitoring post-op ICU


-will need outpatient DC cardioversion 3-4 weeks after on a/c


-as well as cardio f/u 





Visit type





- Emergency Visit


Emergency Visit: No





- New Patient


This patient is new to me today: No





- Critical Care


Critical Care patient: Yes


Total Critical Care Time (in minutes): 45


Critical Care Statement: The care of this patient involved high complexity 

decision making to prevent further life threatening deterioration of the patient

's condition and/or to evaluate & treat vital organ system(s) failure or risk 

of failure.

## 2018-06-01 NOTE — PN
Teaching Attending Note


Name of Resident: Ruma Guevara





ATTENDING PHYSICIAN STATEMENT





I saw and evaluated the patient.


I reviewed the resident's note and discussed the case with the resident.


I agree with the resident's findings and plan as documented.








Refused my interview and exam today again





OBJECTIVE:





77 y/o man with h/o PAD, CAD, HTN, hypertrophic cardiomyopathy,  b/l SFA 

occlsion s/p thrombectomy, A fib, non compliance and other medical problems who 

presented with foot pain and was found to have L LE arterial occlusion








1- Acute Limb ischemia :  L SFA , popliteal, and posterior tibial arteries 

occlusion . 


Continue to refuse exam 


- for Angiogram today 


- cont lipitor. 


- when OK with surgery, will resume heparin and bridge to coumadin 





2- HAM: likely prerenal, might have some degree of CKD. 


- cr improved with IVF. cont  for now especially with contrast administration 

today 








3- A fib : cont coreg and heparin gtt . 


f/u wit card as out pt 





4- CAD: last cath with non obstructive disease .


-cont lipitpr , asa and coreg 





5-Nicotine dependence : cont patch 





dispo : HLOC

## 2018-06-01 NOTE — OP
Operative Note





- Note:


Operative Date: 06/01/18


Pre-Operative Diagnosis: LLE ischemia


Operation: Aortogram, LLE angiogram, Thrombolysis


Findings: 





thrombosis LLe 


Post-Operative Diagnosis: Same as Pre-op


Surgeon: Perry Chan


Anesthesia: Fractional


Estimated Blood Loss (mls): 150


Operative Report Dictated: Yes

## 2018-06-02 LAB
ALBUMIN SERPL-MCNC: 2.1 G/DL (ref 3.4–5)
ALBUMIN SERPL-MCNC: 2.1 G/DL (ref 3.4–5)
ALP SERPL-CCNC: 109 U/L (ref 45–117)
ALP SERPL-CCNC: 113 U/L (ref 45–117)
ALT SERPL-CCNC: 37 U/L (ref 12–78)
ALT SERPL-CCNC: 42 U/L (ref 12–78)
ANION GAP SERPL CALC-SCNC: 12 MMOL/L (ref 8–16)
ANION GAP SERPL CALC-SCNC: 9 MMOL/L (ref 8–16)
APTT BLD: 30.2 SECONDS (ref 26.9–34.4)
AST SERPL-CCNC: 26 U/L (ref 15–37)
AST SERPL-CCNC: 27 U/L (ref 15–37)
BILIRUB SERPL-MCNC: 0.5 MG/DL (ref 0.2–1)
BILIRUB SERPL-MCNC: 0.6 MG/DL (ref 0.2–1)
BUN SERPL-MCNC: 16 MG/DL (ref 7–18)
BUN SERPL-MCNC: 16 MG/DL (ref 7–18)
CALCIUM SERPL-MCNC: 7.6 MG/DL (ref 8.5–10.1)
CALCIUM SERPL-MCNC: 7.9 MG/DL (ref 8.5–10.1)
CHLORIDE SERPL-SCNC: 109 MMOL/L (ref 98–107)
CHLORIDE SERPL-SCNC: 110 MMOL/L (ref 98–107)
CO2 SERPL-SCNC: 19 MMOL/L (ref 21–32)
CO2 SERPL-SCNC: 21 MMOL/L (ref 21–32)
CREAT SERPL-MCNC: 1.1 MG/DL (ref 0.7–1.3)
CREAT SERPL-MCNC: 1.2 MG/DL (ref 0.7–1.3)
DEPRECATED RDW RBC AUTO: 15.8 % (ref 11.9–15.9)
FIBRINOGEN PPP-MCNC: 205 MG/DL (ref 238–498)
FIBRINOGEN PPP-MCNC: 456 MG/DL (ref 238–498)
GLUCOSE SERPL-MCNC: 68 MG/DL (ref 74–106)
GLUCOSE SERPL-MCNC: 70 MG/DL (ref 74–106)
HCT VFR BLD CALC: 32 % (ref 35.4–49)
HGB BLD-MCNC: 10.5 GM/DL (ref 11.7–16.9)
INR BLD: 1.35 (ref 0.82–1.09)
INR BLD: 1.48 (ref 0.82–1.09)
MCH RBC QN AUTO: 30.7 PG (ref 25.7–33.7)
MCHC RBC AUTO-ENTMCNC: 32.8 G/DL (ref 32–35.9)
MCV RBC: 93.6 FL (ref 80–96)
PLATELET # BLD AUTO: 257 K/MM3 (ref 134–434)
PMV BLD: 8.9 FL (ref 7.5–11.1)
POTASSIUM SERPLBLD-SCNC: 5 MMOL/L (ref 3.5–5.1)
POTASSIUM SERPLBLD-SCNC: 5.1 MMOL/L (ref 3.5–5.1)
PROT SERPL-MCNC: 6.3 G/DL (ref 6.4–8.2)
PROT SERPL-MCNC: 6.3 G/DL (ref 6.4–8.2)
PT PNL PPP: 15.3 SEC (ref 9.7–13)
PT PNL PPP: 16.7 SEC (ref 9.7–13)
RBC # BLD AUTO: 3.41 M/MM3 (ref 4–5.6)
SODIUM SERPL-SCNC: 139 MMOL/L (ref 136–145)
SODIUM SERPL-SCNC: 141 MMOL/L (ref 136–145)
WBC # BLD AUTO: 11.3 K/MM3 (ref 4–10)

## 2018-06-02 PROCEDURE — 047L3DZ DILATION OF LEFT FEMORAL ARTERY WITH INTRALUMINAL DEVICE, PERCUTANEOUS APPROACH: ICD-10-PCS | Performed by: SURGERY

## 2018-06-02 PROCEDURE — 3E03317 INTRODUCTION OF OTHER THROMBOLYTIC INTO PERIPHERAL VEIN, PERCUTANEOUS APPROACH: ICD-10-PCS | Performed by: SURGERY

## 2018-06-02 PROCEDURE — B40DYZZ PLAIN RADIOGRAPHY OF AORTA AND BILATERAL LOWER EXTREMITY ARTERIES USING OTHER CONTRAST: ICD-10-PCS | Performed by: SURGERY

## 2018-06-02 RX ADMIN — HEPARIN SODIUM PRN UNIT: 5000 INJECTION, SOLUTION INTRAVENOUS; SUBCUTANEOUS at 23:47

## 2018-06-02 RX ADMIN — ATORVASTATIN CALCIUM SCH: 40 TABLET, FILM COATED ORAL at 21:55

## 2018-06-02 RX ADMIN — CHLORHEXIDINE GLUCONATE SCH: 213 SOLUTION TOPICAL at 21:56

## 2018-06-02 RX ADMIN — HEPARIN SODIUM PRN UNIT: 5000 INJECTION, SOLUTION INTRAVENOUS; SUBCUTANEOUS at 17:06

## 2018-06-02 RX ADMIN — CARVEDILOL SCH: 6.25 TABLET, FILM COATED ORAL at 21:57

## 2018-06-02 RX ADMIN — WARFARIN SCH MG: 7.5 TABLET ORAL at 17:39

## 2018-06-02 RX ADMIN — MUPIROCIN SCH: 20 OINTMENT TOPICAL at 19:43

## 2018-06-02 RX ADMIN — MUPIROCIN SCH: 20 OINTMENT TOPICAL at 21:56

## 2018-06-02 RX ADMIN — HEPARIN SODIUM SCH MLS/HR: 5000 INJECTION, SOLUTION INTRAVENOUS at 13:22

## 2018-06-02 RX ADMIN — CARVEDILOL SCH: 6.25 TABLET, FILM COATED ORAL at 19:44

## 2018-06-02 NOTE — OP
Operative Note





- Note:


Operative Date: 06/02/18


Pre-Operative Diagnosis: LLE thrombosis, ischemia


Operation: LLE angiogram, SFA angioplasty with stent placement.


Findings: 





PT runoff 


Post-Operative Diagnosis: Same as Pre-op


Surgeon: Perry Chan


Anesthesia: Fractional


Estimated Blood Loss (mls): 30


Operative Report Dictated: Yes

## 2018-06-02 NOTE — PN
Progress Note, Physician


Chief Complaint: 





Events noted


Feels better


History of Present Illness: 





Patient was seen and examined. Awake and alert. Chart was reviewed


Denies chest pain, SOB or palpitations


Post vascular intervention hemodynamically stable





- Current Medication List


Current Medications: 


Active Medications





Acetaminophen (Tylenol -)  650 mg PO Q6H PRN


   PRN Reason: FEVER


Aspirin (Ecotrin -)  81 mg PO DAILY [facility]


Atorvastatin Calcium (Lipitor -)  40 mg PO HS [facility]


Carvedilol (Coreg -)  6.25 mg PO BID [facility]


Chlorhexidine Gluconate (Hibiclens For Decolonization -)  1 applic TP HS [facility]


Fentanyl (Sublimaze Injection -)  50 mcg IVPUSH [id number] PRN


   PRN Reason: PAIN-PACU ORDER X 4 DOSES ONLY


Heparin Sodium (Porcine) (Heparin -)  1,000 unit IVPUSH PRN PRN


   PRN Reason: Heparin


Heparin Sodium (Porcine) (Heparin -)  5,000 unit IVPUSH PRN PRN


   PRN Reason: Heparin


HEPARIN SOD,PORK IN 0.45% NACL (Heparin-1/2ns 25,000 Units/500)  25,000 units 

in 500 mls @ 20 mls/hr IVPB TITR [facility]; Protocol


   Last Admin: 06/02/18 13:22 Dose:  1,000 units/hr, 20 mls/hr


Sodium Chloride (Normal Saline -)  1,000 mls @ 100 mls/hr IV ASDIR [facility]


   Last Admin: 06/02/18 13:19 Dose:  100 mls/hr


Morphine Sulfate (Morphine Injection -)  2 mg IVPUSH Q4H PRN


   PRN Reason: PAIN LEVEL 6-10


Mupirocin (Bactroban Ointment (For Decolonization) -)  1 applic NS BID [facility]


   Stop: 06/06/18 21:59


Nicotine (Nicoderm Patch -)  14 mg TD DAILY [facility]


Ondansetron HCl (Zofran Injection)  4 mg IVPUSH Q6H PRN


   PRN Reason: NAUSEA AND/OR VOMITING


Oxycodone HCl (Roxicodone -)  5 mg PO Q4H PRN


   PRN Reason: PAIN LEVEL 1-5


   Stop: 06/02/18 16:50


Promethazine HCl (Phenergan Injection -)  12.5 mg IVPUSH Q6H PRN


   PRN Reason: NAUSEA-FOR RESCUE AFTER 15 MIN


Warfarin Sodium (Coumadin -)  7.5 mg PO DAILY@1800 [facility]











- Objective


Vital Signs: 


 Vital Signs











Temperature  97.2 F L  06/02/18 12:10


 


Pulse Rate  103 H  06/02/18 12:10


 


Respiratory Rate  22   06/02/18 12:10


 


Blood Pressure  142/92   06/02/18 12:10


 


O2 Sat by Pulse Oximetry (%)  98   06/02/18 12:10











Eyes: Yes: PERRL


HENT: Yes: Atraumatic


Neck: Yes: Supple


Cardiovascular: Yes: Tachycardia, Pulse Irregular, S1, S2


Respiratory: Yes: Diminished


Gastrointestinal: Yes: Normal Bowel Sounds, Soft.  No: Tenderness


Edema: Yes


Labs: 


 CBC, BMP





 06/02/18 07:00 





 06/02/18 07:00 





 INR, PTT











INR  1.48  (0.82-1.09)  H  06/02/18  07:00    


 


Fibrinogen  205.0 mg/dL (238-498)  L D 06/02/18  07:00    














Problem List





- Problems


(1) Acute-on-chronic kidney injury


Code(s): N17.9 - ACUTE KIDNEY FAILURE, UNSPECIFIED; N18.9 - CHRONIC KIDNEY 

DISEASE, UNSPECIFIED   


Qualifiers: 


   Chronic kidney disease stage: stage 2 (mild) 





(2) Demand ischemia


Code(s): I24.8 - OTHER FORMS OF ACUTE ISCHEMIC HEART DISEASE   





(3) Coronary artery disease


Code(s): I25.10 - ATHSCL HEART DISEASE OF NATIVE CORONARY ARTERY W/O ANG PCTRS 

  


Qualifiers: 


   Coronary Disease-Associated Artery/Lesion type: native artery   Native vs. 

transplanted heart: native heart   Associated angina: without angina   

Qualified Code(s): I25.10 - Atherosclerotic heart disease of native coronary 

artery without angina pectoris   





(4) Diastolic dysfunction without heart failure


Code(s): I51.9 - HEART DISEASE, UNSPECIFIED   





(5) HTN (hypertension)


Code(s): I10 - ESSENTIAL (PRIMARY) HYPERTENSION   


Qualifiers: 


   Hypertension type: essential hypertension   Qualified Code(s): I10 - 

Essential (primary) hypertension   





(6) Hypertrophic cardiomyopathy


Code(s): I42.2 - OTHER HYPERTROPHIC CARDIOMYOPATHY   





(7) Left ventricular systolic dysfunction


Code(s): I51.9 - HEART DISEASE, UNSPECIFIED   





(8) Nicotine dependence


Code(s): F17.200 - NICOTINE DEPENDENCE, UNSPECIFIED, UNCOMPLICATED   





(9) Paroxysmal atrial fibrillation


Code(s): I48.0 - PAROXYSMAL ATRIAL FIBRILLATION   





(10) Peripheral arterial disease


Code(s): I73.9 - PERIPHERAL VASCULAR DISEASE, UNSPECIFIED   





(11) Atrial flutter


Code(s): I48.92 - UNSPECIFIED ATRIAL FLUTTER   


Qualifiers: 


   Atrial flutter type: typical   Qualified Code(s): I48.3 - Typical atrial 

flutter   





(12) Pulmonary vascular congestion


Code(s): R09.89 - OTH SYMPTOMS AND SIGNS INVOLVING THE CIRC AND RESP SYSTEMS   





Assessment/Plan





1. PAD with left limb ischemia referable to SFA occlusion


2. 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 

disease related to persistent atrial fibrillation with [id number] score of 6, 

history of poor compliance with therapy administration and medical follow up


3. CAD with demand ischemic injury, non obstructive CAD on [facility] coronary 

angiogrpahy angina pectoris


4. 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 

chronic class I-II NYHA classification, compensated/euvolemic


5. Persistent atrial flutter [id number] score of 6 on Coumadin therapy, with 

subtherapeutic INR


6. HTN


7. Acute on CKD improving


8. Poor compliance with medical therapy administration and medical F/U


9. Tobacco abuse





PLAN:


1. Continue current medical therapy


2. Resume Coumadin once post-op hemostasis assured given [id number] score 6, 

placed on heparin gtt bridge


3. Continue Carvedilol 6.25 bid


4. Judicious hydration and hold Diovan 80 qd pending renal function 

stabilization


5. Continue ASA 81 qd 


6. Continue Lipitor 40 qhs


7. Consider outpatient DCCV for the above noted atrial arrhythmia once 

clinically stabilized. Patient was advised to follow up in office (previously 

been to office)





Further plans are to follow. Patient was seen, counseled and examined for 35 

minutes


Olvin Hobbs MD

## 2018-06-02 NOTE — PN
Teaching Attending Note


Name of Resident: Sylvia Tejada





ATTENDING PHYSICIAN STATEMENT





I saw and evaluated the patient.


I reviewed the resident's note and discussed the case with the resident.


I agree with the resident's findings and plan as documented.








SUBJECTIVE:


No fever or chills, L foot pain is better , denies SOB 








OBJECTIVE:


NAD , cooperative today 


CV; RRR


Lungs: CTAB 


LE : warm feet. TTP over L forefoot , won't allow L toes exam. DP 1+ on left, 

non palpable on R. PT not palpable on both sides 


dry small wound  on L dorsal foot  





ASSESSMENT AND PLAN:








77 y/o man with h/o PAD, CAD, HTN, hypertrophic cardiomyopathy,  b/l SFA 

occlsion s/p thrombectomy, A fib, non compliance and other medical problems who 

presented with foot pain and was found to have L LE arterial occlusion








1- Acute Limb ischemia:  L SFA , popliteal, and posterior tibial arteries 

occlusion/thrombosis  . 


s/p angio and now on TPA infusion and heprain gtt 


- cont TPA and heparin and ASA 


- cont lipitor 


- vascular f/u 





2- HAM: likely prerenal, might have some degree of CKD. 


- monitor renal function after IV dye 








3- A fib : cont coreg and heparin gtt. 


f/u wit card as out pt 





4- CAD: last cath with non obstructive disease .


-cont lipitpr , asa and coreg 





5-Nicotine dependence : cont patch 





dispo : ICU monitoring 











Critical Care


Total Critical Care Time (in minutes): 30


Critical Care Statement: The care of this patient involved high complexity 

decision making to prevent further life threatening deterioration of the patient

's condition and/or to evaluate & treat vital organ system(s) failure or risk 

of failure.

## 2018-06-02 NOTE — PN
Physical Exam: 


SUBJECTIVE: Patient seen and examined by me at bedside. Overnight patient was 

combative and pulling out his IV lines, as per nurse.  Patient states his Left 

leg pain is better than yesterday but still requires pain control.  Otherwise, 

patient denies fever, chills, nausea, vomiting, chest pain, palpitations, 

shortness of breath, abdominal pain, acute vision changes, dysuria, hematuria. 








OBJECTIVE:





 Vital Signs











 Period  Temp  Pulse  Resp  BP Sys/Varghese  Pulse Ox


 


 Last 24 Hr  96.0 F-98.8 F  76-98  12-20  122-160/  








GENERAL: The patient is awake,confused, in no acute distress.


EYES: Sclera anicteric, conjunctiva clear. 


LUNGS: Breath sounds equal, clear to auscultation bilaterally


HEART: Regular rate and rhythm- in sinus, S1, S2 without murmur, rub or gallop.


ABDOMEN: Soft, nontender, nondistended, normoactive bowel sounds


EXTREMITIES: non palpable LLE dp pulse or pt, +LLE erythema of the dorsal 

aspect of the foot with TTP











 Laboratory Results - last 24 hr











 CBC, BMP





 06/02/18 07:00 





 06/02/18 07:00 





 











  06/02/18 06/02/18





  07:00 07:00


 


INR   1.48 H


 


PTT (Actin FS)  32.2 


 


Fibrinogen   205.0 L D











Active Medications











Generic Name Dose Route Start Last Admin





  Trade Name Freq  PRN Reason Stop Dose Admin


 


Acetaminophen  650 mg  06/01/18 17:44  06/01/18 21:14





  Tylenol -  PO   650 mg





  Q6H PRN   Administration





  FEVER   





     





     





     


 


Aspirin  81 mg  06/02/18 10:00  





  Ecotrin -  PO   





  DAILY [facility]   





     





     





     





     


 


Atorvastatin Calcium  40 mg  06/02/18 22:00  





  Lipitor -  PO   





  HS [facility]   





     





     





     





     


 


Carvedilol  6.25 mg  06/01/18 22:00  06/01/18 21:20





  Coreg -  PO   6.25 mg





  BID [facility]   Administration





     





     





     





     


 


Chlorhexidine Gluconate  1 applic  06/01/18 22:00  06/01/18 21:15





  Hibiclens For Decolonization -  TP   1 applic





  HS [facility]   Administration





     





     





     





     


 


Fentanyl  50 mcg  06/02/18 08:18  





  Sublimaze Injection -  IVPUSH   





  [id number] PRN   





  PAIN-PACU ORDER X 4 DOSES ONLY   





     





     





     


 


Alteplase, Recombinant 25 mg/  250 mls @ 10 mls/hr  06/01/18 17:44  06/01/18 21:

20





  Sodium Chloride  IVPB  06/02/18 18:43  Not Given





  ONCE ONE   





     





     





     





     


 


Lactated Ringer's  1,000 mls @ 125 mls/hr  06/02/18 08:30  





  Lactated Ringers Solution  IV   





  ASDIR [facility]   





     





     





     





     


 


Morphine Sulfate  2 mg  06/01/18 17:44  06/01/18 22:28





  Morphine Sulfate  IVPUSH   2 mg





  Q4H PRN   Administration





  PAIN LEVEL 6-10   





     





     





     


 


Mupirocin  1 applic  06/01/18 22:00  06/01/18 21:20





  Bactroban Ointment (For Decolonization) -  NS  06/06/18 21:59  1 applic





  BID [facility]   Administration





     





     





     





     


 


Nicotine  14 mg  06/02/18 10:00  





  Nicoderm Patch -  TD   





  DAILY [facility]   





     





     





     





     


 


Ondansetron HCl  4 mg  06/02/18 08:18  





  Zofran Injection  IVPUSH   





  Q6H PRN   





  NAUSEA AND/OR VOMITING   





     





     





     


 


Oxycodone HCl  5 mg  06/01/18 17:44  06/01/18 21:14





  Roxicodone -  PO  06/02/18 16:50  5 mg





  Q4H PRN   Administration





  PAIN LEVEL 1-5   





     





     





     


 


Promethazine HCl  12.5 mg  06/02/18 08:18  





  Phenergan Injection -  IVPUSH   





  Q6H PRN   





  NAUSEA-FOR RESCUE AFTER 15 MIN   





     





     





     











IMAGING





-5/26/18: Duplex, arterial duplex lower ext: no evidence of DVT in both lower 

extremities. compared to prior b/l lower extremity duplex arterial US from 8/26/ 17, in the RLE there is now normal triphasic waveform in the common femoral 

artery, superficial femoral, popliteal, posterior tibial a. atheromatous 

plaques are present throughout. in the LLE, there is normal triphasic waveform 

in L common femoral a. there is no definite flow within the L superficial 

femoral, popliteal, and posterior tibial a. consistent with occlusion. diffuse 

atheromatous plaques are present throughout.





-5/26/18: CXR: since prior study 5/26/18, new congestive changes with 

persistently elevated L hemidiaphragm and some bibasilar atelctatic/

infiltrative changes with the L more affected than the R. there may be some L 

fluid. correlation recommended.





-5/26/18: L foot XR: pes planus deformity, exostosis/degenerative change of 

navicular bone. bunion formation of 1st MTP with extenive arthritic changes. 

the toes are partially flexed with arthritic changes. no sign of calcaneal 

spurring. blastic changes are not seen. there appears to be a hypodene/cystic 

change at the base of the proximal phalynx of the 2nd metatarsal. correlation 

recommended. if symptoms persist, further imaging and ortho consultation may be 

needed.





Microbiology





05/27/18 10:30   Urine - Urine Clean Catch   Urine Culture - Final


                              NO GROWTH OBTAINED


05/26/18 21:40   Blood - Peripheral Venous   Blood Culture - Preliminary


                              NO GROWTH OBTAINED AFTER 96 HOURS, INCUBATION TO 

CONTINUE


                              FOR 1 DAYS.


05/26/18 21:19   Blood - Peripheral Venous   Blood Culture - Preliminary


                              NO GROWTH OBTAINED AFTER 96 HOURS, INCUBATION TO 

CONTINUE


                              FOR 1 DAYS.


ASSESSMENT/PLAN:





77 y/o M with PMH alcohol abuse, HTN, CAD, afib (on coumadin), PAD s/p 

thrombectomy 2017, who presented to the ED with acute L foot pain. Pt admitted 

for acute L foot pain, r/o ischemia.





#Acute Left Limb Ischemia


-PO Day 1 s/p aortogram, LLE angiogram, thrombolysis.  


-In the OR today for LLE angiogram, SFA angioplasty with stent placement.


-Continue with Heparin drip and begin bridging with coumadin tonight with 7.5mg 

PO 


-Continue to follow INR, PTT 





#HAM likely 2/2 prerenal- Resolved


-Discontinue LR due to slightly elevated Potassium.  Will give NS @75mls/hr


-Continue to monitor BMP





#Hyperkalemia- resolved





#Afib - currently in sinus


-continue coreg 6.25mg PO BID


-F/u with cardiology as outpt 





#HLD


-Continue lipitor 40mg PO qd





#Smoking cessation


-Continue nicotine patch 14mg TD daily





#Supratherapeutic INR-resolved





#Possible cellulitis-resolved


-received clinda -currently monitoring off abx


-ucx, blood cx - NGTD 





#F/E/N


NS @75 cc/hr


Continue to follow lytes


Diabetic sodium controlled diet 





#PPX


hep gtt/tPA





#Dispo


-S/P LLE angiogram, SFA angioplasty with stent placement.  Will need to bridge 

with heparin and coumadin


-Full Code





Visit type





- Emergency Visit


Emergency Visit: Yes


ED Registration Date: 05/26/18


Care time: The patient presented to the Emergency Department on the above date 

and was hospitalized for further evaluation of their emergent condition.





- New Patient


This patient is new to me today: Yes


Date on this admission: 06/02/18





- Critical Care


Critical Care patient: Yes


Total Critical Care Time (in minutes): 45


Critical Care Statement: The care of this patient involved high complexity 

decision making to prevent further life threatening deterioration of the patient

's condition and/or to evaluate & treat vital organ system(s) failure or risk 

of failure.

## 2018-06-03 LAB
ANION GAP SERPL CALC-SCNC: 7 MMOL/L (ref 8–16)
BUN SERPL-MCNC: 16 MG/DL (ref 7–18)
CALCIUM SERPL-MCNC: 7.5 MG/DL (ref 8.5–10.1)
CHLORIDE SERPL-SCNC: 110 MMOL/L (ref 98–107)
CO2 SERPL-SCNC: 24 MMOL/L (ref 21–32)
CREAT SERPL-MCNC: 1.5 MG/DL (ref 0.7–1.3)
DEPRECATED RDW RBC AUTO: 15.4 % (ref 11.9–15.9)
GLUCOSE SERPL-MCNC: 86 MG/DL (ref 74–106)
HCT VFR BLD CALC: 29.2 % (ref 35.4–49)
HGB BLD-MCNC: 9.8 GM/DL (ref 11.7–16.9)
INR BLD: 1.4 (ref 0.82–1.09)
MCH RBC QN AUTO: 31 PG (ref 25.7–33.7)
MCHC RBC AUTO-ENTMCNC: 33.4 G/DL (ref 32–35.9)
MCV RBC: 92.9 FL (ref 80–96)
PLATELET # BLD AUTO: 282 K/MM3 (ref 134–434)
PMV BLD: 8.3 FL (ref 7.5–11.1)
POTASSIUM SERPLBLD-SCNC: 4.7 MMOL/L (ref 3.5–5.1)
PT PNL PPP: 15.8 SEC (ref 9.7–13)
RBC # BLD AUTO: 3.14 M/MM3 (ref 4–5.6)
SODIUM SERPL-SCNC: 141 MMOL/L (ref 136–145)
WBC # BLD AUTO: 8.4 K/MM3 (ref 4–10)

## 2018-06-03 RX ADMIN — MORPHINE SULFATE PRN MG: 2 INJECTION, SOLUTION INTRAMUSCULAR; INTRAVENOUS at 09:06

## 2018-06-03 RX ADMIN — CHLORHEXIDINE GLUCONATE SCH: 213 SOLUTION TOPICAL at 21:21

## 2018-06-03 RX ADMIN — MUPIROCIN SCH: 20 OINTMENT TOPICAL at 21:21

## 2018-06-03 RX ADMIN — HEPARIN SODIUM SCH MLS/HR: 5000 INJECTION, SOLUTION INTRAVENOUS at 11:08

## 2018-06-03 RX ADMIN — WARFARIN SCH MG: 7.5 TABLET ORAL at 18:01

## 2018-06-03 RX ADMIN — MUPIROCIN SCH: 20 OINTMENT TOPICAL at 11:03

## 2018-06-03 RX ADMIN — NICOTINE SCH: 14 PATCH, EXTENDED RELEASE TRANSDERMAL at 09:06

## 2018-06-03 RX ADMIN — MORPHINE SULFATE PRN MG: 2 INJECTION, SOLUTION INTRAMUSCULAR; INTRAVENOUS at 20:20

## 2018-06-03 RX ADMIN — HEPARIN SODIUM SCH MLS/HR: 5000 INJECTION, SOLUTION INTRAVENOUS at 01:57

## 2018-06-03 RX ADMIN — CARVEDILOL SCH MG: 6.25 TABLET, FILM COATED ORAL at 21:40

## 2018-06-03 RX ADMIN — HEPARIN SODIUM PRN UNIT: 5000 INJECTION, SOLUTION INTRAVENOUS; SUBCUTANEOUS at 11:11

## 2018-06-03 RX ADMIN — ASPIRIN SCH MG: 81 TABLET, COATED ORAL at 09:06

## 2018-06-03 RX ADMIN — CARVEDILOL SCH MG: 6.25 TABLET, FILM COATED ORAL at 09:06

## 2018-06-03 RX ADMIN — ATORVASTATIN CALCIUM SCH MG: 40 TABLET, FILM COATED ORAL at 21:40

## 2018-06-03 RX ADMIN — SODIUM CHLORIDE SCH MLS/HR: 9 INJECTION, SOLUTION INTRAVENOUS at 18:37

## 2018-06-03 NOTE — PN
Progress Note (short form)





- Note


Progress Note: 





Subjective:


no fever or chills , pain in L foot but improved 





Objective:








Vital Signs:


 Last Vital Signs











Temp Pulse Resp BP Pulse Ox


 


 98.3 F   110 H  18   121/72   97 


 


 06/02/18 20:52  06/03/18 13:00  06/03/18 13:00  06/03/18 13:00  06/03/18 09:00








 Laboratory Results - last 24 hr











  06/02/18 06/02/18 06/03/18





  16:15 21:40 06:00


 


WBC   


 


RBC   


 


Hgb   


 


Hct   


 


MCV   


 


MCH   


 


MCHC   


 


RDW   


 


Plt Count   


 


MPV   


 


PT with INR    15.80 H


 


INR    1.40 H


 


PTT (Actin FS)  46.6 H D  44.2 H 


 


Sodium   


 


Potassium   


 


Chloride   


 


Carbon Dioxide   


 


Anion Gap   


 


BUN   


 


Creatinine   


 


Random Glucose   


 


Calcium   














  06/03/18 06/03/18 06/03/18





  06:30 06:30 06:30


 


WBC  8.4  


 


RBC  3.14 L  


 


Hgb  9.8 L  


 


Hct  29.2 L  


 


MCV  92.9  


 


MCH  31.0  


 


MCHC  33.4  


 


RDW  15.4  


 


Plt Count  282  


 


MPV  8.3  


 


PT with INR   


 


INR   


 


PTT (Actin FS)   41.1 H 


 


Sodium    141


 


Potassium    4.7


 


Chloride    110 H


 


Carbon Dioxide    24


 


Anion Gap    7 L


 


BUN    16


 


Creatinine    1.5 H D


 


Random Glucose    86  D


 


Calcium    7.5 L














  06/03/18





  15:15


 


WBC 


 


RBC 


 


Hgb 


 


Hct 


 


MCV 


 


MCH 


 


MCHC 


 


RDW 


 


Plt Count 


 


MPV 


 


PT with INR 


 


INR 


 


PTT (Actin FS)  57.3 H D


 


Sodium 


 


Potassium 


 


Chloride 


 


Carbon Dioxide 


 


Anion Gap 


 


BUN 


 


Creatinine 


 


Random Glucose 


 


Calcium 














Physical Exam:


NAD, cooperative today 


CV; RRR


Lungs: CTAB 


LE : warm feet. TTP over L forefoot , erythema on anterior dorsal aspect of L 

fot . DP 1+ on left, 1+ on R . PT not palpable on both sides 





ASSESSMENT AND PLAN:








77 y/o man with h/o PAD, CAD, HTN, hypertrophic cardiomyopathy,  b/l SFA 

occlsion s/p thrombectomy, A fib, non compliance and other medical problems who 

presented with foot pain and was found to have L LE arterial occlusion





1- Acute Limb ischemia:  L SFA , popliteal, and posterior tibial arteries 

occlusion/thrombosis. 


s/p angio and TPA  


-cont heparin gtt and coumadin bridge 


- cont ASA 


- cont lipitor 


- vascular f/u 





2- HAM: concern for development of MORGAN 


- increase IVF to 125 cc/hr 


- if renal function cont to worsen then will consult renal 





3- A fib : cont coreg and heparin gtt. 


f/u wit card as out pt 





4- CAD: last cath with non obstructive disease .


-cont lipitpr , asa and coreg 





5-Nicotine dependence : cont patch 





Dispo: HLOC 


























Visit type





- Emergency Visit


Emergency Visit: Yes


ED Registration Date: 05/26/18


Care time: The patient presented to the Emergency Department on the above date 

and was hospitalized for further evaluation of their emergent condition.





- New Patient


This patient is new to me today: No





- Critical Care


Critical Care patient: No

## 2018-06-03 NOTE — PN
Progress Note (short form)





- Note


Progress Note: 





Anesthesia Post op


Pt seen and examined


S:alert and awake


O:


 Vital Signs











Temperature  98.3 F   06/02/18 20:52


 


Pulse Rate  94 H  06/02/18 20:52


 


Respiratory Rate  20   06/02/18 20:52


 


Blood Pressure  143/83   06/02/18 20:52


 


O2 Sat by Pulse Oximetry (%)  97   06/02/18 21:00








 CBC, BMP





 06/02/18 07:00 





 06/02/18 07:00 








A/P:


Current Active Problems





Acute-on-chronic kidney injury (Acute)


Pre-procedural cardiovascular examination (Acute)








s/p  Aniogram angioplast left LE


Doing well post op


Continue current care


Asim Roa MD

## 2018-06-04 LAB
ANION GAP SERPL CALC-SCNC: 5 MMOL/L (ref 8–16)
BUN SERPL-MCNC: 12 MG/DL (ref 7–18)
CALCIUM SERPL-MCNC: 7.6 MG/DL (ref 8.5–10.1)
CHLORIDE SERPL-SCNC: 111 MMOL/L (ref 98–107)
CO2 SERPL-SCNC: 27 MMOL/L (ref 21–32)
CREAT SERPL-MCNC: 1.4 MG/DL (ref 0.7–1.3)
DEPRECATED RDW RBC AUTO: 15.7 % (ref 11.9–15.9)
GLUCOSE SERPL-MCNC: 96 MG/DL (ref 74–106)
HCT VFR BLD CALC: 30.8 % (ref 35.4–49)
HGB BLD-MCNC: 10.4 GM/DL (ref 11.7–16.9)
INR BLD: 1.35 (ref 0.82–1.09)
MCH RBC QN AUTO: 31.2 PG (ref 25.7–33.7)
MCHC RBC AUTO-ENTMCNC: 33.6 G/DL (ref 32–35.9)
MCV RBC: 92.8 FL (ref 80–96)
PLATELET # BLD AUTO: 333 K/MM3 (ref 134–434)
PMV BLD: 8.2 FL (ref 7.5–11.1)
POTASSIUM SERPLBLD-SCNC: 4.8 MMOL/L (ref 3.5–5.1)
PT PNL PPP: 15.3 SEC (ref 9.7–13)
RBC # BLD AUTO: 3.32 M/MM3 (ref 4–5.6)
SODIUM SERPL-SCNC: 143 MMOL/L (ref 136–145)
WBC # BLD AUTO: 8.3 K/MM3 (ref 4–10)

## 2018-06-04 RX ADMIN — ATORVASTATIN CALCIUM SCH MG: 40 TABLET, FILM COATED ORAL at 21:59

## 2018-06-04 RX ADMIN — MUPIROCIN SCH: 20 OINTMENT TOPICAL at 22:00

## 2018-06-04 RX ADMIN — SODIUM CHLORIDE SCH MLS/HR: 9 INJECTION, SOLUTION INTRAVENOUS at 02:30

## 2018-06-04 RX ADMIN — MUPIROCIN SCH: 20 OINTMENT TOPICAL at 10:55

## 2018-06-04 RX ADMIN — HEPARIN SODIUM SCH MLS/HR: 5000 INJECTION, SOLUTION INTRAVENOUS at 01:00

## 2018-06-04 RX ADMIN — CARVEDILOL SCH MG: 6.25 TABLET, FILM COATED ORAL at 21:59

## 2018-06-04 RX ADMIN — CARVEDILOL SCH MG: 6.25 TABLET, FILM COATED ORAL at 10:55

## 2018-06-04 RX ADMIN — MORPHINE SULFATE PRN MG: 2 INJECTION, SOLUTION INTRAMUSCULAR; INTRAVENOUS at 19:07

## 2018-06-04 RX ADMIN — HEPARIN SODIUM SCH MLS/HR: 5000 INJECTION, SOLUTION INTRAVENOUS at 18:44

## 2018-06-04 RX ADMIN — ASPIRIN SCH MG: 81 TABLET, COATED ORAL at 10:56

## 2018-06-04 RX ADMIN — WARFARIN SCH MG: 7.5 TABLET ORAL at 18:07

## 2018-06-04 RX ADMIN — NICOTINE SCH: 14 PATCH, EXTENDED RELEASE TRANSDERMAL at 10:57

## 2018-06-04 RX ADMIN — SODIUM CHLORIDE SCH MLS/HR: 9 INJECTION, SOLUTION INTRAVENOUS at 18:12

## 2018-06-04 RX ADMIN — HEPARIN SODIUM SCH MLS/HR: 5000 INJECTION, SOLUTION INTRAVENOUS at 09:00

## 2018-06-04 RX ADMIN — CHLORHEXIDINE GLUCONATE SCH: 213 SOLUTION TOPICAL at 22:00

## 2018-06-04 NOTE — PN
Teaching Attending Note


Name of Resident: Ruma Guevara





ATTENDING PHYSICIAN STATEMENT





I saw and evaluated the patient.


I reviewed the resident's note and discussed the case with the resident.


I agree with the resident's findings and plan as documented.








SUBJECTIVE:


No fever or chills. No SOB. minimal pain in foot 








OBJECTIVE:


NAD, cooperative today , allowed exam 


CV; RRR


Lungs: CTAB 


LE : warm feet. TTP over L forefoot , erythema on anterior dorsal aspect of L 

foot . DP was not felt . PT not palpable on both sides 





ASSESSMENT AND PLAN:








79 y/o man with h/o PAD, CAD, HTN, hypertrophic cardiomyopathy,  b/l SFA 

occlsion s/p thrombectomy, A fib, non compliance and other medical problems who 

presented with foot pain and was found to have L LE arterial occlusion





1- Acute Limb ischemia: L SFA , popliteal, and posterior tibial arteries 

occlusion/thrombosis. 


s/p angio and TPA  


- cont heparin gtt and coumadin bridge 


- cont ASA 


- cont lipitor 


- vascular f/u 





2- HAM: concern MORGAN 


- Cont increased  cc/hr 


- Stable renal function . cont to hold ARB 





3- A fib: cont coreg and heparin gtt. 


f/u wit card as out pt 





4- CAD: last cath with non obstructive disease .


-cont lipitor, asa and coreg 





5-Nicotine dependence: cont patch 





Dispo: OC

## 2018-06-04 NOTE — PN
Physical Exam: 


SUBJECTIVE: Patient seen and examined at bedside. Overnight, pt c/o pain, 

received morphine 2mg x 1 . Continued on heparin gtt. Pt in good spirits. 

Denies HA, fever, chills, or changes in urinary or bowel function.





OBJECTIVE:





 Vital Signs











 Period  Temp  Pulse  Resp  BP Sys/Varghese  Pulse Ox


 


 Last 24 Hr  97.4 F-97.6 F  77-91  20-20  143-153/71-89  95-98











GENERAL: The patient is resting comfortably in bed. awake, alert, and fully 

oriented, in no acute distress. 


HEAD: Normal with no signs of trauma.


EYES: PERRL, extraocular movements intact, sclera anicteric, conjunctiva clear. 


ENT: Ears normal, nares patent


NECK: Trachea midline, supple. 


LUNGS: Breath sounds equal, clear to auscultation bilaterally, no wheezes, no 

crackles, no accessory muscle use. 


HEART: Regular rate and rhythm-in sinus, S1, S2 without murmur, rub or gallop.


ABDOMEN: Soft, nontender, nondistended, normoactive bowel sounds, no guarding


EXTREMITIES: 1+ dorsalis pedis pulse LLE, with increased erythema and swelling 

in forefoot. 1+ dorsalis pedis RLE 


NEUROLOGICAL: Cranial nerves II through XII grossly intact. with decreased ROM 

in LLE 2/2 pain


PSYCH: Positive mood, normal affect.


SKIN: Warm, dry, normal turgor








 Laboratory Results - last 24 hr











  06/04/18 06/04/18 06/04/18





  07:26 07:42 07:42


 


WBC   8.3 


 


RBC   3.32 L 


 


Hgb   10.4 L 


 


Hct   30.8 L 


 


MCV   92.8 


 


MCH   31.2 


 


MCHC   33.6 


 


RDW   15.7 


 


Plt Count   333 


 


MPV   8.2 


 


PT with INR  15.30 H  


 


INR  1.35 H  


 


PTT (Actin FS)    55.6 H


 


Potassium   


 


Calcium   














  06/04/18





  07:42


 


PTT (Actin FS) 


 


Sodium  143


 


Potassium  4.8


 


Chloride  111 H


 


Carbon Dioxide  27


 


Anion Gap  5 L


 


BUN  12  D


 


Creatinine  1.4 H


 


Random Glucose  96


 


Calcium  7.6 L








Active Medications











Generic Name Dose Route Start Last Admin





  Trade Name Freq  PRN Reason Stop Dose Admin


 


Acetaminophen  650 mg  06/02/18 10:20  





  Tylenol -  PO   





  Q6H PRN   





  FEVER   


 


Aspirin  81 mg  06/03/18 10:00  06/04/18 10:56





  Ecotrin -  PO   81 mg





  DAILY [facility]   Administration





     


 


Atorvastatin Calcium  40 mg  06/02/18 22:00  06/03/18 21:40





  Lipitor -  PO   40 mg





  HS [facility]   Administration





     


 


Carvedilol  6.25 mg  06/02/18 22:00  06/04/18 10:55





  Coreg -  PO   6.25 mg





  BID [facility]   Administration





     


 


Chlorhexidine Gluconate  1 applic  06/02/18 22:00  06/03/18 21:21





  Hibiclens For Decolonization -  TP   Not Given





  HS [facility]   





     


 


Fentanyl  50 mcg  06/02/18 10:20  





  Sublimaze Injection -  IVPUSH   





  [id number] PRN   





  PAIN-PACU ORDER X 4 DOSES ONLY   





     


 


Heparin Sodium (Porcine)  1,000 unit  06/02/18 09:41  06/03/18 11:11





  Heparin -  IVPUSH   1,000 unit





  PRN PRN   Administration





  Heparin   





     


 


Heparin Sodium (Porcine)  5,000 unit  06/02/18 09:41  





  Heparin -  IVPUSH   





  PRN PRN   





  Heparin   





     


 


HEPARIN SOD,PORK IN 0.45% NACL  25,000 units in 500 mls @ 20 mls/hr  06/02/18 09

:45  06/04/18 09:00





  Heparin-1/2ns 25,000 Units/500  IVPB   1,300 units/hr





  TITR [facility]   26 mls/hr





     Administration





     





  Protocol   





  1,000 UNITS/HR   


 


Sodium Chloride  1,000 mls @ 125 mls/hr  06/03/18 16:45  06/04/18 18:12





  Normal Saline -  IV   125 mls/hr





  ASDIR [facility]   Administration





     


 


Morphine Sulfate  2 mg  06/02/18 10:20  06/03/18 20:20





  Morphine Injection -  IVPUSH   2 mg





  Q4H PRN   Administration





  PAIN LEVEL 6-10   





     


 


Mupirocin  1 applic  06/02/18 22:00  06/04/18 10:55





  Bactroban Ointment (For Decolonization) -  NS  06/06/18 21:59  Not Given





  BID [facility]   





     


 


Nicotine  14 mg  06/03/18 10:00  06/04/18 10:57





  Nicoderm Patch -  TD   Not Given





  DAILY [facility]   





     


 


Ondansetron HCl  4 mg  06/02/18 10:20  





  Zofran Injection  IVPUSH   





  Q6H PRN   





  NAUSEA AND/OR VOMITING   





     


 


Promethazine HCl  12.5 mg  06/02/18 10:20  





  Phenergan Injection -  IVPUSH   





  Q6H PRN   





  NAUSEA-FOR RESCUE AFTER 15 MIN   





     


 


Warfarin Sodium  7.5 mg  06/02/18 18:00  06/04/18 18:07





  Coumadin -  PO   7.5 mg





  DAILY@1800 [facility]   Administration





     








IMAGING





-5/26/18: Duplex, arterial duplex lower ext: no evidence of DVT in both lower 

extremities. compared to prior b/l lower extremity duplex arterial US from 8/26/ 17, in the RLE there is now normal triphasic waveform in the common femoral 

artery, superficial femoral, popliteal, posterior tibial a. atheromatous 

plaques are present throughout. in the LLE, there is normal triphasic waveform 

in L common femoral a. there is no definite flow within the L superficial 

femoral, popliteal, and posterior tibial a. consistent with occlusion. diffuse 

atheromatous plaques are present throughout.





-5/26/18: CXR: since prior study 5/26/18, new congestive changes with 

persistently elevated L hemidiaphragm and some bibasilar atelctatic/

infiltrative changes with the L more affected than the R. there may be some L 

fluid. correlation recommended.





-5/26/18: L foot XR: pes planus deformity, exostosis/degenerative change of 

navicular bone. bunion formation of 1st MTP with extenive arthritic changes. 

the toes are partially flexed with arthritic changes. no sign of calcaneal 

spurring. blastic changes are not seen. there appears to be a hypodene/cystic 

change at the base of the proximal phalynx of the 2nd metatarsal. correlation 

recommended. if symptoms persist, further imaging and ortho consultation may be 

needed.





Microbiology





05/27/18 10:30   Urine - Urine Clean Catch   Urine Culture - Final


                              NO GROWTH OBTAINED


05/26/18 21:40   Blood - Peripheral Venous   Blood Culture - Final


                              NO GROWTH AFTER 5 DAYS INCUBATION


05/26/18 21:19   Blood - Peripheral Venous   Blood Culture - Final


                              NO GROWTH AFTER 5 DAYS INCUBATION





ASSESSMENT/PLAN:





77 y/o M with PMH alcohol abuse, HTN, CAD, afib (on coumadin), PAD s/p 

thrombectomy 2017, who presented to the ED with acute L foot pain. Pt admitted 

for acute L foot pain, r/o ischemia.





#Acute L foot pain, r/o ischemia


-s/p tPA, aortogram, LLE angiogram, thrombolysis


-extensive clot needed continuation of hep gtt (started 5/30/18) and tPA


-on hep gtt, bridging with coumadin 7.5mg PO qd


-INR sub-therapeutic 1.35. 


-Continue asa 81, lipitor 40mg PO qd 


-Continue to follow INR, PTT 


-d/w Dr. Chan





#HAM likely 2/2 prerenal 


-Continue IV  cc/hr


-may be able to decrease tomorrow 


-received contrast for procedure x 2





#Hyperkalemia- resolved





#Afib - currently in sinus


-continue coreg 6.25mg PO BID


-F/u with cardiology as outpt





#HLD


-Continue lipitor 40mg PO qd





#Smoking cessation


-Continue nicotine patch 14mg TD daily





#Supratherapeutic INR-resolved





#Possible cellulitis-resolved


-received clinda -currently monitoring off abx


-ucx, blood cx - NGTD 





#F/E/N


IV  cc/hr


Continue to follow lytes


diabetic/sodium controlled diet 





#PPX


s/p tPA


bridging hep gtt, coumadin





#Dispo


-continued monitoring med-surg 


-will need outpatient DC cardioversion 3-4 weeks after on a/c


-as well as cardio f/u 





Visit type





- Emergency Visit


Emergency Visit: No





- New Patient


This patient is new to me today: No





- Critical Care


Critical Care patient: No

## 2018-06-04 NOTE — PN
Progress Note, Physician


History of Present Illness: 


LLE ischemic discomfort improved post left SFA PTA and stent.











- Current Medication List


Current Medications: 


Active Medications





Acetaminophen (Tylenol -)  650 mg PO Q6H PRN


   PRN Reason: FEVER


Aspirin (Ecotrin -)  81 mg PO DAILY [facility]


   Last Admin: 06/04/18 10:56 Dose:  81 mg


Atorvastatin Calcium (Lipitor -)  40 mg PO HS [facility]


   Last Admin: 06/03/18 21:40 Dose:  40 mg


Carvedilol (Coreg -)  6.25 mg PO BID [facility]


   Last Admin: 06/04/18 10:55 Dose:  6.25 mg


Chlorhexidine Gluconate (Hibiclens For Decolonization -)  1 applic TP HS [facility]


   Last Admin: 06/03/18 21:21 Dose:  Not Given


Fentanyl (Sublimaze Injection -)  50 mcg IVPUSH [id number] PRN


   PRN Reason: PAIN-PACU ORDER X 4 DOSES ONLY


Heparin Sodium (Porcine) (Heparin -)  1,000 unit IVPUSH PRN PRN


   PRN Reason: Heparin


   Last Admin: 06/03/18 11:11 Dose:  1,000 unit


Heparin Sodium (Porcine) (Heparin -)  5,000 unit IVPUSH PRN PRN


   PRN Reason: Heparin


HEPARIN SOD,PORK IN 0.45% NACL (Heparin-1/2ns 25,000 Units/500)  25,000 units 

in 500 mls @ 20 mls/hr IVPB TITR [facility]; Protocol


   Last Admin: 06/04/18 09:00 Dose:  1,300 units/hr, 26 mls/hr


Sodium Chloride (Normal Saline -)  1,000 mls @ 125 mls/hr IV ASDIR [facility]


   Last Admin: 06/04/18 02:30 Dose:  125 mls/hr


Morphine Sulfate (Morphine Injection -)  2 mg IVPUSH Q4H PRN


   PRN Reason: PAIN LEVEL 6-10


   Last Admin: 06/03/18 20:20 Dose:  2 mg


Mupirocin (Bactroban Ointment (For Decolonization) -)  1 applic NS BID [facility]


   Stop: 06/06/18 21:59


   Last Admin: 06/04/18 10:55 Dose:  Not Given


Nicotine (Nicoderm Patch -)  14 mg TD DAILY [facility]


   Last Admin: 06/04/18 10:57 Dose:  Not Given


Ondansetron HCl (Zofran Injection)  4 mg IVPUSH Q6H PRN


   PRN Reason: NAUSEA AND/OR VOMITING


Promethazine HCl (Phenergan Injection -)  12.5 mg IVPUSH Q6H PRN


   PRN Reason: NAUSEA-FOR RESCUE AFTER 15 MIN


Warfarin Sodium (Coumadin -)  7.5 mg PO DAILY@1800 [facility]


   Last Admin: 06/03/18 18:01 Dose:  7.5 mg











- Objective


Vital Signs: 


 Vital Signs











Temperature  97.6 F   06/04/18 06:00


 


Pulse Rate  77   06/04/18 06:00


 


Respiratory Rate  20   06/04/18 06:00


 


Blood Pressure  152/84   06/04/18 06:00


 


O2 Sat by Pulse Oximetry (%)  98   06/03/18 21:00











Constitutional: Yes: No Distress, Calm, Thin


Neck: Yes: Supple


Cardiovascular: Yes: Regular Rate and Rhythm


Respiratory: Yes: Regular, Diminished


Gastrointestinal: Yes: Normal Bowel Sounds, Soft


Edema: No


Labs: 


 CBC, BMP





 06/04/18 07:42 





 06/04/18 07:42 





 INR, PTT











INR  1.35  (0.82-1.09)  H  06/04/18  07:26    


 


Fibrinogen  205.0 mg/dL (238-498)  L D 06/02/18  07:00    














Problem List





- Problems


(1) Acute-on-chronic kidney injury


Code(s): N17.9 - ACUTE KIDNEY FAILURE, UNSPECIFIED; N18.9 - CHRONIC KIDNEY 

DISEASE, UNSPECIFIED   


Qualifiers: 


   Chronic kidney disease stage: stage 2 (mild) 





(2) Demand ischemia


Code(s): I24.8 - OTHER FORMS OF ACUTE ISCHEMIC HEART DISEASE   





(3) Anticoagulant long-term use


Code(s): Z79.01 - LONG TERM (CURRENT) USE OF ANTICOAGULANTS   





(4) Coronary artery disease


Code(s): I25.10 - ATHSCL HEART DISEASE OF NATIVE CORONARY ARTERY W/O ANG PCTRS 

  


Qualifiers: 


   Coronary Disease-Associated Artery/Lesion type: native artery   Native vs. 

transplanted heart: native heart   Associated angina: without angina   

Qualified Code(s): I25.10 - Atherosclerotic heart disease of native coronary 

artery without angina pectoris   





(5) Diastolic dysfunction without heart failure


Code(s): I51.9 - HEART DISEASE, UNSPECIFIED   





(6) HTN (hypertension)


Code(s): I10 - ESSENTIAL (PRIMARY) HYPERTENSION   


Qualifiers: 


   Hypertension type: essential hypertension   Qualified Code(s): I10 - 

Essential (primary) hypertension   





(7) Hypertrophic cardiomyopathy


Code(s): I42.2 - OTHER HYPERTROPHIC CARDIOMYOPATHY   





(8) Peripheral arterial disease


Code(s): I73.9 - PERIPHERAL VASCULAR DISEASE, UNSPECIFIED   





Assessment/Plan


[facility] nonobstructive CAD, severe LV apical 

hypertrophic cardiomyopathy, mildly elevated right sided pressures, Mynx 

deployed right CFA access site. Study is consistent with apical hypertrophy (

spade-like) variant of hypertrophic cardiomyopathy, planned for optimal medical 

therapy. 





1. PAD with left limb ischemia referable to SFA occlusion s/p PTA and stent


2. 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 

disease related to persistent atrial fibrillation with [id number] score of 6, 

history of poor compliance with therapy administration and medical F/U


3. CAD with demand ischemic injury, non obstructive CAD on R&LHc coronary 

angiogrpahy angina pectoris


4. 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 

chronic class I-II NYHA classification, compensated/euvolemic


5. Persistent atrial flutter [id number] score of 6 on Coumadin therapy, with 

subtherapeutic INR


6. HTN


7. Acute on CKD improving


8. Poor compliance with medical therapy administration and medical F/U


9. Tobacco abuse


10. H/o food anaphylaxis 





PLAN:





1. Resumed coumadin post-procedure given [id number] score 6, placed on heparin 

gtt with INR<2.0


2. Continue Carvedilol 6.25 bid


3. Judicious hydration and hold Diovan 80 qd pending renal fxn stabilization


4. Continue ASA 81 qd 


5. Continue Lipitor 40 qhs


6. Counselled smoking cessation and abstinence


7. Will consider outpatient DCCV for the above noted atrial arrhythmia after 3-

4 weeks of adequate A/C

## 2018-06-05 LAB
ANION GAP SERPL CALC-SCNC: 6 MMOL/L (ref 8–16)
BUN SERPL-MCNC: 11 MG/DL (ref 7–18)
CALCIUM SERPL-MCNC: 7.5 MG/DL (ref 8.5–10.1)
CHLORIDE SERPL-SCNC: 111 MMOL/L (ref 98–107)
CO2 SERPL-SCNC: 25 MMOL/L (ref 21–32)
CREAT SERPL-MCNC: 1.3 MG/DL (ref 0.7–1.3)
DEPRECATED RDW RBC AUTO: 15.5 % (ref 11.9–15.9)
GLUCOSE SERPL-MCNC: 91 MG/DL (ref 74–106)
HCT VFR BLD CALC: 29.8 % (ref 35.4–49)
HGB BLD-MCNC: 10.1 GM/DL (ref 11.7–16.9)
INR BLD: 1.65 (ref 0.82–1.09)
MAGNESIUM SERPL-MCNC: 1.9 MG/DL (ref 1.8–2.4)
MCH RBC QN AUTO: 31.4 PG (ref 25.7–33.7)
MCHC RBC AUTO-ENTMCNC: 33.8 G/DL (ref 32–35.9)
MCV RBC: 92.8 FL (ref 80–96)
PHOSPHATE SERPL-MCNC: 3.5 MG/DL (ref 2.5–4.9)
PLATELET # BLD AUTO: 321 K/MM3 (ref 134–434)
PMV BLD: 8.3 FL (ref 7.5–11.1)
POTASSIUM SERPLBLD-SCNC: 4.3 MMOL/L (ref 3.5–5.1)
PT PNL PPP: 18.7 SEC (ref 9.7–13)
RBC # BLD AUTO: 3.21 M/MM3 (ref 4–5.6)
SODIUM SERPL-SCNC: 142 MMOL/L (ref 136–145)
WBC # BLD AUTO: 9 K/MM3 (ref 4–10)

## 2018-06-05 RX ADMIN — CHLORHEXIDINE GLUCONATE SCH: 213 SOLUTION TOPICAL at 21:01

## 2018-06-05 RX ADMIN — ATORVASTATIN CALCIUM SCH MG: 40 TABLET, FILM COATED ORAL at 21:01

## 2018-06-05 RX ADMIN — CARVEDILOL SCH MG: 6.25 TABLET, FILM COATED ORAL at 10:28

## 2018-06-05 RX ADMIN — NICOTINE SCH: 14 PATCH, EXTENDED RELEASE TRANSDERMAL at 10:29

## 2018-06-05 RX ADMIN — WARFARIN SCH MG: 7.5 TABLET ORAL at 18:56

## 2018-06-05 RX ADMIN — SODIUM CHLORIDE SCH: 9 INJECTION, SOLUTION INTRAVENOUS at 19:31

## 2018-06-05 RX ADMIN — MUPIROCIN SCH: 20 OINTMENT TOPICAL at 10:29

## 2018-06-05 RX ADMIN — HEPARIN SODIUM SCH: 5000 INJECTION, SOLUTION INTRAVENOUS at 10:28

## 2018-06-05 RX ADMIN — ASPIRIN SCH MG: 81 TABLET, COATED ORAL at 10:28

## 2018-06-05 RX ADMIN — MUPIROCIN SCH: 20 OINTMENT TOPICAL at 21:01

## 2018-06-05 RX ADMIN — CARVEDILOL SCH MG: 6.25 TABLET, FILM COATED ORAL at 21:00

## 2018-06-05 NOTE — PN
Teaching Attending Note


Name of Resident: Ruma Guevara





ATTENDING PHYSICIAN STATEMENT





I saw and evaluated the patient.


I reviewed the resident's note and discussed the case with the resident.


I agree with the resident's findings and plan as documented.








SUBJECTIVE:


Refused exam and interview





ASSESSMENT AND PLAN:








79 y/o man with h/o PAD, CAD, HTN, hypertrophic cardiomyopathy,  b/l SFA 

occlsion s/p thrombectomy, A fib, non compliance and other medical problems who 

presented with foot pain and was found to have L LE arterial occlusion





1- Acute Limb ischemia: L SFA , popliteal, and posterior tibial arteries 

occlusion/thrombosis. 


s/p angio and TPA  


- cont heparin gtt and coumadin bridge . give 10 mg of coumadin today then 7.5 

again tomorrow 


- cont ASA 


- cont lipitor 


- vascular f/u 





2- HAM: concern MORGAN 


- Cr improved 


- cont to hold ARB for now . 


- decrease IVF . 





3- A fib: cont coreg and heparin gtt. 


f/u wit card as out pt 





4- CAD: last cath with non obstructive disease .


-cont lipitor, asa and coreg 





5-Nicotine dependence: cont patch 





Dispo: [facility]LOC

## 2018-06-05 NOTE — PN
Physical Exam: 


SUBJECTIVE: Patient seen and examined at bedside. Overnight, pt received 

morphine for pain. Today, pt in good spirits. States that the erythema in his L 

foot is improving. With better ROM. Denies HA, fever, chills, SOB, or changes 

in urinary or bowel function.








OBJECTIVE:





 Vital Signs











 Period  Temp  Pulse  Resp  BP Sys/Varghese  Pulse Ox


 


 Last 24 Hr  97.8 F-98.2 F    18-20  138-154/  98-98











GENERAL: The patient is in good spirits. awake, alert, and fully oriented, in 

no acute distress.


HEAD: Normal with no signs of trauma.


EYES: PERRL, extraocular movements intact, sclera anicteric, conjunctiva clear. 


ENT: Ears normal, nares patent, oropharynx clear without exudates, moist mucous 

membranes.


NECK: Trachea midline, supple. 


LUNGS: Breath sounds equal, clear to auscultation bilaterally, no wheezes, no 

crackles, no accessory muscle use. 


HEART: Regular rate and rhythm-in sinus. S1, S2 without murmur, rub or gallop.


ABDOMEN: Soft, nontender, nondistended, normoactive bowel sounds, no guarding


EXTREMITIES: 1+ dorsalis pedis pulses appreciated b/l. +L foot- improved 

erythema, ROM. still with dark discoloration, nail changes


NEUROLOGICAL: Cranial nerves II through XII grossly intact.


PSYCH: Normal mood, normal affect.


SKIN: Warm, dry, normal turgor








 Laboratory Results - last 24 hr











  06/05/18 06/05/18 06/05/18





  06:20 06:30 06:30


 


WBC   9.0 


 


RBC   3.21 L 


 


Hgb   10.1 L 


 


Hct   29.8 L 


 


MCV   92.8 


 


MCH   31.4 


 


MCHC   33.8 


 


RDW   15.5 


 


Plt Count   321 


 


MPV   8.3 


 


INR   


 


PTT (Actin FS)    62.7 H


 


Sodium  142  


 


Potassium  4.3  


 


Chloride  111 H  


 


Carbon Dioxide  25  


 


Anion Gap  6 L  


 


BUN  11  


 


Creatinine  1.3  


 


Random Glucose  91  


 


Calcium  7.5 L  


 


Phosphorus  3.5  


 


Magnesium  1.9  














  06/05/18





  06:30


 


MPV 


 


PT with INR  18.70 H


 


INR  1.65 H


 


Random Glucose 








Active Medications











Generic Name Dose Route Start Last Admin





  Trade Name Freq  PRN Reason Stop Dose Admin


 


Acetaminophen  650 mg  06/02/18 10:20  





  Tylenol -  PO   





  Q6H PRN   





  FEVER   





     


 


Aspirin  81 mg  06/03/18 10:00  06/05/18 10:28





  Ecotrin -  PO   81 mg





  DAILY [facility]   Administration





     


 


Atorvastatin Calcium  40 mg  06/02/18 22:00  06/04/18 21:59





  Lipitor -  PO   40 mg





  HS [facility]   Administration





     


 


Carvedilol  6.25 mg  06/02/18 22:00  06/05/18 10:28





  Coreg -  PO   6.25 mg





  BID [facility]   Administration





     


 


Chlorhexidine Gluconate  1 applic  06/02/18 22:00  06/04/18 22:00





  Hibiclens For Decolonization -  TP   Not Given





  HS [facility]   





     


 


Fentanyl  50 mcg  06/02/18 10:20  





  Sublimaze Injection -  IVPUSH   





  [id number] PRN   





  PAIN-PACU ORDER X 4 DOSES ONLY   





     


 


Heparin Sodium (Porcine)  1,000 unit  06/02/18 09:41  06/03/18 11:11





  Heparin -  IVPUSH   1,000 unit





  PRN PRN   Administration





  Heparin   





     


 


Heparin Sodium (Porcine)  5,000 unit  06/02/18 09:41  





  Heparin -  IVPUSH   





  PRN PRN   





  Heparin   





     


 


HEPARIN SOD,PORK IN 0.45% NACL  25,000 units in 500 mls @ 20 mls/hr  06/02/18 09

:45  06/05/18 10:28





  Heparin-1/2ns 25,000 Units/500  IVPB   Not Given





  TITR [facility]   





     





  Protocol   





  1,000 UNITS/HR   


 


Sodium Chloride  1,000 mls @ 75 mls/hr  06/05/18 17:44  06/05/18 18:56





  Normal Saline -  IV   75 mls/hr





  ASDIR [facility]   Administration





     


 


Morphine Sulfate  2 mg  06/05/18 10:37  





  Morphine Sulfate  IVPUSH   





  Q6H PRN   





     


 


Mupirocin  1 applic  06/02/18 22:00  06/05/18 10:29





  Bactroban Ointment (For Decolonization) -  NS  06/06/18 21:59  Not Given





  BID [facility]   





     


 


Nicotine  14 mg  06/03/18 10:00  06/05/18 10:29





  Nicoderm Patch -  TD   Not Given





  DAILY [facility]   





     


 


Ondansetron HCl  4 mg  06/02/18 10:20  





  Zofran Injection  IVPUSH   





  Q6H PRN   





  NAUSEA AND/OR VOMITING   





     


 


Promethazine HCl  12.5 mg  06/02/18 10:20  





  Phenergan Injection -  IVPUSH   





  Q6H PRN   





  NAUSEA-FOR RESCUE AFTER 15 MIN   


 


Warfarin Sodium  7.5 mg  06/02/18 18:00  06/05/18 18:56





  Coumadin -  PO   7.5 mg





  DAILY@1800 [facility]   Administration





     








IMAGING





-5/26/18: Duplex, arterial duplex lower ext: no evidence of DVT in both lower 

extremities. compared to prior b/l lower extremity duplex arterial US from 8/26/ 17, in the RLE there is now normal triphasic waveform in the common femoral 

artery, superficial femoral, popliteal, posterior tibial a. atheromatous 

plaques are present throughout. in the LLE, there is normal triphasic waveform 

in L common femoral a. there is no definite flow within the L superficial 

femoral, popliteal, and posterior tibial a. consistent with occlusion. diffuse 

atheromatous plaques are present throughout.





-5/26/18: CXR: since prior study 5/26/18, new congestive changes with 

persistently elevated L hemidiaphragm and some bibasilar atelctatic/

infiltrative changes with the L more affected than the R. there may be some L 

fluid. correlation recommended.





-5/26/18: L foot XR: pes planus deformity, exostosis/degenerative change of 

navicular bone. bunion formation of 1st MTP with extenive arthritic changes. 

the toes are partially flexed with arthritic changes. no sign of calcaneal 

spurring. blastic changes are not seen. there appears to be a hypodene/cystic 

change at the base of the proximal phalynx of the 2nd metatarsal. correlation 

recommended. if symptoms persist, further imaging and ortho consultation may be 

needed.





Microbiology





05/27/18 10:30   Urine - Urine Clean Catch   Urine Culture - Final


                              NO GROWTH OBTAINED


05/26/18 21:40   Blood - Peripheral Venous   Blood Culture - Final


                              NO GROWTH AFTER 5 DAYS INCUBATION


05/26/18 21:19   Blood - Peripheral Venous   Blood Culture - Final


                              NO GROWTH AFTER 5 DAYS INCUBATION





ASSESSMENT/PLAN:





79 y/o M with PMH alcohol abuse, HTN, CAD, afib (on coumadin), PAD s/p 

thrombectomy 2017, who presented to the ED with acute L foot pain. Pt admitted 

for acute L foot pain, r/o ischemia.





#Acute L foot pain, r/o ischemia


-s/p tPA, aortogram, LLE angiogram, thrombolysis


-extensive clot needed continuation of hep gtt (started 5/30/18) and tPA


-on hep gtt, bridging with coumadin 7.5mg PO qd. 


-will add additional dose coumadin 2.5mg PO x1 tonight


-INR sub-therapeutic 1.65, however gradual trend up


-Continue asa 81, lipitor 40mg PO qd 


-Continue to follow INR, PTT 


-d/w Dr. Chan





#HAM likely 2/2 prerenal 


-Continue IV NS 75 cc/hr


-received contrast for procedure x 2


-Cr improved 1.3





#Hyperkalemia- resolved





#Afib - currently in sinus


-continue coreg 6.25mg PO BID


-F/u with cardiology as outpt





#HLD


-Continue lipitor 40mg PO qd





#Smoking cessation


-Continue nicotine patch 14mg TD daily





#Supratherapeutic INR-resolved





#Possible cellulitis-resolved


-received clinda -currently monitoring off abx


-ucx, blood cx - NGTD 





#F/E/N


IV NS 75 cc/hr


Continue to follow lytes


diabetic/sodium controlled diet 





#PPX


s/p tPA


bridging hep gtt, coumadin





#Dispo


-continued monitoring med-surg . awaiting improvement INR


-has been refusing PT


-will need outpatient DC cardioversion 3-4 weeks after on a/c


-as well as cardio f/u 





Visit type





- Emergency Visit


Emergency Visit: No





- New Patient


This patient is new to me today: No





- Critical Care


Critical Care patient: No





- Discharge Referral


Referred to [facility] No

## 2018-06-05 NOTE — PN
Progress Note, Physician


Chief Complaint: 





Events noted


Feels better


Not in distress


History of Present Illness: 





Patient was seen and examined. Awake and alert. Chart was reviewed


Denies chest pain, SOB or palpitations


Post vascular intervention left SFA stent, hemodynamically stable





- Current Medication List


Current Medications: 


Active Medications





Acetaminophen (Tylenol -)  650 mg PO Q6H PRN


   PRN Reason: FEVER


Aspirin (Ecotrin -)  81 mg PO DAILY [facility]


   Last Admin: 06/04/18 10:56 Dose:  81 mg


Atorvastatin Calcium (Lipitor -)  40 mg PO HS [facility]


   Last Admin: 06/04/18 21:59 Dose:  40 mg


Carvedilol (Coreg -)  6.25 mg PO BID [facility]


   Last Admin: 06/04/18 21:59 Dose:  6.25 mg


Chlorhexidine Gluconate (Hibiclens For Decolonization -)  1 applic TP HS [facility]


   Last Admin: 06/04/18 22:00 Dose:  Not Given


Fentanyl (Sublimaze Injection -)  50 mcg IVPUSH [id number] PRN


   PRN Reason: PAIN-PACU ORDER X 4 DOSES ONLY


Heparin Sodium (Porcine) (Heparin -)  1,000 unit IVPUSH PRN PRN


   PRN Reason: Heparin


   Last Admin: 06/03/18 11:11 Dose:  1,000 unit


Heparin Sodium (Porcine) (Heparin -)  5,000 unit IVPUSH PRN PRN


   PRN Reason: Heparin


HEPARIN SOD,PORK IN 0.45% NACL (Heparin-1/2ns 25,000 Units/500)  25,000 units 

in 500 mls @ 20 mls/hr IVPB TITR [facility]; Protocol


   Last Admin: 06/04/18 18:44 Dose:  1,300 units/hr, 26 mls/hr


Sodium Chloride (Normal Saline -)  1,000 mls @ 125 mls/hr IV ASDIR [facility]


   Last Admin: 06/04/18 18:12 Dose:  125 mls/hr


Morphine Sulfate (Morphine Injection -)  2 mg IVPUSH Q4H PRN


   PRN Reason: PAIN LEVEL 6-10


   Last Admin: 06/04/18 19:07 Dose:  2 mg


Mupirocin (Bactroban Ointment (For Decolonization) -)  1 applic NS BID [facility]


   Stop: 06/06/18 21:59


   Last Admin: 06/04/18 22:00 Dose:  Not Given


Nicotine (Nicoderm Patch -)  14 mg TD DAILY [facility]


   Last Admin: 06/04/18 10:57 Dose:  Not Given


Ondansetron HCl (Zofran Injection)  4 mg IVPUSH Q6H PRN


   PRN Reason: NAUSEA AND/OR VOMITING


Promethazine HCl (Phenergan Injection -)  12.5 mg IVPUSH Q6H PRN


   PRN Reason: NAUSEA-FOR RESCUE AFTER 15 MIN


Warfarin Sodium (Coumadin -)  7.5 mg PO DAILY@1800 [facility]


   Last Admin: 06/04/18 18:07 Dose:  7.5 mg











- Objective


Vital Signs: 


 Vital Signs











Temperature  98.0 F   06/05/18 06:25


 


Pulse Rate  98 H  06/05/18 06:25


 


Respiratory Rate  20   06/05/18 06:25


 


Blood Pressure  144/98   06/05/18 06:25


 


O2 Sat by Pulse Oximetry (%)  98   06/04/18 21:00











Eyes: Yes: PERRL


HENT: Yes: Atraumatic


Neck: Yes: Supple


Cardiovascular: Yes: Pulse Irregular, Murmur (SM), S1, S2


Respiratory: Yes: CTA Bilaterally


Gastrointestinal: Yes: Normal Bowel Sounds, Soft.  No: Tenderness


Edema: No


Labs: 


 CBC, BMP





 06/05/18 06:30 





 06/05/18 06:20 





 INR, PTT











INR  1.65  (0.82-1.09)  H  06/05/18  06:30    


 


Fibrinogen  205.0 mg/dL (238-498)  L D 06/02/18  07:00    














Problem List





- Problems


(1) Acute-on-chronic kidney injury


Code(s): N17.9 - ACUTE KIDNEY FAILURE, UNSPECIFIED; N18.9 - CHRONIC KIDNEY 

DISEASE, UNSPECIFIED   


Qualifiers: 


   Chronic kidney disease stage: stage 2 (mild) 





(2) Demand ischemia


Code(s): I24.8 - OTHER FORMS OF ACUTE ISCHEMIC HEART DISEASE   





(3) Coronary artery disease


Code(s): I25.10 - ATHSCL HEART DISEASE OF NATIVE CORONARY ARTERY W/O ANG PCTRS 

  


Qualifiers: 


   Coronary Disease-Associated Artery/Lesion type: native artery   Native vs. 

transplanted heart: native heart   Associated angina: without angina   

Qualified Code(s): I25.10 - Atherosclerotic heart disease of native coronary 

artery without angina pectoris   





(4) Diastolic dysfunction without heart failure


Code(s): I51.9 - HEART DISEASE, UNSPECIFIED   





(5) HTN (hypertension)


Code(s): I10 - ESSENTIAL (PRIMARY) HYPERTENSION   


Qualifiers: 


   Hypertension type: essential hypertension   Qualified Code(s): I10 - 

Essential (primary) hypertension   





(6) Hypertrophic cardiomyopathy


Code(s): I42.2 - OTHER HYPERTROPHIC CARDIOMYOPATHY   





(7) Left ventricular systolic dysfunction


Code(s): I51.9 - HEART DISEASE, UNSPECIFIED   





(8) Nicotine dependence


Code(s): F17.200 - NICOTINE DEPENDENCE, UNSPECIFIED, UNCOMPLICATED   





(9) Paroxysmal atrial fibrillation


Code(s): I48.0 - PAROXYSMAL ATRIAL FIBRILLATION   





(10) Peripheral arterial disease


Code(s): I73.9 - PERIPHERAL VASCULAR DISEASE, UNSPECIFIED   





(11) Atrial flutter


Code(s): I48.92 - UNSPECIFIED ATRIAL FLUTTER   


Qualifiers: 


   Atrial flutter type: typical   Qualified Code(s): I48.3 - Typical atrial 

flutter   





(12) Pulmonary vascular congestion


Code(s): R09.89 - OTH SYMPTOMS AND SIGNS INVOLVING THE CIRC AND RESP SYSTEMS   





Assessment/Plan





1. PAD with left limb ischemia referable to SFA occlusion


2. 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 

disease related to persistent atrial fibrillation with [id number] score of 6, 

history of poor compliance with therapy administration and medical follow up


3. CAD with demand ischemic injury, non obstructive CAD on [facility] coronary 

angiogrpahy angina pectoris


4. 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 

chronic class I-II NYHA classification, compensated/euvolemic


5. Persistent atrial flutter [id number] score of 6 on Coumadin therapy, with 

subtherapeutic INR


6. HTN


7. Acute on CKD improving


8. Poor compliance with medical therapy administration and medical F/U


9. Tobacco abuse





PLAN:


1. Continue current medical therapy


2. Coumadin per INR


3. Continue Carvedilol 6.25 bid


4. Restart Diovan 80 qd pending renal function stabilization


5. Continue ASA 81 qd 


6. Continue Lipitor 40 qhs


7. Consider outpatient DCCV for the above noted atrial arrhythmia once 

clinically stabilized. Patient was advised to follow up in office (previously 

been to office)


8. PT





Further plans are to follow. 


Olvin Hobbs MD

## 2018-06-06 LAB
ANION GAP SERPL CALC-SCNC: 8 MMOL/L (ref 8–16)
BUN SERPL-MCNC: 13 MG/DL (ref 7–18)
CALCIUM SERPL-MCNC: 7.9 MG/DL (ref 8.5–10.1)
CHLORIDE SERPL-SCNC: 112 MMOL/L (ref 98–107)
CO2 SERPL-SCNC: 22 MMOL/L (ref 21–32)
CREAT SERPL-MCNC: 1.3 MG/DL (ref 0.7–1.3)
DEPRECATED RDW RBC AUTO: 15.8 % (ref 11.9–15.9)
GLUCOSE SERPL-MCNC: 96 MG/DL (ref 74–106)
HCT VFR BLD CALC: 30.1 % (ref 35.4–49)
HGB BLD-MCNC: 10 GM/DL (ref 11.7–16.9)
INR BLD: 2.27 (ref 0.82–1.09)
MCH RBC QN AUTO: 31.1 PG (ref 25.7–33.7)
MCHC RBC AUTO-ENTMCNC: 33.4 G/DL (ref 32–35.9)
MCV RBC: 93.3 FL (ref 80–96)
PLATELET # BLD AUTO: 320 K/MM3 (ref 134–434)
PMV BLD: 8.6 FL (ref 7.5–11.1)
POTASSIUM SERPLBLD-SCNC: 4.7 MMOL/L (ref 3.5–5.1)
PT PNL PPP: 25.6 SEC (ref 9.7–13)
RBC # BLD AUTO: 3.23 M/MM3 (ref 4–5.6)
SODIUM SERPL-SCNC: 142 MMOL/L (ref 136–145)
WBC # BLD AUTO: 10.4 K/MM3 (ref 4–10)

## 2018-06-06 RX ADMIN — WARFARIN SCH MG: 7.5 TABLET ORAL at 18:40

## 2018-06-06 RX ADMIN — MUPIROCIN SCH: 20 OINTMENT TOPICAL at 11:25

## 2018-06-06 RX ADMIN — NICOTINE SCH: 14 PATCH, EXTENDED RELEASE TRANSDERMAL at 11:25

## 2018-06-06 RX ADMIN — ASPIRIN SCH MG: 81 TABLET, COATED ORAL at 11:24

## 2018-06-06 RX ADMIN — HEPARIN SODIUM SCH MLS/HR: 5000 INJECTION, SOLUTION INTRAVENOUS at 12:30

## 2018-06-06 RX ADMIN — CARVEDILOL SCH MG: 6.25 TABLET, FILM COATED ORAL at 11:25

## 2018-06-06 RX ADMIN — CHLORHEXIDINE GLUCONATE SCH: 213 SOLUTION TOPICAL at 21:40

## 2018-06-06 RX ADMIN — ATORVASTATIN CALCIUM SCH MG: 40 TABLET, FILM COATED ORAL at 21:40

## 2018-06-06 RX ADMIN — CARVEDILOL SCH MG: 6.25 TABLET, FILM COATED ORAL at 21:40

## 2018-06-06 NOTE — PN
Physical Exam: 


SUBJECTIVE: Patient seen and examined at bedside. Overnight, pt refusing 

physical therapy. Today, pt in good spirits. Very pleasant, compliant with 

physical exam. Denies HA, fever, or chills. 





OBJECTIVE:





 Vital Signs











 Period  Temp  Pulse  Resp  BP Sys/Varghese  Pulse Ox


 


 Last 24 Hr  97.5 F-98.6 F    18-20  144-154/87-97  98











GENERAL: Very pleasant today. awake, alert, and fully oriented, in no acute 

distress.


HEAD: Normal with no signs of trauma.


EYES: PERRL, extraocular movements intact, sclera anicteric, conjunctiva clear. 


ENT: Ears normal, nares patent, oropharynx clear without exudates


NECK: Trachea midline, supple. 


LUNGS: Breath sounds equal, clear to auscultation bilaterally, no wheezes, no 

crackles, no accessory muscle use. 


HEART: Regular rate and rhythm, S1, S2-in sinus without murmur, rub or gallop.


ABDOMEN: Soft, nontender, nondistended, normoactive bowel sounds, no guarding, 

no rebound


EXTREMITIES: 1+ dp pulses bilaterally. Improved L foot- less erythema, less 

edema. 1+ dp pulse more appreciable today . +psoriatic rash medially. 


NEUROLOGICAL: Cranial nerves II through XII grossly intact. Normal speech


PSYCH: Normal mood, normal affect.


SKIN: Warm, dry, normal turgor








 Laboratory Results - last 24 hr











  06/06/18 06/06/18 06/06/18





  06:00 06:00 06:00


 


WBC  10.4 H  


 


RBC  3.23 L  


 


Hgb  10.0 L  


 


Hct  30.1 L  


 


MCV  93.3  


 


MCH  31.1  


 


MCHC  33.4  


 


RDW  15.8  


 


Plt Count  320  


 


MPV  8.6  


 


PT with INR   


 


INR   


 


PTT (Actin FS)   71.5 H 


 


Sodium    142


 


Potassium    4.7


 


Chloride    112 H


 


Carbon Dioxide    22


 


Anion Gap    8


 


BUN    13


 


Creatinine    1.3


 


Random Glucose    96


 


Calcium    7.9 L














  06/06/18





  06:00


 


MPV 


 


PT with INR  25.60 H


 


INR  2.27 H D


 


Creatinine 








Active Medications











Generic Name Dose Route Start Last Admin





  Trade Name Freq  PRN Reason Stop Dose Admin


 


Acetaminophen  650 mg  06/02/18 10:20  06/05/18 21:01





  Tylenol -  PO   650 mg





  Q6H PRN   Administration





  FEVER   


 


Aspirin  81 mg  06/03/18 10:00  06/06/18 11:24





  Ecotrin -  PO   81 mg





  DAILY [facility]   Administration





     


 


Atorvastatin Calcium  40 mg  06/02/18 22:00  06/05/18 21:01





  Lipitor -  PO   40 mg





  HS [facility]   Administration





     


 


Carvedilol  6.25 mg  06/02/18 22:00  06/06/18 11:25





  Coreg -  PO   6.25 mg





  BID [facility]   Administration





     


 


Chlorhexidine Gluconate  1 applic  06/02/18 22:00  06/05/18 21:01





  Hibiclens For Decolonization -  TP   Not Given





  HS [facility]   





     


 


Fentanyl  50 mcg  06/02/18 10:20  





  Sublimaze Injection -  IVPUSH   





  [id number] PRN   





  PAIN-PACU ORDER X 4 DOSES ONLY   





     


 


Sodium Chloride  1,000 mls @ 75 mls/hr  06/05/18 17:44  06/05/18 18:56





  Normal Saline -  IV   75 mls/hr





  ASDIR [facility]   Administration





     


 


Morphine Sulfate  2 mg  06/05/18 10:37  





  Morphine Sulfate  IVPUSH   





  Q6H PRN   





  PAIN LEVEL 7 - 10   


 


Mupirocin  1 applic  06/02/18 22:00  06/06/18 11:25





  Bactroban Ointment (For Decolonization) -  NS  06/06/18 21:59  Not Given





  BID [facility]   





     


 


Nicotine  14 mg  06/03/18 10:00  06/06/18 11:25





  Nicoderm Patch -  TD   Not Given





  DAILY [facility]   





     


 


Ondansetron HCl  4 mg  06/02/18 10:20  





  Zofran Injection  IVPUSH   





  Q6H PRN   





  NAUSEA AND/OR VOMITING   


 


Promethazine HCl  12.5 mg  06/02/18 10:20  





  Phenergan Injection -  IVPUSH   





  Q6H PRN   





  NAUSEA-FOR RESCUE AFTER 15 MIN   


 


Warfarin Sodium  7.5 mg  06/02/18 18:00  06/05/18 18:56





  Coumadin -  PO   7.5 mg





  DAILY@1800 [facility]   Administration








IMAGING





-5/26/18: Duplex, arterial duplex lower ext: no evidence of DVT in both lower 

extremities. compared to prior b/l lower extremity duplex arterial US from 8/26/ 17, in the RLE there is now normal triphasic waveform in the common femoral 

artery, superficial femoral, popliteal, posterior tibial a. atheromatous 

plaques are present throughout. in the LLE, there is normal triphasic waveform 

in L common femoral a. there is no definite flow within the L superficial 

femoral, popliteal, and posterior tibial a. consistent with occlusion. diffuse 

atheromatous plaques are present throughout.





-5/26/18: CXR: since prior study 5/26/18, new congestive changes with 

persistently elevated L hemidiaphragm and some bibasilar atelctatic/

infiltrative changes with the L more affected than the R. there may be some L 

fluid. correlation recommended.





-5/26/18: L foot XR: pes planus deformity, exostosis/degenerative change of 

navicular bone. bunion formation of 1st MTP with extenive arthritic changes. 

the toes are partially flexed with arthritic changes. no sign of calcaneal 

spurring. blastic changes are not seen. there appears to be a hypodene/cystic 

change at the base of the proximal phalynx of the 2nd metatarsal. correlation 

recommended. if symptoms persist, further imaging and ortho consultation may be 

needed.





Microbiology





05/27/18 10:30   Urine - Urine Clean Catch   Urine Culture - Final


                              NO GROWTH OBTAINED


05/26/18 21:40   Blood - Peripheral Venous   Blood Culture - Final


                              NO GROWTH AFTER 5 DAYS INCUBATION


05/26/18 21:19   Blood - Peripheral Venous   Blood Culture - Final


                              NO GROWTH AFTER 5 DAYS INCUBATION





ASSESSMENT/PLAN:





77 y/o M with PMH alcohol abuse, HTN, CAD, afib (on coumadin), PAD s/p 

thrombectomy 2017, who presented to the ED with acute L foot pain. Pt admitted 

for acute L foot pain, r/o ischemia.





#Acute L foot pain, r/o ischemia


-s/p tPA, aortogram, LLE angiogram, thrombolysis


-extensive clot needed continuation of hep gtt (started 5/30/18) and tPA


-on hep gtt, bridging with coumadin 7.5mg PO qd. 


-received additional dose coumadin 2.5mg PO yesterday (6/5)


-INR improved from 1.65 to 2.27*


-will need consistent level, to prevent additional clot develop


-Continue asa 81, lipitor 40mg PO qd 


-Continue to follow INR, PTT 


-d/w Dr. Chan





#HAM likely 2/2 prerenal 


-Continue IV NS 75 cc/hr


-received contrast for procedure x 2


-Cr improved to 1.3





#Hyperkalemia- resolved





#Afib - currently in sinus


-continue coreg 6.25mg PO BID


-aspirin 81mg PO qd 


-F/u with cardiology as outpt





#HLD


-Continue lipitor 40mg PO qd





#Smoking cessation


-Continue nicotine patch 14mg TD daily





#Supratherapeutic INR-resolved





#Possible cellulitis-resolved


-received clinda -currently monitoring off abx


-ucx, blood cx - NGTD 





#F/E/N


IV NS 75 cc/hr


Continue to follow lytes


diabetic/sodium controlled diet 





#PPX


s/p tPA


bridging hep gtt, coumadin 





#Dispo


-continued monitoring med-surg . improved INR, will need consistent 

stabilization prior to d/c 


-has been refusing PT


-will need to speak to tomorrow about VNS 


-will need outpatient DC cardioversion 3-4 weeks after on a/c


-at home coumadin dose is 6mg ** 


-as well as cardio f/u 





Visit type





- Emergency Visit


Emergency Visit: No





- New Patient


This patient is new to me today: No





- Critical Care


Critical Care patient: No

## 2018-06-06 NOTE — OP
DATE OF OPERATION:  06/02/2018

 

PREOPERATIVE DIAGNOSIS:  Left lower extremity ischemia.

 

POSTOPERATIVE DIAGNOSIS:  Left lower extremity ischemia.

 

PROCEDURE:  Left lower extremity angiogram, superficial femoral artery angioplasty

with stent placement.

 

SURGEON:  Perry Still DO

 

ANESTHESIA:  Fractional.

 

BLOOD LOSS:  50 mL.

 

The patient is a 78-year-old male who came in with left lower extremity ischemia.  He

had restarted thrombolysis the day before and now comes in for a thrombolysis check. 

Patient's entire foot is nice and warm with palpable DP and PT pulses and it was

decided now the patient needs an angiogram and to take the catheters out.  Patient

was consented for the procedure understanding all risks, benefits and alternatives.

 

He was then taken to the operating room.  Once in the operating room he was laid on

the operating table in the supine manner and the area of the left and right groin was

prepped and draped in a sterile surgical manner.  Using Betadine we were able to prep

the catheters in a sterile surgical manner.  We then went ahead and placed a 0.035

floppy guidewire into our Ekos catheter and the Ekos catheter was removed.  We then

shot an angiogram of the left lower extremity via hand injection showing that the

common femoral artery, the profunda and the SFA are patent.  The mid to distal SFA

has an 80% stenosis.  The distal SFA is patent.  The popliteal artery is patent and

the patient's main runoff is via his PTRE going right into his foot.

 

At this point we administered 5000 units of IV heparin to the patient.  We went ahead

and used a 6 x 8 LifeStent, placed a 6 x 8 LifeStent and ballooned it in place using

a 5 x 8 Ultraverse balloon.  Completion angiogram now showed that the SFA was now

patent, there was no recoil and there was good brisk flow down into the foot.  At

this point  we brought our sheath up and over and [employer] device was successfully

deployed in the right common femoral artery.  Pressure was held for 5 minutes.  After

there was no bleeding.  The area was wet and dried and Dermabond was placed.  Patient

tolerated the procedure with no complication.  Patient transferred to PACU in stable

condition where patient has good dopplerable DP and PT pulses.

 

 

PERRY STILL DO

 

NP/[id number]

DD: 06/06/2018 09:06

DT: 06/06/2018 09:28

Job #:  [id number]

## 2018-06-06 NOTE — PN
Progress Note, Physician


History of Present Illness: 


LLE ischemic discomfort improved post left SFA PTA and stent.











- Current Medication List


Current Medications: 


Active Medications





Acetaminophen (Tylenol -)  650 mg PO Q6H PRN


   PRN Reason: FEVER


   Last Admin: 06/05/18 21:01 Dose:  650 mg


Aspirin (Ecotrin -)  81 mg PO DAILY [facility]


   Last Admin: 06/06/18 11:24 Dose:  81 mg


Atorvastatin Calcium (Lipitor -)  40 mg PO HS [facility]


   Last Admin: 06/05/18 21:01 Dose:  40 mg


Carvedilol (Coreg -)  6.25 mg PO BID [facility]


   Last Admin: 06/06/18 11:25 Dose:  6.25 mg


Chlorhexidine Gluconate (Hibiclens For Decolonization -)  1 applic TP [facility]


   Last Admin: 06/05/18 21:01 Dose:  Not Given


Fentanyl (Sublimaze Injection -)  50 mcg IVPUSH [id number] PRN


   PRN Reason: PAIN-PACU ORDER X 4 DOSES ONLY


Heparin Sodium (Porcine) (Heparin -)  1,000 unit IVPUSH PRN PRN


   PRN Reason: Heparin


   Last Admin: 06/03/18 11:11 Dose:  1,000 unit


Heparin Sodium (Porcine) (Heparin -)  5,000 unit IVPUSH PRN PRN


   PRN Reason: Heparin


HEPARIN SOD,PORK IN 0.45% NACL (Heparin-1/2ns 25,000 Units/500)  25,000 units 

in 500 mls @ 20 mls/hr IVPB TITR [facility]; Protocol


   Last Admin: 06/06/18 12:30 Dose:  1,300 units/hr, 26 mls/hr


Sodium Chloride (Normal Saline -)  1,000 mls @ 75 mls/hr IV ASDIR [facility]


   Last Admin: 06/05/18 18:56 Dose:  75 mls/hr


Morphine Sulfate (Morphine Sulfate)  2 mg IVPUSH Q6H PRN


   PRN Reason: PAIN LEVEL 7 - 10


Mupirocin (Bactroban Ointment (For Decolonization) -)  1 applic NS BID [facility]


   Stop: 06/06/18 21:59


   Last Admin: 06/06/18 11:25 Dose:  Not Given


Nicotine (Nicoderm Patch -)  14 mg TD DAILY [facility]


   Last Admin: 06/06/18 11:25 Dose:  Not Given


Ondansetron HCl (Zofran Injection)  4 mg IVPUSH Q6H PRN


   PRN Reason: NAUSEA AND/OR VOMITING


Promethazine HCl (Phenergan Injection -)  12.5 mg IVPUSH Q6H PRN


   PRN Reason: NAUSEA-FOR RESCUE AFTER 15 MIN


Warfarin Sodium (Coumadin -)  7.5 mg PO DAILY@1800 [facility]


   Last Admin: 06/05/18 18:56 Dose:  7.5 mg











- Objective


Vital Signs: 


 Vital Signs











Temperature  98.6 F   06/06/18 13:17


 


Pulse Rate  101 H  06/06/18 13:17


 


Respiratory Rate  18   06/06/18 13:17


 


Blood Pressure  144/97   06/06/18 13:17


 


O2 Sat by Pulse Oximetry (%)  98   06/05/18 21:00











Constitutional: Yes: No Distress, Calm


Neck: Yes: Supple


Cardiovascular: Yes: Regular Rate and Rhythm


Respiratory: Yes: Regular, CTA Bilaterally


Gastrointestinal: Yes: Normal Bowel Sounds, Soft


Edema: Yes


Edema: LLE: Trace, RLE: Trace


Labs: 


 CBC, BMP





 06/06/18 06:00 





 06/06/18 06:00 





 INR, PTT











INR  2.27  (0.82-1.09)  H D 06/06/18  06:00    


 


Fibrinogen  205.0 mg/dL (238-498)  L D 06/02/18  07:00    














Problem List





- Problems


(1) Acute-on-chronic kidney injury


Code(s): N17.9 - ACUTE KIDNEY FAILURE, UNSPECIFIED; N18.9 - CHRONIC KIDNEY 

DISEASE, UNSPECIFIED   


Qualifiers: 


   Chronic kidney disease stage: stage 2 (mild) 





(2) Demand ischemia


Code(s): I24.8 - OTHER FORMS OF ACUTE ISCHEMIC HEART DISEASE   





(3) Anticoagulant long-term use


Code(s): Z79.01 - LONG TERM (CURRENT) USE OF ANTICOAGULANTS   





(4) Coronary artery disease


Code(s): I25.10 - ATHSCL HEART DISEASE OF NATIVE CORONARY ARTERY W/O ANG PCTRS 

  


Qualifiers: 


   Coronary Disease-Associated Artery/Lesion type: native artery   Native vs. 

transplanted heart: native heart   Associated angina: without angina   

Qualified Code(s): I25.10 - Atherosclerotic heart disease of native coronary 

artery without angina pectoris   





(5) Diastolic dysfunction without heart failure


Code(s): I51.9 - HEART DISEASE, UNSPECIFIED   





(6) HTN (hypertension)


Code(s): I10 - ESSENTIAL (PRIMARY) HYPERTENSION   


Qualifiers: 


   Hypertension type: essential hypertension   Qualified Code(s): I10 - 

Essential (primary) hypertension   





(7) Hypertrophic cardiomyopathy


Code(s): I42.2 - OTHER HYPERTROPHIC CARDIOMYOPATHY   





(8) Peripheral arterial disease


Code(s): I73.9 - PERIPHERAL VASCULAR DISEASE, UNSPECIFIED   





Assessment/Plan


R&LHc at [facility] 1/11/2017 showing nonobstructive CAD, severe LV apical 

hypertrophic cardiomyopathy, mildly elevated right sided pressures, Mynx 

deployed right CFA access site. Study is consistent with apical hypertrophy (

spade-like) variant of hypertrophic cardiomyopathy, planned for optimal medical 

therapy. 





1. PAD with left limb ischemia referable to SFA occlusion s/p PTA and stent


2. 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 

disease related to persistent atrial fibrillation with [id number] score of 6, 

history of poor compliance with therapy administration and medical F/U


3. CAD with demand ischemic injury, non obstructive CAD on R&LHc coronary 

angiogrpahy angina pectoris


4. 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 

chronic class I-II NYHA classification, compensated/euvolemic


5. Persistent atrial flutter [id number] score of 6 on Coumadin therapy, with 

therapeutic INR


6. HTN


7. Acute on CKD improving


8. Poor compliance with medical therapy administration and medical F/U


9. Tobacco abuse








PLAN:





1. Coumadin per INR, d/c heparin gtt


2. Continue Carvedilol 6.25 bid


3. Continue ASA 81 qd 


4. Continue Lipitor 40 qhs


5. Consider outpatient DCCV for the above noted atrial arrhythmia once 

clinically stabilized. Patient was advised to follow up in office (previously 

been to office)


6. D/c planning

## 2018-06-06 NOTE — PN
Teaching Attending Note


Name of Resident: Ruma Guevara





ATTENDING PHYSICIAN STATEMENT





I saw and evaluated the patient.


I reviewed the resident's note and discussed the case with the resident.


I agree with the resident's findings and plan as documented.








SUBJECTIVE:





Patient is feeling better. No fever or chills, 


OBJECTIVE:





 Vital Signs











Temperature  97.9 F   06/06/18 06:00


 


Pulse Rate  83   06/06/18 06:00


 


Respiratory Rate  20   06/06/18 06:00


 


Blood Pressure  154/92   06/06/18 06:00


 


O2 Sat by Pulse Oximetry (%)  98   06/05/18 21:00








 CBCD











WBC  10.4 K/mm3 (4.0-10.0)  H  06/06/18  06:00    


 


RBC  3.23 M/mm3 (4.00-5.60)  L  06/06/18  06:00    


 


Hgb  10.0 GM/dL (11.7-16.9)  L  06/06/18  06:00    


 


Hct  30.1 % (35.4-49)  L  06/06/18  06:00    


 


MCV  93.3 fl (80-96)   06/06/18  06:00    


 


MCHC  33.4 g/dl (32.0-35.9)   06/06/18  06:00    


 


RDW  15.8 % (11.9-15.9)   06/06/18  06:00    


 


Plt Count  320 K/MM3 (134-434)   06/06/18  06:00    


 


MPV  8.6 fl (7.5-11.1)   06/06/18  06:00    








 CMP











Sodium  142 mmol/L (136-145)   06/06/18  06:00    


 


Potassium  4.7 mmol/L (3.5-5.1)   06/06/18  06:00    


 


Chloride  112 mmol/L ()  H  06/06/18  06:00    


 


Carbon Dioxide  22 mmol/L (21-32)   06/06/18  06:00    


 


Anion Gap  8  (8-16)   06/06/18  06:00    


 


BUN  13 mg/dL (7-18)   06/06/18  06:00    


 


Creatinine  1.3 mg/dL (0.7-1.3)   06/06/18  06:00    


 


Creat Clearance w eGFR  58.40  (>60)   06/02/18  07:00    


 


Random Glucose  96 mg/dL ()   06/06/18  06:00    


 


Calcium  7.9 mg/dL (8.5-10.1)  L  06/06/18  06:00    


 


Total Bilirubin  0.6 mg/dL (0.2-1.0)   06/02/18  07:00    


 


AST  26 U/L (15-37)   06/02/18  07:00    


 


ALT  37 U/L (12-78)   06/02/18  07:00    


 


Alkaline Phosphatase  109 U/L ()   06/02/18  07:00    


 


Total Protein  6.3 g/dl (6.4-8.2)  L  06/02/18  07:00    


 


Albumin  2.1 g/dl (3.4-5.0)  L  06/02/18  07:00    








 CARDIAC ENZYMES











Creatine Kinase  227 IU/L ()   05/25/18  22:50    


 


Troponin I  0.35 ng/ml (0.00-0.05)  H  05/26/18  08:25    








 Current Medications











Generic Name Dose Route Start Last Admin





  Trade Name Freq  PRN Reason Stop Dose Admin


 


Acetaminophen  650 mg  06/02/18 10:20  06/05/18 21:01





  Tylenol -  PO   650 mg





  Q6H PRN   Administration





  FEVER   





     





     





     


 


Aspirin  81 mg  06/03/18 10:00  06/06/18 11:24





  Ecotrin -  PO   81 mg





  DAILY AVA   Administration





     





     





     





     


 


Atorvastatin Calcium  40 mg  06/02/18 22:00  06/05/18 21:01





  Lipitor -  PO   40 mg





  HS AVA   Administration





     





     





     





     


 


Carvedilol  6.25 mg  06/02/18 22:00  06/06/18 11:25





  Coreg -  PO   6.25 mg





  BID AVA   Administration





     





     





     





     


 


Chlorhexidine Gluconate  1 applic  06/02/18 22:00  06/05/18 21:01





  Hibiclens For Decolonization -  TP   Not Given





  HS AVA   





     





     





     





     


 


Fentanyl  50 mcg  06/02/18 10:20  





  Sublimaze Injection -  IVPUSH   





  [id number] PRN   





  PAIN-PACU ORDER X 4 DOSES ONLY   





     





     





     


 


Heparin Sodium (Porcine)  1,000 unit  06/02/18 09:41  06/03/18 11:11





  Heparin -  IVPUSH   1,000 unit





  PRN PRN   Administration





  Heparin   





     





     





     


 


Heparin Sodium (Porcine)  5,000 unit  06/02/18 09:41  





  Heparin -  IVPUSH   





  PRN PRN   





  Heparin   





     





     





     


 


HEPARIN SOD,PORK IN 0.45% NACL  25,000 units in 500 mls @ 20 mls/hr  06/02/18 09

:45  06/05/18 10:28





  Heparin-1/2ns 25,000 Units/500  IVPB   Not Given





  TITR [facility]   





     





     





  Protocol   





  1,000 UNITS/HR   


 


Sodium Chloride  1,000 mls @ 75 mls/hr  06/05/18 17:44  06/05/18 18:56





  Normal Saline -  IV   75 mls/hr





  ASDIR AVA   Administration





     





     





     





     


 


Morphine Sulfate  2 mg  06/05/18 10:37  





  Morphine Sulfate  IVPUSH   





  Q6H PRN   





  PAIN LEVEL 7 - 10   





     





     





     


 


Mupirocin  1 applic  06/02/18 22:00  06/06/18 11:25





  Bactroban Ointment (For Decolonization) -  NS  06/06/18 21:59  Not Given





  BID [facility]   





     





     





     





     


 


Nicotine  14 mg  06/03/18 10:00  06/06/18 11:25





  Nicoderm Patch -  TD   Not Given





  DAILY [facility]   





     





     





     





     


 


Ondansetron HCl  4 mg  06/02/18 10:20  





  Zofran Injection  IVPUSH   





  Q6H PRN   





  NAUSEA AND/OR VOMITING   





     





     





     


 


Promethazine HCl  12.5 mg  06/02/18 10:20  





  Phenergan Injection -  IVPUSH   





  Q6H PRN   





  NAUSEA-FOR RESCUE AFTER 15 MIN   





     





     





     


 


Warfarin Sodium  7.5 mg  06/02/18 18:00  06/05/18 18:56





  Coumadin -  PO   7.5 mg





  DAILY@1800 [facility]   Administration





     





     





     





     








 Home Medications











 Medication  Instructions  Recorded


 


Aspirin [Aspirin EC] 81 mg PO DAILY 05/25/18


 


Atorvastatin Calcium 40 mg PO DAILY 05/25/18


 


Carvedilol 6.25 mg PO DAILY 05/25/18


 


Warfarin Sodium 6 mg PO DAILY 05/25/18


 


Albuterol Sulfate Inhaler - 90 mcg PO QID PRN 05/26/18





[Ventolin HFA Inhaler -]  


 


Fluticasone/Vilanterol [Breo 1 each IH DAILY 05/26/18





Ellipta 200-25 Mcg INH]  


 


Ipratropium/Albuterol Sulfate 4 gm IH QID 05/26/18





[Combivent Respimat Inhal Spray]  





PE: per resident's note








ASSESSMENT AND PLAN:


Patient is a 79 y/o man with h/o PAD, CAD, HTN, hypertrophic cardiomyopathy,  b/

l SFA occlsion s/p thrombectomy, A fib, non compliance who presented with foot 

pain and was found to have L LE arterial occlusion





# Acute Limb ischemia: L SFA , popliteal, and posterior tibial arteries 

occlusion/thrombosis s/p angio and TPA  , On heparin gtt and coumadin bridge , 

INR 2.29 today , will give 7.5mg today.  cont ASA . Lipitor .  is on 

the case





# HAM:  Cr improved , continue to hold ARB for now . 





# A fib: cont coreg and heparin gtt. f/u wit card as out pt 





# CAD: last cath with non obstructive disease .continue Lipitor/asa and coreg





#-Nicotine dependence: cont patch 





Dispo: HLOC

## 2018-06-07 VITALS — DIASTOLIC BLOOD PRESSURE: 63 MMHG | HEART RATE: 90 BPM | TEMPERATURE: 98.6 F | SYSTOLIC BLOOD PRESSURE: 121 MMHG

## 2018-06-07 LAB
ANION GAP SERPL CALC-SCNC: 9 MMOL/L (ref 8–16)
BASOPHILS # BLD: 0.3 % (ref 0–2)
BUN SERPL-MCNC: 15 MG/DL (ref 7–18)
CALCIUM SERPL-MCNC: 8.2 MG/DL (ref 8.5–10.1)
CHLORIDE SERPL-SCNC: 110 MMOL/L (ref 98–107)
CO2 SERPL-SCNC: 22 MMOL/L (ref 21–32)
CREAT SERPL-MCNC: 1.3 MG/DL (ref 0.7–1.3)
DEPRECATED RDW RBC AUTO: 15.8 % (ref 11.9–15.9)
EOSINOPHIL # BLD: 0.3 % (ref 0–4.5)
GLUCOSE SERPL-MCNC: 88 MG/DL (ref 74–106)
HCT VFR BLD CALC: 30.2 % (ref 35.4–49)
HGB BLD-MCNC: 10 GM/DL (ref 11.7–16.9)
INR BLD: 3.27 (ref 0.82–1.09)
LYMPHOCYTES # BLD: 17.3 % (ref 8–40)
MAGNESIUM SERPL-MCNC: 1.9 MG/DL (ref 1.8–2.4)
MCH RBC QN AUTO: 31 PG (ref 25.7–33.7)
MCHC RBC AUTO-ENTMCNC: 33.3 G/DL (ref 32–35.9)
MCV RBC: 93 FL (ref 80–96)
MONOCYTES # BLD AUTO: 6.7 % (ref 3.8–10.2)
NEUTROPHILS # BLD: 75.4 % (ref 42.8–82.8)
PHOSPHATE SERPL-MCNC: 3.1 MG/DL (ref 2.5–4.9)
PLATELET # BLD AUTO: 320 K/MM3 (ref 134–434)
PMV BLD: 8.5 FL (ref 7.5–11.1)
POTASSIUM SERPLBLD-SCNC: 4.6 MMOL/L (ref 3.5–5.1)
PT PNL PPP: 37 SEC (ref 9.7–13)
RBC # BLD AUTO: 3.24 M/MM3 (ref 4–5.6)
SODIUM SERPL-SCNC: 141 MMOL/L (ref 136–145)
WBC # BLD AUTO: 11.1 K/MM3 (ref 4–10)

## 2018-06-07 RX ADMIN — NICOTINE SCH: 14 PATCH, EXTENDED RELEASE TRANSDERMAL at 10:06

## 2018-06-07 RX ADMIN — CARVEDILOL SCH MG: 6.25 TABLET, FILM COATED ORAL at 10:06

## 2018-06-07 RX ADMIN — ASPIRIN SCH MG: 81 TABLET, COATED ORAL at 10:06

## 2018-06-07 NOTE — PN
Progress Note, Physician


History of Present Illness: 


LLE ischemic discomfort improved post left SFA PTA and stent.











- Current Medication List


Current Medications: 


Active Medications





Acetaminophen (Tylenol -)  650 mg PO Q6H PRN


   PRN Reason: FEVER


   Last Admin: 06/05/18 21:01 Dose:  650 mg


Aspirin (Ecotrin -)  81 mg PO DAILY [facility]


   Last Admin: 06/07/18 10:06 Dose:  81 mg


Atorvastatin Calcium (Lipitor -)  40 mg PO [facility]


   Last Admin: 06/06/18 21:40 Dose:  40 mg


Carvedilol (Coreg -)  6.25 mg PO BID [facility]


   Last Admin: 06/07/18 10:06 Dose:  6.25 mg


Chlorhexidine Gluconate (Hibiclens For Decolonization -)  1 applic TP [facility]


   Last Admin: 06/06/18 21:40 Dose:  Not Given


Fentanyl (Sublimaze Injection -)  50 mcg IVPUSH [id number] PRN


   PRN Reason: PAIN-PACU ORDER X 4 DOSES ONLY


Morphine Sulfate (Morphine Sulfate)  2 mg IVPUSH Q6H PRN


   PRN Reason: PAIN LEVEL 7 - 10


Nicotine (Nicoderm Patch -)  14 mg TD DAILY [facility]


   Last Admin: 06/07/18 10:06 Dose:  Not Given


Ondansetron HCl (Zofran Injection)  4 mg IVPUSH Q6H PRN


   PRN Reason: NAUSEA AND/OR VOMITING


Promethazine HCl (Phenergan Injection -)  12.5 mg IVPUSH Q6H PRN


   PRN Reason: NAUSEA-FOR RESCUE AFTER 15 MIN


Warfarin Sodium (Coumadin -)  7.5 mg PO DAILY@1800 [facility]


   Last Admin: 06/06/18 18:40 Dose:  7.5 mg











- Objective


Vital Signs: 


 Vital Signs











Temperature  99 F   06/07/18 06:00


 


Pulse Rate  86   06/07/18 06:00


 


Respiratory Rate  20   06/07/18 06:00


 


Blood Pressure  119/95   06/07/18 06:00


 


O2 Sat by Pulse Oximetry (%)  98   06/06/18 21:00











Constitutional: Yes: No Distress, Calm


Neck: Yes: Supple


Cardiovascular: Yes: Pulse Irregular


Respiratory: Yes: Regular, CTA Bilaterally


Gastrointestinal: Yes: Normal Bowel Sounds, Soft


Edema: No


Labs: 


 CBC, BMP





 06/07/18 06:20 





 06/07/18 06:20 





 INR, PTT











INR  3.27  (0.82-1.09)  H D 06/07/18  06:20    


 


Fibrinogen  205.0 mg/dL (238-498)  L D 06/02/18  07:00    














Problem List





- Problems


(1) Acute-on-chronic kidney injury


Code(s): N17.9 - ACUTE KIDNEY FAILURE, UNSPECIFIED; N18.9 - CHRONIC KIDNEY 

DISEASE, UNSPECIFIED   


Qualifiers: 


   Chronic kidney disease stage: stage 2 (mild) 





(2) Demand ischemia


Code(s): I24.8 - OTHER FORMS OF ACUTE ISCHEMIC HEART DISEASE   





(3) Anticoagulant long-term use


Code(s): Z79.01 - LONG TERM (CURRENT) USE OF ANTICOAGULANTS   





(4) Coronary artery disease


Code(s): I25.10 - ATHSCL HEART DISEASE OF NATIVE CORONARY ARTERY W/O ANG PCTRS 

  


Qualifiers: 


   Coronary Disease-Associated Artery/Lesion type: native artery   Native vs. 

transplanted heart: native heart   Associated angina: without angina   

Qualified Code(s): I25.10 - Atherosclerotic heart disease of native coronary 

artery without angina pectoris   





(5) Diastolic dysfunction without heart failure


Code(s): I51.9 - HEART DISEASE, UNSPECIFIED   





(6) HTN (hypertension)


Code(s): I10 - ESSENTIAL (PRIMARY) HYPERTENSION   


Qualifiers: 


   Hypertension type: essential hypertension   Qualified Code(s): I10 - 

Essential (primary) hypertension   





(7) Hypertrophic cardiomyopathy


Code(s): I42.2 - OTHER HYPERTROPHIC CARDIOMYOPATHY   





(8) Peripheral arterial disease


Code(s): I73.9 - PERIPHERAL VASCULAR DISEASE, UNSPECIFIED   





Assessment/Plan


R&LHc at [facility] 1/11/2017 showing nonobstructive CAD, severe LV apical 

hypertrophic cardiomyopathy, mildly elevated right sided pressures, Mynx 

deployed right CFA access site. Study is consistent with apical hypertrophy (

spade-like) variant of hypertrophic cardiomyopathy, planned for optimal medical 

therapy. 





1. PAD with left limb ischemia referable to SFA occlusion s/p PTA and stent


2. 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 

disease related to persistent atrial fibrillation with [id number] score of 6, 

history of poor compliance with therapy administration and medical F/U


3. CAD with demand ischemic injury, non obstructive CAD on R&LHc coronary 

angiogrpahy angina pectoris


4. 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 

chronic class I-II NYHA classification, compensated/euvolemic


5. Persistent atrial flutter [id number] score of 6 on Coumadin therapy, with 

supratherapeutic INR


6. HTN


7. Acute on CKD improving


8. Poor compliance with medical therapy administration and medical F/U


9. Tobacco abuse








PLAN:





1. D/w primary team, plan to change from Coumadin to Xarelto 20 qd once INR<3.0


2. Continue Carvedilol 6.25 bid


3. Continue ASA 81 qd 


4. Continue Lipitor 40 qhs


5. Consider outpatient DCCV for the above noted atrial arrhythmia once 

clinically stabilized. Patient was advised to follow up in office (previously 

been to office)


6. D/c planning

## 2018-06-07 NOTE — DS
Physical Exam: 


SUBJECTIVE: Patient seen and examined at bedside. Overnight, pt with elevated 

BP (154/109) thus received norvasc 5mg PO x 1. Today, pt in good spirits, 

without complaint. OOB in chair, talking with neighbor. Discharge plan 

discussed at length with Mr. Avila, expressed verbal understanding. 





OBJECTIVE:





 Vital Signs











 Period  Temp  Pulse  Resp  BP Sys/Varghese  Pulse Ox


 


 Last 24 Hr  98.3 F-99 F    18-20  119-154/  98-98








PHYSICAL EXAM





GENERAL: The patient is OOB in a chair, awake, alert, and fully oriented, in no 

acute distress. Talking with neighbor


HEAD: Normal with no signs of trauma.


EYES: PERRL, extraocular movements intact, sclera anicteric, conjunctiva clear. 


ENT: Ears normal, nares patent, oropharynx clear without exudates, moist mucous 

membranes.


NECK: Trachea midline, supple. 


LUNGS: Breath sounds equal, clear to auscultation bilaterally, no wheezes, no 

crackles, no accessory muscle use. +Increased expiratory phase 


HEART: Regular rate and rhythm-in sinus , S1, S2 without murmur, rub or gallop.


ABDOMEN: Soft, nontender, nondistended, normoactive bowel sounds, no guarding, 

no rebound


EXTREMITIES: 1+ b/l dp pulses. improved edema, erythema in L foot. +psoriatic 

rash-medial, instep


NEUROLOGICAL: Cranial nerves II through XII grossly intact. Able to cherry, 

invert ankle


PSYCH: Positive mood, normal affect.


SKIN: Warm, dry, normal turgor





LABS


                       PTT TREND








  05/25/18 05/26/18 05/26/18





  22:50 08:25 21:19


 


PTT (Actin FS)  38.6 H  41.6 H  39.9 H














  05/31/18 05/31/18 06/01/18





  10:28 22:30 06:10


 


PTT (Actin FS)  41.7 H  44.6 H  46.3 H














  06/01/18 06/01/18 06/02/18





  12:50 23:00 07:00


 


PTT (Actin FS)  55.5 H  30.2  D  32.2














  06/02/18 06/02/18 06/03/18





  16:15 21:40 06:30


 


PTT (Actin FS)  46.6 H D  44.2 H  41.1 H














  06/03/18 06/03/18 06/04/18





  15:15 16:50 07:42


 


PTT (Actin FS)  57.3 H D  55.3 H  55.6 H














  06/05/18 06/06/18 06/07/18





  06:30 06:00 06:20


 


PTT (Actin FS)  62.7 H  71.5 H  35.2 H D








 INR TREND








  05/25/18 05/26/18 05/26/18





  22:50 08:25 21:19


 


INR  3.97 H  4.15 H*  4.88 H*














  05/27/18 05/28/18 05/29/18





  06:00 08:55 06:43


 


INR  5.08 H*  4.28 H*  2.58 H D














  05/30/18 05/31/18 06/01/18





  09:50 09:15 09:30


 


INR  1.79 H D  1.46 H  1.40 H














  06/02/18 06/03/18 06/04/18





  07:00 06:00 07:26


 


INR  1.48 H  1.40 H  1.35 H














  06/05/18 06/06/18 06/07/18





  06:30 06:00 06:20


 


INR  1.65 H  2.27 H D  3.27 H D








 CBC TREND








  05/27/18 05/28/18 05/29/18





  06:00 07:00 06:43


 


WBC  14.7 H  12.0 H  9.2


 


Hgb  11.0 L  10.0 L  10.4 L


 


Hct  32.7 L  30.4 L  31.7 L


 


RDW   


 


Plt Count  266  264  287














  06/02/18 06/03/18 06/06/18





  07:00 06:30 06:00


 


WBC  11.3 H  8.4  10.4 H


 


Hgb  10.5 L  9.8 L  10.0 L


 


Hct  32.0 L  29.2 L  30.1 L


 


RDW  15.8   15.8


 


Plt Count   282 














  06/07/18





  06:20


 


WBC  11.1 H


 


Hgb  10.0 L


 


Hct  30.2 L


 


RDW  15.8


 


Plt Count 





 


 RENAL FUNCTION








  05/25/18 05/26/18 05/26/18





  22:50 08:25 21:19


 


BUN  16  D  18  22 H D


 


Creatinine  1.7 H D  1.9 H  1.9 H














  05/27/18 05/28/18 05/29/18





  06:00 07:00 06:43


 


BUN  22 H  21 H  18


 


Creatinine  1.8 H  1.6 H  1.5 H














  05/31/18 05/31/18 06/01/18





  06:20 12:20 06:10


 


BUN  14  14  15


 


Creatinine  1.3  1.2  1.2














  06/02/18 06/02/18 06/03/18





  00:20 07:00 06:30


 


BUN  16  16  16


 


Creatinine  1.1  1.2  1.5 H D














  06/04/18 06/05/18 06/07/18





  07:42 06:20 06:20


 


BUN  12  D  11  15


 


Creatinine  1.4 H  1.3  1.3





 


 URINE/MISCELLANEOUS








  05/25/18 05/27/18





  22:50 10:30


 


Urine Protein   1+ H


 


Urine Glucose (UA)   Negative


 


Urine Ketones   Trace H


 


Urine Blood   1+ H


 


Urine Nitrite   Negative


 


Ur Leukocyte Esterase   Negative


 


Alcohol, Quantitative  < 5.0 








IMAGING





-5/26/18: Duplex, arterial duplex lower ext: no evidence of DVT in both lower 

extremities. compared to prior b/l lower extremity duplex arterial US from 8/26/ 17, in the RLE there is now normal triphasic waveform in the common femoral 

artery, superficial femoral, popliteal, posterior tibial a. atheromatous 

plaques are present throughout. in the LLE, there is normal triphasic waveform 

in L common femoral a. there is no definite flow within the L superficial 

femoral, popliteal, and posterior tibial a. consistent with occlusion. diffuse 

atheromatous plaques are present throughout.





-5/26/18: CXR: since prior study 5/26/18, new congestive changes with 

persistently elevated L hemidiaphragm and some bibasilar atelctatic/

infiltrative changes with the L more affected than the R. there may be some L 

fluid. correlation recommended.





-5/26/18: L foot XR: pes planus deformity, exostosis/degenerative change of 

navicular bone. bunion formation of 1st MTP with extenive arthritic changes. 

the toes are partially flexed with arthritic changes. no sign of calcaneal 

spurring. blastic changes are not seen. there appears to be a hypodene/cystic 

change at the base of the proximal phalynx of the 2nd metatarsal. correlation 

recommended. if symptoms persist, further imaging and ortho consultation may be 

needed.





Microbiology





05/27/18 10:30   Urine - Urine Clean Catch   Urine Culture - Final


                              NO GROWTH OBTAINED


05/26/18 21:40   Blood - Peripheral Venous   Blood Culture - Final


                              NO GROWTH AFTER 5 DAYS INCUBATION


05/26/18 21:19   Blood - Peripheral Venous   Blood Culture - Final


                              NO GROWTH AFTER 5 DAYS INCUBATION





Procedures





-6/1/18: Pre-operative diagnosis: LLE ischemia. Operation: Aortogram, LLE 

angiogram, thrombolysis. Findings: Thrombosis LLE. Surgeon: Dr. Perry Chan





-6/2/18: Pre-operative diagnosis: LLE thrombosis, ischemia. Operation: LLE 

angiogram, SFA Angioplasty with stent placement. Surgeon: Dr. Perry Chan





HOSPITAL COURSE:





Date of Admission:05/26/18





Date of Discharge: 06/07/18





Admit diagnosis: Acute L foot pain r/o ischemia





79 y/o male with a PMH of Alcohol abuse, HTN, CAD, ischemic cardiomyopathy, 

Afib on coumadin 6mg , PAD who presented to the ED c/o severe L foot pain. As 

per pt, on admission, pain was associated with edema that started three days 

prior. During this time, he described the pain as 10/10, and radiating to his 

toes. Pain was so severe that he has had trouble ambulating. Pt admitted for 

acute L foot pain, r/o ischemia.





While pt was hospitalized, he was managed for the following:





Acute L foot pain, r/o ischemia


While in the hospital, d/t patient's decreased posterior tibial and dorsalis 

pedis pulses in L foot, as well as erythema and edema in the area, he underwent 

arterial duplex which revealed: no evidence of DVT in both lower extremities. 

in the LLE, no definite flow within the L superficial femoral, popliteal, and 

posterior tibial a. consistent with occlusion. Pt was seen by vascular and 

medicine teams, and placed on heparin gtt. He also received tPA for thrombolysis

, as pt was found to have an extensive clot in LLE. Pt was subsequently bridged 

with coumadin, 7.5mg PO qd and his INR was followed daily. When supratherapeutic

, it was held. On discharge, pt with INR of 3.27. Discussed at length with 

patient, importance of checking INR tomorrow morning (6/8/18) at hospital on 

first floor. Given prescription for lab. Pt will check INR and see Dr. Chan 

for follow-up in order to authorize pick-up of xarelto 20mg qd, which patient 

will be switched to. Instructions clearly written in packet, discussed with 

patient, and pharmacy ([facility]) called, d/w pharmacist at length. 

Pager number given for follow-up. During this time, pt will also continue on 

aspirin 81mg PO qd, and lipitor 40mg PO qd.





Acute kidney injury likely 2/2 prerenal causes


During his hospitalization, pt's Creatinine was found to be elevated reaching a 

max of 1.9. As pt underwent two procedures with contrast, he was given 

sufficient IVF to avoid contrast induced nephropathy. Initially he was placed 

on IV  cc/hr, however this was later reduced to 75 cc/hr. Day prior to 

discharge, pt removed his IV and was thus encouraged PO intake. His Creatinine 

improved to 1.3 on d/c.





Atrial fibrillation


Patient was seen by the cardiology team, and was continued on coreg 6.25mg PO 

BID, aspirin 81mg PO qd and will need to follow up as an outpatient. Pt will 

likely need DC conversion 3-4 weeks after he is on anticoagulation. 





Possible cellulitis, LLE


Upon admission, pt was thought to have cellulitis due to his lower extremity's 

presentation of erythema, edema. He received clindamycin, however it was 

discontinued shortly after. Urine cx and blood cx were negative for growth. 








Minutes to complete discharge: 50





Discharge Summary


Reason For Visit: PERIPHERAL ARTERIAL DISEASE


Current Active Problems





Acute-on-chronic kidney injury (Acute)


Pre-procedural cardiovascular examination (Acute)


Vascular occlusion (Acute)








Condition: Fair





- Instructions


Diet, Activity, Other Instructions: 


You were in the hospital because you had pain in your left foot. You had 

imaging tests done which showed that you had a clot in your left leg. 

Specifically, the clot blocked blood flow to the left superficial femoral, 

popliteal, and posterior tibial arteries in your leg. You had a procedure done 

with the vascular surgeon, Dr. Chan for this reason to help blood flow.





Your visit


you were seen by the vascular surgery, medicine, and cardiology teams.





Procedures


-On June 2nd, 2018, you had a procedure called a Left leg angiogram, with 

superficial femoral artery angioplasty with stent placement.


this was done with your vascular surgeon, Dr. Chan. 





Labs


VERY IMPORTANT: You must have your INR checked tomorrow (6/8/18). This can be 

done on the 1st Floor of the hospital in the registration center. If you are 

unsure, please go to Dr. Chan's office on floor 5. (located at [address]). We 

will give you a prescription for this. This must be done tomorrow to check your 

INR  level and see if it is high.





Medications


-You may continue your home medications.


-Please note the following important change:


   -Coumadin/Warfarin has been stopped


   -continue the following medications:


      -Aspirin 81mg daily


      -Lipitor 40mg daily at night


      -Coreg 6.25mg twice a day


      -Nicotine patch apply daily


      -Albuterol and Breo inhalers 


-You must have your INR checked tomorrow morning. Then you will be started on 

Xarelto 20 mg daily. This is a new blood thinner medication. However you must 

NOT start this medication until you have your INR checked tomorrow, and see Dr. Chan.


   


Follow-up


-Please follow up with Dr. Perry Chan tomorrow, 6/8/18 9AM. 


-You must have your INR checked tomorrow morning


-only after you are cleared by Dr. Chan, THEN you will start xarelto 20mg 

daily. 





-Please also follow-up with your heart doctor, Dr. Hobbs in 1 week. He saw you 

while you were in the hospital.


-Please also follow up with Dr. Vidal Fischer at the [facility] on 

Tuesday 6/12/18 to follow up. 





We hope you feel better soon. Good luck


Referrals: 


Vidal Fischer MD [Staff Physician] - 06/12/18 10:00 am


Olvin Hobbs MD [Staff Physician] - 1 Week


Perry Chan MD [Non Staff, Medical] - 06/08/18 9:00 am


Disposition: HOME





- Home Medications


Comprehensive Discharge Medication List: 


Ambulatory Orders





Aspirin [Aspirin EC] 81 mg PO DAILY 05/25/18 


Atorvastatin Calcium 40 mg PO DAILY 05/25/18 


Albuterol Sulfate Inhaler - [Ventolin HFA Inhaler -] 90 mcg PO QID PRN 05/26/18 


Fluticasone/Vilanterol [Breo Ellipta 200-25 Mcg INH] 1 each IH DAILY 05/26/18 


Ipratropium/Albuterol Sulfate [Combivent Respimat  Mcg] 4 gm IH QID 05/26/ 18 


Carvedilol [Coreg -] 6.25 mg PO BID #60 tablet 06/07/18 


Miscellaneous Drug Not In Syst [Outpatient Lab Test] 1 each  ASDIR #1 misc 06/ 07/18 


Nicotine Patch [Nicoderm Patch -] 14 mg TD DAILY 30 Days #30 patch 06/07/18 


Rivaroxaban [Xarelto -] 20 mg PO DAILY #30 tablet 06/07/18 








This patient is new to me today: No


Emergency Visit: No


Critical Care patient: No





- Discharge Referral


Referred to [facility] No

## 2018-06-07 NOTE — PN
Teaching Attending Note


Name of Resident: Ruma Guevara





ATTENDING PHYSICIAN STATEMENT





I saw and evaluated the patient.


I reviewed the resident's note and discussed the case with the resident.


I agree with the resident's findings and plan as documented.








SUBJECTIVE:


Patient wants to go home. No fever or chills, no shortness of breath.





OBJECTIVE:


 Vital Signs











Temperature  99 F   06/07/18 06:00


 


Pulse Rate  86   06/07/18 06:00


 


Respiratory Rate  20   06/07/18 06:00


 


Blood Pressure  119/95   06/07/18 06:00


 


O2 Sat by Pulse Oximetry (%)  98   06/06/18 21:00








 CBCD











WBC  11.1 K/mm3 (4.0-10.0)  H  06/07/18  06:20    


 


RBC  3.24 M/mm3 (4.00-5.60)  L  06/07/18  06:20    


 


Hgb  10.0 GM/dL (11.7-16.9)  L  06/07/18  06:20    


 


Hct  30.2 % (35.4-49)  L  06/07/18  06:20    


 


MCV  93.0 fl (80-96)   06/07/18  06:20    


 


MCHC  33.3 g/dl (32.0-35.9)   06/07/18  06:20    


 


RDW  15.8 % (11.9-15.9)   06/07/18  06:20    


 


Plt Count  320 K/MM3 (134-434)   06/07/18  06:20    


 


MPV  8.5 fl (7.5-11.1)   06/07/18  06:20    








 CMP











Sodium  141 mmol/L (136-145)   06/07/18  06:20    


 


Potassium  4.6 mmol/L (3.5-5.1)   06/07/18  06:20    


 


Chloride  110 mmol/L ()  H  06/07/18  06:20    


 


Carbon Dioxide  22 mmol/L (21-32)   06/07/18  06:20    


 


Anion Gap  9  (8-16)   06/07/18  06:20    


 


BUN  15 mg/dL (7-18)   06/07/18  06:20    


 


Creatinine  1.3 mg/dL (0.7-1.3)   06/07/18  06:20    


 


Creat Clearance w eGFR  58.40  (>60)   06/02/18  07:00    


 


Random Glucose  88 mg/dL ()   06/07/18  06:20    


 


Calcium  8.2 mg/dL (8.5-10.1)  L  06/07/18  06:20    


 


Total Bilirubin  0.6 mg/dL (0.2-1.0)   06/02/18  07:00    


 


AST  26 U/L (15-37)   06/02/18  07:00    


 


ALT  37 U/L (12-78)   06/02/18  07:00    


 


Alkaline Phosphatase  109 U/L ()   06/02/18  07:00    


 


Total Protein  6.3 g/dl (6.4-8.2)  L  06/02/18  07:00    


 


Albumin  2.1 g/dl (3.4-5.0)  L  06/02/18  07:00    








 CARDIAC ENZYMES











Creatine Kinase  227 IU/L ()   05/25/18  22:50    


 


Troponin I  0.35 ng/ml (0.00-0.05)  H  05/26/18  08:25    








 Current Medications











Generic Name Dose Route Start Last Admin





  Trade Name Freq  PRN Reason Stop Dose Admin


 


Acetaminophen  650 mg  06/02/18 10:20  06/05/18 21:01





  Tylenol -  PO   650 mg





  Q6H PRN   Administration





  FEVER   





     





     





     


 


Aspirin  81 mg  06/03/18 10:00  06/06/18 11:24





  Ecotrin -  PO   81 mg





  DAILY AVA   Administration





     





     





     





     


 


Atorvastatin Calcium  40 mg  06/02/18 22:00  06/06/18 21:40





  Lipitor -  PO   40 mg





  HS AVA   Administration





     





     





     





     


 


Carvedilol  6.25 mg  06/02/18 22:00  06/06/18 21:40





  Coreg -  PO   6.25 mg





  BID AVA   Administration





     





     





     





     


 


Chlorhexidine Gluconate  1 applic  06/02/18 22:00  06/06/18 21:40





  Hibiclens For Decolonization -  TP   Not Given





  HS [facility]   





     





     





     





     


 


Fentanyl  50 mcg  06/02/18 10:20  





  Sublimaze Injection -  IVPUSH   





  [id number] PRN   





  PAIN-PACU ORDER X 4 DOSES ONLY   





     





     





     


 


Morphine Sulfate  2 mg  06/05/18 10:37  





  Morphine Sulfate  IVPUSH   





  Q6H PRN   





  PAIN LEVEL 7 - 10   





     





     





     


 


Nicotine  14 mg  06/03/18 10:00  06/06/18 11:25





  Nicoderm Patch -  TD   Not Given





  DAILY [facility]   





     





     





     





     


 


Ondansetron HCl  4 mg  06/02/18 10:20  





  Zofran Injection  IVPUSH   





  Q6H PRN   





  NAUSEA AND/OR VOMITING   





     





     





     


 


Promethazine HCl  12.5 mg  06/02/18 10:20  





  Phenergan Injection -  IVPUSH   





  Q6H PRN   





  NAUSEA-FOR RESCUE AFTER 15 MIN   





     





     





     


 


Warfarin Sodium  7.5 mg  06/02/18 18:00  06/06/18 18:40





  Coumadin -  PO   7.5 mg





  DAILY@1800 AVA   Administration





     





     





     





     








 Home Medications











 Medication  Instructions  Recorded


 


Aspirin [Aspirin EC] 81 mg PO DAILY 05/25/18


 


Atorvastatin Calcium 40 mg PO DAILY 05/25/18


 


Carvedilol 6.25 mg PO DAILY 05/25/18


 


Warfarin Sodium 6 mg PO DAILY 05/25/18


 


Albuterol Sulfate Inhaler - 90 mcg PO QID PRN 05/26/18





[Ventolin HFA Inhaler -]  


 


Fluticasone/Vilanterol [Breo 1 each IH DAILY 05/26/18





Ellipta 200-25 Mcg INH]  


 


Ipratropium/Albuterol Sulfate 4 gm IH QID 05/26/18





[Combivent Respimat Inhal Spray]  














 Laboratory Tests











  12/24/17 12/24/17 12/25/17





  20:42 20:42 08:15


 


INR   7.33 H* D  7.37 H*


 


PTT (Actin FS)  46.6 H  














  12/26/17 06/04/18 06/05/18





  06:40 07:26 06:30


 


INR  6.19 H*  1.35 H  1.65 H


 


PTT (Actin FS)   














  06/06/18 06/07/18





  06:00 06:20


 


INR  2.27 H D  3.27 H D


 


PTT (Actin FS)  





PE: per resident's note








ASSESSMENT AND PLAN:


Patient is a 77 y/o man with h/o PAD, CAD, HTN, hypertrophic cardiomyopathy,  b/

l SFA occlsion s/p thrombectomy, A fib, non compliance who presented with foot 

pain and was found to have L LE arterial occlusion





# Acute Limb ischemia: L SFA , popliteal, and posterior tibial arteries 

occlusion/thrombosis s/p angio and TPA  ,off  heparin gtt and INR is 3.27, 

discussed with , will repeat INR in am , patient will see him in the 

office and start him on Xarelto since it's covered by his insurance. Patient 

start the medication if INR below 3.0. Will switch to xarelto since patient has 

a compliance issue.





# HAM:  Cr improved , will continue ARB  . 





# A fib: cont coreg and will place him  on Xarelto  





# CAD: last cath with non obstructive disease .continue Lipitor/asa and coreg





#-Nicotine dependence: cont patch 





patient will follow up with  in am , and check his INR if below 3.o 

 will give him an Rx for Xarelto.

## 2018-07-26 NOTE — PN
Problem: Occupational Therapy Goal  Goal: Occupational Therapy Goal  No OT goals established.  Outcome: Outcome(s) achieved Date Met: 07/26/18  No OT goals established.       Progress Note (short form)





- Note


Progress Note: 


 


Comfortable with no acute distress.

















Temperature  100 F H  09/03/17 17:43


 


Pulse Rate  90   09/03/17 17:43


 


Respiratory Rate  20   09/03/17 17:43


 


Blood Pressure  164/110   09/03/17 17:43


 


O2 Sat by Pulse Oximetry (%)  98   09/03/17 09:00








GENERAL: The patient is awake, alert, and fully oriented, in no acute distress.


HEAD: Normal with no signs of trauma.


EYES: PERRL, extraocular movements intact, sclera anicteric, conjunctiva clear. 


ENT: Ears normal,  oropharynx clear without exudates, moist mucous membranes.


NECK: Trachea midline, full range of motion, supple. 


LUNGS: Breath sounds equal, clear to auscultation bilaterally, no wheezes, no 

crackles, no accessory muscle use. 


HEART: Regular rate and rhythm, S1, S2 positive, ELIU 1/6, rub or gallop.


ABDOMEN: Soft, nontender, nondistended, normoactive bowel sounds, no guarding, 

no rebound, no hepatosplenomegaly, no masses.


EXTREMITIES: b/l approximately 8cm groin incisions closed with staples are clean

, dry, and intact without erythema or other signs of infection. both legs are 

warm to touch with intact motor and sensory function. pulses are POsitive  


NEUROLOGICAL: Cranial nerves II through XII grossly intact. Normal speech, gait 

not observed.


PSYCH: Normal mood, normal affect.


SKIN: Warm, dry, normal turgor, no rashes or lesions noted


 CBCD











WBC  10.6 K/mm3 (4.0-10.0)  H  08/31/17  05:20    


 


RBC  3.78 M/mm3 (4.00-5.60)  L  08/31/17  05:20    


 


Hgb  11.7 GM/dL (11.7-16.9)   08/31/17  05:20    


 


Hct  35.4 % (35.4-49)   08/31/17  05:20    


 


MCV  93.7 fl (80-96)   08/31/17  05:20    


 


MCHC  33.1 g/dl (32.0-35.9)   08/31/17  05:20    


 


RDW  14.8 % (11.9-15.9)   08/31/17  05:20    


 


Plt Count  307 K/MM3 (134-434)   08/31/17  05:20    


 


MPV  9.6 fl (7.5-11.1)   08/31/17  05:20    








 CMP











Sodium  139 mmol/L (136-145)   08/31/17  05:20    


 


Potassium  4.8 mmol/L (3.5-5.1)   08/31/17  05:20    


 


Chloride  105 mmol/L ()   08/31/17  05:20    


 


Carbon Dioxide  25 mmol/L (21-32)   08/31/17  05:20    


 


Anion Gap  9  (8-16)   08/31/17  05:20    


 


BUN  22 mg/dL (7-18)  H D 08/31/17  05:20    


 


Creatinine  1.0 mg/dL (0.7-1.3)   08/31/17  05:20    


 


Creat Clearance w eGFR  > 60  (>60)   08/27/17  06:30    


 


Random Glucose  99 mg/dL ()   08/31/17  05:20    


 


Calcium  7.7 mg/dL (8.5-10.1)  L  08/31/17  05:20    


 


Total Bilirubin  0.5 mg/dL (0.2-1.0)  D 08/27/17  06:30    


 


AST  15 U/L (15-37)   08/27/17  06:30    


 


ALT  17 U/L (12-78)   08/27/17  06:30    


 


Alkaline Phosphatase  65 U/L ()   08/27/17  06:30    


 


Total Protein  6.1 g/dl (6.4-8.2)  L  08/27/17  06:30    


 


Albumin  2.4 g/dl (3.4-5.0)  L  08/27/17  06:30    








 CARDIAC ENZYMES











Creatine Kinase  282 IU/L ()   08/26/17  18:45    


 


Troponin I  0.50 ng/ml (0.00-0.05)  H  08/28/17  05:35    








 Current Medications











Generic Name Dose Route Start Last Admin





  Trade Name Korin  PRN Reason Stop Dose Admin


 


Aspirin  81 mg 08/31/17 10:00 09/03/17 09:12





  Ecotrin -  PO   81 mg





  DAILY AVA   Administration


 


Atorvastatin Calcium  40 mg 08/30/17 22:00 09/02/17 21:19





  Lipitor -  PO   40 mg





  HS AVA   Administration


 


Carvedilol  6.25 mg 09/01/17 10:00 09/03/17 09:12





  Coreg -  PO   6.25 mg





  BID AVA   Administration


 


Enoxaparin Sodium  100 mg 09/02/17 09:00 09/03/17 09:12





  Lovenox -  SQ   100 mg





  BID@0900,2100 AVA   Administration


 


Tramadol HCl  25 mg 09/03/17 14:41  





  Ultram -  PO   





  Q6H PRN   





  PAIN   


 


Valsartan  80 mg 08/31/17 10:00 09/03/17 09:12





  Diovan -  PO   80 mg





  DAILY AVA   Administration


 


Warfarin Sodium  10 mg 09/03/17 18:00 09/03/17 17:06





  Coumadin -  PO   10 mg





  DAILY@1800 AVA   Administration











INR 1.44-->1.42 today


CTA report with possible thrombus in R common femoral artery and decreased flow 

on both sides. 


 


ASSESSMENT AND PLAN:


This is a 77 year old man with a history of hypertrophic cardiomyopathy, non-

obstructive CAD, chronic systolic/diastolic HF, HTN, PAF who presented to the 

ER with right leg pain





# Acute arterial occlusion of Right lower extremity s/p b/l thrombectomy (8/29) 

by [facility]ascular DrCheo Chan , will discontinue Heparin drip and start the 

patient on coumadin with bridgening Lovenox ,patient needs  long term AC. As 

per Vascular to send the patient home on coumadin 





# A fib : on coumadin 10mg  tonight on Lovenox as well , PT/INR daily 

discontinue heparin drip  , on coreg continue, needs long term AC





# CAD: cont ASA and statin 





#  Hx of  chronic Systolic and diastolic  heart failure . Euvolemic . echo 

reviewed,  cont to monitor . cont diovan 





DVT PX: Coumadin





PT


coumadin 10mg tonight 


daily PT/INR 





Visit type





- Emergency Visit


Emergency Visit: Yes


ED Registration Date: 08/26/17


Care time: The patient presented to the Emergency Department on the above date 

and was hospitalized for further evaluation of their emergent condition.





- New Patient


This patient is new to me today: No





- Critical Care


Critical Care patient: No

## 2018-09-30 NOTE — PN
Teaching Attending Note


Name of Resident: Christiano Wong


ATTENDING PHYSICIAN STATEMENT





I saw and evaluated the patient.


I reviewed the resident's note and discussed the case with the resident.


I agree with the resident's findings and plan as documented.








SUBJECTIVE:


no fever or chills, cont to have pain in R LE . no CP or SOB 





OBJECTIVE:





NAD 


CV: RRR


LUngs: CTAB 


ext: TTP to R cald , cold skin from knee down R > L. DP 0 on R , 1+ on L . nl 

sensation to light touch, nl range of motion in LE . no erythema 














A/P 





This is a 77 year old man with a history of hypertrophic cardiomyopathy, non-

obstructive CAD, chronic systolic/diastolic HF, HTN, PAF who presented to the 

ER with right leg pain





1- RLE pain, due to arterial  occlusion ( thrombus vs embolus ) . 


CTA prelim report withpossible thrombus in R common femoral artery and 

decreased flow on both sides. 





- ocnt heparin 


- NPO after mid night for  angiogram and thrmbectomy 


- case d/w Dr. Chan by team 





2- A fib : 


- cont heparin gtt 


- needs long term AC, will discuss his options after procedure 


- cont coreg 





3- CAD: cont ASA and statin 





4- h/o chronic S/D heart failure . Euvolemic . cont to monitor . cont diovan all other ROS negative except as per HPI

## 2018-10-14 ENCOUNTER — HOSPITAL ENCOUNTER (INPATIENT)
Dept: HOSPITAL 74 - JER | Age: 78
LOS: 3 days | Discharge: HOME HEALTH | DRG: 683 | End: 2018-10-17
Attending: FAMILY MEDICINE | Admitting: FAMILY MEDICINE
Payer: COMMERCIAL

## 2018-10-14 VITALS — BODY MASS INDEX: 25.7 KG/M2

## 2018-10-14 DIAGNOSIS — I48.1: ICD-10-CM

## 2018-10-14 DIAGNOSIS — J45.901: ICD-10-CM

## 2018-10-14 DIAGNOSIS — Z87.891: ICD-10-CM

## 2018-10-14 DIAGNOSIS — N17.9: Primary | ICD-10-CM

## 2018-10-14 DIAGNOSIS — Z79.01: ICD-10-CM

## 2018-10-14 DIAGNOSIS — I73.9: ICD-10-CM

## 2018-10-14 DIAGNOSIS — I42.2: ICD-10-CM

## 2018-10-14 DIAGNOSIS — R73.03: ICD-10-CM

## 2018-10-14 DIAGNOSIS — E78.5: ICD-10-CM

## 2018-10-14 DIAGNOSIS — I48.92: ICD-10-CM

## 2018-10-14 DIAGNOSIS — N18.9: ICD-10-CM

## 2018-10-14 DIAGNOSIS — Z91.11: ICD-10-CM

## 2018-10-14 DIAGNOSIS — I25.10: ICD-10-CM

## 2018-10-14 DIAGNOSIS — I45.10: ICD-10-CM

## 2018-10-14 DIAGNOSIS — I50.32: ICD-10-CM

## 2018-10-14 DIAGNOSIS — E83.52: ICD-10-CM

## 2018-10-14 DIAGNOSIS — I25.5: ICD-10-CM

## 2018-10-14 DIAGNOSIS — Z98.61: ICD-10-CM

## 2018-10-14 DIAGNOSIS — I48.0: ICD-10-CM

## 2018-10-14 DIAGNOSIS — R68.0: ICD-10-CM

## 2018-10-14 DIAGNOSIS — I13.0: ICD-10-CM

## 2018-10-14 LAB
ALBUMIN SERPL-MCNC: 2 G/DL (ref 3.4–5)
ALP SERPL-CCNC: 69 U/L (ref 45–117)
ALT SERPL-CCNC: 14 U/L (ref 13–61)
ANION GAP SERPL CALC-SCNC: 6 MMOL/L (ref 8–16)
APTT BLD: 31.5 SECONDS (ref 25.2–36.5)
AST SERPL-CCNC: 19 U/L (ref 15–37)
BASOPHILS # BLD: 0.4 % (ref 0–2)
BILIRUB SERPL-MCNC: 0.3 MG/DL (ref 0.2–1)
BUN SERPL-MCNC: 31 MG/DL (ref 7–18)
CALCIUM SERPL-MCNC: 10.2 MG/DL (ref 8.5–10.1)
CHLORIDE SERPL-SCNC: 110 MMOL/L (ref 98–107)
CO2 SERPL-SCNC: 19 MMOL/L (ref 21–32)
CREAT SERPL-MCNC: 2.2 MG/DL (ref 0.55–1.3)
DEPRECATED RDW RBC AUTO: 16.5 % (ref 11.9–15.9)
EOSINOPHIL # BLD: 0.4 % (ref 0–4.5)
GLUCOSE SERPL-MCNC: 104 MG/DL (ref 74–106)
HCT VFR BLD CALC: 34.5 % (ref 35.4–49)
HGB BLD-MCNC: 11.8 GM/DL (ref 11.7–16.9)
INR BLD: 3.14 (ref 0.83–1.09)
LYMPHOCYTES # BLD: 19.3 % (ref 8–40)
MCH RBC QN AUTO: 30.8 PG (ref 25.7–33.7)
MCHC RBC AUTO-ENTMCNC: 34.1 G/DL (ref 32–35.9)
MCV RBC: 90.2 FL (ref 80–96)
MONOCYTES # BLD AUTO: 5.7 % (ref 3.8–10.2)
NEUTROPHILS # BLD: 74.2 % (ref 42.8–82.8)
PH BLDV: 7.4 [PH] (ref 7.32–7.42)
PLATELET # BLD AUTO: 294 K/MM3 (ref 134–434)
PMV BLD: 8.8 FL (ref 7.5–11.1)
POTASSIUM SERPLBLD-SCNC: 4.8 MMOL/L (ref 3.5–5.1)
PROT SERPL-MCNC: 11 G/DL (ref 6.4–8.2)
PT PNL PPP: 37.5 SEC (ref 9.7–13)
RBC # BLD AUTO: 3.83 M/MM3 (ref 4–5.6)
SODIUM SERPL-SCNC: 135 MMOL/L (ref 136–145)
VENOUS PC02: 29.6 MMHG (ref 38–52)
VENOUS PO2: 110 MMHG (ref 28–48)
WBC # BLD AUTO: 4.6 K/MM3 (ref 4–10)

## 2018-10-14 NOTE — HP
CHIEF COMPLAINT: Weakness





PCP: Dr. Davalos





HISTORY OF PRESENT ILLNESS:


78 year-old male with a PMH significant for HTN, HLD, CAD, paroxysmal A-fib on 

Xarelto, PVD with recent left arterial occlusion with stent placement, CKD.   

Presents to ED today with complaint of nausea and weakness. Patient was 

shopping in a grocery store and had to stop multiple times because of weakness. 

Store management called 911. EMS reportedly found patient hypotensive. Patient 

denies headache, cough, fever, sweats, chills; denies vomiting, diarrhea; 

denies chest pain, SOB, palpitations. No bleeding episodes. 





ER course was notable for:


(1) T 95.0, repeat 95.6, BP 94/64, p79, RR 18, SpO2 98% room air


(2) BUN 31/Cr 2.2 (baseline 1.2)


(3) Corrected calcium 11.8





Recent Travel:


No





PAST MEDICAL HISTORY:


Hypertension


Hyperlipidemia


Coronary artery disease


Ischemic cardiomyopathy


Paroxysmal atrial fibrillation


Peripheral vascular disease





PAST SURGICAL HISTORY:


Left popliteal artery stenting (08/2018)





Social History:


Smoking: quit 2016


Alcohol: yes


Drugs: denies





Family History:


Allergies


valsartan Allergy (Severe, Verified 10/14/18 18:52)


 








HOME MEDICATIONS:


 Home Medications











 Medication  Instructions  Recorded


 


Aspirin Coated [Ecotrin -] 81 mg PO DAILY #30 tablet.ec 07/12/18


 


Atorvastatin Ca [Lipitor] 40 mg PO HS #30 tablet 07/12/18


 


Carvedilol [Coreg -] 6.25 mg PO BID #60 tablet 07/12/18


 


Rivaroxaban [Xarelto -] 20 mg PO DAILY@1800 #30 tablet 07/12/18


 


Aspirin [ASA -] 1 tab PO DAILY 08/20/18








REVIEW OF SYSTEMS


CONSTITUTIONAL: 


+Weakness


Absent:  fever, chills, diaphoresis, generalized weakness, malaise, loss of 

appetite, weight change


HEENT: 


Absent:  rhinorrhea, nasal congestion, throat pain, throat swelling, difficulty 

swallowing, mouth swelling, ear pain, eye pain, visual changes


CARDIOVASCULAR: 


Absent: chest pain, syncope, palpitations, irregular heart rate, lightheadedness

, peripheral edema


RESPIRATORY: 


Absent: cough, shortness of breath, dyspnea with exertion, orthopnea, wheezing, 

stridor, hemoptysis


GASTROINTESTINAL:


+Nausea


Absent: abdominal pain, abdominal distension, nausea, vomiting, diarrhea, 

constipation, melena, hematochezia


GENITOURINARY: 


Absent: dysuria, frequency, urgency, hesitancy, hematuria, flank pain, genital 

pain


MUSCULOSKELETAL: 


Absent: myalgia, arthralgia, joint swelling, back pain, neck pain


SKIN: 


Absent: rash, itching, pallor


HEMATOLOGIC/IMMUNOLOGIC: 


Absent: easy bleeding, easy bruising, lymphadenopathy, frequent infections


ENDOCRINE:


Absent: unexplained weight gain, unexplained weight loss, heat intolerance, 

cold intolerance


NEUROLOGIC: 


Absent: headache, focal weakness or paresthesias, dizziness, unsteady gait, 

seizure, mental status changes, bladder or bowel incontinence


PSYCHIATRIC: 


Absent: anxiety, depression, suicidal or homicidal ideation, hallucinations.








PHYSICAL EXAMINATION


 Vital Signs - 24 hr











  10/14/18 10/14/18





  18:49 19:00


 


Temperature 95.6 F L 


 


Pulse Rate 79 


 


Respiratory 18 





Rate  


 


Blood Pressure 94/64 


 


O2 Sat by Pulse 98 100





Oximetry (%)  











GENERAL: Awake, alert, and fully oriented, in no acute distress. Poor dentition.


HEAD: Normal with no signs of trauma.


EYES: Pupils equal, round and reactive to light, extraocular movements intact, 

sclera anicteric, conjunctiva clear. No lid lag.


EARS, NOSE, THROAT: Ears normal, nares patent, oropharynx clear without 

exudates. Moist mucous membranes.


NECK: Normal range of motion, supple without lymphadenopathy, JVD, or masses.


LUNGS: Breath sounds equal, clear to auscultation bilaterally. No wheezes, and 

no crackles. No accessory muscle use.


HEART: Regular rate and rhythm, normal S1 and S2 without murmur, rub or gallop.


ABDOMEN: Soft, nontender, not distended, normoactive bowel sounds, no guarding, 

no rebound, no masses.  No hepatomegaly or  splenomegaly. 


MUSCULOSKELETAL: Normal range of motion at all joints. No bony deformities or 

tenderness. No CVA tenderness.


UPPER EXTREMITIES: 2+ pulses, warm, well-perfused. No cyanosis. No clubbing. No 

peripheral edema.


LOWER EXTREMITIES: 2+ pulses, warm, well-perfused. No calf tenderness. No 

peripheral edema. 


NEUROLOGICAL:  Cranial nerves II-XII intact. Normal speech. Normal gait.


PSYCHIATRIC: Cooperative. Good eye contact. Appropriate mood and affect.








 Laboratory Results - last 24 hr











  10/14/18 10/14/18 10/14/18





  20:15 20:15 20:15


 


WBC  4.6  


 


RBC  3.83 L  


 


Hgb  11.8  


 


Hct  34.5 L D  


 


MCV  90.2  


 


MCH  30.8  


 


MCHC  34.1  


 


RDW  16.5 H  


 


Plt Count  294  


 


MPV  8.8  


 


Absolute Neuts (auto)  3.4  


 


Neutrophils %  74.2  


 


Lymphocytes %  19.3  


 


Monocytes %  5.7  


 


Eosinophils %  0.4  


 


Basophils %  0.4  


 


Nucleated RBC %  0  


 


PT with INR   37.50 H 


 


INR   3.14 H 


 


PTT (Actin FS)   31.5 


 


VBG pH    7.40


 


POC VBG pCO2    29.6 L


 


POC VBG pO2    110.0 H


 


Mixed VBG HCO3    18.2 L


 


Sodium   


 


Potassium   


 


Chloride   


 


Carbon Dioxide   


 


Anion Gap   


 


BUN   


 


Creatinine   


 


Creat Clearance w eGFR   


 


Random Glucose   


 


Lactic Acid   


 


Calcium   


 


Total Bilirubin   


 


AST   


 


ALT   


 


Alkaline Phosphatase   


 


Creatine Kinase   


 


CK-MB (CK-2)   


 


Troponin I   


 


Total Protein   


 


Albumin   


 


Blood Type   


 


Antibody Screen   














  10/14/18 10/14/18 10/14/18





  20:15 20:15 20:15


 


WBC   


 


RBC   


 


Hgb   


 


Hct   


 


MCV   


 


MCH   


 


MCHC   


 


RDW   


 


Plt Count   


 


MPV   


 


Absolute Neuts (auto)   


 


Neutrophils %   


 


Lymphocytes %   


 


Monocytes %   


 


Eosinophils %   


 


Basophils %   


 


Nucleated RBC %   


 


PT with INR   


 


INR   


 


PTT (Actin FS)   


 


VBG pH   


 


POC VBG pCO2   


 


POC VBG pO2   


 


Mixed VBG HCO3   


 


Sodium  135 L  


 


Potassium  4.8  


 


Chloride  110 H  


 


Carbon Dioxide  19 L  


 


Anion Gap  6 L  


 


BUN  31 H  


 


Creatinine  2.2 H  


 


Creat Clearance w eGFR  29.02  


 


Random Glucose  104  


 


Lactic Acid   1.1 


 


Calcium  10.2 H  


 


Total Bilirubin  0.3  


 


AST  19  


 


ALT  14  


 


Alkaline Phosphatase  69  


 


Creatine Kinase  116  


 


CK-MB (CK-2)  4.8 H  


 


Troponin I   


 


Total Protein  11.0 H  


 


Albumin  2.0 L  


 


Blood Type    B POSITIVE


 


Antibody Screen    Negative














  10/14/18





  20:15


 


WBC 


 


RBC 


 


Hgb 


 


Hct 


 


MCV 


 


MCH 


 


MCHC 


 


RDW 


 


Plt Count 


 


MPV 


 


Absolute Neuts (auto) 


 


Neutrophils % 


 


Lymphocytes % 


 


Monocytes % 


 


Eosinophils % 


 


Basophils % 


 


Nucleated RBC % 


 


PT with INR 


 


INR 


 


PTT (Actin FS) 


 


VBG pH 


 


POC VBG pCO2 


 


POC VBG pO2 


 


Mixed VBG HCO3 


 


Sodium 


 


Potassium 


 


Chloride 


 


Carbon Dioxide 


 


Anion Gap 


 


BUN 


 


Creatinine 


 


Creat Clearance w eGFR 


 


Random Glucose 


 


Lactic Acid 


 


Calcium 


 


Total Bilirubin 


 


AST 


 


ALT 


 


Alkaline Phosphatase 


 


Creatine Kinase 


 


CK-MB (CK-2) 


 


Troponin I  0.23 H


 


Total Protein 


 


Albumin 


 


Blood Type 


 


Antibody Screen 











ASSESSMENT/PLAN


78 year-old male with a PMH significant for HTN, HLD, CAD, diastolic HF, 

persistent afib on Xarelto, PVD with recent left arterial occlusion with stent 

placement, CKD. Admitted for hypothermia, HAM.





Hypothermia


   --no other SIRS criteria met


   --teto shen in ED and temp improved to 97.5





HAM


Azotemia


   --Cr 2.2 on admission, baseline 1.2


   --NS x 3L ordered in ED, ongoing infusion 


   --not on home diuretics, indication of heavy NSAID use on previous admission


   --renal consult





Hypercalcemia


   --IV fluids





Hypertension


   --presently hypotensive 


   --hold home carvedilol


   --IV fluids





Persistent atrial fibrillation


   --tele monitoring


   --daily ECGs


   --rate controlled but monitor closely while off carvedilol


   --h/o poor compliance with medications


   --continue Xarelto


   


Coronary artery disease


   --R&LHc at [facility] 1/11/2017 showing nonobstructive CAD, severe LV 

apical hypertrophic cardiomyopathy, mildly elevated right sided pressures


   --continue ASA, statin





Peripheral arterial disease


   --h/o limb ischemia and embolism


   --recent stent


   --US to r/o DVT





LV diastolic dysfunction


   --not on diuretics 








DVT prophylaxis: on Xarelto











Visit type





- Emergency Visit


Emergency Visit: Yes


ED Registration Date: 10/14/18


Care time: The patient presented to the Emergency Department on the above date 

and was hospitalized for further evaluation of their emergent condition.





- New Patient


This patient is new to me today: Yes


Date on this admission: 10/15/18





- Critical Care


Critical Care patient: No

## 2018-10-14 NOTE — PDOC
History of Present Illness





- General


Chief Complaint: Weakness


Stated Complaint: WEAKNESS


Time Seen by Provider: 10/14/18 18:56


History Source: Patient


Exam Limitations: No Limitations





- History of Present Illness


Initial Comments: 





10/14/18 19:25





This is a 78 YOM with h/o CAD, HTN, HLD, paroxysmal A-fib, HAM (baseline Cr 1.2)

, PVD distal left foot ischemia (admitted here in 8/2018), left popliteal 

artery stenting with multiple prior occlusions of stent, nonadherence to 

medications for this, also EtOH use and prior smoker, who p/w new onset nausea 

and weakness on exertion today which he denies ever having experienced before. 

He explains that he was shopping in the grocery store and had to stop multiple 

times because of the weakness. The store management called 911 because they 

thought he needed medical attention. EMS measured his BP to be hypotensive but 

he was a/ox4 for them. The patient denies any recent f/c, vomiting, diarrhea, 

constipation, black/bloody stool, white stool, headache, vision change, SOB, 

wheezing, cough, chest pain, abdominal pain, dysuria, hematuria, or other 

symptoms.





Past History





- Past Medical History


Allergies/Adverse Reactions: 


 Allergies











Allergy/AdvReac Type Severity Reaction Status Date / Time


 


valsartan Allergy Severe  Verified 10/14/18 18:52











Home Medications: 


Ambulatory Orders





Aspirin Coated [Ecotrin -] 81 mg PO DAILY #30 tablet.ec 07/12/18 


Atorvastatin Ca [Lipitor] 40 mg PO HS #30 tablet 07/12/18 


Carvedilol [Coreg -] 6.25 mg PO BID #60 tablet 07/12/18 


Rivaroxaban [Xarelto -] 20 mg PO DAILY@1800 #30 tablet 07/12/18 


Aspirin [ASA -] 1 tab PO DAILY 08/20/18 








Anemia: No


Asthma: Yes


Cancer: No


Cardiac Disorders: Yes (PAROXYSMAL A.FIBRILLATION/A.FLUTTER)


COPD: No


Diabetes: Yes (BORDERLINE)


HTN: Yes (BORDERLINE)


Hypercholesterolemia: Yes





- Surgical History


Abdominal Surgery: Yes (HERNIA REPAIR)


Cardiac Surgery: Yes (CARDIAC CATHERIZATION/CORONARY ANGIOGRAPHY)


Orthopedic Surgery: Yes (Bone marrow biopsy)





- Immunization History


Immunization Up to Date: Yes





- Suicide/Smoking/Psychosocial Hx


Smoking History: Former smoker


Have you smoked in the past 12 months: No


Number of Cigarettes Smoked Daily: 10


If you are a former smoker, when did you quit?: 12.28.16


Information on smoking cessation initiated: No


'Breaking Loose' booklet given: 05/26/18


Hx Alcohol Use: No


Drug/Substance Use Hx: No


Substance Use Type: None


Hx Substance Use Treatment: No





**Review of Systems





- Review of Systems


Able to Perform ROS?: Yes


Comments:: 





10/14/18 19:45


GEN: generalized weakness, malaise, no fever, chills, change in activity level, 

unintentional weight change, loss of appetite, or difficulty sleeping


HEENT: no ear pain, congestion, sore throat, rhinorrhea, nosebleed, vision 

change, or eye pain


CV: no chest pain, palpitations, lightheadedess, or syncope


RESP: no cough, wheezing, or SOB


GI: nausea, no vomiting, diarrhea, constipation, black/bloody stool, abdominal 

pain, or appetite change


: no dysuria, hematuria, frequency, incontinence, retention, pruritis, 

bleeding, or discharge


MSK: weakness, no joint swelling, limping, joint pain, or muscle pain


NEURO: no headaches, seizures, or head trauma


PSYCH: no insomnia, or depression


SKIN: no jaundice, rashes, cuts, bruises, scars, or lesions





*Physical Exam





- Vital Signs


 Last Vital Signs











Temp Pulse Resp BP Pulse Ox


 


 95.6 F L  79   18   94/64   98 


 


 10/14/18 18:49  10/14/18 18:49  10/14/18 18:49  10/14/18 18:49  10/14/18 18:49











10/14/18 19:47


GENERAL: nontoxic appearing, A/Ox4, no acute distress, speaking in full 

sentences with some speech latency, otherwise answers questions appropriately, +

sense of humor


HEENT: Poor dentition, PERRLA, EOMI, moist mucous membranes, no posterior 

pharyngeal erythema, no tonsillar swelling or exudates, no cervical 

lymphadenopathy


NECK: No midline ttp, no spinal stepoff or deformity, full ROM, supple


CARDIOVASCULAR: Regular rate and rhythm, normal S1S2, no MGR, capillary refill <

2 seconds, thorax with normal temperature but extremities are cool and clammy (

BLE worse than BUE)


Chest wall: Normal appearance, no rash, no bruising, no costal stepoff or 

deformity, nontender to compression


LUNGS/RESPIRATORY: No respiratory distress, normal and symmetric chest 

movements during respirations, lungs CTA bilaterally, equal breath sounds, no 

cyanosis, no nail clubbing


GI/ABDOMEN: Normal symmetric appearance, normoactive bowel sounds, soft, no 

tenderness to palpation, no midline pulsatile masses, no palpated organomegaly


: No CVA tenderness


BACK: No midline ttp or stepoff or deformity of thoracic or lumbar spine


EXTREMITIES: DP pulses not palpable, bilateral feet cold but equally so, no LE 

edema


SKIN: Dry, no pallor, no jaundice, no bruising, no rash, no skin breakdown, no 

cuts, no lesions, no ulcers


NEUROLOGICAL: GCS 15, CN II-XII grossly intact, gait not tested, moving all 

extremities, 5/5 strength proximally and distally, no facial droop, no 

decreased sensation (including toes with full sensation and able to wiggle all 

digits)





**Heart Score/ECG Review


  ** #1





10/14/18 19:04


A-fib, rate of 80, with RBBB, no ischemic ST-T changes





ED Treatment Course





- LABORATORY


CBC & Chemistry Diagram: 


 10/14/18 20:15





 10/14/18 20:15





- RADIOLOGY


Radiology Studies Ordered: 














 Category Date Time Status


 


 CHEST X-RAY PORTABLE* [RAD] Stat Radiology  10/14/18 19:24 Ordered














Medical Decision Making





- Medical Decision Making


Pt p/w nausea, fatigue, generalized weakness; EMS noted hypotension.





 Initial Vital Signs











Temp Pulse Resp BP Pulse Ox


 


 95.6 F L  79   18   94/64   98 


 


 10/14/18 18:49  10/14/18 18:49  10/14/18 18:49  10/14/18 18:49  10/14/18 18:49











Exam: As noted in Physical Exam section.


DDX IBNLT: sepss, anemia (e.g. from GIB, menorrhagia, other blood loss), 

hypothyroidism, hyperparathyroidism, Parkinsons disease, CVA, HIV, cancer (jace 

CNS neoplasm), medications/drugs (any sedative e.g. EtOH or withdrawal from a 

stimulant, methyldopa, clonidine, beta blockers, Haldol, prochlorperaine, 

metocloperamide, long-term steroid use, IFN-alpha), psychiatric (depression, 

dysthymia, bipolar disorder, adjustment disorder with depressed mood, 

premenstrual dysphoric disorder).


W/U ordered: EKG CXR Labs as noted below


TX ordered: IVF





EKG: Reviewed; results as noted in ECG Review section.


CXR: Nothing acute on my preliminary read.


Patient unable to give urine sample at this time.





 Laboratory Tests











  10/14/18 10/14/18 10/14/18





  20:15 20:15 20:15


 


WBC  4.6  


 


RBC  3.83 L  


 


Hgb  11.8  


 


Hct  34.5 L D  


 


MCV  90.2  


 


MCH  30.8  


 


MCHC  34.1  


 


RDW  16.5 H  


 


Plt Count  294  


 


MPV  8.8  


 


Absolute Neuts (auto)  3.4  


 


Neutrophils %  74.2  


 


Lymphocytes %  19.3  


 


Monocytes %  5.7  


 


Eosinophils %  0.4  


 


Basophils %  0.4  


 


Nucleated RBC %  0  


 


PT with INR   37.50 H 


 


INR   3.14 H 


 


PTT (Actin FS)   31.5 


 


VBG pH    7.40


 


POC VBG pCO2    29.6 L


 


POC VBG pO2    110.0 H


 


Mixed VBG HCO3    18.2 L


 


Sodium   


 


Potassium   


 


Chloride   


 


Carbon Dioxide   


 


Anion Gap   


 


BUN   


 


Creatinine   


 


Creat Clearance w eGFR   


 


Random Glucose   


 


Lactic Acid   


 


Calcium   


 


Total Bilirubin   


 


AST   


 


ALT   


 


Alkaline Phosphatase   


 


Creatine Kinase   


 


CK-MB (CK-2)   


 


Troponin I   


 


Total Protein   


 


Albumin   














  10/14/18 10/14/18 10/14/18





  20:15 20:15 20:15


 


WBC   


 


RBC   


 


Hgb   


 


Hct   


 


MCV   


 


MCH   


 


MCHC   


 


RDW   


 


Plt Count   


 


MPV   


 


Absolute Neuts (auto)   


 


Neutrophils %   


 


Lymphocytes %   


 


Monocytes %   


 


Eosinophils %   


 


Basophils %   


 


Nucleated RBC %   


 


PT with INR   


 


INR   


 


PTT (Actin FS)   


 


VBG pH   


 


POC VBG pCO2   


 


POC VBG pO2   


 


Mixed VBG HCO3   


 


Sodium  135 L  


 


Potassium  4.8  


 


Chloride  110 H  


 


Carbon Dioxide  19 L  


 


Anion Gap  6 L  


 


BUN  31 H  


 


Creatinine  2.2 H  


 


Creat Clearance w eGFR  29.02  


 


Random Glucose  104  


 


Lactic Acid   1.1 


 


Calcium  10.2 H  


 


Total Bilirubin  0.3  


 


AST  19  


 


ALT  14  


 


Alkaline Phosphatase  69  


 


Creatine Kinase  116  


 


CK-MB (CK-2)  4.8 H  


 


Troponin I    0.23 H


 


Total Protein  11.0 H  


 


Albumin  2.0 L  














Reassessment: Patient states feels unchanged, mild nausea.


Repeat exam unchanged.


Patient still unable to give UA sample; now drinking PO fluids as well as IVF.





10/14/18 21:02


The Pt is unsafe for discharge at this time.


They require further hospital observation, workup, and treatment.


PCP is Dr. Davalos; goes to hospitalist now.


Microblog sent to [facility] for admission.


Blank Decision to Admit order is placed per ED protocol.





10/14/18 23:12


Blank decision to Admit corrected with Dr. Davalos's name at Tasha Morris's 

recommendation.





*DC/Admit/Observation/Transfer


Diagnosis at time of Disposition: 


 Generalized weakness, Hypoalbuminemia





Acute-on-chronic kidney injury


Qualifiers:


 Acute renal failure type: unspecified Chronic kidney disease stage: 

unspecified stage Qualified Code(s): N17.9 - Acute kidney failure, unspecified








- Discharge Dispostion


Condition at time of disposition: Guarded


Decision to Admit order: Yes





- Referrals


Referrals: 


Kareen Davalos MD [Primary Care Provider] - 





- Patient Instructions





- Post Discharge Activity

## 2018-10-14 NOTE — PDOC
Attending Attestation





- HPI


HPI: 





10/14/18 19:40





The patient is a 78 year old male, with a significant past medical history of 

htn, atrial fibrillation, CAD, PVD (s/p thrombectomy,  s/p popliteal stent, 

currently taking Xarelto and last admitted in August), who presents to the 

emergency department with weakness and nausea while walking in the store today. 

He states he has never experienced these symptoms in the past. 





The patient denies chest pain, shortness of breath, headache and dizziness. The 

patient denies fever, chills, nausea, vomit, diarrhea and constipation. The 

patient denies dysuria, frequency, urgency and hematuria. 





Allergies: Valsartan


Past surgical history: multiple vascular Sx


Social history: former smoker, reports ETOH use


PCP - Dr. Kareen Davalos








- Medical Decision Making





10/14/18 19:40





Documentation prepared by Tanya Collins, acting as medical scribe for 

Slime Preciado MD





<Tanya Collins - Last Filed: 10/14/18 19:40>





- Resident


Resident Name: Rosario Troy





- ED Attending Attestation


I have performed the following: I have examined & evaluated the patient, The 

case was reviewed & discussed with the resident, I agree w/resident's findings 

& plan, Exceptions are as noted





- Physicial Exam


PE: 





GENERAL: Awake, alert, and fully oriented, in no acute distress


HEAD: No signs of trauma


EYES: PERRLA, EOMI, sclera anicteric, conjunctiva clear


ENT: Auricles normal inspection, hearing grossly normal, nares patent, 

oropharynx clear without exudates. Dry mucosa


NECK: Normal ROM, supple, no lymphadenopathy, JVD, or masses


LUNGS: Breath sounds equal, clear to auscultation bilaterally.  No wheezes, and 

no crackles


HEART: Regular rate and rhythm, normal S1 and S2, no murmurs, rubs or gallops


ABDOMEN: Soft, nontender, normoactive bowel sounds.  No guarding, no rebound.  

No masses


EXTREMITIES: Normal range of motion, no edema.  No clubbing or cyanosis. No 

cords, erythema, or tenderness


NEUROLOGICAL: Cranial nerves II through XII grossly intact.  Normal speech. 

Motor and sensation intact. 


SKIN: Cool, Dry, normal turgor, no rashes or lesions noted.











- Medical Decision Making





Pt found to have hypothermia, unknown etiology. Sepsis protocol initiated, will 

place under teto hugger. Will plan for admission.








<Slime Preciado - Last Filed: 10/14/18 21:21>

## 2018-10-15 RX ADMIN — RIVAROXABAN SCH MG: 20 TABLET, FILM COATED ORAL at 17:17

## 2018-10-15 RX ADMIN — ATORVASTATIN CALCIUM SCH MG: 40 TABLET, FILM COATED ORAL at 22:05

## 2018-10-15 NOTE — CON.CARD
Consult


Consult Specialty:: Cardiology


Referred by:: Hospitalist


Reason for Consultation:: Afib h/o thromboembolism, demand ischemia





- History of Present Illness


Chief Complaint: Nausea, weakness, near syncope


History of Present Illness: 


This is a 77 y/o man with a PMHx of Persistent Afib [id number] with recurrent 

peripheral arterial embolism due to previous noncompliance with Xarelto due to 

lack of social support, Diabetes Mellitus, Apical Hypertrophic Cardiomyopathy, 

Alcohol Abuse, nonobstructive CAD h/o anaphylaxis after eating [employer]- 

shrimp and boneless spare ribs presented for weakness, nausea w/o emesis, near 

without true syncope, flushing without diaphoresis and HAM. He is asymptomatic 

from cardiovascular standpoint and denies chest pain, dyspnea, orthopnea, PND, 

or edema, exercise capacity limited by left claudication. Last seen in office 10

/13/17, sxs improving with IV hydration.








- History Source


History Provided By: Patient


Limitations to Obtaining History: No Limitations





- Past Medical History


Cardio/Vascular: Yes: AFIB, HTN, MI


Gastrointestinal: No: GI Bleed, Irritable Bowel Disease, Ulcerative Colitis


Renal/: No: Renal Inusuff, Cancer, Hematuria, Hemodialysis





- Past Surgical History


Past Surgical History: Yes: Hernia Repair





- Alcohol/Substance Use


Hx Alcohol Use: No


History of Substance Use: reports: None





- Smoking History


Smoking history: Former smoker


Have you smoked in the past 12 months: No


Aproximately how many cigarettes per day: 10


If you are a former smoker, when did you quit?: 12.28.16





Home Medications





- Allergies


Allergies/Adverse Reactions: 


 Allergies











Allergy/AdvReac Type Severity Reaction Status Date / Time


 


valsartan Allergy Severe  Verified 10/14/18 18:52














- Home Medications


Home Medications: 


Ambulatory Orders





Aspirin Coated [Ecotrin -] 81 mg PO DAILY #30 tablet.ec 07/12/18 


Atorvastatin Ca [Lipitor] 40 mg PO HS #30 tablet 07/12/18 


Carvedilol [Coreg -] 6.25 mg PO BID #60 tablet 07/12/18 


Rivaroxaban [Xarelto -] 20 mg PO DAILY@1800 #30 tablet 07/12/18 


Aspirin [ASA -] 1 tab PO DAILY 08/20/18 











Family Disease History





- Family Disease History


Family History: Unremarkable





Review of Systems





- Review of Systems


Constitutional: reports: Weakness


HENT: reports: No Symptoms


Neck: reports: No Symptoms


Cardiovascular: reports: No Symptoms


Respiratory: reports: No Symptoms


Gastrointestinal: reports: No Symptoms


Genitourinary: reports: No Symptoms


Musculoskeletal: reports: No Symptoms


Integumentary: reports: No Symptoms


Neurological: reports: Dizziness, Incoordination


Endocrine: reports: No Symptoms


Vital Signs: 


 Vital Signs











Temperature  98.4 F   10/15/18 05:27


 


Pulse Rate  73   10/15/18 05:27


 


Respiratory Rate  18   10/15/18 09:00


 


Blood Pressure  109/53 L  10/15/18 05:27


 


O2 Sat by Pulse Oximetry (%)  100   10/15/18 09:00











Constitutional: Yes: No Distress, Calm


Neck: Yes: Supple


Respiratory: Yes: Regular, CTA Bilaterally


Gastrointestinal: Yes: Normal Bowel Sounds, Soft


Cardiovascular: Yes: Regular Rate and Rhythm


JVD: No


Carotid Bruit: No


Heart Sounds: Yes: S1, S2


Murmur: Yes: Systolic Murmur, Grade 2


Edema: No





- Other Data


Labs, Other Data: 


 CBC, BMP





 10/14/18 20:15 





 10/14/18 20:15 





 INR, PTT











INR  3.14  (0.83-1.09)  H  10/14/18  20:15    








 Troponin, BNP











  10/14/18 10/15/18





  20:15 12:28


 


Troponin I  0.23 H  0.21 H








 Troponin, BNP











  10/14/18 10/15/18





  20:15 12:28


 


Troponin I  0.23 H  0.21 H














Afib @ 80 RBBB


Echo: Report Reviewed


Ejection Fraction %: LVEF > or = 40 %





Imaging





- Results


Chest X-ray: Report Reviewed (NAD)





Problem List





- Problems


(1) Acute-on-chronic kidney injury


Code(s): N17.9 - ACUTE KIDNEY FAILURE, UNSPECIFIED; N18.9 - CHRONIC KIDNEY 

DISEASE, UNSPECIFIED   


Qualifiers: 


   Acute renal failure type: unspecified   Chronic kidney disease stage: 

unspecified stage   Qualified Code(s): N17.9 - Acute kidney failure, unspecified

; N18.9 - Chronic kidney disease, unspecified   





(2) Hypercalcemia


Code(s): E83.52 - HYPERCALCEMIA   





(3) Demand ischemia


Code(s): I24.8 - OTHER FORMS OF ACUTE ISCHEMIC HEART DISEASE   





(4) Nonadherence to medication


Code(s): Z91.14 - PATIENT'S OTHER NONCOMPLIANCE WITH MEDICATION REGIMEN   





(5) Persistent atrial fibrillation


Code(s): I48.1 - PERSISTENT ATRIAL FIBRILLATION   





(6) Anticoagulant long-term use


Code(s): Z79.01 - LONG TERM (CURRENT) USE OF ANTICOAGULANTS   





(7) Coronary artery disease


Code(s): I25.10 - ATHSCL HEART DISEASE OF NATIVE CORONARY ARTERY W/O ANG PCTRS 

  


Qualifiers: 


   Coronary Disease-Associated Artery/Lesion type: native artery   Native vs. 

transplanted heart: native heart   Associated angina: without angina   

Qualified Code(s): I25.10 - Atherosclerotic heart disease of native coronary 

artery without angina pectoris   





(8) Diastolic dysfunction without heart failure


Code(s): I51.9 - HEART DISEASE, UNSPECIFIED   





(9) Hypertrophic cardiomyopathy


Code(s): I42.2 - OTHER HYPERTROPHIC CARDIOMYOPATHY   





(10) Peripheral arterial disease


Code(s): I73.9 - PERIPHERAL VASCULAR DISEASE, UNSPECIFIED   





(11) Atrial flutter


Code(s): I48.92 - UNSPECIFIED ATRIAL FLUTTER   


Qualifiers: 


   Atrial flutter type: typical   Qualified Code(s): I48.3 - Typical atrial 

flutter   





(12) Vasovagal near syncope


Code(s): R55 - SYNCOPE AND COLLAPSE   





Assessment/Plan


R&LHc at [facility] 1/11/2017 showing nonobstructive CAD, severe LV apical 

hypertrophic cardiomyopathy, mildly elevated right sided pressures, Mynx 

deployed right CFA access site. Study is consistent with apical hypertrophy (

spade-like) variant of hypertrophic cardiomyopathy, planned for optimal medical 

therapy. 


Echocardiogram: 07/11/2018 Mod cLVH, severe DYANA, mod TR RVSP 50-60 mmHg, mod-

severe MR





1. Vasovagal near syncope with typical prodromal sxs


2. Acute on CKD (pre-renal)


3. History of bilateral SFA occlusion post thrombectomy, referable to embolic 

disease related to persistent atrial fibrillation with [id number] score of 6, 

history of poor compliance with anticoagulation and medical F/U


4. Non obstructive CAD on R&LHc coronary angiography


5. 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 

subendocardial ischemia, compensated/euvolemic


6. Persistent atrial fibrillation [id number] score of 6 on Xarelto 20 qd


8. Labile HTN


9. Poor compliance with medical therapy administration and medical F/U


10. Tobacco abuse


11. Hypercalcemia, paraproteinemia-> possible multiple myeloma





PLAN:





1. Continue Xarelto 20 qd, Lipitor 40 qd and d/c concomitant ASA 81 qd, trops 

downtrending


2. Resume Carvedilol 6.25 bid as hemodynamics tolerate


3. Consider outpatient DCCV for the above noted atrial arrhythmia once 

clinically stabilized and compliance with anticoagulation is assured. Patient 

was advised to follow up in office [phone] (previously been to office)


4. Hematology input


5. Judicious IVF with monitor renal recovery


6. Thank you for consultative opportunity

## 2018-10-15 NOTE — CONSULT
Consult


Consult Specialty:: Nephrology ( felicia/ Bernardo)


Referred by:: Dr. orozco


Reason for Consultation:: Hypercalcemia





- History of Present Illness


History of Present Illness: 





78 year-old male with a PMH significant for HTN, HLD, CAD, ischemic 

cardiomyopathy, paroxysmal A-fib on Xarelto, PVD with recent left arterial 

occlusion with stent placement, CKD, and ETOH abuse. Presented to ED today with 

complaints of nausea and weakness. 


Patient was shopping in a grocery store and had to stop multiple times because 

of weakness. Store management called 911. 


Patient denies headache, cough, Fever, Sweats, Chills; Denies vomiting, Diarrhea

; Denies chest pain, SOB, Palpitations. 





- History Source


History Provided By: Patient





- Past Medical History


Cardio/Vascular: Yes: AFIB, HTN, MI


Gastrointestinal: No: GI Bleed, Irritable Bowel Disease, Ulcerative Colitis


Renal/: No: Renal Inusuff, Cancer, Hematuria, Hemodialysis





- Past Surgical History


Past Surgical History: Yes: Hernia Repair





- Alcohol/Substance Use


Hx Alcohol Use: No


History of Substance Use: reports: None





- Smoking History


Smoking history: Former smoker


Have you smoked in the past 12 months: No


Aproximately how many cigarettes per day: 10


If you are a former smoker, when did you quit?: 12.28.16





Home Medications





- Allergies


Allergies/Adverse Reactions: 


 Allergies











Allergy/AdvReac Type Severity Reaction Status Date / Time


 


valsartan Allergy Severe  Verified 10/14/18 18:52














- Home Medications


Home Medications: 


Ambulatory Orders





Aspirin Coated [Ecotrin -] 81 mg PO DAILY #30 tablet.ec 07/12/18 


Atorvastatin Ca [Lipitor] 40 mg PO HS #30 tablet 07/12/18 


Carvedilol [Coreg -] 6.25 mg PO BID #60 tablet 07/12/18 


Rivaroxaban [Xarelto -] 20 mg PO DAILY@1800 #30 tablet 07/12/18 


Aspirin [ASA -] 1 tab PO DAILY 08/20/18 











Review of Systems





- Review of Systems


Constitutional: reports: Weakness


Eyes: denies: Double Vision


HENT: denies: Difficult Swallowing


Neck: reports: Stiffness.  denies: Decreased ROM


Respiratory: denies: Exercise Intolerance


Gastrointestinal: denies: Abdominal Pain, Bloating, Indigestion


Genitourinary: denies: Burning


Musculoskeletal: reports: Back Pain, Extremity Pain


Neurological: reports: Dizziness





Physical Exam


Vital Signs: 


 Vital Signs











Temperature  98.4 F   10/15/18 05:27


 


Pulse Rate  73   10/15/18 05:27


 


Respiratory Rate  18   10/15/18 09:00


 


Blood Pressure  109/53 L  10/15/18 05:27


 


O2 Sat by Pulse Oximetry (%)  100   10/15/18 09:00











Constitutional: Yes: No Distress, Anxious


Eyes: Yes: Conjunctiva Clear


HENT: Yes: Normocephalic


Neck: Yes: Trachea Midline


Cardiovascular: Yes: Pulse Irregular, S1, S2


Respiratory: Yes: CTA Bilaterally, Diminished


Gastrointestinal: Yes: Normal Bowel Sounds, Soft


Renal/: No: Anuria, Bladder Distention, CVA Tenderness - Left, CVA Tenderness 

- Right, Hematuria


Edema: No


Labs: 


 CBC, BMP





 10/14/18 20:15 





 10/14/18 20:15 











Problem List





- Problems


(1) Hypercalcemia


Code(s): E83.52 - HYPERCALCEMIA   





(2) Paraproteinemia


Code(s): D89.2 - HYPERGAMMAGLOBULINEMIA, UNSPECIFIED   





(3) Generalized weakness


Code(s): R53.1 - WEAKNESS   





(4) Hypoalbuminemia


Code(s): E88.09 - OTH DISORDERS OF PLASMA-PROTEIN METABOLISM, NEC   





(5) Paroxysmal atrial fibrillation


Code(s): I48.0 - PAROXYSMAL ATRIAL FIBRILLATION   





Assessment/Plan





78 year-old male with a PMH significant for HTN, HLD, CAD, ischemic 

cardiomyopathy, paroxysmal A-fib on Xarelto, PVD with recent left arterial 

occlusion with stent placement, CKD, and ETOH abuse. Presents to ED today with 

complaint of nausea and weakness.





The patient has Hypercalcemia, the etiology of which needs to be defined. 

Patient has Hyperproteinemia, Hypoalbuminemia and Hyperglobulinemia, with low 

anion gap. This along side Hypercalcemia suggests paraproteinemia, (Possible 

multiple myeloma).


Also noted is nemia.





Will give cautious IV hydratio.


Basic w/u as ordered.


Should consider Heme/ Onc eval.





Will monitor the Renal/ electrolyte profile with you.





Thanks again.

## 2018-10-15 NOTE — EKG
Test Reason : 

Blood Pressure : ***/*** mmHG

Vent. Rate : 080 BPM     Atrial Rate : 092 BPM

   P-R Int : 000 ms          QRS Dur : 156 ms

    QT Int : 448 ms       P-R-T Axes : 000 239 026 degrees

   QTc Int : 516 ms

 

ATRIAL FIBRILLATION

RIGHT BUNDLE BRANCH BLOCK

SEPTAL INFARCT (CITED ON OR BEFORE 03-JAN-2017)

SUGGEST LEAD REVERSAL

ABNORMAL ECG

WHEN COMPARED WITH ECG OF 08-JUL-2018 23:57,

UNCHANGED FROM PREVIOUS ECG

Confirmed by ELY ALBERT, CHAVA ([id number]) on 10/15/2018 10:16:32 AM

 

Referred By:             Confirmed By:CHAVA GRAVES MD

## 2018-10-16 LAB
ALBUMIN SERPL-MCNC: 1.5 G/DL (ref 3.4–5)
ALP SERPL-CCNC: 59 U/L (ref 45–117)
ALT SERPL-CCNC: 17 U/L (ref 13–61)
ANION GAP SERPL CALC-SCNC: 7 MMOL/L (ref 8–16)
AST SERPL-CCNC: 22 U/L (ref 15–37)
BASOPHILS # BLD: 0.6 % (ref 0–2)
BILIRUB SERPL-MCNC: 0.2 MG/DL (ref 0.2–1)
BUN SERPL-MCNC: 24 MG/DL (ref 7–18)
CALCIUM SERPL-MCNC: 9.3 MG/DL (ref 8.5–10.1)
CHLORIDE SERPL-SCNC: 113 MMOL/L (ref 98–107)
CO2 SERPL-SCNC: 21 MMOL/L (ref 21–32)
CREAT SERPL-MCNC: 1.9 MG/DL (ref 0.55–1.3)
DEPRECATED RDW RBC AUTO: 16.8 % (ref 11.9–15.9)
EOSINOPHIL # BLD: 1.4 % (ref 0–4.5)
GLUCOSE SERPL-MCNC: 82 MG/DL (ref 74–106)
HCT VFR BLD CALC: 29.1 % (ref 35.4–49)
HGB BLD-MCNC: 9.8 GM/DL (ref 11.7–16.9)
LYMPHOCYTES # BLD: 35.7 % (ref 8–40)
MCH RBC QN AUTO: 30.3 PG (ref 25.7–33.7)
MCHC RBC AUTO-ENTMCNC: 33.7 G/DL (ref 32–35.9)
MCV RBC: 90.1 FL (ref 80–96)
MONOCYTES # BLD AUTO: 4.7 % (ref 3.8–10.2)
NEUTROPHILS # BLD: 57.6 % (ref 42.8–82.8)
PHOSPHATE SERPL-MCNC: 4.3 MG/DL (ref 2.5–4.9)
PLATELET # BLD AUTO: 242 K/MM3 (ref 134–434)
PMV BLD: 9.3 FL (ref 7.5–11.1)
POTASSIUM SERPLBLD-SCNC: 4.6 MMOL/L (ref 3.5–5.1)
PROT SERPL-MCNC: 9.4 G/DL (ref 6.4–8.2)
RBC # BLD AUTO: 3.23 M/MM3 (ref 4–5.6)
ROULEAUX BLD QL SMEAR: (no result)
SODIUM SERPL-SCNC: 141 MMOL/L (ref 136–145)
URATE SERPL-SCNC: 11 MG/DL (ref 2.6–7.2)
WBC # BLD AUTO: 4.2 K/MM3 (ref 4–10)

## 2018-10-16 RX ADMIN — RIVAROXABAN SCH MG: 20 TABLET, FILM COATED ORAL at 17:43

## 2018-10-16 RX ADMIN — ATORVASTATIN CALCIUM SCH MG: 40 TABLET, FILM COATED ORAL at 21:53

## 2018-10-16 NOTE — ECHO
______________________________________________________________________________



Name: UNA YOUSSEF                                   Exam:Adult Echocardiogram

MRN: [id number]         Study Date: 10/16/2018 10:42 AM

Age: 78 yrs

______________________________________________________________________________



Reason For Study: SYNCOPE

Height: 73 in        Weight: 195 lb        BSA: 2.1 m2



______________________________________________________________________________



MMode/2D Measurements & Calculations

IVSd: 1.3 cm                                          Ao root diam: 3.0 cm

LVIDd: 5.1 cm                                         LA dimension: 5.6 cm

LVIDs: 4.0 cm

LVPWd: 1.0 cm



_______________________________________________________

EDV(Teich): 123.8 ml                                  TAPSE: 2.2 cm

ESV(Teich): 70.0 ml                                   RV S Anthony: 12.3 cm/sec



Doppler Measurements & Calculations

MV E max anthony: 109.0 cm/sec                               Ao V2 max: 155.0 cm/sec

MV A max anthony: 32.1 cm/sec                                Ao max P.6 mmHg

MV E/A: 3.4

MV dec time: 0.12 sec



__________________________________________________________

LV V1 max P.2 mmHg                                   MR max antohny: 336.0 cm/sec

LV V1 max: 74.8 cm/sec                                   MR max P.2 mmHg



__________________________________________________________

TR max anthony: 244.4 cm/sec                                 PI end-d anthony: 129.2 cm/sec

TR max P.3 mmHg



__________________________________________________________

Med Peak E' Anthony: 4.8 cm/sec

Med E/e': 22.5

Lat Peak E' Anthony: 10.1 cm/sec

Lat E/e': 10.8





______________________________________________________________________________

Left Ventricle

The left ventricle is normal in size. Mild upper septal hypertrophy. The left ventricle is hyperdynam
ic. LVEF

= 75%. Diastolic dysfunction, Grade II, consistent with elevated left atrial pressure.

Right Ventricle

The right ventricle is normal in size and function.

Atria

The left atrium is severely dilated. Right atrial size is normal.

Mitral Valve

There is moderate mitral annular calcification. There is moderate mitral valve thickening. There is m
oderate

to severe mitral regurgitation.

Tricuspid Valve

The tricuspid valve is not well visualized, but is grossly normal. There is moderate tricuspid regurg
itation.

Right ventricular systolic pressure is elevated at 34 mmhg.

Aortic Valve

There is moderate aortic sclerosis.;.

Pulmonic Valve

The pulmonic valve is not well visualized.

Great Vessels

The aortic root is normal size.

Pericardium/Pleura

There is no pericardial effusion.

______________________________________________________________________________





Interpretation Summary

The left ventricle is normal in size. Mild upper septal hypertrophy. The left ventricle is hyperdynam
ic. LVEF

= 75%.

The right ventricle is normal in size and function.

The left atrium is severely dilated. Right atrial size is normal.

There is moderate aortic sclerosis.

There is moderate mitral annular calcification. There is moderate mitral valve thickening. There is m
oderate

to severe mitral regurgitation.

The tricuspid valve is not well visualized, but is grossly normal. There is moderate tricuspid regurg
itation.





MD Isis Small 10/16/2018 04:57 PM

## 2018-10-16 NOTE — PN
Progress Note, Physician


Chief Complaint: 





AWAKE ALERT EVENTS AND NOTES REVIEWED


DENIES CHEST PAIN OR SOB


LOW BP





- Current Medication List


Current Medications: 


Active Medications





Atorvastatin Calcium (Lipitor -)  [facility] HS [facility]


   Last Admin: 10/15/18 22:05 Dose:  40 mg


Carvedilol (Coreg -)  6.25 mg PO BID [facility]


   Last Admin: 10/15/18 22:05 Dose:  Not Given


Dextrose/Sodium Chloride (D5-Ns -)  250 mls @ 250 mls/hr IV [facility]IR [facility]


   Last Admin: 10/15/18 15:30 Dose:  250 mls/hr


Rivaroxaban (Xarelto -)  20 mg PO DAILY@1800 [facility]


   Last Admin: 10/15/18 17:17 Dose:  20 mg











- Objective


Vital Signs: 


 Vital Signs











Temperature  98.4 F   10/16/18 02:00


 


Pulse Rate  77   10/16/18 02:00


 


Respiratory Rate  20   10/16/18 02:00


 


Blood Pressure  102/72   10/16/18 02:00


 


O2 Sat by Pulse Oximetry (%)  100   10/15/18 21:00











Constitutional: Yes: Mild Distress


Eyes: Yes: WNL


HENT: Yes: WNL


Neck: Yes: WNL


Cardiovascular: Yes: Pulse Irregular


Respiratory: Yes: WNL


Gastrointestinal: Yes: WNL


Genitourinary: Yes: WNL


Musculoskeletal: Yes: WNL


Extremities: Yes: WNL


Edema: No


Peripheral Pulses WNL: Yes


Integumentary: Yes: WNL


Wound/Incision: Yes: Clean/Dry


Neurological: Yes: WNL


...Motor Strength: WNL


Psychiatric: Yes: WNL


Labs: 


 CBC, BMP





 10/14/18 20:15 





 INR, PTT











INR  3.14  (0.83-1.09)  H  10/14/18  20:15    














Problem List





- Problems


(1) History of ETOH abuse


Code(s): Z87.898 - PERSONAL HISTORY OF OTHER SPECIFIED CONDITIONS   





(2) Acute-on-chronic kidney injury


Code(s): N17.9 - ACUTE KIDNEY FAILURE, UNSPECIFIED; N18.9 - CHRONIC KIDNEY 

DISEASE, UNSPECIFIED   


Qualifiers: 


   Acute renal failure type: unspecified   Chronic kidney disease stage: 

unspecified stage   Qualified Code(s): N17.9 - Acute kidney failure, unspecified

; N18.9 - Chronic kidney disease, unspecified   





(3) Generalized weakness


Code(s): R53.1 - WEAKNESS   





(4) Vasovagal near syncope


Code(s): R55 - SYNCOPE AND COLLAPSE   





(5) Artery occlusion


Code(s): I70.90 - UNSPECIFIED ATHEROSCLEROSIS   





(6) Diastolic dysfunction without heart failure


Code(s): I51.9 - HEART DISEASE, UNSPECIFIED   





(7) Atrial flutter


Code(s): I48.92 - UNSPECIFIED ATRIAL FLUTTER   


Qualifiers: 


   Atrial flutter type: typical   Qualified Code(s): I48.3 - Typical atrial 

flutter   





Assessment/Plan





CARDIO AND RENAL EVAL


IVF BOLUS 250CC D5/NS FOR LOW BP


HOLD BP MEDS FOR SBP LESS THAN 110MMHG


CHECK LABS


PT EVAL


FALL RISKS


ETOH USE HAS BEEN LESS AS PER PATIENT

## 2018-10-16 NOTE — PN
Progress Note (short form)





- Note


Progress Note: 





Renal follow up for HAM





Pt seen and examined at the bedside


no acute complaints


no sob, cp, abd pain, N/V/D





 Vital Signs











Temperature  98.0 F   10/16/18 14:33


 


Pulse Rate  72   10/16/18 14:33


 


Respiratory Rate  20   10/16/18 14:33


 


Blood Pressure  127/66   10/16/18 14:33


 


O2 Sat by Pulse Oximetry (%)  100   10/16/18 09:00








 Intake & Output











 10/13/18 10/14/18 10/15/18 10/16/18





 23:59 23:59 23:59 23:59


 


Intake Total  1000 1200 200


 


Output Total   600 


 


Balance  1000 600 200


 


Weight  88.677 kg  88.451 kg








NAD


awake and alert


RRR


CTA


soft NT/ND


No Le edema





 CBC, BMP





 10/16/18 06:00 





 10/16/18 06:00 





 Laboratory Tests











  10/16/18 10/16/18





  06:00 06:00


 


Uric Acid  11.0 H 


 


Phosphorus  4.3 


 


PTH Intact   Pending








 Current Medications





Atorvastatin Calcium (Lipitor -)  40 mg PO HS [facility]


   Last Admin: 10/15/18 22:05 Dose:  40 mg


Carvedilol (Coreg -)  3.75 mg PO BID [facility]


Dextrose/Sodium Chloride (D5-Ns -)  1,000 mls @ 100 mls/hr IV ASDIR [facility]


   Last Admin: 10/16/18 10:28 Dose:  100 mls/hr


Rivaroxaban (Xarelto -)  20 mg PO DAILY@1800 [facility]


   Last Admin: 10/16/18 17:43 Dose:  20 mg








78 year-old male with a PMH significant for HTN, HLD, CAD, ischemic 

cardiomyopathy, paroxysmal A-fib on Xarelto, PVD with recent left arterial 

occlusion with stent placement, CKD, and ETOH abuse Presented with complaint of 

nausea and weakness.





#HAM


#Hypercalcemia


#Elevated serum protein levels with anemia


#Hypertension 





Renal function slightly improved 


Ca levels stable 


continue isotonic saline at present rate 


SPEP, PTH pending


Heme/Oncology consult


BP at goal on coreg alone 





THank you 


Will follow





Ricky Chang DO

## 2018-10-16 NOTE — PN
Progress Note, Physician


Chief Complaint: 





AWAKE ALERT


DENIES CHEST PAIN


LOW BP TODAY





- Current Medication List


Current Medications: 


Active Medications





Atorvastatin Calcium (Lipitor -)  40 mg PO HS [facility]


   Last Admin: 10/15/18 22:05 Dose:  40 mg


Carvedilol (Coreg -)  3.75 mg PO BID [facility]


Dextrose/Sodium Chloride (D5-Ns -)  1,000 mls @ 100 mls/hr IV ASDIR [facility]


   Last Admin: 10/16/18 10:28 Dose:  100 mls/hr


Rivaroxaban (Xarelto -)  20 mg PO DAILY@1800 [facility]


   Last Admin: 10/15/18 17:17 Dose:  20 mg











- Objective


Vital Signs: 


 Vital Signs











Temperature  98.0 F   10/16/18 14:33


 


Pulse Rate  72   10/16/18 14:33


 


Respiratory Rate  20   10/16/18 14:33


 


Blood Pressure  127/66   10/16/18 14:33


 


O2 Sat by Pulse Oximetry (%)  100   10/16/18 09:00











Constitutional: Yes: Mild Distress


Eyes: Yes: WNL


HENT: Yes: WNL


Neck: Yes: WNL


Cardiovascular: Yes: Pulse Irregular


Respiratory: Yes: WNL


Gastrointestinal: Yes: WNL


Genitourinary: Yes: WNL


Musculoskeletal: Yes: WNL


Extremities: Yes: WNL


Edema: No


Peripheral Pulses WNL: Yes


Integumentary: Yes: WNL


Wound/Incision: Yes: Clean/Dry


Neurological: Yes: WNL


...Motor Strength: WNL


Psychiatric: Yes: WNL


Labs: 


 CBC, BMP





 10/16/18 06:00 





 10/16/18 06:00 





 INR, PTT











INR  3.14  (0.83-1.09)  H  10/14/18  20:15    














Problem List





- Problems


(1) History of ETOH abuse


Code(s): Z87.898 - PERSONAL HISTORY OF OTHER SPECIFIED CONDITIONS   





(2) Acute-on-chronic kidney injury


Code(s): N17.9 - ACUTE KIDNEY FAILURE, UNSPECIFIED; N18.9 - CHRONIC KIDNEY 

DISEASE, UNSPECIFIED   


Qualifiers: 


   Acute renal failure type: unspecified   Chronic kidney disease stage: 

unspecified stage   Qualified Code(s): N17.9 - Acute kidney failure, unspecified

; N18.9 - Chronic kidney disease, unspecified   





(3) Generalized weakness


Code(s): R53.1 - WEAKNESS   





(4) Vasovagal near syncope


Code(s): R55 - SYNCOPE AND COLLAPSE   





(5) Artery occlusion


Code(s): I70.90 - UNSPECIFIED ATHEROSCLEROSIS   





(6) Diastolic dysfunction without heart failure


Code(s): I51.9 - HEART DISEASE, UNSPECIFIED   





(7) Atrial flutter


Code(s): I48.92 - UNSPECIFIED ATRIAL FLUTTER   


Qualifiers: 


   Atrial flutter type: typical   Qualified Code(s): I48.3 - Typical atrial 

flutter   





Assessment/Plan





CARDIO AND RENAL EVAL


COREG REDUCED TO 3.375MG BID


HOLD BP MEDS FOR SBP LESS THAN 110MMHG


CHECK LABS


PT EVAL


FALL RISKS


ETOH USE HAS BEEN LESS AS PER PATIENT

## 2018-10-17 VITALS — HEART RATE: 81 BPM | SYSTOLIC BLOOD PRESSURE: 105 MMHG | TEMPERATURE: 97.8 F | DIASTOLIC BLOOD PRESSURE: 66 MMHG

## 2018-10-17 LAB
ANION GAP SERPL CALC-SCNC: 6 MMOL/L (ref 8–16)
BUN SERPL-MCNC: 20 MG/DL (ref 7–18)
CALCIUM SERPL-MCNC: 9.4 MG/DL (ref 8.5–10.1)
CHLORIDE SERPL-SCNC: 113 MMOL/L (ref 98–107)
CO2 SERPL-SCNC: 23 MMOL/L (ref 21–32)
CREAT SERPL-MCNC: 1.6 MG/DL (ref 0.55–1.3)
DEPRECATED RDW RBC AUTO: 16.9 % (ref 11.9–15.9)
GLUCOSE SERPL-MCNC: 105 MG/DL (ref 74–106)
HCT VFR BLD CALC: 29.4 % (ref 35.4–49)
HGB BLD-MCNC: 9.5 GM/DL (ref 11.7–16.9)
MAGNESIUM SERPL-MCNC: 1.5 MG/DL (ref 1.8–2.4)
MCH RBC QN AUTO: 29.3 PG (ref 25.7–33.7)
MCHC RBC AUTO-ENTMCNC: 32.4 G/DL (ref 32–35.9)
MCV RBC: 90.5 FL (ref 80–96)
PLATELET # BLD AUTO: 239 K/MM3 (ref 134–434)
PMV BLD: 8.7 FL (ref 7.5–11.1)
POTASSIUM SERPLBLD-SCNC: 4.7 MMOL/L (ref 3.5–5.1)
PTH-INTACT SERPL-MCNC: 13 PG/ML (ref 15–65)
RBC # BLD AUTO: 3.25 M/MM3 (ref 4–5.6)
SODIUM SERPL-SCNC: 142 MMOL/L (ref 136–145)
T3 SERPL-MCNC: 104 NG/DL (ref 71–180)
WBC # BLD AUTO: 4.3 K/MM3 (ref 4–10)

## 2018-10-17 RX ADMIN — RIVAROXABAN SCH MG: 20 TABLET, FILM COATED ORAL at 17:23

## 2018-10-17 NOTE — PN
Progress Note, Physician


History of Present Illness: 


Weakness, nausea w/o emesis, near without true syncope, flushing without 

diaphoresis and acute on CKD improving with IV hydration.








- Current Medication List


Current Medications: 


Active Medications





Atorvastatin Calcium (Lipitor -)  40 mg PO HS [facility]


   Last Admin: 10/16/18 21:53 Dose:  40 mg


Carvedilol (Coreg -)  6.25 mg PO BID [facility]


   Last Admin: 10/17/18 11:38 Dose:  6.25 mg


Dextrose/Sodium Chloride (D5-Ns -)  1,000 mls @ 100 mls/hr IV ASDIR [facility]


   Last Admin: 10/16/18 10:28 Dose:  100 mls/hr


Rivaroxaban (Xarelto -)  20 mg PO DAILY@1800 [facility]


   Last Admin: 10/16/18 17:43 Dose:  20 mg











- Objective


Vital Signs: 


 Vital Signs











Temperature  97.8 F   10/17/18 13:57


 


Pulse Rate  81   10/17/18 13:57


 


Respiratory Rate  18   10/17/18 13:57


 


Blood Pressure  105/66   10/17/18 13:57


 


O2 Sat by Pulse Oximetry (%)  97   10/17/18 09:00











Constitutional: Yes: No Distress, Calm, Thin


Neck: Yes: Supple


Cardiovascular: Yes: Pulse Irregular, Murmur (2/6 SM)


Respiratory: Yes: Regular, Diminished, On Nasal O2


Gastrointestinal: Yes: Normal Bowel Sounds, Soft


Edema: No


Labs: 


 CBC, BMP





 10/17/18 06:45 





 10/17/18 06:30 





 INR, PTT











INR  3.14  (0.83-1.09)  H  10/14/18  20:15    














Problem List





- Problems


(1) Acute-on-chronic kidney injury


Code(s): N17.9 - ACUTE KIDNEY FAILURE, UNSPECIFIED; N18.9 - CHRONIC KIDNEY 

DISEASE, UNSPECIFIED   


Qualifiers: 


   Acute renal failure type: unspecified   Chronic kidney disease stage: 

unspecified stage   Qualified Code(s): N17.9 - Acute kidney failure, unspecified

; N18.9 - Chronic kidney disease, unspecified   





(2) Hypercalcemia


Code(s): E83.52 - HYPERCALCEMIA   





(3) Demand ischemia


Code(s): I24.8 - OTHER FORMS OF ACUTE ISCHEMIC HEART DISEASE   





(4) Nonadherence to medication


Code(s): Z91.14 - PATIENT'S OTHER NONCOMPLIANCE WITH MEDICATION REGIMEN   





(5) Persistent atrial fibrillation


Code(s): I48.1 - PERSISTENT ATRIAL FIBRILLATION   





(6) Anticoagulant long-term use


Code(s): Z79.01 - LONG TERM (CURRENT) USE OF ANTICOAGULANTS   





(7) Coronary artery disease


Code(s): I25.10 - ATHSCL HEART DISEASE OF NATIVE CORONARY ARTERY W/O ANG PCTRS 

  


Qualifiers: 


   Coronary Disease-Associated Artery/Lesion type: native artery   Native vs. 

transplanted heart: native heart   Associated angina: without angina   

Qualified Code(s): I25.10 - Atherosclerotic heart disease of native coronary 

artery without angina pectoris   





(8) Diastolic dysfunction without heart failure


Code(s): I51.9 - HEART DISEASE, UNSPECIFIED   





(9) Hypertrophic cardiomyopathy


Code(s): I42.2 - OTHER HYPERTROPHIC CARDIOMYOPATHY   





(10) Peripheral arterial disease


Code(s): I73.9 - PERIPHERAL VASCULAR DISEASE, UNSPECIFIED   





(11) Atrial flutter


Code(s): I48.92 - UNSPECIFIED ATRIAL FLUTTER   


Qualifiers: 


   Atrial flutter type: typical   Qualified Code(s): I48.3 - Typical atrial 

flutter   





(12) Vasovagal near syncope


Code(s): R55 - SYNCOPE AND COLLAPSE   





Assessment/Plan


R&LHc at [facility] 1/11/2017 showing nonobstructive CAD, severe LV apical 

hypertrophic cardiomyopathy, mildly elevated right sided pressures, Mynx 

deployed right CFA access site. Study is consistent with apical hypertrophy (

spade-like) variant of hypertrophic cardiomyopathy, planned for optimal medical 

therapy. 


Echocardiogram: 07/11/2018 Mod cLVH, severe DYANA, mod TR RVSP 50-60 mmHg, mod-

severe MR


Echocardiogram: 10/16/2018 Normal LV size with hyperdynamic LVEF 75%, mild BSH, 

normal RV size and fxn, severe LAE, mod-severe MR, mod TR





1. Vasovagal near syncope with typical prodromal sxs


2. Acute on CKD (pre-renal) improving


3. History of bilateral SFA occlusion post thrombectomy, referable to embolic 

disease related to persistent atrial fibrillation with [id number] score of 6, 

history of poor compliance with anticoagulation and medical F/U


4. Non obstructive CAD on R&LHc coronary angiography


5. 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 

subendocardial ischemia, compensated/euvolemic


6. Persistent atrial fibrillation [id number] score of 6 on Xarelto 20 qd


7. Labile HTN


8. Poor compliance with medical therapy administration and medical F/U


9. Tobacco abuse


10. Hypercalcemia, paraproteinemia-> possible multiple myeloma





PLAN:





1. Continue Xarelto 20 qd, Lipitor 40 qd and d/c concomitant ASA 81 qd, trops 

downtrending


2. Continue Carvedilol 6.25 bid as hemodynamics tolerate


3. Consider outpatient DCCV for the above noted atrial arrhythmia once 

clinically stabilized and compliance with anticoagulation is assured. Patient 

was advised to follow up in office [phone] (previously been to office)


4. Hematology input, SPEP, UPEP pending


5. Judicious IVF with monitor renal recovery

## 2018-10-17 NOTE — PN
Progress Note (short form)





- Note


Progress Note: 





Renal follow up for HAM





Pt seen and examined at the bedside


no acute complaints


no sob, cp, abd pain, N/V/D


feels well 


off IVF 


 Vital Signs











Temperature  97.8 F   10/17/18 13:57


 


Pulse Rate  81   10/17/18 13:57


 


Respiratory Rate  18   10/17/18 13:57


 


Blood Pressure  105/66   10/17/18 13:57


 


O2 Sat by Pulse Oximetry (%)  97   10/17/18 09:00








 Intake & Output











 10/14/18 10/15/18 10/16/18 10/17/18





 23:59 23:59 23:59 23:59


 


Intake Total 1000 1200 1000 1100


 


Output Total  600 600 200


 


Balance 1000 600 400 900


 


Weight 88.677 kg  88.451 kg 











NAD


awake and alert


RRR


CTA


soft NT/ND


No Le edema





 CBC, BMP





 10/17/18 06:45 





 10/17/18 06:30 





 Current Medications





Atorvastatin Calcium (Lipitor -)  40 mg PO HS [facility]


   Last Admin: 10/16/18 21:53 Dose:  40 mg


Carvedilol (Coreg -)  6.25 mg PO BID [facility]


   Last Admin: 10/17/18 11:38 Dose:  6.25 mg


Dextrose/Sodium Chloride (D5-Ns -)  1,000 mls @ 100 mls/hr IV ASDIR [facility]


   Last Admin: 10/16/18 10:28 Dose:  100 mls/hr


Rivaroxaban (Xarelto -)  20 mg PO DAILY@1800 [facility]


   Last Admin: 10/17/18 17:23 Dose:  20 mg











78 year-old male with a PMH significant for HTN, HLD, CAD, ischemic 

cardiomyopathy, paroxysmal A-fib on Xarelto, PVD with recent left arterial 

occlusion with stent placement, CKD, and ETOH abuse Presented with complaint of 

nausea and weakness.





#HAM


#Hypercalcemia


#Elevated serum protein levels with anemia


#Hypertension 





Renal function improved 


Corrected Ca is 11.4


tolerating oral intake


SPEP results pending 


stable for discharge with outpatient follow up if pt is able to tolerate oral 

diet 





Ricky Chang DO

## 2018-10-30 ENCOUNTER — HOSPITAL ENCOUNTER (INPATIENT)
Dept: HOSPITAL 74 - JER | Age: 78
LOS: 44 days | Discharge: ANOTHER HOSPITAL | DRG: 4 | End: 2018-12-13
Attending: FAMILY MEDICINE | Admitting: INTERNAL MEDICINE
Payer: COMMERCIAL

## 2018-10-30 VITALS — BODY MASS INDEX: 31.4 KG/M2

## 2018-10-30 DIAGNOSIS — Z87.891: ICD-10-CM

## 2018-10-30 DIAGNOSIS — D61.818: ICD-10-CM

## 2018-10-30 DIAGNOSIS — F10.20: ICD-10-CM

## 2018-10-30 DIAGNOSIS — E72.20: ICD-10-CM

## 2018-10-30 DIAGNOSIS — A41.81: Primary | ICD-10-CM

## 2018-10-30 DIAGNOSIS — I48.0: ICD-10-CM

## 2018-10-30 DIAGNOSIS — I25.119: ICD-10-CM

## 2018-10-30 DIAGNOSIS — A41.9: ICD-10-CM

## 2018-10-30 DIAGNOSIS — Z79.01: ICD-10-CM

## 2018-10-30 DIAGNOSIS — N39.0: ICD-10-CM

## 2018-10-30 DIAGNOSIS — I42.2: ICD-10-CM

## 2018-10-30 DIAGNOSIS — E86.0: ICD-10-CM

## 2018-10-30 DIAGNOSIS — J96.01: ICD-10-CM

## 2018-10-30 DIAGNOSIS — I48.1: ICD-10-CM

## 2018-10-30 DIAGNOSIS — C90.00: ICD-10-CM

## 2018-10-30 DIAGNOSIS — I51.9: ICD-10-CM

## 2018-10-30 DIAGNOSIS — E88.09: ICD-10-CM

## 2018-10-30 DIAGNOSIS — E87.0: ICD-10-CM

## 2018-10-30 DIAGNOSIS — R73.03: ICD-10-CM

## 2018-10-30 DIAGNOSIS — E83.52: ICD-10-CM

## 2018-10-30 DIAGNOSIS — I12.9: ICD-10-CM

## 2018-10-30 DIAGNOSIS — Z99.11: ICD-10-CM

## 2018-10-30 DIAGNOSIS — R53.2: ICD-10-CM

## 2018-10-30 DIAGNOSIS — G92: ICD-10-CM

## 2018-10-30 DIAGNOSIS — L03.115: ICD-10-CM

## 2018-10-30 DIAGNOSIS — N17.9: ICD-10-CM

## 2018-10-30 DIAGNOSIS — E78.00: ICD-10-CM

## 2018-10-30 DIAGNOSIS — I48.92: ICD-10-CM

## 2018-10-30 DIAGNOSIS — R68.0: ICD-10-CM

## 2018-10-30 DIAGNOSIS — D89.2: ICD-10-CM

## 2018-10-30 DIAGNOSIS — Z91.14: ICD-10-CM

## 2018-10-30 DIAGNOSIS — N18.3: ICD-10-CM

## 2018-10-30 DIAGNOSIS — K81.9: ICD-10-CM

## 2018-10-30 DIAGNOSIS — E87.2: ICD-10-CM

## 2018-10-30 DIAGNOSIS — I36.1: ICD-10-CM

## 2018-10-30 DIAGNOSIS — Z95.5: ICD-10-CM

## 2018-10-30 DIAGNOSIS — I73.9: ICD-10-CM

## 2018-10-30 DIAGNOSIS — R19.7: ICD-10-CM

## 2018-10-30 DIAGNOSIS — I48.91: ICD-10-CM

## 2018-10-30 DIAGNOSIS — I24.8: ICD-10-CM

## 2018-10-30 DIAGNOSIS — I34.0: ICD-10-CM

## 2018-10-30 DIAGNOSIS — R64: ICD-10-CM

## 2018-10-30 DIAGNOSIS — J69.0: ICD-10-CM

## 2018-10-30 DIAGNOSIS — M62.82: ICD-10-CM

## 2018-10-30 DIAGNOSIS — D64.9: ICD-10-CM

## 2018-10-30 LAB
ALBUMIN SERPL-MCNC: 1.4 G/DL (ref 3.4–5)
ALP SERPL-CCNC: 54 U/L (ref 45–117)
ALT SERPL-CCNC: 11 U/L (ref 13–61)
ANION GAP SERPL CALC-SCNC: 7 MMOL/L (ref 8–16)
APPEARANCE UR: CLEAR
APTT BLD: 22.3 SECONDS (ref 25.2–36.5)
AST SERPL-CCNC: 29 U/L (ref 15–37)
BASOPHILS # BLD: 0.2 % (ref 0–2)
BILIRUB SERPL-MCNC: 0.3 MG/DL (ref 0.2–1)
BILIRUB UR STRIP.AUTO-MCNC: NEGATIVE MG/DL
BUN SERPL-MCNC: 67 MG/DL (ref 7–18)
CALCIUM SERPL-MCNC: 10.9 MG/DL (ref 8.5–10.1)
CHLORIDE SERPL-SCNC: 115 MMOL/L (ref 98–107)
CO2 SERPL-SCNC: 19 MMOL/L (ref 21–32)
COLOR UR: YELLOW
CREAT SERPL-MCNC: 2.8 MG/DL (ref 0.55–1.3)
DEPRECATED RDW RBC AUTO: 16.5 % (ref 11.9–15.9)
EOSINOPHIL # BLD: 0.6 % (ref 0–4.5)
EPITH CASTS URNS QL MICRO: (no result) /HPF
GLUCOSE SERPL-MCNC: 97 MG/DL (ref 74–106)
HCT VFR BLD CALC: 31.6 % (ref 35.4–49)
HGB BLD-MCNC: 10.6 GM/DL (ref 11.7–16.9)
INR BLD: 1.33 (ref 0.83–1.09)
KETONES UR QL STRIP: NEGATIVE
LEUKOCYTE ESTERASE UR QL STRIP.AUTO: NEGATIVE
LYMPHOCYTES # BLD: 24.4 % (ref 8–40)
MCH RBC QN AUTO: 30.3 PG (ref 25.7–33.7)
MCHC RBC AUTO-ENTMCNC: 33.5 G/DL (ref 32–35.9)
MCV RBC: 90.4 FL (ref 80–96)
MONOCYTES # BLD AUTO: 4.2 % (ref 3.8–10.2)
MUCOUS THREADS URNS QL MICRO: (no result)
NEUTROPHILS # BLD: 70.6 % (ref 42.8–82.8)
NITRITE UR QL STRIP: NEGATIVE
PH BLDV: 7.38 [PH] (ref 7.32–7.42)
PH UR: 5 [PH] (ref 5–8)
PLATELET # BLD AUTO: 301 K/MM3 (ref 134–434)
PLATELET BLD QL SMEAR: ADEQUATE
PMV BLD: 9 FL (ref 7.5–11.1)
POTASSIUM SERPLBLD-SCNC: 4.2 MMOL/L (ref 3.5–5.1)
PROT SERPL-MCNC: 12.1 G/DL (ref 6.4–8.2)
PROT UR QL STRIP: NEGATIVE
PROT UR QL STRIP: NEGATIVE
PT PNL PPP: 15.7 SEC (ref 9.7–13)
RBC # BLD AUTO: 3.5 M/MM3 (ref 4–5.6)
SODIUM SERPL-SCNC: 140 MMOL/L (ref 136–145)
SP GR UR: 1.02 (ref 1.01–1.03)
UROBILINOGEN UR STRIP-MCNC: NEGATIVE MG/DL (ref 0.2–1)
VENOUS PC02: 42.3 MMHG (ref 38–52)
VENOUS PO2: 23.3 MMHG (ref 28–48)
WBC # BLD AUTO: 4.1 K/MM3 (ref 4–10)

## 2018-10-30 PROCEDURE — P9038 RBC IRRADIATED: HCPCS

## 2018-10-30 PROCEDURE — G0480 DRUG TEST DEF 1-7 CLASSES: HCPCS

## 2018-10-30 PROCEDURE — P9017 PLASMA 1 DONOR FRZ W/IN 8 HR: HCPCS

## 2018-10-30 PROCEDURE — P9058 RBC, L/R, CMV-NEG, IRRAD: HCPCS

## 2018-10-30 RX ADMIN — FOLIC ACID SCH: 1 TABLET ORAL at 22:30

## 2018-10-30 RX ADMIN — RIVAROXABAN STA: 20 TABLET, FILM COATED ORAL at 22:30

## 2018-10-30 RX ADMIN — Medication SCH: at 22:30

## 2018-10-30 NOTE — PDOC
History of Present Illness





- General


Chief Complaint: SIRS, Suspected/Possible


Stated Complaint: AMS


Time Seen by Provider: 10/30/18 16:37


History Source: EMS, Old Records


Exam Limitations: Clinical Condition





- History of Present Illness


Initial Comments: 





10/30/18 16:55


Pt is a 77yo m with PMH of Afib (on Xarelto), CAD, CKD, HTN, HLD, PVD with 

stent placement L foot BIBA for AMS. EMS was called by neighbors because mail 

had been piling up. EMS entered the home and noticed house was cold and not 

well kept, pt was laying on his bed with just a jacket on, oriented only to 

self. EMS noticed pt had a hospital band on from 2 weeks ago. Old records 

indicate pt was dc from this hospital on October 17. Pt is unable to answer 

questions. 





PMD: Anoop


PMH: see hpi


PSH: 


Meds: 


Allergies: valsartan








Past History





- Past Medical History


Allergies/Adverse Reactions: 


 Allergies











Allergy/AdvReac Type Severity Reaction Status Date / Time


 


valsartan Allergy Severe  Verified 10/30/18 16:47











Home Medications: 


Ambulatory Orders





Atorvastatin Ca [Lipitor] 40 mg PO HS #30 tablet 07/12/18 


Rivaroxaban [Xarelto -] 20 mg PO DAILY@1800 #30 tablet 07/12/18 


Carvedilol [Coreg -] 6.25 mg PO BID #60 tablet 10/17/18 








Anemia: No


Asthma: Yes


Cancer: No


Cardiac Disorders: Yes (PAROXYSMAL A.FIBRILLATION/A.FLUTTER)


COPD: No


Diabetes: Yes (BORDERLINE)


HTN: Yes (BORDERLINE)


Hypercholesterolemia: Yes





- Surgical History


Abdominal Surgery: Yes (HERNIA REPAIR)


Cardiac Surgery: Yes (CARDIAC CATHERIZATION/CORONARY ANGIOGRAPHY)


Orthopedic Surgery: Yes (Bone marrow biopsy)





- Immunization History


Immunization Up to Date: Yes





- Suicide/Smoking/Psychosocial Hx


Smoking History: Former smoker


Have you smoked in the past 12 months: No


Number of Cigarettes Smoked Daily: 10


If you are a former smoker, when did you quit?: 12.28.16


Information on smoking cessation initiated: No


'Breaking Loose' booklet given: 05/26/18


Hx Alcohol Use: No


Drug/Substance Use Hx: No


Substance Use Type: None


Hx Substance Use Treatment: No





**Review of Systems





- Review of Systems


Able to Perform ROS?: No





*Physical Exam





- Vital Signs


 Last Vital Signs











Temp Pulse Resp BP Pulse Ox


 


 96.8 F L  130 H  20   190/169 H  90 L


 


 10/30/18 16:43  10/30/18 16:43  10/30/18 16:43  10/30/18 16:43  10/30/18 16:43














- Physical Exam


General Appearance: Yes: Disheveled, Moderate Distress, Cachetic


HEENT: positive: EOMI, FUNMILAYO, TMs Normal, Pharynx Normal, Hearing Grossly Normal

, Other (dry mucousal membranes, bad dentition).  negative: Nasal Congestion, 

Rhinorrhea


Neck: positive: Trachea midline, Supple.  negative: Carotid bruit, 

Lymphadenopathy (R), Lymphadenopathy (L)


Respiratory/Chest: positive: Decreased Breath Sounds, Other (unable to fully 

evaluate, pt lying down, not taking in deep breaths when asked to).  negative: 

Respiratory Distress


Cardiovascular: positive: Tachycardia, Irregularly Irregular.  negative: Edema, 

JVD, Murmur


Vascular Pulses: Carotid (R): 2+, Carotid (L): 2+, Dorsalis-Pedis (R): 1+, 

Doralis-Pedis (L): 1+


Gastrointestinal/Abdominal: positive: Normal Bowel Sounds, Soft.  negative: 

Distended, Guarding, Rebound, Tenderness, Hernia


Male Genitalia: positive: normal genitalia.  negative: testicular mass


Musculoskeletal: positive: Other (R ankle tender to palpation).  negative: CVA 

Tenderness


Extremity: positive: Normal Capillary Refill, Swelling (R foot), Erythema (R 

ankle).  negative: Cyanosis, Delayed Capillary Refill, Pedal Edema


Integumentary: positive: Normal Color, Dry, Warm, Erythema (R ankle), Cold.  

negative: Pale, Clammy, Petechiae, Rash, Bruising


Neurologic: positive: CNs II-XII NML intact, Alert, Normal Response, Motor 

Strength 5/5, Responsive.  negative: Fully Oriented (AOx1), Sensory Deficit





ED Treatment Course





- LABORATORY


CBC & Chemistry Diagram: 


 10/30/18 17:02





 10/30/18 18:00





- RADIOLOGY


Radiology Studies Ordered: 














 Category Date Time Status


 


 HEAD CT WITHOUT CONTRAST [CT] Stat CT Scan  10/30/18 16:37 Ordered


 


 CHEST X-RAY PORTABLE* [RAD] Stat Radiology  10/30/18 16:36 Ordered














Medical Decision Making





- Medical Decision Making





10/30/18 17:10


Pt is a 77yo m with PMH of Afib (on Xarelto), CAD, HTN, HLD, PVD BIBA for AMS. 

Last known well at discharge on October 17, 2018


   Vitals: rectal 96.3, 120s hr, /165? 


   PE: R foot tenderness, AOx1, responding to commands. Not answering 

questions. GCS 15


EMS- BGL 95, AOx1, Afib rvr with pvc. 


DDx: stroke, sepsis, uti, cellulitis





elevated hr, low body temp...sirs? workup started. Davis hugger placed. 





repeat bp 103/86. will give fluids (pt looks dehydrated)


CT head ordered. 


unknown down time. bgl ordered. 


 CT head negative for pathology. Labs significant for Cr 2.8, Trop 0.8, CKMB 

13.7, , Ca10.9, Hgb 10.6 (baseline)


UA 2+ blood 4RBC. most likely ham from rhabdo with demand ischemia. Pt started 

on fluids. Given 3 liters total. Started on vanc and zosyn. R ankle xray done. 





Xray shows possible consolidation in R mid lung field or just vascularity. Xray 

ankle no fracture. 


Will admit pt for ham with rhabdo, cellulitis, afib rvr. Possible artery 

occlusion from PVD? however low suspicion bc pt can move toes, feels warm, 1+ 

pulses. 





*DC/Admit/Observation/Transfer


Diagnosis at time of Disposition: 


 HAM (acute kidney injury), Atrial fibrillation with RVR





Hypothermia


Qualifiers:


 Encounter type: initial encounter Qualified Code(s): T68.XXXA - Hypothermia, 

initial encounter





Cellulitis


Qualifiers:


 Site of cellulitis: extremity Site of cellulitis of extremity: lower extremity 

Laterality: right Qualified Code(s): L03.115 - Cellulitis of right lower limb








- Discharge Dispostion


Condition at time of disposition: Good


Decision to Admit order: Yes





- Referrals





- Patient Instructions





- Post Discharge Activity

## 2018-10-30 NOTE — HP
CHIEF COMPLAINT: altered mental status





PCP:





HISTORY OF PRESENT ILLNESS:


Patient is not responsive upon my encounter and history obtained from chart 

review, and discussion with ED resident. 


Patient is a 78 year old male with history of Afib on xarelto, coronary artery 

disease, chronic kidney disease, hypertension, hyperlipidemia, peripheral 

vascular disease s/p arterial stent in left lower extremity, alcohol abuse, and 

medication noncompliance presents after being found non responsive at home. 

Patient was noted to be found with hospital band still on his arm. He was 

sleeping in bed, wearing a sweater. 


Upon my encounter, he is alert only to name. Believes he is currently in [place] 

[place]. Unable to follow commands. 





ER course was notable for:


(1) CT head, Chest Xray


(2) UA


(3) troponin 0.80





PAST MEDICAL HISTORY: Afib on xarelto, coronary artery disease, chronic kidney 

disease, hypertension, hyperlipidemia, peripheral vascular disease s/p arterial 

stent in left lower extremity, alcohol abuse,





PAST SURGICAL HISTORY: 





Social History:


Smoking: former smoker


Alcohol: history of alcohol abuse


Drugs: unable to obtain





Family History: unable to obtain 


Allergies





valsartan Allergy (Severe, Verified 10/30/18 16:47)


 








HOME MEDICATIONS:


 Home Medications











 Medication  Instructions  Recorded


 


Atorvastatin Ca [Lipitor] 40 mg PO HS #30 tablet 07/12/18


 


Carvedilol [Coreg -] 6.25 mg PO BID #60 tablet 07/12/18


 


Rivaroxaban [Xarelto -] 20 mg PO DAILY@1800 #30 tablet 07/12/18


 


Carvedilol [Coreg -] 6.25 mg PO BID #60 tablet 10/17/18








REVIEW OF SYSTEMS


Unable to obtain due to patient nonresponsiveness. 








PHYSICAL EXAMINATION


 Vital Signs - 24 hr











  10/30/18 10/30/18 10/30/18





  16:36 16:43 17:45


 


Temperature 96.9 F L 96.8 F L 


 


Pulse Rate  140 H 


 


Pulse Rate [ 130 H  120 H





Apical]   


 


Respiratory 20 20 16





Rate   


 


Blood Pressure  190/169 H 


 


Blood Pressure 103/82  99/43 L





[Right Arm]   


 


O2 Sat by Pulse 96 96 97





Oximetry (%)   














  10/30/18 10/30/18 10/30/18





  18:36 18:43 19:56


 


Temperature  97.4 F L 


 


Pulse Rate 120 H  


 


Pulse Rate [  110 H 110 H





Apical]   


 


Respiratory  16 





Rate   


 


Blood Pressure   


 


Blood Pressure  103/68 103/82





[Right Arm]   


 


O2 Sat by Pulse 98 100 100





Oximetry (%)   














  10/30/18





  20:14


 


Temperature 97.6 F


 


Pulse Rate 


 


Pulse Rate [ 110 H





Apical] 


 


Respiratory 18





Rate 


 


Blood Pressure 


 


Blood Pressure 103/62





[Right Arm] 


 


O2 Sat by Pulse 98





Oximetry (%) 











GENERAL: Thin  male, temporal wasting, poorly groomed, with 

overgrown, dirty finger and toe nails. Somnolent. Alert only to name. 


HEAD: Normal with no signs of trauma. No Marroquin's sign appreciated. 


EYES: Pupils equal, round and reactive to light. Sclera anicteric, conjunctiva 

clear b/l. Patient unable to comply with extraocular eye movements, however 

moves both eyes to track. 


EARS, NOSE, THROAT: Oral mucosa dry, poor dentition, with numerous missing 

teeth. 


NECK: Supple without lymphadenopathy


LUNGS: Poor inspiratory effort b/l with crackles auscultated in right lower 

lung base. No wheezing appreciated. Left lung clear to auscultation. 


HEART: Irregular rate and rhythm. Normal S1 and S2 auscultated without murmur, 

rub or gallop.


ABDOMEN: Soft, nontender, not distended. Hypoactive bowel sounds X4 quadrants. 

No guarding, no rebound tendernes. No hepatomegaly or  splenomegaly palpated or 

percussed.


UPPER EXTREMITIES: 2+ radial pulses b/l, warm, well-perfused. 


LOWER EXTREMITIES: Unable to appreciated DP pulses B/L. 1+ posterior tibialis 

pulses appreciated b/l. Right lower extremity erythematous, warm, and swollen, 

compared to right


NEUROLOGICAL: Unable to assess motion, strength as patient not responding to 

commands. Good muscle tone b/l upper and lower extremities.


SKIN: Dry, cracking skin noted in b/l lower extremities, and between toes.





 Laboratory Results - last 24 hr











  10/30/18 10/30/18 10/30/18





  17:02 17:02 17:02


 


WBC  4.1  


 


RBC  3.50 L  


 


Hgb  10.6 L  


 


Hct  31.6 L  


 


MCV  90.4  


 


MCH  30.3  


 


MCHC  33.5  


 


RDW  16.5 H  


 


Plt Count  301  D  


 


MPV  9.0  


 


Absolute Neuts (auto)  2.9  


 


Neutrophils %  70.6  D  


 


Neutrophils % (Manual)  74.0  D  


 


Band Neutrophils %  2.0  


 


Lymphocytes %  24.4  D  


 


Lymphocytes % (Manual)  18.0  D  


 


Monocytes %  4.2  


 


Monocytes % (Manual)  2 L  


 


Eosinophils %  0.6  


 


Eosinophils % (Manual)  0.0  D  


 


Basophils %  0.2  


 


Basophils % (Manual)  0.0  


 


Myelocytes % (Man)  2  D  


 


Nucleated RBC %  2 H  


 


Platelet Estimate  Adequate  


 


PT with INR   15.70 H 


 


INR   1.33 H 


 


PTT (Actin FS)   22.3 L 


 


VBG pH   


 


POC VBG pCO2   


 


POC VBG pO2   


 


Mixed VBG HCO3   


 


Sodium   


 


Potassium   


 


Chloride   


 


Carbon Dioxide   


 


Anion Gap   


 


BUN   


 


Creatinine   


 


Creat Clearance w eGFR   


 


Random Glucose   


 


Lactic Acid   


 


Calcium   


 


Total Bilirubin   


 


AST   


 


ALT   


 


Alkaline Phosphatase   


 


Creatine Kinase   


 


Creatine Kinase Index   


 


CK-MB (CK-2)   


 


Troponin I   


 


Total Protein   


 


Albumin   


 


Urine Color    Yellow


 


Urine Appearance    Clear


 


Urine pH    5.0


 


Ur Specific Gravity    1.018


 


Urine Protein    Negative


 


Urine Glucose (UA)    Negative


 


Urine Ketones    Negative


 


Urine Blood    2+ H


 


Urine Nitrite    Negative


 


Urine Bilirubin    Negative


 


Urine Urobilinogen    Negative


 


Ur Leukocyte Esterase    Negative


 


Urine WBC (Auto)    <1


 


Urine RBC (Auto)    4


 


Ur Epithelial Cells    Rare


 


Urine Mucus    Rare


 


Blood Type   


 


Antibody Screen   














  10/30/18 10/30/18 10/30/18





  17:02 17:02 17:02


 


WBC   


 


RBC   


 


Hgb   


 


Hct   


 


MCV   


 


MCH   


 


MCHC   


 


RDW   


 


Plt Count   


 


MPV   


 


Absolute Neuts (auto)   


 


Neutrophils %   


 


Neutrophils % (Manual)   


 


Band Neutrophils %   


 


Lymphocytes %   


 


Lymphocytes % (Manual)   


 


Monocytes %   


 


Monocytes % (Manual)   


 


Eosinophils %   


 


Eosinophils % (Manual)   


 


Basophils %   


 


Basophils % (Manual)   


 


Myelocytes % (Man)   


 


Nucleated RBC %   


 


Platelet Estimate   


 


PT with INR   


 


INR   


 


PTT (Actin FS)   


 


VBG pH  7.38  


 


POC VBG pCO2  42.3  D  


 


POC VBG pO2  23.3 L D  


 


Mixed VBG HCO3  24.5  


 


Sodium   Cancelled 


 


Potassium   Cancelled 


 


Chloride   Cancelled 


 


Carbon Dioxide   Cancelled 


 


Anion Gap   Cancelled 


 


BUN   Cancelled 


 


Creatinine   Cancelled 


 


Creat Clearance w eGFR   Cancelled 


 


Random Glucose   Cancelled 


 


Lactic Acid    2.0


 


Calcium   Cancelled 


 


Total Bilirubin   Cancelled 


 


AST   Cancelled 


 


ALT   Cancelled 


 


Alkaline Phosphatase   Cancelled 


 


Creatine Kinase   Cancelled 


 


Creatine Kinase Index   


 


CK-MB (CK-2)   Cancelled 


 


Troponin I   


 


Total Protein   Cancelled 


 


Albumin   Cancelled 


 


Urine Color   


 


Urine Appearance   


 


Urine pH   


 


Ur Specific Gravity   


 


Urine Protein   


 


Urine Glucose (UA)   


 


Urine Ketones   


 


Urine Blood   


 


Urine Nitrite   


 


Urine Bilirubin   


 


Urine Urobilinogen   


 


Ur Leukocyte Esterase   


 


Urine WBC (Auto)   


 


Urine RBC (Auto)   


 


Ur Epithelial Cells   


 


Urine Mucus   


 


Blood Type   


 


Antibody Screen   














  10/30/18 10/30/18 10/30/18





  17:02 17:02 18:00


 


WBC   


 


RBC   


 


Hgb   


 


Hct   


 


MCV   


 


MCH   


 


MCHC   


 


RDW   


 


Plt Count   


 


MPV   


 


Absolute Neuts (auto)   


 


Neutrophils %   


 


Neutrophils % (Manual)   


 


Band Neutrophils %   


 


Lymphocytes %   


 


Lymphocytes % (Manual)   


 


Monocytes %   


 


Monocytes % (Manual)   


 


Eosinophils %   


 


Eosinophils % (Manual)   


 


Basophils %   


 


Basophils % (Manual)   


 


Myelocytes % (Man)   


 


Nucleated RBC %   


 


Platelet Estimate   


 


PT with INR   


 


INR   


 


PTT (Actin FS)   


 


VBG pH   


 


POC VBG pCO2   


 


POC VBG pO2   


 


Mixed VBG HCO3   


 


Sodium    140


 


Potassium    4.2


 


Chloride    115 H


 


Carbon Dioxide    19 L


 


Anion Gap    7 L


 


BUN    67 H


 


Creatinine    2.8 H


 


Creat Clearance w eGFR    21.97


 


Random Glucose    97


 


Lactic Acid   


 


Calcium    10.9 H


 


Total Bilirubin    0.3


 


AST    29


 


ALT    11 L


 


Alkaline Phosphatase    54


 


Creatine Kinase    409 H


 


Creatine Kinase Index    3.3


 


CK-MB (CK-2)    13.7 H


 


Troponin I  Cancelled   0.80 H*


 


Total Protein    12.1 H


 


Albumin    1.4 L


 


Urine Color   


 


Urine Appearance   


 


Urine pH   


 


Ur Specific Gravity   


 


Urine Protein   


 


Urine Glucose (UA)   


 


Urine Ketones   


 


Urine Blood   


 


Urine Nitrite   


 


Urine Bilirubin   


 


Urine Urobilinogen   


 


Ur Leukocyte Esterase   


 


Urine WBC (Auto)   


 


Urine RBC (Auto)   


 


Ur Epithelial Cells   


 


Urine Mucus   


 


Blood Type   Cancelled 


 


Antibody Screen   Cancelled 











ASSESSMENT/PLAN:


Patient is a 78 year old male with history of Afib on xarelto, coronary artery 

disease, chronic kidney disease, hypertension, hyperlipidemia, peripheral 

vascular disease s/p arterial stent in left lower extremity, alcohol abuse, and 

medication noncompliance presents after being found non responsive at home.





Altered mental status


-Unclear etiology. May be secondary to toxic metabolic encephalopathy as 

patient has history of alcohol abuse. Uncertain last alcoholic drink.


-CT head showed no acute intracranial pathology


-IV normal saline at 125mL/ hour


-[facility] protocol


-Thiamine


-Folate


-F/U urine toxicology


-F/U blood alcohol level





Hypercalcemia


-Prior admission had begun workup for multiple myeloma (ANTWAN M-spike 4.7)


-Patient receiving IV normal saline


-Will continue fluids as discussed with nephrology (Dr. Carlos). 


-F/U ionized calcium


-Consider hematology-oncology consult 





Troponinemia


-Likely secondary to demand ischemia as patient was dehydrated, hypovolemic


-Troponin 0.80. Will trend


-EKG showed


-F/U cardiology consult (Dr. Padilla)





Acute on chronic kidney injury


-Cr 2.8 (1.6 on prior discharge earlier this month). Baseline Cr. at approx 1.3


-May be secondary to dehydration


-Patient receiving IV normal saline


-F/U nephrology consult (Dr. Carlos)





Afib (CHADSVASC 6)


-Reinstate home medication Xarelto 20mg 





History peripheral vascular disease s/p arterial stent in left lower extremity


-Patient's right lower extremity erythematous, with poor pulses


-Duplex arterial and venous ultrasound of right lower extremity


-F/U official read of xray right lower extremity done in ED


-Vascular surgery consult (Dr. Chan)





Coronary artery disease


-Continue Aspirin 81mg 


-Continue Lipitor 40mg 





FEN


-IV normal saline at 125mL/ hour


-Hypecalcemia. Follow CMP


-NPO except for medications with sips of water, pending speech/ swallow and 

dietician assessment





Prophylaxis


-On Xarelto 20mg PO daily





Disposition


-Admit to telemetry for care





Visit type





- Emergency Visit


Emergency Visit: Yes


ED Registration Date: 10/30/18


Care time: The patient presented to the Emergency Department on the above date 

and was hospitalized for further evaluation of their emergent condition.





- New Patient


This patient is new to me today: Yes


Date on this admission: 10/31/18





- Critical Care


Critical Care patient: No

## 2018-10-30 NOTE — PDOC
Attending Attestation





- Resident


Resident Name: Carol Martinez





- ED Attending Attestation


I have performed the following: I have examined & evaluated the patient, The 

case was reviewed & discussed with the resident, I agree w/resident's findings 

& plan, Exceptions are as noted





- HPI


HPI: 





10/30/18 17:56


78-year-old male brought in by ambulance from his residence.





A neighbor  saw his mail was collecting outside his residence  and became 

concerned and called for help.  EMS reports the patient was found in bed 

wearing only a sweater and the apartment appeared seem to be unheated.


This 78-year-old male had been admitted to our hospital from October 14 the Oct 

17 for acute renal failure.  Past medical history of A. fib on Xalato,


 coronary artery disease, chronic kidney failure, peripheral vascular disease, 

hypertension.  


10/30/18 18:02








- Physicial Exam


PE: 





10/30/18 17:59


Tall, thin 78-year-old male who appears dehydrated and frail.


Head normocephalic, atraumatic, no evidence of scalp lacerations.


Very dry mucous membranes


Neck supple


Lungs no crackles appreciated


CVS tachycardia


Abdomen is flat


Extremities there is no edema, he has pain in his feet.


Skin tenting due to dehydration.


neuro .  He is able to move his extremities ,patient is alert only to self





- Medical Decision Making





10/30/18 19:29


CAT scan of the head did not show any acute intracranial pathology.


EKG is tachycardic with A. fib.  His prior history of A. fib


UA negative for infection


Comparing his renal function tests 11 days ago.  He now has acute renal failure 

with a BUN about 60 and a creatinine of 2.8.


He has a positive troponin of 0.8


Impression dehydration/acute renal insufficiency, positive troponin


Admitted to telemetry

## 2018-10-30 NOTE — PN
Teaching Attending Note


Name of Resident: Yoana Curiel





ATTENDING PHYSICIAN STATEMENT





I saw and evaluated the patient.


I reviewed the resident's note and discussed the case with the resident.


I agree with the resident's findings and plan as documented.








SUBJECTIVE:


Patient is a 79 y/o AAM with a PMH significant for Persistent AFib (on xarelto, 

Coreg, CHADSVASC 6), PAD (s/p stenting LLE 4 months ago), Alcohol Abuse, 

suspected myeloma (due to paraproteinemia and M spike, SPEP/UPEP results not 

yet found), hypertrophic CM (apical), diastolic dysfunction, Nonobstructive CAD 

(2017 cath done at [facility]), tobacco abuse, multiple admissions to our service in 

the past year.  His history has been complicated by documented noncompliance.  

He presents today via EMS with altered mental status after being found down at 

his home by EMS after his neighbor found mail piling up, etc.  He was brought 

to the ER and was arousable and following commands but not answering questions 

appropriately and is altered and cannot provide any acurate history; I am told 

he was still wearing his wristband from his last discharge.  He was found to be 

hypothermic and tachycardic with a marked hypercalcemia and an HAM.  This is 

similar to his prior presentation when he was found to be hypercalcemic (

discharged with Ca of 11 corrected) and hypothermic.  ER was concerned for 

possible cellulitis given the findings on his lower extremity (R) with redness 

and covered him broadly, recieved fluids.  Being admitted to the medicine 

service.  Thank you for allowing us to take part in the ongoing care of your 

patient.








10 sys ROS couldn't be completed due to mentation





Social history significant for h/o EtOH abuse, tobacco abuse


FH could not be confirmed with patient


PMH and PSH discussed








OBJECTIVE:


VS, labs, and imaging all personally reviewed


NAD, resting in bed, confused, orientated to self


Tachycardic with irregular rhythm no precious mgr


Lungs CTAB with sym expansion


NT ND +BS x4 quads


CN2-12 grossly intact, no FND, altered, follows basic commands but easily 

confused, no confabulation


Normal muscle tone, reddish discoloration over distal RLE, near global 

onchyomycosis





CXR today without any obvious disease


Old vascular studies reviewed; he does have diffuse athrosclerotic plaques 

throughout his RLE 6 months ago


ANTWAN M-spike 10/15 @ 4.7 with elevated serum protein and low albumin; I haven't 

been able to find the results of SPEP and UPEP 


Troponin, CK, and CKMB are elevated today








ASSESSMENT AND PLAN:


Patient is a 79 y/o AAM with stated PMH who presents to ER with AMS after being 

found down at home; he is being admitted to medicine for a variety of reasons 

including AMS, hypercalcemia, elevated troponin, NAGMA, tachycardia, profound 

dehydration, HAM.





1) AMS


-Likely multifactoral; must r/o EtOH intoxication so checking EtOH level.  

Checking EtOH level.  Placing on CIWA.  Giving thiamine/folate.  Hypercalcemia 

likely could be playing a role so we will treat.


-No FND but was not excellent neuro exam due to underlying AMS.  If he improves 

will reattempt as he has been off of his Xarelto likely given his elevated 

CHADSVASC2 he is a stroke risk; if any concern for CVA will consult neurology 

and obtain MRI, etc.  He is moving all 4 extemities now without any obvious CN 

defects or sensory changes; CT negative.


-Neuro checks, seizure precautions.


-Some difficulty swallowing on his own with bedside swallow; plan on swallow 

study in AM.  Assist with PO medications.





2) Hypercalcemia


-Corrects to 13 range given albumin; recurring problem.  Concern for multiple 

myeloma in the past given M-[place].  SPEP and UPEP documented as pending; was 

unable to find the results at this juncture but will continue to search/request 

OP records.  


-Consulting nephrology; hydrating with NS @ 125cc/hr


-Monitor Calcium levels; check ionized calcium.  





3) PAD, likely worsening


-Known severe PAD s/p LLE stenting now with RLE skin changes suspect for 

worsening PAD.  Checking ultrasound of RLE to see if worsening disease; if it 

is then will plan to consult vascular.  He does have palpable pulses, etc.





4) HAM on CKD-III


-Cr above baseline; assuming prerenal given history but will calculate FeNa.  

Has had this issue before.  Nephrology has been consulted and will defer 

management to their service ultimately.  Monitor BMP and UOP.





5) Elevated Troponin


-Trend and monitor on telemetry; consult CV.  No date for last stress test; was 

cathed in 2017 at [facility] but do not have the report (was aparently R and L 

heart cath).  We will obtain the old records and followup with CV 

recommendations.





6) Hypertrophic CM


-Known; nonacute.  Careful management with fluids and control BP.  OP followup 

with CV.





7) Diastolic Dysfunction


-Per above





8) Nonobstructing CAD


-Consider adding ASA prior to DC


-Continue statin, BB


-Positive troponin likely demand, but please refer to that section for further 

details.





9) Tobacco Abuse


- when clinically appropriate





10) Alcohol Abuse


-Unknown last drink; some notes say he was actually using less than he was in 

the past.  We will check an alcohol level and place on CIWA.  Thiamine and 

folate to be given to avoid any Wernicke-Korsakoff





11) Suspected MM


-Elucidate the status of SPEP/UPEP and involve oncology if need be; need to 

obtain old records.





12) Persistent Afib with RVR


-Known dx with CV=6; noncompliant with tx.  Placing back on home meds.  

Tachycardia observed not likely sepsis-more likely him not using his BB and 

fluid depletion.  Titrate BB as needed.


-Please refer to #1 regarding AMS and CVA risk





13) RLE Reddness


-RO worsening PAD





14) Hypoalbuminemia


-Chronic; will plan to monitor CMP and check prealbumin.





Full Code

## 2018-10-31 LAB
ALBUMIN SERPL-MCNC: 1.3 G/DL (ref 3.4–5)
ALBUMIN SERPL-MCNC: 1.4 G/DL (ref 3.4–5)
ALP SERPL-CCNC: 50 U/L (ref 45–117)
ALP SERPL-CCNC: 53 U/L (ref 45–117)
ALT SERPL-CCNC: 10 U/L (ref 13–61)
ALT SERPL-CCNC: 10 U/L (ref 13–61)
ANION GAP SERPL CALC-SCNC: 4 MMOL/L (ref 8–16)
ANION GAP SERPL CALC-SCNC: 5 MMOL/L (ref 8–16)
ANISOCYTOSIS BLD QL: (no result)
AST SERPL-CCNC: 30 U/L (ref 15–37)
AST SERPL-CCNC: 31 U/L (ref 15–37)
BASOPHILS # BLD: 0.3 % (ref 0–2)
BASOPHILS # BLD: 0.4 % (ref 0–2)
BILIRUB DIRECT SERPL-MCNC: 325 U/L (ref 87–246)
BILIRUB SERPL-MCNC: 0.3 MG/DL (ref 0.2–1)
BILIRUB SERPL-MCNC: 0.4 MG/DL (ref 0.2–1)
BUN SERPL-MCNC: 65 MG/DL (ref 7–18)
BUN SERPL-MCNC: 66 MG/DL (ref 7–18)
CALCIUM SERPL-MCNC: 10.6 MG/DL (ref 8.5–10.1)
CALCIUM SERPL-MCNC: 10.6 MG/DL (ref 8.5–10.1)
CHLORIDE SERPL-SCNC: 114 MMOL/L (ref 98–107)
CHLORIDE SERPL-SCNC: 118 MMOL/L (ref 98–107)
CO2 SERPL-SCNC: 21 MMOL/L (ref 21–32)
CO2 SERPL-SCNC: 23 MMOL/L (ref 21–32)
CREAT SERPL-MCNC: 3 MG/DL (ref 0.55–1.3)
CREAT SERPL-MCNC: 3 MG/DL (ref 0.55–1.3)
DEPRECATED RDW RBC AUTO: 16.2 % (ref 11.9–15.9)
DEPRECATED RDW RBC AUTO: 16.6 % (ref 11.9–15.9)
EOSINOPHIL # BLD: 1 % (ref 0–4.5)
EOSINOPHIL # BLD: 1.1 % (ref 0–4.5)
GLUCOSE SERPL-MCNC: 78 MG/DL (ref 74–106)
GLUCOSE SERPL-MCNC: 94 MG/DL (ref 74–106)
HCT VFR BLD CALC: 26.7 % (ref 35.4–49)
HCT VFR BLD CALC: 29.2 % (ref 35.4–49)
HGB BLD-MCNC: 8.8 GM/DL (ref 11.7–16.9)
HGB BLD-MCNC: 9.7 GM/DL (ref 11.7–16.9)
LYMPHOCYTES # BLD: 28.1 % (ref 8–40)
LYMPHOCYTES # BLD: 35 % (ref 8–40)
MACROCYTES BLD QL: 0
MAGNESIUM SERPL-MCNC: 2.4 MG/DL (ref 1.8–2.4)
MCH RBC QN AUTO: 29.8 PG (ref 25.7–33.7)
MCH RBC QN AUTO: 30 PG (ref 25.7–33.7)
MCHC RBC AUTO-ENTMCNC: 32.9 G/DL (ref 32–35.9)
MCHC RBC AUTO-ENTMCNC: 33.1 G/DL (ref 32–35.9)
MCV RBC: 90.6 FL (ref 80–96)
MCV RBC: 90.7 FL (ref 80–96)
MONOCYTES # BLD AUTO: 4.4 % (ref 3.8–10.2)
MONOCYTES # BLD AUTO: 8.6 % (ref 3.8–10.2)
NEUTROPHILS # BLD: 59.1 % (ref 42.8–82.8)
NEUTROPHILS # BLD: 62 % (ref 42.8–82.8)
PHOSPHATE SERPL-MCNC: 5 MG/DL (ref 2.5–4.9)
PLATELET # BLD AUTO: 219 K/MM3 (ref 134–434)
PLATELET # BLD AUTO: 236 K/MM3 (ref 134–434)
PLATELET BLD QL SMEAR: NORMAL
PMV BLD: 8.7 FL (ref 7.5–11.1)
PMV BLD: 9 FL (ref 7.5–11.1)
POTASSIUM SERPLBLD-SCNC: 4.2 MMOL/L (ref 3.5–5.1)
POTASSIUM SERPLBLD-SCNC: 4.3 MMOL/L (ref 3.5–5.1)
PROT SERPL-MCNC: 10.7 G/DL (ref 6.4–8.2)
PROT SERPL-MCNC: 11.4 G/DL (ref 6.4–8.2)
RBC # BLD AUTO: 2.95 M/MM3 (ref 4–5.6)
RBC # BLD AUTO: 3.22 M/MM3 (ref 4–5.6)
SODIUM SERPL-SCNC: 142 MMOL/L (ref 136–145)
SODIUM SERPL-SCNC: 143 MMOL/L (ref 136–145)
WBC # BLD AUTO: 3.4 K/MM3 (ref 4–10)
WBC # BLD AUTO: 3.7 K/MM3 (ref 4–10)

## 2018-10-31 RX ADMIN — CARVEDILOL SCH: 6.25 TABLET, FILM COATED ORAL at 21:43

## 2018-10-31 RX ADMIN — FOLIC ACID SCH MG: 1 TABLET ORAL at 01:08

## 2018-10-31 RX ADMIN — Medication SCH: at 11:36

## 2018-10-31 RX ADMIN — Medication SCH MG: at 00:54

## 2018-10-31 RX ADMIN — VANCOMYCIN HYDROCHLORIDE SCH MLS/HR: 1 INJECTION, POWDER, LYOPHILIZED, FOR SOLUTION INTRAVENOUS at 21:43

## 2018-10-31 RX ADMIN — FOLIC ACID SCH MG: 1 TABLET ORAL at 01:09

## 2018-10-31 RX ADMIN — RIVAROXABAN STA MG: 20 TABLET, FILM COATED ORAL at 00:55

## 2018-10-31 RX ADMIN — CARVEDILOL SCH: 6.25 TABLET, FILM COATED ORAL at 11:36

## 2018-10-31 RX ADMIN — CEFTRIAXONE SCH MLS/HR: 1 INJECTION, POWDER, FOR SOLUTION INTRAMUSCULAR; INTRAVENOUS at 12:35

## 2018-10-31 RX ADMIN — SODIUM CHLORIDE SCH MLS/HR: 9 INJECTION, SOLUTION INTRAVENOUS at 21:45

## 2018-10-31 RX ADMIN — FOLIC ACID SCH: 1 TABLET ORAL at 11:36

## 2018-10-31 RX ADMIN — SODIUM CHLORIDE SCH MLS/HR: 9 INJECTION, SOLUTION INTRAVENOUS at 01:14

## 2018-10-31 NOTE — CON.CARD
Consult


Consult Specialty:: Cardiology


Referred by:: Hospitalist Medicine


Reason for Consultation:: afib h/o thromboembolism





- History of Present Illness


Chief Complaint: Altered mental status


History of Present Illness: 





This is a 79 y/o man with a PMHx of Persistent Afib [id number] with recurrent 

peripheral arterial embolism due to previous noncompliance with Xarelto due to 

lack of social support, Diabetes Mellitus, Apical Hypertrophic Cardiomyopathy, 

Alcohol Abuse, nonobstructive CAD h/o anaphylaxis after eating [employer]- 

shrimp and boneless spare ribs, suspected myeloma (due to paraproteinemia and M 

spike, SPEP/UPEP results not yet found), recent admission for vasovagal near 

syncope and HAM presented with altered mental status after being found down at 

his home by EMS after his neighbor found mail piling up, etc.  He was brought 

to the ER and was arousable and following commands but not answering questions 

appropriately and is altered and cannot provide any acurate history; I am told 

he was still wearing his wristband from his last discharge.  He was found to be 

hypothermic and tachycardic with a marked hypercalcemia and HAM.  This is 

similar to his prior presentation when he was found to be hypercalcemic (

discharged with Ca of 11 corrected) and hypothermic.  ER was concerned for 

possible cellulitis given the findings on his lower extremity (R) with redness 

and covered him broadly, recieved fluids.  Cannot provide history.  





- History Source


History Provided By: Medical Record


Limitations to Obtaining History: Clinical Condition





- Past Medical History


Cardio/Vascular: Yes: AFIB, HTN, MI





- Past Surgical History


Past Surgical History: Yes: Hernia Repair





- Alcohol/Substance Use


Hx Alcohol Use: Yes


History of Substance Use: reports: None





- Smoking History


Smoking history: Former smoker


Have you smoked in the past 12 months: No


Aproximately how many cigarettes per day: 10


If you are a former smoker, when did you quit?: 12.28.16





Home Medications





- Allergies


Allergies/Adverse Reactions: 


 Allergies











Allergy/AdvReac Type Severity Reaction Status Date / Time


 


valsartan Allergy Severe  Verified 10/30/18 16:47














- Home Medications


Home Medications: 


Ambulatory Orders





Atorvastatin Ca [Lipitor] 40 mg PO HS #30 tablet 07/12/18 


Rivaroxaban [Xarelto -] 20 mg PO DAILY@1800 #30 tablet 07/12/18 


Carvedilol [Coreg -] 6.25 mg PO BID #60 tablet 10/17/18 








Vital Signs: 


 Vital Signs











Temperature  96.5 F L  10/31/18 09:31


 


Pulse Rate  100 H  10/31/18 09:31


 


Respiratory Rate  20   10/31/18 09:31


 


Blood Pressure  118/64   10/31/18 09:31


 


O2 Sat by Pulse Oximetry (%)  94 L  10/31/18 00:15











Constitutional: Yes: No Distress, Calm, Thin


Neck: Yes: Supple


Respiratory: Yes: Regular, Diminished, On Nasal O2


Gastrointestinal: Yes: Soft, Hypoactive Bowel Sounds


Cardiovascular: Yes: Tachycardia, Pulse Irregular


JVD: No


Carotid Bruit: No


Heart Sounds: Yes: S1, S2


Murmur: Yes: Systolic Murmur, Grade 2


Edema: No





- Other Data


Labs, Other Data: 


 CBC, BMP





 10/31/18 06:00 





 10/31/18 06:00 





 INR, PTT











INR  1.33  (0.83-1.09)  H  10/30/18  17:02    








 Troponin, BNP











  10/30/18 10/30/18 10/31/18





  17:02 18:00 00:55


 


Troponin I  Cancelled  0.80 H*  0.74 H*














  10/31/18





  06:00


 


Troponin I  0.77 H*








 Troponin, BNP











  10/30/18 10/30/18 10/31/18





  17:02 18:00 00:55


 


Troponin I  Cancelled  0.80 H*  0.74 H*














  10/31/18





  06:00


 


Troponin I  0.77 H*











EKG: Afib @ 124 





Imaging





- Results


Chest X-ray: Report Reviewed (NAD)


Cat Scan: Report Reviewed (HCT: No bleed)





Problem List





- Problems


(1) Atrial fibrillation with RVR


Code(s): I48.91 - UNSPECIFIED ATRIAL FIBRILLATION   





(2) Acute-on-chronic kidney injury


Code(s): N17.9 - ACUTE KIDNEY FAILURE, UNSPECIFIED; N18.9 - CHRONIC KIDNEY 

DISEASE, UNSPECIFIED   


Qualifiers: 


   Acute renal failure type: unspecified   Chronic kidney disease stage: 

unspecified stage   Qualified Code(s): N17.9 - Acute kidney failure, unspecified

; N18.9 - Chronic kidney disease, unspecified   





(3) Demand ischemia


Code(s): I24.8 - OTHER FORMS OF ACUTE ISCHEMIC HEART DISEASE   





(4) Hypercalcemia


Code(s): E83.52 - HYPERCALCEMIA   





(5) Nonadherence to medication


Code(s): Z91.14 - PATIENT'S OTHER NONCOMPLIANCE WITH MEDICATION REGIMEN   





(6) Paraproteinemia


Code(s): D89.2 - HYPERGAMMAGLOBULINEMIA, UNSPECIFIED   





(7) Anticoagulant long-term use


Code(s): Z79.01 - LONG TERM (CURRENT) USE OF ANTICOAGULANTS   





(8) Chronic thromboembolic disease


Code(s): I74.9 - EMBOLISM AND THROMBOSIS OF UNSPECIFIED ARTERY   





(9) Coronary artery disease


Code(s): I25.10 - ATHSCL HEART DISEASE OF NATIVE CORONARY ARTERY W/O ANG PCTRS 

  


Qualifiers: 


   Coronary Disease-Associated Artery/Lesion type: native artery   Native vs. 

transplanted heart: native heart   Associated angina: without angina   

Qualified Code(s): I25.10 - Atherosclerotic heart disease of native coronary 

artery without angina pectoris   





(10) Diastolic dysfunction without heart failure


Code(s): I51.9 - HEART DISEASE, UNSPECIFIED   





(11) HTN (hypertension)


Code(s): I10 - ESSENTIAL (PRIMARY) HYPERTENSION   


Qualifiers: 


   Hypertension type: essential hypertension   Qualified Code(s): I10 - 

Essential (primary) hypertension   





(12) Hypertrophic cardiomyopathy


Code(s): I42.2 - OTHER HYPERTROPHIC CARDIOMYOPATHY   





(13) Hyperlipidemia


Code(s): E78.5 - HYPERLIPIDEMIA, UNSPECIFIED   


Qualifiers: 


   Hyperlipidemia type: pure hypercholesterolemia   Qualified Code(s): E78.00 - 

Pure hypercholesterolemia, unspecified; E78.0 - Pure hypercholesterolemia   





Assessment/Plan





R&LHc at [facility] 1/11/2017 showing nonobstructive CAD, severe LV apical 

hypertrophic cardiomyopathy, mildly elevated right sided pressures, Mynx 

deployed right CFA access site. Study is consistent with apical hypertrophy (

spade-like) variant of hypertrophic cardiomyopathy, planned for optimal medical 

therapy. 


Echocardiogram: 07/11/2018 Mod cLVH, severe DYANA, mod TR RVSP 50-60 mmHg, mod-

severe MR


Echocardiogram: 10/16/2018 Normal LV size with hyperdynamic LVEF 75%, mild BSH, 

normal RV size and fxn, severe LAE, mod-severe MR, mod TR





1. Toxic metabolic encephelopathy, r/o CVA while off NOAC, r/o sepsis


2. Acute on CKD (pre-renal) 


3. Hypercalcemia, paraproteinemia-> possible multiple myeloma


3. History of bilateral SFA occlusion post thrombectomy, referable to embolic 

disease related to persistent atrial fibrillation with [id number] score of 6, 

history of poor compliance with anticoagulation and medical F/U


4. Non obstructive CAD on R&c coronary angiography with demand ischemia


5. 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 

subendocardial ischemia, compensated/euvolemic


6. Persistent atrial fibrillation [id number] score of 6 on Xarelto 20 qd


7. Labile HTN


8. Poor compliance with medical therapy administration and medical F/U


9. Tobacco abuse


10. ETOH dependence





PLAN:





1. Continue Lipitor 40 qd and decrease Xarelto 15 qd (renal dosing), trops 

downtrending


2. Continue Carvedilol 6.25 bid as hemodynamics tolerate


3. Hematology input, SPEP, UPEP pending


4. Judicious IVF with monitor renal recovery, renal input pending


5. Empiric abx course pending C&S, undergoing Librum detox


6. Thank you for consultative opportunity

## 2018-10-31 NOTE — PN
Progress Note, Physician


Chief Complaint: 





EVENTS AND NOTES REVIEWED


PATIENT IN BED UNABLE TO FOLLOW COMMANDS


CONFUSED








- Current Medication List


Current Medications: 


Active Medications





Chlordiazepoxide HCl (Librium -)  50 mg PO [id number] [facility]


   Stop: 10/31/18 23:01


   Last Admin: 10/31/18 05:35 Dose:  50 mg


Chlordiazepoxide HCl (Librium -)  25 mg PO [id number] [facility]


   Stop: 11/01/18 23:01


Chlordiazepoxide HCl (Librium -)  15 mg PO [id number] [facility]


   Stop: 11/02/18 23:01


Chlordiazepoxide HCl (Librium -)  25 mg PO Q4H PRN


   PRN Reason: WITHDRAWAL(CONT SUBST)


   Stop: 11/03/18 02:24


Chlordiazepoxide HCl (Librium -)  10 mg PO [id number] [facility]


   Stop: 11/03/18 23:01


Folic Acid (Folic Acid -)  1 mg PO DAILY [facility]


   Last Admin: 10/31/18 01:09 Dose:  1 mg


Sodium Chloride (Normal Saline -)  1,000 mls @ 125 mls/hr IV ASDIR [facility]


   Last Admin: 10/31/18 01:14 Dose:  125 mls/hr


Thiamine HCl (Vitamin B1 -)  100 mg PO DAILY [facility]


   Last Admin: 10/31/18 00:54 Dose:  100 mg











- Objective


Vital Signs: 


 Vital Signs











Temperature  97.3 F L  10/31/18 05:44


 


Pulse Rate  106 H  10/31/18 05:44


 


Respiratory Rate  18   10/31/18 05:44


 


Blood Pressure  111/58 L  10/31/18 05:44


 


O2 Sat by Pulse Oximetry (%)  94 L  10/31/18 00:15











Constitutional: Yes: Mild Distress


Eyes: Yes: Other


HENT: Yes: WNL


Neck: Yes: WNL


Cardiovascular: Yes: Pulse Irregular


Respiratory: Yes: CTA Bilaterally, On Nasal O2


Gastrointestinal: Yes: Normal Bowel Sounds, Soft


Genitourinary: Yes: Incontinence


Musculoskeletal: Yes: Muscle Weakness


Extremities: Yes: WNL


Edema: No


Peripheral Pulses WNL: Yes


Integumentary: Yes: WNL


Wound/Incision: Yes: Clean/Dry


Neurological: Yes: Confusion


...Motor Strength: LLE, RLE


Psychiatric: Yes: Other


Labs: 


 CBC, BMP





 10/31/18 06:00 





 10/31/18 06:00 





 INR, PTT











INR  1.33  (0.83-1.09)  H  10/30/18  17:02    














Problem List





- Problems


(1) HAM (acute kidney injury)


Code(s): N17.9 - ACUTE KIDNEY FAILURE, UNSPECIFIED   





(2) Atrial fibrillation with RVR


Code(s): I48.91 - UNSPECIFIED ATRIAL FIBRILLATION   





(3) Hyperlipidemia


Code(s): E78.5 - HYPERLIPIDEMIA, UNSPECIFIED   


Qualifiers: 


   Hyperlipidemia type: pure hypercholesterolemia   Qualified Code(s): E78.00 - 

Pure hypercholesterolemia, unspecified; E78.0 - Pure hypercholesterolemia   





(4) Hypothermia


Code(s): T68.XXXA - HYPOTHERMIA, INITIAL ENCOUNTER   


Qualifiers: 


   Encounter type: initial encounter   Qualified Code(s): T68.XXXA - Hypothermia

, initial encounter   





(5) Acute-on-chronic kidney injury


Code(s): N17.9 - ACUTE KIDNEY FAILURE, UNSPECIFIED; N18.9 - CHRONIC KIDNEY 

DISEASE, UNSPECIFIED   


Qualifiers: 


   Acute renal failure type: unspecified   Chronic kidney disease stage: 

unspecified stage   Qualified Code(s): N17.9 - Acute kidney failure, unspecified

; N18.9 - Chronic kidney disease, unspecified   





(6) Generalized weakness


Code(s): R53.1 - WEAKNESS   





(7) History of ETOH abuse


Code(s): Z87.898 - PERSONAL HISTORY OF OTHER SPECIFIED CONDITIONS   





(8) Hypercalcemia


Code(s): E83.52 - HYPERCALCEMIA   





(9) Hypertrophic cardiomyopathy


Code(s): I42.2 - OTHER HYPERTROPHIC CARDIOMYOPATHY   





(10) Toxic metabolic encephalopathy


Code(s): G92 - TOXIC ENCEPHALOPATHY   





(11) Sepsis


Code(s): A41.9 - SEPSIS, UNSPECIFIED ORGANISM   





Assessment/Plan





SUSPECT HYPERCALCEMIA WITH UNDERLINE SEPSIS


ID CONSULT


IV ABX


RENAL WORKUP/IVF


IV THIAMINE/FOLIC ACID/MVI


PT EVAL


NEURO CHECKS


OOB TO CHAIR/FALL PRECATIONS


NEUROLOG WORKUP CT HEAD NEG X 1

## 2018-10-31 NOTE — CONS
DATE OF CONSULTATION:  10/31/2018

 

HISTORY OF PRESENT ILLNESS:  The patient is a 78-year-old male evaluated for possible

sepsis.  He was recently admitted to [facility] where he was

undergoing evaluation for possible multiple myeloma.  At that time, he was found to

have hypercalcemia.  He was now admitted to the hospital on October 30, 2018, after

being found at home confused.  EMS was called as neighbors noted that he had a large

amount of mail accumulating at his residence.  They found him at home in bed,

confused.  He was brought to the emergency room where he was noted to be hypothermic,

tachycardic, and in acute renal failure.  He was also noted to have a serum calcium

at 10.6 with an albumin of 1.3.  Concern was raised over the possibility of possible

sepsis in light of altered mental status, hypothermia, and leukopenia.  At the

present time, he is awake, but he is confused.  He offers no complaints.  No reports

of shaking chills, labored breathing, cough, sputum production, vomiting, diarrhea,

or grossly purulent urine.

 

PAST MEDICAL HISTORY:  Atrial fibrillation, coronary artery disease, hyperlipidemia,

hypertension, peripheral vascular disease, possible multiple myeloma. 

 

PAST SURGICAL HISTORY:  Status post left lower extremity stent.  

 

ALLERGIES:  To VALSARTAN.  

 

MEDICATIONS:  Include Lipitor, Coreg, Xarelto.  

 

SOCIAL HISTORY:  He resides at home, is a former smoker. 

 

SYSTEMS REVIEW:

Neurologic:  Positive for altered mental status. 

Cardiac:  Positive for atrial fibrillation.  No chest pain or palpitations.

Respiratory:  Negative for labored breathing or cough. 

Gastrointestinal:  Negative for vomiting or diarrhea.

Genitourinary:  Positive for renal failure. 

 

LABORATORY DATA:  White count 3.4, 59% neutrophils, 35% lymphocytes, 4% monocytes,

hematocrit 26.7, platelet count 219.  BUN 65, creatinine 3.0, lactic acid 2.4.  Blood

and urine cultures are pending.  Urinalysis less than 1 white cell.  Chest x-ray

negative for acute infiltrate.  

 

PHYSICAL EXAMINATION:

General:  He is awake, but he is confused, supine in bed.  

Vital signs:  Temperature 96.5, blood pressure 118/64, pulse 100 and regular,

respirations 20 per minute. 

HEENT:  Sclerae anicteric.  

Heart:  Heart sounds S1, S2.  

Lungs:  Grossly clear.  Poor inspiratory effort.  

Abdomen:  Soft.  No tenderness elicited.  No mass, rebound, or rigidity.  

Extremities:  1+ edema.  There is hyperemia of the distal right lower extremity. 

However, it is not warm or tender to touch.  

 

IMPRESSION:  

1.  Altered mental status, possible secondary to hypercalcemia.  

2.  Rule out toxic metabolic encephalopathy. 

3.  Renal failure.  

4.  Lactic acidosis.  

 

PLAN:  Will obtain influenza swab in light of leukopenia to rule out influenza,

obtain blood cultures, empiric antibiotic coverage in this patient with renal failure

with ceftriaxone and stat dose vancomycin, would treat hypercalcemia as corrected

value likely much higher than the reported value of 10.6, await culture results.  

 

Will follow.  Thank you for the kind referral.   

 

 

ALANIS BARRETT M.D.

 

[id number]

DD: 10/31/2018 12:01

DT: 10/31/2018 13:00

Job #:  [id number]

## 2018-10-31 NOTE — PN
Progress Note (short form)





- Note


Progress Note: 





ID Consult dictated


Altered mental status   ? secondary to hypercalcemia


     Ca++ 10.6 with  albumin 1.3


R/O toxic metabolic encephalopathy


Renal failure


Lactic acidosis


Await c/s


Obtain flu swab


Empiric ceftriaxone + stat dose vancomycin

## 2018-10-31 NOTE — EKG
Test Reason : 

Blood Pressure : ***/*** mmHG

Vent. Rate : 124 BPM     Atrial Rate : 089 BPM

   P-R Int : 000 ms          QRS Dur : 148 ms

    QT Int : 390 ms       P-R-T Axes : 000 254 068 degrees

   QTc Int : 560 ms

 

ATRIAL FIBRILLATION WITH RAPID VENTRICULAR RESPONSE WITH PREMATURE

VENTRICULAR OR ABERRANTLY CONDUCTED COMPLEXES

RIGHT BUNDLE BRANCH BLOCK

ABNORMAL ECG

WHEN COMPARED WITH ECG OF 14-OCT-2018 19:04,

VENT. RATE HAS INCREASED BY  44 BPM

CRITERIA FOR SEPTAL INFARCT ARE NO LONGER PRESENT

Confirmed by PREM LAIRD MD ([id number]) on 10/31/2018 12:02:48 PM

 

Referred By:             Confirmed By:PREM LARID MD

## 2018-10-31 NOTE — CONSULT
Admitting History and Physical





- Primary Care Physician


PCP: Ritchie Diane





- Admission


History of Present Illness: 





Patient is a 78 year old male with history of Afib on xarelto, coronary artery 

disease, chronic kidney disease, hypertension, hyperlipidemia, peripheral 

vascular disease s/p arterial stent in left lower extremity, alcohol abuse, and 

medication noncompliance presents after being found non responsive at home.








CT head showed no acute intracranial pathology


Hypercalcemia -workup for suspected multiple myeloma \





This is my first consult with this pt.


History Source: Medical Record


Limitations to Obtaining History: Clinical Condition





- Past Medical History


Cardiovascular: Yes: AFIB, HTN, MI





- Past Surgical History


Past Surgical History: Yes: Hernia Repair





- Smoking History


Smoking history: Former smoker


Have you smoked in the past 12 months: No


Aproximately how many cigarettes per day: 10


If you are a former smoker, when did you quit?: 12.28.16





- Alcohol/Substance Use


Hx Alcohol Use: Yes


History of Substance Use: reports: None





History





- Admission


Reason For Visit: ACUTE KIDNEY INJURY/CELLULITIS/HYPOTHERMIA





- Diagnostics


X-ray: Report Reviewed


CT Scan: Report Reviewed





- General


Mental Status: Confused (Phonating. Occasional unintelligible words. Poor eye 

contact. Eyes open but not visually tracking.), Flat Affect


Attention: Profound Impairment


Ability to Follow Directions: Poor





- Hearing


Hearing: Normal


Hearing Aide: No


With Patient: No





Speech Evaluation





- Communication


Primary Language: ENGLISH


Communication: Yes: Non-Communicable


Oral Expression Ability: Yes: Non-Verbal (vocal, occasional unintelligible words

)





- Speech Production


Able to Make Needs Known: Yes: Severely Impaired


Intelligibility: Yes: Severely Impaired





- Speech Characteristics


Voice Loudness: Mildly Soft/Quiet


Voice Pitch: Yes: Normal


Voice Phonatory-based Quality: Yes: Normal


Speech Pattern: Impaired


Nasal Resonance: Normal





- Language/Auditory Comprehension


Observation: Able to respond to yes/no queries: No, Comprehends Conversational 

Speech: No





- Language/Verbal Expression


Able to Respond to Simple Queries: Yes: Severely Impaired


Able to Communicate Wants and Needs: Yes: Severely Impaired


Functional Communication Status: Yes: Severely Impaired





- Swallow Evaluation/Bedside Assessment


Current Nutritional Intake: NPO


Oral Secretions: Yes: WFL


Dentition: Yes: Adequate


Facial Symmetry at Rest: Symmetrical


Laryngeal Movement: Labored,delay initiation


Labial Seal: Impaired Bilaterally


Oral Prep Time: Increased


A-P Transit: Impaired


Timing of Swallow: Delayed


Coughing/Throat Clear: No


Change in Voice: No





Recommendations





- Speech Evaluation, Impression/Plan


Impression: Eyes open, phonating, not visually tracking or establishing eye 

contact. Close lips around spoon with applesauce but no posterior transit of 

the bolus. Delayed swallow eliocited with sip of thin water without responsive 

cough. Presently at high risk of aspiration due to severe cognitive deficits, 

awake but unaware of environment around him.





- Dysphagia Impressions/Plan


Swallowing Skills: Impaired


Dysphagia Impressions: Risk of Aspiration


*Silent aspiration: cannot be R/O at bedside





- Recommendations


Diet Consistency: NPO, Other (NPO including medication, if possible. If 

neccessary, crush meds and give in small amount of nectar thick.)


Liquids: NPO

## 2018-10-31 NOTE — CONSULT
Consult - text type





- Consultation


Consultation Note: 





Renal Consult for HAM





This is a 78 year old gentleman with hx of CKD, Afib on A/C, CAD, CKD, 

Hypertension, Hyperlipidemia, PVD who presented with AMS/Confusion and found to 

have HAM. Pt was brought into the ER by EMS after his neighbors became 

concerned about him. Pt is awake but lethargic. Making urine in diaper as per 

nurse. Denies any pain, sob, N/V/D or dysuria. CT head was negative. On Statin 

at home as per EMR records. No ACE/ARB noted. Unknown if pt was using NSAIDs. 

No recent contrast exposure. 





PMHx: as above


Allergies: NKDA


Family Hx: NC


Social Hx: no T/A/D


ROS: as per HPI, limited by clinical status  


 Home Medications











 Medication  Instructions  Recorded


 


Atorvastatin Ca [Lipitor] 40 mg PO HS #30 tablet 07/12/18


 


Rivaroxaban [Xarelto -] 20 mg PO DAILY@1800 #30 tablet 07/12/18


 


Carvedilol [Coreg -] 6.25 mg PO BID #60 tablet 10/17/18











 Vital Signs











Temperature  96.5 F L  10/31/18 09:31


 


Pulse Rate  100 H  10/31/18 09:31


 


Respiratory Rate  20   10/31/18 09:31


 


Blood Pressure  118/64   10/31/18 09:31


 


O2 Sat by Pulse Oximetry (%)  94 L  10/31/18 00:15








 Intake & Output











 10/28/18 10/29/18 10/30/18 10/31/18





 23:59 23:59 23:59 23:59


 


Intake Total   2250 2625


 


Output Total   300 


 


Balance   1950 2625


 


Weight   72.575 kg 75.296 kg





NAD


lethargic but responsive to questions


RRR, no M/R


CTA, no rales or wheeze


soft NT/ND, no bladder distension 


no LE edema, clubbing or cyanosis





 CBC, BMP





 10/31/18 06:00 





 10/31/18 06:00 


 Laboratory Tests











  10/30/18 10/31/18 10/31/18





  18:00 00:55 06:00


 


Creatinine  2.8 H  3.0 H  3.0 H











 Current Medications





Atorvastatin Calcium (Lipitor -)  40 mg PO HS [facility]


Carvedilol (Coreg -)  6.25 mg PO BID [facility]


   Last Admin: 10/31/18 11:36 Dose:  Not Given


Chlordiazepoxide HCl (Librium -)  50 mg PO [id number] [facility]


   Stop: 10/31/18 23:01


   Last Admin: 10/31/18 11:37 Dose:  Not Given


Chlordiazepoxide HCl (Librium -)  25 mg PO [id number] [facility]


   Stop: 11/01/18 23:01


Chlordiazepoxide HCl (Librium -)  15 mg PO [id number] [facility]


   Stop: 11/02/18 23:01


Chlordiazepoxide HCl (Librium -)  25 mg PO Q4H PRN


   PRN Reason: WITHDRAWAL(CONT SUBST)


   Stop: 11/03/18 02:24


Chlordiazepoxide HCl (Librium -)  10 mg PO [id number] [facility]


   Stop: 11/03/18 23:01


Folic Acid (Folic Acid -)  1 mg PO DAILY [facility]


   Last Admin: 10/31/18 11:36 Dose:  Not Given


Sodium Chloride (Normal Saline -)  1,000 mls @ 125 mls/hr IV ASDIR [facility]


   Last Admin: 10/31/18 01:14 Dose:  125 mls/hr


Folic Acid 1 mg/ Thiamine HCl 100 mg/ Multivitamins/Minerals 10 ml/ Sodium 

Chloride  1,000 mls @ 125 mls/hr IVPB ONCE ONE


   Stop: 10/31/18 18:44


Ceftriaxone Sodium 1 gm/ (Dextrose)  50 mls @ 100 mls/hr IVPB DAILY [facility]; 

Protocol


Vancomycin HCl (Vancomycin (Pre-Docked))  1,000 mg in 250 mls @ 166.667 mls/hr 

IVPB Q24H [facility]; Protocol


Rivaroxaban (Xarelto -)  15 mg PO DAILY@1800 [facility]


Thiamine HCl (Vitamin B1 -)  100 mg PO DAILY [facility]


   Last Admin: 10/31/18 11:36 Dose:  Not Given





78 year old gentleman with hx of CKD, Afib on A/C, CAD, CKD, Hypertension, 

Hyperlipidemia, PVD who presented with AMS/Confusion and found to have HAM.





#HAM r/o pigment injury vs. obstruction vs. ATN


#AMS of unclear etiology 


#? hx of ETOH abuse 


#Anemia 


#Hypercalcemia 





Check Urine studies for FeNa, UPCR


Check Kidney and Bladder US to r/o obstruction 


Check CPK levels to r/o rhabodmyolysis


Corrected Ca a 12.7, continue IVF, check PTH levels 


Check iron stuides and trend cbc, no acute need for transfusion at this time 


Check Methanol levels, ethylene gylcol levels, LDH, Haptoglobin 





continue supportive care


no acute need for RRT 


Trend renal function and electrolytes


dose all meds for CrCl < 15





Thank you 


Will follow





Ricky Chang DO

## 2018-10-31 NOTE — PN
Progress Note (short form)





- Note


Progress Note: 





surgery





79yo male well known to the vascular surgery team with pmhx of CKD, Afib on A/C

, CAD, CKD, Hypertension, Hyperlipidemia, recurrent peripheral arterial 

embolism due to previous noncompliance with Xarelto with previous history of b/

l LE angioplasty/thrombectomy/thrombolysis with stent Left LE 5/2018, Diabetes 

Mellitus, Apical Hypertrophic Cardiomyopathy, Alcohol Abuse who presented with 

AMS/Confusion and found to have HAM. Patient rousble but does not respond to 

questions. 





 Vital Signs











Temp  96.5 F L  10/31/18 09:31


 


Pulse  100 H  10/31/18 09:31


 


Resp  20   10/31/18 09:31


 


BP  118/64   10/31/18 09:31


 


Pulse Ox  94 L  10/31/18 09:00








 Intake & Output











 10/30/18 10/31/18 10/31/18





 23:59 11:59 23:59


 


Intake Total 2250 2625 


 


Output Total 300  


 


Balance 1950 2625 


 


Weight 160 lb 166 lb 


 


Intake:   


 


  IV 2000 2625 


 


    Normal Saline - 1,000 ml 1000  





    @ 1000 mls/hr IV ASDIR   





    STA Rx#:[id number]   


 


    Normal Saline - 1,000 ml 1000  





    @ 1000 mls/hr IV ASDIR   





    STA Rx#:[id number]   


 


    Normal Saline - 250 ml @  2625 





    250 mls/hr IV ASDIR [facility]   





    Rx#:[id number]   


 


  IVPB 250  


 


Output:   


 


  Urine 300  


 


    Straight Cath 300  


 


Other:   


 


  Voiding Method  Diaper 


 


  # Unmeasured Voids   


 


    Void  2 


 


  Bowel Movement  No 


 


  Height 5 ft 1 in 6 ft 


 


  Body Mass Index (BMI) 30.2 22.5 


 


  Weight Measurement Method  Patient Lift Scale 


 


  Weight Measurement Method Estimated by Staff  








 CBC, BMP





 10/31/18 06:00 





 10/31/18 06:00 


B/L LE arterial Dopplers revealed interval development of prominently dimished 

flow of the right superficial femoral, popliteal and posterior tibial artery. 








PE:


Patient rousable but does not respond to questions.


Unlabored resp on RA


Right LE with palpable popliteal pulse, mild erythema/ early cellulitis over 

ankle and foot. No lesions or evidence of skin breakdown. Leg and foot warm to 

touch with dopplerable PT at bedside. COmpartments soft, supple and tender to 

palpation throughout


Left LE warm to touch, compartments soft, supple and tender to palpation 

throughout. Dopplerable DP and PT pulse (Afib noted)





Problem List





- Problems


(1) Peripheral arterial disease


Assessment/Plan: 


79 yo with PAD in the setting of multiple co-morbidities and non-compliance 

with dopplerable pulses, no evidence for vascular intervention at this time.





1) Continue Anticoagulation with Xarelto as ordered


2) OOB as tolerated


3) F/U AMS work up


4) Vascular to follow





Evaluation and plan discussed with Dr Chan.


Code(s): I73.9 - PERIPHERAL VASCULAR DISEASE, UNSPECIFIED

## 2018-10-31 NOTE — CON.NEURO
Consult


Consult Specialty:: NEUROLOGY-[facility]


Reason for Consultation:: Altered Mental Status





- History of Present Illness


History of Present Illness: 





his is a 77 y/o man with a PMHx of Persistent Afib [id number] with recurrent 

peripheral arterial embolism due to previous noncompliance with Xarelto due to 

lack of social support, Diabetes Mellitus, Apical Hypertrophic Cardiomyopathy, 

Alcohol Abuse, nonobstructive CAD h/o anaphylaxis after eating [employer]- 

shrimp and boneless spare ribs, suspected myeloma (due to paraproteinemia and M 

spike, SPEP/UPEP results not yet found), recent admission for vasovagal near 

syncope and HAM presented with altered mental status after being found down at 

his home by EMS after his neighbor found mail piling up, etc.  He was brought 

to the ER and was arousable and following commands but not answering questions 

appropriately and is altered and cannot provide any acurate history; I am told 

he was still wearing his wristband from his last discharge.  He was found to be 

hypothermic and tachycardic with a marked hypercalcemia and HAM.  This is 

similar to his prior presentation when he was found to be hypercalcemic (

discharged with Ca of 11 corrected) and hypothermic.  ER was concerned for 

possible cellulitis given the findings on his lower extremity (R) with redness 

and covered him broadly, recieved fluids.  Cannot provide history, responds 

better today than yesterday, he turns head towards examiner, follows simple 

commands.  





- Past Medical History


Cardio/Vascular: Yes: AFIB, HTN, MI





- Past Surgical History


Past Surgical History: Yes: Hernia Repair





- Alcohol/Substance Use


Hx Alcohol Use: Yes


History of Substance Use: reports: None





- Smoking History


Smoking history: Former smoker


Have you smoked in the past 12 months: No


Aproximately how many cigarettes per day: 10


If you are a former smoker, when did you quit?: 12.28.16





Home Medications





- Allergies


Allergies/Adverse Reactions: 


 Allergies











Allergy/AdvReac Type Severity Reaction Status Date / Time


 


valsartan Allergy Severe  Verified 10/30/18 16:47














- Home Medications


Home Medications: 


Ambulatory Orders





Atorvastatin Ca [Lipitor] 40 mg PO HS #30 tablet 07/12/18 


Rivaroxaban [Xarelto -] 20 mg PO DAILY@1800 #30 tablet 07/12/18 


Carvedilol [Coreg -] 6.25 mg PO BID #60 tablet 10/17/18 











Physical Exam-Neuro


Vital Signs: 


 Vital Signs











Temperature  96.5 F L  10/31/18 09:31


 


Pulse Rate  100 H  10/31/18 09:31


 


Respiratory Rate  20   10/31/18 09:31


 


Blood Pressure  118/64   10/31/18 09:31


 


O2 Sat by Pulse Oximetry (%)  94 L  10/31/18 09:00











Labs: 


 CBC, BMP





 10/31/18 06:00 





 10/31/18 06:00 





 INR, PTT











INR  1.33  (0.83-1.09)  H  10/30/18  17:02    














- Neuro Exam


Level Of Consciousness: Yes: Stuporous (Opens eyes to loud voice, turns head 

towards examiner, follows 1 step commands)


Eyes: Yes: PERRL


Speech: Garbled


Mini Mental Exam: Stuporous


DTR's: 1+ Left Bicep, 1+ Right Bicep, 1+ Left Tricep, 1+ Right Tricep, 1+ Left 

Brachioradialis, 1+ Right Brachioradialis


Babinski: Absent


Response to light touch: Normal (Withdraws both arms more than legs to pain, 

unable to test brest of sensory modalities)


Motor Strength: 1/5: Left Leg, Right Leg, 5/5: Left Arm, Right Arm


Gait: Deferred





Imaging





- Results


Cat Scan: Report Reviewed (Without acute abn.)





Assessment/Plan





This is a 78 year old gentleman with hx of CKD, Afib on A/C, CAD, CKD, 

Hypertension, Hyperlipidemia, PVD who presented with AMS/Confusion and found to 

have HAM. Pt was brought into the ER by EMS after his neighbors became 

concerned about him. Pt is awake but lethargic, follows c1 step commands, he 

has 1/5 strength in both legs. He appears to be encephalopathic due to 

metabolic abn. ?infection.Pt. is moving arms well but legs are weak, likely due 

to gen. weakness/encephalopathic state but if weakness persists with improved 

metabolic state need to consider bilat frontal lobe ischemia. Will follow 

patient and will repeat CT head tomorroiw if not improved.





Thank you,





Rafia Youngblood MD

## 2018-11-01 LAB
ALBUMIN SERPL-MCNC: 1.2 G/DL (ref 3.4–5)
ALP SERPL-CCNC: 46 U/L (ref 45–117)
ALT SERPL-CCNC: 14 U/L (ref 13–61)
ANION GAP SERPL CALC-SCNC: 8 MMOL/L (ref 8–16)
AST SERPL-CCNC: 64 U/L (ref 15–37)
BILIRUB SERPL-MCNC: 0.4 MG/DL (ref 0.2–1)
BUN SERPL-MCNC: 60 MG/DL (ref 7–18)
CALCIUM SERPL-MCNC: 10.3 MG/DL (ref 8.5–10.1)
CHLORIDE SERPL-SCNC: 120 MMOL/L (ref 98–107)
CO2 SERPL-SCNC: 19 MMOL/L (ref 21–32)
CREAT SERPL-MCNC: 2.8 MG/DL (ref 0.55–1.3)
CREAT UR-MCNC: 94 MG/DL (ref 2–36)
DEPRECATED RDW RBC AUTO: 16.2 % (ref 11.9–15.9)
GLUCOSE SERPL-MCNC: 64 MG/DL (ref 74–106)
HCT VFR BLD CALC: 25.5 % (ref 35.4–49)
HGB BLD-MCNC: 8.3 GM/DL (ref 11.7–16.9)
MCH RBC QN AUTO: 29.4 PG (ref 25.7–33.7)
MCHC RBC AUTO-ENTMCNC: 32.4 G/DL (ref 32–35.9)
MCV RBC: 90.7 FL (ref 80–96)
PLATELET # BLD AUTO: 199 K/MM3 (ref 134–434)
PMV BLD: 8.7 FL (ref 7.5–11.1)
POTASSIUM SERPLBLD-SCNC: 4.2 MMOL/L (ref 3.5–5.1)
PROT SERPL-MCNC: 10.7 G/DL (ref 6.4–8.2)
RBC # BLD AUTO: 2.81 M/MM3 (ref 4–5.6)
SODIUM SERPL-SCNC: 147 MMOL/L (ref 136–145)
WBC # BLD AUTO: 3.3 K/MM3 (ref 4–10)

## 2018-11-01 RX ADMIN — Medication SCH: at 09:46

## 2018-11-01 RX ADMIN — DEXTROSE AND SODIUM CHLORIDE SCH MLS/HR: 5; 450 INJECTION, SOLUTION INTRAVENOUS at 14:36

## 2018-11-01 RX ADMIN — METOPROLOL TARTRATE SCH MG: 5 INJECTION, SOLUTION INTRAVENOUS at 22:07

## 2018-11-01 RX ADMIN — HEPARIN SODIUM SCH MLS/HR: 5000 INJECTION, SOLUTION INTRAVENOUS at 07:04

## 2018-11-01 RX ADMIN — METOPROLOL TARTRATE SCH MG: 5 INJECTION, SOLUTION INTRAVENOUS at 14:37

## 2018-11-01 RX ADMIN — SODIUM CHLORIDE SCH MLS/HR: 9 INJECTION, SOLUTION INTRAVENOUS at 00:01

## 2018-11-01 RX ADMIN — METOPROLOL TARTRATE SCH MG: 5 INJECTION, SOLUTION INTRAVENOUS at 07:11

## 2018-11-01 RX ADMIN — VANCOMYCIN HYDROCHLORIDE SCH MLS/HR: 1 INJECTION, POWDER, LYOPHILIZED, FOR SOLUTION INTRAVENOUS at 21:43

## 2018-11-01 RX ADMIN — CEFTRIAXONE SCH MLS/HR: 1 INJECTION, POWDER, FOR SOLUTION INTRAMUSCULAR; INTRAVENOUS at 09:51

## 2018-11-01 NOTE — PN
Progress Note, Physician


Chief Complaint: 





AWAKE CONFUSED


A&O X 0





- Current Medication List


Current Medications: 


Active Medications





Acetaminophen (Tylenol Suppository -)  650 mg [place] Q6H PRN


   PRN Reason: FEVER


Atorvastatin Calcium (Lipitor -)  40 mg PO HS AVA


   Last Admin: 10/31/18 21:44 Dose:  Not Given


Heparin Sodium (Porcine) (Heparin -)  1,000 unit IVPUSH PRN PRN


   PRN Reason: Heparin


Heparin Sodium (Porcine) (Heparin -)  5,000 unit IVPUSH PRN PRN


   PRN Reason: Heparin


   Last Admin: 11/01/18 15:15 Dose:  5,000 unit


Ceftriaxone Sodium 1 gm/ (Dextrose)  50 mls @ 100 mls/hr IVPB DAILY AVA; 

Protocol


   Last Admin: 11/01/18 09:51 Dose:  100 mls/hr


Vancomycin HCl (Vancomycin (Pre-Docked))  1,000 mg in 250 mls @ 166.667 mls/hr 

IVPB Q24H AVA; Protocol


   Last Admin: 10/31/18 21:43 Dose:  166.667 mls/hr


Heparin Sodium/Dextrose (Heparin Infusion -)  25,000 units in 500 mls @ 16 mls/

hr IVPB TITR AVA; Protocol


   Last Titration: 11/01/18 15:16 Dose:  950 unit/hr, 19 mls/hr


Dextrose/Sodium Chloride (D5-1/2ns -)  1,000 mls @ 125 mls/hr IV ASDIR AVA


   Last Admin: 11/01/18 14:36 Dose:  125 mls/hr


Metoprolol Tartrate (Lopressor Injection -)  5 mg IVPUSH Q8H AVA


   Last Admin: 11/01/18 14:37 Dose:  5 mg


Thiamine HCl (Vitamin B1 -)  100 mg PO DAILY AVA


   Last Admin: 11/01/18 09:46 Dose:  Not Given











- Objective


Vital Signs: 


 Vital Signs











Temperature  97.2 F L  11/01/18 17:39


 


Pulse Rate  89   11/01/18 17:39


 


Respiratory Rate  18   11/01/18 17:39


 


Blood Pressure  123/77   11/01/18 17:39


 


O2 Sat by Pulse Oximetry (%)  94 L  11/01/18 09:00











Constitutional: Yes: Mild Distress


Eyes: Yes: Other


HENT: Yes: WNL


Neck: Yes: WNL


Cardiovascular: Yes: WNL


Respiratory: Yes: WNL


Gastrointestinal: Yes: Soft, Other


Genitourinary: Yes: Incontinence


Musculoskeletal: Yes: Muscle Weakness


Extremities: Yes: WNL


Edema: Yes


Edema: LLE: Trace, RLE: Trace


Peripheral Pulses WNL: Yes


Integumentary: Yes: WNL


Wound/Incision: Yes: Clean/Dry


Neurological: Yes: Confusion, Pre-Existing Deficit, Weakness


...Motor Strength: LLE, RLE


Psychiatric: Yes: Agitated


Labs: 


 CBC, BMP





 11/01/18 06:00 





 11/01/18 06:00 





 INR, PTT











INR  1.33  (0.83-1.09)  H  10/30/18  17:02    














Problem List





- Problems


(1) HAM (acute kidney injury)


Code(s): N17.9 - ACUTE KIDNEY FAILURE, UNSPECIFIED   





(2) Atrial fibrillation with RVR


Code(s): I48.91 - UNSPECIFIED ATRIAL FIBRILLATION   





(3) Hyperlipidemia


Code(s): E78.5 - HYPERLIPIDEMIA, UNSPECIFIED   


Qualifiers: 


   Hyperlipidemia type: pure hypercholesterolemia   Qualified Code(s): E78.00 - 

Pure hypercholesterolemia, unspecified; E78.0 - Pure hypercholesterolemia   





(4) Hypothermia


Code(s): T68.XXXA - HYPOTHERMIA, INITIAL ENCOUNTER   


Qualifiers: 


   Encounter type: initial encounter   Qualified Code(s): T68.XXXA - Hypothermia

, initial encounter   





(5) Acute-on-chronic kidney injury


Code(s): N17.9 - ACUTE KIDNEY FAILURE, UNSPECIFIED; N18.9 - CHRONIC KIDNEY 

DISEASE, UNSPECIFIED   


Qualifiers: 


   Acute renal failure type: unspecified   Chronic kidney disease stage: 

unspecified stage   Qualified Code(s): N17.9 - Acute kidney failure, unspecified

; N18.9 - Chronic kidney disease, unspecified   





(6) Generalized weakness


Code(s): R53.1 - WEAKNESS   





(7) History of ETOH abuse


Code(s): Z87.898 - PERSONAL HISTORY OF OTHER SPECIFIED CONDITIONS   





(8) Hypercalcemia


Code(s): E83.52 - HYPERCALCEMIA   





(9) Hypertrophic cardiomyopathy


Code(s): I42.2 - OTHER HYPERTROPHIC CARDIOMYOPATHY   





(10) Toxic metabolic encephalopathy


Code(s): G92 - TOXIC ENCEPHALOPATHY   





(11) Sepsis


Code(s): A41.9 - SEPSIS, UNSPECIFIED ORGANISM   





Assessment/Plan





NEUROLOGY WORKUP IN PROGRESS


WILL NEED LP PER NEURO


DYSPHAGIA


NPO


OOB TO CHAIR WITH ASSIST


PT EVAL


CORRECT CA+


RENAL EVAL


SPEECH PATHOLOGY


IVF


CANGE MEDS TO IV


ON HEPARIN IV STOP ELIQUIS FOR NOW UNTIL ABLE TO SWALLOW

## 2018-11-01 NOTE — PN
Progress Note (short form)





- Note


Progress Note: 





Vascular Surgery:





PT with altered mental status. 





 Vital Signs











 Period  Temp  Pulse  Resp  BP Sys/Varghese  Pulse Ox


 


 Last 24 Hr  96.5 F-97.6 F    18-20  100-119/54-65  94-94








GEN: Arousable and responds to pain


Lower ext: bilateral DP pulses and feet warm. 





 CBC, BMP





 11/01/18 06:00 





 10/31/18 06:00 





 


A/p: 77 yo male admitted with AMS with a history of PVD


   No evidence of acute ischemia, pt placed on IV heparin because he isn't able 

to eat because of his altered mental status.


   Follow PTT as per protocol for IV heparin

## 2018-11-01 NOTE — PN
Progress Note (short form)





- Note


Progress Note: 





PATIENT FAILED SWALLOW EVAL


REPEAT TEST WHEN MORE ALERT


CHANGING MEDS TO IV





Problem List





- Problems


(1) HAM (acute kidney injury)


Code(s): N17.9 - ACUTE KIDNEY FAILURE, UNSPECIFIED   





(2) Atrial fibrillation with RVR


Code(s): I48.91 - UNSPECIFIED ATRIAL FIBRILLATION   





(3) Hyperlipidemia


Code(s): E78.5 - HYPERLIPIDEMIA, UNSPECIFIED   


Qualifiers: 


   Hyperlipidemia type: pure hypercholesterolemia   Qualified Code(s): E78.00 - 

Pure hypercholesterolemia, unspecified; E78.0 - Pure hypercholesterolemia   





(4) Hypothermia


Code(s): T68.XXXA - HYPOTHERMIA, INITIAL ENCOUNTER   


Qualifiers: 


   Encounter type: initial encounter   Qualified Code(s): T68.XXXA - Hypothermia

, initial encounter   





(5) Acute-on-chronic kidney injury


Code(s): N17.9 - ACUTE KIDNEY FAILURE, UNSPECIFIED; N18.9 - CHRONIC KIDNEY 

DISEASE, UNSPECIFIED   


Qualifiers: 


   Acute renal failure type: unspecified   Chronic kidney disease stage: 

unspecified stage   Qualified Code(s): N17.9 - Acute kidney failure, unspecified

; N18.9 - Chronic kidney disease, unspecified   





(6) Generalized weakness


Code(s): R53.1 - WEAKNESS   





(7) History of ETOH abuse


Code(s): Z87.898 - PERSONAL HISTORY OF OTHER SPECIFIED CONDITIONS   





(8) Hypercalcemia


Code(s): E83.52 - HYPERCALCEMIA   





(9) Hypertrophic cardiomyopathy


Code(s): I42.2 - OTHER HYPERTROPHIC CARDIOMYOPATHY   





(10) Toxic metabolic encephalopathy


Code(s): G92 - TOXIC ENCEPHALOPATHY   





(11) Sepsis


Code(s): A41.9 - SEPSIS, UNSPECIFIED ORGANISM

## 2018-11-01 NOTE — PN
Progress Note, Physician


Chief Complaint: 





The patient seen in the bed. Quite lethargic and confused. IV fluids infusing. 


History of Present Illness: 





This is a 78 year old gentleman with hx of CKD, Afib on A/C, CAD, CKD, 

Hypertension, Hyperlipidemia, PVD who presented with AMS/Confusion and found to 

have HAM. 


The patient was seen to have hypercalcemia. Etiology to be determined.





Urine output not measured. 








- Current Medication List


Current Medications: 


Active Medications





Acetaminophen (Tylenol Suppository -)  650 mg [place] Q6H PRN


   PRN Reason: FEVER


Atorvastatin Calcium (Lipitor -)  40 mg PO HS AVA


   Last Admin: 10/31/18 21:44 Dose:  Not Given


Heparin Sodium (Porcine) (Heparin -)  1,000 unit IVPUSH PRN PRN


   PRN Reason: Heparin


Heparin Sodium (Porcine) (Heparin -)  5,000 unit IVPUSH PRN PRN


   PRN Reason: Heparin


Sodium Chloride (Normal Saline -)  1,000 mls @ 125 mls/hr IV ASDIR AVA


   Last Admin: 11/01/18 00:01 Dose:  125 mls/hr


Ceftriaxone Sodium 1 gm/ (Dextrose)  50 mls @ 100 mls/hr IVPB DAILY AVA; 

Protocol


   Last Admin: 11/01/18 09:51 Dose:  100 mls/hr


Vancomycin HCl (Vancomycin (Pre-Docked))  1,000 mg in 250 mls @ 166.667 mls/hr 

IVPB Q24H AVA; Protocol


   Last Admin: 10/31/18 21:43 Dose:  166.667 mls/hr


Heparin Sodium/Dextrose (Heparin Infusion -)  25,000 units in 500 mls @ 16 mls/

hr IVPB TITR AVA; Protocol


   Last Admin: 11/01/18 07:04 Dose:  800 unit/hr, 16 mls/hr


Metoprolol Tartrate (Lopressor Injection -)  5 mg IVPUSH Q8H AVA


   Last Admin: 11/01/18 07:11 Dose:  5 mg


Thiamine HCl (Vitamin B1 -)  100 mg PO DAILY AVA


   Last Admin: 11/01/18 09:46 Dose:  Not Given











- Objective


Vital Signs: 


 Vital Signs











Temperature  97.8 F   11/01/18 08:45


 


Pulse Rate  94 H  11/01/18 08:45


 


Respiratory Rate  20   11/01/18 08:45


 


Blood Pressure  112/53 L  11/01/18 08:45


 


O2 Sat by Pulse Oximetry (%)  94 L  10/31/18 21:00











Constitutional: Yes: No Distress, Pallor


Eyes: Yes: Conjunctiva Clear


HENT: Yes: Atraumatic


Neck: Yes: Trachea Midline


Cardiovascular: Yes: S1, S2


Respiratory: Yes: Diminished, Poor Air Entry


Gastrointestinal: Yes: Soft


Edema: No


Neurological: Yes: Confusion, Lethargy


Labs: 


 CBC, BMP





 11/01/18 06:00 





 11/01/18 06:00 





 INR, PTT











INR  1.33  (0.83-1.09)  H  10/30/18  17:02    














Assessment/Plan





This is a 78 year old gentleman with hx of CKD, Afib on A/C, CAD, CKD, 

Hypertension, Hyperlipidemia, PVD who presented with AMS/Confusion and found to 

have HAM. 


The patient has Hypercalcemia, Hypoalbuminemia, and Anemia. A triad of these 

findings should alert one to rule out paraproteinemia. Malignant Hypercalcemia 

needs to be ruled out. ? heme / Onc eval. 





Tendency for Hypernatremia.  Will start on IV 1/2 NS infusion.





Will monitor the renal/ electrolyte profile. 





Thank you. 





Leeann Pérez MD

## 2018-11-01 NOTE — PN
Progress Note, SLP





- Note


Progress Note: 





Asked to reassess swallowing for PO intake. Pt is lethargic but responds to his 

name with slight improved sensorium. Stil not establishing eye contact/visually 

tracking. Vision? Attempted to respond to questions a little more today eg NO 

but limited and repeatedly falls back to sleep.





Swallow reassessed with poor bilabial closure or oral transit. However, swallow 

was brisk with sip of water. My concern is risk of aspiration due to impaired 

mental status.





Defer PO for now. 





Consider Clinimix if not medically contraindicated?

## 2018-11-01 NOTE — PN
Progress Note, Physician


Chief Complaint: 


confusion





History of Present Illness: 


 77 y/o man with a PMHx of Persistent Afib [id number] with recurrent 

peripheral arterial embolism due to previous noncompliance with Xarelto due to 

lack of social support, Diabetes Mellitus, Apical Hypertrophic Cardiomyopathy, 

Alcohol Abuse, nonobstructive CAD h/o anaphylaxis after eating [employer]- 

shrimp and boneless spare ribs, suspected myeloma (due to paraproteinemia and M 

spike, SPEP/UPEP results not yet found), recent admission for vasovagal near 

syncope and HAM presented with altered mental status after being found down at 

his home by EMS after his neighbor found mail piling up, etc.  He was brought 

to the ER and was arousable and following commands but not answering questions 

appropriately and is altered and cannot provide any acurate history; I am told 

he was still wearing his wristband from his last discharge.  He was found to be 

hypothermic and tachycardic with a marked hypercalcemia and HAM.  This is 

similar to his prior presentation when he was found to be hypercalcemic (

discharged with Ca of 11 corrected) and hypothermic.  His Ca is even more off 

this time.  








- Current Medication List


Current Medications: 


Active Medications





Acetaminophen (Tylenol Suppository -)  650 mg [place] Q6H PRN


   PRN Reason: FEVER


Atorvastatin Calcium (Lipitor -)  40 mg PO HS AVA


   Last Admin: 10/31/18 21:44 Dose:  Not Given


Heparin Sodium (Porcine) (Heparin -)  1,000 unit IVPUSH PRN PRN


   PRN Reason: Heparin


Heparin Sodium (Porcine) (Heparin -)  5,000 unit IVPUSH PRN PRN


   PRN Reason: Heparin


   Last Admin: 11/01/18 15:15 Dose:  5,000 unit


Ceftriaxone Sodium 1 gm/ (Dextrose)  50 mls @ 100 mls/hr IVPB DAILY AVA; 

Protocol


   Last Admin: 11/01/18 09:51 Dose:  100 mls/hr


Vancomycin HCl (Vancomycin (Pre-Docked))  1,000 mg in 250 mls @ 166.667 mls/hr 

IVPB Q24H AVA; Protocol


   Last Admin: 10/31/18 21:43 Dose:  166.667 mls/hr


Heparin Sodium/Dextrose (Heparin Infusion -)  25,000 units in 500 mls @ 16 mls/

hr IVPB TITR AVA; Protocol


   Last Titration: 11/01/18 15:16 Dose:  950 unit/hr, 19 mls/hr


Dextrose/Sodium Chloride (D5-1/2ns -)  1,000 mls @ 125 mls/hr IV ASDIR [facility]


   Last Admin: 11/01/18 14:36 Dose:  125 mls/hr


Metoprolol Tartrate (Lopressor Injection -)  5 mg IVPUSH Q8H [facility]


   Last Admin: 11/01/18 14:37 Dose:  5 mg


Thiamine HCl (Vitamin B1 -)  100 mg PO DAILY [facility]


   Last Admin: 11/01/18 09:46 Dose:  Not Given











- Objective


Vital Signs: 


 Vital Signs











Temperature  96.8 F L  11/01/18 14:34


 


Pulse Rate  95 H  11/01/18 14:37


 


Respiratory Rate  20   11/01/18 14:34


 


Blood Pressure  111/55 L  11/01/18 14:37


 


O2 Sat by Pulse Oximetry (%)  94 L  10/31/18 21:00











Neurological: Yes: Other (Lying in bed, eyes closed but will open to vigorous 

stimulation though difficult to maintain arousal.  he me appears symmetric in 

his face.  Moves limbs symmetrically though doesn't follow commands 

consistently.)


Labs: 


 CBC, BMP





 11/01/18 06:00 





 11/01/18 06:00 





 INR, PTT











INR  1.33  (0.83-1.09)  H  10/30/18  17:02    














Problem List





- Problems


(1) HAM (acute kidney injury)


Code(s): N17.9 - ACUTE KIDNEY FAILURE, UNSPECIFIED   





(2) Atrial fibrillation with RVR


Code(s): I48.91 - UNSPECIFIED ATRIAL FIBRILLATION   





(3) Cellulitis


Code(s): L03.90 - CELLULITIS, UNSPECIFIED   


Qualifiers: 


   Site of cellulitis: extremity   Site of cellulitis of extremity: lower 

extremity   Laterality: right   Qualified Code(s): L03.115 - Cellulitis of 

right lower limb   





(4) Hyperlipidemia


Code(s): E78.5 - HYPERLIPIDEMIA, UNSPECIFIED   


Qualifiers: 


   Hyperlipidemia type: pure hypercholesterolemia   Qualified Code(s): E78.00 - 

Pure hypercholesterolemia, unspecified; E78.0 - Pure hypercholesterolemia   





(5) Hypothermia


Code(s): T68.XXXA - HYPOTHERMIA, INITIAL ENCOUNTER   


Qualifiers: 


   Encounter type: initial encounter   Qualified Code(s): T68.XXXA - Hypothermia

, initial encounter   





(6) Sepsis


Code(s): A41.9 - SEPSIS, UNSPECIFIED ORGANISM   





(7) Toxic metabolic encephalopathy


Code(s): G92 - TOXIC ENCEPHALOPATHY   





(8) Acute-on-chronic kidney injury


Code(s): N17.9 - ACUTE KIDNEY FAILURE, UNSPECIFIED; N18.9 - CHRONIC KIDNEY 

DISEASE, UNSPECIFIED   


Qualifiers: 


   Acute renal failure type: unspecified   Chronic kidney disease stage: 

unspecified stage   Qualified Code(s): N17.9 - Acute kidney failure, unspecified

; N18.9 - Chronic kidney disease, unspecified   





Assessment/Plan


This still looks like metabolic encephalopathy much more than structural.  As 

calcium corrects, will watch for improvement and if none seen consider repeat 

imaging.

## 2018-11-01 NOTE — PN
Progress Note, Physician


History of Present Illness: 





Lethargic    Poorly responsive


Afebrile


Leukopenic


Blood c/s  prelim no growth


Flu swab negative





- Current Medication List


Current Medications: 


Active Medications





Acetaminophen (Tylenol Suppository -)  650 mg [place] Q6H PRN


   PRN Reason: FEVER


Atorvastatin Calcium (Lipitor -)  40 mg PO HS AVA


   Last Admin: 10/31/18 21:44 Dose:  Not Given


Heparin Sodium (Porcine) (Heparin -)  1,000 unit IVPUSH PRN PRN


   PRN Reason: Heparin


Heparin Sodium (Porcine) (Heparin -)  5,000 unit IVPUSH PRN PRN


   PRN Reason: Heparin


   Last Admin: 11/01/18 15:15 Dose:  5,000 unit


Ceftriaxone Sodium 1 gm/ (Dextrose)  50 mls @ 100 mls/hr IVPB DAILY AVA; 

Protocol


   Last Admin: 11/01/18 09:51 Dose:  100 mls/hr


Vancomycin HCl (Vancomycin (Pre-Docked))  1,000 mg in 250 mls @ 166.667 mls/hr 

IVPB Q24H AVA; Protocol


   Last Admin: 10/31/18 21:43 Dose:  166.667 mls/hr


Heparin Sodium/Dextrose (Heparin Infusion -)  25,000 units in 500 mls @ 16 mls/

hr IVPB TITR AVA; Protocol


   Last Titration: 11/01/18 15:16 Dose:  950 unit/hr, 19 mls/hr


Dextrose/Sodium Chloride (D5-1/2ns -)  1,000 mls @ 125 mls/hr IV ASDIR [facility]A


   Last Admin: 11/01/18 14:36 Dose:  125 mls/hr


Metoprolol Tartrate (Lopressor Injection -)  5 mg IVPUSH Q8H AVA


   Last Admin: 11/01/18 14:37 Dose:  5 mg


Thiamine HCl (Vitamin B1 -)  100 mg PO DAILY AVA


   Last Admin: 11/01/18 09:46 Dose:  Not Given











- Objective


Vital Signs: 


 Vital Signs











Temperature  96.8 F L  11/01/18 14:34


 


Pulse Rate  95 H  11/01/18 14:37


 


Respiratory Rate  20   11/01/18 14:34


 


Blood Pressure  111/55 L  11/01/18 14:37


 


O2 Sat by Pulse Oximetry (%)  94 L  10/31/18 21:00











Constitutional: Yes: No Distress


Eyes: Yes: Conjunctiva Clear


Cardiovascular: Yes: Regular Rate and Rhythm, S1, S2


Respiratory: Yes: Diminished


Gastrointestinal: Yes: Normal Bowel Sounds, Soft.  No: Tenderness


Edema: No


Labs: 


 CBC, BMP





 11/01/18 06:00 





 11/01/18 06:00 





 INR, PTT











INR  1.33  (0.83-1.09)  H  10/30/18  17:02    














Assessment/Plan





? Toxic metabolic encephalopathy


Hypercalcemia


Leukopenia


Renal failure


Await c/s


Continue empiric ceftriaxone

## 2018-11-02 LAB
ALBUMIN SERPL-MCNC: 1 G/DL (ref 3.4–5)
ALBUMIN SERPL-MCNC: 1.3 G/DL (ref 3.4–5)
ALP SERPL-CCNC: 59 U/L (ref 45–117)
ALT SERPL-CCNC: 17 U/L (ref 13–61)
ANION GAP SERPL CALC-SCNC: 7 MMOL/L (ref 8–16)
ANION GAP SERPL CALC-SCNC: 7 MMOL/L (ref 8–16)
AST SERPL-CCNC: 64 U/L (ref 15–37)
BILIRUB SERPL-MCNC: 0.3 MG/DL (ref 0.2–1)
BUN SERPL-MCNC: 44 MG/DL (ref 7–18)
BUN SERPL-MCNC: 51 MG/DL (ref 7–18)
CALCIUM SERPL-MCNC: 10.3 MG/DL (ref 8.5–10.1)
CALCIUM SERPL-MCNC: 8.7 MG/DL (ref 8.5–10.1)
CHLORIDE SERPL-SCNC: 116 MMOL/L (ref 98–107)
CHLORIDE SERPL-SCNC: 121 MMOL/L (ref 98–107)
CO2 SERPL-SCNC: 16 MMOL/L (ref 21–32)
CO2 SERPL-SCNC: 20 MMOL/L (ref 21–32)
CREAT SERPL-MCNC: 2.2 MG/DL (ref 0.55–1.3)
CREAT SERPL-MCNC: 2.8 MG/DL (ref 0.55–1.3)
GLUCOSE SERPL-MCNC: 130 MG/DL (ref 74–106)
GLUCOSE SERPL-MCNC: 131 MG/DL (ref 74–106)
POTASSIUM SERPLBLD-SCNC: 4 MMOL/L (ref 3.5–5.1)
POTASSIUM SERPLBLD-SCNC: 4.3 MMOL/L (ref 3.5–5.1)
PROT SERPL-MCNC: 12.1 G/DL (ref 6.4–8.2)
SODIUM SERPL-SCNC: 143 MMOL/L (ref 136–145)
SODIUM SERPL-SCNC: 144 MMOL/L (ref 136–145)

## 2018-11-02 PROCEDURE — 30233N1 TRANSFUSION OF NONAUTOLOGOUS RED BLOOD CELLS INTO PERIPHERAL VEIN, PERCUTANEOUS APPROACH: ICD-10-PCS

## 2018-11-02 RX ADMIN — VANCOMYCIN HYDROCHLORIDE SCH MLS/HR: 1 INJECTION, POWDER, LYOPHILIZED, FOR SOLUTION INTRAVENOUS at 22:04

## 2018-11-02 RX ADMIN — METOPROLOL TARTRATE SCH MG: 5 INJECTION, SOLUTION INTRAVENOUS at 22:04

## 2018-11-02 RX ADMIN — METOPROLOL TARTRATE SCH MG: 5 INJECTION, SOLUTION INTRAVENOUS at 05:56

## 2018-11-02 RX ADMIN — CEFTRIAXONE SCH MLS/HR: 1 INJECTION, POWDER, FOR SOLUTION INTRAMUSCULAR; INTRAVENOUS at 10:47

## 2018-11-02 RX ADMIN — METOPROLOL TARTRATE SCH MG: 5 INJECTION, SOLUTION INTRAVENOUS at 14:42

## 2018-11-02 RX ADMIN — HEPARIN SODIUM SCH MLS/HR: 5000 INJECTION, SOLUTION INTRAVENOUS at 05:56

## 2018-11-02 RX ADMIN — LEUCINE, PHENYLALANINE, LYSINE, METHIONINE, ISOLEUCINE, VALINE, HISTIDINE, THREONINE, TRYPTOPHAN, ALANINE, GLYCINE, ARGININE, PROLINE, SERINE, TYROSINE, DEXTROSE SCH MLS/HR: 311; 238; 247; 170; 255; 247; 204; 179; 77; 880; 438; 489; 289; 213; 17; 5 INJECTION INTRAVENOUS at 09:00

## 2018-11-02 RX ADMIN — Medication SCH: at 10:49

## 2018-11-02 RX ADMIN — LEUCINE, PHENYLALANINE, LYSINE, METHIONINE, ISOLEUCINE, VALINE, HISTIDINE, THREONINE, TRYPTOPHAN, ALANINE, GLYCINE, ARGININE, PROLINE, SERINE, TYROSINE, DEXTROSE SCH: 311; 238; 247; 170; 255; 247; 204; 179; 77; 880; 438; 489; 289; 213; 17; 5 INJECTION INTRAVENOUS at 22:04

## 2018-11-02 RX ADMIN — DEXTROSE AND SODIUM CHLORIDE SCH MLS/HR: 5; 450 INJECTION, SOLUTION INTRAVENOUS at 01:59

## 2018-11-02 NOTE — PN
Progress Note, SLP





- Note


Progress Note: 


 Selected Entries











  11/01/18 11/01/18 11/01/18





  01:42 05:39 08:45


 


Supper   


 


Temperature 97.6 F 97.4 F L 97.8 F














  11/01/18 11/01/18 11/01/18





  14:34 17:39 23:15


 


Supper   NPO


 


Temperature 96.8 F L 97.2 F L 














  11/02/18 11/02/18





  02:00 05:55


 


Supper  


 


Temperature 98.3 F 98.0 F








 Laboratory Tests











  10/31/18 11/01/18





  00:55 06:00


 


WBC  3.7 L  3.3 L











Immediately arousable to his name. Speaking but imprecise with impaired 

intelligibilty. Following commands to lift arm.





 


Right arm seems weaker than left but difficult to determine.





Upper airway congestion/ cough.





Continue NPO for now. Monitor neuro/Pulmonary status.


Elevate HOB/Suction, if neccessary/Mouth care.

## 2018-11-02 NOTE — PN
Progress Note, Physician


History of Present Illness: 


Sensorium still altered, oral intake held, continuing on IV meds and Clinimix.





- Current Medication List


Current Medications: 


Active Medications





Acetaminophen (Tylenol Suppository -)  650 mg WA Q6H PRN


   PRN Reason: FEVER


Atorvastatin Calcium (Lipitor -)  40 mg PO HS [facility]


   Last Admin: 10/31/18 21:44 Dose:  Not Given


Heparin Sodium (Porcine) (Heparin -)  1,000 unit IVPUSH PRN PRN


   PRN Reason: Heparin


Heparin Sodium (Porcine) (Heparin -)  5,000 unit IVPUSH PRN PRN


   PRN Reason: Heparin


   Last Admin: 11/01/18 15:15 Dose:  5,000 unit


Ceftriaxone Sodium 1 gm/ (Dextrose)  50 mls @ 100 mls/hr IVPB DAILY [facility]; 

Protocol


   Last Admin: 11/02/18 10:47 Dose:  100 mls/hr


Vancomycin HCl (Vancomycin (Pre-Docked))  1,000 mg in 250 mls @ 166.667 mls/hr 

IVPB Q24H [facility]; Protocol


   Last Admin: 11/01/18 21:43 Dose:  166.667 mls/hr


Heparin Sodium/Dextrose (Heparin Infusion -)  25,000 units in 500 mls @ 16 mls/

hr IVPB TITR [facility]; Protocol


   Last Titration: 11/02/18 11:30 Dose:  950 unit/hr, 19 mls/hr


Folic Acid 1 mg/ Thiamine HCl 100 mg/ Multivitamins/Minerals 10 ml/ Sodium 

Chloride  1,000 mls @ 125 mls/hr IVPB ONCE ONE


   Stop: 11/02/18 16:38


Amino Acids (Clinimix -)  1,000 mls @ 84 mls/hr IV Q12H [facility]


   Last Admin: 11/02/18 09:00 Dose:  84 mls/hr


Metoprolol Tartrate (Lopressor Injection -)  5 mg IVPUSH Q8H [facility]


   Last Admin: 11/02/18 05:56 Dose:  5 mg


Thiamine HCl (Vitamin B1 -)  100 mg PO DAILY [facility]


   Last Admin: 11/02/18 10:49 Dose:  Not Given











- Objective


Vital Signs: 


 Vital Signs











Temperature  97.4 F L  11/02/18 10:00


 


Pulse Rate  114 H  11/02/18 10:00


 


Respiratory Rate  18   11/02/18 10:00


 


Blood Pressure  129/60   11/02/18 10:00


 


O2 Sat by Pulse Oximetry (%)  99   11/01/18 21:00











Constitutional: Yes: No Distress, Calm, Thin


Neck: Yes: Supple


Cardiovascular: Yes: Regular Rate and Rhythm


Respiratory: Yes: Regular, Diminished, On Nasal O2


Gastrointestinal: Yes: Soft, Hypoactive Bowel Sounds


Edema: No


Labs: 


 CBC, BMP





 11/01/18 06:00 





 11/02/18 08:00 





 INR, PTT











INR  1.33  (0.83-1.09)  H  10/30/18  17:02    














- ....Imaging


EKG: Report Reviewed (Rate-controlled afib)





Problem List





- Problems


(1) Atrial fibrillation with RVR


Code(s): I48.91 - UNSPECIFIED ATRIAL FIBRILLATION   





(2) Acute-on-chronic kidney injury


Code(s): N17.9 - ACUTE KIDNEY FAILURE, UNSPECIFIED; N18.9 - CHRONIC KIDNEY 

DISEASE, UNSPECIFIED   


Qualifiers: 


   Acute renal failure type: unspecified   Chronic kidney disease stage: 

unspecified stage   Qualified Code(s): N17.9 - Acute kidney failure, unspecified

; N18.9 - Chronic kidney disease, unspecified   





(3) Demand ischemia


Code(s): I24.8 - OTHER FORMS OF ACUTE ISCHEMIC HEART DISEASE   





(4) Hypercalcemia


Code(s): E83.52 - HYPERCALCEMIA   





(5) Nonadherence to medication


Code(s): Z91.14 - PATIENT'S OTHER NONCOMPLIANCE WITH MEDICATION REGIMEN   





(6) Paraproteinemia


Code(s): D89.2 - HYPERGAMMAGLOBULINEMIA, UNSPECIFIED   





(7) Anticoagulant long-term use


Code(s): Z79.01 - LONG TERM (CURRENT) USE OF ANTICOAGULANTS   





(8) Chronic thromboembolic disease


Code(s): I74.9 - EMBOLISM AND THROMBOSIS OF UNSPECIFIED ARTERY   





(9) Coronary artery disease


Code(s): I25.10 - ATHSCL HEART DISEASE OF NATIVE CORONARY ARTERY W/O ANG PCTRS 

  


Qualifiers: 


   Coronary Disease-Associated Artery/Lesion type: native artery   Native vs. 

transplanted heart: native heart   Associated angina: without angina   

Qualified Code(s): I25.10 - Atherosclerotic heart disease of native coronary 

artery without angina pectoris   





(10) Diastolic dysfunction without heart failure


Code(s): I51.9 - HEART DISEASE, UNSPECIFIED   





(11) HTN (hypertension)


Code(s): I10 - ESSENTIAL (PRIMARY) HYPERTENSION   


Qualifiers: 


   Hypertension type: essential hypertension   Qualified Code(s): I10 - 

Essential (primary) hypertension   





(12) Hypertrophic cardiomyopathy


Code(s): I42.2 - OTHER HYPERTROPHIC CARDIOMYOPATHY   





(13) Hyperlipidemia


Code(s): E78.5 - HYPERLIPIDEMIA, UNSPECIFIED   


Qualifiers: 


   Hyperlipidemia type: pure hypercholesterolemia   Qualified Code(s): E78.00 - 

Pure hypercholesterolemia, unspecified; E78.0 - Pure hypercholesterolemia   





Assessment/Plan


R&LHc at [facility] 1/11/2017 showing nonobstructive CAD, severe LV apical 

hypertrophic cardiomyopathy, mildly elevated right sided pressures, Mynx 

deployed right CFA access site. Study is consistent with apical hypertrophy (

spade-like) variant of hypertrophic cardiomyopathy, planned for optimal medical 

therapy. 


Echocardiogram: 07/11/2018 Mod cLVH, severe DYANA, mod TR RVSP 50-60 mmHg, mod-

severe MR


Echocardiogram: 10/16/2018 Normal LV size with hyperdynamic LVEF 75%, mild BSH, 

normal RV size and fxn, severe LAE, mod-severe MR, mod TR





1. Toxic metabolic encephelopathy, r/o CVA while off NOAC, r/o sepsis


2. Acute on CKD (pre-renal) 


3. Hypercalcemia, paraproteinemia-> possible multiple myeloma


3. History of bilateral SFA occlusion post thrombectomy, referable to embolic 

disease related to persistent atrial fibrillation with [id number] score of 6, 

history of poor compliance with anticoagulation and medical F/U


4. Non obstructive CAD on R&LHc coronary angiography with demand ischemia


5. 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 

subendocardial ischemia, compensated/euvolemic


6. Persistent atrial fibrillation [id number] score of 6 on Xarelto 20 qd


7. Labile HTN


8. Poor compliance with medical therapy administration and medical F/U


9. Tobacco abuse


10. ETOH dependence





PLAN:





1. Heparin gtt for now while off Xarelto 15 qd (renal dosing)


2. IV Lopressor as hemodynamics tolerate


3. Hematology input, SPEP, UPEP pending


4. Judicious Clinimix with monitor renal recovery, renal input appreciated, 

check PTH, ruled out obstructive uropathy


5. Empiric abx course pending C&S


6. NGT for enteral feeds if prolonged NPO

## 2018-11-02 NOTE — PN
Progress Note, Physician


Chief Complaint: 





AWAKE CONFUSED STILL


NO NEW EVENTS OVERNIGHT





- Current Medication List


Current Medications: 


Active Medications





Acetaminophen (Tylenol Suppository -)  650 mg ND Q6H PRN


   PRN Reason: FEVER


Atorvastatin Calcium (Lipitor -)  40 mg PO HS [facility]


   Last Admin: 10/31/18 21:44 Dose:  Not Given


Heparin Sodium (Porcine) (Heparin -)  1,000 unit IVPUSH PRN PRN


   PRN Reason: Heparin


Heparin Sodium (Porcine) (Heparin -)  5,000 unit IVPUSH PRN PRN


   PRN Reason: Heparin


   Last Admin: 11/01/18 15:15 Dose:  5,000 unit


Ceftriaxone Sodium 1 gm/ (Dextrose)  50 mls @ 100 mls/hr IVPB DAILY [facility]; 

Protocol


   Last Admin: 11/01/18 09:51 Dose:  100 mls/hr


Vancomycin HCl (Vancomycin (Pre-Docked))  1,000 mg in 250 mls @ 166.667 mls/hr 

IVPB Q24H [facility]; Protocol


   Last Admin: 11/01/18 21:43 Dose:  166.667 mls/hr


Heparin Sodium/Dextrose (Heparin Infusion -)  25,000 units in 500 mls @ 16 mls/

hr IVPB TITR [facility]; Protocol


   Last Admin: 11/02/18 05:56 Dose:  950 unit/hr, 19 mls/hr


Folic Acid 1 mg/ Thiamine HCl 100 mg/ Multivitamins/Minerals 10 ml/ Sodium 

Chloride  1,000 mls @ 125 mls/hr IVPB ONCE ONE


   Stop: 11/02/18 16:38


Amino Acids (Clinimix -)  1,000 mls @ 84 mls/hr IV Q12H [facility]


Metoprolol Tartrate (Lopressor Injection -)  5 mg IVPUSH Q8H [facility]


   Last Admin: 11/02/18 05:56 Dose:  5 mg


Thiamine HCl (Vitamin B1 -)  100 mg PO DAILY [facility]


   Last Admin: 11/01/18 09:46 Dose:  Not Given











- Objective


Vital Signs: 


 Vital Signs











Temperature  98.0 F   11/02/18 05:55


 


Pulse Rate  87   11/02/18 05:56


 


Respiratory Rate  18   11/02/18 05:55


 


Blood Pressure  130/83   11/02/18 05:56


 


O2 Sat by Pulse Oximetry (%)  99   11/01/18 21:00











Constitutional: Yes: Mild Distress


Eyes: Yes: Other


Cardiovascular: Yes: Pulse Irregular


Respiratory: Yes: On Nasal O2, Rhonchi


Genitourinary: Yes: Incontinence


Musculoskeletal: Yes: Muscle Weakness


Extremities: Yes: Other


Edema: No


Peripheral Pulses WNL: Yes


Integumentary: Yes: WNL


Wound/Incision: Yes: Clean/Dry


Neurological: Yes: Confusion, Weakness, Other


...Motor Strength: LLE, RLE


Psychiatric: Yes: Agitated, Other


Labs: 


 CBC, BMP





 11/01/18 06:00 





 INR, PTT











INR  1.33  (0.83-1.09)  H  10/30/18  17:02    














Problem List





- Problems


(1) HAM (acute kidney injury)


Code(s): N17.9 - ACUTE KIDNEY FAILURE, UNSPECIFIED   





(2) Atrial fibrillation with RVR


Code(s): I48.91 - UNSPECIFIED ATRIAL FIBRILLATION   





(3) Hyperlipidemia


Code(s): E78.5 - HYPERLIPIDEMIA, UNSPECIFIED   


Qualifiers: 


   Hyperlipidemia type: pure hypercholesterolemia   Qualified Code(s): E78.00 - 

Pure hypercholesterolemia, unspecified; E78.0 - Pure hypercholesterolemia   





(4) Hypothermia


Code(s): T68.XXXA - HYPOTHERMIA, INITIAL ENCOUNTER   


Qualifiers: 


   Encounter type: initial encounter   Qualified Code(s): T68.XXXA - Hypothermia

, initial encounter   





(5) Acute-on-chronic kidney injury


Code(s): N17.9 - ACUTE KIDNEY FAILURE, UNSPECIFIED; N18.9 - CHRONIC KIDNEY 

DISEASE, UNSPECIFIED   


Qualifiers: 


   Acute renal failure type: unspecified   Chronic kidney disease stage: 

unspecified stage   Qualified Code(s): N17.9 - Acute kidney failure, unspecified

; N18.9 - Chronic kidney disease, unspecified   





(6) Generalized weakness


Code(s): R53.1 - WEAKNESS   





(7) History of ETOH abuse


Code(s): Z87.898 - PERSONAL HISTORY OF OTHER SPECIFIED CONDITIONS   





(8) Hypercalcemia


Code(s): E83.52 - HYPERCALCEMIA   





(9) Hypertrophic cardiomyopathy


Code(s): I42.2 - OTHER HYPERTROPHIC CARDIOMYOPATHY   





(10) Toxic metabolic encephalopathy


Code(s): G92 - TOXIC ENCEPHALOPATHY   





(11) Sepsis


Code(s): A41.9 - SEPSIS, UNSPECIFIED ORGANISM   





Assessment/Plan





NPO STILL UNTIL MORE ALERT NEUROLOGICALLY


CLINIMIX STARTED


MONITOR CA+ LEVELS


PT EVAL


IV BANAN BAG FOR POSSIBLE WERNICKES ENCEPHALOPATHY


VERSUS TOXIC METABOLIC ENCEPHALOPATHY


NEURO AND CARDIO EVAL


HEPARIN IV FOR AC AFIB


SWALLOW EVAL

## 2018-11-02 NOTE — PN
Progress Note (short form)





- Note


Progress Note: 





Renal follow up for Hypercalcemia/HAM





Pt seen and examined at the bedside


awake but lethagic and not answering questions


Corrected Ca ~12 this am


on Clinimix IV


 Vital Signs











Temperature  97.8 F   11/02/18 14:09


 


Pulse Rate  110 H  11/02/18 14:09


 


Respiratory Rate  18   11/02/18 14:09


 


Blood Pressure  125/92   11/02/18 14:09


 


O2 Sat by Pulse Oximetry (%)  99   11/02/18 09:00








 Intake & Output











 10/30/18 10/31/18 11/01/18 11/02/18





 23:59 23:59 23:59 23:59


 


Intake Total 2250 4375 1700 2300


 


Output Total 300   


 


Balance 1950 4375 1700 2300


 


Weight 72.575 kg 75.296 kg  








NAD


lethargic


RRR


CTA


no LE edema





 CBC, BMP





 11/01/18 06:00 





 11/02/18 08:00 





 Current Medications





Acetaminophen (Tylenol Suppository -)  650 mg LA Q6H PRN


   PRN Reason: FEVER


Heparin Sodium (Porcine) (Heparin -)  1,000 unit IVPUSH PRN PRN


   PRN Reason: Heparin


Heparin Sodium (Porcine) (Heparin -)  5,000 unit IVPUSH PRN PRN


   PRN Reason: Heparin


   Last Admin: 11/01/18 15:15 Dose:  5,000 unit


Ceftriaxone Sodium 1 gm/ (Dextrose)  50 mls @ 100 mls/hr IVPB DAILY [facility]; 

Protocol


   Last Admin: 11/02/18 10:47 Dose:  100 mls/hr


Vancomycin HCl (Vancomycin (Pre-Docked))  1,000 mg in 250 mls @ 166.667 mls/hr 

IVPB Q24H [facility]; Protocol


   Last Admin: 11/01/18 21:43 Dose:  166.667 mls/hr


Heparin Sodium/Dextrose (Heparin Infusion -)  25,000 units in 500 mls @ 16 mls/

hr IVPB TITR [facility]; Protocol


   Last Titration: 11/02/18 11:30 Dose:  950 unit/hr, 19 mls/hr


Folic Acid 1 mg/ Thiamine HCl 100 mg/ Multivitamins/Minerals 10 ml/ Sodium 

Chloride  1,000 mls @ 125 mls/hr IVPB ONCE ONE


   Stop: 11/02/18 16:38


   Last Admin: 11/02/18 13:40 Dose:  125 mls/hr


Amino Acids (Clinimix -)  1,000 mls @ 84 mls/hr IV Q12H [facility]


   Last Admin: 11/02/18 09:00 Dose:  84 mls/hr


Metoprolol Tartrate (Lopressor Injection -)  5 mg IVPUSH Q8H [facility]


   Last Admin: 11/02/18 05:56 Dose:  5 mg


Thiamine HCl (Vitamin B1 -)  100 mg PO DAILY [facility]


   Last Admin: 11/02/18 10:49 Dose:  Not Given





78 year old gentleman with hx of CKD, Afib on A/C, CAD, CKD, Hypertension, 

Hyperlipidemia, PVD who presented with AMS/Confusion and found to have HAM.





#HAM (FeNa was 2.1% indicating tubular injury, US showed no stones or 

obstruction)


#AMS of unclear etiology (metabolic encephalopathy)


#Anemia 


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





US showed no obstruction


Renal function w/o improvement despite IVF


Ca remains unchanged (corrected ~12)


On IVF (Clinimix), Trend Ca


Check stat BMP now given confusion


Check PTHrp and Vit D levels


Likely hypercalcemia of malignancy


if Ca does not improve will need to treat with Calcitonin (no bisphosphates 

given HAM)


no acute indiction for RRT but if Ca levels and mental status does not improve 

may need to consider 


Trend renal function and electrolytes


dose all meds for CrCl < 15








Ricky Chang DO

## 2018-11-02 NOTE — PN
Progress Note, Physician


Chief Complaint: 


confusion





History of Present Illness: 


 77 y/o man with a PMHx of Persistent Afib [id number] with recurrent 

peripheral arterial embolism due to previous noncompliance with Xarelto due to 

lack of social support, Diabetes Mellitus, Apical Hypertrophic Cardiomyopathy, 

Alcohol Abuse, nonobstructive CAD h/o anaphylaxis after eating [employer]- 

shrimp and boneless spare ribs, suspected myeloma (due to paraproteinemia and M 

spike, SPEP/UPEP results not yet found), recent admission for vasovagal near 

syncope and HAM presented with altered mental status after being found down at 

his home by EMS after his neighbor found mail piling up, etc.  He was brought 

to the ER and was arousable and following commands but not answering questions 

appropriately and is altered and cannot provide any acurate history; I am told 

he was still wearing his wristband from his last discharge.  He was found to be 

hypothermic and tachycardic with a marked hypercalcemia and HAM.  This is 

similar to his prior presentation when he was found to be hypercalcemic (

discharged with Ca of 11 corrected) and hypothermic.  His Ca is even more off 

this time.  








- Current Medication List


Current Medications: 


Active Medications





Acetaminophen (Tylenol Suppository -)  650 mg IA Q6H PRN


   PRN Reason: FEVER


Heparin Sodium (Porcine) (Heparin -)  1,000 unit IVPUSH PRN PRN


   PRN Reason: Heparin


Heparin Sodium (Porcine) (Heparin -)  5,000 unit IVPUSH PRN PRN


   PRN Reason: Heparin


   Last Admin: 11/01/18 15:15 Dose:  5,000 unit


Ceftriaxone Sodium 1 gm/ (Dextrose)  50 mls @ 100 mls/hr IVPB DAILY [facility]; 

Protocol


   Last Admin: 11/02/18 10:47 Dose:  100 mls/hr


Vancomycin HCl (Vancomycin (Pre-Docked))  1,000 mg in 250 mls @ 166.667 mls/hr 

IVPB Q24H [facility]; Protocol


   Last Admin: 11/01/18 21:43 Dose:  166.667 mls/hr


Heparin Sodium/Dextrose (Heparin Infusion -)  25,000 units in 500 mls @ 16 mls/

hr IVPB TITR [facility]; Protocol


   Last Titration: 11/02/18 11:30 Dose:  950 unit/hr, 19 mls/hr


Folic Acid 1 mg/ Thiamine HCl 100 mg/ Multivitamins/Minerals 10 ml/ Sodium 

Chloride  1,000 mls @ 125 mls/hr IVPB ONCE ONE


   Stop: 11/02/18 16:38


   Last Admin: 11/02/18 13:40 Dose:  125 mls/hr


Amino Acids (Clinimix -)  1,000 mls @ 84 mls/hr IV Q12H [facility]


   Last Admin: 11/02/18 09:00 Dose:  84 mls/hr


Metoprolol Tartrate (Lopressor Injection -)  5 mg IVPUSH Q8H [facility]


   Last Admin: 11/02/18 05:56 Dose:  5 mg


Thiamine HCl (Vitamin B1 -)  100 mg PO DAILY [facility]


   Last Admin: 11/02/18 10:49 Dose:  Not Given











- Objective


Vital Signs: 


 Vital Signs











Temperature  97.8 F   11/02/18 14:09


 


Pulse Rate  110 H  11/02/18 14:09


 


Respiratory Rate  18   11/02/18 14:09


 


Blood Pressure  125/92   11/02/18 14:09


 


O2 Sat by Pulse Oximetry (%)  99   11/01/18 21:00











Neurological: Yes: Other (confused, moves extremities equally)


Labs: 


 CBC, BMP





 11/01/18 06:00 





 11/02/18 08:00 





 INR, PTT











INR  1.33  (0.83-1.09)  H  10/30/18  17:02    














Problem List





- Problems


(1) HAM (acute kidney injury)


Code(s): N17.9 - ACUTE KIDNEY FAILURE, UNSPECIFIED   





(2) Atrial fibrillation with RVR


Code(s): I48.91 - UNSPECIFIED ATRIAL FIBRILLATION   





(3) Cellulitis


Code(s): L03.90 - CELLULITIS, UNSPECIFIED   


Qualifiers: 


   Site of cellulitis: extremity   Site of cellulitis of extremity: lower 

extremity   Laterality: right   Qualified Code(s): L03.115 - Cellulitis of 

right lower limb   





(4) Hyperlipidemia


Code(s): E78.5 - HYPERLIPIDEMIA, UNSPECIFIED   


Qualifiers: 


   Hyperlipidemia type: pure hypercholesterolemia   Qualified Code(s): E78.00 - 

Pure hypercholesterolemia, unspecified; E78.0 - Pure hypercholesterolemia   





(5) Hypothermia


Code(s): T68.XXXA - HYPOTHERMIA, INITIAL ENCOUNTER   


Qualifiers: 


   Encounter type: initial encounter   Qualified Code(s): T68.XXXA - Hypothermia

, initial encounter   





(6) Sepsis


Code(s): A41.9 - SEPSIS, UNSPECIFIED ORGANISM   





(7) Toxic metabolic encephalopathy


Code(s): G92 - TOXIC ENCEPHALOPATHY   





(8) Acute-on-chronic kidney injury


Code(s): N17.9 - ACUTE KIDNEY FAILURE, UNSPECIFIED; N18.9 - CHRONIC KIDNEY 

DISEASE, UNSPECIFIED   


Qualifiers: 


   Acute renal failure type: unspecified   Chronic kidney disease stage: 

unspecified stage   Qualified Code(s): N17.9 - Acute kidney failure, unspecified

; N18.9 - Chronic kidney disease, unspecified   





Assessment/Plan


This still looks like metabolic encephalopathy much more than structural.  As 

calcium corrects, will watch for improvement and if none seen consider repeat 

imaging.

## 2018-11-03 LAB
ANION GAP SERPL CALC-SCNC: 6 MMOL/L (ref 8–16)
BUN SERPL-MCNC: 47 MG/DL (ref 7–18)
CALCIUM SERPL-MCNC: 10 MG/DL (ref 8.5–10.1)
CHLORIDE SERPL-SCNC: 116 MMOL/L (ref 98–107)
CO2 SERPL-SCNC: 21 MMOL/L (ref 21–32)
CREAT SERPL-MCNC: 2.5 MG/DL (ref 0.55–1.3)
DEPRECATED RDW RBC AUTO: 16.4 % (ref 11.9–15.9)
GLUCOSE SERPL-MCNC: 106 MG/DL (ref 74–106)
HCT VFR BLD CALC: 26.8 % (ref 35.4–49)
HGB BLD-MCNC: 9.3 GM/DL (ref 11.7–16.9)
MCH RBC QN AUTO: 31.8 PG (ref 25.7–33.7)
MCHC RBC AUTO-ENTMCNC: 34.8 G/DL (ref 32–35.9)
MCV RBC: 91.4 FL (ref 80–96)
PLATELET # BLD AUTO: 235 K/MM3 (ref 134–434)
PMV BLD: 9.3 FL (ref 7.5–11.1)
POTASSIUM SERPLBLD-SCNC: 4.6 MMOL/L (ref 3.5–5.1)
RBC # BLD AUTO: 2.94 M/MM3 (ref 4–5.6)
SODIUM SERPL-SCNC: 142 MMOL/L (ref 136–145)
WBC # BLD AUTO: 3.9 K/MM3 (ref 4–10)

## 2018-11-03 RX ADMIN — VANCOMYCIN HYDROCHLORIDE SCH MLS/HR: 1 INJECTION, POWDER, LYOPHILIZED, FOR SOLUTION INTRAVENOUS at 20:12

## 2018-11-03 RX ADMIN — METOPROLOL TARTRATE SCH MG: 5 INJECTION, SOLUTION INTRAVENOUS at 05:46

## 2018-11-03 RX ADMIN — METOPROLOL TARTRATE SCH MG: 5 INJECTION, SOLUTION INTRAVENOUS at 14:08

## 2018-11-03 RX ADMIN — HEPARIN SODIUM SCH MLS/HR: 5000 INJECTION, SOLUTION INTRAVENOUS at 05:49

## 2018-11-03 RX ADMIN — CEFTRIAXONE SCH MLS/HR: 1 INJECTION, POWDER, FOR SOLUTION INTRAMUSCULAR; INTRAVENOUS at 09:59

## 2018-11-03 RX ADMIN — LEUCINE, PHENYLALANINE, LYSINE, METHIONINE, ISOLEUCINE, VALINE, HISTIDINE, THREONINE, TRYPTOPHAN, ALANINE, GLYCINE, ARGININE, PROLINE, SERINE, TYROSINE, DEXTROSE SCH MLS/HR: 311; 238; 247; 170; 255; 247; 204; 179; 77; 880; 438; 489; 289; 213; 17; 5 INJECTION INTRAVENOUS at 09:58

## 2018-11-03 RX ADMIN — Medication SCH: at 09:59

## 2018-11-03 RX ADMIN — METOPROLOL TARTRATE SCH MG: 5 INJECTION, SOLUTION INTRAVENOUS at 21:20

## 2018-11-03 NOTE — PN
Progress Note (short form)





- Note


Progress Note: 





Renal follow up for Hypercalcemia/HAM





Pt seen and examined at the bedside


remains lethargic


opens eyes to verbal stimuli but not answering questions


no overnight events


on IVF 





 Vital Signs











Temperature  97.7 F   11/03/18 02:00


 


Pulse Rate  89   11/03/18 05:46


 


Respiratory Rate  20   11/03/18 02:00


 


Blood Pressure  113/57 L  11/03/18 05:46


 


O2 Sat by Pulse Oximetry (%)  99   11/02/18 21:00











 Intake & Output











 10/31/18 11/01/18 11/02/18 11/03/18





 23:59 23:59 23:59 23:59


 


Intake Total 4375 1700 2300 478


 


Balance 4375 1700 2300 478


 


Weight 75.296 kg   





NAD


Dry MM


Neck supple


RRR


Dec BS, no rales


no LE edema











 CBC, BMP





 11/02/18 17:45 


 Laboratory Tests











  10/30/18 10/30/18 10/31/18





  17:02 18:00 06:00


 


Calcium    10.6 H


 


Creatine Kinase   409 H 


 


Ur Specific Gravity  1.018  


 


Urine Blood  2+ H  


 


Urine WBC (Auto)  <1  


 


Urine RBC (Auto)  4  


 


[facility]   














  11/02/18 11/02/18





  08:00 17:45


 


Calcium   8.7


 


Creatine Kinase  


 


Ur Specific Gravity  


 


Urine Blood  


 


Urine WBC (Auto)  


 


Urine RBC (Auto)  


 


[facility]  Pending 











 Current Medications





Acetaminophen (Tylenol Suppository -)  650 mg IA Q6H PRN


   PRN Reason: FEVER


Heparin Sodium (Porcine) (Heparin -)  1,000 unit IVPUSH PRN PRN


   PRN Reason: Heparin


Heparin Sodium (Porcine) (Heparin -)  5,000 unit IVPUSH PRN PRN


   PRN Reason: Heparin


   Last Admin: 11/01/18 15:15 Dose:  5,000 unit


Ceftriaxone Sodium 1 gm/ (Dextrose)  50 mls @ 100 mls/hr IVPB DAILY [facility]; 

Protocol


   Last Admin: 11/02/18 10:47 Dose:  100 mls/hr


Vancomycin HCl (Vancomycin (Pre-Docked))  1,000 mg in 250 mls @ 166.667 mls/hr 

IVPB Q24H [facility]; Protocol


   Last Admin: 11/02/18 22:04 Dose:  166.667 mls/hr


Heparin Sodium/Dextrose (Heparin Infusion -)  25,000 units in 500 mls @ 16 mls/

hr IVPB TITR [facility]; Protocol


   Last Admin: 11/03/18 05:49 Dose:  950 unit/hr, 19 mls/hr


Amino Acids (Clinimix -)  1,000 mls @ 84 mls/hr IV Q12H [facility]


   Last Admin: 11/02/18 22:04 Dose:  Not Given


Metoprolol Tartrate (Lopressor Injection -)  5 mg IVPUSH Q8H [facility]


   Last Admin: 11/03/18 05:46 Dose:  5 mg


Thiamine HCl (Vitamin B1 -)  100 mg PO DAILY [facility]


   Last Admin: 11/02/18 10:49 Dose:  Not Given











78 year old gentleman with hx of CKD, Afib on A/C, CAD, CKD, Hypertension, 

Hyperlipidemia, PVD who presented with AMS/Confusion and found to have HAM.





#HAM (FeNa was 2.1% indicating tubular injury, US showed no stones or 

obstruction)


#AMS of unclear etiology (metabolic encephalopathy)


#Anemia 


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





Todays labs pending


renal function unchanged as of yesterday


Corrected Ca as of yesterday evening was 11.1, unlikely that alone would cause 

AMS to this degree


will trend renal function and electrolytes


if no change in MS can consider RRT to access for clinical improvement








Ricky Chang DO

## 2018-11-03 NOTE — PN
Progress Note, Physician





- Current Medication List


Current Medications: 


Active Medications





Acetaminophen (Tylenol Suppository -)  650 mg [place] Q6H PRN


   PRN Reason: FEVER


Heparin Sodium (Porcine) (Heparin -)  1,000 unit IVPUSH PRN PRN


   PRN Reason: Heparin


Heparin Sodium (Porcine) (Heparin -)  5,000 unit IVPUSH PRN PRN


   PRN Reason: Heparin


   Last Admin: 11/01/18 15:15 Dose:  5,000 unit


Ceftriaxone Sodium 1 gm/ (Dextrose)  50 mls @ 100 mls/hr IVPB DAILY [facility]; 

Protocol


   Last Admin: 11/03/18 09:59 Dose:  100 mls/hr


Vancomycin HCl (Vancomycin (Pre-Docked))  1,000 mg in 250 mls @ 166.667 mls/hr 

IVPB Q24H [facility]; Protocol


   Last Admin: 11/02/18 22:04 Dose:  166.667 mls/hr


Heparin Sodium/Dextrose (Heparin Infusion -)  25,000 units in 500 mls @ 16 mls/

hr IVPB TITR [facility]; Protocol


   Last Admin: 11/03/18 05:49 Dose:  950 unit/hr, 19 mls/hr


Amino Acids (Clinimix -)  1,000 mls @ 84 mls/hr IV Q12H [facility]


   Last Admin: 11/03/18 09:58 Dose:  84 mls/hr


Metoprolol Tartrate (Lopressor Injection -)  5 mg IVPUSH Q8H [facility]


   Last Admin: 11/03/18 05:46 Dose:  5 mg


Thiamine HCl (Vitamin B1 -)  100 mg PO DAILY [facility]


   Last Admin: 11/03/18 09:59 Dose:  Not Given











- Objective


Vital Signs: 


 Vital Signs











Temperature  97.7 F   11/03/18 02:00


 


Pulse Rate  89   11/03/18 05:46


 


Respiratory Rate  20   11/03/18 02:00


 


Blood Pressure  113/57 L  11/03/18 05:46


 


O2 Sat by Pulse Oximetry (%)  99   11/02/18 21:00











Labs: 


 CBC, BMP





 11/03/18 06:10 





 11/03/18 08:47 





 INR, PTT











INR  1.33  (0.83-1.09)  H  10/30/18  17:02    














Problem List





- Problems


(1) Toxic metabolic encephalopathy


Assessment/Plan: 





-Unclear etiology. May be secondary to toxic metabolic encephalopathy as 

patient has history of alcohol abuse. Uncertain last alcoholic drink.


-CT head showed no acute intracranial pathology


-IVF-Clinimex


-Thiamine


-Folate








Code(s): G92 - TOXIC ENCEPHALOPATHY   





(2) Cellulitis


Assessment/Plan: 


-Ceftriaxone


Code(s): L03.90 - CELLULITIS, UNSPECIFIED   


Qualifiers: 


   Site of cellulitis: extremity   Site of cellulitis of extremity: lower 

extremity   Laterality: right   Qualified Code(s): L03.115 - Cellulitis of 

right lower limb   





(3) Sepsis


Assessment/Plan: 


 Microbiology





10/30/18 17:02   Blood - Peripheral Venous   Blood Culture - Preliminary


                            NO GROWTH OBTAINED AFTER 72 HOURS, INCUBATION TO 

CONTINUE


                            FOR 2 DAYS.


10/30/18 17:02   Blood - Peripheral Venous   Blood Culture - Preliminary


                            NO GROWTH OBTAINED AFTER 72 HOURS, INCUBATION TO 

CONTINUE


                            FOR 2 DAYS.


10/30/18 17:02   Urine - Urine - Catheterized   Urine Culture - Final


                            NO GROWTH OBTAINED


10/31/18 19:15   Nasopharyngeal Swab   Influenza Types A,B Antigen - Final


10/31/18 19:15   Nasopharyngeal Swab    - Final








Code(s): A41.9 - SEPSIS, UNSPECIFIED ORGANISM   





(4) Artery occlusion


Assessment/Plan: 








-Patient's right lower extremity erythematous, with poor pulses


-Duplex arterial and venous ultrasound of right lower extremity


-Vascular surgery consult (Dr. Chan)








Code(s): I70.90 - UNSPECIFIED ATHEROSCLEROSIS   





(5) Hypercalcemia


Assessment/Plan: 





-Prior admission had begun workup for multiple myeloma (ANTWAN M-spike 4.7)


-Patient receiving IVF


-Will continue fluids as discussed with nephrology (Dr. Carlos). 


-F/U ionized calcium


-Consider hematology-oncology consult 











Code(s): E83.52 - HYPERCALCEMIA   





(6) Paroxysmal atrial fibrillation


Assessment/Plan: 


-Heparin drip


-tele


Code(s): I48.0 - PAROXYSMAL ATRIAL FIBRILLATION   





(7) HAM (acute kidney injury)


Assessment/Plan: 


-Cr 2.8 (1.6 on prior discharge earlier this month). Baseline Cr. at approx 1.3


-May be secondary to dehydration


-Patient receiving IV 


-F/U nephrology consult (Dr. Carlos)








Code(s): N17.9 - ACUTE KIDNEY FAILURE, UNSPECIFIED   





Assessment/Plan





Patient is a 78 year old male with history of Afib on xarelto, coronary artery 

disease, chronic kidney disease, hypertension, hyperlipidemia, peripheral 

vascular disease s/p arterial stent in left lower extremity, alcohol abuse, and 

medication noncompliance presents after being found non responsive at home.





Altered mental status


-Unclear etiology. May be secondary to toxic metabolic encephalopathy as 

patient has history of alcohol abuse. Uncertain last alcoholic drink.


-CT head showed no acute intracranial pathology


-IV normal saline at 125mL/ hour


-[facility] protocol


-Thiamine


-Folate


-F/U urine toxicology


-F/U blood alcohol level





Hypercalcemia


-Prior admission had begun workup for multiple myeloma (ANTWAN M-spike 4.7)


-Patient receiving IV normal saline


-Will continue fluids as discussed with nephrology (Dr. Carlos). 


-F/U ionized calcium


-Consider hematology-oncology consult 





Troponinemia


-Likely secondary to demand ischemia as patient was dehydrated, hypovolemic


-Troponin 0.80. Will trend


-EKG showed


-F/U cardiology consult (Dr. Padilla)





Acute on chronic kidney injury


-Cr 2.8 (1.6 on prior discharge earlier this month). Baseline Cr. at approx 1.3


-May be secondary to dehydration


-Patient receiving IV normal saline


-F/U nephrology consult (Dr. Carlos)





Afib (CHADSVASC 6)


-Reinstate home medication Xarelto 20mg 





History peripheral vascular disease s/p arterial stent in left lower extremity


-Patient's right lower extremity erythematous, with poor pulses


-Duplex arterial and venous ultrasound of right lower extremity


-F/U official read of xray right lower extremity done in ED


-Vascular surgery consult (Dr. Chan)





Coronary artery disease


-Continue Aspirin 81mg 


-Continue Lipitor 40mg

## 2018-11-04 LAB
ANION GAP SERPL CALC-SCNC: 4 MMOL/L (ref 8–16)
BASOPHILS # BLD: 0.6 % (ref 0–2)
BUN SERPL-MCNC: 54 MG/DL (ref 7–18)
CALCIUM SERPL-MCNC: 10.2 MG/DL (ref 8.5–10.1)
CHLORIDE SERPL-SCNC: 112 MMOL/L (ref 98–107)
CO2 SERPL-SCNC: 20 MMOL/L (ref 21–32)
CREAT SERPL-MCNC: 2.3 MG/DL (ref 0.55–1.3)
DEPRECATED RDW RBC AUTO: 16.6 % (ref 11.9–15.9)
EOSINOPHIL # BLD: 1.4 % (ref 0–4.5)
GLUCOSE SERPL-MCNC: 108 MG/DL (ref 74–106)
HCT VFR BLD CALC: 27.3 % (ref 35.4–49)
HGB BLD-MCNC: 9.3 GM/DL (ref 11.7–16.9)
LYMPHOCYTES # BLD: 26.8 % (ref 8–40)
MAGNESIUM SERPL-MCNC: 1.8 MG/DL (ref 1.8–2.4)
MCH RBC QN AUTO: 31.6 PG (ref 25.7–33.7)
MCHC RBC AUTO-ENTMCNC: 34 G/DL (ref 32–35.9)
MCV RBC: 93 FL (ref 80–96)
MONOCYTES # BLD AUTO: 4.6 % (ref 3.8–10.2)
NEUTROPHILS # BLD: 66.6 % (ref 42.8–82.8)
PHOSPHATE SERPL-MCNC: 4.5 MG/DL (ref 2.5–4.9)
PLATELET # BLD AUTO: 203 K/MM3 (ref 134–434)
PMV BLD: 8.7 FL (ref 7.5–11.1)
POTASSIUM SERPLBLD-SCNC: 4.1 MMOL/L (ref 3.5–5.1)
RBC # BLD AUTO: 2.93 M/MM3 (ref 4–5.6)
ROULEAUX BLD QL SMEAR: (no result)
SODIUM SERPL-SCNC: 137 MMOL/L (ref 136–145)
WBC # BLD AUTO: 3.2 K/MM3 (ref 4–10)
WBC NRBC COR # BLD: 2.88 K/MM3

## 2018-11-04 RX ADMIN — LEUCINE, PHENYLALANINE, LYSINE, METHIONINE, ISOLEUCINE, VALINE, HISTIDINE, THREONINE, TRYPTOPHAN, ALANINE, GLYCINE, ARGININE, PROLINE, SERINE, TYROSINE, DEXTROSE SCH MLS/HR: 311; 238; 247; 170; 255; 247; 204; 179; 77; 880; 438; 489; 289; 213; 17; 5 INJECTION INTRAVENOUS at 09:27

## 2018-11-04 RX ADMIN — METOPROLOL TARTRATE SCH MG: 5 INJECTION, SOLUTION INTRAVENOUS at 05:34

## 2018-11-04 RX ADMIN — METOPROLOL TARTRATE SCH MG: 5 INJECTION, SOLUTION INTRAVENOUS at 14:22

## 2018-11-04 RX ADMIN — METOPROLOL TARTRATE SCH MG: 5 INJECTION, SOLUTION INTRAVENOUS at 21:32

## 2018-11-04 RX ADMIN — CEFTRIAXONE SCH MLS/HR: 1 INJECTION, POWDER, FOR SOLUTION INTRAMUSCULAR; INTRAVENOUS at 09:27

## 2018-11-04 RX ADMIN — LEUCINE, PHENYLALANINE, LYSINE, METHIONINE, ISOLEUCINE, VALINE, HISTIDINE, THREONINE, TRYPTOPHAN, ALANINE, GLYCINE, ARGININE, PROLINE, SERINE, TYROSINE, DEXTROSE SCH MLS/HR: 311; 238; 247; 170; 255; 247; 204; 179; 77; 880; 438; 489; 289; 213; 17; 5 INJECTION INTRAVENOUS at 05:33

## 2018-11-04 RX ADMIN — CALCITONIN SALMON SCH UNIT: 200 INJECTION, SOLUTION INTRAMUSCULAR; SUBCUTANEOUS at 22:03

## 2018-11-04 RX ADMIN — VANCOMYCIN HYDROCHLORIDE SCH MLS/HR: 1 INJECTION, POWDER, LYOPHILIZED, FOR SOLUTION INTRAVENOUS at 20:13

## 2018-11-04 RX ADMIN — LEUCINE, PHENYLALANINE, LYSINE, METHIONINE, ISOLEUCINE, VALINE, HISTIDINE, THREONINE, TRYPTOPHAN, ALANINE, GLYCINE, ARGININE, PROLINE, SERINE, TYROSINE, DEXTROSE SCH: 311; 238; 247; 170; 255; 247; 204; 179; 77; 880; 438; 489; 289; 213; 17; 5 INJECTION INTRAVENOUS at 21:32

## 2018-11-04 RX ADMIN — Medication SCH: at 09:28

## 2018-11-04 RX ADMIN — CALCITONIN SALMON SCH UNIT: 200 INJECTION, SOLUTION INTRAMUSCULAR; SUBCUTANEOUS at 11:48

## 2018-11-04 RX ADMIN — HEPARIN SODIUM SCH MLS/HR: 5000 INJECTION, SOLUTION INTRAVENOUS at 05:33

## 2018-11-04 NOTE — PN
Progress Note (short form)





- Note


Progress Note: 





Renal follow up for Hypercalcemia/HAM





Pt seen and examined at the bedside


still remains lethargic


opens eyes to verbal stimuli but not talking


no overnight events


on IVF





 Vital Signs











Temperature  98.6 F   11/04/18 05:30


 


Pulse Rate  94 H  11/04/18 05:34


 


Respiratory Rate  22 H  11/04/18 05:30


 


Blood Pressure  121/76   11/04/18 05:34


 


O2 Sat by Pulse Oximetry (%)  98   11/03/18 20:59








 Intake & Output











 11/01/18 11/02/18 11/03/18 11/04/18





 23:59 23:59 23:59 22:59


 


Intake Total 1700 2300 1856 1236


 


Balance 1700 2300 1856 1236





NAD


Lethargic


RRR


Dec BS no rales


no LE edema


 CBC, BMP





 11/04/18 06:30 





 11/04/18 06:30 





 Laboratory Tests











  11/04/18





  06:30


 


Calcium  10.2 H


 


Phosphorus  4.5


 


Magnesium  1.8











Acetaminophen (Tylenol Suppository -)  650 mg PA Q6H PRN


   PRN Reason: FEVER


Heparin Sodium (Porcine) (Heparin -)  1,000 unit IVPUSH PRN PRN


   PRN Reason: Heparin


Heparin Sodium (Porcine) (Heparin -)  5,000 unit IVPUSH PRN PRN


   PRN Reason: Heparin


   Last Admin: 11/01/18 15:15 Dose:  5,000 unit


Ceftriaxone Sodium 1 gm/ (Dextrose)  50 mls @ 100 mls/hr IVPB DAILY [facility]; 

Protocol


   Last Admin: 11/03/18 09:59 Dose:  100 mls/hr


Vancomycin HCl (Vancomycin (Pre-Docked))  1,000 mg in 250 mls @ 166.667 mls/hr 

IVPB Q24H [facility]; Protocol


   Last Admin: 11/03/18 20:12 Dose:  166.667 mls/hr


Heparin Sodium/Dextrose (Heparin Infusion -)  25,000 units in 500 mls @ 16 mls/

hr IVPB TITR [facility]; Protocol


   Last Admin: 11/04/18 05:33 Dose:  950 unit/hr, 19 mls/hr


Amino Acids (Clinimix -)  1,000 mls @ 84 mls/hr IV Q12H [facility]


   Last Admin: 11/04/18 05:33 Dose:  84 mls/hr


Metoprolol Tartrate (Lopressor Injection -)  5 mg IVPUSH Q8H [facility]


   Last Admin: 11/04/18 05:34 Dose:  5 mg


Thiamine HCl (Vitamin B1 -)  100 mg PO DAILY [facility]


   Last Admin: 11/03/18 09:59 Dose:  Not Given











78 year old gentleman with hx of CKD, Afib on A/C, CAD, CKD, Hypertension, 

Hyperlipidemia, PVD who presented with AMS/Confusion and found to have HAM.





#HAM (FeNa was 2.1% indicating tubular injury, US showed no stones or 

obstruction)


#AMS of unclear etiology (metabolic encephalopathy)


#Anemia 


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


#Hyperdense lesion on US of the kidney 





Renal function w/o significant improvement despite IVF for several days


no hydronephrosis seen on US but pt with mild urinary retention, will insert 

fenton today and monitor clinical response


Corrected Ca today is 12.6, given continues AMS/Lethargy will give IM 

calcitonin today and monitor serum Ca


continue IVF for now


Continue Abx as per ID 


supportive care 


no acute indication for RRT but Ca is not responsive to medical management may 

need to consider RRT 





Ricky Chang DO

## 2018-11-04 NOTE — PN
Progress Note, Physician


History of Present Illness: 


Sensorium still altered, oral intake held, continuing on IV meds and Clinimix.





- Current Medication List


Current Medications: 


Active Medications





Acetaminophen (Tylenol Suppository -)  650 mg [place] Q6H PRN


   PRN Reason: FEVER


Calcitonin (Miacalcin Injection -)  300 unit SQ BID [facility]


   Stop: 11/04/18 22:01


   Last Admin: 11/04/18 11:48 Dose:  300 unit


Heparin Sodium (Porcine) (Heparin -)  1,000 unit IVPUSH PRN PRN


   PRN Reason: Heparin


Heparin Sodium (Porcine) (Heparin -)  5,000 unit IVPUSH PRN PRN


   PRN Reason: Heparin


   Last Admin: 11/01/18 15:15 Dose:  5,000 unit


Ceftriaxone Sodium 1 gm/ (Dextrose)  50 mls @ 100 mls/hr IVPB DAILY [facility]; 

Protocol


   Last Admin: 11/04/18 09:27 Dose:  100 mls/hr


Vancomycin HCl (Vancomycin (Pre-Docked))  1,000 mg in 250 mls @ 166.667 mls/hr 

IVPB Q24H [facility]; Protocol


   Last Admin: 11/03/18 20:12 Dose:  166.667 mls/hr


Heparin Sodium/Dextrose (Heparin Infusion -)  25,000 units in 500 mls @ 16 mls/

hr IVPB TITR [facility]; Protocol


   Last Admin: 11/04/18 05:33 Dose:  950 unit/hr, 19 mls/hr


Amino Acids (Clinimix -)  1,000 mls @ 84 mls/hr IV Q12H [facility]


   Last Admin: 11/04/18 09:27 Dose:  84 mls/hr


Metoprolol Tartrate (Lopressor Injection -)  5 mg IVPUSH Q8H [facility]


   Last Admin: 11/04/18 14:22 Dose:  5 mg


Thiamine HCl (Vitamin B1 -)  100 mg PO DAILY [facility]


   Last Admin: 11/04/18 09:28 Dose:  Not Given











- Objective


Vital Signs: 


 Vital Signs











Temperature  98.2 F   11/04/18 13:30


 


Pulse Rate  92 H  11/04/18 14:22


 


Respiratory Rate  18   11/04/18 13:30


 


Blood Pressure  114/65   11/04/18 14:22


 


O2 Sat by Pulse Oximetry (%)  97   11/04/18 09:00











Constitutional: Yes: No Distress, Calm, Cachectic, Thin


Neck: Yes: Supple


Cardiovascular: Yes: Pulse Irregular, Murmur (2/6 SM)


Respiratory: Yes: Regular, Diminished, On Nasal O2


Gastrointestinal: Yes: Soft, Hypoactive Bowel Sounds


Edema: No


Neurological: Yes: Lethargy


Labs: 


 CBC, BMP





 11/04/18 06:30 





 11/04/18 06:30 





 INR, PTT











INR  1.33  (0.83-1.09)  H  10/30/18  17:02    














- ....Imaging


EKG: Report Reviewed (Tele: Rate-controlled afib)





Problem List





- Problems


(1) Atrial fibrillation with RVR


Code(s): I48.91 - UNSPECIFIED ATRIAL FIBRILLATION   





(2) Acute-on-chronic kidney injury


Code(s): N17.9 - ACUTE KIDNEY FAILURE, UNSPECIFIED; N18.9 - CHRONIC KIDNEY 

DISEASE, UNSPECIFIED   


Qualifiers: 


   Acute renal failure type: unspecified   Chronic kidney disease stage: 

unspecified stage   Qualified Code(s): N17.9 - Acute kidney failure, unspecified

; N18.9 - Chronic kidney disease, unspecified   





(3) Demand ischemia


Code(s): I24.8 - OTHER FORMS OF ACUTE ISCHEMIC HEART DISEASE   





(4) Hypercalcemia


Code(s): E83.52 - HYPERCALCEMIA   





(5) Nonadherence to medication


Code(s): Z91.14 - PATIENT'S OTHER NONCOMPLIANCE WITH MEDICATION REGIMEN   





(6) Paraproteinemia


Code(s): D89.2 - HYPERGAMMAGLOBULINEMIA, UNSPECIFIED   





(7) Anticoagulant long-term use


Code(s): Z79.01 - LONG TERM (CURRENT) USE OF ANTICOAGULANTS   





(8) Chronic thromboembolic disease


Code(s): I74.9 - EMBOLISM AND THROMBOSIS OF UNSPECIFIED ARTERY   





(9) Coronary artery disease


Code(s): I25.10 - ATHSCL HEART DISEASE OF NATIVE CORONARY ARTERY W/O ANG PCTRS 

  


Qualifiers: 


   Coronary Disease-Associated Artery/Lesion type: native artery   Native vs. 

transplanted heart: native heart   Associated angina: without angina   

Qualified Code(s): I25.10 - Atherosclerotic heart disease of native coronary 

artery without angina pectoris   





(10) Diastolic dysfunction without heart failure


Code(s): I51.9 - HEART DISEASE, UNSPECIFIED   





(11) HTN (hypertension)


Code(s): I10 - ESSENTIAL (PRIMARY) HYPERTENSION   


Qualifiers: 


   Hypertension type: essential hypertension   Qualified Code(s): I10 - 

Essential (primary) hypertension   





(12) Hypertrophic cardiomyopathy


Code(s): I42.2 - OTHER HYPERTROPHIC CARDIOMYOPATHY   





(13) Hyperlipidemia


Code(s): E78.5 - HYPERLIPIDEMIA, UNSPECIFIED   


Qualifiers: 


   Hyperlipidemia type: pure hypercholesterolemia   Qualified Code(s): E78.00 - 

Pure hypercholesterolemia, unspecified; E78.0 - Pure hypercholesterolemia   





Assessment/Plan


R&LHc at [facility] 1/11/2017 showing nonobstructive CAD, severe LV apical 

hypertrophic cardiomyopathy, mildly elevated right sided pressures, Mynx 

deployed right CFA access site. Study is consistent with apical hypertrophy (

spade-like) variant of hypertrophic cardiomyopathy, planned for optimal medical 

therapy. 


Echocardiogram: 07/11/2018 Mod cLVH, severe DYANA, mod TR RVSP 50-60 mmHg, mod-

severe MR


Echocardiogram: 10/16/2018 Normal LV size with hyperdynamic LVEF 75%, mild BSH, 

normal RV size and fxn, severe LAE, mod-severe MR, mod TR





1. Toxic metabolic encephelopathy, r/o CVA while off NOAC, r/o sepsis


2. Acute on CKD (pre-renal) resolving


3. Hypercalcemia, paraproteinemia-> possible multiple myeloma


4. History of bilateral SFA occlusion post thrombectomy, referable to embolic 

disease related to persistent atrial fibrillation with [id number] score of 6, 

history of poor compliance with anticoagulation and medical F/U


5. Non obstructive CAD on R&LHc coronary angiography with demand ischemia


6. 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 

subendocardial ischemia, compensated/euvolemic


7. Persistent atrial fibrillation [id number] score of 6 on Xarelto 20 qd


8. Labile HTN


9. Poor compliance with medical therapy administration and medical F/U


10. Tobacco abuse


11. ETOH dependence





PLAN:





1. Heparin gtt for now while off Xarelto 15 qd (renal dosing)


2. IV Lopressor as hemodynamics tolerate


3. Hematology input, SPEP, UPEP pending


4. Judicious Clinimix with monitor renal recovery, renal input appreciated, 

check PTH, ruled out obstructive uropathy


5. Empiric abx course pending C&S


6. NGT for enteral feeds if prolonged NPO


7. Calcitonin per renal

## 2018-11-04 NOTE — PN
Progress Note, Physician


History of Present Illness: 





STILL LETHARGIC








- Current Medication List


Current Medications: 


Active Medications





Acetaminophen (Tylenol Suppository -)  650 mg [place] Q6H PRN


   PRN Reason: FEVER


Calcitonin (Miacalcin Injection -)  300 unit SQ BID [facility]


   Stop: 11/04/18 22:01


   Last Admin: 11/04/18 11:48 Dose:  300 unit


Heparin Sodium (Porcine) (Heparin -)  1,000 unit IVPUSH PRN PRN


   PRN Reason: Heparin


Heparin Sodium (Porcine) (Heparin -)  5,000 unit IVPUSH PRN PRN


   PRN Reason: Heparin


   Last Admin: 11/01/18 15:15 Dose:  5,000 unit


Ceftriaxone Sodium 1 gm/ (Dextrose)  50 mls @ 100 mls/hr IVPB DAILY [facility]; 

Protocol


   Last Admin: 11/04/18 09:27 Dose:  100 mls/hr


Vancomycin HCl (Vancomycin (Pre-Docked))  1,000 mg in 250 mls @ 166.667 mls/hr 

IVPB Q24H [facility]; Protocol


   Last Admin: 11/03/18 20:12 Dose:  166.667 mls/hr


Heparin Sodium/Dextrose (Heparin Infusion -)  25,000 units in 500 mls @ 16 mls/

hr IVPB TITR [facility]; Protocol


   Last Admin: 11/04/18 05:33 Dose:  950 unit/hr, 19 mls/hr


Amino Acids (Clinimix -)  1,000 mls @ 84 mls/hr IV Q12H [facility]


   Last Admin: 11/04/18 09:27 Dose:  84 mls/hr


Metoprolol Tartrate (Lopressor Injection -)  5 mg IVPUSH Q8H [facility]


   Last Admin: 11/04/18 05:34 Dose:  5 mg


Thiamine HCl (Vitamin B1 -)  100 mg PO DAILY [facility]


   Last Admin: 11/04/18 09:28 Dose:  Not Given











- Objective


Vital Signs: 


 Vital Signs











Temperature  98.1 F   11/04/18 09:48


 


Pulse Rate  90   11/04/18 09:48


 


Respiratory Rate  20   11/04/18 09:48


 


Blood Pressure  133/79   11/04/18 09:48


 


O2 Sat by Pulse Oximetry (%)  98   11/03/18 20:59











Cardiovascular: Yes: S1, S2


Respiratory: Yes: Diminished, On Nasal O2, Rhonchi


Gastrointestinal: Yes: Normal Bowel Sounds, Soft


Neurological: Yes: Lethargy, Weakness


Labs: 


 CBC, BMP





 11/04/18 06:30 





 11/04/18 06:30 





 INR, PTT











INR  1.33  (0.83-1.09)  H  10/30/18  17:02    














Problem List





- Problems


(1) Toxic metabolic encephalopathy


Code(s): G92 - TOXIC ENCEPHALOPATHY   





(2) Cellulitis


Code(s): L03.90 - CELLULITIS, UNSPECIFIED   


Qualifiers: 


   Site of cellulitis: extremity   Site of cellulitis of extremity: lower 

extremity   Laterality: right   Qualified Code(s): L03.115 - Cellulitis of 

right lower limb   





(3) Sepsis


Code(s): A41.9 - SEPSIS, UNSPECIFIED ORGANISM   





(4) Artery occlusion


Code(s): I70.90 - UNSPECIFIED ATHEROSCLEROSIS   





(5) Hypercalcemia


Code(s): E83.52 - HYPERCALCEMIA   





(6) Paroxysmal atrial fibrillation


Code(s): I48.0 - PAROXYSMAL ATRIAL FIBRILLATION   





(7) HAM (acute kidney injury)


Code(s): N17.9 - ACUTE KIDNEY FAILURE, UNSPECIFIED   





Assessment/Plan





Patient is a 78 year old male with history of Afib on xarelto, coronary artery 

disease, chronic kidney disease, hypertension, hyperlipidemia, peripheral 

vascular disease s/p arterial stent in left lower extremity, alcohol abuse, and 

medication noncompliance presents after being found non responsive at home.





Altered mental status


-Unclear etiology. May be secondary to toxic metabolic encephalopathy as 

patient has history of alcohol abuse. Uncertain last alcoholic drink.


-CT head showed no acute intracranial pathology--REPEAT TODAY NO CHANGE


-IVF


-Thiamine


-Folate


-nEYROLOGY fOLLOW UP


-eeg





Hypercalcemia


-Prior admission had begun workup for multiple myeloma (ANTWAN M-spike 4.7)


-Patient receiving IV normal saline


-Will continue fluids as discussed with nephrology (Dr. Carlos). 


-F/U ionized calcium


-hematology-oncology consult 





Troponinemia


-Likely secondary to demand ischemia as patient was dehydrated, hypovolemic


-Troponin 0.80. 


-EKG showed


-F/U cardiology consult (Dr. Padilla)





Acute on chronic kidney injury


-Cr 2.8 (1.6 on prior discharge earlier this month). Baseline Cr. at approx 1.3


-May be secondary to dehydration


-Patient receiving IV normal saline


-F/U nephrology consult (Dr. Carlos)





Afib (CHADSVASC 6)


-Reinstate home medication Xarelto 20mg 





History peripheral vascular disease s/p arterial stent in left lower extremity


-Patient's right lower extremity erythematous, with poor pulses


-Duplex arterial--PAD


- and venous ultrasound of right lower extremity-NEG FOR DVT


-Vascular surgery consult (Dr. Chan)





Coronary artery disease


-Continue Aspirin 81mg 


-Continue Lipitor 40mg

## 2018-11-05 LAB
ALBUMIN SERPL-MCNC: 1 G/DL (ref 3.4–5)
ALBUMIN SERPL-MCNC: 1.2 G/DL (ref 3.4–5)
ALP SERPL-CCNC: 62 U/L (ref 45–117)
ALP SERPL-CCNC: 74 U/L (ref 45–117)
ALT SERPL-CCNC: 17 U/L (ref 13–61)
ALT SERPL-CCNC: 19 U/L (ref 13–61)
ANION GAP SERPL CALC-SCNC: 5 MMOL/L (ref 8–16)
ANION GAP SERPL CALC-SCNC: 7 MMOL/L (ref 8–16)
ANISOCYTOSIS BLD QL: (no result)
APTT BLD: 64.8 SECONDS (ref 25.2–36.5)
ARTERIAL BLOOD GAS PCO2: 38.4 MMHG (ref 35–45)
ARTERIAL PATENCY WRIST A: POSITIVE
AST SERPL-CCNC: 39 U/L (ref 15–37)
AST SERPL-CCNC: 48 U/L (ref 15–37)
BASE EXCESS BLDA CALC-SCNC: -7.7 MEQ/L (ref -2–2)
BASOPHILS # BLD: 0.3 % (ref 0–2)
BASOPHILS # BLD: 0.5 % (ref 0–2)
BILIRUB SERPL-MCNC: 0.2 MG/DL (ref 0.2–1)
BILIRUB SERPL-MCNC: 0.3 MG/DL (ref 0.2–1)
BUN SERPL-MCNC: 55 MG/DL (ref 7–18)
BUN SERPL-MCNC: 59 MG/DL (ref 7–18)
CALCIUM SERPL-MCNC: 9.4 MG/DL (ref 8.5–10.1)
CALCIUM SERPL-MCNC: 9.5 MG/DL (ref 8.5–10.1)
CHLORIDE SERPL-SCNC: 108 MMOL/L (ref 98–107)
CHLORIDE SERPL-SCNC: 110 MMOL/L (ref 98–107)
CO2 SERPL-SCNC: 19 MMOL/L (ref 21–32)
CO2 SERPL-SCNC: 19 MMOL/L (ref 21–32)
CREAT SERPL-MCNC: 2 MG/DL (ref 0.55–1.3)
CREAT SERPL-MCNC: 2.2 MG/DL (ref 0.55–1.3)
DEPRECATED RDW RBC AUTO: 15.7 % (ref 11.9–15.9)
DEPRECATED RDW RBC AUTO: 16 % (ref 11.9–15.9)
EOSINOPHIL # BLD: 1 % (ref 0–4.5)
EOSINOPHIL # BLD: 1.1 % (ref 0–4.5)
GLUCOSE SERPL-MCNC: 104 MG/DL (ref 74–106)
GLUCOSE SERPL-MCNC: 143 MG/DL (ref 74–106)
HCT VFR BLD CALC: 25 % (ref 35.4–49)
HCT VFR BLD CALC: 27.8 % (ref 35.4–49)
HGB BLD-MCNC: 9 GM/DL (ref 11.7–16.9)
HGB BLD-MCNC: 9.7 GM/DL (ref 11.7–16.9)
INR BLD: 1.57 (ref 0.83–1.09)
LYMPHOCYTES # BLD: 24 % (ref 8–40)
LYMPHOCYTES # BLD: 32.7 % (ref 8–40)
MACROCYTES BLD QL: 0
MAGNESIUM SERPL-MCNC: 1.8 MG/DL (ref 1.8–2.4)
MAGNESIUM SERPL-MCNC: 1.8 MG/DL (ref 1.8–2.4)
MCH RBC QN AUTO: 31.8 PG (ref 25.7–33.7)
MCH RBC QN AUTO: 32.1 PG (ref 25.7–33.7)
MCHC RBC AUTO-ENTMCNC: 34.8 G/DL (ref 32–35.9)
MCHC RBC AUTO-ENTMCNC: 35.8 G/DL (ref 32–35.9)
MCV RBC: 89.7 FL (ref 80–96)
MCV RBC: 91.5 FL (ref 80–96)
MONOCYTES # BLD AUTO: 2.9 % (ref 3.8–10.2)
MONOCYTES # BLD AUTO: 4 % (ref 3.8–10.2)
NEUTROPHILS # BLD: 63.1 % (ref 42.8–82.8)
NEUTROPHILS # BLD: 70.4 % (ref 42.8–82.8)
PHOSPHATE SERPL-MCNC: 3.5 MG/DL (ref 2.5–4.9)
PHOSPHATE SERPL-MCNC: 5.1 MG/DL (ref 2.5–4.9)
PLATELET # BLD AUTO: 217 K/MM3 (ref 134–434)
PLATELET # BLD AUTO: 254 K/MM3 (ref 134–434)
PLATELET BLD QL SMEAR: ADEQUATE
PLATELET BLD QL SMEAR: NORMAL
PMV BLD: 8.7 FL (ref 7.5–11.1)
PMV BLD: 9 FL (ref 7.5–11.1)
PO2 BLDA: 323 MMHG (ref 70–100)
POTASSIUM SERPLBLD-SCNC: 4.1 MMOL/L (ref 3.5–5.1)
POTASSIUM SERPLBLD-SCNC: 5.1 MMOL/L (ref 3.5–5.1)
PROT SERPL-MCNC: 10.9 G/DL (ref 6.4–8.2)
PROT SERPL-MCNC: 12.2 G/DL (ref 6.4–8.2)
PT PNL PPP: 18.6 SEC (ref 9.7–13)
RBC # BLD AUTO: 2.79 M/MM3 (ref 4–5.6)
RBC # BLD AUTO: 3.04 M/MM3 (ref 4–5.6)
ROULEAUX BLD QL SMEAR: (no result)
SAO2 % BLDA: 99.4 % (ref 90–98.9)
SODIUM SERPL-SCNC: 132 MMOL/L (ref 136–145)
SODIUM SERPL-SCNC: 136 MMOL/L (ref 136–145)
WBC # BLD AUTO: 4.4 K/MM3 (ref 4–10)
WBC # BLD AUTO: 5.1 K/MM3 (ref 4–10)
WBC NRBC COR # BLD: 3.96 K/MM3

## 2018-11-05 PROCEDURE — 0BH17EZ INSERTION OF ENDOTRACHEAL AIRWAY INTO TRACHEA, VIA NATURAL OR ARTIFICIAL OPENING: ICD-10-PCS

## 2018-11-05 PROCEDURE — 6A551Z3 PHERESIS OF PLASMA, MULTIPLE: ICD-10-PCS | Performed by: INTERNAL MEDICINE

## 2018-11-05 PROCEDURE — 5A1955Z RESPIRATORY VENTILATION, GREATER THAN 96 CONSECUTIVE HOURS: ICD-10-PCS

## 2018-11-05 RX ADMIN — LEUCINE, PHENYLALANINE, LYSINE, METHIONINE, ISOLEUCINE, VALINE, HISTIDINE, THREONINE, TRYPTOPHAN, ALANINE, GLYCINE, ARGININE, PROLINE, SERINE, TYROSINE, DEXTROSE SCH MLS/HR: 311; 238; 247; 170; 255; 247; 204; 179; 77; 880; 438; 489; 289; 213; 17; 5 INJECTION INTRAVENOUS at 05:56

## 2018-11-05 RX ADMIN — VANCOMYCIN HYDROCHLORIDE SCH MLS/HR: 1 INJECTION, POWDER, LYOPHILIZED, FOR SOLUTION INTRAVENOUS at 20:25

## 2018-11-05 RX ADMIN — METOPROLOL TARTRATE SCH MG: 5 INJECTION, SOLUTION INTRAVENOUS at 13:15

## 2018-11-05 RX ADMIN — Medication SCH: at 10:07

## 2018-11-05 RX ADMIN — LEUCINE, PHENYLALANINE, LYSINE, METHIONINE, ISOLEUCINE, VALINE, HISTIDINE, THREONINE, TRYPTOPHAN, ALANINE, GLYCINE, ARGININE, PROLINE, SERINE, TYROSINE, DEXTROSE SCH: 311; 238; 247; 170; 255; 247; 204; 179; 77; 880; 438; 489; 289; 213; 17; 5 INJECTION INTRAVENOUS at 10:06

## 2018-11-05 RX ADMIN — HEPARIN SODIUM SCH: 5000 INJECTION, SOLUTION INTRAVENOUS at 05:56

## 2018-11-05 RX ADMIN — HEPARIN SODIUM SCH MLS/HR: 5000 INJECTION, SOLUTION INTRAVENOUS at 01:35

## 2018-11-05 RX ADMIN — METOPROLOL TARTRATE SCH: 5 INJECTION, SOLUTION INTRAVENOUS at 22:38

## 2018-11-05 RX ADMIN — METOPROLOL TARTRATE SCH MG: 5 INJECTION, SOLUTION INTRAVENOUS at 05:55

## 2018-11-05 RX ADMIN — CEFTRIAXONE SCH MLS/HR: 1 INJECTION, POWDER, FOR SOLUTION INTRAMUSCULAR; INTRAVENOUS at 10:06

## 2018-11-05 NOTE — CONSULT
Consult - text type





- Consultation


Consultation Note: 





This is a 78 year old gentleman with hx of CKD, Afib on A/C, CAD, CKD, 

Hypertension, Hyperlipidemia, PVD who presented with AMS/Confusion and found to 

have HAM. Pt was brought into the ER by EMS after his neighbors became 

concerned about him. 


CT head was negative


Being managed for hypercalcemia/HAM/? pneumonia


He was desaturating and in resp. disteress and was intubated











PMHx: as above


Allergies: NKDA


Home Medications











 Medication  Instructions  Recorded


 


Atorvastatin Ca [Lipitor] 40 mg PO HS #30 tablet 07/12/18


 


Rivaroxaban [Xarelto -] 20 mg PO DAILY@1800 #30 tablet 07/12/18


 


Carvedilol [Coreg -] 6.25 mg PO BID #60 tablet 10/17/18











  Last Vital Signs











Temp Pulse Resp BP Pulse Ox


 


 98.1 F   106 H  18   96/70   100 


 


 11/05/18 20:00  11/05/18 20:00  11/05/18 20:35  11/05/18 20:00  11/05/18 17:26








Intubated


Unresponsive


CTA, no rales or wheeze


soft NT/ND


no LE edema, clubbing or cyanosis











 Abnormal Lab Results











  11/05/18 11/05/18 11/05/18





  06:35 06:35 06:35


 


RBC   2.79 L 


 


Hgb   9.0 L 


 


Hct   25.0 L 


 


RDW   


 


Monocytes %   


 


Monocytes % (Manual)   


 


Myelocytes % (Man)   


 


Nucleated RBC %   9 H 


 


Metamyelocytes   4 H D 


 


PT with INR   


 


INR   


 


PTT (Actin FS)  64.4 H  


 


ABG pH   


 


ABG pO2 at Pt Temp   


 


ABG HCO3   


 


ABG O2 Sat (Measured)   


 


ABG Base Excess   


 


Sodium   


 


Chloride    110 H


 


Carbon Dioxide    19 L


 


Anion Gap    7 L


 


BUN    55 H


 


Creatinine    2.0 H


 


Random Glucose   


 


Lactic Acid   


 


Phosphorus   


 


Ferritin   


 


AST    39 H


 


Troponin I   


 


Total Protein    10.9 H


 


Albumin    1.0 L














  11/05/18 11/05/18 11/05/18





  12:55 16:36 16:40


 


RBC    3.04 L


 


Hgb    9.7 L


 


Hct    27.8 L


 


RDW    16.0 H


 


Monocytes %    2.9 L


 


Monocytes % (Manual)    0 L D


 


Myelocytes % (Man)    3 H D


 


Nucleated RBC %    8 H


 


Metamyelocytes   


 


PT with INR   


 


INR   


 


PTT (Actin FS)   


 


ABG pH   


 


ABG pO2 at Pt Temp   


 


ABG HCO3   


 


ABG O2 Sat (Measured)   


 


ABG Base Excess   


 


Sodium   


 


Chloride   


 


Carbon Dioxide   


 


Anion Gap   


 


BUN   


 


Creatinine   


 


Random Glucose   


 


Lactic Acid   2.1 H 


 


Phosphorus   


 


Ferritin  610.5 H  


 


AST   


 


Troponin I   


 


Total Protein   


 


Albumin   














  11/05/18 11/05/18 11/05/18





  16:40 16:40 18:00


 


RBC   


 


Hgb   


 


Hct   


 


RDW   


 


Monocytes %   


 


Monocytes % (Manual)   


 


Myelocytes % (Man)   


 


Nucleated RBC %   


 


Metamyelocytes   


 


PT with INR   18.60 H 


 


INR   1.57 H 


 


PTT (Actin FS)   64.8 H 


 


ABG pH    7.29 L


 


ABG pO2 at Pt Temp    323.0 H*


 


ABG HCO3    17.8 L


 


ABG O2 Sat (Measured)    99.4 H


 


ABG Base Excess    -7.7 L


 


Sodium  132 L  


 


Chloride  108 H  


 


Carbon Dioxide  19 L  


 


Anion Gap  5 L  


 


BUN  59 H  


 


Creatinine  2.2 H  


 


Random Glucose  143 H  


 


Lactic Acid   


 


Phosphorus  5.1 H  


 


Ferritin   


 


AST  48 H  


 


Troponin I  0.55 H  


 


Total Protein  12.2 H  


 


Albumin  1.2 L  








Active Medications











Generic Name Dose Route Start Last Admin





  Trade Name Freq  PRN Reason Stop Dose Admin


 


Acetaminophen  650 mg  11/01/18 05:44  11/05/18 18:10





  Tylenol Suppository -  RI   650 mg





  Q6H PRN   Administration





  FEVER   





     





     





     


 


Heparin Sodium (Porcine)  1,000 unit  11/01/18 05:44  





  Heparin -  IVPUSH   





  PRN PRN   





  Heparin   





     





     





     


 


Heparin Sodium (Porcine)  5,000 unit  11/01/18 05:44  11/01/18 15:15





  Heparin -  IVPUSH   5,000 unit





  PRN PRN   Administration





  Heparin   





     





     





     


 


Ceftriaxone Sodium 1 gm/  50 mls @ 100 mls/hr  10/31/18 11:45  11/05/18 10:06





  Dextrose  IVPB   100 mls/hr





  DAILY [facility]   Administration





     





     





  Protocol   





     


 


Vancomycin HCl  1,000 mg in 250 mls @ 166.667 mls/hr  10/31/18 20:00  11/05/18 

20:25





  Vancomycin (Pre-Docked)  IVPB   166.667 mls/hr





  Q24H AV[facility]   Administration





     





     





  Protocol   





     


 


Heparin Sodium/Dextrose  25,000 units in 500 mls @ 16 mls/hr  11/01/18 05:45  11 /05/18 05:56





  Heparin Infusion -  IVPB   Not Given





  TITR AVA   





     





     





  Protocol   





  800 UNIT/HR   


 


Metoprolol Tartrate  5 mg  11/01/18 05:45  11/05/18 13:15





  Lopressor Injection -  IVPUSH   5 mg





  Q8H AVA   Administration





     





     





     





     


 


Thiamine HCl  100 mg  10/30/18 22:12  11/05/18 10:07





  Vitamin B1 -  PO   Not Given





  DAILY AVA   





     





     





     





     








A/P





78 year old gentleman with hx of CKD, Afib on A/C, CAD, CKD, Hypertension, 

Hyperlipidemia, PVD who presented with AMS/Confusion and found to have HAM.


acute desaturation 





Clinical picture concerning for paraproteinemia


check SFLCA/Quant. Imunoglobulins/SIFE/UPEP


zometa for hypercalcemia





concern for hyperviscosity causing altered mental status


Total protein 12/albumin 1


discussed with icu Dr. Blum. PAtient has no family listed on chart


will arrange for plasmaexchange


Discussed with [facility] and house staff

## 2018-11-05 NOTE — PN
Progress Note (short form)





- Note


Progress Note: 





Renal follow up for Hypercalcemia/HAM





Pt seen and examined at the bedside


awakens to verbal stimuli but lethagic


no noticable change in MS from yesterday to today


no overnight events


on Clinimix 


no fever, chills 


pt is non-oliguric and voiding with catheter 


 Vital Signs











Temperature  98.9 F   11/05/18 06:04


 


Pulse Rate  101 H  11/05/18 06:04


 


Respiratory Rate  18   11/05/18 06:04


 


Blood Pressure  133/79   11/05/18 06:04


 


O2 Sat by Pulse Oximetry (%)  98   11/04/18 21:00








 Intake & Output











 11/03/18 11/04/18 11/04/18 11/05/18





 00:59 00:59 23:59 23:59


 


Intake Total    1318


 


Output Total    1800


 


Balance    -482





NAD


neck supple


RRR


DEc BS, no overt rales


No Le edema


no bladder distension 








 CBC, BMP





 11/05/18 06:35 





 11/05/18 06:35 


 Laboratory Tests











  11/05/18 11/05/18





  06:35 06:35


 


MCV  89.7 


 


Calcium   9.5


 


Phosphorus   3.5


 


Magnesium   1.8


 


Albumin   1.0 L














 Current Medications





Acetaminophen (Tylenol Suppository -)  650 mg [place] Q6H PRN


   PRN Reason: FEVER


Heparin Sodium (Porcine) (Heparin -)  1,000 unit IVPUSH PRN PRN


   PRN Reason: Heparin


Heparin Sodium (Porcine) (Heparin -)  5,000 unit IVPUSH PRN PRN


   PRN Reason: Heparin


   Last Admin: 11/01/18 15:15 Dose:  5,000 unit


Ceftriaxone Sodium 1 gm/ (Dextrose)  50 mls @ 100 mls/hr IVPB DAILY [facility]; 

Protocol


   Last Admin: 11/05/18 10:06 Dose:  100 mls/hr


Vancomycin HCl (Vancomycin (Pre-Docked))  1,000 mg in 250 mls @ 166.667 mls/hr 

IVPB Q24H [facility]; Protocol


   Last Admin: 11/04/18 20:13 Dose:  166.667 mls/hr


Heparin Sodium/Dextrose (Heparin Infusion -)  25,000 units in 500 mls @ 16 mls/

hr IVPB TITR [facility]; Protocol


   Last Admin: 11/05/18 05:56 Dose:  Not Given


Amino Acids (Clinimix -)  1,000 mls @ 84 mls/hr IV [facility]12H [facility]


   Last Admin: 11/05/18 10:06 Dose:  Not Given


Metoprolol Tartrate (Lopressor Injection -)  5 mg IVPUSH Q8H [facility]


   Last Admin: 11/05/18 05:55 Dose:  5 mg


Thiamine HCl (Vitamin B1 -)  100 mg PO DAILY [facility]


   Last Admin: 11/05/18 10:07 Dose:  Not Given














78 year old gentleman with hx of CKD, Afib on A/C, CAD, CKD, Hypertension, 

Hyperlipidemia, PVD who presented with AMS/Confusion and found to have HAM.





#HAM (FeNa was 2.1% indicating tubular injury, US showed no stones or 

obstruction)


#AMS of unclear etiology (metabolic encephalopathy)


#Anemia 


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


#Hyperdense lesion on US of the kidney 





Renal function with mild improvement thus far


Pt is non-oliguric with catheter in place


do no suspect that renal insufficiency to this degree would be cause in mental 

status changes 


Corrected Ca today is 11.9, s/p calcitionin x 2 yesterday


no change in MS with mild improvement in Ca


high suspicion for malignancy given hypercalcemia with low pth, elevated 

protein levels


Oncology to see pt today


SPEP is pending 





prognosis remains guarded


Check CXR today 





Ricky Chang DO

## 2018-11-05 NOTE — PROC
Intubation





- Intubation


Reason for Intubation: Respiratory Insufficiency


Time of Intubation: 16:20


Intubation Method: orotracheal


Blade used: Mac (3)


Tube Size (cm): 8.0


Tube position @ lip (cm): 21


Tube position confirmed by: Direct visualization, CO2 detector, Chest x-ray (

CXR pending), Breath sounds


Breath Sounds after Intubation: left greater than right (but improved after 

adjustment of tube)


Post Intubation Xray: Yes (pending)


Remarks: 





Anesthesiology STAT


Called re. pt. with respiratory deterioration. Pt. also with decreased 

responsiveness and reported suspicion of aspiration . Admitted with respiratory 

compromise and decreased mental status. Pt. positioned in bed. BMV assumed, 

SpO2 improved to 100% with assistance. Propofol 50mg IV, followed by 

Succinylcholine 100mg IV. MAC 3 with grade 2 view after copious secretions 

suctioned. ETT passed with ease. Color change seen with carboximeter. VSS.

## 2018-11-05 NOTE — RAPID
<Sandip Ellis - Last Filed: 11/06/18 16:11>





Physical Examination


Vital Signs: 


 Vital Signs











Temperature  99.2 F   11/05/18 14:00


 


Pulse Rate  104 H  11/05/18 14:00


 


Respiratory Rate  21 H  11/05/18 14:00


 


Blood Pressure  125/82   11/05/18 14:00


 


O2 Sat by Pulse Oximetry (%)  94 L  11/05/18 09:00











Labs: 


 CBC, BMP





 11/05/18 06:35 





 11/05/18 06:35 











Rapid Response





- Rapid Response


Assessment: 





At 16:14 rapid response was called for Pt. that was already non-repsonsive on 

admission but now found to be desaturating. Pt. was on Clinimix 84ml/hr. V/S at 

that time BP: 145/88 HR:147, O2 sat 87%. Pt. was bag masked until Anesthesia 

arrived. Pt. intubated at 16:31. 80mg Lasix was pushed. ABG, CXR, EKG, Trops 

ordered. Pt. already on Ceftriaxone and Vancomycin. PE. Lungs were bibasilar 

crackles before intubation, and tachycardic. After intubation bilateral breath 

sounds were heard. Pt. after intubation VS BP: 84/52 HR: 100, O2: 94. Pt. 

transferred to ICU.





<Stefan Patel - Last Filed: 11/06/18 16:20>





Physical Examination


Vital Signs: 


 


Findings/Remarks: 





of notw when arrived to room, patient was unresponsive . it was reported by RN 

that patient was not responsive since admission . 


Labs: 


 CBC, BMP





 11/06/18 05:30 





 11/06/18 05:30 











Critical Care


Total Critical Care Time (in minutes): 40


Critical Care Statement: The care of this patient involved high complexity 

decision making to prevent further life threatening deterioration of the patient

's condition and/or to evaluate & treat vital organ system(s) failure or risk 

of failure.

## 2018-11-05 NOTE — PN
Progress Note, SLP





- Note


Progress Note: 





On 11/2 Pt was immediately arousable to his name. Speaking but imprecise with 

impaired intelligibility. Following commands to lift arm.


Right arm seems weaker than left but difficult to determine.


Upper airway congestion/ cough.





Today, more lethargic, non vocal/non verbal, eyes open at times but poor eye 

contact. Mouth breathing. Oral dryness. Pulling foot away from me when 

assessing. 





Head CT 11/4 no acute stroke


 Laboratory Tests











  11/05/18





  06:35


 


WBC  4.4














f/u Neurology.


Consider NGT feedings?

## 2018-11-05 NOTE — PN
Progress Note, Physician


Chief Complaint: 





AMS


Unresponsiveness


History of Present Illness: 





NAD


still lethargic, unarousable





- Current Medication List


Current Medications: 


Active Medications





Acetaminophen (Tylenol Suppository -)  650 mg [place] Q6H PRN


   PRN Reason: FEVER


Heparin Sodium (Porcine) (Heparin -)  1,000 unit IVPUSH PRN PRN


   PRN Reason: Heparin


Heparin Sodium (Porcine) (Heparin -)  5,000 unit IVPUSH PRN PRN


   PRN Reason: Heparin


   Last Admin: 11/01/18 15:15 Dose:  5,000 unit


Ceftriaxone Sodium 1 gm/ (Dextrose)  50 mls @ 100 mls/hr IVPB DAILY [facility]; 

Protocol


   Last Admin: 11/05/18 10:06 Dose:  100 mls/hr


Vancomycin HCl (Vancomycin (Pre-Docked))  1,000 mg in 250 mls @ 166.667 mls/hr 

IVPB Q24H [facility]; Protocol


   Last Admin: 11/04/18 20:13 Dose:  166.667 mls/hr


Heparin Sodium/Dextrose (Heparin Infusion -)  25,000 units in 500 mls @ 16 mls/

hr IVPB TITR [facility]; Protocol


   Last Admin: 11/05/18 05:56 Dose:  Not Given


Amino Acids (Clinimix -)  1,000 mls @ 84 mls/hr IV Q12H [facility]


   Last Admin: 11/05/18 10:06 Dose:  Not Given


Metoprolol Tartrate (Lopressor Injection -)  5 mg IVPUSH Q8H [facility]


   Last Admin: 11/05/18 05:55 Dose:  5 mg


Thiamine HCl (Vitamin B1 -)  100 mg PO DAILY [facility]


   Last Admin: 11/05/18 10:07 Dose:  Not Given











- Objective


Vital Signs: 


 Vital Signs











Temperature  98.9 F   11/05/18 06:04


 


Pulse Rate  101 H  11/05/18 06:04


 


Respiratory Rate  18   11/05/18 06:04


 


Blood Pressure  133/79   11/05/18 06:04


 


O2 Sat by Pulse Oximetry (%)  98   11/04/18 21:00











Constitutional: Yes: Well Nourished, No Distress, Calm


Cardiovascular: Yes: Pulse Irregular


Respiratory: Yes: Regular


Gastrointestinal: Yes: Normal Bowel Sounds, Soft


Musculoskeletal: Yes: WNL


Extremities: Yes: WNL


Edema: No


Peripheral Pulses WNL: Yes


Neurological: Yes: Lethargy


Labs: 


 CBC, BMP





 11/05/18 06:35 





 11/05/18 06:35 





 INR, PTT











INR  1.33  (0.83-1.09)  H  10/30/18  17:02    














Problem List





- Problems


(1) HAM (acute kidney injury)


Assessment/Plan: 


-Nephrology on board


-IVF


-Cr Improving


-monitor trend


-U/S renal/bladder unremarkable


Code(s): N17.9 - ACUTE KIDNEY FAILURE, UNSPECIFIED   





(2) Atrial fibrillation with RVR


Assessment/Plan: 


-On heparin drip


-rate controlled


-cardiology on board


-Tele monitor


Code(s): I48.91 - UNSPECIFIED ATRIAL FIBRILLATION   





(3) Sepsis


Assessment/Plan: 


-upon admission


-ID on board


-Cultures:


 Microbiology





10/30/18 17:02   Blood - Peripheral Venous   Blood Culture - Final


                            NO GROWTH AFTER 5 DAYS INCUBATION


10/30/18 17:02   Blood - Peripheral Venous   Blood Culture - Final


                            NO GROWTH AFTER 5 DAYS INCUBATION


10/30/18 17:02   Urine - Urine - Catheterized   Urine Culture - Final


                            NO GROWTH OBTAINED


10/31/18 19:15   Nasopharyngeal Swab   Influenza Types A,B Antigen - Final


10/31/18 19:15   Nasopharyngeal Swab    - Final





-On IV ceftriaxone


Code(s): A41.9 - SEPSIS, UNSPECIFIED ORGANISM   





(4) Toxic metabolic encephalopathy


Code(s): G92 - TOXIC ENCEPHALOPATHY   





(5) Altered mental status


Assessment/Plan: 


-CT head x 2 negative


-Seen by Neurology


-MRI brain?


-Cultures so far negative


-Still on IVF


Code(s): R41.82 - ALTERED MENTAL STATUS, UNSPECIFIED   





(6) Anemia


Assessment/Plan: 


chronic, at baseline


-Check stool OB


-Iron profile, B12, thyroid profile


Code(s): D64.9 - ANEMIA, UNSPECIFIED

## 2018-11-05 NOTE — CONSULT
Consult


Consult Specialty:: Heme/Onc


Referred by:: Dr. Davalos


Reason for Consultation:: Hypercalcemia





- History of Present Illness


Chief Complaint: sent to hospital for HAM


History of Present Illness: 


78M PMH of A fb CAD CKD HTn HLD PVD s/p arterial stent history of alcohol abuse 

presents to the hospital after neighbors found patient down. Patiet was 

recently discharged form the hospital and was found with hospital wristband on 

per chart. Patient is awake but not responding and history could not be taken. 

Heme/Onc consulted for hypercalcemia and concern for multiple myelom. Patient 

had an observed M spike on last admission and hyperproteinemia on SPEP. patient 

also presents with acute on chronic kidney injury. FeNa 2.1% consistent with 

tubular injury. Hypercalcemia treated with calcitonin by nephrology





- History Source


History Provided By: Medical Record


Limitations to Obtaining History: Unresponsive





- Past Medical History


Cardio/Vascular: Yes: AFIB, HTN, MI


Renal/: Yes: Renal Failure, Renal Inusuff


Additional Medical History: peripheral arterial diseases/p stent in LLE





- Past Surgical History


Past Surgical History: Yes: Hernia Repair





- Alcohol/Substance Use


Hx Alcohol Use: Yes


History of Substance Use: reports: None





- Smoking History


Smoking history: Former smoker


Have you smoked in the past 12 months: No


Aproximately how many cigarettes per day: 10


If you are a former smoker, when did you quit?: 12.28.16





Home Medications





- Allergies


Allergies/Adverse Reactions: 


 Allergies











Allergy/AdvReac Type Severity Reaction Status Date / Time


 


valsartan Allergy Severe  Verified 10/30/18 16:47














- Home Medications


Home Medications: 


Ambulatory Orders





Atorvastatin Ca [Lipitor] 40 mg PO HS #30 tablet 07/12/18 


Rivaroxaban [Xarelto -] 20 mg PO DAILY@1800 #30 tablet 07/12/18 


Carvedilol [Coreg -] 6.25 mg PO BID #60 tablet 10/17/18 











Family Disease History





- Family Disease History


Family History: Unable to Obtain





Review of Systems


Findings/Remarks: 





Patient altered and unresponsive





Physical Exam


Vital Signs: 


 Vital Signs











Temperature  98.9 F   11/05/18 06:04


 


Pulse Rate  101 H  11/05/18 06:04


 


Respiratory Rate  18   11/05/18 06:04


 


Blood Pressure  133/79   11/05/18 06:04


 


O2 Sat by Pulse Oximetry (%)  98   11/04/18 21:00











Constitutional: Yes: Other (lying in bed. mouth breathing. eyes open. does not 

respond to verbal stimuli. Responds to painful stimulus.)


Eyes: Yes: PERRL, Other (senile arcus blaterally)


HENT: Yes: Atraumatic, Normocephalic, Other (dry mouth)


Neck: Yes: Supple, Trachea Midline.  No: Lymphadenopathy


Cardiovascular: Yes: Pulse Irregular


Respiratory: Yes: CTA Bilaterally


Gastrointestinal: Yes: Normal Bowel Sounds, Soft


Musculoskeletal: Yes: Other (no inguinal lymphadenopathy)


Edema: No


Neurological: Yes: Other (sensation in tact given grimacing when touching all 

extremities. able to move arms sponatenously. Moves lower extremities-moves his 

legs away from painful stimuli. Does not follow commands. Lying in bed with 

eyes spontaneously opening and closing.)


Labs: 


 CBC, BMP





 11/05/18 06:35 





 11/05/18 06:35 











Imaging





- Results


Chest X-ray: Report Reviewed, Image Reviewed


X-ray: Report Reviewed, Image Reviewed


Cat Scan: Report Reviewed, Image Reviewed


Ultrasound: Report Reviewed





Assessment/Plan





78M with multiple medical problems presents to the hospital after being found 

down by neighbors found to have HAM and hypercalcemia with altered mental 

status.





Problem List:


Altered mental status


toxic metabolic encephalopathy


Hypercalcemia likely from malignancy


HAM on CKD


History of alcohol abuse


HTN


HLD


CAD 


Afib 


diastolic dysfunction


troponinemia


Hypertrophic cardiomypoathy (apical)








Plan:


concern for multiple myeloma given hypercalcemia worsening renal failure and 

hyperproteinemia on last SPEP


Corrected calcium today is 11.9-treated with calcitonin by npehrology


no bisphosphonates given kidney failure


SPEP sent 


Will send UPEP


Will send [place]/lambda light chains


Will send immunoglobulins IgG IgA IgM


Will order bone (metastatic) survey and if negative will get Bone scan. Of note 

Bone scan may not show any lesions if it is from multiple myeloma but will sold 

solid tumors


Can consider MRI of C/T/L spine if patient stops moving lower extremities or 

concern for lytic lesions of the spine. At this time the patient is moving his 

lower extremities so will hold off for now.


PTH, PTHrP, Vit D pending


SPEP pending 


Will follow -


thak you for this consult

## 2018-11-05 NOTE — PN
Teaching Attending Note


Name of Resident: Arnav Bernard





ATTENDING PHYSICIAN STATEMENT





I saw and evaluated the patient.


I reviewed the resident's note and discussed the case with the resident.


I agree with the resident's findings and plan as documented.








SUBJECTIVE:


Patient seen and examined in the ICU.  Clinical deterioration on the medical 

floor requiring intubation and mechanical ventilation. 


No pressors. 


Suspicion of hyperviscosity syndrome. 











 Intake & Output











 11/03/18 11/04/18 11/04/18 11/05/18





 00:59 00:59 23:59 23:59


 


Intake Total    1318


 


Output Total    2600


 


Balance    -1282








 Last Vital Signs











Temp Pulse Resp BP Pulse Ox


 


 98.8 F   106 H  24 H  134/87   95 


 


 11/05/18 17:05  11/05/18 17:05  11/05/18 17:21  11/05/18 17:05  11/05/18 17:21








Active Medications





Acetaminophen (Tylenol Suppository -)  650 mg [place] Q6H PRN


   PRN Reason: FEVER


Heparin Sodium (Porcine) (Heparin -)  1,000 unit IVPUSH PRN PRN


   PRN Reason: Heparin


Heparin Sodium (Porcine) (Heparin -)  5,000 unit IVPUSH PRN PRN


   PRN Reason: Heparin


   Last Admin: 11/01/18 15:15 Dose:  5,000 unit


Ceftriaxone Sodium 1 gm/ (Dextrose)  50 mls @ 100 mls/hr IVPB DAILY [facility]; 

Protocol


   Last Admin: 11/05/18 10:06 Dose:  100 mls/hr


Vancomycin HCl (Vancomycin (Pre-Docked))  1,000 mg in 250 mls @ 166.667 mls/hr 

IVPB Q24H [facility]; Protocol


   Last Admin: 11/04/18 20:13 Dose:  166.667 mls/hr


Heparin Sodium/Dextrose (Heparin Infusion -)  25,000 units in 500 mls @ 16 mls/

hr IVPB TITR [facility]; Protocol


   Last Admin: 11/05/18 05:56 Dose:  Not Given


Amino Acids (Clinimix -)  1,000 mls @ 84 mls/hr IV Q12H [facility]


   Last Admin: 11/05/18 10:06 Dose:  Not Given


Metoprolol Tartrate (Lopressor Injection -)  5 mg IVPUSH Q8H [facility]


   Last Admin: 11/05/18 13:15 Dose:  5 mg


Thiamine HCl (Vitamin B1 -)  100 mg PO DAILY [facility]


   Last Admin: 11/05/18 10:07 Dose:  Not Given














Constitutional: Yes: Intubated and sedated 


Cardiovascular: Yes: Pulse Irregular, AFib 


Respiratory: Yes: Intubated and sedated 


Gastrointestinal: Yes: Normal Bowel Sounds, Soft


Musculoskeletal: Yes: WNL


Extremities: Yes: WNL


Edema: No


Peripheral Pulses WNL: Yes


Neurological: Yes: Sedated 


Labs: 


  


 Laboratory Results - last 24 hr











  10/31/18 11/05/18 11/05/18





  13:00 06:35 06:35


 


WBC    4.4


 


Corrected WBC (auto)    3.96


 


RBC    2.79 L


 


Hgb    9.0 L


 


Hct    25.0 L


 


MCV    89.7


 


MCH    32.1


 


MCHC    35.8


 


RDW    15.7


 


Plt Count    217


 


MPV    9.0


 


Absolute Neuts (auto)    3.1


 


Neutrophils %    70.4


 


Neutrophils % (Manual)    50.5


 


Band Neutrophils %    6.2


 


Lymphocytes %    24.0


 


Lymphocytes % (Manual)    28.9


 


Monocytes %    4.0


 


Monocytes % (Manual)    7


 


Eosinophils %    1.1


 


Eosinophils % (Manual)    1.1


 


Basophils %    0.5


 


Basophils % (Manual)    0.0


 


Myelocytes % (Man)    1


 


Promyelocytes % (Man)    0


 


Blast Cells % (Manual)    0


 


Nucleated RBC %    9 H


 


Metamyelocytes    4 H D


 


Hypochromia    0


 


Platelet Estimate    Normal


 


Platelet Comment    Present


 


Polychromasia    1+


 


Poikilocytosis    1+


 


Anisocytosis    1+


 


Microcytosis    1+


 


Macrocytosis    0


 


Rouleaux    1+


 


PT with INR   


 


INR   


 


PTT (Actin FS)   64.4 H 


 


Sodium   


 


Potassium   


 


Chloride   


 


Carbon Dioxide   


 


Anion Gap   


 


BUN   


 


Creatinine   


 


Creat Clearance w eGFR   


 


Random Glucose   


 


Lactic Acid   


 


Calcium   


 


Phosphorus   


 


Magnesium   


 


Ferritin   


 


Total Bilirubin   


 


AST   


 


ALT   


 


Alkaline Phosphatase   


 


Total Protein   


 


Albumin   


 


Vitamin B12   


 


Ethylene Glycol  None detected  














  11/05/18 11/05/18 11/05/18





  06:35 12:55 16:36


 


WBC   


 


Corrected WBC (auto)   


 


RBC   


 


Hgb   


 


Hct   


 


MCV   


 


MCH   


 


MCHC   


 


RDW   


 


Plt Count   


 


MPV   


 


Absolute Neuts (auto)   


 


Neutrophils %   


 


Neutrophils % (Manual)   


 


Band Neutrophils %   


 


Lymphocytes %   


 


Lymphocytes % (Manual)   


 


Monocytes %   


 


Monocytes % (Manual)   


 


Eosinophils %   


 


Eosinophils % (Manual)   


 


Basophils %   


 


Basophils % (Manual)   


 


Myelocytes % (Man)   


 


Promyelocytes % (Man)   


 


Blast Cells % (Manual)   


 


Nucleated RBC %   


 


Metamyelocytes   


 


Hypochromia   


 


Platelet Estimate   


 


Platelet Comment   


 


Polychromasia   


 


Poikilocytosis   


 


Anisocytosis   


 


Microcytosis   


 


Macrocytosis   


 


Rouleaux   


 


PT with INR   


 


INR   


 


PTT (Actin FS)   


 


Sodium  136  


 


Potassium  4.1  


 


Chloride  110 H  


 


Carbon Dioxide  19 L  


 


Anion Gap  7 L  


 


BUN  55 H  


 


Creatinine  2.0 H  


 


Creat Clearance w eGFR  32.39  


 


Random Glucose  104  


 


Lactic Acid    2.1 H


 


Calcium  9.5  


 


Phosphorus  3.5  


 


Magnesium  1.8  


 


Ferritin   610.5 H 


 


Total Bilirubin  0.3  


 


AST  39 H  


 


ALT  17  


 


Alkaline Phosphatase  62  


 


Total Protein  10.9 H  


 


Albumin  1.0 L  


 


Vitamin B12   431 


 


Ethylene Glycol   














  11/05/18 11/05/18





  16:40 16:40


 


WBC  5.1 


 


Corrected WBC (auto)  


 


RBC  3.04 L 


 


Hgb  9.7 L 


 


Hct  27.8 L 


 


MCV  91.5 


 


MCH  31.8 


 


MCHC  34.8 


 


RDW  16.0 H 


 


Plt Count  254 


 


MPV  8.7 


 


Absolute Neuts (auto)  3.2 


 


Neutrophils %  63.1 


 


Neutrophils % (Manual)  


 


Band Neutrophils %  


 


Lymphocytes %  32.7  D 


 


Lymphocytes % (Manual)  


 


Monocytes %  2.9 L 


 


Monocytes % (Manual)  


 


Eosinophils %  1.0 


 


Eosinophils % (Manual)  


 


Basophils %  0.3 


 


Basophils % (Manual)  


 


Myelocytes % (Man)  


 


Promyelocytes % (Man)  


 


Blast Cells % (Manual)  


 


Nucleated RBC %  8 H 


 


Metamyelocytes  


 


Hypochromia  


 


Platelet Estimate  


 


Platelet Comment  


 


Polychromasia  


 


Poikilocytosis  


 


Anisocytosis  


 


Microcytosis  


 


Macrocytosis  


 


Rouleaux  


 


PT with INR   18.60 H


 


INR   1.57 H


 


PTT (Actin FS)   64.8 H


 


Sodium  


 


Potassium  


 


Chloride  


 


Carbon Dioxide  


 


Anion Gap  


 


BUN  


 


Creatinine  


 


Creat Clearance w eGFR  


 


Random Glucose  


 


Lactic Acid  


 


Calcium  


 


Phosphorus  


 


Magnesium  


 


Ferritin  


 


Total Bilirubin  


 


AST  


 


ALT  


 


Alkaline Phosphatase  


 


Total Protein  


 


Albumin  


 


Vitamin B12  


 


Ethylene Glycol  














Problem List


Acute Respiratory Failure 


R/O Aspiration 


HAM (acute kidney injury)


R/O Hyperviscosity 


Atrial Fibrillation with RVR 


R/O Sepsis 


AMS 


Anemia 








PLAN: 


AC Mode of vent 


Check ABG 


Follow Calcium: will be given Zometa 


AC 


Strict I & O


D/C Clinimix 


Daily sedation vacation 


ABX coverage 


Rate control 


May need Plasmapheresis








Dr Blum 


Critical care time spent in reviewing chart, evaluating patient and formulating 

plan - 36 minutes.

## 2018-11-05 NOTE — PN
Progress Note, Physician


History of Present Illness: 


Sensorium still altered, somnolent, oral intake held, continuing on IV meds and 

Clinimix.





- Current Medication List


Current Medications: 


Active Medications





Acetaminophen (Tylenol Suppository -)  650 mg [place] Q6H PRN


   PRN Reason: FEVER


Heparin Sodium (Porcine) (Heparin -)  1,000 unit IVPUSH PRN PRN


   PRN Reason: Heparin


Heparin Sodium (Porcine) (Heparin -)  5,000 unit IVPUSH PRN PRN


   PRN Reason: Heparin


   Last Admin: 11/01/18 15:15 Dose:  5,000 unit


Ceftriaxone Sodium 1 gm/ (Dextrose)  50 mls @ 100 mls/hr IVPB DAILY [facility]; 

Protocol


   Last Admin: 11/05/18 10:06 Dose:  100 mls/hr


Vancomycin HCl (Vancomycin (Pre-Docked))  1,000 mg in 250 mls @ 166.667 mls/hr 

IVPB Q24H [facility]; Protocol


   Last Admin: 11/04/18 20:13 Dose:  166.667 mls/hr


Heparin Sodium/Dextrose (Heparin Infusion -)  25,000 units in 500 mls @ 16 mls/

hr IVPB TITR [facility]; Protocol


   Last Admin: 11/05/18 05:56 Dose:  Not Given


Amino Acids (Clinimix -)  1,000 mls @ 84 mls/hr IV Q12H [facility]


   Last Admin: 11/05/18 10:06 Dose:  Not Given


Metoprolol Tartrate (Lopressor Injection -)  5 mg IVPUSH Q8H [facility]


   Last Admin: 11/05/18 05:55 Dose:  5 mg


Thiamine HCl (Vitamin B1 -)  100 mg PO DAILY [facility]


   Last Admin: 11/05/18 10:07 Dose:  Not Given











- Objective


Vital Signs: 


 Vital Signs











Temperature  98.9 F   11/05/18 06:04


 


Pulse Rate  101 H  11/05/18 06:04


 


Respiratory Rate  18   11/05/18 06:04


 


Blood Pressure  133/79   11/05/18 06:04


 


O2 Sat by Pulse Oximetry (%)  98   11/04/18 21:00











Constitutional: Yes: No Distress, Calm, Thin


Neck: Yes: Supple


Cardiovascular: Yes: Pulse Irregular


Respiratory: Yes: Regular, Diminished, On Nasal O2


Gastrointestinal: Yes: Soft, Hypoactive Bowel Sounds


Edema: No


Labs: 


 CBC, BMP





 11/05/18 06:35 





 11/05/18 06:35 





 INR, PTT











INR  1.33  (0.83-1.09)  H  10/30/18  17:02    














- ....Imaging


EKG: Report Reviewed (Tele: Rate-controlled afib)





Problem List





- Problems


(1) Atrial fibrillation with RVR


Code(s): I48.91 - UNSPECIFIED ATRIAL FIBRILLATION   





(2) Acute-on-chronic kidney injury


Code(s): N17.9 - ACUTE KIDNEY FAILURE, UNSPECIFIED; N18.9 - CHRONIC KIDNEY 

DISEASE, UNSPECIFIED   


Qualifiers: 


   Acute renal failure type: unspecified   Chronic kidney disease stage: 

unspecified stage   Qualified Code(s): N17.9 - Acute kidney failure, unspecified

; N18.9 - Chronic kidney disease, unspecified   





(3) Demand ischemia


Code(s): I24.8 - OTHER FORMS OF ACUTE ISCHEMIC HEART DISEASE   





(4) Hypercalcemia


Code(s): E83.52 - HYPERCALCEMIA   





(5) Nonadherence to medication


Code(s): Z91.14 - PATIENT'S OTHER NONCOMPLIANCE WITH MEDICATION REGIMEN   





(6) Paraproteinemia


Code(s): D89.2 - HYPERGAMMAGLOBULINEMIA, UNSPECIFIED   





(7) Anticoagulant long-term use


Code(s): Z79.01 - LONG TERM (CURRENT) USE OF ANTICOAGULANTS   





(8) Chronic thromboembolic disease


Code(s): I74.9 - EMBOLISM AND THROMBOSIS OF UNSPECIFIED ARTERY   





(9) Coronary artery disease


Code(s): I25.10 - ATHSCL HEART DISEASE OF NATIVE CORONARY ARTERY W/O ANG PCTRS 

  


Qualifiers: 


   Coronary Disease-Associated Artery/Lesion type: native artery   Native vs. 

transplanted heart: native heart   Associated angina: without angina   

Qualified Code(s): I25.10 - Atherosclerotic heart disease of native coronary 

artery without angina pectoris   





(10) Diastolic dysfunction without heart failure


Code(s): I51.9 - HEART DISEASE, UNSPECIFIED   





(11) HTN (hypertension)


Code(s): I10 - ESSENTIAL (PRIMARY) HYPERTENSION   


Qualifiers: 


   Hypertension type: essential hypertension   Qualified Code(s): I10 - 

Essential (primary) hypertension   





(12) Hypertrophic cardiomyopathy


Code(s): I42.2 - OTHER HYPERTROPHIC CARDIOMYOPATHY   





(13) Hyperlipidemia


Code(s): E78.5 - HYPERLIPIDEMIA, UNSPECIFIED   


Qualifiers: 


   Hyperlipidemia type: pure hypercholesterolemia   Qualified Code(s): E78.00 - 

Pure hypercholesterolemia, unspecified; E78.0 - Pure hypercholesterolemia   





Assessment/Plan


R&LHc at [facility] 1/11/2017 showing nonobstructive CAD, severe LV apical 

hypertrophic cardiomyopathy, mildly elevated right sided pressures, Mynx 

deployed right CFA access site. Study is consistent with apical hypertrophy (

spade-like) variant of hypertrophic cardiomyopathy, planned for optimal medical 

therapy. 


Echocardiogram: 07/11/2018 Mod cLVH, severe DYANA, mod TR RVSP 50-60 mmHg, mod-

severe MR


Echocardiogram: 10/16/2018 Normal LV size with hyperdynamic LVEF 75%, mild BSH, 

normal RV size and fxn, severe LAE, mod-severe MR, mod TR





1. Toxic metabolic encephelopathy, r/o CVA while off NOAC, r/o sepsis


2. Acute on CKD (pre-renal) resolving


3. Hypercalcemia, paraproteinemia-> possible multiple myeloma


4. History of bilateral SFA occlusion post thrombectomy, referable to embolic 

disease related to persistent atrial fibrillation with [id number] score of 6, 

history of poor compliance with anticoagulation and medical F/U


5. Non obstructive CAD on R&LHc coronary angiography with demand ischemia


6. 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 

subendocardial ischemia, compensated/euvolemic


7. Persistent atrial fibrillation [id number] score of 6 on Xarelto 20 qd


8. Labile HTN


9. Poor compliance with medical therapy administration and medical F/U


10. Tobacco abuse


11. ETOH dependence





PLAN:





1. Heparin gtt for now while off Xarelto 15 qd (renal dosing)


2. IV Lopressor as hemodynamics tolerate


3. Hematology input, SPEP, UPEP pending


4. Judicious Clinimix with monitor renal recovery, renal input appreciated, 

check PTH, ruled out obstructive uropathy


5. Empiric abx course pending C&S


6. NGT for enteral feeds as prolonged NPO


7. Calcitonin per renal

## 2018-11-05 NOTE — CONSULT
Consultation: 


CONSULT REQUEST: We have been asked to medically evaluate this patient for 

respiratory inssuficiency and oxygen desaturation. 





HISTORY OF PRESENT ILLNESS: Patient is intubated and not alert. History 

obtained from chart review. 





77 yo m w a hx of A-Fib w RVR, Acute on chronic kidney injury, CAD w stents, 

hypercalcemia, medication non-adherence, paraproteinemia, thromboembolic disease

, diastolic heart dysfunction without failure, HTN, HLD, hypertrophic 

cardiomyopathy is here after a rapid response. He was on the floors when it was 

noticed that he has respiratory insufficiency and was desaturating. 











REVIEW OF SYSTEMS: Unable to obtain due to clinical condition. 





PHYSICAL EXAMINATION





 Vital Signs - 24 hr











  11/04/18 11/04/18 11/05/18





  21:00 21:32 02:00


 


Temperature   97.9 F


 


Pulse Rate  85 90


 


Respiratory 18  18





Rate   


 


Blood Pressure  147/76 107/71


 


O2 Sat by Pulse 98  





Oximetry (%)   














  11/05/18 11/05/18 11/05/18





  05:55 06:04 09:00


 


Temperature  98.9 F 


 


Pulse Rate 101 H 101 H 


 


Respiratory  18 21 H





Rate   


 


Blood Pressure 133/79 133/79 


 


O2 Sat by Pulse   94 L





Oximetry (%)   














  11/05/18 11/05/18 11/05/18





  10:00 13:15 14:00


 


Temperature 99 F  99.2 F


 


Pulse Rate 91 H 104 H 104 H


 


Respiratory 21 H  21 H





Rate   


 


Blood Pressure 111/72 125/82 125/82


 


O2 Sat by Pulse   





Oximetry (%)   














  11/05/18 11/05/18 11/05/18





  17:05 17:21 17:26


 


Temperature 98.8 F  


 


Pulse Rate 106 H  


 


Respiratory 26 H 24 H 





Rate   


 


Blood Pressure 134/87  


 


O2 Sat by Pulse  95 100





Oximetry (%)   














GENERAL: Intubated in respiratory distress. 


HEAD: Normal with no signs of trauma.


EYES: Pupils equal, round and reactive to light, sclera anicteric, conjunctiva 

clear.


EARS, NOSE, THROAT: Ears normal, nares patent, oropharynx clear without 

exudates. Moist mucous membranes.


NECK: Supple without lymphadenopathy, no JVD, or masses.


LUNGS: Diffuse wheezes and rales bilaterally in both lung fields. Abdominal 

breathing and significant accessory muscle use.


HEART: tachycardic rate and irregular rhythm.


ABDOMEN: Soft, normoactive bowel sounds, no guarding, no rebound, no masses.  

No hepatomegaly or splenomegaly. 


MUSCULOSKELETAL: Normal range of motion at all joints. No bony deformities or 

tenderness. 


UPPER EXTREMITIES: 2+ pulses, warm, well-perfused. No cyanosis. No clubbing. 

Cap refill <2 seconds. No peripheral edema.


LOWER EXTREMITIES: 2+ pulses, warm, well-perfused. No calf tenderness. No 

peripheral edema. 


NEUROLOGICAL: Normoreflexic. FUNMILAYO. 


SKIN: Warm, diaphoretic, normal turgor, no rashes or lesions noted. 











 Laboratory Results - last 24 hr











  10/31/18 11/05/18 11/05/18





  13:00 06:35 06:35


 


WBC    4.4


 


Corrected WBC (auto)    3.96


 


RBC    2.79 L


 


Hgb    9.0 L


 


Hct    25.0 L


 


MCV    89.7


 


MCH    32.1


 


MCHC    35.8


 


RDW    15.7


 


Plt Count    217


 


MPV    9.0


 


Absolute Neuts (auto)    3.1


 


Neutrophils %    70.4


 


Neutrophils % (Manual)    50.5


 


Band Neutrophils %    6.2


 


Lymphocytes %    24.0


 


Lymphocytes % (Manual)    28.9


 


Monocytes %    4.0


 


Monocytes % (Manual)    7


 


Eosinophils %    1.1


 


Eosinophils % (Manual)    1.1


 


Basophils %    0.5


 


Basophils % (Manual)    0.0


 


Myelocytes % (Man)    1


 


Promyelocytes % (Man)    0


 


Blast Cells % (Manual)    0


 


Nucleated RBC %    9 H


 


Metamyelocytes    4 H D


 


Hypochromia    0


 


Platelet Estimate    Normal


 


Platelet Comment    Present


 


Polychromasia    1+


 


Poikilocytosis    1+


 


Anisocytosis    1+


 


Microcytosis    1+


 


Macrocytosis    0


 


Rouleaux    1+


 


PT with INR   


 


INR   


 


PTT (Actin FS)   64.4 H 


 


Sodium   


 


Potassium   


 


Chloride   


 


Carbon Dioxide   


 


Anion Gap   


 


BUN   


 


Creatinine   


 


Creat Clearance w eGFR   


 


Random Glucose   


 


Lactic Acid   


 


Calcium   


 


Phosphorus   


 


Magnesium   


 


Ferritin   


 


Total Bilirubin   


 


AST   


 


ALT   


 


Alkaline Phosphatase   


 


Total Protein   


 


Albumin   


 


Vitamin B12   


 


Ethylene Glycol  None detected  














  11/05/18 11/05/18 11/05/18





  06:35 12:55 16:36


 


WBC   


 


Corrected WBC (auto)   


 


RBC   


 


Hgb   


 


Hct   


 


MCV   


 


MCH   


 


MCHC   


 


RDW   


 


Plt Count   


 


MPV   


 


Absolute Neuts (auto)   


 


Neutrophils %   


 


Neutrophils % (Manual)   


 


Band Neutrophils %   


 


Lymphocytes %   


 


Lymphocytes % (Manual)   


 


Monocytes %   


 


Monocytes % (Manual)   


 


Eosinophils %   


 


Eosinophils % (Manual)   


 


Basophils %   


 


Basophils % (Manual)   


 


Myelocytes % (Man)   


 


Promyelocytes % (Man)   


 


Blast Cells % (Manual)   


 


Nucleated RBC %   


 


Metamyelocytes   


 


Hypochromia   


 


Platelet Estimate   


 


Platelet Comment   


 


Polychromasia   


 


Poikilocytosis   


 


Anisocytosis   


 


Microcytosis   


 


Macrocytosis   


 


Rouleaux   


 


PT with INR   


 


INR   


 


PTT (Actin FS)   


 


Sodium  136  


 


Potassium  4.1  


 


Chloride  110 H  


 


Carbon Dioxide  19 L  


 


Anion Gap  7 L  


 


BUN  55 H  


 


Creatinine  2.0 H  


 


Creat Clearance w eGFR  32.39  


 


Random Glucose  104  


 


Lactic Acid    2.1 H


 


Calcium  9.5  


 


Phosphorus  3.5  


 


Magnesium  1.8  


 


Ferritin   610.5 H 


 


Total Bilirubin  0.3  


 


AST  39 H  


 


ALT  17  


 


Alkaline Phosphatase  62  


 


Total Protein  10.9 H  


 


Albumin  1.0 L  


 


Vitamin B12   431 


 


Ethylene Glycol   














  11/05/18 11/05/18





  16:40 16:40


 


WBC  5.1 


 


Corrected WBC (auto)  


 


RBC  3.04 L 


 


Hgb  9.7 L 


 


Hct  27.8 L 


 


MCV  91.5 


 


MCH  31.8 


 


MCHC  34.8 


 


RDW  16.0 H 


 


Plt Count  254 


 


MPV  8.7 


 


Absolute Neuts (auto)  3.2 


 


Neutrophils %  63.1 


 


Neutrophils % (Manual)  


 


Band Neutrophils %  


 


Lymphocytes %  32.7  D 


 


Lymphocytes % (Manual)  


 


Monocytes %  2.9 L 


 


Monocytes % (Manual)  


 


Eosinophils %  1.0 


 


Eosinophils % (Manual)  


 


Basophils %  0.3 


 


Basophils % (Manual)  


 


Myelocytes % (Man)  


 


Promyelocytes % (Man)  


 


Blast Cells % (Manual)  


 


Nucleated RBC %  8 H 


 


Metamyelocytes  


 


Hypochromia  


 


Platelet Estimate  


 


Platelet Comment  


 


Polychromasia  


 


Poikilocytosis  


 


Anisocytosis  


 


Microcytosis  


 


Macrocytosis  


 


Rouleaux  


 


PT with INR   18.60 H


 


INR   1.57 H


 


PTT (Actin FS)   64.8 H


 


Sodium  


 


Potassium  


 


Chloride  


 


Carbon Dioxide  


 


Anion Gap  


 


BUN  


 


Creatinine  


 


Creat Clearance w eGFR  


 


Random Glucose  


 


Lactic Acid  


 


Calcium  


 


Phosphorus  


 


Magnesium  


 


Ferritin  


 


Total Bilirubin  


 


AST  


 


ALT  


 


Alkaline Phosphatase  


 


Total Protein  


 


Albumin  


 


Vitamin B12  


 


Ethylene Glycol  








Active Medications











Generic Name Dose Route Start Last Admin





  Trade Name Freq  PRN Reason Stop Dose Admin


 


Acetaminophen  650 mg  11/01/18 05:44  





  Tylenol Suppository -  MS   





  Q6H PRN   





  FEVER   





     





     





     


 


Heparin Sodium (Porcine)  1,000 unit  11/01/18 05:44  





  Heparin -  IVPUSH   





  PRN PRN   





  Heparin   





     





     





     


 


Heparin Sodium (Porcine)  5,000 unit  11/01/18 05:44  11/01/18 15:15





  Heparin -  IVPUSH   5,000 unit





  PRN PRN   Administration





  Heparin   





     





     





     


 


Ceftriaxone Sodium 1 gm/  50 mls @ 100 mls/hr  10/31/18 11:45  11/05/18 10:06





  Dextrose  IVPB   100 mls/hr





  DAILY [facility]   Administration





     





     





  Protocol   





     


 


Vancomycin HCl  1,000 mg in 250 mls @ 166.667 mls/hr  10/31/18 20:00  11/04/18 

20:13





  Vancomycin (Pre-Docked)  IVPB   166.667 mls/hr





  Q24H AVA   Administration





     





     





  Protocol   





     


 


Heparin Sodium/Dextrose  25,000 units in 500 mls @ 16 mls/hr  11/01/18 05:45  11 /05/18 05:56





  Heparin Infusion -  IVPB   Not Given





  TITR AVA   





     





     





  Protocol   





  800 UNIT/HR   


 


Amino Acids  1,000 mls @ 84 mls/hr  11/02/18 08:45  11/05/18 10:06





  Clinimix -  IV   Not Given





  Q12H AVA   





     





     





     





     


 


Metoprolol Tartrate  5 mg  11/01/18 05:45  11/05/18 13:15





  Lopressor Injection -  IVPUSH   5 mg





  Q8H AVA   Administration





     





     





     





     


 


Thiamine HCl  100 mg  10/30/18 22:12  11/05/18 10:07





  Vitamin B1 -  PO   Not Given





  DAILY [facility]   





     





     





     





     











ASSESSMENT/PLAN:





Assessment: 77 yo m w a hx of A-Fib w RVR, Acute on chronic kidney injury, CAD 

w stents, hypercalcemia, medication non-adherence, paraproteinemia, 

thromboembolic disease, diastolic heart dysfunction without failure, HTN, HLD, 

hypertrophic cardiomyopathy is here after a rapid response. He was on the 

floors when it was noticed that he has respiratory insufficiency and was 

desaturating. 


- He has been receiving vancomycin and Ceftriaxone. 


- Temperature rectally is 101





He is tachycardic, has diffuse rales/rhonchi and wheezes, and a very irregular 

tachycardic rhythm. 





Highest on the DD includes PNA vs other infection vs PNA resistant to vanc and 

ceftriaxone vs heart failure vs pulmonary edema vs Afib w RVR. 





Plan: ID consult, Step up Abx coverage to zosyn, Chest Xray, blood + urine 

cultures, lasix, Acetaminophen for fever and then re-assess.  





Plan: 





ID: 


- Rectal temp of 101 despite ceftriaxone and vancomycin. 


- Rales/Ronchi/wheezes heard on Lung auscultation


- CXR shows pulmonary infiltrate, possible PNA vs empyema vs paramnemonic 

effusion 


- Stepping up Abx coverage to zosyn


- Will call Dr. Munoz





Cardio: 


- Afib w RVR: Patient is receiving metoprolol 5 mg Q8H


- diastolic dysfunction + hypertrophic cardiomyopathy


- CAD with multiple stents





Pulm: 


- Intubated


- b/l diffuse wheezes/ronchi/rales


- infiltrate on CXR: Will step up Abx to Zosyn


- Received 80 Mg lasix today





Renal: 


- Acute on Chronic kidney injury


- BUN: 59 Cr:2.2. possibly Pre-renal etiology. 


 


Neuro: 


- Patient is not alert or oriented. He responds to painful stimuli. 


- Head CT showed no acute pathology





Heme/Onc: 


- Patient has hypercalcemia


- Paraproteinemia


- Possible Multiple Myeloma: SPEP and UPEP sent. Pending results. 





Endo: 


- Hypercalcemia


- Glucose wnl


- Parathyroid hormone sent





F/E/N: 


F: No standing fluids due to fluid overload in lungs


E: Hypercalcemia. Will replete lytes PRN. 


N: Patient was receiving Clinimix. Will DC for now and make patient NPO during 

acute febrile illness/exacerbation





Code Status: Full Code


Dispo: We will continue to follow the patient. Thank you for this consultative 

opportunity.











Visit type





- Emergency Visit


Emergency Visit: Yes


ED Registration Date: 10/30/18


Care time: The patient presented to the Emergency Department on the above date 

and was hospitalized for further evaluation of their emergent condition.





- New Patient


This patient is new to me today: Yes


Date on this admission: 11/05/18





- Critical Care


Critical Care patient: Yes


Total Critical Care Time (in minutes): 36


Critical Care Statement: The care of this patient involved high complexity 

decision making to prevent further life threatening deterioration of the patient

's condition and/or to evaluate & treat vital organ system(s) failure or risk 

of failure.

## 2018-11-06 LAB
ALBUMIN SERPL-MCNC: 3.3 G/DL (ref 3.4–5)
ALP SERPL-CCNC: 39 U/L (ref 45–117)
ALT SERPL-CCNC: 17 U/L (ref 13–61)
ANION GAP SERPL CALC-SCNC: 10 MMOL/L (ref 8–16)
ANION GAP SERPL CALC-SCNC: 10 MMOL/L (ref 8–16)
ANISOCYTOSIS BLD QL: 0
APPEARANCE UR: (no result)
ARTERIAL BLOOD GAS PCO2: 28.9 MMHG (ref 35–45)
ARTERIAL PATENCY WRIST A: POSITIVE
AST SERPL-CCNC: 41 U/L (ref 15–37)
BACTERIA #/AREA URNS HPF: (no result) /HPF
BASE EXCESS BLDA CALC-SCNC: -8.6 MEQ/L (ref -2–2)
BASOPHILS # BLD: 0.3 % (ref 0–2)
BILIRUB SERPL-MCNC: 0.7 MG/DL (ref 0.2–1)
BILIRUB UR STRIP.AUTO-MCNC: NEGATIVE MG/DL
BUN SERPL-MCNC: 60 MG/DL (ref 7–18)
BUN SERPL-MCNC: 61 MG/DL (ref 7–18)
CALCIUM SERPL-MCNC: 9.6 MG/DL (ref 8.5–10.1)
CALCIUM SERPL-MCNC: 9.6 MG/DL (ref 8.5–10.1)
CHLORIDE SERPL-SCNC: 113 MMOL/L (ref 98–107)
CHLORIDE SERPL-SCNC: 113 MMOL/L (ref 98–107)
CO2 SERPL-SCNC: 15 MMOL/L (ref 21–32)
CO2 SERPL-SCNC: 16 MMOL/L (ref 21–32)
COLOR UR: (no result)
CREAT SERPL-MCNC: 2.2 MG/DL (ref 0.55–1.3)
CREAT SERPL-MCNC: 2.4 MG/DL (ref 0.55–1.3)
DEPRECATED RDW RBC AUTO: 16.1 % (ref 11.9–15.9)
EOSINOPHIL # BLD: 0.8 % (ref 0–4.5)
EPITH CASTS URNS QL MICRO: (no result) /HPF
GLUCOSE SERPL-MCNC: 74 MG/DL (ref 74–106)
GLUCOSE SERPL-MCNC: 83 MG/DL (ref 74–106)
HCT VFR BLD CALC: 27.3 % (ref 35.4–49)
HGB BLD-MCNC: 9.5 GM/DL (ref 11.7–16.9)
HYALINE CASTS URNS QL MICRO: 17 /LPF
INR BLD: 1.81 (ref 0.83–1.09)
KETONES UR QL STRIP: NEGATIVE
LEUKOCYTE ESTERASE UR QL STRIP.AUTO: NEGATIVE
LYMPHOCYTES # BLD: 23.2 % (ref 8–40)
MACROCYTES BLD QL: 0
MAGNESIUM SERPL-MCNC: 1.7 MG/DL (ref 1.8–2.4)
MCH RBC QN AUTO: 31.2 PG (ref 25.7–33.7)
MCHC RBC AUTO-ENTMCNC: 34.7 G/DL (ref 32–35.9)
MCV RBC: 89.9 FL (ref 80–96)
MONOCYTES # BLD AUTO: 4.9 % (ref 3.8–10.2)
MUCOUS THREADS URNS QL MICRO: (no result)
NEUTROPHILS # BLD: 70.8 % (ref 42.8–82.8)
NITRITE UR QL STRIP: NEGATIVE
PH UR: 5 [PH] (ref 5–8)
PHOSPHATE SERPL-MCNC: 5.4 MG/DL (ref 2.5–4.9)
PLATELET # BLD AUTO: 156 K/MM3 (ref 134–434)
PLATELET BLD QL SMEAR: ADEQUATE
PMV BLD: 9.2 FL (ref 7.5–11.1)
PO2 BLDA: 158 MMHG (ref 70–100)
POTASSIUM SERPLBLD-SCNC: 4.5 MMOL/L (ref 3.5–5.1)
POTASSIUM SERPLBLD-SCNC: 4.5 MMOL/L (ref 3.5–5.1)
PROT SERPL-MCNC: 8.3 G/DL (ref 6.4–8.2)
PROT UR QL STRIP: NEGATIVE
PROT UR QL STRIP: NEGATIVE
PT PNL PPP: 21.5 SEC (ref 9.7–13)
RBC # BLD AUTO: 3.04 M/MM3 (ref 4–5.6)
SAO2 % BLDA: 98.8 % (ref 90–98.9)
SERUM IRON SATURATION: 29 % (ref 15–55)
SODIUM SERPL-SCNC: 139 MMOL/L (ref 136–145)
SODIUM SERPL-SCNC: 139 MMOL/L (ref 136–145)
SP GR UR: 1.01 (ref 1.01–1.03)
TIBC SERPL-MCNC: 173 UG/DL (ref 250–450)
UIBC SERPL-MCNC: 123 UG/DL (ref 111–343)
UROBILINOGEN UR STRIP-MCNC: NEGATIVE MG/DL (ref 0.2–1)
WBC # BLD AUTO: 4.9 K/MM3 (ref 4–10)
WBC NRBC COR # BLD: 4.34 K/MM3

## 2018-11-06 PROCEDURE — 3E0G76Z INTRODUCTION OF NUTRITIONAL SUBSTANCE INTO UPPER GI, VIA NATURAL OR ARTIFICIAL OPENING: ICD-10-PCS | Performed by: INTERNAL MEDICINE

## 2018-11-06 RX ADMIN — METOPROLOL TARTRATE SCH: 5 INJECTION, SOLUTION INTRAVENOUS at 05:45

## 2018-11-06 RX ADMIN — Medication SCH: at 09:03

## 2018-11-06 RX ADMIN — PIPERACILLIN SODIUM AND TAZOBACTAM SODIUM SCH MLS/HR: .25; 2 INJECTION, POWDER, LYOPHILIZED, FOR SOLUTION INTRAVENOUS at 12:25

## 2018-11-06 RX ADMIN — METOPROLOL TARTRATE SCH MG: 5 INJECTION, SOLUTION INTRAVENOUS at 14:42

## 2018-11-06 RX ADMIN — HEPARIN SODIUM SCH MLS/HR: 5000 INJECTION, SOLUTION INTRAVENOUS at 05:47

## 2018-11-06 RX ADMIN — METOPROLOL TARTRATE SCH: 5 INJECTION, SOLUTION INTRAVENOUS at 13:29

## 2018-11-06 RX ADMIN — PIPERACILLIN SODIUM AND TAZOBACTAM SODIUM SCH MLS/HR: .25; 2 INJECTION, POWDER, LYOPHILIZED, FOR SOLUTION INTRAVENOUS at 12:48

## 2018-11-06 RX ADMIN — VANCOMYCIN HYDROCHLORIDE SCH MLS/HR: 1 INJECTION, POWDER, LYOPHILIZED, FOR SOLUTION INTRAVENOUS at 20:37

## 2018-11-06 RX ADMIN — METOPROLOL TARTRATE SCH MG: 5 INJECTION, SOLUTION INTRAVENOUS at 21:40

## 2018-11-06 RX ADMIN — PIPERACILLIN SODIUM AND TAZOBACTAM SODIUM SCH MLS/HR: .25; 2 INJECTION, POWDER, LYOPHILIZED, FOR SOLUTION INTRAVENOUS at 17:25

## 2018-11-06 RX ADMIN — ACETAMINOPHEN PRN MG: 10 INJECTION, SOLUTION INTRAVENOUS at 17:45

## 2018-11-06 RX ADMIN — CEFTRIAXONE SCH MLS/HR: 1 INJECTION, POWDER, FOR SOLUTION INTRAMUSCULAR; INTRAVENOUS at 09:03

## 2018-11-06 RX ADMIN — METOPROLOL TARTRATE SCH MG: 5 INJECTION, SOLUTION INTRAVENOUS at 06:55

## 2018-11-06 NOTE — PN
Progress Note (short form)





- Note


Progress Note: 





Patient seen and examined 


Evets of last evening noted 


Underwent apheresis. 


Nurses describe some lower extremity movement , but I have not witnessed this. 


Remains somewhat obtunded


 Last Vital Signs











Temp Pulse Resp BP Pulse Ox


 


 98.4 F   87   22 H  112/71   99 


 


 11/06/18 06:00  11/06/18 08:00  11/06/18 08:14  11/06/18 08:00  11/06/18 08:13











HEENT: PEDRITO, EOM Intact


Oropharynx: intubated


Cor: atrial fib 


Lungs: scattered rhonchi


Abd: Soft, Normal bowel sounds, No organomegaly


Ext:No significant edema


Skin: No rashes, Integument intact, heel pads


 CBC, BMP





 11/06/18 05:30 





 11/06/18 05:30 





 INR, PTT











INR  1.81  (0.83-1.09)  H  11/06/18  05:30    








 Abnormal Lab Results











  11/02/18 11/05/18 11/05/18





  08:00 06:35 12:55


 


RBC   


 


Hgb   


 


Hct   


 


RDW   


 


Monocytes %   


 


Monocytes % (Manual)   


 


Myelocytes % (Man)   


 


Nucleated RBC %   


 


Metamyelocytes   4 H D 


 


PT with INR   


 


INR   


 


PTT (Actin FS)   


 


ABG pH   


 


ABG pCO2 at Pt Temp   


 


ABG pO2 at Pt Temp   


 


ABG HCO3   


 


ABG O2 Sat (Measured)   


 


ABG O2 Content   


 


ABG Base Excess   


 


Sodium   


 


Chloride   


 


Carbon Dioxide   


 


Anion Gap   


 


BUN   


 


Creatinine   


 


Random Glucose   


 


Lactic Acid   


 


Phosphorus   


 


Magnesium   


 


TIBC    173 L


 


Ferritin   


 


AST   


 


Alkaline Phosphatase   


 


Troponin I   


 


Total Protein   


 


Total Protein (PEP)  11.9 H  


 


Albumin   


 


Albumin (PEP)  2.3 L  


 


Globulin  9.6 H  


 


Albumin/Globulin Ratio  0.2 L  


 


Urine Blood   


 


[facility]  6.8 H  














  11/05/18 11/05/18 11/05/18





  12:55 16:36 16:40


 


RBC    3.04 L


 


Hgb    9.7 L


 


Hct    27.8 L


 


RDW    16.0 H


 


Monocytes %    2.9 L


 


Monocytes % (Manual)    0 L D


 


Myelocytes % (Man)    3 H D


 


Nucleated RBC %    8 H


 


Metamyelocytes   


 


PT with INR   


 


INR   


 


PTT (Actin FS)   


 


ABG pH   


 


ABG pCO2 at Pt Temp   


 


ABG pO2 at Pt Temp   


 


ABG HCO3   


 


ABG O2 Sat (Measured)   


 


ABG O2 Content   


 


ABG Base Excess   


 


Sodium   


 


Chloride   


 


Carbon Dioxide   


 


Anion Gap   


 


BUN   


 


Creatinine   


 


Random Glucose   


 


Lactic Acid   2.1 H 


 


Phosphorus   


 


Magnesium   


 


TIBC   


 


Ferritin  610.5 H  


 


AST   


 


Alkaline Phosphatase   


 


Troponin I   


 


Total Protein   


 


Total Protein (PEP)   


 


Albumin   


 


Albumin (PEP)   


 


Globulin   


 


Albumin/Globulin Ratio   


 


Urine Blood   


 


[facility]   














  11/05/18 11/05/18 11/05/18





  16:40 16:40 18:00


 


RBC   


 


Hgb   


 


Hct   


 


RDW   


 


Monocytes %   


 


Monocytes % (Manual)   


 


Myelocytes % (Man)   


 


Nucleated RBC %   


 


Metamyelocytes   


 


PT with INR   18.60 H 


 


INR   1.57 H 


 


PTT (Actin FS)   64.8 H 


 


ABG pH    7.29 L


 


ABG pCO2 at Pt Temp   


 


ABG pO2 at Pt Temp    323.0 H*


 


ABG HCO3    17.8 L


 


ABG O2 Sat (Measured)    99.4 H


 


ABG O2 Content   


 


ABG Base Excess    -7.7 L


 


Sodium  132 L  


 


Chloride  108 H  


 


Carbon Dioxide  19 L  


 


Anion Gap  5 L  


 


BUN  59 H  


 


Creatinine  2.2 H  


 


Random Glucose  143 H  


 


Lactic Acid   


 


Phosphorus  5.1 H  


 


Magnesium   


 


TIBC   


 


Ferritin   


 


AST  48 H  


 


Alkaline Phosphatase   


 


Troponin I  0.55 H  


 


Total Protein  12.2 H  


 


Total Protein (PEP)   


 


Albumin  1.2 L  


 


Albumin (PEP)   


 


Globulin   


 


Albumin/Globulin Ratio   


 


Urine Blood   


 


[facility]   














  11/05/18 11/06/18 11/06/18





  23:04 05:30 05:30


 


RBC    3.04 L


 


Hgb    9.5 L


 


Hct    27.3 L


 


RDW    16.1 H


 


Monocytes %   


 


Monocytes % (Manual)   


 


Myelocytes % (Man)   


 


Nucleated RBC %    11 H*


 


Metamyelocytes   


 


PT with INR   


 


INR   


 


PTT (Actin FS)   


 


ABG pH   


 


ABG pCO2 at Pt Temp   


 


ABG pO2 at Pt Temp   


 


ABG HCO3   


 


ABG O2 Sat (Measured)   


 


ABG O2 Content   


 


ABG Base Excess   


 


Sodium   


 


Chloride   113 H 


 


Carbon Dioxide   15 L 


 


Anion Gap   


 


BUN   61 H 


 


Creatinine   2.2 H 


 


Random Glucose   


 


Lactic Acid   


 


Phosphorus   5.4 H 


 


Magnesium   1.7 L 


 


TIBC   


 


Ferritin   


 


AST   41 H 


 


Alkaline Phosphatase   39 L 


 


Troponin I   


 


Total Protein   8.3 H 


 


Total Protein (PEP)   


 


Albumin   3.3 L 


 


Albumin (PEP)   


 


Globulin   


 


Albumin/Globulin Ratio   


 


Urine Blood  2+ H  


 


[facility]   














  11/06/18 11/06/18





  05:30 06:00


 


RBC  


 


Hgb  


 


Hct  


 


RDW  


 


Monocytes %  


 


Monocytes % (Manual)  


 


Myelocytes % (Man)  


 


Nucleated RBC %  


 


Metamyelocytes  


 


PT with INR  21.50 H 


 


INR  1.81 H 


 


PTT (Actin FS)  


 


ABG pH  


 


ABG pCO2 at Pt Temp   28.9 L D


 


ABG pO2 at Pt Temp   158.0 H* D


 


ABG HCO3   15.5 L


 


ABG O2 Sat (Measured)  


 


ABG O2 Content   14.8 L


 


ABG Base Excess   -8.6 L


 


Sodium  


 


Chloride  


 


Carbon Dioxide  


 


Anion Gap  


 


BUN  


 


Creatinine  


 


Random Glucose  


 


Lactic Acid  


 


Phosphorus  


 


Magnesium  


 


TIBC  


 


Ferritin  


 


AST  


 


Alkaline Phosphatase  


 


Troponin I  


 


Total Protein  


 


Total Protein (PEP)  


 


Albumin  


 


Albumin (PEP)  


 


Globulin  


 


Albumin/Globulin Ratio  


 


Urine Blood  


 


[facility]  








 Current Medications











Generic Name Dose Route Start Last Admin





  Trade Name Freq  PRN Reason Stop Dose Admin


 


Acetaminophen  650 mg  11/01/18 05:44  11/05/18 18:10





  Tylenol Suppository -  ID   650 mg





  Q6H PRN   Administration





  FEVER   





     





     





     


 


Heparin Sodium (Porcine)  1,000 unit  11/01/18 05:44  





  Heparin -  IVPUSH   





  PRN PRN   





  Heparin   





     





     





     


 


Heparin Sodium (Porcine)  5,000 unit  11/01/18 05:44  11/01/18 15:15





  Heparin -  IVPUSH   5,000 unit





  PRN PRN   Administration





  Heparin   





     





     





     


 


Ceftriaxone Sodium 1 gm/  50 mls @ 100 mls/hr  10/31/18 11:45  11/05/18 10:06





  Dextrose  IVPB   100 mls/hr





  DAILY [facility]   Administration





     





     





  Protocol   





     


 


Vancomycin HCl  1,000 mg in 250 mls @ 166.667 mls/hr  10/31/18 20:00  11/05/18 

20:25





  Vancomycin (Pre-Docked)  IVPB   166.667 mls/hr





  Q24H [facility]   Administration





     





     





  Protocol   





     


 


Heparin Sodium/Dextrose  25,000 units in 500 mls @ 16 mls/hr  11/01/18 05:45  11 /06/18 05:47





  Heparin Infusion -  IVPB   950 unit/hr





  TITR [facility]   19 mls/hr





     Administration





     





  Protocol   





  800 UNIT/HR   


 


Magnesium Sulfate  2 gm  11/06/18 08:45  





  Magnesium Sulfate  IVPB  11/06/18 08:46  





  ONCE ONE   





     





     





     





     


 


Metoprolol Tartrate  5 mg  11/06/18 06:45  11/06/18 06:55





  Lopressor Injection -  IVPUSH   5 mg





  TID [facility]   Administration





     





     





     





     


 


Thiamine HCl  100 mg  10/30/18 22:12  11/05/18 10:07





  Vitamin B1 -  PO   Not Given





  DAILY [facility]   





     





     





     





     








Impression: 


Acute hypoxic respiratory failure 


s/p intubation


Obtundation 


? sepsis  with ? aspiration and pulmonary infiltrates on antibiotics 


HAM/CKD


PVD- sp thrombectomy 


Atrial fibrillation 


LV diastolic dysfunction


Hypercalcemia- treated with zometa 


? HYPERVISCOSITY as etiology for obtundation with superimposed hypercalcemia 

and sepsis contributing  





Plan:





I have contacted the blood center and arranged for a second apheresis tomorrow. 


In interim- antibiotics per ID, 


Heparin for Atrial fib 


Monitor Mag, Phos, Ca++, lytes


Consider catheter for apheresis  ( will need to hold a/c for this) 





The left shift  with normoblasts in the peripheral blood can be seen in severe 

hypoxemia, overwhelming sepsis or an infiltrative bone marrow disorder. 


Awaiting protein studies and light chain studies for possible diagnosis of 

myeloma.

## 2018-11-06 NOTE — PN
Progress Note, Physician


History of Present Illness: 


Transferred to ICU after Rapid Response


 Intubated on mechanical ventilation


 Opens eyes to calling name


 Spiked temp 101.2


 Apheresis performed


 


 





- Current Medication List


Current Medications: 


Active Medications





Acetaminophen (Tylenol Suppository -)  650 mg [place] Q6H PRN


   PRN Reason: FEVER


   Last Admin: 11/05/18 18:10 Dose:  650 mg


Heparin Sodium (Porcine) (Heparin -)  1,000 unit IVPUSH PRN PRN


   PRN Reason: Heparin


Heparin Sodium (Porcine) (Heparin -)  5,000 unit IVPUSH PRN PRN


   PRN Reason: Heparin


   Last Admin: 11/01/18 15:15 Dose:  5,000 unit


Ceftriaxone Sodium 1 gm/ (Dextrose)  50 mls @ 100 mls/hr IVPB DAILY [facility]; 

Protocol


   Last Admin: 11/06/18 09:03 Dose:  100 mls/hr


Vancomycin HCl (Vancomycin (Pre-Docked))  1,000 mg in 250 mls @ 166.667 mls/hr 

IVPB Q24H [facility]; Protocol


   Last Admin: 11/05/18 20:25 Dose:  166.667 mls/hr


Heparin Sodium/Dextrose (Heparin Infusion -)  25,000 units in 500 mls @ 16 mls/

hr IVPB TITR [facility]; Protocol


   Last Admin: 11/06/18 05:47 Dose:  950 unit/hr, 19 mls/hr


Metoprolol Tartrate (Lopressor Injection -)  5 mg IVPUSH TID [facility]


   Last Admin: 11/06/18 06:55 Dose:  5 mg


Thiamine HCl (Vitamin B1 -)  100 mg PO DAILY [facility]


   Last Admin: 11/06/18 09:03 Dose:  Not Given











- Objective


Vital Signs: 


 Vital Signs











Temperature  98.6 F   11/06/18 10:03


 


Pulse Rate  85   11/06/18 10:03


 


Respiratory Rate  21 H  11/06/18 10:03


 


Blood Pressure  119/70   11/06/18 10:03


 


O2 Sat by Pulse Oximetry (%)  99   11/06/18 08:13











Constitutional: Yes: No Distress


Cardiovascular: Yes: Regular Rate and Rhythm, S1, S2


Respiratory: Yes: Mechanically Ventilated


Gastrointestinal: Yes: Normal Bowel Sounds, Soft.  No: Tenderness


Edema: No


Labs: 


 CBC, BMP





 11/06/18 05:30 





 11/06/18 05:30 





 INR, PTT











INR  1.81  (0.83-1.09)  H  11/06/18  05:30    


 


Fibrinogen  246.0 mg/dL (238-498)   11/06/18  05:30    














Assessment/Plan


Respiratory failure


 RLL pneumonia


 Toxic metabolic encephalopathy


 Hypercalcemia


 Renal failure


 Suspected myeloma


 


 Repeat BC obtained


 Obtain suctioned sputum c/s


 Broaden antimicrobial coverage- zosyn

## 2018-11-06 NOTE — PN
Teaching Attending Note


Name of Resident: Arnav Bernard





ATTENDING PHYSICIAN STATEMENT





I saw and evaluated the patient.


I reviewed the resident's note and discussed the case with the resident.


I agree with the resident's findings and plan as documented.








SUBJECTIVE:


Patient seen and examined in the ICU.  Remains intubated and sedated.  


S/P Plasmapheresis last night. 


Today appears to be grimacing to pain. 





 Intake & Output











 11/04/18 11/04/18 11/05/18 11/06/18





 00:59 23:59 23:59 23:59


 


Intake Total   1744 209


 


Output Total   3100 900


 


Balance   -1356 -691


 


Weight    178 lb 12.8 oz








 Last Vital Signs











Temp Pulse Resp BP Pulse Ox


 


 98.6 F   85   21 H  119/70   99 


 


 11/06/18 10:03  11/06/18 10:03  11/06/18 10:03  11/06/18 10:03  11/06/18 08:13








Active Medications





Acetaminophen (Tylenol Suppository -)  650 mg GA Q6H PRN


   PRN Reason: FEVER


   Last Admin: 11/05/18 18:10 Dose:  650 mg


Heparin Sodium (Porcine) (Heparin -)  1,000 unit IVPUSH PRN PRN


   PRN Reason: Heparin


Heparin Sodium (Porcine) (Heparin -)  5,000 unit IVPUSH PRN PRN


   PRN Reason: Heparin


   Last Admin: 11/01/18 15:15 Dose:  5,000 unit


Vancomycin HCl (Vancomycin (Pre-Docked))  1,000 mg in 250 mls @ 166.667 mls/hr 

IVPB Q24H AVA; Protocol


   Last Admin: 11/05/18 20:25 Dose:  166.667 mls/hr


Heparin Sodium/Dextrose (Heparin Infusion -)  25,000 units in 500 mls @ 16 mls/

hr IVPB TITR AVA; Protocol


   Last Admin: 11/06/18 05:47 Dose:  950 unit/hr, 19 mls/hr


Piperacillin Sod/Tazobactam (Sod 2.25 gm/ Dextrose)  50 mls @ 100 mls/hr IVPB 

Q8H-IV AVA; Protocol


Metoprolol Tartrate (Lopressor Injection -)  5 mg IVPUSH TID AVA


   Last Admin: 11/06/18 06:55 Dose:  5 mg


Thiamine HCl (Vitamin B1 -)  100 mg PO DAILY AVA


   Last Admin: 11/06/18 09:03 Dose:  Not Given

















Constitutional: Yes: Intubated and sedated 


Cardiovascular: Yes: Pulse Irregular, AFib 


Respiratory: Yes: Intubated and sedated 


Gastrointestinal: Yes: Normal Bowel Sounds, Soft


Musculoskeletal: Yes: WNL


Extremities: Yes: WNL


Edema: No


Peripheral Pulses WNL: Yes


Neurological: Yes: Sedated 


Labs: 


  


  Laboratory Results - last 24 hr











  10/31/18 11/02/18 11/05/18





  13:00 08:00 06:35


 


WBC   


 


Corrected WBC (auto)    3.96


 


RBC   


 


Hgb   


 


Hct   


 


MCV   


 


MCH   


 


MCHC   


 


RDW   


 


Plt Count   


 


MPV   


 


Absolute Neuts (auto)   


 


Neutrophils %   


 


Neutrophils % (Manual)    50.5


 


Band Neutrophils %    6.2


 


Lymphocytes %   


 


Lymphocytes % (Manual)    28.9


 


Monocytes %   


 


Monocytes % (Manual)    7


 


Eosinophils %   


 


Eosinophils % (Manual)    1.1


 


Basophils %   


 


Basophils % (Manual)    0.0


 


Myelocytes % (Man)    1


 


Promyelocytes % (Man)    0


 


Blast Cells % (Manual)    0


 


Nucleated RBC %   


 


Metamyelocytes    4 H D


 


Hypochromia    0


 


Platelet Estimate    Normal


 


Platelet Comment    Present


 


Polychromasia    1+


 


Poikilocytosis    1+


 


Anisocytosis    1+


 


Microcytosis    1+


 


Macrocytosis    0


 


Rouleaux    1+


 


PT with INR   


 


INR   


 


PTT (Actin FS)   


 


Fibrinogen   


 


Anticoagulation Therapy   


 


Puncture Site   


 


ABG pH   


 


ABG pCO2 at Pt Temp   


 


ABG pO2 at Pt Temp   


 


ABG HCO3   


 


ABG O2 Sat (Measured)   


 


ABG O2 Content   


 


ABG Base Excess   


 


Chacho Test   


 


O2 Delivery Device   


 


Oxygen Flow Rate   


 


Vent Mode   


 


Vent Rate   


 


Mechanical Rate   


 


PEEP   


 


Pressure Support Vent   


 


Sodium   


 


Potassium   


 


Chloride   


 


Carbon Dioxide   


 


Anion Gap   


 


BUN   


 


Creatinine   


 


Creat Clearance w eGFR   


 


Random Glucose   


 


Lactic Acid   


 


Calcium   


 


Phosphorus   


 


Magnesium   


 


Iron   


 


TIBC   


 


Iron Saturation   


 


Ferritin   


 


Total Bilirubin   


 


AST   


 


ALT   


 


Alkaline Phosphatase   


 


Troponin I   


 


Total Protein   


 


Total Protein (PEP)   11.9 H 


 


Albumin   


 


Albumin (PEP)   2.3 L 


 


Globulin   9.6 H 


 


Albumin/Globulin Ratio   0.2 L 


 


Beta Globulins   1.0 


 


Vitamin B12   


 


TSH   


 


Urine Color   


 


Urine Appearance   


 


Urine pH   


 


Ur Specific Gravity   


 


Urine Protein   


 


Urine Glucose (UA)   


 


Urine Ketones   


 


Urine Blood   


 


Urine Nitrite   


 


Urine Bilirubin   


 


Urine Urobilinogen   


 


Ur Leukocyte Esterase   


 


Urine WBC (Auto)   


 


Urine RBC (Auto)   


 


Ur Epithelial Cells   


 


Urine Bacteria   


 


Hyaline Casts   


 


Urine Mucus   


 


Ethylene Glycol  None detected  


 


ANTWAN [place]   6.8 H 


 


Blood Type   


 


Antibody Screen   














  11/05/18 11/05/18 11/05/18





  12:55 12:55 16:36


 


WBC   


 


Corrected WBC (auto)   


 


RBC   


 


Hgb   


 


Hct   


 


MCV   


 


MCH   


 


MCHC   


 


RDW   


 


Plt Count   


 


MPV   


 


Absolute Neuts (auto)   


 


Neutrophils %   


 


Neutrophils % (Manual)   


 


Band Neutrophils %   


 


Lymphocytes %   


 


Lymphocytes % (Manual)   


 


Monocytes %   


 


Monocytes % (Manual)   


 


Eosinophils %   


 


Eosinophils % (Manual)   


 


Basophils %   


 


Basophils % (Manual)   


 


Myelocytes % (Man)   


 


Promyelocytes % (Man)   


 


Blast Cells % (Manual)   


 


Nucleated RBC %   


 


Metamyelocytes   


 


Hypochromia   


 


Platelet Estimate   


 


Platelet Comment   


 


Polychromasia   


 


Poikilocytosis   


 


Anisocytosis   


 


Microcytosis   


 


Macrocytosis   


 


Rouleaux   


 


PT with INR   


 


INR   


 


PTT (Actin FS)   


 


Fibrinogen   


 


Anticoagulation Therapy   


 


Puncture Site   


 


ABG pH   


 


ABG pCO2 at Pt Temp   


 


ABG pO2 at Pt Temp   


 


ABG HCO3   


 


ABG O2 Sat (Measured)   


 


ABG O2 Content   


 


ABG Base Excess   


 


Chacho Test   


 


O2 Delivery Device   


 


Oxygen Flow Rate   


 


Vent Mode   


 


Vent Rate   


 


Mechanical Rate   


 


PEEP   


 


Pressure Support Vent   


 


Sodium   


 


Potassium   


 


Chloride   


 


Carbon Dioxide   


 


Anion Gap   


 


BUN   


 


Creatinine   


 


Creat Clearance w eGFR   


 


Random Glucose   


 


Lactic Acid    2.1 H


 


Calcium   


 


Phosphorus   


 


Magnesium   


 


Iron  50  


 


TIBC  173 L  


 


Iron Saturation  29  


 


Ferritin   610.5 H 


 


Total Bilirubin   


 


AST   


 


ALT   


 


Alkaline Phosphatase   


 


Troponin I   


 


Total Protein   


 


Total Protein (PEP)   


 


Albumin   


 


Albumin (PEP)   


 


Globulin   


 


Albumin/Globulin Ratio   


 


Beta Globulins   


 


Vitamin B12   431 


 


TSH   


 


Urine Color   


 


Urine Appearance   


 


Urine pH   


 


Ur Specific Gravity   


 


Urine Protein   


 


Urine Glucose (UA)   


 


Urine Ketones   


 


Urine Blood   


 


Urine Nitrite   


 


Urine Bilirubin   


 


Urine Urobilinogen   


 


Ur Leukocyte Esterase   


 


Urine WBC (Auto)   


 


Urine RBC (Auto)   


 


Ur Epithelial Cells   


 


Urine Bacteria   


 


Hyaline Casts   


 


Urine Mucus   


 


Ethylene Glycol   


 


ANT[place]   


 


Blood Type   


 


Antibody Screen   














  11/05/18 11/05/18 11/05/18





  16:40 16:40 16:40


 


WBC   5.1 


 


Corrected WBC (auto)   


 


RBC   3.04 L 


 


Hgb   9.7 L 


 


Hct   27.8 L 


 


MCV   91.5 


 


MCH   31.8 


 


MCHC   34.8 


 


RDW   16.0 H 


 


Plt Count   254 


 


MPV   8.7 


 


Absolute Neuts (auto)   3.2 


 


Neutrophils %   63.1 


 


Neutrophils % (Manual)   50.0 


 


Band Neutrophils %   11.0 


 


Lymphocytes %   32.7  D 


 


Lymphocytes % (Manual)   30.0 


 


Monocytes %   2.9 L 


 


Monocytes % (Manual)   0 L D 


 


Eosinophils %   1.0 


 


Eosinophils % (Manual)   0.0  D 


 


Basophils %   0.3 


 


Basophils % (Manual)   0.0 


 


Myelocytes % (Man)   3 H D 


 


Promyelocytes % (Man)   


 


Blast Cells % (Manual)   


 


Nucleated RBC %   8 H 


 


Metamyelocytes   2  D 


 


Hypochromia   


 


Platelet Estimate   Adequate 


 


Platelet Comment   


 


Polychromasia   


 


Poikilocytosis   


 


Anisocytosis   


 


Microcytosis   


 


Macrocytosis   


 


Rouleaux   


 


PT with INR   


 


INR   


 


PTT (Actin FS)   


 


Fibrinogen   


 


Anticoagulation Therapy   


 


Puncture Site   


 


ABG pH   


 


ABG pCO2 at Pt Temp   


 


ABG pO2 at Pt Temp   


 


ABG HCO3   


 


ABG O2 Sat (Measured)   


 


ABG O2 Content   


 


ABG Base Excess   


 


Chacho Test   


 


O2 Delivery Device   


 


Oxygen Flow Rate   


 


Vent Mode   


 


Vent Rate   


 


Mechanical Rate   


 


PEEP   


 


Pressure Support Vent   


 


Sodium    132 L


 


Potassium    5.1


 


Chloride    108 H


 


Carbon Dioxide    19 L


 


Anion Gap    5 L


 


BUN    59 H


 


Creatinine    2.2 H


 


Creat Clearance w eGFR    29.02


 


Random Glucose    143 H


 


Lactic Acid   


 


Calcium    9.4


 


Phosphorus    5.1 H


 


Magnesium    1.8


 


Iron   


 


TIBC   


 


Iron Saturation   


 


Ferritin   


 


Total Bilirubin    0.2


 


AST    48 H


 


ALT    19


 


Alkaline Phosphatase    74


 


Troponin I    0.55 H


 


Total Protein    12.2 H


 


Total Protein (PEP)   


 


Albumin    1.2 L


 


Albumin (PEP)   


 


Globulin   


 


Albumin/Globulin Ratio   


 


Beta Globulins   


 


Vitamin B12   


 


TSH   


 


Urine Color   


 


Urine Appearance   


 


Urine pH   


 


Ur Specific Gravity   


 


Urine Protein   


 


Urine Glucose (UA)   


 


Urine Ketones   


 


Urine Blood   


 


Urine Nitrite   


 


Urine Bilirubin   


 


Urine Urobilinogen   


 


Ur Leukocyte Esterase   


 


Urine WBC (Auto)   


 


Urine RBC (Auto)   


 


Ur Epithelial Cells   


 


Urine Bacteria   


 


Hyaline Casts   


 


Urine Mucus   


 


Ethylene Glycol   


 


[place]   


 


Blood Type  B POSITIVE  


 


Antibody Screen  Negative  














  11/05/18 11/05/18 11/05/18





  16:40 18:00 23:04


 


WBC   


 


Corrected WBC (auto)   


 


RBC   


 


Hgb   


 


Hct   


 


MCV   


 


MCH   


 


MCHC   


 


RDW   


 


Plt Count   


 


MPV   


 


Absolute Neuts (auto)   


 


Neutrophils %   


 


Neutrophils % (Manual)   


 


Band Neutrophils %   


 


Lymphocytes %   


 


Lymphocytes % (Manual)   


 


Monocytes %   


 


Monocytes % (Manual)   


 


Eosinophils %   


 


Eosinophils % (Manual)   


 


Basophils %   


 


Basophils % (Manual)   


 


Myelocytes % (Man)   


 


Promyelocytes % (Man)   


 


Blast Cells % (Manual)   


 


Nucleated RBC %   


 


Metamyelocytes   


 


Hypochromia   


 


Platelet Estimate   


 


Platelet Comment   


 


Polychromasia   


 


Poikilocytosis   


 


Anisocytosis   


 


Microcytosis   


 


Macrocytosis   


 


Rouleaux   


 


PT with INR  18.60 H  


 


INR  1.57 H  


 


PTT (Actin FS)  64.8 H  


 


Fibrinogen   


 


Anticoagulation Therapy   No Result Required. 


 


Puncture Site   Right radial 


 


ABG pH   7.29 L 


 


ABG pCO2 at Pt Temp   38.4 


 


ABG pO2 at Pt Temp   323.0 H* 


 


ABG HCO3   17.8 L 


 


ABG O2 Sat (Measured)   99.4 H 


 


ABG O2 Content   15.0 


 


ABG Base Excess   -7.7 L 


 


Chacho Test   Positive 


 


O2 Delivery Device   No Result Required. 


 


Oxygen Flow Rate   100 


 


Vent Mode   Vo/ac 


 


Vent Rate   12 


 


Mechanical Rate   No Result Required. 


 


PEEP   8.0 


 


Pressure Support Vent   No Result Required. 


 


Sodium   


 


Potassium   


 


Chloride   


 


Carbon Dioxide   


 


Anion Gap   


 


BUN   


 


Creatinine   


 


Creat Clearance w eGFR   


 


Random Glucose   


 


Lactic Acid   


 


Calcium   


 


Phosphorus   


 


Magnesium   


 


Iron   


 


TIBC   


 


Iron Saturation   


 


Ferritin   


 


Total Bilirubin   


 


AST   


 


ALT   


 


Alkaline Phosphatase   


 


Troponin I   


 


Total Protein   


 


Total Protein (PEP)   


 


Albumin   


 


Albumin (PEP)   


 


Globulin   


 


Albumin/Globulin Ratio   


 


Beta Globulins   


 


Vitamin B12   


 


TSH   


 


Urine Color    Ltyellow


 


Urine Appearance    Slcloudy


 


Urine pH    5.0


 


Ur Specific Gravity    1.010


 


Urine Protein    Negative


 


Urine Glucose (UA)    Negative


 


Urine Ketones    Negative


 


Urine Blood    2+ H


 


Urine Nitrite    Negative


 


Urine Bilirubin    Negative


 


Urine Urobilinogen    Negative


 


Ur Leukocyte Esterase    Negative


 


Urine WBC (Auto)    5


 


Urine RBC (Auto)    9


 


Ur Epithelial Cells    Rare


 


Urine Bacteria    Few


 


Hyaline Casts    17


 


Urine Mucus    Rare


 


Ethylene Glycol   


 


[place]   


 


Blood Type   


 


Antibody Screen   














  11/06/18 11/06/18 11/06/18





  05:30 05:30 05:30


 


WBC    4.9


 


Corrected WBC (auto)    4.34


 


RBC    3.04 L


 


Hgb    9.5 L


 


Hct    27.3 L


 


MCV    89.9


 


MCH    31.2


 


MCHC    34.7


 


RDW    16.1 H


 


Plt Count    156  D


 


MPV    9.2


 


Absolute Neuts (auto)    2.9


 


Neutrophils %    70.8


 


Neutrophils % (Manual)    53.0


 


Band Neutrophils %    7.0


 


Lymphocytes %    23.2  D


 


Lymphocytes % (Manual)    31.0


 


Monocytes %    4.9


 


Monocytes % (Manual)    2 L D


 


Eosinophils %    0.8


 


Eosinophils % (Manual)    2.0  D


 


Basophils %    0.3


 


Basophils % (Manual)    0.0


 


Myelocytes % (Man)    2  D


 


Promyelocytes % (Man)    0


 


Blast Cells % (Manual)    0


 


Nucleated RBC %    13 H*


 


Metamyelocytes    3 H D


 


Hypochromia    0


 


Platelet Estimate    Adequate


 


Platelet Comment   


 


Polychromasia    0


 


Poikilocytosis    0


 


Anisocytosis    0


 


Microcytosis    0


 


Macrocytosis    0


 


Rouleaux   


 


PT with INR   


 


INR   


 


PTT (Actin FS)  76.8 H  


 


Fibrinogen   


 


Anticoagulation Therapy   


 


Puncture Site   


 


ABG pH   


 


ABG pCO2 at Pt Temp   


 


ABG pO2 at Pt Temp   


 


ABG HCO3   


 


ABG O2 Sat (Measured)   


 


ABG O2 Content   


 


ABG Base Excess   


 


Chacho Test   


 


O2 Delivery Device   


 


Oxygen Flow Rate   


 


Vent Mode   


 


Vent Rate   


 


Mechanical Rate   


 


PEEP   


 


Pressure Support Vent   


 


Sodium   139 


 


Potassium   4.5 


 


Chloride   113 H 


 


Carbon Dioxide   15 L 


 


Anion Gap   10 


 


BUN   61 H 


 


Creatinine   2.2 H 


 


Creat Clearance w eGFR   29.02 


 


Random Glucose   74 


 


Lactic Acid   


 


Calcium   9.6 


 


Phosphorus   5.4 H 


 


Magnesium   1.7 L 


 


Iron   


 


TIBC   


 


Iron Saturation   


 


Ferritin   


 


Total Bilirubin   0.7 


 


AST   41 H 


 


ALT   17 


 


Alkaline Phosphatase   39 L 


 


Troponin I   


 


Total Protein   8.3 H 


 


Total Protein (PEP)   


 


Albumin   3.3 L 


 


Albumin (PEP)   


 


Globulin   


 


Albumin/Globulin Ratio   


 


Beta Globulins   


 


Vitamin B12   


 


TSH   1.94  D 


 


Urine Color   


 


Urine Appearance   


 


Urine pH   


 


Ur Specific Gravity   


 


Urine Protein   


 


Urine Glucose (UA)   


 


Urine Ketones   


 


Urine Blood   


 


Urine Nitrite   


 


Urine Bilirubin   


 


Urine Urobilinogen   


 


Ur Leukocyte Esterase   


 


Urine WBC (Auto)   


 


Urine RBC (Auto)   


 


Ur Epithelial Cells   


 


Urine Bacteria   


 


Hyaline Casts   


 


Urine Mucus   


 


Ethylene Glycol   


 


[place]   


 


Blood Type   


 


Antibody Screen   














  11/06/18 11/06/18 11/06/18





  05:30 05:30 06:00


 


WBC   


 


Corrected WBC (auto)   


 


RBC   


 


Hgb   


 


Hct   


 


MCV   


 


MCH   


 


MCHC   


 


RDW   


 


Plt Count   


 


MPV   


 


Absolute Neuts (auto)   


 


Neutrophils %   


 


Neutrophils % (Manual)   


 


Band Neutrophils %   


 


Lymphocytes %   


 


Lymphocytes % (Manual)   


 


Monocytes %   


 


Monocytes % (Manual)   


 


Eosinophils %   


 


Eosinophils % (Manual)   


 


Basophils %   


 


Basophils % (Manual)   


 


Myelocytes % (Man)   


 


Promyelocytes % (Man)   


 


Blast Cells % (Manual)   


 


Nucleated RBC %   


 


Metamyelocytes   


 


Hypochromia   


 


Platelet Estimate   


 


Platelet Comment   


 


Polychromasia   


 


Poikilocytosis   


 


Anisocytosis   


 


Microcytosis   


 


Macrocytosis   


 


Rouleaux   


 


PT with INR  21.50 H  


 


INR  1.81 H  


 


PTT (Actin FS)   


 


Fibrinogen   246.0 


 


Anticoagulation Therapy    No Result Required.


 


Puncture Site    Right radial


 


ABG pH    7.35


 


ABG pCO2 at Pt Temp    28.9 L D


 


ABG pO2 at Pt Temp    158.0 H* D


 


ABG HCO3    15.5 L


 


ABG O2 Sat (Measured)    98.8


 


ABG O2 Content    14.8 L


 


ABG Base Excess    -8.6 L


 


Chacho Test    Positive


 


O2 Delivery Device    Vent


 


Oxygen Flow Rate    40%


 


Vent Mode    No Result Required.


 


Vent Rate    12


 


Mechanical Rate    No Result Required.


 


PEEP    5.0


 


Pressure Support Vent    500


 


Sodium   


 


Potassium   


 


Chloride   


 


Carbon Dioxide   


 


Anion Gap   


 


BUN   


 


Creatinine   


 


Creat Clearance w eGFR   


 


Random Glucose   


 


Lactic Acid   


 


Calcium   


 


Phosphorus   


 


Magnesium   


 


Iron   


 


TIBC   


 


Iron Saturation   


 


Ferritin   


 


Total Bilirubin   


 


AST   


 


ALT   


 


Alkaline Phosphatase   


 


Troponin I   


 


Total Protein   


 


Total Protein (PEP)   


 


Albumin   


 


Albumin (PEP)   


 


Globulin   


 


Albumin/Globulin Ratio   


 


Beta Globulins   


 


Vitamin B12   


 


TSH   


 


Urine Color   


 


Urine Appearance   


 


Urine pH   


 


Ur Specific Gravity   


 


Urine Protein   


 


Urine Glucose (UA)   


 


Urine Ketones   


 


Urine Blood   


 


Urine Nitrite   


 


Urine Bilirubin   


 


Urine Urobilinogen   


 


Ur Leukocyte Esterase   


 


Urine WBC (Auto)   


 


Urine RBC (Auto)   


 


Ur Epithelial Cells   


 


Urine Bacteria   


 


Hyaline Casts   


 


Urine Mucus   


 


Ethylene Glycol   


 


ANTW[place]   


 


Blood Type   


 


Antibody Screen   














  11/06/18





  08:00


 


WBC 


 


Corrected WBC (auto) 


 


RBC 


 


Hgb 


 


Hct 


 


MCV 


 


MCH 


 


MCHC 


 


RDW 


 


Plt Count 


 


MPV 


 


Absolute Neuts (auto) 


 


Neutrophils % 


 


Neutrophils % (Manual) 


 


Band Neutrophils % 


 


Lymphocytes % 


 


Lymphocytes % (Manual) 


 


Monocytes % 


 


Monocytes % (Manual) 


 


Eosinophils % 


 


Eosinophils % (Manual) 


 


Basophils % 


 


Basophils % (Manual) 


 


Myelocytes % (Man) 


 


Promyelocytes % (Man) 


 


Blast Cells % (Manual) 


 


Nucleated RBC % 


 


Metamyelocytes 


 


Hypochromia 


 


Platelet Estimate 


 


Platelet Comment 


 


Polychromasia 


 


Poikilocytosis 


 


Anisocytosis 


 


Microcytosis 


 


Macrocytosis 


 


Rouleaux 


 


PT with INR 


 


INR 


 


PTT (Actin FS) 


 


Fibrinogen 


 


Anticoagulation Therapy 


 


Puncture Site 


 


ABG pH 


 


ABG pCO2 at Pt Temp 


 


ABG pO2 at Pt Temp 


 


ABG HCO3 


 


ABG O2 Sat (Measured) 


 


ABG O2 Content 


 


ABG Base Excess 


 


Chacho Test 


 


O2 Delivery Device 


 


Oxygen Flow Rate 


 


Vent Mode 


 


Vent Rate 


 


Mechanical Rate 


 


PEEP 


 


Pressure Support Vent 


 


Sodium 


 


Potassium 


 


Chloride 


 


Carbon Dioxide 


 


Anion Gap 


 


BUN 


 


Creatinine 


 


Creat Clearance w eGFR 


 


Random Glucose 


 


Lactic Acid  0.9


 


Calcium 


 


Phosphorus 


 


Magnesium 


 


Iron 


 


TIBC 


 


Iron Saturation 


 


Ferritin 


 


Total Bilirubin 


 


AST 


 


ALT 


 


Alkaline Phosphatase 


 


Troponin I 


 


Total Protein 


 


Total Protein (PEP) 


 


Albumin 


 


Albumin (PEP) 


 


Globulin 


 


Albumin/Globulin Ratio 


 


Beta Globulins 


 


Vitamin B12 


 


TSH 


 


Urine Color 


 


Urine Appearance 


 


Urine pH 


 


Ur Specific Gravity 


 


Urine Protein 


 


Urine Glucose (UA) 


 


Urine Ketones 


 


Urine Blood 


 


Urine Nitrite 


 


Urine Bilirubin 


 


Urine Urobilinogen 


 


Ur Leukocyte Esterase 


 


Urine WBC (Auto) 


 


Urine RBC (Auto) 


 


Ur Epithelial Cells 


 


Urine Bacteria 


 


Hyaline Casts 


 


Urine Mucus 


 


Ethylene Glycol 


 


ANTWAN [place] 


 


Blood Type 


 


Antibody Screen 











Problem List


Acute Respiratory Failure 


R/O Aspiration 


HAM (acute kidney injury)


R/O Hyperviscosity 


Atrial Fibrillation with RVR 


R/O Sepsis 


AMS 


Anemia 


(?) Apical PTX 








PLAN: 


AC Mode of vent 


PEEP 0 for now  


AC 


Strict I & O


Monitor CXR 


Daily sedation vacation 


ABX coverage 


Rate control 


Plasmapheresis per Heme


Start enteral feeds








Dr Blum 


Critical care time spent in reviewing chart, evaluating patient and formulating 

plan - 36 minutes.

## 2018-11-06 NOTE — PN
Progress Note (short form)





- Note


Progress Note: 


Chief Complaint: Events noted, notes reviewed, remains intubated, remains in 

atrial fibrillation on A/C with Heparin





History of Present Illness: 


Seen and examined in the ICU. Events noted, notes reviewed, remains intubated, 

remains in atrial fibrillation on A/C with Heparin





[facility] coronary angiography performed 1/11/2017 revealed non-obstructive CAD, 

severe LV apical hypertrophic cardiomyopathy, mildly elevated right sided 

pressures/study is consistent with apical hypertrophy (spade-like) variant of 

hypertrophic cardiomyopathy





Echocardiography dated 07/11/2018 Mod cLVH, severe DYANA, moderate TR RVSP 50-60 

mmHg, moderate-severe MR





Echocardiography dated 10/16/2018 Normal LV size with hyperdynamic LVEF 75%, 

mild BSH, normal RV size and function, severe LAE, moderate-severe MR, mod TR








 





- Current Medication List


 


 Current Medications





Acetaminophen (Tylenol Suppository -)  650 mg MN Q6H PRN


   PRN Reason: FEVER


   Last Admin: 11/05/18 18:10 Dose:  650 mg


Diphenhydramine HCl (Benadryl Injection -)  25 mg IVPB ONCE PRN


   PRN Reason: DURING PLASMAPHERESIS


   Stop: 11/06/18 08:00


Heparin Sodium (Porcine) (Heparin -)  1,000 unit IVPUSH PRN PRN


   PRN Reason: Heparin


Heparin Sodium (Porcine) (Heparin -)  5,000 unit IVPUSH PRN PRN


   PRN Reason: Heparin


   Last Admin: 11/01/18 15:15 Dose:  5,000 unit


Ceftriaxone Sodium 1 gm/ (Dextrose)  50 mls @ 100 mls/hr IVPB DAILY AVA; 

Protocol


   Last Admin: 11/05/18 10:06 Dose:  100 mls/hr


Vancomycin HCl (Vancomycin (Pre-Docked))  1,000 mg in 250 mls @ 166.667 mls/hr 

IVPB Q24H AV[facility]; Protocol


   Last Admin: 11/05/18 20:25 Dose:  166.667 mls/hr


Heparin Sodium/Dextrose (Heparin Infusion -)  25,000 units in 500 mls @ 16 mls/

hr IVPB TITR AVA; Protocol


   Last Admin: 11/06/18 05:47 Dose:  950 unit/hr, 19 mls/hr


Metoprolol Tartrate (Lopressor Injection -)  5 mg IVPUSH TID AVA


   Last Admin: 11/06/18 06:55 Dose:  5 mg


Thiamine HCl (Vitamin B1 -)  100 mg PO DAILY [facility]


   Last Admin: 11/05/18 10:07 Dose:  Not Given








Review of Systems





Unable to obtain





- Objective


Vital Signs: 


 


 Last Vital Signs











Temp Pulse Resp BP Pulse Ox


 


 98.4 F   100 H  18   126/80   99 


 


 11/06/18 06:00  11/06/18 06:55  11/06/18 06:00  11/06/18 06:55  11/06/18 02:46








 Intake & Output











 11/04/18 11/04/18 11/05/18 11/06/18





 00:59 23:59 23:59 23:59


 


Intake Total   1744 209


 


Output Total   3100 900


 


Balance   -1356 -691


 


Weight    178 lb 12.8 oz











Neck: Supple Negative JVD No Bruit


Cardiovascular: S1 S2 Irregularly Irregular Grade 2/6 Systolic Murmur Apical


Chest: Scattered Rhonchi Bilaterally


Gastrointestinal: Soft Benign Normal Bowel Sounds


Ext: No Edema 1+ Bilateral femoral Pulses





Labs: 


 CBC, BMP





 11/06/18 05:30 








BMP pending 


 INR, PTT











INR  1.81  (0.83-1.09)  H  11/06/18  05:30    








 Hepatic Panel











Total Bilirubin  0.2 mg/dL (0.2-1)   11/05/18  16:40    


 


AST  48 U/L (15-37)  H  11/05/18  16:40    


 


ALT  19 U/L (13-61)   11/05/18  16:40    


 


Alkaline Phosphatase  74 U/L ()   11/05/18  16:40    


 


Albumin  1.2 g/dl (3.4-5.0)  L  11/05/18  16:40    














Assessment/Plan





ASSESSMENT:





1. Acute hypoxic respiratory failure, possible pneumonia other differential 

diagnosis includes possible PTE 


2. Toxic metabolic encephelopathy, rule out CVA while off DOAC, possible sepsis 

syndrome


3. Acute on CKD 


4. Hypercalcemia, paraproteinemia, possible multiple myeloma


5. 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 

disease related to persistent atrial fibrillation with [id number] score of 6


6. CAD with evidence of demand ischemic injury, non obstructive CAD on [facility] 

coronary angiogrpahy angina pectoris


7. 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 

chronic class I-II NYHA classification LV failure, compensated/euvolemic


8. Persistent atrial fibrillation [id number] score of 6 currently on Heparin 

therapy


9. HTN


10. Poor compliance with medical therapy administration and medical F/U


11. Tobacco abuse





PLAN:





1. Continue Heparin pending  extubation at which point resume Xarelto, therapy 

to be continued indefinitely unless it is absolutely contraindicated 

considering his [id number] score of 6 


2. Continue IV Lopressor and initiate PO/NGT therapy at 25 mg twice daily


3. Antibiotics as per the primary team


4. Ventilator management as per the ICU team 














Armand Mckenzie MD

## 2018-11-06 NOTE — PN
Progress Note (short form)





- Note


Progress Note: 





Renal follow up for Hypercalcemia/HAM





Pt seen and examined in the ICU


chart reviewed, yesterday afternoons events noted


s/p plasmapharesis last night 


pt on vent now, responsive to physical stimuli but not following instructions


making urine via fenton 





 Vital Signs











Temperature  98.6 F   11/06/18 10:03


 


Pulse Rate  85   11/06/18 10:03


 


Respiratory Rate  21 H  11/06/18 10:03


 


Blood Pressure  119/70   11/06/18 10:03


 


O2 Sat by Pulse Oximetry (%)  99   11/06/18 08:13








 Intake & Output











 11/04/18 11/04/18 11/05/18 11/06/18





 00:59 23:59 23:59 23:59


 


Intake Total   1744 209


 


Output Total   3100 900


 


Balance   -1356 -821


 


Weight    81.102 kg





NAD


on vent via ET tube


neck supple, no JVD


RRR, No M/R


Dec BS, no rales 


soft NT/ND


no LE edema


fenton in place 


 CBC, BMP





 11/06/18 05:30 





 11/06/18 05:30 





 Current Medications





Acetaminophen (Tylenol Suppository -)  650 mg PA Q6H PRN


   PRN Reason: FEVER


   Last Admin: 11/05/18 18:10 Dose:  650 mg


Heparin Sodium (Porcine) (Heparin -)  1,000 unit IVPUSH PRN PRN


   PRN Reason: Heparin


Heparin Sodium (Porcine) (Heparin -)  5,000 unit IVPUSH PRN PRN


   PRN Reason: Heparin


   Last Admin: 11/01/18 15:15 Dose:  5,000 unit


Vancomycin HCl (Vancomycin (Pre-Docked))  1,000 mg in 250 mls @ 166.667 mls/hr 

IVPB Q24H AV[facility]; Protocol


   Last Admin: 11/05/18 20:25 Dose:  166.667 mls/hr


Heparin Sodium/Dextrose (Heparin Infusion -)  25,000 units in 500 mls @ 16 mls/

hr IVPB TITR AV[facility]; Protocol


   Last Admin: 11/06/18 05:47 Dose:  950 unit/hr, 19 mls/hr


Piperacillin Sod/Tazobactam (Sod 2.25 gm/ Dextrose)  50 mls @ 100 mls/hr IVPB 

Q8H-IV AVA; Protocol


Metoprolol Tartrate (Lopressor Injection -)  5 mg IVPUSH TID [facility]


   Last Admin: 11/06/18 06:55 Dose:  5 mg


Thiamine HCl (Vitamin B1 -)  100 mg PO DAILY [facility]


   Last Admin: 11/06/18 09:03 Dose:  Not Given














78 year old gentleman with hx of CKD, Afib on A/C, CAD, CKD, Hypertension, 

Hyperlipidemia, PVD who presented with AMS/Confusion and found to have HAM.





#HAM (FeNa was 2.1% indicating tubular injury, US showed no stones or 

obstruction, UA w/o protein but UPCR ~0.5 indicating non-albumin proteinuria)


#AMS of unclear etiology (metabolic encephalopathy)


#Anemia 


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


#Hyperdense lesion on US of the kidney 


#Normal anion gap metabolic acidosis 





Renal function unchanged, pt is non-oliguric at the present time 


pt appears to be evolemic 


keep MAP > 65


s/p Zometa yesterday for hypercalcmeia


continue plasmapharesis for suspected hyperviscosity 


Trend serum bicarb Q12h, ususally pt develop metabolic alkalosis with pharesis 


continue supportive care 





Ricky Chang DO

## 2018-11-06 NOTE — PN
Physical Exam: 


SUBJECTIVE: Patient seen and examined at bedside. He remains intubated and 

sedated. He received plasmapharesis overnight. Yesterday he had an elevated 

rectal temp to 101 despite being on vanc and ceftriaxone. His Abx coverage was 

escalated to Zosyn.








OBJECTIVE:





 Vital Signs











 Period  Temp  Pulse  Resp  BP Sys/Varghese  Pulse Ox


 


 Last 24 Hr  98.1 F-101.2 F    14-26  /70-87  











GENERAL: Intubated and sedated. 


HEAD: Normal with no signs of trauma.


EYES: Pupils equal, round and reactive to light, sclera anicteric, conjunctiva 

clear.


EARS, NOSE, THROAT: Ears normal, nares patent, oropharynx clear without 

exudates. Moist mucous membranes.


NECK: Supple without lymphadenopathy, no JVD, or masses.


LUNGS: Diffuse wheezes and rales bilaterally in both lung fields. No accessory 

muscle use. 


HEART: regular rate and irregular rhythm.


ABDOMEN: Soft, normoactive bowel sounds, no guarding, no rebound, no masses.  

No hepatomegaly or splenomegaly. 


MUSCULOSKELETAL: Normal range of motion at all joints. No bony deformities or 

tenderness. 


UPPER EXTREMITIES: 2+ pulses, warm, well-perfused. No cyanosis. No clubbing. 

Cap refill <2 seconds. No peripheral edema.


LOWER EXTREMITIES: 2+ pulses, warm, well-perfused. No calf tenderness. No 

peripheral edema. 


NEUROLOGICAL: Normoreflexic. FUNMILAYO. 


SKIN: Warm, diaphoretic, normal turgor, no rashes or lesions noted. 

















 Laboratory Results - last 24 hr











  10/31/18 11/02/18 11/05/18





  13:00 08:00 06:35


 


WBC   


 


Corrected WBC (auto)    3.96


 


RBC   


 


Hgb   


 


Hct   


 


MCV   


 


MCH   


 


MCHC   


 


RDW   


 


Plt Count   


 


MPV   


 


Absolute Neuts (auto)   


 


Neutrophils %   


 


Neutrophils % (Manual)    50.5


 


Band Neutrophils %    6.2


 


Lymphocytes %   


 


Lymphocytes % (Manual)    28.9


 


Monocytes %   


 


Monocytes % (Manual)    7


 


Eosinophils %   


 


Eosinophils % (Manual)    1.1


 


Basophils %   


 


Basophils % (Manual)    0.0


 


Myelocytes % (Man)    1


 


Promyelocytes % (Man)    0


 


Blast Cells % (Manual)    0


 


Nucleated RBC %   


 


Metamyelocytes    4 H D


 


Hypochromia    0


 


Platelet Estimate    Normal


 


Platelet Comment    Present


 


Polychromasia    1+


 


Poikilocytosis    1+


 


Anisocytosis    1+


 


Microcytosis    1+


 


Macrocytosis    0


 


Rouleaux    1+


 


PT with INR   


 


INR   


 


PTT (Actin FS)   


 


Fibrinogen   


 


Anticoagulation Therapy   


 


Puncture Site   


 


ABG pH   


 


ABG pCO2 at Pt Temp   


 


ABG pO2 at Pt Temp   


 


ABG HCO3   


 


ABG O2 Sat (Measured)   


 


ABG O2 Content   


 


ABG Base Excess   


 


Chacho Test   


 


O2 Delivery Device   


 


Oxygen Flow Rate   


 


Vent Mode   


 


Vent Rate   


 


Mechanical Rate   


 


PEEP   


 


Pressure Support Vent   


 


Sodium   


 


Potassium   


 


Chloride   


 


Carbon Dioxide   


 


Anion Gap   


 


BUN   


 


Creatinine   


 


Creat Clearance w eGFR   


 


Random Glucose   


 


Lactic Acid   


 


Calcium   


 


Phosphorus   


 


Magnesium   


 


Iron   


 


TIBC   


 


Iron Saturation   


 


Ferritin   


 


Total Bilirubin   


 


AST   


 


ALT   


 


Alkaline Phosphatase   


 


Troponin I   


 


Total Protein   


 


Total Protein (PEP)   11.9 H 


 


Albumin   


 


Albumin (PEP)   2.3 L 


 


Globulin   9.6 H 


 


Albumin/Globulin Ratio   0.2 L 


 


Beta Globulins   1.0 


 


Vitamin B12   


 


TSH   


 


Urine Color   


 


Urine Appearance   


 


Urine pH   


 


Ur Specific Gravity   


 


Urine Protein   


 


Urine Glucose (UA)   


 


Urine Ketones   


 


Urine Blood   


 


Urine Nitrite   


 


Urine Bilirubin   


 


Urine Urobilinogen   


 


Ur Leukocyte Esterase   


 


Urine WBC (Auto)   


 


Urine RBC (Auto)   


 


Ur Epithelial Cells   


 


Urine Bacteria   


 


Hyaline Casts   


 


Urine Mucus   


 


Ethylene Glycol  None detected  


 


ANTWAN M[place]   6.8 H 


 


Blood Type   


 


Antibody Screen   














  11/05/18 11/05/18 11/05/18





  12:55 12:55 16:36


 


WBC   


 


Corrected WBC (auto)   


 


RBC   


 


Hgb   


 


Hct   


 


MCV   


 


MCH   


 


MCHC   


 


RDW   


 


Plt Count   


 


MPV   


 


Absolute Neuts (auto)   


 


Neutrophils %   


 


Neutrophils % (Manual)   


 


Band Neutrophils %   


 


Lymphocytes %   


 


Lymphocytes % (Manual)   


 


Monocytes %   


 


Monocytes % (Manual)   


 


Eosinophils %   


 


Eosinophils % (Manual)   


 


Basophils %   


 


Basophils % (Manual)   


 


Myelocytes % (Man)   


 


Promyelocytes % (Man)   


 


Blast Cells % (Manual)   


 


Nucleated RBC %   


 


Metamyelocytes   


 


Hypochromia   


 


Platelet Estimate   


 


Platelet Comment   


 


Polychromasia   


 


Poikilocytosis   


 


Anisocytosis   


 


Microcytosis   


 


Macrocytosis   


 


Rouleaux   


 


PT with INR   


 


INR   


 


PTT (Actin FS)   


 


Fibrinogen   


 


Anticoagulation Therapy   


 


Puncture Site   


 


ABG pH   


 


ABG pCO2 at Pt Temp   


 


ABG pO2 at Pt Temp   


 


ABG HCO3   


 


ABG O2 Sat (Measured)   


 


ABG O2 Content   


 


ABG Base Excess   


 


Chacho Test   


 


O2 Delivery Device   


 


Oxygen Flow Rate   


 


Vent Mode   


 


Vent Rate   


 


Mechanical Rate   


 


PEEP   


 


Pressure Support Vent   


 


Sodium   


 


Potassium   


 


Chloride   


 


Carbon Dioxide   


 


Anion Gap   


 


BUN   


 


Creatinine   


 


Creat Clearance w eGFR   


 


Random Glucose   


 


Lactic Acid    2.1 H


 


Calcium   


 


Phosphorus   


 


Magnesium   


 


Iron  50  


 


TIBC  173 L  


 


Iron Saturation  29  


 


Ferritin   610.5 H 


 


Total Bilirubin   


 


AST   


 


ALT   


 


Alkaline Phosphatase   


 


Troponin I   


 


Total Protein   


 


Total Protein (PEP)   


 


Albumin   


 


Albumin (PEP)   


 


Globulin   


 


Albumin/Globulin Ratio   


 


Beta Globulins   


 


Vitamin B12   431 


 


TSH   


 


Urine Color   


 


Urine Appearance   


 


Urine pH   


 


Ur Specific Gravity   


 


Urine Protein   


 


Urine Glucose (UA)   


 


Urine Ketones   


 


Urine Blood   


 


Urine Nitrite   


 


Urine Bilirubin   


 


Urine Urobilinogen   


 


Ur Leukocyte Esterase   


 


Urine WBC (Auto)   


 


Urine RBC (Auto)   


 


Ur Epithelial Cells   


 


Urine Bacteria   


 


Hyaline Casts   


 


Urine Mucus   


 


Ethylene Glycol   


 


ANTWAN M[place]   


 


Blood Type   


 


Antibody Screen   














  11/05/18 11/05/18 11/05/18





  16:40 16:40 16:40


 


WBC   5.1 


 


Corrected WBC (auto)   


 


RBC   3.04 L 


 


Hgb   9.7 L 


 


Hct   27.8 L 


 


MCV   91.5 


 


MCH   31.8 


 


MCHC   34.8 


 


RDW   16.0 H 


 


Plt Count   254 


 


MPV   8.7 


 


Absolute Neuts (auto)   3.2 


 


Neutrophils %   63.1 


 


Neutrophils % (Manual)   50.0 


 


Band Neutrophils %   11.0 


 


Lymphocytes %   32.7  D 


 


Lymphocytes % (Manual)   30.0 


 


Monocytes %   2.9 L 


 


Monocytes % (Manual)   0 L D 


 


Eosinophils %   1.0 


 


Eosinophils % (Manual)   0.0  D 


 


Basophils %   0.3 


 


Basophils % (Manual)   0.0 


 


Myelocytes % (Man)   3 H D 


 


Promyelocytes % (Man)   


 


Blast Cells % (Manual)   


 


Nucleated RBC %   8 H 


 


Metamyelocytes   2  D 


 


Hypochromia   


 


Platelet Estimate   Adequate 


 


Platelet Comment   


 


Polychromasia   


 


Poikilocytosis   


 


Anisocytosis   


 


Microcytosis   


 


Macrocytosis   


 


Rouleaux   


 


PT with INR   


 


INR   


 


PTT (Actin FS)   


 


Fibrinogen   


 


Anticoagulation Therapy   


 


Puncture Site   


 


ABG pH   


 


ABG pCO2 at Pt Temp   


 


ABG pO2 at Pt Temp   


 


ABG HCO3   


 


ABG O2 Sat (Measured)   


 


ABG O2 Content   


 


ABG Base Excess   


 


Chacho Test   


 


O2 Delivery Device   


 


Oxygen Flow Rate   


 


Vent Mode   


 


Vent Rate   


 


Mechanical Rate   


 


PEEP   


 


Pressure Support Vent   


 


Sodium    132 L


 


Potassium    5.1


 


Chloride    108 H


 


Carbon Dioxide    19 L


 


Anion Gap    5 L


 


BUN    59 H


 


Creatinine    2.2 H


 


Creat Clearance w eGFR    29.02


 


Random Glucose    143 H


 


Lactic Acid   


 


Calcium    9.4


 


Phosphorus    5.1 H


 


Magnesium    1.8


 


Iron   


 


TIBC   


 


Iron Saturation   


 


Ferritin   


 


Total Bilirubin    0.2


 


AST    48 H


 


ALT    19


 


Alkaline Phosphatase    74


 


Troponin I    0.55 H


 


Total Protein    12.2 H


 


Total Protein (PEP)   


 


Albumin    1.2 L


 


Albumin (PEP)   


 


Globulin   


 


Albumin/Globulin Ratio   


 


Beta Globulins   


 


Vitamin B12   


 


TSH   


 


Urine Color   


 


Urine Appearance   


 


Urine pH   


 


Ur Specific Gravity   


 


Urine Protein   


 


Urine Glucose (UA)   


 


Urine Ketones   


 


Urine Blood   


 


Urine Nitrite   


 


Urine Bilirubin   


 


Urine Urobilinogen   


 


Ur Leukocyte Esterase   


 


Urine WBC (Auto)   


 


Urine RBC (Auto)   


 


Ur Epithelial Cells   


 


Urine Bacteria   


 


Hyaline Casts   


 


Urine Mucus   


 


Ethylene Glycol   


 


ANTWAN M[place]   


 


Blood Type  B POSITIVE  


 


Antibody Screen  Negative  














  11/05/18 11/05/18 11/05/18





  16:40 18:00 23:04


 


WBC   


 


Corrected WBC (auto)   


 


RBC   


 


Hgb   


 


Hct   


 


MCV   


 


MCH   


 


MCHC   


 


RDW   


 


Plt Count   


 


MPV   


 


Absolute Neuts (auto)   


 


Neutrophils %   


 


Neutrophils % (Manual)   


 


Band Neutrophils %   


 


Lymphocytes %   


 


Lymphocytes % (Manual)   


 


Monocytes %   


 


Monocytes % (Manual)   


 


Eosinophils %   


 


Eosinophils % (Manual)   


 


Basophils %   


 


Basophils % (Manual)   


 


Myelocytes % (Man)   


 


Promyelocytes % (Man)   


 


Blast Cells % (Manual)   


 


Nucleated RBC %   


 


Metamyelocytes   


 


Hypochromia   


 


Platelet Estimate   


 


Platelet Comment   


 


Polychromasia   


 


Poikilocytosis   


 


Anisocytosis   


 


Microcytosis   


 


Macrocytosis   


 


Rouleaux   


 


PT with INR  18.60 H  


 


INR  1.57 H  


 


PTT (Actin FS)  64.8 H  


 


Fibrinogen   


 


Anticoagulation Therapy   No Result Required. 


 


Puncture Site   Right radial 


 


ABG pH   7.29 L 


 


ABG pCO2 at Pt Temp   38.4 


 


ABG pO2 at Pt Temp   323.0 H* 


 


ABG HCO3   17.8 L 


 


ABG O2 Sat (Measured)   99.4 H 


 


ABG O2 Content   15.0 


 


ABG Base Excess   -7.7 L 


 


Chacho Test   Positive 


 


O2 Delivery Device   No Result Required. 


 


Oxygen Flow Rate   100 


 


Vent Mode   Vo/ac 


 


Vent Rate   12 


 


Mechanical Rate   No Result Required. 


 


PEEP   8.0 


 


Pressure Support Vent   No Result Required. 


 


Sodium   


 


Potassium   


 


Chloride   


 


Carbon Dioxide   


 


Anion Gap   


 


BUN   


 


Creatinine   


 


Creat Clearance w eGFR   


 


Random Glucose   


 


Lactic Acid   


 


Calcium   


 


Phosphorus   


 


Magnesium   


 


Iron   


 


TIBC   


 


Iron Saturation   


 


Ferritin   


 


Total Bilirubin   


 


AST   


 


ALT   


 


Alkaline Phosphatase   


 


Troponin I   


 


Total Protein   


 


Total Protein (PEP)   


 


Albumin   


 


Albumin (PEP)   


 


Globulin   


 


Albumin/Globulin Ratio   


 


Beta Globulins   


 


Vitamin B12   


 


TSH   


 


Urine Color    Ltyellow


 


Urine Appearance    Slcloudy


 


Urine pH    5.0


 


Ur Specific Gravity    1.010


 


Urine Protein    Negative


 


Urine Glucose (UA)    Negative


 


Urine Ketones    Negative


 


Urine Blood    2+ H


 


Urine Nitrite    Negative


 


Urine Bilirubin    Negative


 


Urine Urobilinogen    Negative


 


Ur Leukocyte Esterase    Negative


 


Urine WBC (Auto)    5


 


Urine RBC (Auto)    9


 


Ur Epithelial Cells    Rare


 


Urine Bacteria    Few


 


Hyaline Casts    17


 


Urine Mucus    Rare


 


Ethylene Glycol   


 


ANTWAN [place]   


 


Blood Type   


 


Antibody Screen   














  11/06/18 11/06/18 11/06/18





  05:30 05:30 05:30


 


WBC    4.9


 


Corrected WBC (auto)    4.34


 


RBC    3.04 L


 


Hgb    9.5 L


 


Hct    27.3 L


 


MCV    89.9


 


MCH    31.2


 


MCHC    34.7


 


RDW    16.1 H


 


Plt Count    156  D


 


MPV    9.2


 


Absolute Neuts (auto)    2.9


 


Neutrophils %    70.8


 


Neutrophils % (Manual)    53.0


 


Band Neutrophils %    7.0


 


Lymphocytes %    23.2  D


 


Lymphocytes % (Manual)    31.0


 


Monocytes %    4.9


 


Monocytes % (Manual)    2 L D


 


Eosinophils %    0.8


 


Eosinophils % (Manual)    2.0  D


 


Basophils %    0.3


 


Basophils % (Manual)    0.0


 


Myelocytes % (Man)    2  D


 


Promyelocytes % (Man)    0


 


Blast Cells % (Manual)    0


 


Nucleated RBC %    13 H*


 


Metamyelocytes    3 H D


 


Hypochromia    0


 


Platelet Estimate    Adequate


 


Platelet Comment   


 


Polychromasia    0


 


Poikilocytosis    0


 


Anisocytosis    0


 


Microcytosis    0


 


Macrocytosis    0


 


Rouleaux   


 


PT with INR   


 


INR   


 


PTT (Actin FS)  76.8 H  


 


Fibrinogen   


 


Anticoagulation Therapy   


 


Puncture Site   


 


ABG pH   


 


ABG pCO2 at Pt Temp   


 


ABG pO2 at Pt Temp   


 


ABG HCO3   


 


ABG O2 Sat (Measured)   


 


ABG O2 Content   


 


ABG Base Excess   


 


Chacho Test   


 


O2 Delivery Device   


 


Oxygen Flow Rate   


 


Vent Mode   


 


Vent Rate   


 


Mechanical Rate   


 


PEEP   


 


Pressure Support Vent   


 


Sodium   139 


 


Potassium   4.5 


 


Chloride   113 H 


 


Carbon Dioxide   15 L 


 


Anion Gap   10 


 


BUN   61 H 


 


Creatinine   2.2 H 


 


Creat Clearance w eGFR   29.02 


 


Random Glucose   74 


 


Lactic Acid   


 


Calcium   9.6 


 


Phosphorus   5.4 H 


 


Magnesium   1.7 L 


 


Iron   


 


TIBC   


 


Iron Saturation   


 


Ferritin   


 


Total Bilirubin   0.7 


 


AST   41 H 


 


ALT   17 


 


Alkaline Phosphatase   39 L 


 


Troponin I   


 


Total Protein   8.3 H 


 


Total Protein (PEP)   


 


Albumin   3.3 L 


 


Albumin (PEP)   


 


Globulin   


 


Albumin/Globulin Ratio   


 


Beta Globulins   


 


Vitamin B12   


 


TSH   1.94  D 


 


Urine Color   


 


Urine Appearance   


 


Urine pH   


 


Ur Specific Gravity   


 


Urine Protein   


 


Urine Glucose (UA)   


 


Urine Ketones   


 


Urine Blood   


 


Urine Nitrite   


 


Urine Bilirubin   


 


Urine Urobilinogen   


 


Ur Leukocyte Esterase   


 


Urine WBC (Auto)   


 


Urine RBC (Auto)   


 


Ur Epithelial Cells   


 


Urine Bacteria   


 


Hyaline Casts   


 


Urine Mucus   


 


Ethylene Glycol   


 


ANTWAN [place]   


 


Blood Type   


 


Antibody Screen   














  11/06/18 11/06/18 11/06/18





  05:30 05:30 06:00


 


WBC   


 


Corrected WBC (auto)   


 


RBC   


 


Hgb   


 


Hct   


 


MCV   


 


MCH   


 


MCHC   


 


RDW   


 


Plt Count   


 


MPV   


 


Absolute Neuts (auto)   


 


Neutrophils %   


 


Neutrophils % (Manual)   


 


Band Neutrophils %   


 


Lymphocytes %   


 


Lymphocytes % (Manual)   


 


Monocytes %   


 


Monocytes % (Manual)   


 


Eosinophils %   


 


Eosinophils % (Manual)   


 


Basophils %   


 


Basophils % (Manual)   


 


Myelocytes % (Man)   


 


Promyelocytes % (Man)   


 


Blast Cells % (Manual)   


 


Nucleated RBC %   


 


Metamyelocytes   


 


Hypochromia   


 


Platelet Estimate   


 


Platelet Comment   


 


Polychromasia   


 


Poikilocytosis   


 


Anisocytosis   


 


Microcytosis   


 


Macrocytosis   


 


Rouleaux   


 


PT with INR  21.50 H  


 


INR  1.81 H  


 


PTT (Actin FS)   


 


Fibrinogen   246.0 


 


Anticoagulation Therapy    No Result Required.


 


Puncture Site    Right radial


 


ABG pH    7.35


 


ABG pCO2 at Pt Temp    28.9 L D


 


ABG pO2 at Pt Temp    158.0 H* D


 


ABG HCO3    15.5 L


 


ABG O2 Sat (Measured)    98.8


 


ABG O2 Content    14.8 L


 


ABG Base Excess    -8.6 L


 


Chacho Test    Positive


 


O2 Delivery Device    Vent


 


Oxygen Flow Rate    40%


 


Vent Mode    No Result Required.


 


Vent Rate    12


 


Mechanical Rate    No Result Required.


 


PEEP    5.0


 


Pressure Support Vent    500


 


Sodium   


 


Potassium   


 


Chloride   


 


Carbon Dioxide   


 


Anion Gap   


 


BUN   


 


Creatinine   


 


Creat Clearance w eGFR   


 


Random Glucose   


 


Lactic Acid   


 


Calcium   


 


Phosphorus   


 


Magnesium   


 


Iron   


 


TIBC   


 


Iron Saturation   


 


Ferritin   


 


Total Bilirubin   


 


AST   


 


ALT   


 


Alkaline Phosphatase   


 


Troponin I   


 


Total Protein   


 


Total Protein (PEP)   


 


Albumin   


 


Albumin (PEP)   


 


Globulin   


 


Albumin/Globulin Ratio   


 


Beta Globulins   


 


Vitamin B12   


 


TSH   


 


Urine Color   


 


Urine Appearance   


 


Urine pH   


 


Ur Specific Gravity   


 


Urine Protein   


 


Urine Glucose (UA)   


 


Urine Ketones   


 


Urine Blood   


 


Urine Nitrite   


 


Urine Bilirubin   


 


Urine Urobilinogen   


 


Ur Leukocyte Esterase   


 


Urine WBC (Auto)   


 


Urine RBC (Auto)   


 


Ur Epithelial Cells   


 


Urine Bacteria   


 


Hyaline Casts   


 


Urine Mucus   


 


Ethylene Glycol   


 


ANTWAN [place]   


 


Blood Type   


 


Antibody Screen   














  11/06/18





  08:00


 


WBC 


 


Corrected WBC (auto) 


 


RBC 


 


Hgb 


 


Hct 


 


MCV 


 


MCH 


 


MCHC 


 


RDW 


 


Plt Count 


 


MPV 


 


Absolute Neuts (auto) 


 


Neutrophils % 


 


Neutrophils % (Manual) 


 


Band Neutrophils % 


 


Lymphocytes % 


 


Lymphocytes % (Manual) 


 


Monocytes % 


 


Monocytes % (Manual) 


 


Eosinophils % 


 


Eosinophils % (Manual) 


 


Basophils % 


 


Basophils % (Manual) 


 


Myelocytes % (Man) 


 


Promyelocytes % (Man) 


 


Blast Cells % (Manual) 


 


Nucleated RBC % 


 


Metamyelocytes 


 


Hypochromia 


 


Platelet Estimate 


 


Platelet Comment 


 


Polychromasia 


 


Poikilocytosis 


 


Anisocytosis 


 


Microcytosis 


 


Macrocytosis 


 


Rouleaux 


 


PT with INR 


 


INR 


 


PTT (Actin FS) 


 


Fibrinogen 


 


Anticoagulation Therapy 


 


Puncture Site 


 


ABG pH 


 


ABG pCO2 at Pt Temp 


 


ABG pO2 at Pt Temp 


 


ABG HCO3 


 


ABG O2 Sat (Measured) 


 


ABG O2 Content 


 


ABG Base Excess 


 


Chacho Test 


 


O2 Delivery Device 


 


Oxygen Flow Rate 


 


Vent Mode 


 


Vent Rate 


 


Mechanical Rate 


 


PEEP 


 


Pressure Support Vent 


 


Sodium 


 


Potassium 


 


Chloride 


 


Carbon Dioxide 


 


Anion Gap 


 


BUN 


 


Creatinine 


 


Creat Clearance w eGFR 


 


Random Glucose 


 


Lactic Acid  0.9


 


Calcium 


 


Phosphorus 


 


Magnesium 


 


Iron 


 


TIBC 


 


Iron Saturation 


 


Ferritin 


 


Total Bilirubin 


 


AST 


 


ALT 


 


Alkaline Phosphatase 


 


Troponin I 


 


Total Protein 


 


Total Protein (PEP) 


 


Albumin 


 


Albumin (PEP) 


 


Globulin 


 


Albumin/Globulin Ratio 


 


Beta Globulins 


 


Vitamin B12 


 


TSH 


 


Urine Color 


 


Urine Appearance 


 


Urine pH 


 


Ur Specific Gravity 


 


Urine Protein 


 


Urine Glucose (UA) 


 


Urine Ketones 


 


Urine Blood 


 


Urine Nitrite 


 


Urine Bilirubin 


 


Urine Urobilinogen 


 


Ur Leukocyte Esterase 


 


Urine WBC (Auto) 


 


Urine RBC (Auto) 


 


Ur Epithelial Cells 


 


Urine Bacteria 


 


Hyaline Casts 


 


Urine Mucus 


 


Ethylene Glycol 


 


ANTWAN M[place] 


 


Blood Type 


 


Antibody Screen 








Active Medications











Generic Name Dose Route Start Last Admin





  Trade Name Freq  PRN Reason Stop Dose Admin


 


Acetaminophen  650 mg  11/01/18 05:44  11/05/18 18:10





  Tylenol Suppository -  AL   650 mg





  Q6H PRN   Administration





  FEVER   





     





     





     


 


Heparin Sodium (Porcine)  1,000 unit  11/01/18 05:44  





  Heparin -  IVPUSH   





  PRN PRN   





  Heparin   





     





     





     


 


Heparin Sodium (Porcine)  5,000 unit  11/01/18 05:44  11/01/18 15:15





  Heparin -  IVPUSH   5,000 unit





  PRN PRN   Administration





  Heparin   





     





     





     


 


Vancomycin HCl  1,000 mg in 250 mls @ 166.667 mls/hr  10/31/18 20:00  11/05/18 

20:25





  Vancomycin (Pre-Docked)  IVPB   166.667 mls/hr





  Q24H AVA   Administration





     





     





  Protocol   





     


 


Heparin Sodium/Dextrose  25,000 units in 500 mls @ 16 mls/hr  11/01/18 05:45  11 /06/18 12:14





  Heparin Infusion -  IVPB   900 unit/hr





  TITR AVA   18 mls/hr





     Titration





     





  Protocol   





  800 UNIT/HR   


 


Piperacillin Sod/Tazobactam  50 mls @ 100 mls/hr  11/06/18 11:00  11/06/18 12:48





  Sod 2.25 gm/ Dextrose  IVPB   100 mls/hr





  Q8H-IV AVA   Administration





     





     





  Protocol   





     


 


Metoprolol Tartrate  5 mg  11/06/18 06:45  11/06/18 06:55





  Lopressor Injection -  IVPUSH   5 mg





  TID AVA   Administration





     





     





     





     


 


Thiamine HCl  100 mg  10/30/18 22:12  11/06/18 09:03





  Vitamin B1 -  PO   Not Given





  DAILY AVA   





     





     





     





     











ASSESSMENT/PLAN:





Assessment: Patient was febrile yesterday so we escalated the Abx coverage to 

Zosyn. CXR showed a small apical pneumothorax - We turned off the PEEP on the 

vent. The question remains whether his respiratory insufficiency yesterday 

occurred secondary to an infectious process likely from the lung or whether 

this is cardio/pulmonary in origin. Patient will continue to receive 

plasmapharesis and antibiotics. We will do daily sedation vacations to assess 

his mental status. 





Plan:





ID: 


- Rectal temp of 101 despite ceftriaxone and vancomycin. 


- Rales/Ronchi/wheezes heard on Lung auscultation


- CXR shows pulmonary infiltrate, possible PNA vs empyema vs paramnemonic 

effusion 


- Stepping up Abx coverage to zosyn


- ID on board





Cardio: 


- Afib w RVR: Patient is receiving metoprolol 5 mg Q8H


- diastolic dysfunction + hypertrophic cardiomyopathy


- CAD with multiple stents





Pulm: 


- Intubated


- Patient has a small apical pneumothorax. Will Trend w serial Xrays. No 

invasive treatment indicated at the present time. 


- b/l diffuse wheezes/ronchi


- infiltrate on CXR: Will step up Abx to Zosyn


- No lasix today





Renal: 


- Acute on Chronic kidney injury


- BUN: 61 Cr:2.2. possibly Pre-renal etiology. Unchanged much since yesterday. 


- Renal consulted and on board. 


- Patient received plasmapharesis overnight. Will continue to receive for 

hyperviscosity. 





Neuro: 


- Patient is not alert or oriented. He responds to painful stimuli. 


- Head CT showed no acute pathology





Heme/Onc: 


- Patient received plasmapharesis today. Will get again tomorrow. 


- Patient has hypercalcemia


- Paraproteinemia


- Possible Multiple Myeloma: SPEP and UPEP sent. Pending results. 





Endo: 


- Hypercalcemia


- Glucose wnl


- Parathyroid hormone WNL


- parathyroid hormone like protein pending. 





F/E/N: 


F: No standing fluids due to fluid overload in lungs


E: Mag low. Repleted. 


N: We inserted an NG tube and will give patient tube feeds. 





Code Status: Full Code


Dispo: Patient will continue to receive ICU level care

















Visit type





- Emergency Visit


Emergency Visit: Yes


ED Registration Date: 10/30/18


Care time: The patient presented to the Emergency Department on the above date 

and was hospitalized for further evaluation of their emergent condition.





- New Patient


This patient is new to me today: No





- Critical Care


Critical Care patient: Yes


Total Critical Care Time (in minutes): 36


Critical Care Statement: The care of this patient involved high complexity 

decision making to prevent further life threatening deterioration of the patient

's condition and/or to evaluate & treat vital organ system(s) failure or risk 

of failure.

## 2018-11-06 NOTE — PN
Progress Note, Physician


Chief Complaint: 





AMS


Unresponsiveness


History of Present Illness: 





Intubated yesterday


On mechanical vent


Underwent apheresis. 





- Current Medication List


Current Medications: 


Active Medications





Acetaminophen (Tylenol Suppository -)  650 mg IN Q6H PRN


   PRN Reason: FEVER


   Last Admin: 11/05/18 18:10 Dose:  650 mg


Acetaminophen (Ofirmev Injection -)  1,000 mg IVPB Q6H PRN


   PRN Reason: FEVER


Heparin Sodium (Porcine) (Heparin -)  1,000 unit IVPUSH PRN PRN


   PRN Reason: Heparin


Heparin Sodium (Porcine) (Heparin -)  5,000 unit IVPUSH PRN PRN


   PRN Reason: Heparin


   Last Admin: 11/01/18 15:15 Dose:  5,000 unit


Vancomycin HCl (Vancomycin (Pre-Docked))  1,000 mg in 250 mls @ 166.667 mls/hr 

IVPB Q24H AVA; Protocol


   Last Admin: 11/05/18 20:25 Dose:  166.667 mls/hr


Heparin Sodium/Dextrose (Heparin Infusion -)  25,000 units in 500 mls @ 16 mls/

hr IVPB TITR AVA; Protocol


   Last Titration: 11/06/18 12:14 Dose:  900 unit/hr, 18 mls/hr


Piperacillin Sod/Tazobactam (Sod 2.25 gm/ Dextrose)  50 mls @ 100 mls/hr IVPB 

Q8H-IV AVA; Protocol


   Last Admin: 11/06/18 17:25 Dose:  100 mls/hr


Metoprolol Tartrate (Lopressor Injection -)  5 mg IVPUSH TID [facility]


   Last Admin: 11/06/18 14:42 Dose:  5 mg


Thiamine HCl (Vitamin B1 -)  100 mg PO DAILY [facility]


   Last Admin: 11/06/18 09:03 Dose:  Not Given











- Objective


Vital Signs: 


 Vital Signs











Temperature  99.7 F H  11/06/18 14:36


 


Pulse Rate  95 H  11/06/18 16:00


 


Respiratory Rate  25 H  11/06/18 16:00


 


Blood Pressure  116/64   11/06/18 16:00


 


O2 Sat by Pulse Oximetry (%)  99   11/06/18 08:13











Constitutional: Yes: Well Nourished, No Distress, Calm


Cardiovascular: Yes: Regular Rate and Rhythm


Respiratory: Yes: Mechanically Ventilated


Genitourinary: Yes: Garces Present


Edema: No


Peripheral Pulses WNL: Yes


Neurological: Yes: Other (sedated)


Labs: 


 CBC, BMP





 11/06/18 05:30 





 11/06/18 05:30 





 INR, PTT











INR  1.81  (0.83-1.09)  H  11/06/18  05:30    


 


Fibrinogen  246.0 mg/dL (238-498)   11/06/18  05:30    














Problem List





- Problems


(1) HAM (acute kidney injury)


Assessment/Plan: 


-Nephrology on board


-IVF


-Cr Improving


-monitor trend


-U/S renal/bladder unremarkable


Code(s): N17.9 - ACUTE KIDNEY FAILURE, UNSPECIFIED   





(2) Atrial fibrillation with RVR


Assessment/Plan: 


-On heparin drip


-rate controlled


-cardiology on board


-Tele monitor


Code(s): I48.91 - UNSPECIFIED ATRIAL FIBRILLATION   





(3) Sepsis


Assessment/Plan: 


-upon admission


-ID on board


-Cultures:


 Microbiology





10/30/18 17:02   Blood - Peripheral Venous   Blood Culture - Final


                            NO GROWTH AFTER 5 DAYS INCUBATION


10/30/18 17:02   Blood - Peripheral Venous   Blood Culture - Final


                            NO GROWTH AFTER 5 DAYS INCUBATION


10/30/18 17:02   Urine - Urine - Catheterized   Urine Culture - Final


                            NO GROWTH OBTAINED


10/31/18 19:15   Nasopharyngeal Swab   Influenza Types A,B Antigen - Final


10/31/18 19:15   Nasopharyngeal Swab    - Final











-On IV Zosyn and Vanco


Code(s): A41.9 - SEPSIS, UNSPECIFIED ORGANISM   





(4) Toxic metabolic encephalopathy


Code(s): G92 - TOXIC ENCEPHALOPATHY   





(5) Altered mental status


Assessment/Plan: 


-CT head x 2 negative


-Seen by Neurology


-Cultures so far negative


-Still on IVF


Code(s): R41.82 - ALTERED MENTAL STATUS, UNSPECIFIED   





(6) Anemia


Assessment/Plan: 


chronic, at baseline


-Check stool OB-pending


-Iron profile, B12, thyroid profile unremarkable


Code(s): D64.9 - ANEMIA, UNSPECIFIED   





Assessment/Plan





see problem list

## 2018-11-06 NOTE — EKG
Test Reason : 

Blood Pressure : ***/*** mmHG

Vent. Rate : 087 BPM     Atrial Rate : 288 BPM

   P-R Int : 000 ms          QRS Dur : 146 ms

    QT Int : 404 ms       P-R-T Axes : 000 270 070 degrees

   QTc Int : 486 ms

 

ATRIAL FIBRILLATION

RIGHT BUNDLE BRANCH BLOCK

SEPTAL INFARCT , AGE UNDETERMINED

POSSIBLE LATERAL INFARCT , AGE UNDETERMINED

ABNORMAL ECG

 

Confirmed by MD CRYSTAL, HALLE ([id number]) on 11/6/2018 12:05:52 PM

 

Referred By: NUSRAT ZARCO           Confirmed By:HALLE ROJAS MD

## 2018-11-07 LAB
ACANTHOCYTES BLD QL SMEAR: 0
ALBUMIN SERPL-MCNC: 2.3 G/DL (ref 3.4–5)
ALBUMIN SERPL-MCNC: 3.6 G/DL (ref 3.4–5)
ALP SERPL-CCNC: 51 U/L (ref 45–117)
ALP SERPL-CCNC: 86 U/L (ref 45–117)
ALT SERPL-CCNC: 31 U/L (ref 13–61)
ALT SERPL-CCNC: 49 U/L (ref 13–61)
ANION GAP SERPL CALC-SCNC: 12 MMOL/L (ref 8–16)
ANION GAP SERPL CALC-SCNC: 8 MMOL/L (ref 8–16)
ANISOCYTOSIS BLD QL: (no result)
ANISOCYTOSIS BLD QL: 0
APTT BLD: 186.4 SECONDS (ref 25.2–36.5)
ARTERIAL BLOOD GAS PCO2: 32.4 MMHG (ref 35–45)
AST SERPL-CCNC: 123 U/L (ref 15–37)
AST SERPL-CCNC: 53 U/L (ref 15–37)
BASE EXCESS BLDA CALC-SCNC: -6.6 MEQ/L (ref -2–2)
BASO STIPL BLD QL SMEAR: 0
BASOPHILS # BLD: 0.3 % (ref 0–2)
BASOPHILS # BLD: 0.5 % (ref 0–2)
BILIRUB SERPL-MCNC: 0.4 MG/DL (ref 0.2–1)
BILIRUB SERPL-MCNC: 0.5 MG/DL (ref 0.2–1)
BUN SERPL-MCNC: 55 MG/DL (ref 7–18)
BUN SERPL-MCNC: 58 MG/DL (ref 7–18)
CALCIUM SERPL-MCNC: 7.7 MG/DL (ref 8.5–10.1)
CALCIUM SERPL-MCNC: 9 MG/DL (ref 8.5–10.1)
CHLORIDE SERPL-SCNC: 111 MMOL/L (ref 98–107)
CHLORIDE SERPL-SCNC: 114 MMOL/L (ref 98–107)
CO2 SERPL-SCNC: 17 MMOL/L (ref 21–32)
CO2 SERPL-SCNC: 18 MMOL/L (ref 21–32)
CREAT SERPL-MCNC: 2.1 MG/DL (ref 0.55–1.3)
CREAT SERPL-MCNC: 2.4 MG/DL (ref 0.55–1.3)
DACRYOCYTES BLD QL SMEAR: 0
DEPRECATED RDW RBC AUTO: 15.7 % (ref 11.9–15.9)
DEPRECATED RDW RBC AUTO: 16 % (ref 11.9–15.9)
DOHLE BOD BLD QL SMEAR: 0
EOSINOPHIL # BLD: 1.4 % (ref 0–4.5)
EOSINOPHIL # BLD: 1.7 % (ref 0–4.5)
GLUCOSE SERPL-MCNC: 128 MG/DL (ref 74–106)
GLUCOSE SERPL-MCNC: 87 MG/DL (ref 74–106)
HCT VFR BLD CALC: 27.9 % (ref 35.4–49)
HCT VFR BLD CALC: 28.5 % (ref 35.4–49)
HELMET CELLS BLD QL SMEAR: 0
HGB BLD-MCNC: 9.1 GM/DL (ref 11.7–16.9)
HGB BLD-MCNC: 9.5 GM/DL (ref 11.7–16.9)
HOWELL-JOLLY BOD BLD QL SMEAR: 0
IGA SER-ACNC: 7 MG/DL (ref 61–437)
IGM SERPL-MCNC: <5 MG/DL (ref 15–143)
INR BLD: 2.07 (ref 0.83–1.09)
LYMPHOCYTES # BLD: 14 % (ref 8–40)
LYMPHOCYTES # BLD: 20.5 % (ref 8–40)
MACROCYTES BLD QL: 0
MAGNESIUM SERPL-MCNC: 1.9 MG/DL (ref 1.8–2.4)
MAGNESIUM SERPL-MCNC: 2.2 MG/DL (ref 1.8–2.4)
MCH RBC QN AUTO: 29.4 PG (ref 25.7–33.7)
MCH RBC QN AUTO: 30.2 PG (ref 25.7–33.7)
MCHC RBC AUTO-ENTMCNC: 32.7 G/DL (ref 32–35.9)
MCHC RBC AUTO-ENTMCNC: 33.5 G/DL (ref 32–35.9)
MCV RBC: 89.7 FL (ref 80–96)
MCV RBC: 90.2 FL (ref 80–96)
MONOCYTES # BLD AUTO: 3.4 % (ref 3.8–10.2)
MONOCYTES # BLD AUTO: 4.8 % (ref 3.8–10.2)
NEUTROPHILS # BLD: 74.4 % (ref 42.8–82.8)
NEUTROPHILS # BLD: 79 % (ref 42.8–82.8)
OVALOCYTES BLD QL SMEAR: 0
PHOSPHATE SERPL-MCNC: 3.6 MG/DL (ref 2.5–4.9)
PHOSPHATE SERPL-MCNC: 5.1 MG/DL (ref 2.5–4.9)
PLATELET # BLD AUTO: 130 K/MM3 (ref 134–434)
PLATELET # BLD AUTO: 148 K/MM3 (ref 134–434)
PLATELET BLD QL SMEAR: (no result)
PLATELET BLD QL SMEAR: ADEQUATE
PMV BLD: 9.2 FL (ref 7.5–11.1)
PMV BLD: 9.3 FL (ref 7.5–11.1)
PO2 BLDA: 97.9 MMHG (ref 70–100)
POTASSIUM SERPLBLD-SCNC: 4.1 MMOL/L (ref 3.5–5.1)
POTASSIUM SERPLBLD-SCNC: 4.2 MMOL/L (ref 3.5–5.1)
PROT SERPL-MCNC: 7.2 G/DL (ref 6.4–8.2)
PROT SERPL-MCNC: 9.2 G/DL (ref 6.4–8.2)
PT PNL PPP: 24.6 SEC (ref 9.7–13)
RBC # BLD AUTO: 3.11 M/MM3 (ref 4–5.6)
RBC # BLD AUTO: 3.16 M/MM3 (ref 4–5.6)
ROULEAUX BLD QL SMEAR: 0
SAO2 % BLDA: 96.4 % (ref 90–98.9)
SICKLE CELLS BLD QL SMEAR: 0
SODIUM SERPL-SCNC: 137 MMOL/L (ref 136–145)
SODIUM SERPL-SCNC: 143 MMOL/L (ref 136–145)
TARGETS BLD QL SMEAR: 0
TOXIC GRANULES BLD QL SMEAR: 0
WBC # BLD AUTO: 4 K/MM3 (ref 4–10)
WBC # BLD AUTO: 4.5 K/MM3 (ref 4–10)
WBC NRBC COR # BLD: 3.54 K/MM3

## 2018-11-07 PROCEDURE — 05HM33Z INSERTION OF INFUSION DEVICE INTO RIGHT INTERNAL JUGULAR VEIN, PERCUTANEOUS APPROACH: ICD-10-PCS | Performed by: INTERNAL MEDICINE

## 2018-11-07 PROCEDURE — B513ZZA FLUOROSCOPY OF RIGHT JUGULAR VEINS, GUIDANCE: ICD-10-PCS | Performed by: INTERNAL MEDICINE

## 2018-11-07 RX ADMIN — HEPARIN SODIUM SCH MLS/HR: 5000 INJECTION, SOLUTION INTRAVENOUS at 05:55

## 2018-11-07 RX ADMIN — PIPERACILLIN SODIUM AND TAZOBACTAM SODIUM SCH MLS/HR: .25; 2 INJECTION, POWDER, LYOPHILIZED, FOR SOLUTION INTRAVENOUS at 18:17

## 2018-11-07 RX ADMIN — PANTOPRAZOLE SODIUM SCH MG: 40 INJECTION, POWDER, FOR SOLUTION INTRAVENOUS at 13:16

## 2018-11-07 RX ADMIN — METOPROLOL TARTRATE SCH: 5 INJECTION, SOLUTION INTRAVENOUS at 21:29

## 2018-11-07 RX ADMIN — Medication SCH MG: at 09:23

## 2018-11-07 RX ADMIN — METOPROLOL TARTRATE SCH MG: 5 INJECTION, SOLUTION INTRAVENOUS at 13:18

## 2018-11-07 RX ADMIN — ACETAMINOPHEN PRN MG: 10 INJECTION, SOLUTION INTRAVENOUS at 00:35

## 2018-11-07 RX ADMIN — PIPERACILLIN SODIUM AND TAZOBACTAM SODIUM SCH MLS/HR: .25; 2 INJECTION, POWDER, LYOPHILIZED, FOR SOLUTION INTRAVENOUS at 09:22

## 2018-11-07 RX ADMIN — DEXTROSE MONOHYDRATE SCH MLS/HR: 50 INJECTION, SOLUTION INTRAVENOUS at 11:59

## 2018-11-07 RX ADMIN — METOPROLOL TARTRATE SCH MG: 5 INJECTION, SOLUTION INTRAVENOUS at 05:55

## 2018-11-07 RX ADMIN — VANCOMYCIN HYDROCHLORIDE SCH MLS/HR: 1 INJECTION, POWDER, LYOPHILIZED, FOR SOLUTION INTRAVENOUS at 21:32

## 2018-11-07 RX ADMIN — ACETAMINOPHEN PRN MG: 10 INJECTION, SOLUTION INTRAVENOUS at 13:41

## 2018-11-07 RX ADMIN — PIPERACILLIN SODIUM AND TAZOBACTAM SODIUM SCH MLS/HR: .25; 2 INJECTION, POWDER, LYOPHILIZED, FOR SOLUTION INTRAVENOUS at 02:09

## 2018-11-07 NOTE — PN
Progress Note (short form)





- Note


Progress Note: 





Renal follow up for Hypercalcemia/HAM





Pt seen and examined in the ICU


on vent


no overnight event


making urine








 Vital Signs











Temperature  99.4 F   11/07/18 08:00


 


Pulse Rate  96 H  11/07/18 10:00


 


Respiratory Rate  16   11/07/18 10:10


 


Blood Pressure  123/89   11/07/18 10:00


 


O2 Sat by Pulse Oximetry (%)  100   11/07/18 07:14








 Intake & Output











 11/04/18 11/05/18 11/06/18 11/07/18





 23:59 23:59 23:59 23:59


 


Intake Total  1744 1089 496


 


Output Total  3100 2400 400


 


Balance  -1356 -1311 96


 


Weight   81.102 kg 81.391 kg





NAD on vent via ET Tube


RRR


Dec BS, Course BS


No Le edema


 CBC, BMP





 11/07/18 05:30 





 11/07/18 05:30 


 Laboratory Tests











  11/05/18 11/06/18 11/07/18





  23:04 05:30 05:30


 


MCV    89.7


 


Phosphorus   


 


Magnesium   1.7 L 


 


Albumin   3.3 L 


 


Urine Protein  Negative  


 


Urine Blood  2+ H  














  11/07/18





  05:30


 


MCV 


 


Phosphorus  5.1 H


 


Magnesium  2.2


 


Albumin  2.3 L


 


Urine Protein 


 


Urine Blood 











 Current Medications





Acetaminophen (Tylenol Suppository -)  650 mg RI Q6H PRN


   PRN Reason: FEVER


   Last Admin: 11/05/18 18:10 Dose:  650 mg


Acetaminophen (Ofirmev Injection -)  1,000 mg IVPB Q6H PRN


   PRN Reason: FEVER


   Last Admin: 11/07/18 00:35 Dose:  1,000 mg


Heparin Sodium (Porcine) (Heparin -)  1,000 unit IVPUSH PRN PRN


   PRN Reason: Heparin


Heparin Sodium (Porcine) (Heparin -)  5,000 unit IVPUSH PRN PRN


   PRN Reason: Heparin


   Last Admin: 11/01/18 15:15 Dose:  5,000 unit


Vancomycin HCl (Vancomycin (Pre-Docked))  1,000 mg in 250 mls @ 166.667 mls/hr 

IVPB Q24H AVA; Protocol


   Last Admin: 11/06/18 20:37 Dose:  166.667 mls/hr


Heparin Sodium/Dextrose (Heparin Infusion -)  25,000 units in 500 mls @ 16 mls/

hr IVPB TITR AVA; Protocol


   Last Titration: 11/07/18 07:08 Dose:  0 unit/hr, 0 mls/hr


Piperacillin Sod/Tazobactam (Sod 2.25 gm/ Dextrose)  50 mls @ 100 mls/hr IVPB 

Q8H-IV AVA; Protocol


   Last Admin: 11/07/18 09:22 Dose:  100 mls/hr


Metoprolol Tartrate (Lopressor Injection -)  5 mg IVPUSH TID [facility]


   Last Admin: 11/07/18 05:55 Dose:  5 mg


Thiamine HCl (Vitamin B1 -)  100 mg PO DAILY [facility]


   Last Admin: 11/07/18 09:23 Dose:  100 mg











78 year old gentleman with hx of CKD, Afib on A/C, CAD, CKD, Hypertension, 

Hyperlipidemia, PVD who presented with AMS/Confusion and found to have HAM.





#HAM (FeNa was 2.1% indicating tubular injury, US showed no stones or 

obstruction, UA w/o protein but UPCR ~0.5 indicating non-albumin proteinuria)


#AMS of unclear etiology (metabolic encephalopathy)


#Anemia 


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


#Hyperdense lesion on US of the kidney 


#Normal anion gap metabolic acidosis 





Renal function unchanged and pt is making urine


no volume overload, metabolic acidosis to warrent RRT


continue plasmaexchange as per Heme 


Trend renal function and electrolytes 


keep MAP > 65


Hgb stable, GI follow up


f/u pathology regarding biopsy from EGD


Urology consult for Renal Lesion


Trend serum bicarb goal > 20








Ricky Chang DO

## 2018-11-07 NOTE — PN
Physical Exam: 


SUBJECTIVE: Patient seen and examined at bedside. He remains intubated and 

sedated. A central line was placed in the Right IJ for access and so that 

plasmapharesis can take place. He spiked a fever overnight which responded to 

tylenol.  








OBJECTIVE:





 Vital Signs











 Period  Temp  Pulse  Resp  BP Sys/Varghese  Pulse Ox


 


 Last 24 Hr  99.4 F-101.8 F    14-26  /64-89  








GENERAL: Intubated and sedated. 


HEAD: Normal with no signs of trauma.


EYES: Pupils equal, round and reactive to light, sclera anicteric, conjunctiva 

clear.


EARS, NOSE, THROAT: Ears normal, nares patent, oropharynx clear without 

exudates. Moist mucous membranes.


NECK: Supple without lymphadenopathy, no JVD, or masses.


LUNGS: Diffuse wheezes and rales bilaterally in both lung fields. No accessory 

muscle use. 


HEART: regular rate and irregular rhythm.


ABDOMEN: Soft, normoactive bowel sounds, no guarding, no rebound, no masses.  

No hepatomegaly or splenomegaly. 


MUSCULOSKELETAL: Normal range of motion at all joints. No bony deformities or 

tenderness. 


UPPER EXTREMITIES: 2+ pulses, warm, well-perfused. No cyanosis. No clubbing. 

Cap refill <2 seconds. No peripheral edema.


LOWER EXTREMITIES: 2+ pulses, warm, well-perfused. No calf tenderness. No 

peripheral edema. 


NEUROLOGICAL: Normoreflexic. FUNMILAYO. 


SKIN: Warm, diaphoretic, normal turgor, no rashes or lesions noted. 











 Laboratory Results - last 24 hr











  11/05/18 11/05/18 11/06/18





  21:45 21:45 05:30


 


WBC   


 


Corrected WBC (auto)   


 


RBC   


 


Hgb   


 


Hct   


 


MCV   


 


MCH   


 


MCHC   


 


RDW   


 


Plt Count   


 


MPV   


 


Absolute Neuts (auto)   


 


Neutrophils %   


 


Neutrophils % (Manual)   


 


Band Neutrophils %   


 


Lymphocytes %   


 


Lymphocytes % (Manual)   


 


Monocytes %   


 


Monocytes % (Manual)   


 


Eosinophils %   


 


Eosinophils % (Manual)   


 


Basophils %   


 


Basophils % (Manual)   


 


Myelocytes % (Man)   


 


Promyelocytes % (Man)   


 


Blast Cells % (Manual)   


 


Nucleated RBC %   


 


Metamyelocytes   


 


Hypochromia   


 


Toxic Granulation   


 


Dohle Bodies   


 


Platelet Estimate   


 


Polychromasia   


 


Poikilocytosis   


 


Basophilic Stippling   


 


Anisocytosis   


 


Microcytosis   


 


Macrocytosis   


 


Spherocytes   


 


Sickle Cells   


 


Target Cells   


 


Tear Drop Cells   


 


Ovalocytes   


 


Stomatocytes   


 


Helmet Cells   


 


[place] Bodies   


 


Cabot Rings   


 


Jeanette Cells   


 


Acanthocytes (Spur)   


 


Rouleaux   


 


Fragmented RBCs   


 


Schistocytes   


 


PTT (Actin FS)   


 


Puncture Site   


 


ABG pH   


 


ABG pCO2 at Pt Temp   


 


ABG pO2 at Pt Temp   


 


ABG HCO3   


 


ABG O2 Sat (Measured)   


 


ABG O2 Content   


 


ABG Base Excess   


 


Chacho Test   


 


Oxygen Flow Rate   


 


Vent Rate   


 


PEEP   


 


Sodium   


 


Potassium   


 


Chloride   


 


Carbon Dioxide   


 


Anion Gap   


 


BUN   


 


Creatinine   


 


Creat Clearance w eGFR   


 


Random Glucose   


 


Lactic Acid   


 


Calcium   


 


Phosphorus   


 


Magnesium   


 


Total Bilirubin   


 


AST   


 


ALT   


 


Alkaline Phosphatase   


 


Total Protein   


 


Albumin   


 


Vancomycin Pre-Dose   


 


IgG  6954 H  6294 H  3159 H


 


IgA  13 L   7 L


 


IgM  <5 L   <5 L














  11/06/18 11/06/18 11/06/18





  05:30 17:50 17:50


 


WBC   


 


Corrected WBC (auto)   


 


RBC   


 


Hgb   


 


Hct   


 


MCV   


 


MCH   


 


MCHC   


 


RDW   


 


Plt Count   


 


MPV   


 


Absolute Neuts (auto)   


 


Neutrophils %   


 


Neutrophils % (Manual)   


 


Band Neutrophils %   


 


Lymphocytes %   


 


Lymphocytes % (Manual)   


 


Monocytes %   


 


Monocytes % (Manual)   


 


Eosinophils %   


 


Eosinophils % (Manual)   


 


Basophils %   


 


Basophils % (Manual)   


 


Myelocytes % (Man)   


 


Promyelocytes % (Man)   


 


Blast Cells % (Manual)   


 


Nucleated RBC %   


 


Metamyelocytes   


 


Hypochromia   


 


Toxic Granulation   


 


Dohle Bodies   


 


Platelet Estimate   


 


Polychromasia   


 


Poikilocytosis   


 


Basophilic Stippling   


 


Anisocytosis   


 


Microcytosis   


 


Macrocytosis   


 


Spherocytes   


 


Sickle Cells   


 


Target Cells   


 


Tear Drop Cells   


 


Ovalocytes   


 


Stomatocytes   


 


Helmet Cells   


 


[place] Bodies   


 


Cabot Rings   


 


[place] Cells   


 


Acanthocytes (Spur)   


 


Rouleaux   


 


Fragmented RBCs   


 


Schistocytes   


 


PTT (Actin FS)   


 


Puncture Site   


 


ABG pH   


 


ABG pCO2 at Pt Temp   


 


ABG pO2 at Pt Temp   


 


ABG HCO3   


 


ABG O2 Sat (Measured)   


 


ABG O2 Content   


 


ABG Base Excess   


 


Chacho Test   


 


Oxygen Flow Rate   


 


Vent Rate   


 


PEEP   


 


Sodium    139


 


Potassium    4.5


 


Chloride    113 H


 


Carbon Dioxide    16 L


 


Anion Gap    10


 


BUN    60 H


 


Creatinine    2.4 H


 


Creat Clearance w eGFR    26.25


 


Random Glucose    83


 


Lactic Acid   


 


Calcium    9.6


 


Phosphorus   


 


Magnesium   


 


Total Bilirubin   


 


AST   


 


ALT   


 


Alkaline Phosphatase   


 


Total Protein   


 


Albumin   


 


Vancomycin Pre-Dose   23.0 


 


IgG  3009 H  


 


IgA   


 


IgM   














  11/06/18 11/07/18 11/07/18





  17:50 05:30 05:30


 


WBC    4.0


 


Corrected WBC (auto)    3.54


 


RBC    3.11 L


 


Hgb    9.1 L


 


Hct    27.9 L


 


MCV    89.7


 


MCH    29.4


 


MCHC    32.7


 


RDW    16.0 H


 


Plt Count    148


 


MPV    9.3


 


Absolute Neuts (auto)    3.1


 


Neutrophils %    79.0


 


Neutrophils % (Manual)    68.4  D


 


Band Neutrophils %    5.3


 


Lymphocytes %    14.0  D


 


Lymphocytes % (Manual)    12.6  D


 


Monocytes %    4.8


 


Monocytes % (Manual)    6  D


 


Eosinophils %    1.7  D


 


Eosinophils % (Manual)    1.1


 


Basophils %    0.5


 


Basophils % (Manual)    0.0


 


Myelocytes % (Man)    2


 


Promyelocytes % (Man)    0


 


Blast Cells % (Manual)    0


 


Nucleated RBC %    13 H*


 


Metamyelocytes    2  D


 


Hypochromia    0


 


Toxic Granulation    0


 


Dohle Bodies    0


 


Platelet Estimate    Adequate


 


Polychromasia    0


 


Poikilocytosis    0


 


Basophilic Stippling    0


 


Anisocytosis    0


 


Microcytosis    0


 


Macrocytosis    0


 


Spherocytes    0


 


Sickle Cells    0


 


Target Cells    0


 


Tear Drop Cells    0


 


Ovalocytes    0


 


Stomatocytes    0


 


Helmet Cells    0


 


[place] Bodies    0


 


Cabot Rings    0


 


[place] Cells    0


 


Acanthocytes (Spur)    0


 


Rouleaux    0


 


Fragmented RBCs    0


 


Schistocytes    0


 


PTT (Actin FS)   93.0 H 


 


Puncture Site   


 


ABG pH   


 


ABG pCO2 at Pt Temp   


 


ABG pO2 at Pt Temp   


 


ABG HCO3   


 


ABG O2 Sat (Measured)   


 


ABG O2 Content   


 


ABG Base Excess   


 


Chacho Test   


 


Oxygen Flow Rate   


 


Vent Rate   


 


PEEP   


 


Sodium   


 


Potassium   


 


Chloride   


 


Carbon Dioxide   


 


Anion Gap   


 


BUN   


 


Creatinine   


 


Creat Clearance w eGFR   


 


Random Glucose   


 


Lactic Acid  1.3  


 


Calcium   


 


Phosphorus   


 


Magnesium   


 


Total Bilirubin   


 


AST   


 


ALT   


 


Alkaline Phosphatase   


 


Total Protein   


 


Albumin   


 


Vancomycin Pre-Dose   


 


IgG   


 


IgA   


 


IgM   














  11/07/18 11/07/18





  05:30 06:00


 


WBC  


 


Corrected WBC (auto)  


 


RBC  


 


Hgb  


 


Hct  


 


MCV  


 


MCH  


 


MCHC  


 


RDW  


 


Plt Count  


 


MPV  


 


Absolute Neuts (auto)  


 


Neutrophils %  


 


Neutrophils % (Manual)  


 


Band Neutrophils %  


 


Lymphocytes %  


 


Lymphocytes % (Manual)  


 


Monocytes %  


 


Monocytes % (Manual)  


 


Eosinophils %  


 


Eosinophils % (Manual)  


 


Basophils %  


 


Basophils % (Manual)  


 


Myelocytes % (Man)  


 


Promyelocytes % (Man)  


 


Blast Cells % (Manual)  


 


Nucleated RBC %  


 


Metamyelocytes  


 


Hypochromia  


 


Toxic Granulation  


 


Dohle Bodies  


 


Platelet Estimate  


 


Polychromasia  


 


Poikilocytosis  


 


Basophilic Stippling  


 


Anisocytosis  


 


Microcytosis  


 


Macrocytosis  


 


Spherocytes  


 


Sickle Cells  


 


Target Cells  


 


Tear Drop Cells  


 


Ovalocytes  


 


Stomatocytes  


 


Helmet Cells  


 


[place] Bodies  


 


Cabot Rings  


 


Jeanette Cells  


 


Acanthocytes (Spur)  


 


Rouleaux  


 


Fragmented RBCs  


 


Schistocytes  


 


PTT (Actin FS)  


 


Puncture Site   Right radial


 


ABG pH   7.35


 


ABG pCO2 at Pt Temp   32.4 L


 


ABG pO2 at Pt Temp   97.9  D


 


ABG HCO3   17.5 L


 


ABG O2 Sat (Measured)   96.4


 


ABG O2 Content   16.4


 


ABG Base Excess   -6.6 L


 


Chacho Test   No Result Required.


 


Oxygen Flow Rate   30


 


Vent Rate   12


 


PEEP   0.0


 


Sodium  137 


 


Potassium  4.1 


 


Chloride  111 H 


 


Carbon Dioxide  18 L 


 


Anion Gap  8 


 


BUN  58 H 


 


Creatinine  2.4 H 


 


Creat Clearance w eGFR  26.25 


 


Random Glucose  128 H 


 


Lactic Acid  


 


Calcium  9.0 


 


Phosphorus  5.1 H 


 


Magnesium  2.2 


 


Total Bilirubin  0.4 


 


AST  123 H 


 


ALT  49 


 


Alkaline Phosphatase  86 


 


Total Protein  9.2 H 


 


Albumin  2.3 L 


 


Vancomycin Pre-Dose  


 


IgG  


 


IgA  


 


IgM  








Active Medications











Generic Name Dose Route Start Last Admin





  Trade Name Freq  PRN Reason Stop Dose Admin


 


Acetaminophen  650 mg  11/01/18 05:44  11/05/18 18:10





  Tylenol Suppository -  VA   650 mg





  Q6H PRN   Administration





  FEVER   





     





     





     


 


Acetaminophen  1,000 mg  11/06/18 17:04  11/07/18 13:41





  Ofirmev Injection -  IVPB   1,000 mg





  Q6H PRN   Administration





  FEVER   





     





     





     


 


Heparin Sodium (Porcine)  1,000 unit  11/01/18 05:44  





  Heparin -  IVPUSH   





  PRN PRN   





  Heparin   





     





     





     


 


Heparin Sodium (Porcine)  5,000 unit  11/01/18 05:44  11/01/18 15:15





  Heparin -  IVPUSH   5,000 unit





  PRN PRN   Administration





  Heparin   





     





     





     


 


Vancomycin HCl  1,000 mg in 250 mls @ 166.667 mls/hr  10/31/18 20:00  11/06/18 

20:37





  Vancomycin (Pre-Docked)  IVPB   166.667 mls/hr





  Q24H AVA   Administration





     





     





  Protocol   





     


 


Heparin Sodium/Dextrose  25,000 units in 500 mls @ 16 mls/hr  11/01/18 05:45  11 /07/18 07:08





  Heparin Infusion -  IVPB   0 unit/hr





  TITR AVA   0 mls/hr





     Titration





     





  Protocol   





  800 UNIT/HR   


 


Piperacillin Sod/Tazobactam  50 mls @ 100 mls/hr  11/06/18 11:00  11/07/18 09:22





  Sod 2.25 gm/ Dextrose  IVPB   100 mls/hr





  Q8H-IV AVA   Administration





     





     





  Protocol   





     


 


Fentanyl 500 mcg/ Dextrose  100 mls @ 5 mls/hr  11/07/18 11:45  11/07/18 11:59





  IVPB   25 mcg/hr





  TITR AVA   5 mls/hr





     Administration





     





  Protocol   





  25 MCG/HR   


 


Metoprolol Tartrate  5 mg  11/07/18 12:00  11/07/18 13:18





  Lopressor Injection -  IVPUSH   5 mg





  Q8H AVA   Administration





     





     





     





     


 


Pantoprazole Sodium  40 mg  11/07/18 12:00  11/07/18 13:16





  Protonix Iv  IVPUSH   40 mg





  DAILY AVA   Administration





     





     





     





     


 


Thiamine HCl  100 mg  10/30/18 22:12  11/07/18 09:23





  Vitamin B1 -  PO   100 mg





  DAILY AVA   Administration





     





     





     





     











ASSESSMENT/PLAN:





Assessment: Patient was febrile yesterday so we escalated the Abx coverage to 

Zosyn. CXR showed a small apical pneumothorax - We turned off the PEEP on the 

vent. The question remains whether his respiratory insufficiency yesterday 

occurred secondary to an infectious process likely from the lung or whether 

this is cardio/pulmonary in origin. Patient will continue to receive 

plasmapharesis and antibiotics. We will do daily sedation vacations to assess 

his mental status. 





Will continue to trend pneumothorax with serial Xrays. 


If Pneumo expands we will place a chest tube. 





Plan:





ID: 


- Rales/Ronchi/wheezes heard on Lung auscultation


- CXR shows pulmonary infiltrate, possible PNA vs empyema vs paramnemonic 

effusion 


- Abx coverage to zosyn


- ID on board





Cardio: 


- Afib w RVR: Patient is receiving metoprolol 5 mg Q8H


- diastolic dysfunction + hypertrophic cardiomyopathy


- CAD with multiple stents


- Cardio consulted and on board. 





Pulm: 


- Intubated


- Patient has a small apical pneumothorax. Will Trend w serial Xrays. 


- +/- chest tube


- b/l diffuse wheezes/ronchi


- infiltrate on CXR: Will step up Abx to Zosyn


- No lasix today





Renal: 


- Acute on Chronic kidney injury


- BUN: 58 Cr:2.4. possibly Pre-renal etiology. Unchanged much since yesterday. 


- Renal consulted and on board. 


- Patient receiving plasmapharesis now. 





Neuro: 


- Patient is not alert or oriented. He responds to painful stimuli. 


- Head CT showed no acute pathology





Heme/Onc: 


- Patient received plasmapharesis today. Will get again tomorrow. 


- Patient has hypercalcemia


- Paraproteinemia


- Possible Multiple Myeloma, SPEP and UPEP suggestive. + M spike. 





Endo: 


- Hypercalcemia


- Glucose wnl


- Parathyroid hormone WNL


- parathyroid hormone like protein pending. 





F/E/N: 


F: No standing fluids due to fluid overload in lungs


E: Mag low. Repleted. 


N: tube feeds. 





Code Status: Full Code


Dispo: Patient will continue to receive ICU level care








Visit type





- Emergency Visit


Emergency Visit: Yes


ED Registration Date: 10/30/18


Care time: The patient presented to the Emergency Department on the above date 

and was hospitalized for further evaluation of their emergent condition.





- New Patient


This patient is new to me today: No





- Critical Care


Critical Care patient: Yes


Total Critical Care Time (in minutes): 36


Critical Care Statement: The care of this patient involved high complexity 

decision making to prevent further life threatening deterioration of the patient

's condition and/or to evaluate & treat vital organ system(s) failure or risk 

of failure.

## 2018-11-07 NOTE — PROC
Central Line Insertion


Indication: Poor Venous Access


Risks and Benefits Explained: Yes


Consent on Chart: Yes


Central Line: Dialysis Cath, Tri Lumen


Anesthesia: 1% Lidocaine


Sterile Technique: Yes


Ultrasound Guided Assistance: Yes


Position: Right Internal Jugular


Post Insertion: Yes: Bilateral Chest Expansion, Chest X-Ray Ordered


Sterile Dressing Applied: Yes

## 2018-11-07 NOTE — PN
Progress Note (short form)





- Note


Progress Note: 





Renal follow up for Hypercalcemia/HAM





Pt seen and examined in the ICU





 Vital Signs











Temperature  99.4 F   11/07/18 08:00


 


Pulse Rate  96 H  11/07/18 10:00


 


Respiratory Rate  16   11/07/18 10:10


 


Blood Pressure  123/89   11/07/18 10:00


 


O2 Sat by Pulse Oximetry (%)  100   11/07/18 07:14








 Intake & Output











 11/04/18 11/05/18 11/06/18 11/07/18





 23:59 23:59 23:59 23:59


 


Intake Total  1744 1089 496


 


Output Total  3100 2400 400


 


Balance  -1356 -1311 96


 


Weight   81.102 kg 81.391 kg








 CBC, BMP





 11/07/18 05:30 





 11/07/18 05:30 


 Laboratory Tests











  11/05/18 11/06/18 11/07/18





  23:04 05:30 05:30


 


MCV    89.7


 


Phosphorus   


 


Magnesium   1.7 L 


 


Albumin   3.3 L 


 


Urine Protein  Negative  


 


Urine Blood  2+ H  














  11/07/18





  05:30


 


MCV 


 


Phosphorus  5.1 H


 


Magnesium  2.2


 


Albumin  2.3 L


 


Urine Protein 


 


Urine Blood 











 Current Medications





Acetaminophen (Tylenol Suppository -)  650 mg [place] Q6H PRN


   PRN Reason: FEVER


   Last Admin: 11/05/18 18:10 Dose:  650 mg


Acetaminophen (Ofirmev Injection -)  1,000 mg IVPB Q6H PRN


   PRN Reason: FEVER


   Last Admin: 11/07/18 00:35 Dose:  1,000 mg


Heparin Sodium (Porcine) (Heparin -)  1,000 unit IVPUSH PRN PRN


   PRN Reason: Heparin


Heparin Sodium (Porcine) (Heparin -)  5,000 unit IVPUSH PRN PRN


   PRN Reason: Heparin


   Last Admin: 11/01/18 15:15 Dose:  5,000 unit


Vancomycin HCl (Vancomycin (Pre-Docked))  1,000 mg in 250 mls @ 166.667 mls/hr 

IVPB Q24H AVA; Protocol


   Last Admin: 11/06/18 20:37 Dose:  166.667 mls/hr


Heparin Sodium/Dextrose (Heparin Infusion -)  25,000 units in 500 mls @ 16 mls/

hr IVPB TITR AVA; Protocol


   Last Titration: 11/07/18 07:08 Dose:  0 unit/hr, 0 mls/hr


Piperacillin Sod/Tazobactam (Sod 2.25 gm/ Dextrose)  50 mls @ 100 mls/hr IVPB 

Q8H-IV AVA; Protocol


   Last Admin: 11/07/18 09:22 Dose:  100 mls/hr


Metoprolol Tartrate (Lopressor Injection -)  5 mg IVPUSH TID AVA


   Last Admin: 11/07/18 05:55 Dose:  5 mg


Thiamine HCl (Vitamin B1 -)  100 mg PO DAILY AVA


   Last Admin: 11/07/18 09:23 Dose:  100 mg











78 year old gentleman with hx of CKD, Afib on A/C, CAD, CKD, Hypertension, 

Hyperlipidemia, PVD who presented with AMS/Confusion and found to have HAM.





#HAM (FeNa was 2.1% indicating tubular injury, US showed no stones or 

obstruction, UA w/o protein but UPCR ~0.5 indicating non-albumin proteinuria)


#AMS of unclear etiology (metabolic encephalopathy)


#Anemia 


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


#Hyperdense lesion on US of the kidney 


#Normal anion gap metabolic acidosis 








Ricky Chang DO

## 2018-11-07 NOTE — PN
Progress Note, Physician


History of Present Illness: 


Poorly responsive off sedation on vent, undergoing apheresis for possible 

hyperviscosity syndrome.





- Current Medication List


Current Medications: 


Active Medications





Acetaminophen (Tylenol Suppository -)  650 mg [place] Q6H PRN


   PRN Reason: FEVER


   Last Admin: 11/05/18 18:10 Dose:  650 mg


Acetaminophen (Ofirmev Injection -)  1,000 mg IVPB Q6H PRN


   PRN Reason: FEVER


   Last Admin: 11/07/18 13:41 Dose:  1,000 mg


Heparin Sodium (Porcine) (Heparin -)  1,000 unit IVPUSH PRN PRN


   PRN Reason: Heparin


Heparin Sodium (Porcine) (Heparin -)  5,000 unit IVPUSH PRN PRN


   PRN Reason: Heparin


   Last Admin: 11/01/18 15:15 Dose:  5,000 unit


Heparin Sodium (Porcine) (Hep-Lock -)  5 ml IVPUSH PRN PRN


   PRN Reason: Heparin


Vancomycin HCl (Vancomycin (Pre-Docked))  1,000 mg in 250 mls @ 166.667 mls/hr 

IVPB Q24H AVA; Protocol


   Last Admin: 11/06/18 20:37 Dose:  166.667 mls/hr


Heparin Sodium/Dextrose (Heparin Infusion -)  25,000 units in 500 mls @ 16 mls/

hr IVPB TITR AVA; Protocol


   Last Titration: 11/07/18 13:00 Dose:  800 unit/hr, 16 mls/hr


Piperacillin Sod/Tazobactam (Sod 2.25 gm/ Dextrose)  50 mls @ 100 mls/hr IVPB 

Q8H-IV AVA; Protocol


   Last Admin: 11/07/18 18:17 Dose:  100 mls/hr


Fentanyl 500 mcg/ Dextrose  100 mls @ 5 mls/hr IVPB TITR AVA; Protocol


   Last Admin: 11/07/18 11:59 Dose:  25 mcg/hr, 5 mls/hr


Sodium Chloride (Normal Saline -)  250 mls @ 250 mls/hr IV ASDIR STA


   Stop: 11/07/18 18:48


   Last Admin: 11/07/18 18:16 Dose:  250 mls/hr


Sodium Chloride (Normal Saline -)  250 mls @ 250 mls/hr IV ASDIR STA


   Stop: 11/07/18 19:02


Metoprolol Tartrate (Lopressor Injection -)  5 mg IVPUSH Q8H AVA


   Last Admin: 11/07/18 13:18 Dose:  5 mg


Pantoprazole Sodium (Protonix Iv)  40 mg IVPUSH DAILY [facility]


   Last Admin: 11/07/18 13:16 Dose:  40 mg


Thiamine HCl (Vitamin B1 -)  100 mg PO DAILY [facility]


   Last Admin: 11/07/18 09:23 Dose:  100 mg











- Objective


Vital Signs: 


 Vital Signs











Temperature  99.4 F   11/07/18 08:00


 


Pulse Rate  91 H  11/07/18 14:00


 


Respiratory Rate  17   11/07/18 15:20


 


Blood Pressure  92/58 L  11/07/18 14:00


 


O2 Sat by Pulse Oximetry (%)  100   11/07/18 07:14











Cardiovascular: Yes: Pulse Irregular


Respiratory: Yes: Intubated, Mechanically Ventilated, Rhonchi


Gastrointestinal: Yes: Soft, Hypoactive Bowel Sounds


Edema: No


Neurological: Yes: Unresponsive


Labs: 


 CBC, BMP





 11/07/18 16:20 





 11/07/18 16:20 





 INR, PTT











INR  2.07  (0.83-1.09)  H  11/07/18  16:20    


 


Fibrinogen  134.0 mg/dL (238-498)  L D 11/07/18  16:20    














Problem List





- Problems


(1) Atrial fibrillation with RVR


Code(s): I48.91 - UNSPECIFIED ATRIAL FIBRILLATION   





(2) Acute-on-chronic kidney injury


Code(s): N17.9 - ACUTE KIDNEY FAILURE, UNSPECIFIED; N18.9 - CHRONIC KIDNEY 

DISEASE, UNSPECIFIED   


Qualifiers: 


   Acute renal failure type: unspecified   Chronic kidney disease stage: 

unspecified stage   Qualified Code(s): N17.9 - Acute kidney failure, unspecified

; N18.9 - Chronic kidney disease, unspecified   





(3) Demand ischemia


Code(s): I24.8 - OTHER FORMS OF ACUTE ISCHEMIC HEART DISEASE   





(4) Hypercalcemia


Code(s): E83.52 - HYPERCALCEMIA   





(5) Nonadherence to medication


Code(s): Z91.14 - PATIENT'S OTHER NONCOMPLIANCE WITH MEDICATION REGIMEN   





(6) Paraproteinemia


Code(s): D89.2 - HYPERGAMMAGLOBULINEMIA, UNSPECIFIED   





(7) Anticoagulant long-term use


Code(s): Z79.01 - LONG TERM (CURRENT) USE OF ANTICOAGULANTS   





(8) Chronic thromboembolic disease


Code(s): I74.9 - EMBOLISM AND THROMBOSIS OF UNSPECIFIED ARTERY   





(9) Coronary artery disease


Code(s): I25.10 - ATHSCL HEART DISEASE OF NATIVE CORONARY ARTERY W/O ANG PCTRS 

  


Qualifiers: 


   Coronary Disease-Associated Artery/Lesion type: native artery   Native vs. 

transplanted heart: native heart   Associated angina: without angina   

Qualified Code(s): I25.10 - Atherosclerotic heart disease of native coronary 

artery without angina pectoris   





(10) Diastolic dysfunction without heart failure


Code(s): I51.9 - HEART DISEASE, UNSPECIFIED   





(11) HTN (hypertension)


Code(s): I10 - ESSENTIAL (PRIMARY) HYPERTENSION   


Qualifiers: 


   Hypertension type: essential hypertension   Qualified Code(s): I10 - 

Essential (primary) hypertension   





(12) Hypertrophic cardiomyopathy


Code(s): I42.2 - OTHER HYPERTROPHIC CARDIOMYOPATHY   





(13) Hyperlipidemia


Code(s): E78.5 - HYPERLIPIDEMIA, UNSPECIFIED   


Qualifiers: 


   Hyperlipidemia type: pure hypercholesterolemia   Qualified Code(s): E78.00 - 

Pure hypercholesterolemia, unspecified; E78.0 - Pure hypercholesterolemia   





Assessment/Plan


R&LHc at [facility] 1/11/2017 showing nonobstructive CAD, severe LV apical 

hypertrophic cardiomyopathy, mildly elevated right sided pressures, Mynx 

deployed right CFA access site. Study is consistent with apical hypertrophy (

spade-like) variant of hypertrophic cardiomyopathy, planned for optimal medical 

therapy. 


Echocardiogram: 07/11/2018 Mod cLVH, severe DYANA, mod TR RVSP 50-60 mmHg, mod-

severe MR


Echocardiogram: 10/16/2018 Normal LV size with hyperdynamic LVEF 75%, mild BSH, 

normal RV size and fxn, severe LAE, mod-severe MR, mod TR





1. Acute hypoxic respiratory failure, possible pneumonia other differential 

diagnosis includes possible PTE


2. Toxic metabolic encephelopathy, possible hyperviscosity syndrome, sepsis


2. Acute on CKD (pre-renal) 


3. Hypercalcemia, paraproteinemia-> possible multiple myeloma


4. History of bilateral SFA occlusion post thrombectomy, referable to embolic 

disease related to persistent atrial fibrillation with [id number] score of 6, 

history of poor compliance with anticoagulation and medical F/U


5. Non obstructive CAD on R&LHc coronary angiography with demand ischemia


6. 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 

subendocardial ischemia, compensated/euvolemic


7. Persistent atrial fibrillation [id number] score of 6 on Xarelto 20 qd


8. Labile HTN


9. Poor compliance with medical therapy administration and medical F/U


10. Tobacco abuse


11. ETOH dependence





PLAN:





1. Heparin gtt for now while off Xarelto 15 qd (renal dosing)


2. IV Lopressor as hemodynamics tolerate


3. Plasmapharesis per hematology input


4. s/p Zometa, monitor calcium level, check PTH, ruled out obstructive uropathy


5. Empiric abx course pending C&S


6. NGT for enteral feeds as prolonged NPO


7. Ventilator management

## 2018-11-07 NOTE — PN
Teaching Attending Note


Name of Resident: Arnav Bernard





ATTENDING PHYSICIAN STATEMENT





I saw and evaluated the patient.


I reviewed the resident's note and discussed the case with the resident.


I agree with the resident's findings and plan as documented.








SUBJECTIVE:





Pt seen and examined in the ICU. Remains intubated, poorly responsive off 

sedation. Tachypneic on volume assist control.





OBJECTIVE:





 Vital Signs











 Period  Temp  Pulse  Resp  BP Sys/Varghese  Pulse Ox


 


 Last 24 Hr  99.4 F-101.8 F    14-26  /64-89  








 Intake & Output











 11/04/18 11/05/18 11/06/18 11/07/18





 23:59 23:59 23:59 23:59


 


Intake Total  1744 1089 496


 


Output Total  3100 2400 400


 


Balance  -1356 -1311 96


 


Weight   81.102 kg 81.391 kg








Gen:  intubated, poorly responsive, tachypneic


Heart: tachycardic, irregular


Lung: decreased breath sounds at the bases


Abd: soft, nontender


Ext: no edema





 CBC, BMP





 11/07/18 05:30 





 11/07/18 05:30 





Active Medications





Acetaminophen (Tylenol Suppository -)  650 mg [place] Q6H PRN


   PRN Reason: FEVER


   Last Admin: 11/05/18 18:10 Dose:  650 mg


Acetaminophen (Ofirmev Injection -)  1,000 mg IVPB Q6H PRN


   PRN Reason: FEVER


   Last Admin: 11/07/18 00:35 Dose:  1,000 mg


Heparin Sodium (Porcine) (Heparin -)  1,000 unit IVPUSH PRN PRN


   PRN Reason: Heparin


Heparin Sodium (Porcine) (Heparin -)  5,000 unit IVPUSH PRN PRN


   PRN Reason: Heparin


   Last Admin: 11/01/18 15:15 Dose:  5,000 unit


Vancomycin HCl (Vancomycin (Pre-Docked))  1,000 mg in 250 mls @ 166.667 mls/hr 

IVPB Q24H AVA; Protocol


   Last Admin: 11/06/18 20:37 Dose:  166.667 mls/hr


Heparin Sodium/Dextrose (Heparin Infusion -)  25,000 units in 500 mls @ 16 mls/

hr IVPB TITR AVA; Protocol


   Last Titration: 11/07/18 07:08 Dose:  0 unit/hr, 0 mls/hr


Piperacillin Sod/Tazobactam (Sod 2.25 gm/ Dextrose)  50 mls @ 100 mls/hr IVPB 

Q8H-IV AVA; Protocol


   Last Admin: 11/07/18 09:22 Dose:  100 mls/hr


Fentanyl 500 mcg/ Dextrose  100 mls @ 5 mls/hr IVPB TITR AVA; Protocol


   Last Admin: 11/07/18 11:59 Dose:  25 mcg/hr, 5 mls/hr


Metoprolol Tartrate (Lopressor Injection -)  5 mg IVPUSH Q8H AVA


Pantoprazole Sodium (Protonix Iv)  40 mg IVPUSH DAILY AVA


Thiamine HCl (Vitamin B1 -)  100 mg PO DAILY [facility]


   Last Admin: 11/07/18 09:23 Dose:  100 mg








ASSESSMENT AND PLAN:


Acute Hypoxic Respiratory Failure


Altered Mental Status


Pneumonia


r/o Multiple Myeloma


Hypercalcemia


Acute on Chronic Renal Failure


Metabolic Acidosis


LV Diastolic Dysfunction


Atrial Fibrillation


CAD


+Troponins likely Demand Ischemia


PAD


Hyperlipidemia


HTN





-  continue antibiotics


-  f/u cultures


-  plasmapharesis per oncology, will place catheter


-  f/u blood viscosity


-  f/u pending serologies


-  s/p Zometa, monitor calcium level


-  rate control


-  continue anticoagulation


-  minimize sedation to assess mental status but start low dose fentanyl given 

labored breathing


-  not a candidate for weaning at this time


-  enteral feeds


-  DVT/GI prophylaxis


-  continue ICU monitoring











critical care time spent in reviewing chart, evaluating patient and formulating 

plan 35 min

## 2018-11-07 NOTE — PN
Progress Note (short form)





- Note


Progress Note: 





Patient seen and examined at bedside in the ICU


Intubated and sedated


failed IV access for pharesis. [facility] to return later today after 

access achieved


Spiked fever last night 


on ABx for RLL pneumonia





 Vital Signs











Temp  99.4 F   11/07/18 08:00


 


Pulse  95 H  11/07/18 08:00


 


Resp  18   11/07/18 08:08


 


BP  108/68   11/07/18 08:00


 


Pulse Ox  100   11/07/18 07:14








 Intake & Output











 11/06/18 11/06/18 11/07/18





 11:59 23:59 11:59


 


Intake Total 209 880 496


 


Output Total 900 1500 400


 


Balance -691 -620 96


 


Weight 81.102 kg  81.391 kg


 


Intake:   


 


   200 216


 


    HEPARIN INFUSION - 25,000 209 200 216





    units In 500 ml @ 800   





    UNIT/HR 16 mls/hr IVPB   





    TITR AVA Rx#:[id number]   


 


  IVPB  650 50


 


  Tube Feeding  20 150


 


  Tube Irrigant  10 80


 


Output:   


 


  Urine 900 1500 400


 


    Garces 900 1500 400


 


Other:   


 


  Voiding Method Indwelling Catheter Indwelling Catheter Indwelling Catheter


 


  Bowel Movement No  Yes


 


  # Bowel Movements   1


 


  Body Mass Index (BMI)  24.1 


 


  Weight Measurement Method Built in Bedscale  Built in Bedscale








PE:


Lying in bed intubated opens eyes to name. yesterday was able to very weakly 

squeeze my hand with his left


Dry mucous membranes. Poor dentition


PERRL


Irregular No murmur


soft non tender non distended


no lower extremity edema





 











  11/06/18 11/06/18 11/07/18





  05:30 17:50 05:30


 


WBC  4.9   4.0


 


Corrected WBC (auto)  4.34   3.54


 


RBC    3.11 L


 


Hgb    9.1 L


 


Hct    27.9 L


 


MCV    89.7


 


MCHC    32.7


 


RDW    16.0 H


 


Plt Count    148


 


Neutrophils %    79.0


 


Lymphocytes %    14.0  D


 


Monocytes %    4.8


 


Eosinophils %    1.7  D


 


Basophils %    0.5


 


Sodium   139 


 


Potassium   4.5 


 


Chloride   113 H 


 


Carbon Dioxide   16 L 


 


Anion Gap   10 


 


BUN   60 H 


 


Creatinine   2.4 H 














  11/07/18





  05:30


 


WBC 


 


Corrected WBC (auto) 


 


RBC 


 


Hgb 


 


Hct 


 


MCV 


 


MCHC 


 


RDW 


 


Plt Count 


 


Neutrophils % 


 


Lymphocytes % 


 


Monocytes % 


 


Eosinophils % 


 


Basophils % 


 


Sodium  137


 


Potassium  4.1


 


Chloride  111 H


 


Carbon Dioxide  18 L


 


Anion Gap  8


 


BUN  58 H


 


Creatinine  2.4 H








 











 11/05/18 19:45 Blood Culture - Preliminary





 Blood - Peripheral Venous    NO GROWTH OBTAINED AFTER 24 HOURS, INCUBATION TO 

CONTINUE





    FOR 4 DAYS.


 


 11/05/18 19:20 Blood Culture - Preliminary





 Blood - Peripheral Venous    NO GROWTH OBTAINED AFTER 24 HOURS, INCUBATION TO 

CONTINUE





    FOR 4 DAYS.


 


 11/06/18 14:30 Gram Stain - Pending





 Sputum - Endotrachea Suction/Ventilator Sputum Culture - Pending


 


 11/06/18 14:30 Legionella Antigen - Pending





 Urine - Urine Garces Streptococcus pneumoniae Antigen (M - Pending


 


 10/30/18 17:02 Blood Culture - Final





 Blood - Peripheral Venous    NO GROWTH AFTER 5 DAYS INCUBATION


 


 10/30/18 17:02 Blood Culture - Final





 Blood - Peripheral Venous    NO GROWTH AFTER 5 DAYS INCUBATION


 


 10/30/18 17:02 Urine Culture - Final





 Urine - Urine - Catheterized    NO GROWTH OBTAINED


 


 10/31/18 19:15 Influenza Types A,B Antigen - Final





 Nasopharyngeal Swab  - Final








78M with multiple medical problems presents to the hospital after being found 

down by neighbors found to have HAM and hypercalcemia with altered mental 

status.





Problem List:


Altered mental status


toxic metabolic encephalopathy


Hypercalcemia likely from malignancy


HAM on CKD


History of alcohol abuse


HTN


HLD


CAD 


Afib 


diastolic dysfunction


troponinemia


Hypertrophic cardiomypoathy (apical)


sepsis secondary to RLL pneumonia


acute hypoxemic respiratory failure


Vitamin D deficiency 





Plan:


concern for multiple myeloma given hypercalcemia worsening renal failure and 

hyperproteinemia on last SPEP


Corrected calcium today is 10.36


Patient has apharesis done on the early morning of 11/6/2018 around 2am


Pharesis to be done today 


will establish central line access with a shiley 


SPEP noted total protein and globulins elevated 


 Kappa/lambda light chains-pending


M spike elevated elevated at 6.8 previously 4.7 


Will send immunoglobulins IgG IgA IgM-IgG elevated rest of immunoglobulins 

pending


 bone (metastatic) survey and if negative will get Bone scan.- when acute 

issues resolved 


Of note Bone scan may not show any lesions if it is from multiple myeloma but 

will sold solid tumors


Can consider MRI of C/T/L spine if patient stops moving lower extremities or 

concern for lytic lesions of the spine. At this time the patient is moving his 

lower extremities so will hold off for now.


PTH, PTHrP pending


Vit D pending low


ABx per ID


Thank you for this consult

## 2018-11-07 NOTE — PN
Progress Note, Physician


Chief Complaint: 





PATIENT AS ASLEEP


COMFIRTABLE


CONFUSED STILL





- Current Medication List


Current Medications: 


Active Medications





Acetaminophen (Tylenol Suppository -)  650 mg [place] Q6H PRN


   PRN Reason: FEVER


   Last Admin: 11/05/18 18:10 Dose:  650 mg


Acetaminophen (Ofirmev Injection -)  1,000 mg IVPB Q6H PRN


   PRN Reason: FEVER


   Last Admin: 11/07/18 00:35 Dose:  1,000 mg


Heparin Sodium (Porcine) (Heparin -)  1,000 unit IVPUSH PRN PRN


   PRN Reason: Heparin


Heparin Sodium (Porcine) (Heparin -)  5,000 unit IVPUSH PRN PRN


   PRN Reason: Heparin


   Last Admin: 11/01/18 15:15 Dose:  5,000 unit


Vancomycin HCl (Vancomycin (Pre-Docked))  1,000 mg in 250 mls @ 166.667 mls/hr 

IVPB Q24H AVA; Protocol


   Last Admin: 11/06/18 20:37 Dose:  166.667 mls/hr


Heparin Sodium/Dextrose (Heparin Infusion -)  25,000 units in 500 mls @ 16 mls/

hr IVPB TITR AVA; Protocol


   Last Titration: 11/07/18 07:08 Dose:  0 unit/hr, 0 mls/hr


Piperacillin Sod/Tazobactam (Sod 2.25 gm/ Dextrose)  50 mls @ 100 mls/hr IVPB 

Q8H-IV AVA; Protocol


   Last Admin: 11/07/18 09:22 Dose:  100 mls/hr


Metoprolol Tartrate (Lopressor Injection -)  5 mg IVPUSH TID [facility]


   Last Admin: 11/07/18 05:55 Dose:  5 mg


Thiamine HCl (Vitamin B1 -)  100 mg PO DAILY [facility]


   Last Admin: 11/07/18 09:23 Dose:  100 mg











- Objective


Vital Signs: 


 Vital Signs











Temperature  99.4 F   11/07/18 08:00


 


Pulse Rate  95 H  11/07/18 08:00


 


Respiratory Rate  16   11/07/18 10:10


 


Blood Pressure  108/68   11/07/18 08:00


 


O2 Sat by Pulse Oximetry (%)  100   11/07/18 07:14











Constitutional: Yes: Mild Distress


Eyes: Yes: WNL


HENT: Yes: WNL


Neck: Yes: WNL


Cardiovascular: Yes: Pulse Irregular


Respiratory: Yes: Diminished, On Nasal O2


Gastrointestinal: Yes: Soft


Genitourinary: Yes: Incontinence


Musculoskeletal: Yes: Muscle Weakness


Edema: Yes


Integumentary: Yes: Other


Neurological: Yes: Confusion, Pre-Existing Deficit


...Motor Strength: LLE, RLE


Psychiatric: Yes: Other


Labs: 


 CBC, BMP





 11/07/18 05:30 





 11/07/18 05:30 





 INR, PTT











INR  1.81  (0.83-1.09)  H  11/06/18  05:30    


 


Fibrinogen  246.0 mg/dL (238-498)   11/06/18  05:30    














Problem List





- Problems


(1) HAM (acute kidney injury)


Code(s): N17.9 - ACUTE KIDNEY FAILURE, UNSPECIFIED   





(2) Atrial fibrillation with RVR


Code(s): I48.91 - UNSPECIFIED ATRIAL FIBRILLATION   





(3) Hyperlipidemia


Code(s): E78.5 - HYPERLIPIDEMIA, UNSPECIFIED   


Qualifiers: 


   Hyperlipidemia type: pure hypercholesterolemia   Qualified Code(s): E78.00 - 

Pure hypercholesterolemia, unspecified; E78.0 - Pure hypercholesterolemia   





(4) Hypothermia


Code(s): T68.XXXA - HYPOTHERMIA, INITIAL ENCOUNTER   


Qualifiers: 


   Encounter type: initial encounter   Qualified Code(s): T68.XXXA - Hypothermia

, initial encounter   





(5) Acute-on-chronic kidney injury


Code(s): N17.9 - ACUTE KIDNEY FAILURE, UNSPECIFIED; N18.9 - CHRONIC KIDNEY 

DISEASE, UNSPECIFIED   


Qualifiers: 


   Acute renal failure type: unspecified   Chronic kidney disease stage: 

unspecified stage   Qualified Code(s): N17.9 - Acute kidney failure, unspecified

; N18.9 - Chronic kidney disease, unspecified   





(6) Generalized weakness


Code(s): R53.1 - WEAKNESS   





(7) History of ETOH abuse


Code(s): Z87.898 - PERSONAL HISTORY OF OTHER SPECIFIED CONDITIONS   





(8) Hypercalcemia


Code(s): E83.52 - HYPERCALCEMIA   





(9) Hypertrophic cardiomyopathy


Code(s): I42.2 - OTHER HYPERTROPHIC CARDIOMYOPATHY   





(10) Toxic metabolic encephalopathy


Code(s): G92 - TOXIC ENCEPHALOPATHY   





(11) Sepsis


Code(s): A41.9 - SEPSIS, UNSPECIFIED ORGANISM   





Assessment/Plan


 TOXIC METABOLIC ENCEPHALOPATHY


NEURO AND CARDIO EVAL


HEPARIN IV FOR AC AFIB


SWALLOW EVAL WITH DYSPHAGIA PRECAUTIONS


MONITOR RENAL FUNCTION


STILL WEAK/AT FALL RISKS


NEURO CHECKS


PT/SNF WHEN EATING AND MORE MEDICALLY STABLE


FREQUENT TURNING

## 2018-11-07 NOTE — PN
Progress Note, Physician


History of Present Illness: 





 Intubated on mechanical ventilation


 Opens eyes to calling name


 Remains febrile


Repeat BC no growth


 


 


 





- Current Medication List


Current Medications: 


Active Medications





Acetaminophen (Tylenol Suppository -)  650 mg [place] Q6H PRN


   PRN Reason: FEVER


   Last Admin: 11/05/18 18:10 Dose:  650 mg


Acetaminophen (Ofirmev Injection -)  1,000 mg IVPB Q6H PRN


   PRN Reason: FEVER


   Last Admin: 11/07/18 00:35 Dose:  1,000 mg


Heparin Sodium (Porcine) (Heparin -)  1,000 unit IVPUSH PRN PRN


   PRN Reason: Heparin


Heparin Sodium (Porcine) (Heparin -)  5,000 unit IVPUSH PRN PRN


   PRN Reason: Heparin


   Last Admin: 11/01/18 15:15 Dose:  5,000 unit


Vancomycin HCl (Vancomycin (Pre-Docked))  1,000 mg in 250 mls @ 166.667 mls/hr 

IVPB Q24H AVA; Protocol


   Last Admin: 11/06/18 20:37 Dose:  166.667 mls/hr


Heparin Sodium/Dextrose (Heparin Infusion -)  25,000 units in 500 mls @ 16 mls/

hr IVPB TITR AVA; Protocol


   Last Titration: 11/07/18 07:08 Dose:  0 unit/hr, 0 mls/hr


Piperacillin Sod/Tazobactam (Sod 2.25 gm/ Dextrose)  50 mls @ 100 mls/hr IVPB 

Q8H-IV AVA; Protocol


   Last Admin: 11/07/18 09:22 Dose:  100 mls/hr


Fentanyl 500 mcg/ Dextrose  100 mls @ 5 mls/hr IVPB TITR AVA


Metoprolol Tartrate (Lopressor Injection -)  5 mg IVPUSH TID VAA


   Last Admin: 11/07/18 05:55 Dose:  5 mg


Thiamine HCl (Vitamin B1 -)  100 mg PO DAILY AVA


   Last Admin: 11/07/18 09:23 Dose:  100 mg











- Objective


Vital Signs: 


 Vital Signs











Temperature  99.4 F   11/07/18 08:00


 


Pulse Rate  96 H  11/07/18 10:00


 


Respiratory Rate  16   11/07/18 10:10


 


Blood Pressure  123/89   11/07/18 10:00


 


O2 Sat by Pulse Oximetry (%)  100   11/07/18 07:14











Constitutional: Yes: No Distress


Cardiovascular: Yes: Regular Rate and Rhythm, S1, S2


Respiratory: Yes: Mechanically Ventilated


Gastrointestinal: Yes: Normal Bowel Sounds, Soft.  No: Tenderness


Edema: No


Labs: 


 CBC, BMP





 11/07/18 05:30 





 11/07/18 05:30 





 INR, PTT











INR  1.81  (0.83-1.09)  H  11/06/18  05:30    


 


Fibrinogen  246.0 mg/dL (238-498)   11/06/18  05:30    














Assessment/Plan


Respiratory failure


 RLL pneumonia


 Toxic metabolic encephalopathy


 Hypercalcemia


 Renal failure


 Suspected myeloma


 


 Repeat BC pending


 Suctioned sputum c/s, urine legionella ag obtained


 Continue broad spectrum  antimicrobial coverage- zosyn

## 2018-11-08 LAB
ACANTHOCYTES BLD QL SMEAR: 0
ALBUMIN SERPL-MCNC: 2.9 G/DL (ref 3.4–5)
ALP SERPL-CCNC: 91 U/L (ref 45–117)
ALT SERPL-CCNC: 41 U/L (ref 13–61)
ANION GAP SERPL CALC-SCNC: 7 MMOL/L (ref 8–16)
ANISOCYTOSIS BLD QL: 0
ARTERIAL BLOOD GAS PCO2: 32.2 MMHG (ref 35–45)
ARTERIAL PATENCY WRIST A: POSITIVE
AST SERPL-CCNC: 74 U/L (ref 15–37)
BASE EXCESS BLDA CALC-SCNC: -8.7 MEQ/L (ref -2–2)
BASO STIPL BLD QL SMEAR: 0
BASOPHILS # BLD: 0.2 % (ref 0–2)
BILIRUB SERPL-MCNC: 0.4 MG/DL (ref 0.2–1)
BUN SERPL-MCNC: 52 MG/DL (ref 7–18)
CALCIUM SERPL-MCNC: 7.6 MG/DL (ref 8.5–10.1)
CHLORIDE SERPL-SCNC: 114 MMOL/L (ref 98–107)
CO2 SERPL-SCNC: 19 MMOL/L (ref 21–32)
CREAT SERPL-MCNC: 2.2 MG/DL (ref 0.55–1.3)
DACRYOCYTES BLD QL SMEAR: 0
DEPRECATED RDW RBC AUTO: 16.4 % (ref 11.9–15.9)
DOHLE BOD BLD QL SMEAR: 0
EOSINOPHIL # BLD: 1.7 % (ref 0–4.5)
GLUCOSE SERPL-MCNC: 110 MG/DL (ref 74–106)
HCT VFR BLD CALC: 27.2 % (ref 35.4–49)
HELMET CELLS BLD QL SMEAR: 0
HGB BLD-MCNC: 9 GM/DL (ref 11.7–16.9)
HOWELL-JOLLY BOD BLD QL SMEAR: 0
KAPPA LC FREE SER-MCNC: 2808.8 MG/L (ref 3.3–19.4)
LYMPHOCYTES # BLD: 20.9 % (ref 8–40)
MACROCYTES BLD QL: 0
MAGNESIUM SERPL-MCNC: 2 MG/DL (ref 1.8–2.4)
MCH RBC QN AUTO: 29.7 PG (ref 25.7–33.7)
MCHC RBC AUTO-ENTMCNC: 33.1 G/DL (ref 32–35.9)
MCV RBC: 89.9 FL (ref 80–96)
MONOCYTES # BLD AUTO: 3.8 % (ref 3.8–10.2)
NEUTROPHILS # BLD: 73.4 % (ref 42.8–82.8)
OVALOCYTES BLD QL SMEAR: 0
PHOSPHATE SERPL-MCNC: 3.1 MG/DL (ref 2.5–4.9)
PLATELET # BLD AUTO: 117 K/MM3 (ref 134–434)
PLATELET BLD QL SMEAR: (no result)
PMV BLD: 9.1 FL (ref 7.5–11.1)
PO2 BLDA: 110 MMHG (ref 70–100)
POTASSIUM SERPLBLD-SCNC: 4.4 MMOL/L (ref 3.5–5.1)
PROT SERPL-MCNC: 7.5 G/DL (ref 6.4–8.2)
RBC # BLD AUTO: 3.03 M/MM3 (ref 4–5.6)
ROULEAUX BLD QL SMEAR: 0
SAO2 % BLDA: 97.9 % (ref 90–98.9)
SICKLE CELLS BLD QL SMEAR: 0
SODIUM SERPL-SCNC: 140 MMOL/L (ref 136–145)
TARGETS BLD QL SMEAR: 0
TOXIC GRANULES BLD QL SMEAR: 0
WBC # BLD AUTO: 4.8 K/MM3 (ref 4–10)

## 2018-11-08 RX ADMIN — METOPROLOL TARTRATE SCH: 5 INJECTION, SOLUTION INTRAVENOUS at 21:02

## 2018-11-08 RX ADMIN — METOPROLOL TARTRATE SCH: 5 INJECTION, SOLUTION INTRAVENOUS at 05:37

## 2018-11-08 RX ADMIN — PANTOPRAZOLE SODIUM SCH MG: 40 INJECTION, POWDER, FOR SOLUTION INTRAVENOUS at 09:01

## 2018-11-08 RX ADMIN — VANCOMYCIN HYDROCHLORIDE SCH MLS/HR: 1 INJECTION, POWDER, LYOPHILIZED, FOR SOLUTION INTRAVENOUS at 21:02

## 2018-11-08 RX ADMIN — PIPERACILLIN SODIUM AND TAZOBACTAM SODIUM SCH MLS/HR: .25; 2 INJECTION, POWDER, LYOPHILIZED, FOR SOLUTION INTRAVENOUS at 17:01

## 2018-11-08 RX ADMIN — Medication SCH MG: at 09:01

## 2018-11-08 RX ADMIN — HEPARIN SODIUM SCH MLS/HR: 5000 INJECTION, SOLUTION INTRAVENOUS at 16:35

## 2018-11-08 RX ADMIN — PIPERACILLIN SODIUM AND TAZOBACTAM SODIUM SCH MLS/HR: .25; 2 INJECTION, POWDER, LYOPHILIZED, FOR SOLUTION INTRAVENOUS at 01:33

## 2018-11-08 RX ADMIN — DEXTROSE MONOHYDRATE SCH MLS/HR: 50 INJECTION, SOLUTION INTRAVENOUS at 07:58

## 2018-11-08 RX ADMIN — ACETAMINOPHEN PRN MG: 10 INJECTION, SOLUTION INTRAVENOUS at 01:55

## 2018-11-08 RX ADMIN — PIPERACILLIN SODIUM AND TAZOBACTAM SODIUM SCH MLS/HR: .25; 2 INJECTION, POWDER, LYOPHILIZED, FOR SOLUTION INTRAVENOUS at 09:01

## 2018-11-08 RX ADMIN — HEPARIN SODIUM SCH: 5000 INJECTION, SOLUTION INTRAVENOUS at 09:39

## 2018-11-08 RX ADMIN — METOPROLOL TARTRATE SCH MG: 5 INJECTION, SOLUTION INTRAVENOUS at 13:00

## 2018-11-08 RX ADMIN — DEXTROSE MONOHYDRATE SCH: 50 INJECTION, SOLUTION INTRAVENOUS at 20:57

## 2018-11-08 NOTE — PN
Progress Note (short form)





- Note


Progress Note: 





Patient seen and examined





Feels well








 Last Vital Signs











Temp Pulse Resp BP Pulse Ox


 


 99.6 F   109 H  18   96/56 L  100 


 


 11/09/18 02:00  11/09/18 06:06  11/09/18 06:39  11/09/18 06:06  11/08/18 20:33








Cor: RSR, No murmurs, No gallops


Lungs: Clear to P&A


Abd: Soft, Normal bowel sounds, No organomegaly


Ext:No significant edema








Labs/Meds reviewed





A/P





78 year old gentleman with hx of CKD, Afib on A/C, CAD, CKD, Hypertension, 

Hyperlipidemia, PVD who presented with AMS/Confusion and found to have HAM/

hypercalcemia/anemia


acute desaturation 





Clinical picture concerning for paraproteinemia


SFLCA --Kappa chains--2000s/ratio 438


IgG > 7grams


IgM < 5





? hyperviscosity due to paraproteinemia


serum viscosity pending





will repeat session no. 3 pheresis tomorrow


monitor coags/fibrinogen/cbc/cmp

## 2018-11-08 NOTE — PN
Progress Note (short form)





- Note


Progress Note: 





Renal follow up for Hypercalcemia/HAM





Pt seen and examined in the ICU


on vent


off sedation


reponds to physical stimuli


no overnight events


last plasmaphareiss was yesterday 











 Vital Signs











Temperature  96.8 F L  11/08/18 10:13


 


Pulse Rate  90   11/08/18 10:13


 


Respiratory Rate  13   11/08/18 10:13


 


Blood Pressure  101/64   11/08/18 10:13


 


O2 Sat by Pulse Oximetry (%)  100   11/08/18 08:59








 Intake & Output











 11/05/18 11/06/18 11/07/18 11/08/18





 23:59 23:59 23:59 23:59


 


Intake Total 1744 1089 2087 1061


 


Output Total 3100 2400 1650 850


 


Balance -1356 -1311 437 211


 


Weight  81.102 kg 81.391 kg 80.853 kg














NAD on vent via ET Tube


RRR


Dec BS, Course BS


No Le edema


 CBC, BMP





 11/08/18 05:30 





 11/08/18 05:30 


 Laboratory Tests











  11/08/18





  05:30


 


Calcium  7.6 L


 


Albumin  2.9 L











 Current Medications





Acetaminophen (Tylenol Suppository -)  650 mg KS Q6H PRN


   PRN Reason: FEVER


   Last Admin: 11/05/18 18:10 Dose:  650 mg


Heparin Sodium (Porcine) (Hep-Lock -)  5 ml IVPUSH PRN PRN


   PRN Reason: Heparin


Heparin Sodium (Porcine) (Heparin -)  1,000 unit IVPUSH PRN PRN


   PRN Reason: Heparin


Heparin Sodium (Porcine) (Heparin -)  5,000 unit IVPUSH PRN PRN


   PRN Reason: Heparin


Vancomycin HCl (Vancomycin (Pre-Docked))  1,000 mg in 250 mls @ 166.667 mls/hr 

IVPB Q24H AVA; Protocol


   Last Admin: 11/07/18 21:32 Dose:  166.667 mls/hr


Piperacillin Sod/Tazobactam (Sod 2.25 gm/ Dextrose)  50 mls @ 100 mls/hr IVPB 

Q8H-IV AVA; Protocol


   Last Admin: 11/08/18 09:01 Dose:  100 mls/hr


Fentanyl 500 mcg/ Dextrose  100 mls @ 5 mls/hr IVPB TITR AVA; Protocol


   Last Admin: 11/08/18 07:58 Dose:  25 mcg/hr, 5 mls/hr


Heparin Sodium/Dextrose (Heparin Infusion -)  25,000 units in 500 mls @ 20 mls/

hr IVPB TITR AVA; Protocol


   Last Admin: 11/08/18 09:39 Dose:  Not Given


Metoprolol Tartrate (Lopressor Injection -)  5 mg IVPUSH Q8H AVA


   Last Admin: 11/08/18 05:37 Dose:  Not Given


Pantoprazole Sodium (Protonix Iv)  40 mg IVPUSH DAILY [facility]


   Last Admin: 11/08/18 09:01 Dose:  40 mg


Thiamine HCl (Vitamin B1 -)  100 mg PO DAILY [facility]


   Last Admin: 11/08/18 09:01 Dose:  100 mg











78 year old gentleman with hx of CKD, Afib on A/C, CAD, CKD, Hypertension, 

Hyperlipidemia, PVD who presented with AMS/Confusion and found to have HAM.





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 

showed no stones or obstruction, UA w/o protein but UPCR ~0.5 indicating non-

albumin proteinuria)


#AMS of unclear etiology (metabolic encephalopathy)


#Anemia 


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


#Hyperdense lesion on US of the kidney 


#Normal anion gap metabolic acidosis 





Renal function stable at pt is non-oliguric 


no overt acidosis, hyperkalemia or fluid overload


continue plasmapharesis as per Heme 


serum Ca is now improved


supportive care








Ricky Chang DO

## 2018-11-08 NOTE — PN
Physical Exam: 


SUBJECTIVE: Patient seen and examined at bedside. He had a few runs of V-Tach 

overnight but reverts to irregular sinus. There was no pneumothorax seen on CXR 

today. We took the patient off sedation today to assess his mental status. He 

opens his eyes, and turns towards people entering the room. No fevers 

overnight. 








OBJECTIVE:





 Vital Signs











 Period  Temp  Pulse  Resp  BP Sys/Varghese  Pulse Ox


 


 Last 24 Hr  96.8 F-100.1 F    12-26  /49-80  100-100











GENERAL: Intubated and not sedated. patient is awake but not alert. 


HEAD: Normal with no signs of trauma.


EYES: Pupils equal, round and reactive to light, sclera anicteric, conjunctiva 

clear.


EARS, NOSE, THROAT: Ears normal, nares patent, oropharynx clear without 

exudates. Moist mucous membranes.


NECK: Supple without lymphadenopathy, no JVD, or masses.


LUNGS: Diffuse wheezes bilaterally in both lung fields. No accessory muscle 

use. 


HEART: regular rate and irregular rhythm.


ABDOMEN: Soft, normoactive bowel sounds, no guarding, no rebound, no masses.  

No hepatomegaly or splenomegaly. 


MUSCULOSKELETAL: No bony deformities. 


UPPER EXTREMITIES: 2+ pulses, warm, well-perfused. No cyanosis. No clubbing. 

Cap refill <2 seconds. No peripheral edema.


LOWER EXTREMITIES: 2+ pulses, warm, well-perfused. No peripheral edema. 


NEUROLOGICAL: Normoreflexic. FUNMILAYO. 


SKIN: Warm, diaphoretic, normal turgor, no rashes or lesions noted. 














 Laboratory Results - last 24 hr











  11/03/18 11/05/18 11/06/18





  06:10 16:25 05:30


 


WBC   


 


RBC   


 


Hgb   


 


Hct   


 


MCV   


 


MCH   


 


MCHC   


 


RDW   


 


Plt Count   


 


MPV   


 


Absolute Neuts (auto)   


 


Total Counted   


 


Neutrophils %   


 


Neutrophils % (Manual)   


 


Band Neutrophils %   


 


Lymphocytes %   


 


Lymphocytes % (Manual)   


 


Monocytes %   


 


Monocytes % (Manual)   


 


Eosinophils %   


 


Eosinophils % (Manual)   


 


Basophils %   


 


Basophils % (Manual)   


 


Myelocytes % (Man)   


 


Promyelocytes % (Man)   


 


Blast Cells % (Manual)   


 


Nucleated RBC %   


 


Metamyelocytes   


 


Hypochromia   


 


Toxic Granulation   


 


Dohle Bodies   


 


Platelet Estimate   


 


Platelet Comment   


 


Polychromasia   


 


Poikilocytosis   


 


Basophilic Stippling   


 


Anisocytosis   


 


Microcytosis   


 


Macrocytosis   


 


Spherocytes   


 


Sickle Cells   


 


Target Cells   


 


Tear Drop Cells   


 


Ovalocytes   


 


Stomatocytes   


 


Helmet Cells   


 


Green-Moulton Bodies   


 


Cabot Rings   


 


Jeanette Cells   


 


Acanthocytes (Spur)   


 


Rouleaux   


 


Fragmented RBCs   


 


Schistocytes   


 


PT with INR   


 


INR   


 


PTT (Actin FS)   


 


Fibrinogen   


 


Anticoagulation Therapy   


 


Puncture Site   


 


ABG pH   


 


ABG pCO2 at Pt Temp   


 


ABG pO2 at Pt Temp   


 


ABG HCO3   


 


ABG O2 Sat (Measured)   


 


ABG O2 Content   


 


ABG Base Excess   


 


Chacho Test   


 


O2 Delivery Device   


 


Oxygen Flow Rate   


 


Vent Mode   


 


Vent Rate   


 


Mechanical Rate   


 


PEEP   


 


Pressure Support Vent   


 


Sodium   


 


Potassium   


 


Chloride   


 


Carbon Dioxide   


 


Anion Gap   


 


BUN   


 


Creatinine   


 


Creat Clearance w eGFR   


 


Random Glucose   


 


Calcium   


 


Phosphorus   


 


Magnesium   


 


Total Bilirubin   


 


AST   


 


ALT   


 


Alkaline Phosphatase   


 


Ammonia   


 


Troponin I   


 


Total Protein   


 


Albumin   


 


PTH Related Protein  < 2.0  


 


IgG   Cancelled 


 


IgA   


 


IgM   


 


Free [place] LC, Quant    2808.8 H


 


Free Lambda LC, Quant    6.4


 


Free Kappa/Lambda Ratio    438.88 H














  11/07/18 11/07/18 11/07/18





  16:20 16:20 16:20


 


WBC   


 


RBC   


 


Hgb   


 


Hct   


 


MCV   


 


MCH   


 


MCHC   


 


RDW   


 


Plt Count   


 


MPV   


 


Absolute Neuts (auto)   


 


Total Counted  100  


 


Neutrophils %   


 


Neutrophils % (Manual)  73.0  


 


Band Neutrophils %  9.0  


 


Lymphocytes %   


 


Lymphocytes % (Manual)  22.0  D  


 


Monocytes %   


 


Monocytes % (Manual)  7  


 


Eosinophils %   


 


Eosinophils % (Manual)   


 


Basophils %   


 


Basophils % (Manual)   


 


Myelocytes % (Man)   


 


Promyelocytes % (Man)   


 


Blast Cells % (Manual)   


 


Nucleated RBC %   


 


Metamyelocytes  2  


 


Hypochromia  1+  


 


Toxic Granulation   


 


Dohle Bodies   


 


Platelet Estimate  Decreased  


 


Platelet Comment  No clumping noted  


 


Polychromasia  1+  


 


Poikilocytosis   


 


Basophilic Stippling   


 


Anisocytosis  1+  


 


Microcytosis   


 


Macrocytosis   


 


Spherocytes   


 


Sickle Cells   


 


Target Cells   


 


Tear Drop Cells   


 


Ovalocytes   


 


Stomatocytes   


 


Helmet Cells   


 


Green-Moulton Bodies   


 


Cabot Rings   


 


[place] Cells   


 


Acanthocytes (Spur)   


 


Rouleaux   


 


Fragmented RBCs   


 


Schistocytes   


 


PT with INR   24.60 H 


 


INR   2.07 H 


 


PTT (Actin FS)   186.4 H 


 


Fibrinogen    134.0 L D


 


Anticoagulation Therapy   


 


Puncture Site   


 


ABG pH   


 


ABG pCO2 at Pt Temp   


 


ABG pO2 at Pt Temp   


 


ABG HCO3   


 


ABG O2 Sat (Measured)   


 


ABG O2 Content   


 


ABG Base Excess   


 


Chacho Test   


 


O2 Delivery Device   


 


Oxygen Flow Rate   


 


Vent Mode   


 


Vent Rate   


 


Mechanical Rate   


 


PEEP   


 


Pressure Support Vent   


 


Sodium   


 


Potassium   


 


Chloride   


 


Carbon Dioxide   


 


Anion Gap   


 


BUN   


 


Creatinine   


 


Creat Clearance w eGFR   


 


Random Glucose   


 


Calcium   


 


Phosphorus   


 


Magnesium   


 


Total Bilirubin   


 


AST   


 


ALT   


 


Alkaline Phosphatase   


 


Ammonia   


 


Troponin I   


 


Total Protein   


 


Albumin   


 


PTH Related Protein   


 


IgG   


 


IgA   


 


IgM   


 


Free [place] LC, Quant   


 


Free Lambda LC, Quant   


 


Free Kappa/Lambda Ratio   














  11/07/18 11/07/18 11/08/18





  16:20 18:45 02:40


 


WBC   


 


RBC   


 


Hgb   


 


Hct   


 


MCV   


 


MCH   


 


MCHC   


 


RDW   


 


Plt Count   


 


MPV   


 


Absolute Neuts (auto)   


 


Total Counted   


 


Neutrophils %   


 


Neutrophils % (Manual)   


 


Band Neutrophils %   


 


Lymphocytes %   


 


Lymphocytes % (Manual)   


 


Monocytes %   


 


Monocytes % (Manual)   


 


Eosinophils %   


 


Eosinophils % (Manual)   


 


Basophils %   


 


Basophils % (Manual)   


 


Myelocytes % (Man)   


 


Promyelocytes % (Man)   


 


Blast Cells % (Manual)   


 


Nucleated RBC %   


 


Metamyelocytes   


 


Hypochromia   


 


Toxic Granulation   


 


Dohle Bodies   


 


Platelet Estimate   


 


Platelet Comment   


 


Polychromasia   


 


Poikilocytosis   


 


Basophilic Stippling   


 


Anisocytosis   


 


Microcytosis   


 


Macrocytosis   


 


Spherocytes   


 


Sickle Cells   


 


Target Cells   


 


Tear Drop Cells   


 


Ovalocytes   


 


Stomatocytes   


 


Helmet Cells   


 


Green-Moulton Bodies   


 


Cabot Rings   


 


Jeanette Cells   


 


Acanthocytes (Spur)   


 


Rouleaux   


 


Fragmented RBCs   


 


Schistocytes   


 


PT with INR   


 


INR   


 


PTT (Actin FS)   103.0 H  73.9 H


 


Fibrinogen   


 


Anticoagulation Therapy   


 


Puncture Site   


 


ABG pH   


 


ABG pCO2 at Pt Temp   


 


ABG pO2 at Pt Temp   


 


ABG HCO3   


 


ABG O2 Sat (Measured)   


 


ABG O2 Content   


 


ABG Base Excess   


 


Chacho Test   


 


O2 Delivery Device   


 


Oxygen Flow Rate   


 


Vent Mode   


 


Vent Rate   


 


Mechanical Rate   


 


PEEP   


 


Pressure Support Vent   


 


Sodium  143  


 


Potassium  4.2  


 


Chloride  114 H  


 


Carbon Dioxide  17 L  


 


Anion Gap  12  


 


BUN  55 H  


 


Creatinine  2.1 H  


 


Creat Clearance w eGFR  30.62  


 


Random Glucose  87  


 


Calcium  7.7 L  


 


Phosphorus  3.6  


 


Magnesium  1.9  


 


Total Bilirubin  0.5  


 


AST  53 H  


 


ALT  31  


 


Alkaline Phosphatase  51  


 


Ammonia   


 


Troponin I   


 


Total Protein  7.2  


 


Albumin  3.6  


 


PTH Related Protein   


 


IgG   


 


IgA   


 


IgM   


 


Free [place] LC, Quant   


 


Free Lambda LC, Quant   


 


Free Kappa/Lambda Ratio   














  11/08/18 11/08/18 11/08/18





  05:30 05:30 05:30


 


WBC  4.8  


 


RBC  3.03 L  


 


Hgb  9.0 L  


 


Hct  27.2 L  


 


MCV  89.9  


 


MCH  29.7  


 


MCHC  33.1  


 


RDW  16.4 H  


 


Plt Count  117 L  


 


MPV  9.1  


 


Absolute Neuts (auto)  3.5  


 


Total Counted   


 


Neutrophils %  73.4  


 


Neutrophils % (Manual)  72.6  


 


Band Neutrophils %  3.2  


 


Lymphocytes %  20.9  


 


Lymphocytes % (Manual)  11.6  D  


 


Monocytes %  3.8  


 


Monocytes % (Manual)  3 L  


 


Eosinophils %  1.7  


 


Eosinophils % (Manual)  4.2  D  


 


Basophils %  0.2  


 


Basophils % (Manual)  1.0  D  


 


Myelocytes % (Man)  0  D  


 


Promyelocytes % (Man)  0  


 


Blast Cells % (Manual)  0  


 


Nucleated RBC %  5 H  


 


Metamyelocytes  1  D  


 


Hypochromia  0  


 


Toxic Granulation  0  


 


Dohle Bodies  0  


 


Platelet Estimate  Decreased  


 


Platelet Comment   


 


Polychromasia  0  


 


Poikilocytosis  0  


 


Basophilic Stippling  0  


 


Anisocytosis  0  


 


Microcytosis  0  


 


Macrocytosis  0  


 


Spherocytes  0  


 


Sickle Cells  0  


 


Target Cells  0  


 


Tear Drop Cells  0  


 


Ovalocytes  0  


 


Stomatocytes  0  


 


Helmet Cells  0  


 


Green-Moulton Bodies  0  


 


Cabot Rings  0  


 


Jeanette Cells  0  


 


Acanthocytes (Spur)  0  


 


Rouleaux  0  


 


Fragmented RBCs  0  


 


Schistocytes  0  


 


PT with INR   


 


INR   


 


PTT (Actin FS)   


 


Fibrinogen   


 


Anticoagulation Therapy   


 


Puncture Site   


 


ABG pH   


 


ABG pCO2 at Pt Temp   


 


ABG pO2 at Pt Temp   


 


ABG HCO3   


 


ABG O2 Sat (Measured)   


 


ABG O2 Content   


 


ABG Base Excess   


 


Chacho Test   


 


O2 Delivery Device   


 


Oxygen Flow Rate   


 


Vent Mode   


 


Vent Rate   


 


Mechanical Rate   


 


PEEP   


 


Pressure Support Vent   


 


Sodium   140 


 


Potassium   4.4 


 


Chloride   114 H 


 


Carbon Dioxide   19 L 


 


Anion Gap   7 L 


 


BUN   52 H 


 


Creatinine   2.2 H 


 


Creat Clearance w eGFR   29.02 


 


Random Glucose   110 H 


 


Calcium   7.6 L 


 


Phosphorus   3.1 


 


Magnesium   2.0 


 


Total Bilirubin   0.4 


 


AST   74 H 


 


ALT   41 


 


Alkaline Phosphatase   91 


 


Ammonia   


 


Troponin I    0.28 H


 


Total Protein   7.5 


 


Albumin   2.9 L 


 


PTH Related Protein   


 


IgG   


 


IgA   


 


IgM   


 


Free [place] LC, Quant   


 


Free Lambda LC, Quant   


 


Free Kappa/Lambda Ratio   














  11/08/18 11/08/18 11/08/18





  05:30 06:00 10:51


 


WBC   


 


RBC   


 


Hgb   


 


Hct   


 


MCV   


 


MCH   


 


MCHC   


 


RDW   


 


Plt Count   


 


MPV   


 


Absolute Neuts (auto)   


 


Total Counted   


 


Neutrophils %   


 


Neutrophils % (Manual)   


 


Band Neutrophils %   


 


Lymphocytes %   


 


Lymphocytes % (Manual)   


 


Monocytes %   


 


Monocytes % (Manual)   


 


Eosinophils %   


 


Eosinophils % (Manual)   


 


Basophils %   


 


Basophils % (Manual)   


 


Myelocytes % (Man)   


 


Promyelocytes % (Man)   


 


Blast Cells % (Manual)   


 


Nucleated RBC %   


 


Metamyelocytes   


 


Hypochromia   


 


Toxic Granulation   


 


Dohle Bodies   


 


Platelet Estimate   


 


Platelet Comment   


 


Polychromasia   


 


Poikilocytosis   


 


Basophilic Stippling   


 


Anisocytosis   


 


Microcytosis   


 


Macrocytosis   


 


Spherocytes   


 


Sickle Cells   


 


Target Cells   


 


Tear Drop Cells   


 


Ovalocytes   


 


Stomatocytes   


 


Helmet Cells   


 


Green-Moulton Bodies   


 


Cabot Rings   


 


Jeanette Cells   


 


Acanthocytes (Spur)   


 


Rouleaux   


 


Fragmented RBCs   


 


Schistocytes   


 


PT with INR   


 


INR   


 


PTT (Actin FS)  70.3 H  


 


Fibrinogen   


 


Anticoagulation Therapy   No Result Required. 


 


Puncture Site   Right radial 


 


ABG pH   7.32 L 


 


ABG pCO2 at Pt Temp   32.2 L 


 


ABG pO2 at Pt Temp   110.0 H 


 


ABG HCO3   16.2 L 


 


ABG O2 Sat (Measured)   97.9 


 


ABG O2 Content   7.6 L* 


 


ABG Base Excess   -8.7 L 


 


Chacho Test   Positive 


 


O2 Delivery Device   No Result Required. 


 


Oxygen Flow Rate   Yes 


 


Vent Mode   No Result Required. 


 


Vent Rate   No Result Required. 


 


Mechanical Rate   No Result Required. 


 


PEEP   0.0 


 


Pressure Support Vent   No Result Required. 


 


Sodium   


 


Potassium   


 


Chloride   


 


Carbon Dioxide   


 


Anion Gap   


 


BUN   


 


Creatinine   


 


Creat Clearance w eGFR   


 


Random Glucose   


 


Calcium   


 


Phosphorus   


 


Magnesium   


 


Total Bilirubin   


 


AST   


 


ALT   


 


Alkaline Phosphatase   


 


Ammonia    91.13 H


 


Troponin I   


 


Total Protein   


 


Albumin   


 


PTH Related Protein   


 


IgG   


 


IgA   


 


IgM   


 


Free [place] LC, Quant   


 


Free Lambda LC, Quant   


 


Free Kappa/Lambda Ratio   














  11/08/18 11/08/18





  10:51 10:51


 


WBC  


 


RBC  


 


Hgb  


 


Hct  


 


MCV  


 


MCH  


 


MCHC  


 


RDW  


 


Plt Count  


 


MPV  


 


Absolute Neuts (auto)  


 


Total Counted  


 


Neutrophils %  


 


Neutrophils % (Manual)  


 


Band Neutrophils %  


 


Lymphocytes %  


 


Lymphocytes % (Manual)  


 


Monocytes %  


 


Monocytes % (Manual)  


 


Eosinophils %  


 


Eosinophils % (Manual)  


 


Basophils %  


 


Basophils % (Manual)  


 


Myelocytes % (Man)  


 


Promyelocytes % (Man)  


 


Blast Cells % (Manual)  


 


Nucleated RBC %  


 


Metamyelocytes  


 


Hypochromia  


 


Toxic Granulation  


 


Dohle Bodies  


 


Platelet Estimate  


 


Platelet Comment  


 


Polychromasia  


 


Poikilocytosis  


 


Basophilic Stippling  


 


Anisocytosis  


 


Microcytosis  


 


Macrocytosis  


 


Spherocytes  


 


Sickle Cells  


 


Target Cells  


 


Tear Drop Cells  


 


Ovalocytes  


 


Stomatocytes  


 


Helmet Cells  


 


Green-Moulton Bodies  


 


Cabot Rings  


 


Jeanette Cells  


 


Acanthocytes (Spur)  


 


Rouleaux  


 


Fragmented RBCs  


 


Schistocytes  


 


PT with INR  


 


INR  


 


PTT (Actin FS)  


 


Fibrinogen  


 


Anticoagulation Therapy  


 


Puncture Site  


 


ABG pH  


 


ABG pCO2 at Pt Temp  


 


ABG pO2 at Pt Temp  


 


ABG HCO3  


 


ABG O2 Sat (Measured)  


 


ABG O2 Content  


 


ABG Base Excess  


 


Chacho Test  


 


O2 Delivery Device  


 


Oxygen Flow Rate  


 


Vent Mode  


 


Vent Rate  


 


Mechanical Rate  


 


PEEP  


 


Pressure Support Vent  


 


Sodium  


 


Potassium  


 


Chloride  


 


Carbon Dioxide  


 


Anion Gap  


 


BUN  


 


Creatinine  


 


Creat Clearance w eGFR  


 


Random Glucose  


 


Calcium  


 


Phosphorus  


 


Magnesium  


 


Total Bilirubin  


 


AST  


 


ALT  


 


Alkaline Phosphatase  


 


Ammonia  


 


Troponin I  


 


Total Protein  


 


Albumin  


 


PTH Related Protein  


 


IgG  Cancelled  Cancelled


 


IgA  Cancelled 


 


IgM  Cancelled 


 


Free Kappa LC, Quant  


 


Free Lambda LC, Quant  


 


Free Kappa/Lambda Ratio  








Active Medications











Generic Name Dose Route Start Last Admin





  Trade Name Freq  PRN Reason Stop Dose Admin


 


Acetaminophen  650 mg  11/01/18 05:44  11/05/18 18:10





  Tylenol Suppository -  OH   650 mg





  Q6H PRN   Administration





  FEVER   





     





     





     


 


Heparin Sodium (Porcine)  5 ml  11/07/18 15:26  





  Hep-Lock -  IVPUSH   





  PRN PRN   





  Heparin   





     





     





     


 


Heparin Sodium (Porcine)  1,000 unit  11/08/18 06:38  





  Heparin -  IVPUSH   





  PRN PRN   





  Heparin   





     





     





     


 


Heparin Sodium (Porcine)  5,000 unit  11/08/18 06:38  





  Heparin -  IVPUSH   





  PRN PRN   





  Heparin   





     





     





     


 


Vancomycin HCl  1,000 mg in 250 mls @ 166.667 mls/hr  10/31/18 20:00  11/07/18 

21:32





  Vancomycin (Pre-Docked)  IVPB   166.667 mls/hr





  Q24H AVA   Administration





     





     





  Protocol   





     


 


Piperacillin Sod/Tazobactam  50 mls @ 100 mls/hr  11/06/18 11:00  11/08/18 17:01





  Sod 2.25 gm/ Dextrose  IVPB   100 mls/hr





  Q8H-IV AVA   Administration





     





     





  Protocol   





     


 


Fentanyl 500 mcg/ Dextrose  100 mls @ 5 mls/hr  11/07/18 11:45  11/08/18 07:58





  IVPB   25 mcg/hr





  TITR AVA   5 mls/hr





     Administration





     





  Protocol   





  25 MCG/HR   


 


Heparin Sodium/Dextrose  25,000 units in 500 mls @ 20 mls/hr  11/08/18 06:45  11 /08/18 16:35





  Heparin Infusion -  IVPB   650 units/hr





  TITR AVA   13 mls/hr





     Administration





     





  Protocol   





  1,000 UNITS/HR   


 


Metoprolol Tartrate  5 mg  11/07/18 12:00  11/08/18 13:00





  Lopressor Injection -  IVPUSH   5 mg





  Q8H AVA   Administration





     





     





     





     


 


Pantoprazole Sodium  40 mg  11/07/18 12:00  11/08/18 09:01





  Protonix Iv  IVPUSH   40 mg





  DAILY AVA   Administration





     





     





     





     


 


Thiamine HCl  100 mg  10/30/18 22:12  11/08/18 09:01





  Vitamin B1 -  PO   100 mg





  DAILY AVA   Administration





     





     





     





     











ASSESSMENT/PLAN:





Assessment: Patient will continue to receive plasmapharesis and antibiotics. We 

will do daily sedation vacations to assess his mental status. 





He experienced multiple runs of V-Tach overnight. 





Plan:





ID: 


- wheezes heard on Lung auscultation


- CXR shows pulmonary infiltrate, possible PNA vs empyema vs paramnemonic 

effusion 


- Abx coverage to zosyn and Vanc


- ID on board





Cardio: 


- Sporadically goes in and out of V-Tach - Will let Cards know. 


- Afib w RVR: Patient is receiving metoprolol 5 mg Q8H


- diastolic dysfunction + hypertrophic cardiomyopathy


- CAD with multiple stents


- Cardio consulted and on board. 





Pulm: 


- Intubated


- No pneumothorax


- increasing Peep to 5


- b/l diffuse wheezes


- infiltrate on CXR: Will step up Abx to Zosyn and Vanc


- No lasix today





Renal: 


- Acute on Chronic kidney injury


- BUN: 58 Cr:2.4. possibly Pre-renal etiology. Unchanged much since yesterday. 


- Renal consulted and on board. 


- Patient received plasmapharesis yesterday.  





Neuro: 


- Patient is not alert or oriented. He responds to painful stimuli. 


- Head CT showed no acute pathology





Heme/Onc: 


- Patient received plasmapharesis yesterday. Will get again tomorrow. 


- Patient has hypercalcemia


- Paraproteinemia


- Possible Multiple Myeloma, SPEP and UPEP suggestive. 


- Kappa/Lambda ratio > 100 consistent with Multiple myeloma


- M spike elevated elevated at 6.8 previously 4.7 





Endo: 


- Hypercalcemia


- Glucose wnl


- Parathyroid hormone WNL


- PTH-borderline low appropriate given hypercalcemia, PTHrP low





F/E/N: 


F: No standing fluids due to fluid overload in lungs


E: Mag low. Repleted. 


N: tube feeds. 





Code Status: Full Code


Dispo: Patient will continue to receive ICU level care

















Visit type





- Emergency Visit


Emergency Visit: Yes


ED Registration Date: 10/30/18


Care time: The patient presented to the Emergency Department on the above date 

and was hospitalized for further evaluation of their emergent condition.





- New Patient


This patient is new to me today: No





- Critical Care


Critical Care patient: Yes


Total Critical Care Time (in minutes): 36


Critical Care Statement: The care of this patient involved high complexity 

decision making to prevent further life threatening deterioration of the patient

's condition and/or to evaluate & treat vital organ system(s) failure or risk 

of failure.

## 2018-11-08 NOTE — PN
Teaching Attending Note


Name of Resident: Arnav Bernard





ATTENDING PHYSICIAN STATEMENT





I saw and evaluated the patient.


I reviewed the resident's note and discussed the case with the resident.


I agree with the resident's findings and plan as documented.








SUBJECTIVE:


Patient seen and examined in the ICU.  Remains intubated and sedated.  


Tolerating Plasmapheresis (last was yesterday). 


Appears to be grimacing to pain. 





 


 Intake & Output











 11/05/18 11/06/18 11/07/18 11/08/18





 23:59 23:59 23:59 23:59


 


Intake Total 1744 1089 2087 1061


 


Output Total 3100 2400 1650 850


 


Balance -1356 -1311 437 211


 


Weight  178 lb 12.8 oz 179 lb 7 oz 178 lb 4 oz








 Last Vital Signs











Temp Pulse Resp BP Pulse Ox


 


 96.8 F L  90   13   101/64   100 


 


 11/08/18 10:13  11/08/18 10:13  11/08/18 10:13  11/08/18 10:13  11/08/18 08:59








Active Medications





Acetaminophen (Tylenol Suppository -)  650 mg IA Q6H PRN


   PRN Reason: FEVER


   Last Admin: 11/05/18 18:10 Dose:  650 mg


Heparin Sodium (Porcine) (Hep-Lock -)  5 ml IVPUSH PRN PRN


   PRN Reason: Heparin


Heparin Sodium (Porcine) (Heparin -)  1,000 unit IVPUSH PRN PRN


   PRN Reason: Heparin


Heparin Sodium (Porcine) (Heparin -)  5,000 unit IVPUSH PRN PRN


   PRN Reason: Heparin


Vancomycin HCl (Vancomycin (Pre-Docked))  1,000 mg in 250 mls @ 166.667 mls/hr 

IVPB Q24H [facility]; Protocol


   Last Admin: 11/07/18 21:32 Dose:  166.667 mls/hr


Piperacillin Sod/Tazobactam (Sod 2.25 gm/ Dextrose)  50 mls @ 100 mls/hr IVPB 

Q8H-IV [facility]; Protocol


   Last Admin: 11/08/18 09:01 Dose:  100 mls/hr


Fentanyl 500 mcg/ Dextrose  100 mls @ 5 mls/hr IVPB TITR [facility]; Protocol


   Last Admin: 11/08/18 07:58 Dose:  25 mcg/hr, 5 mls/hr


Heparin Sodium/Dextrose (Heparin Infusion -)  25,000 units in 500 mls @ 20 mls/

hr IVPB TITR [facility]; Protocol


   Last Admin: 11/08/18 09:39 Dose:  Not Given


Metoprolol Tartrate (Lopressor Injection -)  5 mg IVPUSH Q8H [facility]


   Last Admin: 11/08/18 05:37 Dose:  Not Given


Pantoprazole Sodium (Protonix Iv)  40 mg IVPUSH DAILY [facility]


   Last Admin: 11/08/18 09:01 Dose:  40 mg


Thiamine HCl (Vitamin B1 -)  100 mg PO DAILY [facility]


   Last Admin: 11/08/18 09:01 Dose:  100 mg

















Constitutional: Yes: Intubated and sedated 


Cardiovascular: Yes: Pulse Irregular, AFib 


Respiratory: Yes: Intubated and sedated 


Gastrointestinal: Yes: Normal Bowel Sounds, Soft


Musculoskeletal: Yes: WNL


Extremities: Yes: WNL


Edema: No


Peripheral Pulses WNL: Yes


Neurological: Yes: Sedated 


Labs: 


  


  


 Laboratory Results - last 24 hr











  11/03/18 11/05/18 11/06/18





  06:10 16:25 05:30


 


WBC   


 


RBC   


 


Hgb   


 


Hct   


 


MCV   


 


MCH   


 


MCHC   


 


RDW   


 


Plt Count   


 


MPV   


 


Absolute Neuts (auto)   


 


Total Counted   


 


Neutrophils %   


 


Neutrophils % (Manual)   


 


Band Neutrophils %   


 


Lymphocytes %   


 


Lymphocytes % (Manual)   


 


Monocytes %   


 


Monocytes % (Manual)   


 


Eosinophils %   


 


Eosinophils % (Manual)   


 


Basophils %   


 


Basophils % (Manual)   


 


Myelocytes % (Man)   


 


Promyelocytes % (Man)   


 


Blast Cells % (Manual)   


 


Nucleated RBC %   


 


Metamyelocytes   


 


Hypochromia   


 


Toxic Granulation   


 


Dohle Bodies   


 


Platelet Estimate   


 


Platelet Comment   


 


Polychromasia   


 


Poikilocytosis   


 


Basophilic Stippling   


 


Anisocytosis   


 


Microcytosis   


 


Macrocytosis   


 


Spherocytes   


 


Sickle Cells   


 


Target Cells   


 


Tear Drop Cells   


 


Ovalocytes   


 


Stomatocytes   


 


Helmet Cells   


 


Green-Mertzon Bodies   


 


Cabot Rings   


 


Francis Cells   


 


Acanthocytes (Spur)   


 


Rouleaux   


 


Fragmented RBCs   


 


Schistocytes   


 


PT with INR   


 


INR   


 


PTT (Actin FS)   


 


Fibrinogen   


 


Anticoagulation Therapy   


 


Puncture Site   


 


ABG pH   


 


ABG pCO2 at Pt Temp   


 


ABG pO2 at Pt Temp   


 


ABG HCO3   


 


ABG O2 Sat (Measured)   


 


ABG O2 Content   


 


ABG Base Excess   


 


Chacho Test   


 


O2 Delivery Device   


 


Oxygen Flow Rate   


 


Vent Mode   


 


Vent Rate   


 


Mechanical Rate   


 


PEEP   


 


Pressure Support Vent   


 


Sodium   


 


Potassium   


 


Chloride   


 


Carbon Dioxide   


 


Anion Gap   


 


BUN   


 


Creatinine   


 


Creat Clearance w eGFR   


 


Random Glucose   


 


Calcium   


 


Phosphorus   


 


Magnesium   


 


Total Bilirubin   


 


AST   


 


ALT   


 


Alkaline Phosphatase   


 


Ammonia   


 


Troponin I   


 


Total Protein   


 


Albumin   


 


PTH Related Protein  < 2.0  


 


IgG   Cancelled 


 


IgA   


 


IgM   


 


Free Marissa LC, Quant    2808.8 H


 


Free Lambda LC, Quant    6.4


 


Free Kappa/Lambda Ratio    438.88 H














  11/07/18 11/07/18 11/07/18





  16:20 16:20 16:20


 


WBC  4.5  


 


RBC  3.16 L  


 


Hgb  9.5 L  


 


Hct  28.5 L  


 


MCV  90.2  


 


MCH  30.2  


 


MCHC  33.5  


 


RDW  15.7  


 


Plt Count  130 L  


 


MPV  9.2  


 


Absolute Neuts (auto)  3.3  


 


Total Counted  100  


 


Neutrophils %  74.4  


 


Neutrophils % (Manual)  73.0  


 


Band Neutrophils %  9.0  


 


Lymphocytes %  20.5  D  


 


Lymphocytes % (Manual)  22.0  D  


 


Monocytes %  3.4 L  


 


Monocytes % (Manual)  7  


 


Eosinophils %  1.4  


 


Eosinophils % (Manual)   


 


Basophils %  0.3  


 


Basophils % (Manual)   


 


Myelocytes % (Man)   


 


Promyelocytes % (Man)   


 


Blast Cells % (Manual)   


 


Nucleated RBC %  4 H  


 


Metamyelocytes  2  


 


Hypochromia  1+  


 


Toxic Granulation   


 


Dohle Bodies   


 


Platelet Estimate  Decreased  


 


Platelet Comment  No clumping noted  


 


Polychromasia  1+  


 


Poikilocytosis   


 


Basophilic Stippling   


 


Anisocytosis  1+  


 


Microcytosis   


 


Macrocytosis   


 


Spherocytes   


 


Sickle Cells   


 


Target Cells   


 


Tear Drop Cells   


 


Ovalocytes   


 


Stomatocytes   


 


Helmet Cells   


 


Green-Mertzon Bodies   


 


Cabot Rings   


 


Francis Cells   


 


Acanthocytes (Spur)   


 


Rouleaux   


 


Fragmented RBCs   


 


Schistocytes   


 


PT with INR   24.60 H 


 


INR   2.07 H 


 


PTT (Actin FS)   186.4 H 


 


Fibrinogen    134.0 L D


 


Anticoagulation Therapy   


 


Puncture Site   


 


ABG pH   


 


ABG pCO2 at Pt Temp   


 


ABG pO2 at Pt Temp   


 


ABG HCO3   


 


ABG O2 Sat (Measured)   


 


ABG O2 Content   


 


ABG Base Excess   


 


Chacho Test   


 


O2 Delivery Device   


 


Oxygen Flow Rate   


 


Vent Mode   


 


Vent Rate   


 


Mechanical Rate   


 


PEEP   


 


Pressure Support Vent   


 


Sodium   


 


Potassium   


 


Chloride   


 


Carbon Dioxide   


 


Anion Gap   


 


BUN   


 


Creatinine   


 


Creat Clearance w eGFR   


 


Random Glucose   


 


Calcium   


 


Phosphorus   


 


Magnesium   


 


Total Bilirubin   


 


AST   


 


ALT   


 


Alkaline Phosphatase   


 


Ammonia   


 


Troponin I   


 


Total Protein   


 


Albumin   


 


PTH Related Protein   


 


IgG   


 


IgA   


 


IgM   


 


Free Marissa LC, Quant   


 


Free Lambda LC, Quant   


 


Free Kappa/Lambda Ratio   














  11/07/18 11/07/18 11/08/18





  16:20 18:45 02:40


 


WBC   


 


RBC   


 


Hgb   


 


Hct   


 


MCV   


 


MCH   


 


MCHC   


 


RDW   


 


Plt Count   


 


MPV   


 


Absolute Neuts (auto)   


 


Total Counted   


 


Neutrophils %   


 


Neutrophils % (Manual)   


 


Band Neutrophils %   


 


Lymphocytes %   


 


Lymphocytes % (Manual)   


 


Monocytes %   


 


Monocytes % (Manual)   


 


Eosinophils %   


 


Eosinophils % (Manual)   


 


Basophils %   


 


Basophils % (Manual)   


 


Myelocytes % (Man)   


 


Promyelocytes % (Man)   


 


Blast Cells % (Manual)   


 


Nucleated RBC %   


 


Metamyelocytes   


 


Hypochromia   


 


Toxic Granulation   


 


Dohle Bodies   


 


Platelet Estimate   


 


Platelet Comment   


 


Polychromasia   


 


Poikilocytosis   


 


Basophilic Stippling   


 


Anisocytosis   


 


Microcytosis   


 


Macrocytosis   


 


Spherocytes   


 


Sickle Cells   


 


Target Cells   


 


Tear Drop Cells   


 


Ovalocytes   


 


Stomatocytes   


 


Helmet Cells   


 


Green-Mertzon Bodies   


 


Cabot Rings   


 


Francis Cells   


 


Acanthocytes (Spur)   


 


Rouleaux   


 


Fragmented RBCs   


 


Schistocytes   


 


PT with INR   


 


INR   


 


PTT (Actin FS)   103.0 H  73.9 H


 


Fibrinogen   


 


Anticoagulation Therapy   


 


Puncture Site   


 


ABG pH   


 


ABG pCO2 at Pt Temp   


 


ABG pO2 at Pt Temp   


 


ABG HCO3   


 


ABG O2 Sat (Measured)   


 


ABG O2 Content   


 


ABG Base Excess   


 


Chacho Test   


 


O2 Delivery Device   


 


Oxygen Flow Rate   


 


Vent Mode   


 


Vent Rate   


 


Mechanical Rate   


 


PEEP   


 


Pressure Support Vent   


 


Sodium  143  


 


Potassium  4.2  


 


Chloride  114 H  


 


Carbon Dioxide  17 L  


 


Anion Gap  12  


 


BUN  55 H  


 


Creatinine  2.1 H  


 


Creat Clearance w eGFR  30.62  


 


Random Glucose  87  


 


Calcium  7.7 L  


 


Phosphorus  3.6  


 


Magnesium  1.9  


 


Total Bilirubin  0.5  


 


AST  53 H  


 


ALT  31  


 


Alkaline Phosphatase  51  


 


Ammonia   


 


Troponin I   


 


Total Protein  7.2  


 


Albumin  3.6  


 


PTH Related Protein   


 


IgG   


 


IgA   


 


IgM   


 


Free Marissa LC, Quant   


 


Free Lambda LC, Quant   


 


Free Kappa/Lambda Ratio   














  11/08/18 11/08/18 11/08/18





  05:30 05:30 05:30


 


WBC  4.8  


 


RBC  3.03 L  


 


Hgb  9.0 L  


 


Hct  27.2 L  


 


MCV  89.9  


 


MCH  29.7  


 


MCHC  33.1  


 


RDW  16.4 H  


 


Plt Count  117 L  


 


MPV  9.1  


 


Absolute Neuts (auto)  3.5  


 


Total Counted   


 


Neutrophils %  73.4  


 


Neutrophils % (Manual)  72.6  


 


Band Neutrophils %  3.2  


 


Lymphocytes %  20.9  


 


Lymphocytes % (Manual)  11.6  D  


 


Monocytes %  3.8  


 


Monocytes % (Manual)  3 L  


 


Eosinophils %  1.7  


 


Eosinophils % (Manual)  4.2  D  


 


Basophils %  0.2  


 


Basophils % (Manual)  1.0  D  


 


Myelocytes % (Man)  0  D  


 


Promyelocytes % (Man)  0  


 


Blast Cells % (Manual)  0  


 


Nucleated RBC %  5 H  


 


Metamyelocytes  1  D  


 


Hypochromia  0  


 


Toxic Granulation  0  


 


Dohle Bodies  0  


 


Platelet Estimate  Decreased  


 


Platelet Comment   


 


Polychromasia  0  


 


Poikilocytosis  0  


 


Basophilic Stippling  0  


 


Anisocytosis  0  


 


Microcytosis  0  


 


Macrocytosis  0  


 


Spherocytes  0  


 


Sickle Cells  0  


 


Target Cells  0  


 


Tear Drop Cells  0  


 


Ovalocytes  0  


 


Stomatocytes  0  


 


Helmet Cells  0  


 


Green-Mertzon Bodies  0  


 


Cabot Rings  0  


 


Francis Cells  0  


 


Acanthocytes (Spur)  0  


 


Rouleaux  0  


 


Fragmented RBCs  0  


 


Schistocytes  0  


 


PT with INR   


 


INR   


 


PTT (Actin FS)   


 


Fibrinogen   


 


Anticoagulation Therapy   


 


Puncture Site   


 


ABG pH   


 


ABG pCO2 at Pt Temp   


 


ABG pO2 at Pt Temp   


 


ABG HCO3   


 


ABG O2 Sat (Measured)   


 


ABG O2 Content   


 


ABG Base Excess   


 


Chacho Test   


 


O2 Delivery Device   


 


Oxygen Flow Rate   


 


Vent Mode   


 


Vent Rate   


 


Mechanical Rate   


 


PEEP   


 


Pressure Support Vent   


 


Sodium   140 


 


Potassium   4.4 


 


Chloride   114 H 


 


Carbon Dioxide   19 L 


 


Anion Gap   7 L 


 


BUN   52 H 


 


Creatinine   2.2 H 


 


Creat Clearance w eGFR   29.02 


 


Random Glucose   110 H 


 


Calcium   7.6 L 


 


Phosphorus   3.1 


 


Magnesium   2.0 


 


Total Bilirubin   0.4 


 


AST   74 H 


 


ALT   41 


 


Alkaline Phosphatase   91 


 


Ammonia   


 


Troponin I    0.28 H


 


Total Protein   7.5 


 


Albumin   2.9 L 


 


PTH Related Protein   


 


IgG   


 


IgA   


 


IgM   


 


Free Marissa LC, Quant   


 


Free Lambda LC, Quant   


 


Free Kappa/Lambda Ratio   














  11/08/18 11/08/18 11/08/18





  05:30 06:00 10:51


 


WBC   


 


RBC   


 


Hgb   


 


Hct   


 


MCV   


 


MCH   


 


MCHC   


 


RDW   


 


Plt Count   


 


MPV   


 


Absolute Neuts (auto)   


 


Total Counted   


 


Neutrophils %   


 


Neutrophils % (Manual)   


 


Band Neutrophils %   


 


Lymphocytes %   


 


Lymphocytes % (Manual)   


 


Monocytes %   


 


Monocytes % (Manual)   


 


Eosinophils %   


 


Eosinophils % (Manual)   


 


Basophils %   


 


Basophils % (Manual)   


 


Myelocytes % (Man)   


 


Promyelocytes % (Man)   


 


Blast Cells % (Manual)   


 


Nucleated RBC %   


 


Metamyelocytes   


 


Hypochromia   


 


Toxic Granulation   


 


Dohle Bodies   


 


Platelet Estimate   


 


Platelet Comment   


 


Polychromasia   


 


Poikilocytosis   


 


Basophilic Stippling   


 


Anisocytosis   


 


Microcytosis   


 


Macrocytosis   


 


Spherocytes   


 


Sickle Cells   


 


Target Cells   


 


Tear Drop Cells   


 


Ovalocytes   


 


Stomatocytes   


 


Helmet Cells   


 


Green-Mertzon Bodies   


 


Cabot Rings   


 


Jeanette Cells   


 


Acanthocytes (Spur)   


 


Rouleaux   


 


Fragmented RBCs   


 


Schistocytes   


 


PT with INR   


 


INR   


 


PTT (Actin FS)  70.3 H  


 


Fibrinogen   


 


Anticoagulation Therapy   No Result Required. 


 


Puncture Site   Right radial 


 


ABG pH   7.32 L 


 


ABG pCO2 at Pt Temp   32.2 L 


 


ABG pO2 at Pt Temp   110.0 H 


 


ABG HCO3   16.2 L 


 


ABG O2 Sat (Measured)   97.9 


 


ABG O2 Content   7.6 L* 


 


ABG Base Excess   -8.7 L 


 


Chacho Test   Positive 


 


O2 Delivery Device   No Result Required. 


 


Oxygen Flow Rate   Yes 


 


Vent Mode   No Result Required. 


 


Vent Rate   No Result Required. 


 


Mechanical Rate   No Result Required. 


 


PEEP   0.0 


 


Pressure Support Vent   No Result Required. 


 


Sodium   


 


Potassium   


 


Chloride   


 


Carbon Dioxide   


 


Anion Gap   


 


BUN   


 


Creatinine   


 


Creat Clearance w eGFR   


 


Random Glucose   


 


Calcium   


 


Phosphorus   


 


Magnesium   


 


Total Bilirubin   


 


AST   


 


ALT   


 


Alkaline Phosphatase   


 


Ammonia    91.13 H


 


Troponin I   


 


Total Protein   


 


Albumin   


 


PTH Related Protein   


 


IgG   


 


IgA   


 


IgM   


 


Free Marissa LC, Quant   


 


Free Lambda LC, Quant   


 


Free Kappa/Lambda Ratio   














  11/08/18 11/08/18





  10:51 10:51


 


WBC  


 


RBC  


 


Hgb  


 


Hct  


 


MCV  


 


MCH  


 


MCHC  


 


RDW  


 


Plt Count  


 


MPV  


 


Absolute Neuts (auto)  


 


Total Counted  


 


Neutrophils %  


 


Neutrophils % (Manual)  


 


Band Neutrophils %  


 


Lymphocytes %  


 


Lymphocytes % (Manual)  


 


Monocytes %  


 


Monocytes % (Manual)  


 


Eosinophils %  


 


Eosinophils % (Manual)  


 


Basophils %  


 


Basophils % (Manual)  


 


Myelocytes % (Man)  


 


Promyelocytes % (Man)  


 


Blast Cells % (Manual)  


 


Nucleated RBC %  


 


Metamyelocytes  


 


Hypochromia  


 


Toxic Granulation  


 


Dohle Bodies  


 


Platelet Estimate  


 


Platelet Comment  


 


Polychromasia  


 


Poikilocytosis  


 


Basophilic Stippling  


 


Anisocytosis  


 


Microcytosis  


 


Macrocytosis  


 


Spherocytes  


 


Sickle Cells  


 


Target Cells  


 


Tear Drop Cells  


 


Ovalocytes  


 


Stomatocytes  


 


Helmet Cells  


 


Green-Mertzon Bodies  


 


Cabot Rings  


 


Jeanette Cells  


 


Acanthocytes (Spur)  


 


Rouleaux  


 


Fragmented RBCs  


 


Schistocytes  


 


PT with INR  


 


INR  


 


PTT (Actin FS)  


 


Fibrinogen  


 


Anticoagulation Therapy  


 


Puncture Site  


 


ABG pH  


 


ABG pCO2 at Pt Temp  


 


ABG pO2 at Pt Temp  


 


ABG HCO3  


 


ABG O2 Sat (Measured)  


 


ABG O2 Content  


 


ABG Base Excess  


 


Chacho Test  


 


O2 Delivery Device  


 


Oxygen Flow Rate  


 


Vent Mode  


 


Vent Rate  


 


Mechanical Rate  


 


PEEP  


 


Pressure Support Vent  


 


Sodium  


 


Potassium  


 


Chloride  


 


Carbon Dioxide  


 


Anion Gap  


 


BUN  


 


Creatinine  


 


Creat Clearance w eGFR  


 


Random Glucose  


 


Calcium  


 


Phosphorus  


 


Magnesium  


 


Total Bilirubin  


 


AST  


 


ALT  


 


Alkaline Phosphatase  


 


Ammonia  


 


Troponin I  


 


Total Protein  


 


Albumin  


 


PTH Related Protein  


 


IgG  Cancelled  Cancelled


 


IgA  Cancelled 


 


IgM  Cancelled 


 


Free Kappa LC, Quant  


 


Free Lambda LC, Quant  


 


Free Kappa/Lambda Ratio  











Problem List


Acute Respiratory Failure 


R/O Aspiration 


HAM (acute kidney injury)


R/O Hyperviscosity 


Atrial Fibrillation with RVR 


R/O Sepsis 


AMS 


Anemia 


(?) Apical PTX: does not appear to have a PTX on CXR today  








PLAN: 


AC Mode of vent 


Would add PEEP 5 cm H2O   


AC 


Strict I & O


Monitor CXR 


Daily sedation vacation 


ABX coverage 


Rate control 


Plasmapheresis per Heme


Start enteral feeds








Dr Blum 


Critical care time spent in reviewing chart, evaluating patient and formulating 

plan - 36 minutes.

## 2018-11-08 NOTE — PN
Progress Note (short form)





- Note


Progress Note: 





Patient seen and examined at bedside in the ICU


Intubated and sedated


s/p Right IJ Trialysis catheter for apharesis 


Tmax 100.5 at 2pm yesterday 


100.1 overnight











 





 Vital Signs











Temp  96.8 F L  11/08/18 10:13


 


Pulse  90   11/08/18 10:13


 


Resp  13   11/08/18 10:13


 


BP  101/64   11/08/18 10:13


 


Pulse Ox  100   11/08/18 08:59








 Intake & Output











 11/07/18 11/07/18 11/08/18





 11:59 23:59 11:59


 


Intake Total 496 1591 1061


 


Output Total 400 1250 850


 


Balance 96 341 211


 


Weight 81.391 kg  80.853 kg


 


Intake:   


 


   661 126


 


    HEPARIN INFUSION - 25,000 216 126 91





    units In 500 ml @ 800   





    UNIT/HR 16 mls/hr IVPB   





    TITR [facility] Rx#:[id number]   


 


    Normal Saline - 250 ml @  250 





    250 mls/hr IV ASDIR STA   





    Rx#:[id number]   


 


    Normal Saline - 250 ml @  250 





    250 mls/hr IV ASDIR STA   





    Rx#:[id number]   


 


    Sublimaze Injection - 500  35 35





    Mcg In D5w - 90 ml @ 25   





    MCG/HR 5 mls/hr IVPB TITR   





    [facility] Rx#:[id number]   


 


  IVPB 50 450 250


 


  Tube Feeding 150 360 315


 


  Tube Irrigant 80 120 370


 


Output:   


 


  Urine 400 1250 850


 


    Garces 400 1250 850


 


Other:   


 


  Voiding Method Indwelling Catheter Indwelling Catheter Indwelling Catheter


 


  Bowel Movement Yes No 


 


  # Bowel Movements 1  


 


  Weight Measurement Method Built in [facility]  Built in [facility]

















PE:


Lying in bed intubated opens eyes to name. Able to turn head to the direction 

that his name is being called. Spontaneously moving his arms 


Dry mucous membranes. Poor dentition


PERRL senile arcus bilaterally


Irregular No murmur


soft non tender non distended


no lower extremity edema





 








 











  11/07/18 11/07/18 11/07/18





  16:20 16:20 16:20


 


WBC  4.5  


 


RBC  3.16 L  


 


Hgb  9.5 L  


 


Hct  28.5 L  


 


MCV  90.2  


 


MCHC  33.5  


 


RDW  15.7  


 


Plt Count  130 L  


 


Neutrophils %  74.4  


 


Lymphocytes %  20.5  D  


 


Monocytes %  3.4 L  


 


Eosinophils %  1.4  


 


Basophils %  0.3  


 


INR   2.07 H 


 


Sodium    143


 


Potassium    4.2


 


Chloride    114 H


 


Carbon Dioxide    17 L


 


Anion Gap    12


 


BUN    55 H


 


Creatinine    2.1 H














  11/08/18 11/08/18





  05:30 05:30


 


WBC  4.8 


 


RBC  3.03 L 


 


Hgb  9.0 L 


 


Hct  27.2 L 


 


MCV  89.9 


 


MCHC  33.1 


 


RDW  16.4 H 


 


Plt Count  117 L 


 


Neutrophils %  73.4 


 


Lymphocytes %  20.9 


 


Monocytes %  3.8 


 


Eosinophils %  1.7 


 


Basophils %  0.2 


 


INR  


 


Sodium   140


 


Potassium   4.4


 


Chloride   114 H


 


Carbon Dioxide   19 L


 


Anion Gap   7 L


 


BUN   52 H


 


Creatinine   2.2 H








 











 11/05/18 19:45 Blood Culture - Preliminary





 Blood - Peripheral Venous    NO GROWTH OBTAINED AFTER 48 HOURS, INCUBATION TO 

CONTINUE





    FOR 3 DAYS.


 


 11/05/18 19:20 Blood Culture - Preliminary





 Blood - Peripheral Venous    NO GROWTH OBTAINED AFTER 48 HOURS, INCUBATION TO 

CONTINUE





    FOR 3 DAYS.


 


 11/06/18 14:30 Gram Stain - Final





 Sputum - Endotrachea Suction/Ventilator Sputum Culture - Pending


 


 11/06/18 14:30 Legionella Antigen - Final





 Urine - Urine Garces Streptococcus pneumoniae Antigen (M - Final


 


 10/30/18 17:02 Blood Culture - Final





 Blood - Peripheral Venous    NO GROWTH AFTER 5 DAYS INCUBATION


 


 10/30/18 17:02 Blood Culture - Final





 Blood - Peripheral Venous    NO GROWTH AFTER 5 DAYS INCUBATION


 


 10/30/18 17:02 Urine Culture - Final





 Urine - Urine - Catheterized    NO GROWTH OBTAINED


 


 10/31/18 19:15 Influenza Types A,B Antigen - Final





 Nasopharyngeal Swab  - Final











 








78M with multiple medical problems presents to the hospital after being found 

down by neighbors found to have HAM and hypercalcemia with altered mental 

status.





Problem List:


Altered mental status


toxic metabolic encephalopathy


Hypercalcemia likely from malignancy


HAM on CKD


History of alcohol abuse


HTN


HLD


CAD 


Afib 


diastolic dysfunction


troponinemia


Hypertrophic cardiomypoathy (apical)


sepsis secondary to RLL pneumonia


acute hypoxemic respiratory failure


Vitamin D deficiency 





Plan:


concern for multiple myeloma given hypercalcemia worsening renal failure and 

hyperproteinemia on last SPEP


Calcium WNL today


Patient has apharesis done yesterday for a total of 2 pharesis


SPEP noted total protein and globulins elevated 


 Kappa/lambda light chains-noted 


Kappa/Lambda ratio > 100 consistent with Multiple myeloma


M spike elevated elevated at 6.8 previously 4.7 


Will send immunoglobulins IgG IgA IgM-IgG again today. Baseline Immunoglobulins 

done at time of rapid repsonse second set shows decreased of IgG from abou7 

grams to about 3 grams after first pharesis. will send third set now to see 

trend.


Will send ammonia level to r/o another cause of altered mental status


 bone (metastatic) survey and if negative will get Bone scan.- when acute 

issues resolved 


Of note Bone scan may not show any lesions if it is from multiple myeloma but 

will sold solid tumors


Can consider MRI of C/T/L spine if patient stops moving lower extremities or 

concern for lytic lesions of the spine. At this time the patient is moving his 

lower extremities so will hold off for now.


PTH-borderline low appropriate given hypercalcemia, PTHrP low


Vit D low


ABx per ID


Thank you for this consult

## 2018-11-08 NOTE — PN
Progress Note, Physician


Chief Complaint: 





AMS


Unresponsiveness


History of Present Illness: 





Intubated yesterday


On mechanical vent


Underwent apheresis. 





- Current Medication List


Current Medications: 


Active Medications





Acetaminophen (Tylenol Suppository -)  650 mg NE Q6H PRN


   PRN Reason: FEVER


   Last Admin: 11/05/18 18:10 Dose:  650 mg


Heparin Sodium (Porcine) (Hep-Lock -)  5 ml IVPUSH PRN PRN


   PRN Reason: Heparin


Heparin Sodium (Porcine) (Heparin -)  1,000 unit IVPUSH PRN PRN


   PRN Reason: Heparin


Heparin Sodium (Porcine) (Heparin -)  5,000 unit IVPUSH PRN PRN


   PRN Reason: Heparin


Vancomycin HCl (Vancomycin (Pre-Docked))  1,000 mg in 250 mls @ 166.667 mls/hr 

IVPB Q24H AVA; Protocol


   Last Admin: 11/07/18 21:32 Dose:  166.667 mls/hr


Piperacillin Sod/Tazobactam (Sod 2.25 gm/ Dextrose)  50 mls @ 100 mls/hr IVPB 

Q8H-IV AVA; Protocol


   Last Admin: 11/08/18 17:01 Dose:  100 mls/hr


Fentanyl 500 mcg/ Dextrose  100 mls @ 5 mls/hr IVPB TITR AVA; Protocol


   Last Admin: 11/08/18 07:58 Dose:  25 mcg/hr, 5 mls/hr


Heparin Sodium/Dextrose (Heparin Infusion -)  25,000 units in 500 mls @ 20 mls/

hr IVPB TITR AVA; Protocol


   Last Admin: 11/08/18 16:35 Dose:  650 units/hr, 13 mls/hr


Metoprolol Tartrate (Lopressor Injection -)  5 mg IVPUSH Q8H AVA


   Last Admin: 11/08/18 13:00 Dose:  5 mg


Pantoprazole Sodium (Protonix Iv)  40 mg IVPUSH DAILY AVA


   Last Admin: 11/08/18 09:01 Dose:  40 mg


Thiamine HCl (Vitamin B1 -)  100 mg PO DAILY AVA


   Last Admin: 11/08/18 09:01 Dose:  100 mg











- Objective


Vital Signs: 


 Vital Signs











Temperature  100 F H  11/08/18 18:00


 


Pulse Rate  92 H  11/08/18 18:00


 


Respiratory Rate  17   11/08/18 18:00


 


Blood Pressure  105/63   11/08/18 18:00


 


O2 Sat by Pulse Oximetry (%)  100   11/08/18 08:59











Labs: 


 CBC, BMP





 11/08/18 05:30 





 11/08/18 05:30 





 INR, PTT











INR  2.07  (0.83-1.09)  H  11/07/18  16:20    


 


Fibrinogen  134.0 mg/dL (238-498)  L D 11/07/18  16:20    














Problem List





- Problems


(1) HAM (acute kidney injury)


Code(s): N17.9 - ACUTE KIDNEY FAILURE, UNSPECIFIED   





(2) Atrial fibrillation with RVR


Code(s): I48.91 - UNSPECIFIED ATRIAL FIBRILLATION   





(3) Sepsis


Code(s): A41.9 - SEPSIS, UNSPECIFIED ORGANISM   





(4) Toxic metabolic encephalopathy


Code(s): G92 - TOXIC ENCEPHALOPATHY   





(5) Altered mental status


Code(s): R41.82 - ALTERED MENTAL STATUS, UNSPECIFIED   





(6) Anemia


Code(s): D64.9 - ANEMIA, UNSPECIFIED

## 2018-11-09 LAB
ALBUMIN SERPL-MCNC: 2.4 G/DL (ref 3.4–5)
ALP SERPL-CCNC: 146 U/L (ref 45–117)
ALT SERPL-CCNC: 45 U/L (ref 13–61)
ANION GAP SERPL CALC-SCNC: 5 MMOL/L (ref 8–16)
ANISOCYTOSIS BLD QL: (no result)
ARTERIAL BLOOD GAS PCO2: 37.7 MMHG (ref 35–45)
ARTERIAL PATENCY WRIST A: POSITIVE
AST SERPL-CCNC: 62 U/L (ref 15–37)
BASE EXCESS BLDA CALC-SCNC: -6.3 MEQ/L (ref -2–2)
BASOPHILS # BLD: 0.3 % (ref 0–2)
BILIRUB SERPL-MCNC: 0.3 MG/DL (ref 0.2–1)
BUN SERPL-MCNC: 47 MG/DL (ref 7–18)
CALCIUM SERPL-MCNC: 7.2 MG/DL (ref 8.5–10.1)
CHLORIDE SERPL-SCNC: 113 MMOL/L (ref 98–107)
CO2 SERPL-SCNC: 22 MMOL/L (ref 21–32)
CREAT SERPL-MCNC: 2.1 MG/DL (ref 0.55–1.3)
DEPRECATED RDW RBC AUTO: 16.1 % (ref 11.9–15.9)
EOSINOPHIL # BLD: 1.7 % (ref 0–4.5)
GLUCOSE SERPL-MCNC: 123 MG/DL (ref 74–106)
HCT VFR BLD CALC: 25 % (ref 35.4–49)
HGB BLD-MCNC: 8.4 GM/DL (ref 11.7–16.9)
IGA SER-ACNC: 13 MG/DL (ref 61–437)
IGM SERPL-MCNC: 6 MG/DL (ref 15–143)
INR BLD: 1.28 (ref 0.83–1.09)
KAPPA LC FREE SER-MCNC: 3135.8 MG/L (ref 3.3–19.4)
LYMPHOCYTES # BLD: 22.7 % (ref 8–40)
MACROCYTES BLD QL: 0
MAGNESIUM SERPL-MCNC: 2 MG/DL (ref 1.8–2.4)
MCH RBC QN AUTO: 30.3 PG (ref 25.7–33.7)
MCHC RBC AUTO-ENTMCNC: 33.8 G/DL (ref 32–35.9)
MCV RBC: 89.8 FL (ref 80–96)
MONOCYTES # BLD AUTO: 2.8 % (ref 3.8–10.2)
NEUTROPHILS # BLD: 72.5 % (ref 42.8–82.8)
PHOSPHATE SERPL-MCNC: 2.1 MG/DL (ref 2.5–4.9)
PLATELET # BLD AUTO: 127 K/MM3 (ref 134–434)
PLATELET BLD QL SMEAR: NORMAL
PMV BLD: 9.4 FL (ref 7.5–11.1)
PO2 BLDA: 110 MMHG (ref 70–100)
POTASSIUM SERPLBLD-SCNC: 4.1 MMOL/L (ref 3.5–5.1)
PROT SERPL-MCNC: 8.2 G/DL (ref 6.4–8.2)
PT PNL PPP: 15.2 SEC (ref 9.7–13)
RBC # BLD AUTO: 2.78 M/MM3 (ref 4–5.6)
SAO2 % BLDA: 97.8 % (ref 90–98.9)
SODIUM SERPL-SCNC: 140 MMOL/L (ref 136–145)
URATE SERPL-SCNC: 6.4 MG/DL (ref 2.6–7.2)
WBC # BLD AUTO: 5.2 K/MM3 (ref 4–10)

## 2018-11-09 RX ADMIN — PANTOPRAZOLE SODIUM SCH MG: 40 INJECTION, POWDER, FOR SOLUTION INTRAVENOUS at 09:26

## 2018-11-09 RX ADMIN — PIPERACILLIN SODIUM AND TAZOBACTAM SODIUM SCH MLS/HR: .25; 2 INJECTION, POWDER, LYOPHILIZED, FOR SOLUTION INTRAVENOUS at 09:26

## 2018-11-09 RX ADMIN — DEXTROSE MONOHYDRATE SCH MLS/HR: 50 INJECTION, SOLUTION INTRAVENOUS at 14:04

## 2018-11-09 RX ADMIN — HEPARIN SODIUM SCH: 5000 INJECTION, SOLUTION INTRAVENOUS at 06:48

## 2018-11-09 RX ADMIN — ALLOPURINOL SCH MG: 100 TABLET ORAL at 10:41

## 2018-11-09 RX ADMIN — PIPERACILLIN SODIUM AND TAZOBACTAM SODIUM SCH MLS/HR: .25; 2 INJECTION, POWDER, LYOPHILIZED, FOR SOLUTION INTRAVENOUS at 17:27

## 2018-11-09 RX ADMIN — METOPROLOL TARTRATE SCH MG: 25 TABLET, FILM COATED ORAL at 22:00

## 2018-11-09 RX ADMIN — LACTULOSE SCH GM: 20 SOLUTION ORAL at 22:02

## 2018-11-09 RX ADMIN — METOPROLOL TARTRATE SCH MG: 25 TABLET, FILM COATED ORAL at 13:37

## 2018-11-09 RX ADMIN — ALLOPURINOL SCH MG: 100 TABLET ORAL at 22:00

## 2018-11-09 RX ADMIN — LACTULOSE SCH GM: 20 SOLUTION ORAL at 10:41

## 2018-11-09 RX ADMIN — LACTULOSE SCH GM: 20 SOLUTION ORAL at 13:37

## 2018-11-09 RX ADMIN — VANCOMYCIN HYDROCHLORIDE SCH MLS/HR: 1 INJECTION, POWDER, LYOPHILIZED, FOR SOLUTION INTRAVENOUS at 22:00

## 2018-11-09 RX ADMIN — METOPROLOL TARTRATE SCH: 5 INJECTION, SOLUTION INTRAVENOUS at 06:06

## 2018-11-09 RX ADMIN — Medication SCH MG: at 09:26

## 2018-11-09 RX ADMIN — PIPERACILLIN SODIUM AND TAZOBACTAM SODIUM SCH MLS/HR: .25; 2 INJECTION, POWDER, LYOPHILIZED, FOR SOLUTION INTRAVENOUS at 01:47

## 2018-11-09 NOTE — PN
Teaching Attending Note


Name of Resident: Arnav Bernard





ATTENDING PHYSICIAN STATEMENT





I saw and evaluated the patient.


I reviewed the resident's note and discussed the case with the resident.


I agree with the resident's findings and plan as documented.








SUBJECTIVE:


Patient seen and examined in the ICU.  Remains intubated and sedated.  


RN for Plasmapheresis at the bedside. 


Minimally responsive to pain. 





  


 Intake & Output











 11/06/18 11/07/18 11/08/18 11/09/18





 23:59 23:59 23:59 23:59


 


Intake Total 1089 2087 1792 1126


 


Output Total 2400 1650 1800 600


 


Balance -1311 437 -8 526


 


Weight 178 lb 12.8 oz 179 lb 7 oz 178 lb 4 oz 177 lb 1 oz











 Last Vital Signs











Temp Pulse Resp BP Pulse Ox


 


 99.6 F   104 H  21 H  96/59 L  98 


 


 11/09/18 02:00  11/09/18 08:00  11/09/18 11:16  11/09/18 08:00  11/09/18 08:54








Active Medications





Acetaminophen (Tylenol Suppository -)  650 mg NC Q6H PRN


   PRN Reason: FEVER


   Last Admin: 11/05/18 18:10 Dose:  650 mg


Albumin Human (Albutein)  162.5 gm IVPB ONCE ONE


   Stop: 11/09/18 12:01


Allopurinol (Zyloprim -)  100 mg PO BID AVA


   Last Admin: 11/09/18 10:41 Dose:  100 mg


Diphenhydramine HCl (Benadryl Injection -)  25 mg IVPB ONCE PRN


   PRN Reason: DURING PLASMAPHERESIS


Heparin Sodium (Porcine) (Hep-Lock -)  5 ml IVPUSH PRN PRN


   PRN Reason: Heparin


Heparin Sodium (Porcine) (Heparin -)  1,000 unit IVPUSH PRN PRN


   PRN Reason: Heparin


Heparin Sodium (Porcine) (Heparin -)  5,000 unit IVPUSH PRN PRN


   PRN Reason: Heparin


Vancomycin HCl (Vancomycin (Pre-Docked))  1,000 mg in 250 mls @ 166.667 mls/hr 

IVPB Q24H AVA; Protocol


   Last Admin: 11/08/18 21:02 Dose:  166.667 mls/hr


Piperacillin Sod/Tazobactam (Sod 2.25 gm/ Dextrose)  50 mls @ 100 mls/hr IVPB 

Q8H-IV AVA; Protocol


   Last Admin: 11/09/18 09:26 Dose:  100 mls/hr


Fentanyl 500 mcg/ Dextrose  100 mls @ 5 mls/hr IVPB TITR AVA; Protocol


   Last Admin: 11/08/18 20:57 Dose:  Not Given


Heparin Sodium/Dextrose (Heparin Infusion -)  25,000 units in 500 mls @ 20 mls/

hr IVPB TITR AVA; Protocol


   Last Admin: 11/09/18 06:48 Dose:  Not Given


Lactulose (Cephulac (Oral Use))  20 gm PO TID AVA


   Last Admin: 11/09/18 10:41 Dose:  20 gm


Metoprolol Tartrate (Lopressor Injection -)  5 mg IVPUSH Q8H AVA


   Last Admin: 11/09/18 06:06 Dose:  Not Given


Pantoprazole Sodium (Protonix Iv)  40 mg IVPUSH DAILY [facility]


   Last Admin: 11/09/18 09:26 Dose:  40 mg


Thiamine HCl (Vitamin B1 -)  100 mg PO DAILY [facility]


   Last Admin: 11/09/18 09:26 Dose:  100 mg

















Constitutional: Yes: Intubated and sedated 


Cardiovascular: Yes: Pulse Irregular, AFib 


Respiratory: Yes: Intubated and sedated 


Gastrointestinal: Yes: Normal Bowel Sounds, Soft


Musculoskeletal: Yes: WNL


Extremities: Yes: WNL


Edema: No


Peripheral Pulses WNL: Yes


Neurological: Yes: Sedated 


Labs: 


  


  


  Laboratory Results - last 24 hr











  11/05/18 11/05/18 11/08/18





  16:25 21:45 10:51


 


WBC   


 


RBC   


 


Hgb   


 


Hct   


 


MCV   


 


MCH   


 


MCHC   


 


RDW   


 


Plt Count   


 


MPV   


 


Absolute Neuts (auto)   


 


Neutrophils %   


 


Lymphocytes %   


 


Monocytes %   


 


Eosinophils %   


 


Basophils %   


 


Nucleated RBC %   


 


PT with INR   


 


INR   


 


PTT (Actin FS)   


 


Fibrinogen   


 


Puncture Site   


 


ABG pH   


 


ABG pCO2 at Pt Temp   


 


ABG pO2 at Pt Temp   


 


ABG HCO3   


 


ABG O2 Sat (Measured)   


 


ABG O2 Content   


 


ABG Base Excess   


 


[place] Test   


 


O2 Delivery Device   


 


Oxygen Flow Rate   


 


Vent Rate   


 


PEEP   


 


Pressure Support Vent   


 


Sodium   


 


Potassium   


 


Chloride   


 


Carbon Dioxide   


 


Anion Gap   


 


BUN   


 


Creatinine   


 


Creat Clearance w eGFR   


 


Random Glucose   


 


Lactic Acid   


 


Uric Acid   


 


Calcium   


 


Phosphorus   


 


Magnesium   


 


Total Bilirubin   


 


AST   


 


ALT   


 


Alkaline Phosphatase   


 


Troponin I   


 


B-Natriuretic Peptide   


 


Total Protein   


 


Albumin   


 


IgG  6770 H   Cancelled


 


IgA  13 L   Cancelled


 


IgM  6 L   Cancelled


 


Free Kappa LC, Quant   3135.8 H 


 


Free Lambda LC, Quant   5.7 


 


Free Kappa/Lambda Ratio   550.14 H 


 


Blood Type   


 


Antibody Screen   














  11/08/18 11/09/18 11/09/18





  10:51 05:30 05:30


 


WBC   5.2 


 


RBC   2.78 L 


 


Hgb   8.4 L 


 


Hct   25.0 L 


 


MCV   89.8 


 


MCH   30.3 


 


MCHC   33.8 


 


RDW   16.1 H 


 


Plt Count   127 L 


 


MPV   9.4 


 


Absolute Neuts (auto)   3.8 


 


Neutrophils %   72.5 


 


Lymphocytes %   22.7 


 


Monocytes %   2.8 L 


 


Eosinophils %   1.7 


 


Basophils %   0.3 


 


Nucleated RBC %   4 H 


 


PT with INR   


 


INR   


 


PTT (Actin FS)   


 


Fibrinogen   


 


Puncture Site   


 


ABG pH   


 


ABG pCO2 at Pt Temp   


 


ABG pO2 at Pt Temp   


 


ABG HCO3   


 


ABG O2 Sat (Measured)   


 


ABG O2 Content   


 


ABG Base Excess   


 


[place] Test   


 


O2 Delivery Device   


 


Oxygen Flow Rate   


 


Vent Rate   


 


PEEP   


 


Pressure Support Vent   


 


Sodium    140


 


Potassium    4.1


 


Chloride    113 H


 


Carbon Dioxide    22


 


Anion Gap    5 L


 


BUN    47 H


 


Creatinine    2.1 H


 


Creat Clearance w eGFR    30.62


 


Random Glucose    123 H


 


Lactic Acid   


 


Uric Acid    6.4


 


Calcium    7.2 L


 


Phosphorus    2.1 L


 


Magnesium    2.0


 


Total Bilirubin    0.3


 


AST    62 H


 


ALT    45


 


Alkaline Phosphatase    146 H


 


Troponin I    0.27 H


 


B-Natriuretic Peptide    7658.0 H


 


Total Protein    8.2


 


Albumin    2.4 L


 


IgG  Cancelled  


 


IgA   


 


IgM   


 


Free [place] LC, Quant   


 


Free Lambda LC, Quant   


 


Free Kappa/Lambda Ratio   


 


Blood Type   


 


Antibody Screen   














  11/09/18 11/09/18 11/09/18





  05:30 05:30 05:30


 


WBC   


 


RBC   


 


Hgb   


 


Hct   


 


MCV   


 


MCH   


 


MCHC   


 


RDW   


 


Plt Count   


 


MPV   


 


Absolute Neuts (auto)   


 


Neutrophils %   


 


Lymphocytes %   


 


Monocytes %   


 


Eosinophils %   


 


Basophils %   


 


Nucleated RBC %   


 


PT with INR   15.20 H 


 


INR   1.28 H 


 


PTT (Actin FS)   


 


Fibrinogen    293.0  D


 


Puncture Site   


 


ABG pH   


 


ABG pCO2 at Pt Temp   


 


ABG pO2 at Pt Temp   


 


ABG HCO3   


 


ABG O2 Sat (Measured)   


 


ABG O2 Content   


 


ABG Base Excess   


 


[place] Test   


 


O2 Delivery Device   


 


Oxygen Flow Rate   


 


Vent Rate   


 


PEEP   


 


Pressure Support Vent   


 


Sodium   


 


Potassium   


 


Chloride   


 


Carbon Dioxide   


 


Anion Gap   


 


BUN   


 


Creatinine   


 


Creat Clearance w eGFR   


 


Random Glucose   


 


Lactic Acid  0.7  


 


Uric Acid   


 


Calcium   


 


Phosphorus   


 


Magnesium   


 


Total Bilirubin   


 


AST   


 


ALT   


 


Alkaline Phosphatase   


 


Troponin I   


 


B-Natriuretic Peptide   


 


Total Protein   


 


Albumin   


 


IgG   


 


IgA   


 


IgM   


 


Free [place] LC, Quant   


 


Free Lambda LC, Quant   


 


Free Kappa/Lambda Ratio   


 


Blood Type   


 


Antibody Screen   














  11/09/18 11/09/18 11/09/18





  05:30 05:30 06:15


 


WBC   


 


RBC   


 


Hgb   


 


Hct   


 


MCV   


 


MCH   


 


MCHC   


 


RDW   


 


Plt Count   


 


MPV   


 


Absolute Neuts (auto)   


 


Neutrophils %   


 


Lymphocytes %   


 


Monocytes %   


 


Eosinophils %   


 


Basophils %   


 


Nucleated RBC %   


 


PT with INR   


 


INR   


 


PTT (Actin FS)  62.0 H  


 


Fibrinogen   


 


Puncture Site    Right radial


 


ABG pH    7.32 L


 


ABG pCO2 at Pt Temp    37.7


 


ABG pO2 at Pt Temp    110.0 H


 


ABG HCO3    18.7 L


 


ABG O2 Sat (Measured)    97.8


 


ABG O2 Content    12.1 L


 


ABG Base Excess    -6.3 L


 


[place]acho Test    Positive


 


O2 Delivery Device    Vent


 


Oxygen Flow Rate    30


 


Vent Rate    12


 


PEEP    5.0


 


Pressure Support Vent    500


 


Sodium   


 


Potassium   


 


Chloride   


 


Carbon Dioxide   


 


Anion Gap   


 


BUN   


 


Creatinine   


 


Creat Clearance w eGFR   


 


Random Glucose   


 


Lactic Acid   


 


Uric Acid   Cancelled 


 


Calcium   


 


Phosphorus   


 


Magnesium   


 


Total Bilirubin   


 


AST   


 


ALT   


 


Alkaline Phosphatase   


 


Troponin I   


 


B-Natriuretic Peptide   


 


Total Protein   


 


Albumin   


 


IgG   


 


IgA   


 


IgM   


 


Free [place] LC, Quant   


 


Free Lambda LC, Quant   


 


Free Kappa/Lambda Ratio   


 


Blood Type   


 


Antibody Screen   














  11/09/18





  09:11


 


WBC 


 


RBC 


 


Hgb 


 


Hct 


 


MCV 


 


MCH 


 


MCHC 


 


RDW 


 


Plt Count 


 


MPV 


 


Absolute Neuts (auto) 


 


Neutrophils % 


 


Lymphocytes % 


 


Monocytes % 


 


Eosinophils % 


 


Basophils % 


 


Nucleated RBC % 


 


PT with INR 


 


INR 


 


PTT (Actin FS) 


 


Fibrinogen 


 


Puncture Site 


 


ABG pH 


 


ABG pCO2 at Pt Temp 


 


ABG pO2 at Pt Temp 


 


ABG HCO3 


 


ABG O2 Sat (Measured) 


 


ABG O2 Content 


 


ABG Base Excess 


 


[place]acho Test 


 


O2 Delivery Device 


 


Oxygen Flow Rate 


 


Vent Rate 


 


PEEP 


 


Pressure Support Vent 


 


Sodium 


 


Potassium 


 


Chloride 


 


Carbon Dioxide 


 


Anion Gap 


 


BUN 


 


Creatinine 


 


Creat Clearance w eGFR 


 


Random Glucose 


 


Lactic Acid 


 


Uric Acid 


 


Calcium 


 


Phosphorus 


 


Magnesium 


 


Total Bilirubin 


 


AST 


 


ALT 


 


Alkaline Phosphatase 


 


Troponin I 


 


B-Natriuretic Peptide 


 


Total Protein 


 


Albumin 


 


IgG 


 


IgA 


 


IgM 


 


Free [place] LC, Quant 


 


Free Lambda LC, Quant 


 


Free Kappa/Lambda Ratio 


 


Blood Type  B POSITIVE


 


Antibody Screen  Negative











Problem List


Acute Respiratory Failure 


Suspected Aspiration 


HAM (acute kidney injury)


R/O Hyperviscosity 


Atrial Fibrillation with RVR 


R/O Sepsis 


AMS 


Anemia 


(?) Apical PTX: does not appear to have a PTX on CXR today  








PLAN: 


AC Mode of vent 


PEEP 5 cm H2O   


AC 


Strict I & O


Monitor CXR 


Daily sedation vacation 


ABX coverage 


Rate control 


Plasmapheresis per Heme


Enteral feeds





Dr Blum 


Critical care time spent in reviewing chart, evaluating patient and formulating 

plan - 36 minutes.

## 2018-11-09 NOTE — PN
Progress Note (short form)





- Note


Progress Note: 





Patient seen and examined 


Remains intubated 


Lethargic, non responsive , with withdrawl to painful stimuli 


 Last Vital Signs











Temp Pulse Resp BP Pulse Ox


 


 99.6 F   104 H  18   96/59 L  98 


 


 11/09/18 02:00  11/09/18 08:00  11/09/18 08:54  11/09/18 08:00  11/09/18 08:54











HEENT: PEDRITO, EOM Intact,arcus


Oropharynx: ET tube


Cor: irregular 


Lungs: Clear to P&A


Abd: Soft, Normal bowel sounds, No organomegaly


Ext:No significant edemaheel pads


Skin: No rashes, Integument intact


 CBC, BMP





 11/09/18 05:30 





 11/09/18 05:30 





 Current Medications











Generic Name Dose Route Start Last Admin





  Trade Name Freq  PRN Reason Stop Dose Admin


 


Acetaminophen  650 mg  11/01/18 05:44  11/05/18 18:10





  Tylenol Suppository -  MT   650 mg





  Q6H PRN   Administration





  FEVER   





     





     





     


 


Allopurinol  100 mg  11/09/18 10:00  





  Zyloprim -  PO   





  BID AVA   





     





     





     





     


 


Heparin Sodium (Porcine)  5 ml  11/07/18 15:26  





  Hep-Lock -  IVPUSH   





  PRN PRN   





  Heparin   





     





     





     


 


Heparin Sodium (Porcine)  1,000 unit  11/08/18 06:38  





  Heparin -  IVPUSH   





  PRN PRN   





  Heparin   





     





     





     


 


Heparin Sodium (Porcine)  5,000 unit  11/08/18 06:38  





  Heparin -  IVPUSH   





  PRN PRN   





  Heparin   





     





     





     


 


Vancomycin HCl  1,000 mg in 250 mls @ 166.667 mls/hr  10/31/18 20:00  11/08/18 

21:02





  Vancomycin (Pre-Docked)  IVPB   166.667 mls/hr





  Q24H AVA   Administration





     





     





  Protocol   





     


 


Piperacillin Sod/Tazobactam  50 mls @ 100 mls/hr  11/06/18 11:00  11/09/18 09:26





  Sod 2.25 gm/ Dextrose  IVPB   100 mls/hr





  Q8H-IV AVA   Administration





     





     





  Protocol   





     


 


Fentanyl 500 mcg/ Dextrose  100 mls @ 5 mls/hr  11/07/18 11:45  11/08/18 20:57





  IVPB   Not Given





  TITR AVA   





     





     





  Protocol   





  25 MCG/HR   


 


Heparin Sodium/Dextrose  25,000 units in 500 mls @ 20 mls/hr  11/08/18 06:45  11 /09/18 06:48





  Heparin Infusion -  IVPB   Not Given





  TITR [facility]   





     





     





  Protocol   





  1,000 UNITS/HR   


 


Sodium Phosphate 15 mm/ Sodium  255 mls @ 62.5 mls/hr  11/09/18 07:08  





  Chloride  IVPB  11/09/18 11:12  





  ONCE ONE   





     





     





     





     


 


Lactulose  20 gm  11/09/18 09:38  





  Cephulac (Oral Use)  PO  11/09/18 09:39  





  TID STA   





     





     





     





     


 


Metoprolol Tartrate  5 mg  11/07/18 12:00  11/09/18 06:06





  Lopressor Injection -  IVPUSH   Not Given





  Q8H [facility]   





     





     





     





     


 


Pantoprazole Sodium  40 mg  11/07/18 12:00  11/09/18 09:26





  Protonix Iv  IVPUSH   40 mg





  DAILY [facility]   Administration





     





     





     





     


 


Thiamine HCl  100 mg  10/30/18 22:12  11/09/18 09:26





  Vitamin B1 -  PO   100 mg





  DAILY [facility]   Administration





     





     





     





     








Impression:





Patient fulfills new criteria  for multiple myeloma with free kappa/ free 

lambda light chain  ratio of 432 (>100 is diagnostic of myeloma) 


Patient has had decrease in paraprotein IgG from > 6700---> 3100 with pheresis 


High free kappa levels may be contributing to renal insufficiency


Unusual  unexplained phenomenon of myeloma and hypercalcemia with elevated 

blood level of ammonia not associated with liver disease. ( Patients ammonia 

level is 91)





Plan 


Continue pheresis -Lowereing of IgG noted. 


IgG is typically monomeric and unusually associated with hyperviscosity. Await 

results


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 





Will plan to begin decadron next few days after initiation of allopurinol.





Will begin lactulose for elevated ammonia.

## 2018-11-09 NOTE — PN
Progress Note, Physician


History of Present Illness: 





 Intubated on mechanical ventilation


 Opens eyes to calling name


 Low grade temp


 Receiving pheresis


 Repeat BC no growth


 


 


 





- Current Medication List


Current Medications: 


Active Medications





Acetaminophen (Tylenol Suppository -)  650 mg DC Q6H PRN


   PRN Reason: FEVER


   Last Admin: 11/05/18 18:10 Dose:  650 mg


Albumin Human (Albutein)  162.5 gm IVPB ONCE ONE


   Stop: 11/09/18 12:01


Allopurinol (Zyloprim -)  100 mg PO BID AVA


   Last Admin: 11/09/18 10:41 Dose:  100 mg


Diphenhydramine HCl (Benadryl Injection -)  25 mg IVPB ONCE PRN


   PRN Reason: DURING PLASMAPHERESIS


Heparin Sodium (Porcine) (Hep-Lock -)  5 ml IVPUSH PRN PRN


   PRN Reason: Heparin


Heparin Sodium (Porcine) (Heparin -)  1,000 unit IVPUSH PRN PRN


   PRN Reason: Heparin


Heparin Sodium (Porcine) (Heparin -)  5,000 unit IVPUSH PRN PRN


   PRN Reason: Heparin


Vancomycin HCl (Vancomycin (Pre-Docked))  1,000 mg in 250 mls @ 166.667 mls/hr 

IVPB Q24H AVA; Protocol


   Last Admin: 11/08/18 21:02 Dose:  166.667 mls/hr


Piperacillin Sod/Tazobactam (Sod 2.25 gm/ Dextrose)  50 mls @ 100 mls/hr IVPB 

Q8H-IV AVA; Protocol


   Last Admin: 11/09/18 09:26 Dose:  100 mls/hr


Fentanyl 500 mcg/ Dextrose  100 mls @ 5 mls/hr IVPB TITR AVA; Protocol


   Last Admin: 11/08/18 20:57 Dose:  Not Given


Heparin Sodium/Dextrose (Heparin Infusion -)  25,000 units in 500 mls @ 20 mls/

hr IVPB TITR AVA; Protocol


   Last Admin: 11/09/18 06:48 Dose:  Not Given


Lactulose (Cephulac (Oral Use))  20 gm PO TID AVA


   Last Admin: 11/09/18 10:41 Dose:  20 gm


Metoprolol Tartrate (Lopressor Injection -)  5 mg IVPUSH Q8H [facility]


   Last Admin: 11/09/18 06:06 Dose:  Not Given


Pantoprazole Sodium (Protonix Iv)  40 mg IVPUSH DAILY [facility]


   Last Admin: 11/09/18 09:26 Dose:  40 mg


Thiamine HCl (Vitamin B1 -)  100 mg PO DAILY [facility]


   Last Admin: 11/09/18 09:26 Dose:  100 mg











- Objective


Vital Signs: 


 Vital Signs











Temperature  99.6 F   11/09/18 02:00


 


Pulse Rate  104 H  11/09/18 08:00


 


Respiratory Rate  21 H  11/09/18 11:16


 


Blood Pressure  96/59 L  11/09/18 08:00


 


O2 Sat by Pulse Oximetry (%)  98   11/09/18 08:54











Constitutional: Yes: No Distress


Eyes: Yes: Conjunctiva Clear


Cardiovascular: Yes: Regular Rate and Rhythm, S1, S2


Respiratory: Yes: Mechanically Ventilated


Gastrointestinal: Yes: Normal Bowel Sounds, Soft.  No: Tenderness


Edema: No


Labs: 


 CBC, BMP





 11/09/18 05:30 





 11/09/18 05:30 





 INR, PTT











INR  1.28  (0.83-1.09)  H  11/09/18  05:30    


 


Fibrinogen  293.0 mg/dL (238-498)  D 11/09/18  05:30    














Assessment/Plan


Respiratory failure


 RLL pneumonia


 Toxic metabolic encephalopathy


 Hypercalcemia


 Renal failure


 Suspected myeloma


Hyperviscosity syndrome


 


  


 Continue broad spectrum  antimicrobial coverage- [id number]


 Ventilatory support


 Apheresis

## 2018-11-09 NOTE — PN
Physical Exam: 


SUBJECTIVE: Patient seen and examined at bedside. He had sporadic runs of V-

Tach overnight again. He remains intubated but not sedated. He is receiving 

fentanyl for pain control. Patient has no meaningful communication. Patient is 

receiving plasmapharesis today. 





Sister Hien Argueta: [phone] 


- She is in [place]


- She says they use to speak on a monthly basis. Last time they spoke was in 

the summer time. 








OBJECTIVE:





 Vital Signs











 Period  Temp  Pulse  Resp  BP Sys/Varghese  Pulse Ox


 


 Last 24 Hr  98.7 F-100.6 F    14-26  /56-80  











GENERAL: The patient is awake but not alert or oriented. He does not speak or 

squeeze my hands. 


HEAD: Normal with no signs of trauma.


EYES: PERRL, sclera anicteric. 


ENT: Ears normal, nares patent, moist mucous membranes.


NECK: Trachea midline. 


LUNGS: bilateral diffuse rhonchi


HEART: tachycardic rate and irregular rhythm. S1, S2 without murmur, rub or 

gallop.


ABDOMEN: Soft, nondistended, normoactive bowel sounds, no masses.


EXTREMITIES: 2+ pulses, warm, well-perfused, no edema. 


NEUROLOGICAL: Cannot assess due to clinical condition. 


SKIN: Warm, dry, normal turgor. Scattered ulcers on both legs and feet. 














 Laboratory Results - last 24 hr











  11/05/18 11/05/18 11/09/18





  16:25 21:45 05:30


 


WBC    5.2


 


RBC    2.78 L


 


Hgb    8.4 L


 


Hct    25.0 L


 


MCV    89.8


 


MCH    30.3


 


MCHC    33.8


 


RDW    16.1 H


 


Plt Count    127 L


 


MPV    9.4


 


Absolute Neuts (auto)    3.8


 


Neutrophils %    72.5


 


Neutrophils % (Manual)    74.2


 


Band Neutrophils %    2.1


 


Lymphocytes %    22.7


 


Lymphocytes % (Manual)    16.5  D


 


Monocytes %    2.8 L


 


Monocytes % (Manual)    3 L


 


Eosinophils %    1.7


 


Eosinophils % (Manual)    3.1


 


Basophils %    0.3


 


Basophils % (Manual)    0.0


 


Myelocytes % (Man)    0


 


Promyelocytes % (Man)    0


 


Blast Cells % (Manual)    0


 


Nucleated RBC %    4 H


 


Metamyelocytes    1


 


Hypochromia    0


 


Platelet Estimate    Normal


 


Polychromasia    1+


 


Poikilocytosis    0


 


Anisocytosis    1+


 


Microcytosis    1+


 


Macrocytosis    0


 


PT with INR   


 


INR   


 


PTT (Actin FS)   


 


Fibrinogen   


 


Puncture Site   


 


ABG pH   


 


ABG pCO2 at Pt Temp   


 


ABG pO2 at Pt Temp   


 


ABG HCO3   


 


ABG O2 Sat (Measured)   


 


ABG O2 Content   


 


ABG Base Excess   


 


Chacho Test   


 


O2 Delivery Device   


 


Oxygen Flow Rate   


 


Vent Rate   


 


PEEP   


 


Pressure Support Vent   


 


Sodium   


 


Potassium   


 


Chloride   


 


Carbon Dioxide   


 


Anion Gap   


 


BUN   


 


Creatinine   


 


Creat Clearance w eGFR   


 


Random Glucose   


 


Lactic Acid   


 


Uric Acid   


 


Calcium   


 


Phosphorus   


 


Magnesium   


 


Total Bilirubin   


 


AST   


 


ALT   


 


Alkaline Phosphatase   


 


Troponin I   


 


B-Natriuretic Peptide   


 


Total Protein   


 


Albumin   


 


IgG  6770 H  


 


IgA  13 L  


 


IgM  6 L  


 


Free [place] LC, Quant   3135.8 H 


 


Free Lambda LC, Quant   5.7 


 


Free Kappa/Lambda Ratio   550.14 H 


 


Blood Type   


 


Antibody Screen   














  11/09/18 11/09/18 11/09/18





  05:30 05:30 05:30


 


WBC   


 


RBC   


 


Hgb   


 


Hct   


 


MCV   


 


MCH   


 


MCHC   


 


RDW   


 


Plt Count   


 


MPV   


 


Absolute Neuts (auto)   


 


Neutrophils %   


 


Neutrophils % (Manual)   


 


Band Neutrophils %   


 


Lymphocytes %   


 


Lymphocytes % (Manual)   


 


Monocytes %   


 


Monocytes % (Manual)   


 


Eosinophils %   


 


Eosinophils % (Manual)   


 


Basophils %   


 


Basophils % (Manual)   


 


Myelocytes % (Man)   


 


Promyelocytes % (Man)   


 


Blast Cells % (Manual)   


 


Nucleated RBC %   


 


Metamyelocytes   


 


Hypochromia   


 


Platelet Estimate   


 


Polychromasia   


 


Poikilocytosis   


 


Anisocytosis   


 


Microcytosis   


 


Macrocytosis   


 


PT with INR    15.20 H


 


INR    1.28 H


 


PTT (Actin FS)   


 


Fibrinogen   


 


Puncture Site   


 


ABG pH   


 


ABG pCO2 at Pt Temp   


 


ABG pO2 at Pt Temp   


 


ABG HCO3   


 


ABG O2 Sat (Measured)   


 


ABG O2 Content   


 


ABG Base Excess   


 


Chacho Test   


 


O2 Delivery Device   


 


Oxygen Flow Rate   


 


Vent Rate   


 


PEEP   


 


Pressure Support Vent   


 


Sodium  140  


 


Potassium  4.1  


 


Chloride  113 H  


 


Carbon Dioxide  22  


 


Anion Gap  5 L  


 


BUN  47 H  


 


Creatinine  2.1 H  


 


Creat Clearance w eGFR  30.62  


 


Random Glucose  123 H  


 


Lactic Acid   0.7 


 


Uric Acid  6.4  


 


Calcium  7.2 L  


 


Phosphorus  2.1 L  


 


Magnesium  2.0  


 


Total Bilirubin  0.3  


 


AST  62 H  


 


ALT  45  


 


Alkaline Phosphatase  146 H  


 


Troponin I  0.27 H  


 


B-Natriuretic Peptide  7658.0 H  


 


Total Protein  8.2  


 


Albumin  2.4 L  


 


IgG   


 


IgA   


 


IgM   


 


Free Lind[place]org LC, Quant   


 


Free Lambda LC, Quant   


 


Free Kappa/Lambda Ratio   


 


Blood Type   


 


Antibody Screen   














  11/09/18 11/09/18 11/09/18





  05:30 05:30 05:30


 


WBC   


 


RBC   


 


Hgb   


 


Hct   


 


MCV   


 


MCH   


 


MCHC   


 


RDW   


 


Plt Count   


 


MPV   


 


Absolute Neuts (auto)   


 


Neutrophils %   


 


Neutrophils % (Manual)   


 


Band Neutrophils %   


 


Lymphocytes %   


 


Lymphocytes % (Manual)   


 


Monocytes %   


 


Monocytes % (Manual)   


 


Eosinophils %   


 


Eosinophils % (Manual)   


 


Basophils %   


 


Basophils % (Manual)   


 


Myelocytes % (Man)   


 


Promyelocytes % (Man)   


 


Blast Cells % (Manual)   


 


Nucleated RBC %   


 


Metamyelocytes   


 


Hypochromia   


 


Platelet Estimate   


 


Polychromasia   


 


Poikilocytosis   


 


Anisocytosis   


 


Microcytosis   


 


Macrocytosis   


 


PT with INR   


 


INR   


 


PTT (Actin FS)   62.0 H 


 


Fibrinogen  293.0  D  


 


Puncture Site   


 


ABG pH   


 


ABG pCO2 at Pt Temp   


 


ABG pO2 at Pt Temp   


 


ABG HCO3   


 


ABG O2 Sat (Measured)   


 


ABG O2 Content   


 


ABG Base Excess   


 


Chacho Test   


 


O2 Delivery Device   


 


Oxygen Flow Rate   


 


Vent Rate   


 


PEEP   


 


Pressure Support Vent   


 


Sodium   


 


Potassium   


 


Chloride   


 


Carbon Dioxide   


 


Anion Gap   


 


BUN   


 


Creatinine   


 


Creat Clearance w eGFR   


 


Random Glucose   


 


Lactic Acid   


 


Uric Acid    Cancelled


 


Calcium   


 


Phosphorus   


 


Magnesium   


 


Total Bilirubin   


 


AST   


 


ALT   


 


Alkaline Phosphatase   


 


Troponin I   


 


B-Natriuretic Peptide   


 


Total Protein   


 


Albumin   


 


IgG   


 


IgA   


 


IgM   


 


Free Lind[place] LC, Quant   


 


Free Lambda LC, Quant   


 


Free Kappa/Lambda Ratio   


 


Blood Type   


 


Antibody Screen   














  11/09/18 11/09/18





  06:15 09:11


 


WBC  


 


RBC  


 


Hgb  


 


Hct  


 


MCV  


 


MCH  


 


MCHC  


 


RDW  


 


Plt Count  


 


MPV  


 


Absolute Neuts (auto)  


 


Neutrophils %  


 


Neutrophils % (Manual)  


 


Band Neutrophils %  


 


Lymphocytes %  


 


Lymphocytes % (Manual)  


 


Monocytes %  


 


Monocytes % (Manual)  


 


Eosinophils %  


 


Eosinophils % (Manual)  


 


Basophils %  


 


Basophils % (Manual)  


 


Myelocytes % (Man)  


 


Promyelocytes % (Man)  


 


Blast Cells % (Manual)  


 


Nucleated RBC %  


 


Metamyelocytes  


 


Hypochromia  


 


Platelet Estimate  


 


Polychromasia  


 


Poikilocytosis  


 


Anisocytosis  


 


Microcytosis  


 


Macrocytosis  


 


PT with INR  


 


INR  


 


PTT (Actin FS)  


 


Fibrinogen  


 


Puncture Site  Right radial 


 


ABG pH  7.32 L 


 


ABG pCO2 at Pt Temp  37.7 


 


ABG pO2 at Pt Temp  110.0 H 


 


ABG HCO3  18.7 L 


 


ABG O2 Sat (Measured)  97.8 


 


ABG O2 Content  12.1 L 


 


ABG Base Excess  -6.3 L 


 


Chacho Test  Positive 


 


O2 Delivery Device  Vent 


 


Oxygen Flow Rate  30 


 


Vent Rate  12 


 


PEEP  5.0 


 


Pressure Support Vent  500 


 


Sodium  


 


Potassium  


 


Chloride  


 


Carbon Dioxide  


 


Anion Gap  


 


BUN  


 


Creatinine  


 


Creat Clearance w eGFR  


 


Random Glucose  


 


Lactic Acid  


 


Uric Acid  


 


Calcium  


 


Phosphorus  


 


Magnesium  


 


Total Bilirubin  


 


AST  


 


ALT  


 


Alkaline Phosphatase  


 


Troponin I  


 


B-Natriuretic Peptide  


 


Total Protein  


 


Albumin  


 


IgG  


 


IgA  


 


IgM  


 


Free Lind[place]org LC, Quant  


 


Free Lambda LC, Quant  


 


Free Kappa/Lambda Ratio  


 


Blood Type   B POSITIVE


 


Antibody Screen   Negative








Active Medications











Generic Name Dose Route Start Last Admin





  Trade Name Freq  PRN Reason Stop Dose Admin


 


Acetaminophen  650 mg  11/01/18 05:44  11/05/18 18:10





  Tylenol Suppository -  [place]   650 mg





  Q6H PRN   Administration





  FEVER   





     





     





     


 


Allopurinol  100 mg  11/09/18 10:00  11/09/18 10:41





  Zyloprim -  PO   100 mg





  BID AVA   Administration





     





     





     





     


 


Diphenhydramine HCl  25 mg  11/09/18 12:00  





  Benadryl Injection -  IVPB   





  ONCE PRN   





  DURING PLASMAPHERESIS   





     





     





     


 


Heparin Sodium (Porcine)  5 ml  11/07/18 15:26  





  Hep-Lock -  IVPUSH   





  PRN PRN   





  Heparin   





     





     





     


 


Heparin Sodium (Porcine)  1,000 unit  11/08/18 06:38  





  Heparin -  IVPUSH   





  PRN PRN   





  Heparin   





     





     





     


 


Heparin Sodium (Porcine)  5,000 unit  11/08/18 06:38  





  Heparin -  IVPUSH   





  PRN PRN   





  Heparin   





     





     





     


 


Vancomycin HCl  1,000 mg in 250 mls @ 166.667 mls/hr  10/31/18 20:00  11/08/18 

21:02





  Vancomycin (Pre-Docked)  IVPB   166.667 mls/hr





  Q24H AVA   Administration





     





     





  Protocol   





     


 


Piperacillin Sod/Tazobactam  50 mls @ 100 mls/hr  11/06/18 11:00  11/09/18 09:26





  Sod 2.25 gm/ Dextrose  IVPB   100 mls/hr





  Q8H-IV AVA   Administration





     





     





  Protocol   





     


 


Fentanyl 500 mcg/ Dextrose  100 mls @ 5 mls/hr  11/07/18 11:45  11/08/18 20:57





  IVPB   Not Given





  TITR AVA   





     





     





  Protocol   





  25 MCG/HR   


 


Heparin Sodium/Dextrose  25,000 units in 500 mls @ 20 mls/hr  11/08/18 06:45  11 /09/18 06:48





  Heparin Infusion -  IVPB   Not Given





  TITR AVA   





     





     





  Protocol   





  1,000 UNITS/HR   


 


Lactulose  20 gm  11/09/18 09:38  11/09/18 10:41





  Cephulac (Oral Use)  PO   20 gm





  TID AVA   Administration





     





     





     





     


 


Metoprolol Tartrate  5 mg  11/07/18 12:00  11/09/18 06:06





  Lopressor Injection -  IVPUSH   Not Given





  Q8H AVA   





     





     





     





     


 


Pantoprazole Sodium  40 mg  11/07/18 12:00  11/09/18 09:26





  Protonix Iv  IVPUSH   40 mg





  DAILY AVA   Administration





     





     





     





     


 


Thiamine HCl  100 mg  10/30/18 22:12  11/09/18 09:26





  Vitamin B1 -  PO   100 mg





  DAILY AVA   Administration





     





     





     





     











ASSESSMENT/PLAN:





Assessment: Patient receiving plasmapharesis currently. During sedation 

vacation today patient's mental status was unimpressive. He was not alert, 

oriented, or responsive. 





He experienced multiple runs of V-Tach overnight. Consulted Cards who 

recommended re-starting patient on PO Lopressor 25 mg BID. 





Got hold of the patient's Sister Hien Argueta: [phone] to discuss 

goals of care. 





Plan:





ID: 


- Rhonchi heard on Lung auscultation


- CXR shows pulmonary infiltrate, possible PNA vs empyema vs paramnemonic 

effusion 


- Abx coverage with zosyn and Vanc


- ID on board





Cardio: 


- Sporadically goes in and out of V-Tach - Will let Cards know. 


- Cards recommends restarting Lopressor 25 mg BID PO


- Afib w RVR: Patient is receiving metoprolol 5 mg PRN


- diastolic dysfunction + hypertrophic cardiomyopathy


- CAD with multiple stents


- Cardio consulted and on board. 





Pulm: 


- Intubated


- No pneumothorax


- increasing Peep to 5


- b/l diffuse rhonchi


- infiltrate on CXR: Abx- Zosyn and Vanc


- No lasix today





Renal: 


- Acute on Chronic kidney injury


- BUN: 47 Cr:2.1. possibly Pre-renal etiology. Unchanged much since yesterday. 


- Renal consulted and on board. 


- Patient receiving plasmapharesis today.  





Neuro: 


- Patient is not alert or oriented. He responds to painful stimuli. 


- Head CT showed no acute pathology





Heme/Onc: 


- Patient receiving plasmapharesis today. 


- Patient has hypercalcemia


- Paraproteinemia


- Probable Multiple Myeloma, SPEP and UPEP suggestive. 


- Patient fulfills new criteria  for multiple myeloma with free kappa/ free 

lambda light chain  ratio of 432 (>100 is diagnostic of myeloma) 


- Kappa/Lambda ratio > 100 consistent with Multiple myeloma


- M spike elevated elevated at 6.8 previously 4.7 








Endo: 


- Hypercalcemia


- Glucose wnl


- Parathyroid hormone WNL


- PTH-borderline low appropriate given hypercalcemia, PTHrP low





F/E/N: 


F: No standing fluids due to fluid overload in lungs


E: Will replete lytes PRN


N: tube feeds. 





Code Status: Full Code 


Dispo: Patient will continue to receive ICU level care











Visit type





- Emergency Visit


Emergency Visit: Yes


ED Registration Date: 10/30/18


Care time: The patient presented to the Emergency Department on the above date 

and was hospitalized for further evaluation of their emergent condition.





- New Patient


This patient is new to me today: No





- Critical Care


Critical Care patient: Yes


Total Critical Care Time (in minutes): 36


Critical Care Statement: The care of this patient involved high complexity 

decision making to prevent further life threatening deterioration of the patient

's condition and/or to evaluate & treat vital organ system(s) failure or risk 

of failure.

## 2018-11-09 NOTE — PN
Progress Note, Physician


Chief Complaint: 





The patient seen in the ICU. 


Intubated,a nd vent supported.


Poorly responsive, except for minimal response to calling names. 


Maintains good urine output. 


IV fluids well tolerated. 


History of Present Illness: 





This is a 78 year old gentleman with hx of CKD, Afib on A/C, CAD, CKD, 

Hypertension, Hyperlipidemia, PVD who presented with AMS/Confusion and found to 

have HAM. 








- Current Medication List


Current Medications: 


Active Medications





Acetaminophen (Tylenol Suppository -)  650 mg [place] Q6H PRN


   PRN Reason: FEVER


   Last Admin: 11/05/18 18:10 Dose:  650 mg


Allopurinol (Zyloprim -)  100 mg PO BID AVA


   Last Admin: 11/09/18 10:41 Dose:  100 mg


Diphenhydramine HCl (Benadryl Injection -)  25 mg IVPB ONCE PRN


   PRN Reason: DURING PLASMAPHERESIS


Heparin Sodium (Porcine) (Hep-Lock -)  5 ml IVPUSH PRN PRN


   PRN Reason: Heparin


Heparin Sodium (Porcine) (Heparin -)  1,000 unit IVPUSH PRN PRN


   PRN Reason: Heparin


Heparin Sodium (Porcine) (Heparin -)  5,000 unit IVPUSH PRN PRN


   PRN Reason: Heparin


Vancomycin HCl (Vancomycin (Pre-Docked))  1,000 mg in 250 mls @ 166.667 mls/hr 

IVPB Q24H AVA; Protocol


   Last Admin: 11/08/18 21:02 Dose:  166.667 mls/hr


Piperacillin Sod/Tazobactam (Sod 2.25 gm/ Dextrose)  50 mls @ 100 mls/hr IVPB 

Q8H-IV AVA; Protocol


   Last Admin: 11/09/18 09:26 Dose:  100 mls/hr


Fentanyl 500 mcg/ Dextrose  100 mls @ 5 mls/hr IVPB TITR AVA; Protocol


   Last Admin: 11/08/18 20:57 Dose:  Not Given


Heparin Sodium/Dextrose (Heparin Infusion -)  25,000 units in 500 mls @ 20 mls/

hr IVPB TITR AVA; Protocol


   Last Admin: 11/09/18 06:48 Dose:  Not Given


Lactulose (Cephulac (Oral Use))  20 gm PO TID AVA


   Last Admin: 11/09/18 10:41 Dose:  20 gm


Metoprolol Tartrate (Lopressor Injection -)  5 mg IVPUSH Q8H [facility]


   Last Admin: 11/09/18 06:06 Dose:  Not Given


Pantoprazole Sodium (Protonix Iv)  40 mg IVPUSH DAILY [facility]


   Last Admin: 11/09/18 09:26 Dose:  40 mg


Thiamine HCl (Vitamin B1 -)  100 mg PO DAILY [facility]


   Last Admin: 11/09/18 09:26 Dose:  100 mg











- Objective


Vital Signs: 


 Vital Signs











Temperature  99.6 F   11/09/18 10:00


 


Pulse Rate  116 H  11/09/18 12:00


 


Respiratory Rate  18   11/09/18 12:00


 


Blood Pressure  118/70   11/09/18 12:00


 


O2 Sat by Pulse Oximetry (%)  98   11/09/18 08:54











Constitutional: Yes: Pallor, Other (Poorly responsive)


Eyes: Yes: Conjunctiva Clear


HENT: Yes: Atraumatic


Neck: Yes: Trachea Midline


Cardiovascular: Yes: Pulse Irregular, S1, S2


Respiratory: Yes: CTA Bilaterally, Diminished, Mechanically Ventilated


Gastrointestinal: Yes: Normal Bowel Sounds, Soft


Edema: No


Neurological: Yes: Unresponsive


Labs: 


 CBC, BMP





 11/09/18 05:30 





 11/09/18 05:30 





 INR, PTT











INR  1.28  (0.83-1.09)  H  11/09/18  05:30    


 


Fibrinogen  293.0 mg/dL (238-498)  D 11/09/18  05:30    














Problem List





- Problems


(1) Multiple myeloma


Code(s): C90.00 - MULTIPLE MYELOMA NOT HAVING ACHIEVED REMISSION   





(2) HAM (acute kidney injury)


Code(s): N17.9 - ACUTE KIDNEY FAILURE, UNSPECIFIED   





(3) Altered mental status


Code(s): R41.82 - ALTERED MENTAL STATUS, UNSPECIFIED   





(4) Anemia


Code(s): D64.9 - ANEMIA, UNSPECIFIED   





(5) Atrial fibrillation with RVR


Code(s): I48.91 - UNSPECIFIED ATRIAL FIBRILLATION   





(6) Cellulitis


Code(s): L03.90 - CELLULITIS, UNSPECIFIED   


Qualifiers: 


   Site of cellulitis: extremity   Site of cellulitis of extremity: lower 

extremity   Laterality: right   Qualified Code(s): L03.115 - Cellulitis of 

right lower limb   





(7) Hyperlipidemia


Code(s): E78.5 - HYPERLIPIDEMIA, UNSPECIFIED   


Qualifiers: 


   Hyperlipidemia type: pure hypercholesterolemia   Qualified Code(s): E78.00 - 

Pure hypercholesterolemia, unspecified; E78.0 - Pure hypercholesterolemia   





(8) Sepsis


Code(s): A41.9 - SEPSIS, UNSPECIFIED ORGANISM   





(9) Hypercalcemia


Code(s): E83.52 - HYPERCALCEMIA   





Assessment/Plan





This is a 78 year old gentleman with hx of CKD, Afib on A/C, CAD, CKD, 

Hypertension, Hyperlipidemia, PVD who presented with AMS/Confusion and found to 

have HAM. 


The patient has IgG Myeloma, getting plasmapheresis. 


Serum Calcium in much better range. 


Renal functions slowly improving. 





The patient's mental status remains essentially unchanged, and not easily 

explained. ? Anoxic encephalopathy





Discussed with Dr. Cuellar. 





Concur with the current management. 





Will follow with you. 





Thank you.  GREG Pérez MD

## 2018-11-09 NOTE — PN
Progress Note, Physician


Chief Complaint: 





INTUBATED SEDATED


EVENTS AND NOTES REVIEWED





- Current Medication List


Current Medications: 


Active Medications





Acetaminophen (Tylenol Suppository -)  650 mg [place] Q6H PRN


   PRN Reason: FEVER


   Last Admin: 11/05/18 18:10 Dose:  650 mg


Heparin Sodium (Porcine) (Hep-Lock -)  5 ml IVPUSH PRN PRN


   PRN Reason: Heparin


Heparin Sodium (Porcine) (Heparin -)  1,000 unit IVPUSH PRN PRN


   PRN Reason: Heparin


Heparin Sodium (Porcine) (Heparin -)  5,000 unit IVPUSH PRN PRN


   PRN Reason: Heparin


Vancomycin HCl (Vancomycin (Pre-Docked))  1,000 mg in 250 mls @ 166.667 mls/hr 

IVPB Q24H AVA; Protocol


   Last Admin: 11/08/18 21:02 Dose:  166.667 mls/hr


Piperacillin Sod/Tazobactam (Sod 2.25 gm/ Dextrose)  50 mls @ 100 mls/hr IVPB 

Q8H-IV AVA; Protocol


   Last Admin: 11/09/18 01:47 Dose:  100 mls/hr


Fentanyl 500 mcg/ Dextrose  100 mls @ 5 mls/hr IVPB TITR AVA; Protocol


   Last Admin: 11/08/18 20:57 Dose:  Not Given


Heparin Sodium/Dextrose (Heparin Infusion -)  25,000 units in 500 mls @ 20 mls/

hr IVPB TITR AVA; Protocol


   Last Admin: 11/09/18 06:48 Dose:  Not Given


Sodium Phosphate 15 mm/ Sodium (Chloride)  255 mls @ 62.5 mls/hr IVPB ONCE ONE


   Stop: 11/09/18 11:12


Metoprolol Tartrate (Lopressor Injection -)  5 mg IVPUSH Q8H [facility]


   Last Admin: 11/09/18 06:06 Dose:  Not Given


Pantoprazole Sodium (Protonix Iv)  40 mg IVPUSH DAILY [facility]


   Last Admin: 11/08/18 09:01 Dose:  40 mg


Thiamine HCl (Vitamin B1 -)  100 mg PO DAILY [facility]


   Last Admin: 11/08/18 09:01 Dose:  100 mg











- Objective


Vital Signs: 


 Vital Signs











Temperature  99.6 F   11/09/18 02:00


 


Pulse Rate  109 H  11/09/18 06:06


 


Respiratory Rate  18   11/09/18 06:39


 


Blood Pressure  96/56 L  11/09/18 06:06


 


O2 Sat by Pulse Oximetry (%)  100   11/08/18 20:33











Constitutional: Yes: Other


Cardiovascular: Yes: Tachycardia


Respiratory: Yes: Mechanically Ventilated


Gastrointestinal: Yes: Soft


Genitourinary: Yes: Garces Present


Musculoskeletal: Yes: Muscle Weakness


Extremities: Yes: Other


Edema: Yes


Integumentary: Yes: WNL


Wound/Incision: Yes: Dressing Dry and Intact


Neurological: Yes: Pre-Existing Deficit


...Motor Strength: LLE, RLE


Psychiatric: Yes: Other


Labs: 


 CBC, BMP





 11/09/18 05:30 





 11/09/18 05:30 





 INR, PTT











INR  1.28  (0.83-1.09)  H  11/09/18  05:30    


 


Fibrinogen  134.0 mg/dL (238-498)  L D 11/07/18  16:20    














Problem List





- Problems


(1) HAM (acute kidney injury)


Code(s): N17.9 - ACUTE KIDNEY FAILURE, UNSPECIFIED   





(2) Atrial fibrillation with RVR


Code(s): I48.91 - UNSPECIFIED ATRIAL FIBRILLATION   





(3) Hyperlipidemia


Code(s): E78.5 - HYPERLIPIDEMIA, UNSPECIFIED   


Qualifiers: 


   Hyperlipidemia type: pure hypercholesterolemia   Qualified Code(s): E78.00 - 

Pure hypercholesterolemia, unspecified; E78.0 - Pure hypercholesterolemia   





(4) Hypothermia


Code(s): T68.XXXA - HYPOTHERMIA, INITIAL ENCOUNTER   


Qualifiers: 


   Encounter type: initial encounter   Qualified Code(s): T68.XXXA - Hypothermia

, initial encounter   





(5) Acute-on-chronic kidney injury


Code(s): N17.9 - ACUTE KIDNEY FAILURE, UNSPECIFIED; N18.9 - CHRONIC KIDNEY 

DISEASE, UNSPECIFIED   


Qualifiers: 


   Acute renal failure type: unspecified   Chronic kidney disease stage: 

unspecified stage   Qualified Code(s): N17.9 - Acute kidney failure, unspecified

; N18.9 - Chronic kidney disease, unspecified   





(6) Generalized weakness


Code(s): R53.1 - WEAKNESS   





(7) History of ETOH abuse


Code(s): Z87.898 - PERSONAL HISTORY OF OTHER SPECIFIED CONDITIONS   





(8) Hypercalcemia


Code(s): E83.52 - HYPERCALCEMIA   





(9) Hypertrophic cardiomyopathy


Code(s): I42.2 - OTHER HYPERTROPHIC CARDIOMYOPATHY   





(10) Toxic metabolic encephalopathy


Code(s): G92 - TOXIC ENCEPHALOPATHY   





(11) Sepsis


Code(s): A41.9 - SEPSIS, UNSPECIFIED ORGANISM   





Assessment/Plan


INTUBATED AND SEDATED


WEAN OFF AS TOLERATED


TOXIC METABOLIC ENCEPHALOPATHY


NEURO AND CARDIO EVAL


HEPARIN IV FOR AC AFIB


SWALLOW EVAL WITH DYSPHAGIA PRECAUTIONS


MONITOR RENAL FUNCTION


STILL WEAK/AT FALL RISKS


NEURO CHECKS


PT/SNF WHEN EATING AND MORE MEDICALLY STABLE


FREQUENT TURNING

## 2018-11-09 NOTE — PN
Progress Note, Physician


History of Present Illness: 


Poorly responsive off sedation on vent, underwent apheresis for possible 

hyperviscosity syndrome, rapid afib resumed on lopressor and heparin gtt.





- Current Medication List


Current Medications: 


Active Medications





Acetaminophen (Tylenol Suppository -)  650 mg NC Q6H PRN


   PRN Reason: FEVER


   Last Admin: 11/05/18 18:10 Dose:  650 mg


Allopurinol (Zyloprim -)  100 mg PO BID AVA


   Last Admin: 11/09/18 10:41 Dose:  100 mg


Diphenhydramine HCl (Benadryl Injection -)  25 mg IVPB ONCE PRN


   PRN Reason: DURING PLASMAPHERESIS


Heparin Sodium (Porcine) (Hep-Lock -)  5 ml IVPUSH PRN PRN


   PRN Reason: Heparin


Heparin Sodium (Porcine) (Heparin -)  1,000 unit IVPUSH PRN PRN


   PRN Reason: Heparin


Heparin Sodium (Porcine) (Heparin -)  5,000 unit IVPUSH PRN PRN


   PRN Reason: Heparin


Vancomycin HCl (Vancomycin (Pre-Docked))  1,000 mg in 250 mls @ 166.667 mls/hr 

IVPB Q24H AVA; Protocol


   Last Admin: 11/08/18 21:02 Dose:  166.667 mls/hr


Piperacillin Sod/Tazobactam (Sod 2.25 gm/ Dextrose)  50 mls @ 100 mls/hr IVPB 

Q8H-IV AVA; Protocol


   Last Admin: 11/09/18 09:26 Dose:  100 mls/hr


Fentanyl 500 mcg/ Dextrose  100 mls @ 5 mls/hr IVPB TITR AVA; Protocol


   Last Admin: 11/09/18 14:04 Dose:  50 mcg/hr, 10 mls/hr


Heparin Sodium/Dextrose (Heparin Infusion -)  25,000 units in 500 mls @ 20 mls/

hr IVPB TITR AVA; Protocol


   Last Admin: 11/09/18 06:48 Dose:  Not Given


Lactulose (Cephulac (Oral Use))  20 gm PO TID AVA


   Last Admin: 11/09/18 13:37 Dose:  20 gm


Metoprolol Tartrate (Lopressor -)  25 mg PO BID AVA


   Last Admin: 11/09/18 13:37 Dose:  25 mg


Metoprolol Tartrate (Lopressor Injection -)  5 mg IVPUSH Q8H PRN


   PRN Reason: TACHYCARDIA


Pantoprazole Sodium (Protonix Iv)  40 mg IVPUSH DAILY [facility]


   Last Admin: 11/09/18 09:26 Dose:  40 mg


Thiamine HCl (Vitamin B1 -)  100 mg PO DAILY [facility]


   Last Admin: 11/09/18 09:26 Dose:  100 mg











- Objective


Vital Signs: 


 Vital Signs











Temperature  99.2 F   11/09/18 14:00


 


Pulse Rate  108 H  11/09/18 14:00


 


Respiratory Rate  18   11/09/18 14:00


 


Blood Pressure  119/82   11/09/18 14:00


 


O2 Sat by Pulse Oximetry (%)  98   11/09/18 08:54











Cardiovascular: Yes: Tachycardia, Pulse Irregular, Murmur (2/6 SM)


Respiratory: Yes: Intubated, Mechanically Ventilated, Rhonchi


Gastrointestinal: Yes: Normal Bowel Sounds, Soft


Edema: No


Labs: 


 CBC, BMP





 11/09/18 05:30 





 11/09/18 05:30 





 INR, PTT











INR  1.28  (0.83-1.09)  H  11/09/18  05:30    


 


Fibrinogen  293.0 mg/dL (238-498)  D 11/09/18  05:30    














- ....Imaging


Chest X-ray: Report Reviewed (Left base infiltrate)


EKG: Report Reviewed (Tele: Rate-controlled afib)





Problem List





- Problems


(1) Atrial fibrillation with RVR


Code(s): I48.91 - UNSPECIFIED ATRIAL FIBRILLATION   





(2) Acute-on-chronic kidney injury


Code(s): N17.9 - ACUTE KIDNEY FAILURE, UNSPECIFIED; N18.9 - CHRONIC KIDNEY 

DISEASE, UNSPECIFIED   


Qualifiers: 


   Acute renal failure type: unspecified   Chronic kidney disease stage: 

unspecified stage   Qualified Code(s): N17.9 - Acute kidney failure, unspecified

; N18.9 - Chronic kidney disease, unspecified   





(3) Demand ischemia


Code(s): I24.8 - OTHER FORMS OF ACUTE ISCHEMIC HEART DISEASE   





(4) Hypercalcemia


Code(s): E83.52 - HYPERCALCEMIA   





(5) Nonadherence to medication


Code(s): Z91.14 - PATIENT'S OTHER NONCOMPLIANCE WITH MEDICATION REGIMEN   





(6) Paraproteinemia


Code(s): D89.2 - HYPERGAMMAGLOBULINEMIA, UNSPECIFIED   





(7) Anticoagulant long-term use


Code(s): Z79.01 - LONG TERM (CURRENT) USE OF ANTICOAGULANTS   





(8) Chronic thromboembolic disease


Code(s): I74.9 - EMBOLISM AND THROMBOSIS OF UNSPECIFIED ARTERY   





(9) Coronary artery disease


Code(s): I25.10 - ATHSCL HEART DISEASE OF NATIVE CORONARY ARTERY W/O ANG PCTRS 

  


Qualifiers: 


   Coronary Disease-Associated Artery/Lesion type: native artery   Native vs. 

transplanted heart: native heart   Associated angina: without angina   

Qualified Code(s): I25.10 - Atherosclerotic heart disease of native coronary 

artery without angina pectoris   





(10) Diastolic dysfunction without heart failure


Code(s): I51.9 - HEART DISEASE, UNSPECIFIED   





(11) HTN (hypertension)


Code(s): I10 - ESSENTIAL (PRIMARY) HYPERTENSION   


Qualifiers: 


   Hypertension type: essential hypertension   Qualified Code(s): I10 - 

Essential (primary) hypertension   





(12) Hypertrophic cardiomyopathy


Code(s): I42.2 - OTHER HYPERTROPHIC CARDIOMYOPATHY   





(13) Hyperlipidemia


Code(s): E78.5 - HYPERLIPIDEMIA, UNSPECIFIED   


Qualifiers: 


   Hyperlipidemia type: pure hypercholesterolemia   Qualified Code(s): E78.00 - 

Pure hypercholesterolemia, unspecified; E78.0 - Pure hypercholesterolemia   





(14) Multiple myeloma


Code(s): C90.00 - MULTIPLE MYELOMA NOT HAVING ACHIEVED REMISSION   


Qualifiers: 


   Multiple myeloma remission status: not in remission   Qualified Code(s): 

C90.00 - Multiple myeloma not having achieved remission   





Assessment/Plan


R&LHc at [facility] 1/11/2017 showing nonobstructive CAD, severe LV apical 

hypertrophic cardiomyopathy, mildly elevated right sided pressures, Mynx 

deployed right CFA access site. Study is consistent with apical hypertrophy (

spade-like) variant of hypertrophic cardiomyopathy, planned for optimal medical 

therapy. 


Echocardiogram: 07/11/2018 Mod cLVH, severe DYANA, mod TR RVSP 50-60 mmHg, mod-

severe MR


Echocardiogram: 10/16/2018 Normal LV size with hyperdynamic LVEF 75%, mild BSH, 

normal RV size and fxn, severe LAE, mod-severe MR, mod TR





1. Acute hypoxic respiratory failure, susoected asoiration pneumonia other 

differential diagnosis includes possible PTE


2. Toxic metabolic encephelopathy, possible hyperviscosity syndrome, sepsis


2. 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 


3. Hypercalcemia, paraproteinemia-> confirmed multiple myeloma, hyperviscosity 

syndrome


4. History of bilateral SFA occlusion post thrombectomy, referable to embolic 

disease related to persistent atrial fibrillation with [id number] score of 6, 

history of poor compliance with anticoagulation and medical F/U


5. Non obstructive CAD on R&LHc coronary angiography with demand ischemia


6. 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 

subendocardial ischemia, compensated/euvolemic


7. Persistent atrial fibrillation with RVR [id number] score of 6 on heparin gtt


8. Labile HTN


9. Poor compliance with medical therapy administration and medical F/U


10. Tobacco abuse


11. ETOH dependence





PLAN:





1. Heparin gtt for now while off Xarelto 15 qd (renal dosing)


2. Resume Lopressor 25 bid as hemodynamics tolerate


3. Plasmapharesis per hematology input


4. s/p Zometa, monitor calcium level, ruled out obstructive uropathy


5. Empiric abx course pending C&S


6. Enteral feeds, lactulose for hyperammonemia, monitor ammonia levels


7. Ventilator management

## 2018-11-10 LAB
ACANTHOCYTES BLD QL SMEAR: (no result)
ALBUMIN SERPL-MCNC: 3 G/DL (ref 3.4–5)
ALP SERPL-CCNC: 116 U/L (ref 45–117)
ALT SERPL-CCNC: 37 U/L (ref 13–61)
ANION GAP SERPL CALC-SCNC: 6 MMOL/L (ref 8–16)
ANISOCYTOSIS BLD QL: (no result)
ARTERIAL BLOOD GAS PCO2: 39.2 MMHG (ref 35–45)
ARTERIAL PATENCY WRIST A: POSITIVE
AST SERPL-CCNC: 51 U/L (ref 15–37)
BASE EXCESS BLDA CALC-SCNC: -7.8 MEQ/L (ref -2–2)
BASOPHILS # BLD: 0.4 % (ref 0–2)
BILIRUB SERPL-MCNC: 0.2 MG/DL (ref 0.2–1)
BUN SERPL-MCNC: 43 MG/DL (ref 7–18)
CALCIUM SERPL-MCNC: 6.6 MG/DL (ref 8.5–10.1)
CHLORIDE SERPL-SCNC: 115 MMOL/L (ref 98–107)
CO2 SERPL-SCNC: 19 MMOL/L (ref 21–32)
CREAT SERPL-MCNC: 2.1 MG/DL (ref 0.55–1.3)
DEPRECATED RDW RBC AUTO: 16.3 % (ref 11.9–15.9)
EOSINOPHIL # BLD: 1.9 % (ref 0–4.5)
GLUCOSE SERPL-MCNC: 112 MG/DL (ref 74–106)
HCT VFR BLD CALC: 24.3 % (ref 35.4–49)
HGB BLD-MCNC: 7.9 GM/DL (ref 11.7–16.9)
LYMPHOCYTES # BLD: 17.5 % (ref 8–40)
MAGNESIUM SERPL-MCNC: 2 MG/DL (ref 1.8–2.4)
MCH RBC QN AUTO: 29.4 PG (ref 25.7–33.7)
MCHC RBC AUTO-ENTMCNC: 32.7 G/DL (ref 32–35.9)
MCV RBC: 89.9 FL (ref 80–96)
MONOCYTES # BLD AUTO: 2.9 % (ref 3.8–10.2)
NEUTROPHILS # BLD: 77.3 % (ref 42.8–82.8)
PHOSPHATE SERPL-MCNC: 2.7 MG/DL (ref 2.5–4.9)
PLATELET # BLD AUTO: 118 K/MM3 (ref 134–434)
PLATELET BLD QL SMEAR: (no result)
PMV BLD: 9.5 FL (ref 7.5–11.1)
PO2 BLDA: 75.3 MMHG (ref 70–100)
POTASSIUM SERPLBLD-SCNC: 4.2 MMOL/L (ref 3.5–5.1)
PROT SERPL-MCNC: 7.2 G/DL (ref 6.4–8.2)
PROT UR-MCNC: 39.3 MG/DL
RBC # BLD AUTO: 2.7 M/MM3 (ref 4–5.6)
SAO2 % BLDA: 94.1 % (ref 90–98.9)
SODIUM SERPL-SCNC: 140 MMOL/L (ref 136–145)
URATE SERPL-SCNC: 6 MG/DL (ref 2.6–7.2)
WBC # BLD AUTO: 5 K/MM3 (ref 4–10)

## 2018-11-10 RX ADMIN — PIPERACILLIN SODIUM AND TAZOBACTAM SODIUM SCH MLS/HR: .25; 2 INJECTION, POWDER, LYOPHILIZED, FOR SOLUTION INTRAVENOUS at 18:09

## 2018-11-10 RX ADMIN — HEPARIN SODIUM SCH MLS/HR: 5000 INJECTION, SOLUTION INTRAVENOUS at 04:07

## 2018-11-10 RX ADMIN — LACTULOSE SCH GM: 20 SOLUTION ORAL at 22:31

## 2018-11-10 RX ADMIN — DEXTROSE MONOHYDRATE SCH MLS/HR: 50 INJECTION, SOLUTION INTRAVENOUS at 13:10

## 2018-11-10 RX ADMIN — VANCOMYCIN HYDROCHLORIDE SCH MLS/HR: 1 INJECTION, POWDER, LYOPHILIZED, FOR SOLUTION INTRAVENOUS at 19:52

## 2018-11-10 RX ADMIN — Medication SCH MG: at 09:09

## 2018-11-10 RX ADMIN — METOPROLOL TARTRATE SCH MG: 25 TABLET, FILM COATED ORAL at 09:09

## 2018-11-10 RX ADMIN — PIPERACILLIN SODIUM AND TAZOBACTAM SODIUM SCH MLS/HR: .25; 2 INJECTION, POWDER, LYOPHILIZED, FOR SOLUTION INTRAVENOUS at 09:09

## 2018-11-10 RX ADMIN — METOPROLOL TARTRATE SCH MG: 25 TABLET, FILM COATED ORAL at 22:31

## 2018-11-10 RX ADMIN — PANTOPRAZOLE SODIUM SCH MG: 40 INJECTION, POWDER, FOR SOLUTION INTRAVENOUS at 09:09

## 2018-11-10 RX ADMIN — DEXAMETHASONE SODIUM PHOSPHATE SCH MG: 10 INJECTION, SOLUTION INTRAMUSCULAR; INTRAVENOUS at 22:31

## 2018-11-10 RX ADMIN — ALLOPURINOL SCH MG: 100 TABLET ORAL at 09:09

## 2018-11-10 RX ADMIN — PIPERACILLIN SODIUM AND TAZOBACTAM SODIUM SCH MLS/HR: .25; 2 INJECTION, POWDER, LYOPHILIZED, FOR SOLUTION INTRAVENOUS at 02:52

## 2018-11-10 RX ADMIN — ALLOPURINOL SCH MG: 100 TABLET ORAL at 22:32

## 2018-11-10 RX ADMIN — DEXTROSE MONOHYDRATE SCH MLS/HR: 50 INJECTION, SOLUTION INTRAVENOUS at 22:41

## 2018-11-10 RX ADMIN — LACTULOSE SCH GM: 20 SOLUTION ORAL at 05:27

## 2018-11-10 RX ADMIN — DEXAMETHASONE SODIUM PHOSPHATE SCH MG: 10 INJECTION, SOLUTION INTRAMUSCULAR; INTRAVENOUS at 13:05

## 2018-11-10 RX ADMIN — LACTULOSE SCH GM: 20 SOLUTION ORAL at 14:00

## 2018-11-10 NOTE — PN
Physical Exam: 


SUBJECTIVE: Patient seen and examined at bedside. He is intubated and not 

sedated. Despite no sedation he is barely responsive. He does not respond to 

sound or sternal rub. He does respond to R sided shin pressure. 





Spoke with his sister at length yesterday. Will continue to speak with her 

regarding goals of care. 





Sister iHen Argueta: [phone] 


- She is in [place]


- She says they use to speak on a monthly basis. Last time they spoke was in 

the summer time. 











OBJECTIVE:





 Vital Signs











 Period  Temp  Pulse  Resp  BP Sys/Varghese  Pulse Ox


 


 Last 24 Hr  98.1 F-99.9 F    13-21  /59-82  98











GENERAL: The patient is not sedated, not awake, not alert, and not oriented.


HEAD: Normal with no signs of trauma.


EYES: PERRL, sclera anicteric, conjunctiva clear. 


ENT: Ears normal, nares patent, moist mucous membranes


NECK: Trachea midline. 


LUNGS: bilateral rhonchi


HEART: Regular rate and rhythm, S1, S2 without murmur, rub or gallop.


ABDOMEN: Soft, nondistended.


EXTREMITIES: 2+ pulses, warm, well-perfused, no edema, scattered ulcers. 


NEUROLOGICAL: Cannot assess


SKIN: Warm, dry, normal turgor, scattered ulcers on the legs











 Laboratory Results - last 24 hr











  11/05/18 11/09/18 11/09/18





  10:32 05:30 09:11


 


WBC   


 


RBC   


 


Hgb   


 


Hct   


 


MCV   


 


MCH   


 


MCHC   


 


RDW   


 


Plt Count   


 


MPV   


 


Absolute Neuts (auto)   


 


Total Counted   


 


Neutrophils %   


 


Neutrophils % (Manual)   74.2 


 


Band Neutrophils %   2.1 


 


Lymphocytes %   


 


Lymphocytes % (Manual)   16.5  D 


 


Monocytes %   


 


Monocytes % (Manual)   3 L 


 


Eosinophils %   


 


Eosinophils % (Manual)   3.1 


 


Basophils %   


 


Basophils % (Manual)   0.0 


 


Myelocytes % (Man)   0 


 


Promyelocytes % (Man)   0 


 


Blast Cells % (Manual)   0 


 


Nucleated RBC %   


 


Metamyelocytes   1 


 


Hypochromia   0 


 


Platelet Estimate   Normal 


 


Platelet Comment   


 


Polychromasia   1+ 


 


Poikilocytosis   0 


 


Anisocytosis   1+ 


 


Microcytosis   1+ 


 


Macrocytosis   0 


 


Acanthocytes (Spur)   


 


Fragmented RBCs   


 


PTT (Actin FS)   


 


Puncture Site   


 


ABG pH   


 


ABG pCO2 at Pt Temp   


 


ABG pO2 at Pt Temp   


 


ABG HCO3   


 


ABG O2 Sat (Measured)   


 


ABG O2 Content   


 


ABG Base Excess   


 


Chacho Test   


 


O2 Delivery Device   


 


Oxygen Flow Rate   


 


Vent Rate   


 


Mechanical Rate   


 


PEEP   


 


Pressure Support Vent   


 


Sodium   


 


Potassium   


 


Chloride   


 


Carbon Dioxide   


 


Anion Gap   


 


BUN   


 


Creatinine   


 


Creat Clearance w eGFR   


 


Random Glucose   


 


Uric Acid   


 


Calcium   


 


Phosphorus   


 


Magnesium   


 


Total Bilirubin   


 


AST   


 


ALT   


 


Alkaline Phosphatase   


 


Total Protein   


 


Albumin   


 


Alpha-1-Globulins (%)  1.5  


 


Alpha-2-Globulins (%)  5.5  


 


Beta Globulins (%)  11.4  


 


Gamma Globulins (%)  64.9  


 


M-[place] %  Comment:  


 


Urine Total Protein  39.3  


 


Urine PEP Interpret  16.7  


 


Ref Test Comments    


 


Blood Type    B POSITIVE


 


Antibody Screen    Negative














  11/10/18 11/10/18 11/10/18





  05:30 05:30 05:30


 


WBC   5.0 


 


RBC   2.70 L 


 


Hgb   7.9 L 


 


Hct   24.3 L 


 


MCV   89.9 


 


MCH   29.4 


 


MCHC   32.7 


 


RDW   16.3 H 


 


Plt Count   118 L 


 


MPV   9.5 


 


Absolute Neuts (auto)   3.9 


 


Total Counted   100 


 


Neutrophils %   77.3 


 


Neutrophils % (Manual)   70.0 


 


Band Neutrophils %   6.0 


 


Lymphocytes %   17.5  D 


 


Lymphocytes % (Manual)   20.0  D 


 


Monocytes %   2.9 L 


 


Monocytes % (Manual)   


 


Eosinophils %   1.9 


 


Eosinophils % (Manual)   


 


Basophils %   0.4 


 


Basophils % (Manual)   


 


Myelocytes % (Man)   


 


Promyelocytes % (Man)   


 


Blast Cells % (Manual)   


 


Nucleated RBC %   7 H 


 


Metamyelocytes   1 


 


Hypochromia   1+ 


 


Platelet Estimate   Slt decrease 


 


Platelet Comment   No clotting detected 


 


Polychromasia   2+ 


 


Poikilocytosis   


 


Anisocytosis   2+ 


 


Microcytosis   


 


Macrocytosis   


 


Acanthocytes (Spur)   1+ 


 


Fragmented RBCs   Occasional 


 


PTT (Actin FS)  81.9 H  


 


Puncture Site   


 


ABG pH   


 


ABG pCO2 at Pt Temp   


 


ABG pO2 at Pt Temp   


 


ABG HCO3   


 


ABG O2 Sat (Measured)   


 


ABG O2 Content   


 


ABG Base Excess   


 


Chacho Test   


 


O2 Delivery Device   


 


Oxygen Flow Rate   


 


Vent Rate   


 


Mechanical Rate   


 


PEEP   


 


Pressure Support Vent   


 


Sodium    140


 


Potassium    4.2


 


Chloride    115 H


 


Carbon Dioxide    19 L


 


Anion Gap    6 L


 


BUN    43 H


 


Creatinine    2.1 H


 


Creat Clearance w eGFR    30.62


 


Random Glucose    112 H


 


Uric Acid    6.0


 


Calcium    6.6 L*


 


Phosphorus    2.7


 


Magnesium    2.0


 


Total Bilirubin    0.2


 


AST    51 H


 


ALT    37


 


Alkaline Phosphatase    116


 


Total Protein    7.2


 


Albumin    3.0 L


 


Alpha-1-Globulins (%)   


 


Alpha-2-Globulins (%)   


 


Beta Globulins (%)   


 


Gamma Globulins (%)   


 


M-[place] %   


 


Urine Total Protein   


 


Urine PEP Interpret   


 


Ref Test Comments   


 


Blood Type   


 


Antibody Screen   














  11/10/18





  06:05


 


WBC 


 


RBC 


 


Hgb 


 


Hct 


 


MCV 


 


MCH 


 


MCHC 


 


RDW 


 


Plt Count 


 


MPV 


 


Absolute Neuts (auto) 


 


Total Counted 


 


Neutrophils % 


 


Neutrophils % (Manual) 


 


Band Neutrophils % 


 


Lymphocytes % 


 


Lymphocytes % (Manual) 


 


Monocytes % 


 


Monocytes % (Manual) 


 


Eosinophils % 


 


Eosinophils % (Manual) 


 


Basophils % 


 


Basophils % (Manual) 


 


Myelocytes % (Man) 


 


Promyelocytes % (Man) 


 


Blast Cells % (Manual) 


 


Nucleated RBC % 


 


Metamyelocytes 


 


Hypochromia 


 


Platelet Estimate 


 


Platelet Comment 


 


Polychromasia 


 


Poikilocytosis 


 


Anisocytosis 


 


Microcytosis 


 


Macrocytosis 


 


Acanthocytes (Spur) 


 


Fragmented RBCs 


 


PTT (Actin FS) 


 


Puncture Site  Right radial


 


ABG pH  7.28 L


 


ABG pCO2 at Pt Temp  39.2


 


ABG pO2 at Pt Temp  75.3  D


 


ABG HCO3  17.8 L


 


ABG O2 Sat (Measured)  94.1


 


ABG O2 Content  10.8 L


 


ABG Base Excess  -7.8 L


 


Chacho Test  Positive


 


O2 Delivery Device  Mech vent


 


Oxygen Flow Rate  30%


 


Vent Rate  14


 


Mechanical Rate  Yes


 


PEEP  5.0


 


Pressure Support Vent  500


 


Sodium 


 


Potassium 


 


Chloride 


 


Carbon Dioxide 


 


Anion Gap 


 


BUN 


 


Creatinine 


 


Creat Clearance w eGFR 


 


Random Glucose 


 


Uric Acid 


 


Calcium 


 


Phosphorus 


 


Magnesium 


 


Total Bilirubin 


 


AST 


 


ALT 


 


Alkaline Phosphatase 


 


Total Protein 


 


Albumin 


 


Alpha-1-Globulins (%) 


 


Alpha-2-Globulins (%) 


 


Beta Globulins (%) 


 


Gamma Globulins (%) 


 


M-[place] % 


 


Urine Total Protein 


 


Urine PEP Interpret 


 


Ref Test Comments 


 


Blood Type 


 


Antibody Screen 








Active Medications











Generic Name Dose Route Start Last Admin





  Trade Name Korin  PRN Reason Stop Dose Admin


 


Acetaminophen  650 mg  11/01/18 05:44  11/05/18 18:10





  Tylenol Suppository -  GA   650 mg





  Q6H PRN   Administration





  FEVER   





     





     





     


 


Allopurinol  100 mg  11/09/18 10:00  11/10/18 09:09





  Zyloprim -  PO   100 mg





  BID AVA   Administration





     





     





     





     


 


Diphenhydramine HCl  25 mg  11/09/18 12:00  





  Benadryl Injection -  IVPB   





  ONCE PRN   





  DURING PLASMAPHERESIS   





     





     





     


 


Heparin Sodium (Porcine)  5 ml  11/07/18 15:26  





  Hep-Lock -  IVPUSH   





  PRN PRN   





  Heparin   





     





     





     


 


Heparin Sodium (Porcine)  1,000 unit  11/08/18 06:38  





  Heparin -  IVPUSH   





  PRN PRN   





  Heparin   





     





     





     


 


Heparin Sodium (Porcine)  5,000 unit  11/08/18 06:38  





  Heparin -  IVPUSH   





  PRN PRN   





  Heparin   





     





     





     


 


Vancomycin HCl  1,000 mg in 250 mls @ 166.667 mls/hr  10/31/18 20:00  11/09/18 

22:00





  Vancomycin (Pre-Docked)  IVPB   166.667 mls/hr





  Q24H AVA   Administration





     





     





  Protocol   





     


 


Piperacillin Sod/Tazobactam  50 mls @ 100 mls/hr  11/06/18 11:00  11/10/18 09:09





  Sod 2.25 gm/ Dextrose  IVPB   100 mls/hr





  Q8H-IV AVA   Administration





     





     





  Protocol   





     


 


Fentanyl 500 mcg/ Dextrose  100 mls @ 5 mls/hr  11/07/18 11:45  11/09/18 14:04





  IVPB   50 mcg/hr





  TITR AVA   10 mls/hr





     Administration





     





  Protocol   





  25 MCG/HR   


 


Heparin Sodium/Dextrose  25,000 units in 500 mls @ 20 mls/hr  11/08/18 06:45  11

/10/18 04:07





  Heparin Infusion -  IVPB   650 units/hr





  TITR AVA   13 mls/hr





     Administration





     





  Protocol   





  1,000 UNITS/HR   


 


Lactulose  20 gm  11/09/18 09:38  11/10/18 05:27





  Cephulac (Oral Use)  PO   20 gm





  TID AVA   Administration





     





     





     





     


 


Metoprolol Tartrate  25 mg  11/09/18 13:15  11/10/18 09:09





  Lopressor -  PO   25 mg





  BID AVA   Administration





     





     





     





     


 


Metoprolol Tartrate  5 mg  11/09/18 13:04  





  Lopressor Injection -  IVPUSH   





  Q8H PRN   





  TACHYCARDIA   





     





     





     


 


Pantoprazole Sodium  40 mg  11/07/18 12:00  11/10/18 09:09





  Protonix Iv  IVPUSH   40 mg





  DAILY AVA   Administration





     





     





     





     


 


Thiamine HCl  100 mg  10/30/18 22:12  11/10/18 09:09





  Vitamin B1 -  PO   100 mg





  DAILY AVA   Administration





     





     





     





     











ASSESSMENT/PLAN:





Assessment: During sedation vacation today patient's mental status was 

unimpressive. He was not alert, oriented, or responsive. 





He experienced multiple runs of V-Tach overnight. Consulted Cards who 

recommended re-starting patient on PO Lopressor 25 mg BID. 





Got hold of the patient's Sister Hien Argueta: [phone] to discuss 

goals of care. 


Will discuss his DNR/DNI status. Speak about possible Trach tube. 





Plan:





ID: 


- Rhonchi heard on Lung auscultation


- CXR shows pulmonary infiltrate, possible PNA vs empyema vs paramnemonic 

effusion 


- Abx coverage with zosyn and Vanc


- ID on board





Cardio: 


- Sporadically goes in and out of V-Tach 


- Cards recommends restarting Lopressor 25 mg BID PO


- Afib w RVR: Patient is receiving metoprolol 5 mg PRN


- diastolic dysfunction + hypertrophic cardiomyopathy


- CAD with multiple stents


- Cardio consulted and on board. 





Pulm: 


- Intubated


- No pneumothorax


- increasing Peep to 5


- b/l diffuse rhonchi


- infiltrate on CXR: Abx- Zosyn and Vanc


- No lasix today





Renal: 


- Acute on Chronic kidney injury


- BUN: 43 Cr:2.1. possibly Pre-renal etiology. Unchanged much since yesterday. 


- Renal consulted and on board. 


- Patient received plasmapharesis yesterday.  





Neuro: 


- Patient is not alert or oriented. He responds to painful stimuli. 


- Head CT showed no acute pathology





Heme/Onc: 


- Patient received plasmapharesis yesterday. 


- Patient has hypocalcemia today - Will replete. 


- Paraproteinemia


- Probable Multiple Myeloma, SPEP and UPEP suggestive. 


- Patient fulfills new criteria  for multiple myeloma with free kappa/ free 

lambda light chain  ratio of 432 (>100 is diagnostic of myeloma) 


- Kappa/Lambda ratio > 100 consistent with Multiple myeloma


- M spike elevated elevated at 6.8 previously 4.7 








Endo: 


- Hypocalcemia


- Glucose wnl


- Parathyroid hormone WNL


- PTH-borderline low appropriate given hypercalcemia, PTHrP low





F/E/N: 


F: No standing fluids due to fluid overload in lungs


E: Will replete lytes PRN


N: tube feeds. 





Code Status: Full Code 


Dispo: Patient will continue to receive ICU level care











Visit type





- Emergency Visit


Emergency Visit: Yes


ED Registration Date: 10/30/18


Care time: The patient presented to the Emergency Department on the above date 

and was hospitalized for further evaluation of their emergent condition.





- New Patient


This patient is new to me today: No





- Critical Care


Critical Care patient: Yes


Total Critical Care Time (in minutes): 36


Critical Care Statement: The care of this patient involved high complexity 

decision making to prevent further life threatening deterioration of the patient

's condition and/or to evaluate & treat vital organ system(s) failure or risk 

of failure.

## 2018-11-10 NOTE — PN
Progress Note, Physician


Chief Complaint: 





INTUBATED STILL


IN ICU


NO NEW EVENTS OVERNIGHT





- Current Medication List


Current Medications: 


Active Medications





Acetaminophen (Tylenol Suppository -)  650 mg HI Q6H PRN


   PRN Reason: FEVER


   Last Admin: 11/05/18 18:10 Dose:  650 mg


Allopurinol (Zyloprim -)  100 mg PO BID [facility]


   Last Admin: 11/09/18 22:00 Dose:  100 mg


Diphenhydramine HCl (Benadryl Injection -)  25 mg IVPB ONCE PRN


   PRN Reason: DURING PLASMAPHERESIS


Heparin Sodium (Porcine) (Hep-Lock -)  5 ml IVPUSH PRN PRN


   PRN Reason: Heparin


Heparin Sodium (Porcine) (Heparin -)  1,000 unit IVPUSH PRN PRN


   PRN Reason: Heparin


Heparin Sodium (Porcine) (Heparin -)  5,000 unit IVPUSH PRN PRN


   PRN Reason: Heparin


Vancomycin HCl (Vancomycin (Pre-Docked))  1,000 mg in 250 mls @ 166.667 mls/hr 

IVPB Q24H AVA; Protocol


   Last Admin: 11/09/18 22:00 Dose:  166.667 mls/hr


Piperacillin Sod/Tazobactam (Sod 2.25 gm/ Dextrose)  50 mls @ 100 mls/hr IVPB 

Q8H-IV AVA; Protocol


   Last Admin: 11/10/18 02:52 Dose:  100 mls/hr


Fentanyl 500 mcg/ Dextrose  100 mls @ 5 mls/hr IVPB TITR AVA; Protocol


   Last Admin: 11/09/18 14:04 Dose:  50 mcg/hr, 10 mls/hr


Heparin Sodium/Dextrose (Heparin Infusion -)  25,000 units in 500 mls @ 20 mls/

hr IVPB TITR AVA; Protocol


   Last Admin: 11/10/18 04:07 Dose:  650 units/hr, 13 mls/hr


Lactulose (Cephulac (Oral Use))  20 gm PO TID AVA


   Last Admin: 11/10/18 05:27 Dose:  20 gm


Metoprolol Tartrate (Lopressor -)  25 mg PO BID [facility]


   Last Admin: 11/09/18 22:00 Dose:  25 mg


Metoprolol Tartrate (Lopressor Injection -)  5 mg IVPUSH Q8H PRN


   PRN Reason: TACHYCARDIA


Pantoprazole Sodium (Protonix Iv)  40 mg IVPUSH DAILY [facility]


   Last Admin: 11/09/18 09:26 Dose:  40 mg


Thiamine HCl (Vitamin B1 -)  100 mg PO DAILY [facility]


   Last Admin: 11/09/18 09:26 Dose:  100 mg











- Objective


Vital Signs: 


 Vital Signs











Temperature  99.9 F H  11/10/18 06:00


 


Pulse Rate  92 H  11/10/18 08:00


 


Respiratory Rate  16   11/10/18 08:00


 


Blood Pressure  98/60   11/10/18 08:00


 


O2 Sat by Pulse Oximetry (%)  98   11/09/18 20:48











Eyes: Yes: Other


HENT: Yes: WNL


Neck: Yes: WNL


Cardiovascular: Yes: Pulse Irregular


Respiratory: Yes: Diminished, Mechanically Ventilated


Gastrointestinal: Yes: Normal Bowel Sounds, Soft


Genitourinary: Yes: Garces Present


Musculoskeletal: Yes: Muscle Weakness


Extremities: Yes: Other


Edema: Yes


Integumentary: Yes: WNL


Wound/Incision: Yes: Dressing Dry and Intact


Neurological: Yes: Confusion, Pre-Existing Deficit


...Motor Strength: LLE, RLE


Psychiatric: Yes: Other


Labs: 


 CBC, BMP





 11/10/18 05:30 





 11/10/18 05:30 





 INR, PTT











INR  1.28  (0.83-1.09)  H  11/09/18  05:30    


 


Fibrinogen  293.0 mg/dL (238-498)  D 11/09/18  05:30    














Problem List





- Problems


(1) HAM (acute kidney injury)


Code(s): N17.9 - ACUTE KIDNEY FAILURE, UNSPECIFIED   





(2) Atrial fibrillation with RVR


Code(s): I48.91 - UNSPECIFIED ATRIAL FIBRILLATION   





(3) Hyperlipidemia


Code(s): E78.5 - HYPERLIPIDEMIA, UNSPECIFIED   


Qualifiers: 


   Hyperlipidemia type: pure hypercholesterolemia   Qualified Code(s): E78.00 - 

Pure hypercholesterolemia, unspecified; E78.0 - Pure hypercholesterolemia   





(4) Hypothermia


Code(s): T68.XXXA - HYPOTHERMIA, INITIAL ENCOUNTER   


Qualifiers: 


   Encounter type: initial encounter   Qualified Code(s): T68.XXXA - Hypothermia

, initial encounter   





(5) Acute-on-chronic kidney injury


Code(s): N17.9 - ACUTE KIDNEY FAILURE, UNSPECIFIED; N18.9 - CHRONIC KIDNEY 

DISEASE, UNSPECIFIED   


Qualifiers: 


   Acute renal failure type: unspecified   Chronic kidney disease stage: 

unspecified stage   Qualified Code(s): N17.9 - Acute kidney failure, unspecified

; N18.9 - Chronic kidney disease, unspecified   





(6) Generalized weakness


Code(s): R53.1 - WEAKNESS   





(7) History of ETOH abuse


Code(s): Z87.898 - PERSONAL HISTORY OF OTHER SPECIFIED CONDITIONS   





(8) Hypercalcemia


Code(s): E83.52 - HYPERCALCEMIA   





(9) Hypertrophic cardiomyopathy


Code(s): I42.2 - OTHER HYPERTROPHIC CARDIOMYOPATHY   





(10) Toxic metabolic encephalopathy


Code(s): G92 - TOXIC ENCEPHALOPATHY   





(11) Sepsis


Code(s): A41.9 - SEPSIS, UNSPECIFIED ORGANISM   





(12) Respiratory failure


Code(s): J96.90 - RESPIRATORY FAILURE, UNSP, UNSP W HYPOXIA OR HYPERCAPNIA   





Assessment/Plan


INTUBATED AND SEDATED


WEAN OFF AS TOLERATED


TOXIC METABOLIC ENCEPHALOPATHY


NEURO AND CARDIO EVAL


HEPARIN IV FOR AC AFIB


SWALLOW EVAL WITH DYSPHAGIA PRECAUTIONS


MONITOR RENAL FUNCTION


STILL WEAK/AT FALL RISKS


NEURO CHECKS


PT/SNF WHEN EATING AND MORE MEDICALLY STABLE


FREQUENT TURNING

## 2018-11-10 NOTE — PN
Progress Note (short form)





- Note


Progress Note: 








Patient seen and examined 


Remains intubated 


Lethargic, non responsive , with withdrawl to painful stimuli 


  Vital Signs











 Period  Temp  Pulse  Resp  BP Sys/Varghese  Pulse Ox


 


 Last 24 Hr  98.1 F-99.9 F    13-20  /59-82  98














HEENT: PEDRITO, EOM Intact,arcus


Oropharynx: ET tube


Cor: irregular 


Lungs: Clear to P&A


Abd: Soft, Normal bowel sounds, No organomegaly


Ext:No significant edemaheel pads


Skin: No rashes, Integument intact


  CBC, BMP





 11/10/18 05:30 





 11/10/18 05:30 








 Active Medications











Generic Name Dose Route Start Last Admin





  Trade Name Freq  PRN Reason Stop Dose Admin


 


Acetaminophen  650 mg  11/01/18 05:44  11/05/18 18:10





  Tylenol Suppository -  [place]   650 mg





  Q6H PRN   Administration





  FEVER   





     





     





     


 


Allopurinol  100 mg  11/09/18 10:00  11/10/18 09:09





  Zyloprim -  PO   100 mg





  BID AVA   Administration





     





     





     





     


 


Diphenhydramine HCl  25 mg  11/09/18 12:00  





  Benadryl Injection -  IVPB   





  ONCE PRN   





  DURING PLASMAPHERESIS   





     





     





     


 


Heparin Sodium (Porcine)  5 ml  11/07/18 15:26  





  Hep-Lock -  IVPUSH   





  PRN PRN   





  Heparin   





     





     





     


 


Heparin Sodium (Porcine)  1,000 unit  11/08/18 06:38  





  Heparin -  IVPUSH   





  PRN PRN   





  Heparin   





     





     





     


 


Heparin Sodium (Porcine)  5,000 unit  11/08/18 06:38  





  Heparin -  IVPUSH   





  PRN PRN   





  Heparin   





     





     





     


 


Vancomycin HCl  1,000 mg in 250 mls @ 166.667 mls/hr  10/31/18 20:00  11/09/18 

22:00





  Vancomycin (Pre-Docked)  IVPB   166.667 mls/hr





  Q24H AVA   Administration





     





     





  Protocol   





     


 


Piperacillin Sod/Tazobactam  50 mls @ 100 mls/hr  11/06/18 11:00  11/10/18 09:09





  Sod 2.25 gm/ Dextrose  IVPB   100 mls/hr





  Q8H-IV AVA   Administration





     





     





  Protocol   





     


 


Fentanyl 500 mcg/ Dextrose  100 mls @ 5 mls/hr  11/07/18 11:45  11/09/18 14:04





  IVPB   50 mcg/hr





  TITR AVA   10 mls/hr





     Administration





     





  Protocol   





  25 MCG/HR   


 


Heparin Sodium/Dextrose  25,000 units in 500 mls @ 20 mls/hr  11/08/18 06:45  11

/10/18 04:07





  Heparin Infusion -  IVPB   650 units/hr





  TITR AVA   13 mls/hr





     Administration





     





  Protocol   





  1,000 UNITS/HR   


 


Lactulose  20 gm  11/09/18 09:38  11/10/18 05:27





  Cephulac (Oral Use)  PO   20 gm





  TID AVA   Administration





     





     





     





     


 


Metoprolol Tartrate  25 mg  11/09/18 13:15  11/10/18 09:09





  Lopressor -  PO   25 mg





  BID AVA   Administration





     





     





     





     


 


Metoprolol Tartrate  5 mg  11/09/18 13:04  





  Lopressor Injection -  IVPUSH   





  Q8H PRN   





  TACHYCARDIA   





     





     





     


 


Pantoprazole Sodium  40 mg  11/07/18 12:00  11/10/18 09:09





  Protonix Iv  IVPUSH   40 mg





  DAILY AVA   Administration





     





     





     





     


 


Thiamine HCl  100 mg  10/30/18 22:12  11/10/18 09:09





  Vitamin B1 -  PO   100 mg





  DAILY AVA   Administration





     





     





     





     











Impression:





Patient fulfills new criteria  for multiple myeloma with free kappa/ free 

lambda light chain  ratio of 432 (>100 is diagnostic of myeloma) 


Patient has had decrease in paraprotein IgG from > 6700---> 3100 with pheresis 


High free kappa levels may be contributing to renal insufficiency


Unusual  unexplained phenomenon of myeloma and hypercalcemia with elevated 

blood level of ammonia not associated with liver disease. ( Patients ammonia 

level is 91)





Plan 


Continue pheresis -Lowereing of IgG noted. 


IgG is typically monomeric and unusually associated with hyperviscosity. Await 

results


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 


Start dexamethasone 10 mg iv (equivalent to prednisone 60) q day along with PPI


will also recommned acyclovir iv prophylaxis (even though mostly associated 

with bortezomib treatment)


Will begin lactulose for elevated ammonia.

## 2018-11-10 NOTE — PN
Progress Note, Physician


History of Present Illness: 





 Intubated on mechanical ventilation


 Opens eyes to calling name


 Low grade temWBC WNL


 Sputum c/s NLF


 





 


 


 





- Current Medication List


Current Medications: 


Active Medications





Acetaminophen (Tylenol Suppository -)  650 mg IN Q6H PRN


   PRN Reason: FEVER


   Last Admin: 11/05/18 18:10 Dose:  650 mg


Allopurinol (Zyloprim -)  100 mg PO BID AVA


   Last Admin: 11/10/18 09:09 Dose:  100 mg


Diphenhydramine HCl (Benadryl Injection -)  25 mg IVPB ONCE PRN


   PRN Reason: DURING PLASMAPHERESIS


Heparin Sodium (Porcine) (Hep-Lock -)  5 ml IVPUSH PRN PRN


   PRN Reason: Heparin


Heparin Sodium (Porcine) (Heparin -)  1,000 unit IVPUSH PRN PRN


   PRN Reason: Heparin


Heparin Sodium (Porcine) (Heparin -)  5,000 unit IVPUSH PRN PRN


   PRN Reason: Heparin


Vancomycin HCl (Vancomycin (Pre-Docked))  1,000 mg in 250 mls @ 166.667 mls/hr 

IVPB Q24H AVA; Protocol


   Last Admin: 11/09/18 22:00 Dose:  166.667 mls/hr


Piperacillin Sod/Tazobactam (Sod 2.25 gm/ Dextrose)  50 mls @ 100 mls/hr IVPB 

Q8H-IV AVA; Protocol


   Last Admin: 11/10/18 09:09 Dose:  100 mls/hr


Fentanyl 500 mcg/ Dextrose  100 mls @ 5 mls/hr IVPB TITR AVA; Protocol


   Last Admin: 11/09/18 14:04 Dose:  50 mcg/hr, 10 mls/hr


Heparin Sodium/Dextrose (Heparin Infusion -)  25,000 units in 500 mls @ 20 mls/

hr IVPB TITR AVA; Protocol


   Last Admin: 11/10/18 04:07 Dose:  650 units/hr, 13 mls/hr


Lactulose (Cephulac (Oral Use))  20 gm PO TID AVA


   Last Admin: 11/10/18 05:27 Dose:  20 gm


Metoprolol Tartrate (Lopressor -)  25 mg PO BID AVA


   Last Admin: 11/10/18 09:09 Dose:  25 mg


Metoprolol Tartrate (Lopressor Injection -)  5 mg IVPUSH Q8H PRN


   PRN Reason: TACHYCARDIA


Pantoprazole Sodium (Protonix Iv)  40 mg IVPUSH DAILY [facility]


   Last Admin: 11/10/18 09:09 Dose:  40 mg


Thiamine HCl (Vitamin B1 -)  100 mg PO DAILY [facility]


   Last Admin: 11/10/18 09:09 Dose:  100 mg











- Objective


Vital Signs: 


 Vital Signs











Temperature  98.1 F   11/10/18 10:00


 


Pulse Rate  83   11/10/18 10:00


 


Respiratory Rate  14   11/10/18 10:00


 


Blood Pressure  96/60   11/10/18 10:00


 


O2 Sat by Pulse Oximetry (%)  98   11/09/18 20:48











Constitutional: Yes: No Distress


Eyes: Yes: Conjunctiva Clear


Cardiovascular: Yes: Regular Rate and Rhythm, S1, S2


Respiratory: Yes: Mechanically Ventilated


Gastrointestinal: Yes: Normal Bowel Sounds, Soft.  No: Tenderness


Edema: No


Labs: 


 CBC, BMP





 11/10/18 05:30 





 11/10/18 05:30 





 INR, PTT











INR  1.28  (0.83-1.09)  H  11/09/18  05:30    


 


Fibrinogen  293.0 mg/dL (238-498)  D 11/09/18  05:30    














Assessment/Plan


Respiratory failure


 RLL pneumonia


 Toxic metabolic encephalopathy


 Hypercalcemia


 Renal failure


 Myeloma


 Hyperviscosity syndrome


 


  


 Continue broad spectrum  antimicrobial coverage- zosyn


 Ventilatory support


 Apheresis

## 2018-11-10 NOTE — PN
Teaching Attending Note


Name of Resident: Arnav Bernard





ATTENDING PHYSICIAN STATEMENT





I saw and evaluated the patient.


I reviewed the resident's note and discussed the case with the resident.


I agree with the resident's findings and plan as documented.








SUBJECTIVE:


Patient seen and examined in the ICU.  Remains intubated.  Off sedation and 

very poor mental status.  Intermittently grimacing to pain.   


No pressors. 





CXR: no gross change in LLL infiltrate and effusion 





  


 Intake & Output











 11/07/18 11/08/18 11/09/18 11/10/18





 23:59 23:59 23:59 23:59


 


Intake Total 2087 1792 2958 561


 


Output Total 1650 1800 1450 750


 


Balance 437 -8 1508 -189


 


Weight 179 lb 7 oz 178 lb 4 oz 177 lb 1 oz 180 lb 1 oz











 Last Vital Signs











Temp Pulse Resp BP Pulse Ox


 


 98.1 F   83   14   96/60   98 


 


 11/10/18 10:00  11/10/18 10:00  11/10/18 10:00  11/10/18 10:00  11/09/18 20:48








Active Medications





Acetaminophen (Tylenol Suppository -)  650 mg UT Q6H PRN


   PRN Reason: FEVER


   Last Admin: 11/05/18 18:10 Dose:  650 mg


Allopurinol (Zyloprim -)  100 mg PO BID AVA


   Last Admin: 11/10/18 09:09 Dose:  100 mg


Diphenhydramine HCl (Benadryl Injection -)  25 mg IVPB ONCE PRN


   PRN Reason: DURING PLASMAPHERESIS


Heparin Sodium (Porcine) (Hep-Lock -)  5 ml IVPUSH PRN PRN


   PRN Reason: Heparin


Heparin Sodium (Porcine) (Heparin -)  1,000 unit IVPUSH PRN PRN


   PRN Reason: Heparin


Heparin Sodium (Porcine) (Heparin -)  5,000 unit IVPUSH PRN PRN


   PRN Reason: Heparin


Vancomycin HCl (Vancomycin (Pre-Docked))  1,000 mg in 250 mls @ 166.667 mls/hr 

IVPB Q24H AVA; Protocol


   Last Admin: 11/09/18 22:00 Dose:  166.667 mls/hr


Piperacillin Sod/Tazobactam (Sod 2.25 gm/ Dextrose)  50 mls @ 100 mls/hr IVPB 

Q8H-IV AVA; Protocol


   Last Admin: 11/10/18 09:09 Dose:  100 mls/hr


Fentanyl 500 mcg/ Dextrose  100 mls @ 5 mls/hr IVPB TITR AVA; Protocol


   Last Admin: 11/09/18 14:04 Dose:  50 mcg/hr, 10 mls/hr


Heparin Sodium/Dextrose (Heparin Infusion -)  25,000 units in 500 mls @ 20 mls/

hr IVPB TITR AVA; Protocol


   Last Admin: 11/10/18 04:07 Dose:  650 units/hr, 13 mls/hr


Lactulose (Cephulac (Oral Use))  20 gm PO TID [facility]


   Last Admin: 11/10/18 05:27 Dose:  20 gm


Metoprolol Tartrate (Lopressor -)  25 mg PO BID [facility]


   Last Admin: 11/10/18 09:09 Dose:  25 mg


Metoprolol Tartrate (Lopressor Injection -)  5 mg IVPUSH Q8H PRN


   PRN Reason: TACHYCARDIA


Pantoprazole Sodium (Protonix Iv)  40 mg IVPUSH DAILY [facility]


   Last Admin: 11/10/18 09:09 Dose:  40 mg


Thiamine HCl (Vitamin B1 -)  100 mg PO DAILY [facility]


   Last Admin: 11/10/18 09:09 Dose:  100 mg














Constitutional: Yes: Intubated, not sedated, minimally responsive  


Cardiovascular: Yes: Pulse Irregular, AFib 


Respiratory: Yes: Intubated and sedated, basilar rhonhci Left > Right  


Gastrointestinal: Yes: Normal Bowel Sounds, Soft


Musculoskeletal: Yes: WNL


Extremities: Yes: WNL


Edema: No


Peripheral Pulses WNL: Yes


Neurological: Yes: Minimally responsive 


Labs: 


  


  


  


 Laboratory Results - last 24 hr











  11/05/18 11/09/18 11/09/18





  10:32 05:30 09:11


 


WBC   


 


RBC   


 


Hgb   


 


Hct   


 


MCV   


 


MCH   


 


MCHC   


 


RDW   


 


Plt Count   


 


MPV   


 


Absolute Neuts (auto)   


 


Total Counted   


 


Neutrophils %   


 


Neutrophils % (Manual)   74.2 


 


Band Neutrophils %   2.1 


 


Lymphocytes %   


 


Lymphocytes % (Manual)   16.5  D 


 


Monocytes %   


 


Monocytes % (Manual)   3 L 


 


Eosinophils %   


 


Eosinophils % (Manual)   3.1 


 


Basophils %   


 


Basophils % (Manual)   0.0 


 


Myelocytes % (Man)   0 


 


Promyelocytes % (Man)   0 


 


Blast Cells % (Manual)   0 


 


Nucleated RBC %   


 


Metamyelocytes   1 


 


Hypochromia   0 


 


Platelet Estimate   Normal 


 


Platelet Comment   


 


Polychromasia   1+ 


 


Poikilocytosis   0 


 


Anisocytosis   1+ 


 


Microcytosis   1+ 


 


Macrocytosis   0 


 


Acanthocytes (Spur)   


 


Fragmented RBCs   


 


PTT (Actin FS)   


 


Puncture Site   


 


ABG pH   


 


ABG pCO2 at Pt Temp   


 


ABG pO2 at Pt Temp   


 


ABG HCO3   


 


ABG O2 Sat (Measured)   


 


ABG O2 Content   


 


ABG Base Excess   


 


Chacho Test   


 


O2 Delivery Device   


 


Oxygen Flow Rate   


 


Vent Rate   


 


Mechanical Rate   


 


PEEP   


 


Pressure Support Vent   


 


Sodium   


 


Potassium   


 


Chloride   


 


Carbon Dioxide   


 


Anion Gap   


 


BUN   


 


Creatinine   


 


Creat Clearance w eGFR   


 


Random Glucose   


 


Uric Acid   


 


Calcium   


 


Phosphorus   


 


Magnesium   


 


Total Bilirubin   


 


AST   


 


ALT   


 


Alkaline Phosphatase   


 


Total Protein   


 


Albumin   


 


Alpha-1-Globulins (%)  1.5  


 


Alpha-2-Globulins (%)  5.5  


 


Beta Globulins (%)  11.4  


 


Gamma Globulins (%)  64.9  


 


M-[place] %  Comment:  


 


Urine Total Protein  39.3  


 


Urine PEP Interpret  16.7  


 


Ref Test Comments    


 


Blood Type    B POSITIVE


 


Antibody Screen    Negative














  11/10/18 11/10/18 11/10/18





  05:30 05:30 05:30


 


WBC   5.0 


 


RBC   2.70 L 


 


Hgb   7.9 L 


 


Hct   24.3 L 


 


MCV   89.9 


 


MCH   29.4 


 


MCHC   32.7 


 


RDW   16.3 H 


 


Plt Count   118 L 


 


MPV   9.5 


 


Absolute Neuts (auto)   3.9 


 


Total Counted   100 


 


Neutrophils %   77.3 


 


Neutrophils % (Manual)   70.0 


 


Band Neutrophils %   6.0 


 


Lymphocytes %   17.5  D 


 


Lymphocytes % (Manual)   20.0  D 


 


Monocytes %   2.9 L 


 


Monocytes % (Manual)   


 


Eosinophils %   1.9 


 


Eosinophils % (Manual)   


 


Basophils %   0.4 


 


Basophils % (Manual)   


 


Myelocytes % (Man)   


 


Promyelocytes % (Man)   


 


Blast Cells % (Manual)   


 


Nucleated RBC %   7 H 


 


Metamyelocytes   1 


 


Hypochromia   1+ 


 


Platelet Estimate   Slt decrease 


 


Platelet Comment   No clotting detected 


 


Polychromasia   2+ 


 


Poikilocytosis   


 


Anisocytosis   2+ 


 


Microcytosis   


 


Macrocytosis   


 


Acanthocytes (Spur)   1+ 


 


Fragmented RBCs   Occasional 


 


PTT (Actin FS)  81.9 H  


 


Puncture Site   


 


ABG pH   


 


ABG pCO2 at Pt Temp   


 


ABG pO2 at Pt Temp   


 


ABG HCO3   


 


ABG O2 Sat (Measured)   


 


ABG O2 Content   


 


ABG Base Excess   


 


Chacho Test   


 


O2 Delivery Device   


 


Oxygen Flow Rate   


 


Vent Rate   


 


Mechanical Rate   


 


PEEP   


 


Pressure Support Vent   


 


Sodium    140


 


Potassium    4.2


 


Chloride    115 H


 


Carbon Dioxide    19 L


 


Anion Gap    6 L


 


BUN    43 H


 


Creatinine    2.1 H


 


Creat Clearance w eGFR    30.62


 


Random Glucose    112 H


 


Uric Acid    6.0


 


Calcium    6.6 L*


 


Phosphorus    2.7


 


Magnesium    2.0


 


Total Bilirubin    0.2


 


AST    51 H


 


ALT    37


 


Alkaline Phosphatase    116


 


Total Protein    7.2


 


Albumin    3.0 L


 


Alpha-1-Globulins (%)   


 


Alpha-2-Globulins (%)   


 


Beta Globulins (%)   


 


Gamma Globulins (%)   


 


M-[place] %   


 


Urine Total Protein   


 


Urine PEP Interpret   


 


Ref Test Comments   


 


Blood Type   


 


Antibody Screen   














  11/10/18





  06:05


 


WBC 


 


RBC 


 


Hgb 


 


Hct 


 


MCV 


 


MCH 


 


MCHC 


 


RDW 


 


Plt Count 


 


MPV 


 


Absolute Neuts (auto) 


 


Total Counted 


 


Neutrophils % 


 


Neutrophils % (Manual) 


 


Band Neutrophils % 


 


Lymphocytes % 


 


Lymphocytes % (Manual) 


 


Monocytes % 


 


Monocytes % (Manual) 


 


Eosinophils % 


 


Eosinophils % (Manual) 


 


Basophils % 


 


Basophils % (Manual) 


 


Myelocytes % (Man) 


 


Promyelocytes % (Man) 


 


Blast Cells % (Manual) 


 


Nucleated RBC % 


 


Metamyelocytes 


 


Hypochromia 


 


Platelet Estimate 


 


Platelet Comment 


 


Polychromasia 


 


Poikilocytosis 


 


Anisocytosis 


 


Microcytosis 


 


Macrocytosis 


 


Acanthocytes (Spur) 


 


Fragmented RBCs 


 


PTT (Actin FS) 


 


Puncture Site  Right radial


 


ABG pH  7.28 L


 


ABG pCO2 at Pt Temp  39.2


 


ABG pO2 at Pt Temp  75.3  D


 


ABG HCO3  17.8 L


 


ABG O2 Sat (Measured)  94.1


 


ABG O2 Content  10.8 L


 


ABG Base Excess  -7.8 L


 


Chacho Test  Positive


 


O2 Delivery Device  Mech vent


 


Oxygen Flow Rate  30%


 


Vent Rate  14


 


Mechanical Rate  Yes


 


PEEP  5.0


 


Pressure Support Vent  500


 


Sodium 


 


Potassium 


 


Chloride 


 


Carbon Dioxide 


 


Anion Gap 


 


BUN 


 


Creatinine 


 


Creat Clearance w eGFR 


 


Random Glucose 


 


Uric Acid 


 


Calcium 


 


Phosphorus 


 


Magnesium 


 


Total Bilirubin 


 


AST 


 


ALT 


 


Alkaline Phosphatase 


 


Total Protein 


 


Albumin 


 


Alpha-1-Globulins (%) 


 


Alpha-2-Globulins (%) 


 


Beta Globulins (%) 


 


Gamma Globulins (%) 


 


M-[place] % 


 


Urine Total Protein 


 


Urine PEP Interpret 


 


Ref Test Comments 


 


Blood Type 


 


Antibody Screen 











Problem List


Acute Respiratory Failure 


Suspected Aspiration 


HAM (acute kidney injury)


Low suspicion for Hyperviscosity 


Atrial Fibrillation with RVR 


AMS 


Anemia 


No PTX on CXR 








PLAN: 


AC Mode of vent 


PEEP 5 cm H2O   


Continue AC 


Strict I & O


Monitor CXR 


Monitor off sedation  


ABX coverage 


Rate control 


Plasmapheresis per Heme


Enteral feeds


SBTs as tolerated: Need to discuss GOC with family as he may need a Trach 





Dr Blum 


Critical care time spent in reviewing chart, evaluating patient and formulating 

plan - 36 minutes.

## 2018-11-10 NOTE — PN
Progress Note, Physician


Chief Complaint: 





The patient seen in the ICU. 


Intubated, and vent supported.


Patient remains poorly responsive. 


Maintains good urine output. 


IV fluids well tolerated. 


History of Present Illness: 





This is a 78 year old gentleman with hx of CKD, Afib on A/C, CAD, CKD, 

Hypertension, Hyperlipidemia, PVD who presented with AMS/Confusion and found to 

have HAM. 


The patient is intubated and supported by mechanical vent. In ICU. 


Poorly responsive








- Current Medication List


Current Medications: 


Active Medications





Acetaminophen (Tylenol Suppository -)  650 mg PA Q6H PRN


   PRN Reason: FEVER


   Last Admin: 11/05/18 18:10 Dose:  650 mg


Allopurinol (Zyloprim -)  100 mg PO BID [facility]


   Last Admin: 11/10/18 09:09 Dose:  100 mg


Diphenhydramine HCl (Benadryl Injection -)  25 mg IVPB ONCE PRN


   PRN Reason: DURING PLASMAPHERESIS


Heparin Sodium (Porcine) (Hep-Lock -)  5 ml IVPUSH PRN PRN


   PRN Reason: Heparin


Heparin Sodium (Porcine) (Heparin -)  1,000 unit IVPUSH PRN PRN


   PRN Reason: Heparin


Heparin Sodium (Porcine) (Heparin -)  5,000 unit IVPUSH PRN PRN


   PRN Reason: Heparin


Vancomycin HCl (Vancomycin (Pre-Docked))  1,000 mg in 250 mls @ 166.667 mls/hr 

IVPB Q24H AVA; Protocol


   Last Admin: 11/09/18 22:00 Dose:  166.667 mls/hr


Piperacillin Sod/Tazobactam (Sod 2.25 gm/ Dextrose)  50 mls @ 100 mls/hr IVPB 

Q8H-IV AVA; Protocol


   Last Admin: 11/10/18 09:09 Dose:  100 mls/hr


Fentanyl 500 mcg/ Dextrose  100 mls @ 5 mls/hr IVPB TITR AVA; Protocol


   Last Admin: 11/09/18 14:04 Dose:  50 mcg/hr, 10 mls/hr


Heparin Sodium/Dextrose (Heparin Infusion -)  25,000 units in 500 mls @ 20 mls/

hr IVPB TITR AV[facility]; Protocol


   Last Admin: 11/10/18 04:07 Dose:  650 units/hr, 13 mls/hr


Lactulose (Cephulac (Oral Use))  20 gm PO TID [facility]


   Last Admin: 11/10/18 05:27 Dose:  20 gm


Metoprolol Tartrate (Lopressor -)  25 mg PO BID [facility]


   Last Admin: 11/10/18 09:09 Dose:  25 mg


Metoprolol Tartrate (Lopressor Injection -)  5 mg IVPUSH Q8H PRN


   PRN Reason: TACHYCARDIA


Pantoprazole Sodium (Protonix Iv)  40 mg IVPUSH DAILY [facility]


   Last Admin: 11/10/18 09:09 Dose:  40 mg


Thiamine HCl (Vitamin B1 -)  100 mg PO DAILY [facility]


   Last Admin: 11/10/18 09:09 Dose:  100 mg











- Objective


Vital Signs: 


 Vital Signs











Temperature  98.1 F   11/10/18 10:00


 


Pulse Rate  83   11/10/18 10:00


 


Respiratory Rate  16   11/10/18 11:43


 


Blood Pressure  96/60   11/10/18 10:00


 


O2 Sat by Pulse Oximetry (%)  98   11/09/18 20:48











Neck: Yes: Trachea Midline


Cardiovascular: Yes: Tachycardia, S1, S2


Respiratory: Yes: Diminished, Mechanically Ventilated, Rales, Rhonchi


Gastrointestinal: Yes: Normal Bowel Sounds


Genitourinary: Yes: Garces Present.  No: CVA Tenderness - Left, CVA Tenderness - 

Right


Edema: No


Neurological: Yes: Unresponsive


Labs: 


 CBC, BMP





 11/10/18 05:30 





 11/10/18 05:30 





 INR, PTT











INR  1.28  (0.83-1.09)  H  11/09/18  05:30    


 


Fibrinogen  293.0 mg/dL (238-498)  D 11/09/18  05:30    














Problem List





- Problems


(1) Multiple myeloma


Code(s): C90.00 - MULTIPLE MYELOMA NOT HAVING ACHIEVED REMISSION   


Qualifiers: 


   Qualified Code(s): C90.00 - Multiple myeloma not having achieved remission   





(2) HAM (acute kidney injury)


Code(s): N17.9 - ACUTE KIDNEY FAILURE, UNSPECIFIED   





(3) Altered mental status


Code(s): R41.82 - ALTERED MENTAL STATUS, UNSPECIFIED   





(4) Anemia


Code(s): D64.9 - ANEMIA, UNSPECIFIED   





(5) Atrial fibrillation with RVR


Code(s): I48.91 - UNSPECIFIED ATRIAL FIBRILLATION   





(6) Cellulitis


Code(s): L03.90 - CELLULITIS, UNSPECIFIED   


Qualifiers: 


   Qualified Code(s): L03.115 - Cellulitis of right lower limb   





(7) Hyperlipidemia


Code(s): E78.5 - HYPERLIPIDEMIA, UNSPECIFIED   


Qualifiers: 


   Qualified Code(s): E78.00 - Pure hypercholesterolemia, unspecified; E78.0 - 

Pure hypercholesterolemia   





(8) Sepsis


Code(s): A41.9 - SEPSIS, UNSPECIFIED ORGANISM   





(9) Hypercalcemia


Code(s): E83.52 - HYPERCALCEMIA   





Assessment/Plan





This is a 78 year old gentleman with hx of CKD, Afib on A/C, CAD, CKD, 

Hypertension, Hyperlipidemia, PVD who presented with AMS/Confusion and found to 

have HAM. 


The patient has IgG Myeloma, getting plasmapheresis. 


Serum Calcium had dropped to 6.6. Received Calcium supplements today. 





Renal functions stable. 





The patient's mental status remains essentially unchanged, and not easily 

explained. ? Anoxic encephalopathy





Discussed with Dr. Cuellar. 





Concur with the current management. 





Will monitor the renal/ elctrolyte profile. 





Thank you.  





Leeann Pérez MD

## 2018-11-10 NOTE — PN
Progress Note, Physician


Chief Complaint: 





Events noted


Remains on mechanical ventilation


History of Present Illness: 





Patient was seen and examined in ICU. Remains on vent support. Chart was 

reviewed








- Current Medication List


Current Medications: 


Active Medications





Acetaminophen (Tylenol Suppository -)  650 mg ID Q6H PRN


   PRN Reason: FEVER


   Last Admin: 11/05/18 18:10 Dose:  650 mg


Allopurinol (Zyloprim -)  100 mg PO BID [facility]


   Last Admin: 11/10/18 09:09 Dose:  100 mg


Diphenhydramine HCl (Benadryl Injection -)  25 mg IVPB ONCE PRN


   PRN Reason: DURING PLASMAPHERESIS


Heparin Sodium (Porcine) (Hep-Lock -)  5 ml IVPUSH PRN PRN


   PRN Reason: Heparin


Heparin Sodium (Porcine) (Heparin -)  1,000 unit IVPUSH PRN PRN


   PRN Reason: Heparin


Heparin Sodium (Porcine) (Heparin -)  5,000 unit IVPUSH PRN PRN


   PRN Reason: Heparin


Vancomycin HCl (Vancomycin (Pre-Docked))  1,000 mg in 250 mls @ 166.667 mls/hr 

IVPB Q24H [facility]; Protocol


   Last Admin: 11/09/18 22:00 Dose:  166.667 mls/hr


Piperacillin Sod/Tazobactam (Sod 2.25 gm/ Dextrose)  50 mls @ 100 mls/hr IVPB 

Q8H-IV [facility]; Protocol


   Last Admin: 11/10/18 09:09 Dose:  100 mls/hr


Fentanyl 500 mcg/ Dextrose  100 mls @ 5 mls/hr IVPB TITR [facility]; Protocol


   Last Admin: 11/09/18 14:04 Dose:  50 mcg/hr, 10 mls/hr


Heparin Sodium/Dextrose (Heparin Infusion -)  25,000 units in 500 mls @ 20 mls/

hr IVPB TITR [facility]; Protocol


   Last Admin: 11/10/18 04:07 Dose:  650 units/hr, 13 mls/hr


Lactulose (Cephulac (Oral Use))  20 gm PO TID [facility]


   Last Admin: 11/10/18 05:27 Dose:  20 gm


Metoprolol Tartrate (Lopressor -)  25 mg PO BID [facility]


   Last Admin: 11/10/18 09:09 Dose:  25 mg


Metoprolol Tartrate (Lopressor Injection -)  5 mg IVPUSH Q8H PRN


   PRN Reason: TACHYCARDIA


Pantoprazole Sodium (Protonix Iv)  40 mg IVPUSH DAILY [facility]


   Last Admin: 11/10/18 09:09 Dose:  40 mg


Thiamine HCl (Vitamin B1 -)  100 mg PO DAILY [facility]


   Last Admin: 11/10/18 09:09 Dose:  100 mg











- Objective


Vital Signs: 


 Vital Signs











Temperature  99.9 F H  11/10/18 06:00


 


Pulse Rate  92 H  11/10/18 08:00


 


Respiratory Rate  16   11/10/18 08:00


 


Blood Pressure  98/60   11/10/18 08:00


 


O2 Sat by Pulse Oximetry (%)  98   11/09/18 20:48











Cardiovascular: Yes: Pulse Irregular, S1, S2


Respiratory: Yes: Mechanically Ventilated


Gastrointestinal: Yes: Normal Bowel Sounds, Soft.  No: Tenderness


Edema: No


Labs: 


 CBC, BMP





 11/10/18 05:30 





 11/10/18 05:30 





 INR, PTT











INR  1.28  (0.83-1.09)  H  11/09/18  05:30    


 


Fibrinogen  293.0 mg/dL (238-498)  D 11/09/18  05:30    














- ....Imaging


Chest X-ray: Report Reviewed (ET tube in place and congestive changes)





Problem List





- Problems


(1) Anemia


Code(s): D64.9 - ANEMIA, UNSPECIFIED   





(2) Hyperlipidemia


Code(s): E78.5 - HYPERLIPIDEMIA, UNSPECIFIED   


Qualifiers: 


   Hyperlipidemia type: pure hypercholesterolemia   Qualified Code(s): E78.00 - 

Pure hypercholesterolemia, unspecified; E78.0 - Pure hypercholesterolemia   





(3) Multiple myeloma


Code(s): C90.00 - MULTIPLE MYELOMA NOT HAVING ACHIEVED REMISSION   


Qualifiers: 


   Multiple myeloma remission status: not in remission   Qualified Code(s): 

C90.00 - Multiple myeloma not having achieved remission   





(4) Respiratory failure


Code(s): J96.90 - RESPIRATORY FAILURE, UNSP, UNSP W HYPOXIA OR HYPERCAPNIA   





(5) Sepsis


Code(s): A41.9 - SEPSIS, UNSPECIFIED ORGANISM   





(6) Toxic metabolic encephalopathy


Code(s): G92 - TOXIC ENCEPHALOPATHY   





(7) Acute-on-chronic kidney injury


Code(s): N17.9 - ACUTE KIDNEY FAILURE, UNSPECIFIED; N18.9 - CHRONIC KIDNEY 

DISEASE, UNSPECIFIED   


Qualifiers: 


   Acute renal failure type: unspecified   Chronic kidney disease stage: 

unspecified stage   Qualified Code(s): N17.9 - Acute kidney failure, unspecified

; N18.9 - Chronic kidney disease, unspecified   





(8) Demand ischemia


Code(s): I24.8 - OTHER FORMS OF ACUTE ISCHEMIC HEART DISEASE   





(9) History of ETOH abuse


Code(s): Z87.898 - PERSONAL HISTORY OF OTHER SPECIFIED CONDITIONS   





(10) Nonadherence to medication


Code(s): Z91.14 - PATIENT'S OTHER NONCOMPLIANCE WITH MEDICATION REGIMEN   





(11) Paraproteinemia


Code(s): D89.2 - HYPERGAMMAGLOBULINEMIA, UNSPECIFIED   





(12) Persistent atrial fibrillation


Code(s): I48.1 - PERSISTENT ATRIAL FIBRILLATION   





(13) Chronic thromboembolic disease


Code(s): I74.9 - EMBOLISM AND THROMBOSIS OF UNSPECIFIED ARTERY   





(14) Coronary artery disease


Code(s): I25.10 - ATHSCL HEART DISEASE OF NATIVE CORONARY ARTERY W/O ANG PCTRS 

  


Qualifiers: 


   Coronary Disease-Associated Artery/Lesion type: native artery   Native vs. 

transplanted heart: native heart   Associated angina: without angina   

Qualified Code(s): I25.10 - Atherosclerotic heart disease of native coronary 

artery without angina pectoris   





(15) Diastolic dysfunction without heart failure


Code(s): I51.9 - HEART DISEASE, UNSPECIFIED   





(16) HTN (hypertension)


Code(s): I10 - ESSENTIAL (PRIMARY) HYPERTENSION   


Qualifiers: 


   Hypertension type: essential hypertension   Qualified Code(s): I10 - 

Essential (primary) hypertension   





(17) Hypertrophic cardiomyopathy


Code(s): I42.2 - OTHER HYPERTROPHIC CARDIOMYOPATHY   





(18) Left ventricular systolic dysfunction


Code(s): I51.9 - HEART DISEASE, UNSPECIFIED   





Assessment/Plan





1. Acute hypoxic respiratory failure, suspect aspiration pneumonia 


2. Toxic metabolic encephalopathy, possible hyperviscosity syndrome and sepsis


2. Acute on CKD


3. Hypercalcemia, paraproteinemia confirmed multiple myeloma, hyperviscosity 

syndrome


4. History of bilateral SFA occlusion post thrombectomy, referable to embolic 

disease related to persistent atrial fibrillation


5. Non obstructive CAD on R&L heart cath/coronary angiography with demand 

ischemia


6. 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 

subendocardial ischemia, compensated/euvolemic


7. Persistent atrial fibrillation with RVR [id number] score of 6 on heparin gtt


8. Labile HTN


9. Poor compliance with medical therapy administration and medical F/U


10. ETOH dependence





PLAN:


1. Heparin drip for now while off Xarelto 15 qd (renal dosing)


2. Resume Lopressor 25 bid as hemodynamics tolerate


3. Plasmapharesis per hematology input


4. Empiric antibiotic course 


5. Enteral feeds, lactulose for hyperammonemia and monitor ammonia levels


6. Ventilator management as per Critical Care team





Guarded


Olvin Hobbs MD

## 2018-11-11 LAB
ALBUMIN SERPL-MCNC: 2.6 G/DL (ref 3.4–5)
ALP SERPL-CCNC: 145 U/L (ref 45–117)
ALT SERPL-CCNC: 45 U/L (ref 13–61)
ANION GAP SERPL CALC-SCNC: 9 MMOL/L (ref 8–16)
ANISOCYTOSIS BLD QL: (no result)
ARTERIAL BLOOD GAS PCO2: 45.4 MMHG (ref 35–45)
ARTERIAL PATENCY WRIST A: POSITIVE
AST SERPL-CCNC: 54 U/L (ref 15–37)
BASE EXCESS BLDA CALC-SCNC: -9.2 MEQ/L (ref -2–2)
BASOPHILS # BLD: 0.4 % (ref 0–2)
BILIRUB SERPL-MCNC: 0.7 MG/DL (ref 0.2–1)
BUN SERPL-MCNC: 52 MG/DL (ref 7–18)
CALCIUM SERPL-MCNC: 6.5 MG/DL (ref 8.5–10.1)
CHLORIDE SERPL-SCNC: 114 MMOL/L (ref 98–107)
CO2 SERPL-SCNC: 19 MMOL/L (ref 21–32)
CREAT SERPL-MCNC: 2.7 MG/DL (ref 0.55–1.3)
DACRYOCYTES BLD QL SMEAR: (no result)
DEPRECATED RDW RBC AUTO: 17 % (ref 11.9–15.9)
EOSINOPHIL # BLD: 0.3 % (ref 0–4.5)
GLUCOSE SERPL-MCNC: 174 MG/DL (ref 74–106)
HCT VFR BLD CALC: 23.7 % (ref 35.4–49)
HGB BLD-MCNC: 7.9 GM/DL (ref 11.7–16.9)
INR BLD: 1.36 (ref 0.83–1.09)
LYMPHOCYTES # BLD: 17.5 % (ref 8–40)
MACROCYTES BLD QL: (no result)
MAGNESIUM SERPL-MCNC: 2.4 MG/DL (ref 1.8–2.4)
MCH RBC QN AUTO: 30.1 PG (ref 25.7–33.7)
MCHC RBC AUTO-ENTMCNC: 33.2 G/DL (ref 32–35.9)
MCV RBC: 90.6 FL (ref 80–96)
MONOCYTES # BLD AUTO: 2.3 % (ref 3.8–10.2)
NEUTROPHILS # BLD: 79.5 % (ref 42.8–82.8)
PHOSPHATE SERPL-MCNC: 3.8 MG/DL (ref 2.5–4.9)
PLATELET # BLD AUTO: 134 K/MM3 (ref 134–434)
PLATELET BLD QL SMEAR: (no result)
PMV BLD: 9.2 FL (ref 7.5–11.1)
PO2 BLDA: 390 MMHG (ref 70–100)
POTASSIUM SERPLBLD-SCNC: 4.8 MMOL/L (ref 3.5–5.1)
PROT SERPL-MCNC: 8.2 G/DL (ref 6.4–8.2)
PT PNL PPP: 16.1 SEC (ref 9.7–13)
RBC # BLD AUTO: 2.62 M/MM3 (ref 4–5.6)
SAO2 % BLDA: 99.3 % (ref 90–98.9)
SODIUM SERPL-SCNC: 141 MMOL/L (ref 136–145)
WBC # BLD AUTO: 8.3 K/MM3 (ref 4–10)

## 2018-11-11 RX ADMIN — CHLORHEXIDINE GLUCONATE 0.12% ORAL RINSE SCH ML: 1.2 LIQUID ORAL at 21:20

## 2018-11-11 RX ADMIN — ALLOPURINOL SCH MG: 100 TABLET ORAL at 21:20

## 2018-11-11 RX ADMIN — HEPARIN SODIUM SCH MLS/HR: 5000 INJECTION, SOLUTION INTRAVENOUS at 15:31

## 2018-11-11 RX ADMIN — PIPERACILLIN SODIUM AND TAZOBACTAM SODIUM SCH MLS/HR: .25; 2 INJECTION, POWDER, LYOPHILIZED, FOR SOLUTION INTRAVENOUS at 09:43

## 2018-11-11 RX ADMIN — LACTULOSE SCH GM: 20 SOLUTION ORAL at 21:30

## 2018-11-11 RX ADMIN — DEXTROSE MONOHYDRATE SCH MLS/HR: 50 INJECTION, SOLUTION INTRAVENOUS at 15:34

## 2018-11-11 RX ADMIN — Medication SCH MG: at 09:44

## 2018-11-11 RX ADMIN — ALLOPURINOL SCH MG: 100 TABLET ORAL at 09:44

## 2018-11-11 RX ADMIN — Medication SCH: at 16:45

## 2018-11-11 RX ADMIN — DEXTROSE MONOHYDRATE SCH MLS/HR: 50 INJECTION, SOLUTION INTRAVENOUS at 06:15

## 2018-11-11 RX ADMIN — CHLORHEXIDINE GLUCONATE 0.12% ORAL RINSE SCH ML: 1.2 LIQUID ORAL at 09:44

## 2018-11-11 RX ADMIN — LACTULOSE SCH GM: 20 SOLUTION ORAL at 06:10

## 2018-11-11 RX ADMIN — PIPERACILLIN SODIUM AND TAZOBACTAM SODIUM SCH MLS/HR: .25; 2 INJECTION, POWDER, LYOPHILIZED, FOR SOLUTION INTRAVENOUS at 02:42

## 2018-11-11 RX ADMIN — CEFTAZIDIME SCH MLS/HR: 1 INJECTION, POWDER, FOR SOLUTION INTRAMUSCULAR; INTRAVENOUS at 17:12

## 2018-11-11 RX ADMIN — LACTULOSE SCH GM: 20 SOLUTION ORAL at 15:35

## 2018-11-11 RX ADMIN — PROPOFOL SCH MLS/HR: 10 INJECTION, EMULSION INTRAVENOUS at 15:35

## 2018-11-11 RX ADMIN — DEXAMETHASONE SODIUM PHOSPHATE SCH MG: 10 INJECTION, SOLUTION INTRAMUSCULAR; INTRAVENOUS at 09:47

## 2018-11-11 RX ADMIN — PANTOPRAZOLE SODIUM SCH MG: 40 INJECTION, POWDER, FOR SOLUTION INTRAVENOUS at 09:48

## 2018-11-11 RX ADMIN — METOPROLOL TARTRATE SCH: 25 TABLET, FILM COATED ORAL at 21:30

## 2018-11-11 RX ADMIN — METOPROLOL TARTRATE SCH MG: 25 TABLET, FILM COATED ORAL at 09:44

## 2018-11-11 RX ADMIN — Medication SCH UNITS: at 17:13

## 2018-11-11 RX ADMIN — DEXAMETHASONE SODIUM PHOSPHATE SCH MG: 10 INJECTION, SOLUTION INTRAMUSCULAR; INTRAVENOUS at 21:19

## 2018-11-11 RX ADMIN — CHLORHEXIDINE GLUCONATE 0.12% ORAL RINSE SCH ML: 1.2 LIQUID ORAL at 00:34

## 2018-11-11 NOTE — PN
Progress Note, Physician


Chief Complaint: 





AMS


Unresponsiveness


Respiratory failure


History of Present Illness: 





remains Intubated 


On mechanical vent


remains sedated





- Current Medication List


Current Medications: 


Active Medications





Acetaminophen (Tylenol Suppository -)  650 mg [place] Q6H PRN


   PRN Reason: FEVER


   Last Admin: 11/05/18 18:10 Dose:  650 mg


Acetaminophen (Tylenol -)  650 mg PO Q6H PRN


   PRN Reason: PAIN LEVEL 1 - 3


Allopurinol (Zyloprim -)  100 mg PO BID [facility]


   Last Admin: 11/10/18 22:32 Dose:  100 mg


Calcium Gluconate (Calcium Gluconate 10% -)  1,000 mg IVPB ONCE ONE


   Stop: 11/11/18 08:25


Chlorhexidine Gluconate (Peridex -)  15 ml MM BID [facility]


   Last Admin: 11/11/18 00:34 Dose:  15 ml


Dexamethasone Sodium Phosphate (Decadron Injection -)  10 mg IVPUSH BID [facility]


   Last Admin: 11/10/18 22:31 Dose:  10 mg


Diphenhydramine HCl (Benadryl Injection -)  25 mg IVPB ONCE PRN


   PRN Reason: DURING PLASMAPHERESIS


Heparin Sodium (Porcine) (Hep-Lock -)  5 ml IVPUSH PRN PRN


   PRN Reason: Heparin


Heparin Sodium (Porcine) (Heparin -)  1,000 unit IVPUSH PRN PRN


   PRN Reason: Heparin


Heparin Sodium (Porcine) (Heparin -)  5,000 unit IVPUSH PRN PRN


   PRN Reason: Heparin


Vancomycin HCl (Vancomycin (Pre-Docked))  1,000 mg in 250 mls @ 166.667 mls/hr 

IVPB Q24H [facility]; Protocol


   Last Admin: 11/10/18 19:52 Dose:  166.667 mls/hr


Piperacillin Sod/Tazobactam (Sod 2.25 gm/ Dextrose)  50 mls @ 100 mls/hr IVPB 

Q8H-IV [facility]; Protocol


   Last Admin: 11/11/18 02:42 Dose:  100 mls/hr


Fentanyl 500 mcg/ Dextrose  100 mls @ 5 mls/hr IVPB TITR [facility]; Protocol


   Last Admin: 11/11/18 06:15 Dose:  100 mcg/hr, 20 mls/hr


Heparin Sodium/Dextrose (Heparin Infusion -)  25,000 units in 500 mls @ 20 mls/

hr IVPB TITR [facility]; Protocol


   Last Admin: 11/10/18 04:07 Dose:  650 units/hr, 13 mls/hr


Lactulose (Cephulac (Oral Use))  20 gm PO TID [facility]


   Last Admin: 11/11/18 06:10 Dose:  20 gm


Metoprolol Tartrate (Lopressor -)  25 mg PO BID [facility]


   Last Admin: 11/10/18 22:31 Dose:  25 mg


Metoprolol Tartrate (Lopressor Injection -)  5 mg IVPUSH Q8H PRN


   PRN Reason: TACHYCARDIA


Pantoprazole Sodium (Protonix Iv)  40 mg IVPUSH DAILY [facility]


   Last Admin: 11/10/18 09:09 Dose:  40 mg


Thiamine HCl (Vitamin B1 -)  100 mg PO DAILY [facility]


   Last Admin: 11/10/18 09:09 Dose:  100 mg











- Objective


Vital Signs: 


 Vital Signs











Temperature  98.7 F   11/11/18 06:00


 


Pulse Rate  69   11/11/18 08:00


 


Respiratory Rate  14   11/11/18 08:00


 


Blood Pressure  85/61 L  11/11/18 08:00


 


O2 Sat by Pulse Oximetry (%)  98   11/10/18 22:00











Constitutional: Yes: Well Nourished, No Distress, Calm


Cardiovascular: Yes: Regular Rate and Rhythm


Respiratory: Yes: Mechanically Ventilated, Rhonchi


Gastrointestinal: Yes: Normal Bowel Sounds, Soft


Genitourinary: Yes: Garces Present


Musculoskeletal: Yes: WNL


Extremities: Yes: WNL


Edema: No


Peripheral Pulses WNL: Yes


Neurological: Yes: Other (sedated)


Labs: 


 CBC, BMP





 11/11/18 06:00 





 11/11/18 06:00 





 INR, PTT











INR  1.36  (0.83-1.09)  H  11/11/18  06:00    


 


Fibrinogen  290.0 mg/dL (238-498)   11/11/18  06:00    














Problem List





- Problems


(1) AHM (acute kidney injury)


Assessment/Plan: 


-Nephrology on board


-Cr stable


-monitor trend


-U/S renal/bladder unremarkable


Code(s): N17.9 - ACUTE KIDNEY FAILURE, UNSPECIFIED   





(2) Atrial fibrillation with RVR


Assessment/Plan: 


-On heparin drip


-rate controlled


-cardiology on board


-Tele monitor


Code(s): I48.91 - UNSPECIFIED ATRIAL FIBRILLATION   





(3) Sepsis


Assessment/Plan: 


-upon admission


-ID on board


-Cultures:


 Microbiology





11/05/18 19:45   Blood - Peripheral Venous   Blood Culture - Final


                            NO GROWTH AFTER 5 DAYS INCUBATION


11/05/18 19:20   Blood - Peripheral Venous   Blood Culture - Final


                            NO GROWTH AFTER 5 DAYS INCUBATION


11/06/18 14:30   Sputum - Endotrachea Suction/Ventilator   Gram Stain - Final


11/06/18 14:30   Sputum - Endotrachea Suction/Ventilator   Sputum Culture - 

Preliminary


                            Non Lactose Fermenting Gnb


11/06/18 14:30   Urine - Urine Garces   Legionella Antigen - Final


11/06/18 14:30   Urine - Urine Garces   Streptococcus pneumoniae Antigen (M - 

Final


10/30/18 17:02   Blood - Peripheral Venous   Blood Culture - Final


                            NO GROWTH AFTER 5 DAYS INCUBATION


10/30/18 17:02   Blood - Peripheral Venous   Blood Culture - Final


                            NO GROWTH AFTER 5 DAYS INCUBATION


10/30/18 17:02   Urine - Urine - Catheterized   Urine Culture - Final


                            NO GROWTH OBTAINED


10/31/18 19:15   Nasopharyngeal Swab   Influenza Types A,B Antigen - Final


10/31/18 19:15   Nasopharyngeal Swab    - Final





-On IV Zosyn and Vanco


Code(s): A41.9 - SEPSIS, UNSPECIFIED ORGANISM   





(4) Toxic metabolic encephalopathy


Code(s): G92 - TOXIC ENCEPHALOPATHY   





(5) Altered mental status


Assessment/Plan: 


-CT head x 2 negative


-Seen by Neurology


-multifactorial


Code(s): R41.82 - ALTERED MENTAL STATUS, UNSPECIFIED   





(6) Anemia


Assessment/Plan: 


chronic, at baseline


-Check stool OB-pending


-Iron profile, B12, thyroid profile unremarkable


-monitor trend


-Transfuse only if Hg <7.0 to avoid fluid overload as he is chronically anemic


Code(s): D64.9 - ANEMIA, UNSPECIFIED

## 2018-11-11 NOTE — PN
Progress Note, Physician


Chief Complaint: 





The patient seen in the ICU. has profuse diarrhea. Possibly related to the 

Lactulose. 


Intubated, and vent supported.


Patient remains poorly responsive. Very minimal response to pain. 


Maintains good urine output. 


IV fluids well tolerated. 


History of Present Illness: 





This is a 78 year old gentleman with hx of CKD, Afib on A/C, CAD, CKD, 

Hypertension, Hyperlipidemia, PVD who presented with AMS/Confusion and found to 

have HAM. The patient is intubated and supported by mechanical vent. In ICU. 


Poorly responsive








- Current Medication List


Current Medications: 


Active Medications





Acetaminophen (Tylenol Suppository -)  650 mg [place] Q6H PRN


   PRN Reason: FEVER


   Last Admin: 11/05/18 18:10 Dose:  650 mg


Acetaminophen (Tylenol -)  650 mg PO Q6H PRN


   PRN Reason: PAIN LEVEL 1 - 3


Allopurinol (Zyloprim -)  100 mg PO BID [facility]


   Last Admin: 11/10/18 22:32 Dose:  100 mg


Chlorhexidine Gluconate (Peridex -)  15 ml MM BID [facility]


   Last Admin: 11/11/18 00:34 Dose:  15 ml


Cholecalciferol (Vitamin D3 -)  1,000 unit PO DAILY [facility]


Dexamethasone Sodium Phosphate (Decadron Injection -)  10 mg IVPUSH BID [facility]


   Last Admin: 11/10/18 22:31 Dose:  10 mg


Diphenhydramine HCl (Benadryl Injection -)  25 mg IVPB ONCE PRN


   PRN Reason: DURING PLASMAPHERESIS


Ergocalciferol (Drisdol Oral Solution -)  8,000 units PO DAILY [facility]


   Stop: 11/17/18 08:37


Heparin Sodium (Porcine) (Hep-Lock -)  5 ml IVPUSH PRN PRN


   PRN Reason: Heparin


Heparin Sodium (Porcine) (Heparin -)  1,000 unit IVPUSH PRN PRN


   PRN Reason: Heparin


Heparin Sodium (Porcine) (Heparin -)  5,000 unit IVPUSH PRN PRN


   PRN Reason: Heparin


Vancomycin HCl (Vancomycin (Pre-Docked))  1,000 mg in 250 mls @ 166.667 mls/hr 

IVPB Q24H AVA; Protocol


   Last Admin: 11/10/18 19:52 Dose:  166.667 mls/hr


Piperacillin Sod/Tazobactam (Sod 2.25 gm/ Dextrose)  50 mls @ 100 mls/hr IVPB 

Q8H-IV AV[facility]; Protocol


   Last Admin: 11/11/18 02:42 Dose:  100 mls/hr


Fentanyl 500 mcg/ Dextrose  100 mls @ 5 mls/hr IVPB TITR [facility]; Protocol


   Last Admin: 11/11/18 06:15 Dose:  100 mcg/hr, 20 mls/hr


Heparin Sodium/Dextrose (Heparin Infusion -)  25,000 units in 500 mls @ 20 mls/

hr IVPB TITR [facility]; Protocol


   Last Admin: 11/10/18 04:07 Dose:  650 units/hr, 13 mls/hr


Lactulose (Cephulac (Oral Use))  20 gm PO TID [facility]


   Last Admin: 11/11/18 06:10 Dose:  20 gm


Metoprolol Tartrate (Lopressor -)  25 mg PO BID [facility]


   Last Admin: 11/10/18 22:31 Dose:  25 mg


Metoprolol Tartrate (Lopressor Injection -)  5 mg IVPUSH Q8H PRN


   PRN Reason: TACHYCARDIA


Pantoprazole Sodium (Protonix Iv)  40 mg IVPUSH DAILY [facility]


   Last Admin: 11/10/18 09:09 Dose:  40 mg


Thiamine HCl (Vitamin B1 -)  100 mg PO DAILY [facility]


   Last Admin: 11/10/18 09:09 Dose:  100 mg











- Objective


Vital Signs: 


 Vital Signs











Temperature  98.7 F   11/11/18 06:00


 


Pulse Rate  69   11/11/18 08:00


 


Respiratory Rate  14   11/11/18 08:00


 


Blood Pressure  85/61 L  11/11/18 08:00


 


O2 Sat by Pulse Oximetry (%)  98   11/11/18 08:45











Constitutional: Yes: Pallor


Cardiovascular: Yes: Tachycardia, S1, S2


Respiratory: Yes: CTA Bilaterally, Diminished, Rhonchi


Gastrointestinal: Yes: Hyperactive Bowel Sounds.  No: Palpable Mass


Edema: No


Neurological: Yes: Unresponsive


Labs: 


 CBC, BMP





 11/11/18 06:00 





 11/11/18 06:00 





 INR, PTT











INR  1.36  (0.83-1.09)  H  11/11/18  06:00    


 


Fibrinogen  290.0 mg/dL (238-498)   11/11/18  06:00    














Problem List





- Problems


(1) Multiple myeloma


Code(s): C90.00 - MULTIPLE MYELOMA NOT HAVING ACHIEVED REMISSION   


Qualifiers: 


   Multiple myeloma remission status: not in remission   Qualified Code(s): 

C90.00 - Multiple myeloma not having achieved remission   





(2) HAM (acute kidney injury)


Code(s): N17.9 - ACUTE KIDNEY FAILURE, UNSPECIFIED   





(3) Altered mental status


Code(s): R41.82 - ALTERED MENTAL STATUS, UNSPECIFIED   





(4) Anemia


Code(s): D64.9 - ANEMIA, UNSPECIFIED   





(5) Atrial fibrillation with RVR


Code(s): I48.91 - UNSPECIFIED ATRIAL FIBRILLATION   





(6) Cellulitis


Code(s): L03.90 - CELLULITIS, UNSPECIFIED   


Qualifiers: 


   Site of cellulitis: extremity   Site of cellulitis of extremity: lower 

extremity   Laterality: right   Qualified Code(s): L03.115 - Cellulitis of 

right lower limb   





(7) Hyperlipidemia


Code(s): E78.5 - HYPERLIPIDEMIA, UNSPECIFIED   


Qualifiers: 


   Hyperlipidemia type: pure hypercholesterolemia   Qualified Code(s): E78.00 - 

Pure hypercholesterolemia, unspecified; E78.0 - Pure hypercholesterolemia   





(8) Sepsis


Code(s): A41.9 - SEPSIS, UNSPECIFIED ORGANISM   





(9) Hypercalcemia


Code(s): E83.52 - HYPERCALCEMIA   





Assessment/Plan





This is a 78 year old gentleman with hx of CKD, Afib on A/C, CAD, CKD, 

Hypertension, Hyperlipidemia, PVD who presented with AMS/Confusion and found to 

have HAM. 





The patient has IgG Myeloma, treated with Plasmapheresis. 





Serum Calcium had dropped to 6.5. Received Calcium supplements today also. 








has progressive hyperchloremic Metabolic acidosis, most leikely related to the 

diarrhea. if Sodium Bicarb supplements are given, should consider large amounts 

of Calcium supplemets, to avoid sudden and severe hypocalcemia, to avoid tetany 

and seizure. 





The patient's mental status not easily explained. ? Anoxic encephalopathy








Will monitor the renal/ elctrolyte profile. 





Thank you.  





Leeann Pérez MD

## 2018-11-11 NOTE — PN
Teaching Attending Note


Name of Resident: Rd Rudolph





ATTENDING PHYSICIAN STATEMENT





I saw and evaluated the patient.


I reviewed the resident's note and discussed the case with the resident.


I agree with the resident's findings and plan as documented.








SUBJECTIVE:


Patient seen and examined in the ICU.  


Remains intubated.  


Off sedation and minimally responsive. 


Intermittently grimacing to pain.   


No pressors. 





CXR: poor film quality / left base excluded 





  


  


 Intake & Output











 11/08/18 11/09/18 11/10/18 11/11/18





 23:59 23:59 23:59 23:59


 


Intake Total 1792 2958 1630 1292


 


Output Total 1800 1450 1225 175


 


Balance -8 1800 231 0772


 


Weight 178 lb 4 oz 177 lb 1 oz 180 lb 1 oz 182 lb 3 oz








 Last Vital Signs











Temp Pulse Resp BP Pulse Ox


 


 98.7 F   45 L  14   85/61 L  100 


 


 11/11/18 06:00  11/11/18 09:42  11/11/18 09:40  11/11/18 08:00  11/11/18 09:42








Active Medications





Acetaminophen (Tylenol Suppository -)  650 mg [place] Q6H PRN


   PRN Reason: FEVER


   Last Admin: 11/05/18 18:10 Dose:  650 mg


Acetaminophen (Tylenol -)  650 mg PO Q6H PRN


   PRN Reason: PAIN LEVEL 1 - 3


Allopurinol (Zyloprim -)  100 mg PO BID [facility]


   Last Admin: 11/11/18 09:44 Dose:  100 mg


Chlorhexidine Gluconate (Peridex -)  15 ml MM BID [facility]


   Last Admin: 11/11/18 09:44 Dose:  15 ml


Cholecalciferol (Vitamin D3 -)  1,000 unit PO DAILY [facility]


Dexamethasone Sodium Phosphate (Decadron Injection -)  10 mg IVPUSH BID [facility]


   Last Admin: 11/11/18 09:47 Dose:  10 mg


Diphenhydramine HCl (Benadryl Injection -)  25 mg IVPB ONCE PRN


   PRN Reason: DURING PLASMAPHERESIS


Ergocalciferol (Drisdol Oral Solution -)  8,000 units PO DAILY [facility]


   Stop: 11/17/18 08:37


Heparin Sodium (Porcine) (Hep-Lock -)  5 ml IVPUSH PRN PRN


   PRN Reason: Heparin


Heparin Sodium (Porcine) (Heparin -)  1,000 unit IVPUSH PRN PRN


   PRN Reason: Heparin


Heparin Sodium (Porcine) (Heparin -)  5,000 unit IVPUSH PRN PRN


   PRN Reason: Heparin


Vancomycin HCl (Vancomycin (Pre-Docked))  1,000 mg in 250 mls @ 166.667 mls/hr 

IVPB Q24H [facility]; Protocol


   Last Admin: 11/10/18 19:52 Dose:  166.667 mls/hr


Piperacillin Sod/Tazobactam (Sod 2.25 gm/ Dextrose)  50 mls @ 100 mls/hr IVPB 

Q8H-IV [facility]; Protocol


   Last Admin: 11/11/18 09:43 Dose:  100 mls/hr


Fentanyl 500 mcg/ Dextrose  100 mls @ 5 mls/hr IVPB TITR [facility]; Protocol


   Last Admin: 11/11/18 06:15 Dose:  100 mcg/hr, 20 mls/hr


Heparin Sodium/Dextrose (Heparin Infusion -)  25,000 units in 500 mls @ 20 mls/

hr IVPB TITR [facility]; Protocol


   Last Titration: 11/11/18 09:57 Dose:  500 units/hr, 10 mls/hr


Lactulose (Cephulac (Oral Use))  20 gm PO TID [facility]


   Last Admin: 11/11/18 06:10 Dose:  20 gm


Metoprolol Tartrate (Lopressor -)  25 mg PO BID [facility]


   Last Admin: 11/11/18 09:44 Dose:  25 mg


Metoprolol Tartrate (Lopressor Injection -)  5 mg IVPUSH Q8H PRN


   PRN Reason: TACHYCARDIA


Pantoprazole Sodium (Protonix Iv)  40 mg IVPUSH DAILY [facility]


   Last Admin: 11/11/18 09:48 Dose:  40 mg


Thiamine HCl (Vitamin B1 -)  100 mg PO DAILY [facility]


   Last Admin: 11/11/18 09:44 Dose:  100 mg











Constitutional: Yes: Intubated, not sedated, minimally responsive  


Cardiovascular: Yes: Pulse Irregular, AFib 


Respiratory: Yes: Intubated and sedated, basilar rhonhci Left > Right  


Gastrointestinal: Yes: Normal Bowel Sounds, Soft


Musculoskeletal: Yes: WNL


Extremities: Yes: WNL


Edema: No


Peripheral Pulses WNL: Yes


Neurological: Yes: Minimally responsive 


Labs: 


  


  


  


  Laboratory Results - last 24 hr











  11/09/18 11/11/18 11/11/18





  05:30 06:00 06:00


 


WBC   


 


RBC   


 


Hgb   


 


Hct   


 


MCV   


 


MCH   


 


MCHC   


 


RDW   


 


Plt Count   


 


MPV   


 


Absolute Neuts (auto)   


 


Neutrophils %   


 


Lymphocytes %   


 


Monocytes %   


 


Eosinophils %   


 


Basophils %   


 


Nucleated RBC %   


 


PT with INR    16.10 H


 


INR    1.36 H


 


PTT (Actin FS)   87.7 H 


 


Fibrinogen   


 


Puncture Site   


 


ABG pH   


 


ABG pCO2 at Pt Temp   


 


ABG pO2 at Pt Temp   


 


ABG HCO3   


 


ABG O2 Sat (Measured)   


 


ABG O2 Content   


 


ABG Base Excess   


 


Chacho Test   


 


O2 Delivery Device   


 


Oxygen Flow Rate   


 


Vent Mode   


 


Vent Rate   


 


Mechanical Rate   


 


PEEP   


 


Pressure Support Vent   


 


Sodium   


 


Potassium   


 


Chloride   


 


Carbon Dioxide   


 


Anion Gap   


 


BUN   


 


Creatinine   


 


Creat Clearance w eGFR   


 


Random Glucose   


 


Lactic Acid   


 


Calcium   


 


Phosphorus   


 


Magnesium   


 


Total Bilirubin   


 


AST   


 


ALT   


 


Alkaline Phosphatase   


 


Troponin I   


 


Total Protein   


 


Albumin   


 


IgG  3581 H  














  11/11/18 11/11/18 11/11/18





  06:00 06:00 06:00


 


WBC   8.3 


 


RBC   2.62 L 


 


Hgb   7.9 L 


 


Hct   23.7 L 


 


MCV   90.6 


 


MCH   30.1 


 


MCHC   33.2 


 


RDW   17.0 H 


 


Plt Count   134 


 


MPV   9.2 


 


Absolute Neuts (auto)   6.6 


 


Neutrophils %   79.5 


 


Lymphocytes %   17.5 


 


Monocytes %   2.3 L 


 


Eosinophils %   0.3  D 


 


Basophils %   0.4 


 


Nucleated RBC %   2 H 


 


PT with INR   


 


INR   


 


PTT (Actin FS)   


 


Fibrinogen  290.0  


 


Puncture Site   


 


ABG pH   


 


ABG pCO2 at Pt Temp   


 


ABG pO2 at Pt Temp   


 


ABG HCO3   


 


ABG O2 Sat (Measured)   


 


ABG O2 Content   


 


ABG Base Excess   


 


Chacho Test   


 


O2 Delivery Device   


 


Oxygen Flow Rate   


 


Vent Mode   


 


Vent Rate   


 


Mechanical Rate   


 


PEEP   


 


Pressure Support Vent   


 


Sodium    141


 


Potassium    4.8


 


Chloride    114 H


 


Carbon Dioxide    19 L


 


Anion Gap    9


 


BUN    52 H


 


Creatinine    2.7 H


 


Creat Clearance w eGFR    22.91


 


Random Glucose    174 H


 


Lactic Acid   


 


Calcium    6.5 L*


 


Phosphorus    3.8


 


Magnesium    2.4


 


Total Bilirubin    0.7


 


AST    54 H


 


ALT    45


 


Alkaline Phosphatase    145 H


 


Troponin I    0.19 H


 


Total Protein    8.2


 


Albumin    2.6 L


 


IgG   














  11/11/18 11/11/18





  06:00 06:00


 


WBC  


 


RBC  


 


Hgb  


 


Hct  


 


MCV  


 


MCH  


 


MCHC  


 


RDW  


 


Plt Count  


 


MPV  


 


Absolute Neuts (auto)  


 


Neutrophils %  


 


Lymphocytes %  


 


Monocytes %  


 


Eosinophils %  


 


Basophils %  


 


Nucleated RBC %  


 


PT with INR  


 


INR  


 


PTT (Actin FS)  


 


Fibrinogen  


 


Puncture Site   Right radial


 


ABG pH   7.21 L*


 


ABG pCO2 at Pt Temp   45.4 H


 


ABG pO2 at Pt Temp   390.0 H* D


 


ABG HCO3   17.6 L


 


ABG O2 Sat (Measured)   99.3 H


 


ABG O2 Content   13.3 L


 


ABG Base Excess   -9.2 L


 


Chacho Test   Positive


 


O2 Delivery Device   Vent


 


Oxygen Flow Rate   100%


 


Vent Mode   A/c


 


Vent Rate   14


 


Mechanical Rate   Yes


 


PEEP   5.0


 


Pressure Support Vent   500


 


Sodium  


 


Potassium  


 


Chloride  


 


Carbon Dioxide  


 


Anion Gap  


 


BUN  


 


Creatinine  


 


Creat Clearance w eGFR  


 


Random Glucose  


 


Lactic Acid  0.7 


 


Calcium  


 


Phosphorus  


 


Magnesium  


 


Total Bilirubin  


 


AST  


 


ALT  


 


Alkaline Phosphatase  


 


Troponin I  


 


Total Protein  


 


Albumin  


 


IgG  











Problem List


Acute Respiratory Failure 


Suspected Aspiration 


HAM (acute kidney injury)


Low suspicion for Hyperviscosity 


Atrial Fibrillation with RVR 


AMS 


Anemia 


No PTX on CXR 








PLAN: 


AC Mode of vent 


PEEP 5 cm H2O   


Continue AC 


Strict I & O


Monitor CXR 


Monitor off sedation  


ABX coverage 


Rate control 


Plasmapheresis per Heme


Enteral feeds


SBTs as tolerated: Need to discuss GOC with family as he may need a Trach 

versus compassionate extubation 





Dr Blum 


Critical care time spent in reviewing chart, evaluating patient and formulating 

plan - 36 minutes.

## 2018-11-11 NOTE — PN
Progress Note, Physician


History of Present Illness: 





 Intubated on mechanical ventilation


 Opens eyes to tactile stimulus but does not make eye contact


 Temp 102 yesterday


 Sputum c/s  (R) xanthomonas


 





 


 


 





- Current Medication List


Current Medications: 


Active Medications





Acetaminophen (Tylenol Suppository -)  650 mg [place] Q6H PRN


   PRN Reason: FEVER


   Last Admin: 11/05/18 18:10 Dose:  650 mg


Acetaminophen (Tylenol -)  650 mg PO Q6H PRN


   PRN Reason: PAIN LEVEL 1 - 3


Allopurinol (Zyloprim -)  100 mg PO BID [facility]


   Last Admin: 11/11/18 09:44 Dose:  100 mg


Chlorhexidine Gluconate (Peridex -)  15 ml MM BID [facility]


   Last Admin: 11/11/18 09:44 Dose:  15 ml


Cholecalciferol (Vitamin D3 -)  1,000 unit PO DAILY [facility]


Dexamethasone Sodium Phosphate (Decadron Injection -)  10 mg IVPUSH BID [facility]


   Last Admin: 11/11/18 09:47 Dose:  10 mg


Diphenhydramine HCl (Benadryl Injection -)  25 mg IVPB ONCE PRN


   PRN Reason: DURING PLASMAPHERESIS


Ergocalciferol (Drisdol Oral Solution -)  8,000 units PO DAILY [facility]


   Stop: 11/17/18 08:37


Heparin Sodium (Porcine) (Hep-Lock -)  5 ml IVPUSH PRN PRN


   PRN Reason: Heparin


Heparin Sodium (Porcine) (Heparin -)  1,000 unit IVPUSH PRN PRN


   PRN Reason: Heparin


Heparin Sodium (Porcine) (Heparin -)  5,000 unit IVPUSH PRN PRN


   PRN Reason: Heparin


Vancomycin HCl (Vancomycin (Pre-Docked))  1,000 mg in 250 mls @ 166.667 mls/hr 

IVPB Q24H AVA; Protocol


   Last Admin: 11/10/18 19:52 Dose:  166.667 mls/hr


Piperacillin Sod/Tazobactam (Sod 2.25 gm/ Dextrose)  50 mls @ 100 mls/hr IVPB 

Q8H-IV AVA; Protocol


   Last Admin: 11/11/18 09:43 Dose:  100 mls/hr


Fentanyl 500 mcg/ Dextrose  100 mls @ 5 mls/hr IVPB TITR [facility]; Protocol


   Last Admin: 11/11/18 06:15 Dose:  100 mcg/hr, 20 mls/hr


Heparin Sodium/Dextrose (Heparin Infusion -)  25,000 units in 500 mls @ 20 mls/

hr IVPB TITR [facility]; Protocol


   Last Titration: 11/11/18 09:57 Dose:  500 units/hr, 10 mls/hr


Lactulose (Cephulac (Oral Use))  20 gm PO TID [facility]


   Last Admin: 11/11/18 06:10 Dose:  20 gm


Metoprolol Tartrate (Lopressor -)  25 mg PO BID [facility]


   Last Admin: 11/11/18 09:44 Dose:  25 mg


Metoprolol Tartrate (Lopressor Injection -)  5 mg IVPUSH Q8H PRN


   PRN Reason: TACHYCARDIA


Pantoprazole Sodium (Protonix Iv)  40 mg IVPUSH DAILY [facility]


   Last Admin: 11/11/18 09:48 Dose:  40 mg


Thiamine HCl (Vitamin B1 -)  100 mg PO DAILY [facility]


   Last Admin: 11/11/18 09:44 Dose:  100 mg











- Objective


Vital Signs: 


 Vital Signs











Temperature  98.4 F   11/11/18 10:00


 


Pulse Rate  70   11/11/18 10:00


 


Respiratory Rate  14   11/11/18 10:00


 


Blood Pressure  90/60   11/11/18 10:00


 


O2 Sat by Pulse Oximetry (%)  100   11/11/18 09:42











Constitutional: Yes: No Distress


Eyes: Yes: Conjunctiva Clear


Cardiovascular: Yes: Regular Rate and Rhythm, S1, S2


Respiratory: Yes: Mechanically Ventilated


Gastrointestinal: Yes: Normal Bowel Sounds, Soft.  No: Tenderness


Edema: No


Labs: 


 CBC, BMP





 11/11/18 06:00 





 11/11/18 06:00 





 INR, PTT











INR  1.36  (0.83-1.09)  H  11/11/18  06:00    


 


Fibrinogen  290.0 mg/dL (238-498)   11/11/18  06:00    














Assessment/Plan


Respiratory failure


 RLL pneumonia


 Toxic metabolic encephalopathy


 Hypercalcemia


 Renal failure


 Myeloma


 Hyperviscosity syndrome


 


  


 Will switch antibiotic to cover sputum isolate,


   adjusted for renal failure


 Avoid quinolone with prolonged QT


 Ventilatory support

## 2018-11-11 NOTE — PN
Progress Note (short form)





- Note


Progress Note: 








Patient seen and examined 


Remains intubated 


Lethargic, non responsive , with withdrawl to painful stimuli 


  


 Vital Signs











 Period  Temp  Pulse  Resp  BP Sys/Varghese  Pulse Ox


 


 Last 24 Hr  98.4 F-102 F    14-27  /60-75  




















Oropharynx: ET tube


Ext:No significant edemaheel pads


Skin: No rashes, Integument intact


  


 CBC, BMP





 11/11/18 06:00 





 11/11/18 06:00 





Active Medications











Generic Name Dose Route Start Last Admin





  Trade Name Freq  PRN Reason Stop Dose Admin


 


Acetaminophen  650 mg  11/01/18 05:44  11/05/18 18:10





  Tylenol Suppository -  OR   650 mg





  Q6H PRN   Administration





  FEVER   





     





     





     


 


Acetaminophen  650 mg  11/10/18 14:09  





  Tylenol -  PO   





  Q6H PRN   





  PAIN LEVEL 1 - 3   





     





     





     


 


Allopurinol  100 mg  11/09/18 10:00  11/11/18 09:44





  Zyloprim -  PO   100 mg





  BID [facility]   Administration





     





     





     





     


 


Chlorhexidine Gluconate  15 ml  11/10/18 23:45  11/11/18 09:44





  Peridex -  MM   15 ml





  BID [facility]   Administration





     





     





     





     


 


Cholecalciferol  1,000 unit  11/18/18 10:00  





  Vitamin D3 -  PO   





  DAILY Counts include 234 beds at the [facility]   





     





     





     





     


 


Dexamethasone Sodium Phosphate  10 mg  11/10/18 12:15  11/11/18 09:47





  Decadron Injection -  IVPUSH   10 mg





  BID [facility]   Administration





     





     





     





     


 


Diphenhydramine HCl  25 mg  11/09/18 12:00  





  Benadryl Injection -  IVPB   





  ONCE PRN   





  DURING PLASMAPHERESIS   





     





     





     


 


Ergocalciferol  8,000 units  11/11/18 10:00  





  Drisdol Oral Solution -  PO  11/17/18 08:37  





  DAILY [facility]   





     





     





     





     


 


Heparin Sodium (Porcine)  5 ml  11/07/18 15:26  





  Hep-Lock -  IVPUSH   





  PRN PRN   





  Heparin   





     





     





     


 


Heparin Sodium (Porcine)  1,000 unit  11/08/18 06:38  





  Heparin -  IVPUSH   





  PRN PRN   





  Heparin   





     





     





     


 


Heparin Sodium (Porcine)  5,000 unit  11/08/18 06:38  





  Heparin -  IVPUSH   





  PRN PRN   





  Heparin   





     





     





     


 


Vancomycin HCl  1,000 mg in 250 mls @ 166.667 mls/hr  10/31/18 20:00  11/10/18 

19:52





  Vancomycin (Pre-Docked)  IVPB   166.667 mls/hr





  Q24H AVA   Administration





     





     





  Protocol   





     


 


Piperacillin Sod/Tazobactam  50 mls @ 100 mls/hr  11/06/18 11:00  11/11/18 09:43





  Sod 2.25 gm/ Dextrose  IVPB   100 mls/hr





  Q8H-IV AVA   Administration





     





     





  Protocol   





     


 


Fentanyl 500 mcg/ Dextrose  100 mls @ 5 mls/hr  11/07/18 11:45  11/11/18 06:15





  IVPB   100 mcg/hr





  TITR AVA   20 mls/hr





     Administration





     





  Protocol   





  25 MCG/HR   


 


Heparin Sodium/Dextrose  25,000 units in 500 mls @ 20 mls/hr  11/08/18 06:45  11 /11/18 09:57





  Heparin Infusion -  IVPB   500 units/hr





  TITR AVA   10 mls/hr





     Titration





     





  Protocol   





  1,000 UNITS/HR   


 


Lactulose  20 gm  11/09/18 09:38  11/11/18 06:10





  Cephulac (Oral Use)  PO   20 gm





  TID [facility]A   Administration





     





     





     





     


 


Metoprolol Tartrate  25 mg  11/09/18 13:15  11/11/18 09:44





  Lopressor -  PO   25 mg





  BID [facility]   Administration





     





     





     





     


 


Metoprolol Tartrate  5 mg  11/09/18 13:04  





  Lopressor Injection -  IVPUSH   





  Q8H PRN   





  TACHYCARDIA   





     





     





     


 


Pantoprazole Sodium  40 mg  11/07/18 12:00  11/11/18 09:48





  Protonix Iv  IVPUSH   40 mg





  DAILY [facility]   Administration





     





     





     





     


 


Thiamine HCl  100 mg  10/30/18 22:12  11/11/18 09:44





  Vitamin B1 -  PO   100 mg





  DAILY [facility]   Administration





     





     





     





     











Impression:





Patient fulfills new criteria  for multiple myeloma with free kappa/ free 

lambda light chain  ratio of 432 (>100 is diagnostic of myeloma) 


Patient has had decrease in paraprotein IgG from > 6700---> 3100 with pheresis 


High free kappa levels may be contributing to renal insufficiency


Unusual  unexplained phenomenon of myeloma and hypercalcemia with elevated 

blood level of ammonia not associated with liver disease. ( Patients ammonia 

level is 91)





Plan 


More plasmapheresis is unlikely to be helpful


IgG is typically monomeric and unusually associated with hyperviscosity. Await 

results


Started dexamethasone 10 mg iv (equivalent to prednisone 60) q day along with 

PPI. Continue for 4 days as pulse dex and see whether there is improvement if 

not probably had anoxic brain injury


will also recommned acyclovir iv prophylaxis (even though mostly associated 

with bortezomib treatment)

## 2018-11-11 NOTE — PN
Physical Exam: 


SUBJECTIVE: Patient seen and examined


patient on bed off from sedation 


didn't respond to pain full stimuli but opened his eyes a little bit on sternal 

rub. 











OBJECTIVE:





 Vital Signs











 Period  Temp  Pulse  Resp  BP Sys/Varghese  Pulse Ox


 


 Last 24 Hr  98.4 F-102 F    14-27  /60-75  











GENERAL: intubated, not on sedation,


HEAD: Normal with no signs of trauma.


EYES: PERRL, sclera anicteric, conjunctiva clear. 


NECK: Trachea midline. 


LUNGS: bilateral rhonchi


HEART: Regular rate and rhythm, S1, S2 normal 


ABDOMEN: Soft, nondistended.


EXTREMITIES: 2+ pulses, warm, well-perfused, no edema, scattered ulcers. 


SKIN: Warm, dry, 











 Laboratory Results - last 24 hr











  11/09/18 11/11/18 11/11/18





  05:30 06:00 06:00


 


WBC   


 


RBC   


 


Hgb   


 


Hct   


 


MCV   


 


MCH   


 


MCHC   


 


RDW   


 


Plt Count   


 


MPV   


 


Absolute Neuts (auto)   


 


Neutrophils %   


 


Neutrophils % (Manual)   


 


Band Neutrophils %   


 


Lymphocytes %   


 


Lymphocytes % (Manual)   


 


Monocytes %   


 


Monocytes % (Manual)   


 


Eosinophils %   


 


Eosinophils % (Manual)   


 


Basophils %   


 


Basophils % (Manual)   


 


Myelocytes % (Man)   


 


Promyelocytes % (Man)   


 


Blast Cells % (Manual)   


 


Nucleated RBC %   


 


Metamyelocytes   


 


Hypochromia   


 


Platelet Estimate   


 


Polychromasia   


 


Poikilocytosis   


 


Anisocytosis   


 


Microcytosis   


 


Macrocytosis   


 


Spherocytes   


 


Tear Drop Cells   


 


Felton Cells   


 


PT with INR    16.10 H


 


INR    1.36 H


 


PTT (Actin FS)   87.7 H 


 


Fibrinogen   


 


Puncture Site   


 


ABG pH   


 


ABG pCO2 at Pt Temp   


 


ABG pO2 at Pt Temp   


 


ABG HCO3   


 


ABG O2 Sat (Measured)   


 


ABG O2 Content   


 


ABG Base Excess   


 


Chacho Test   


 


O2 Delivery Device   


 


Oxygen Flow Rate   


 


Vent Mode   


 


Vent Rate   


 


Mechanical Rate   


 


PEEP   


 


Pressure Support Vent   


 


Sodium   


 


Potassium   


 


Chloride   


 


Carbon Dioxide   


 


Anion Gap   


 


BUN   


 


Creatinine   


 


Creat Clearance w eGFR   


 


Random Glucose   


 


Lactic Acid   


 


Calcium   


 


Phosphorus   


 


Magnesium   


 


Total Bilirubin   


 


AST   


 


ALT   


 


Alkaline Phosphatase   


 


Troponin I   


 


Total Protein   


 


Albumin   


 


IgG  3581 H  














  11/11/18 11/11/18 11/11/18





  06:00 06:00 06:00


 


WBC   8.3 


 


RBC   2.62 L 


 


Hgb   7.9 L 


 


Hct   23.7 L 


 


MCV   90.6 


 


MCH   30.1 


 


MCHC   33.2 


 


RDW   17.0 H 


 


Plt Count   134 


 


MPV   9.2 


 


Absolute Neuts (auto)   6.6 


 


Neutrophils %   79.5 


 


Neutrophils % (Manual)   76.8 


 


Band Neutrophils %   3.0 


 


Lymphocytes %   17.5 


 


Lymphocytes % (Manual)   11.1  D 


 


Monocytes %   2.3 L 


 


Monocytes % (Manual)   3 L 


 


Eosinophils %   0.3  D 


 


Eosinophils % (Manual)   3.1 


 


Basophils %   0.4 


 


Basophils % (Manual)   0.0 


 


Myelocytes % (Man)   1  D 


 


Promyelocytes % (Man)   0 


 


Blast Cells % (Manual)   0 


 


Nucleated RBC %   2 H 


 


Metamyelocytes   0  D 


 


Hypochromia   0 


 


Platelet Estimate   Decreased 


 


Polychromasia   2+ 


 


Poikilocytosis   0 


 


Anisocytosis   2+ 


 


Microcytosis   1+ 


 


Macrocytosis   1+ 


 


Spherocytes   2+ 


 


Tear Drop Cells   1+ 


 


Jeanette Cells   1+ 


 


PT with INR   


 


INR   


 


PTT (Actin FS)   


 


Fibrinogen  290.0  


 


Puncture Site   


 


ABG pH   


 


ABG pCO2 at Pt Temp   


 


ABG pO2 at Pt Temp   


 


ABG HCO3   


 


ABG O2 Sat (Measured)   


 


ABG O2 Content   


 


ABG Base Excess   


 


Chacho Test   


 


O2 Delivery Device   


 


Oxygen Flow Rate   


 


Vent Mode   


 


Vent Rate   


 


Mechanical Rate   


 


PEEP   


 


Pressure Support Vent   


 


Sodium    141


 


Potassium    4.8


 


Chloride    114 H


 


Carbon Dioxide    19 L


 


Anion Gap    9


 


BUN    52 H


 


Creatinine    2.7 H


 


Creat Clearance w eGFR    22.91


 


Random Glucose    174 H


 


Lactic Acid   


 


Calcium    6.5 L*


 


Phosphorus    3.8


 


Magnesium    2.4


 


Total Bilirubin    0.7


 


AST    54 H


 


ALT    45


 


Alkaline Phosphatase    145 H


 


Troponin I    0.19 H


 


Total Protein    8.2


 


Albumin    2.6 L


 


IgG   














  11/11/18 11/11/18





  06:00 06:00


 


WBC  


 


RBC  


 


Hgb  


 


Hct  


 


MCV  


 


MCH  


 


MCHC  


 


RDW  


 


Plt Count  


 


MPV  


 


Absolute Neuts (auto)  


 


Neutrophils %  


 


Neutrophils % (Manual)  


 


Band Neutrophils %  


 


Lymphocytes %  


 


Lymphocytes % (Manual)  


 


Monocytes %  


 


Monocytes % (Manual)  


 


Eosinophils %  


 


Eosinophils % (Manual)  


 


Basophils %  


 


Basophils % (Manual)  


 


Myelocytes % (Man)  


 


Promyelocytes % (Man)  


 


Blast Cells % (Manual)  


 


Nucleated RBC %  


 


Metamyelocytes  


 


Hypochromia  


 


Platelet Estimate  


 


Polychromasia  


 


Poikilocytosis  


 


Anisocytosis  


 


Microcytosis  


 


Macrocytosis  


 


Spherocytes  


 


Tear Drop Cells  


 


Jeanette Cells  


 


PT with INR  


 


INR  


 


PTT (Actin FS)  


 


Fibrinogen  


 


Puncture Site   Right radial


 


ABG pH   7.21 L*


 


ABG pCO2 at Pt Temp   45.4 H


 


ABG pO2 at Pt Temp   390.0 H* D


 


ABG HCO3   17.6 L


 


ABG O2 Sat (Measured)   99.3 H


 


ABG O2 Content   13.3 L


 


ABG Base Excess   -9.2 L


 


Chacho Test   Positive


 


O2 Delivery Device   Vent


 


Oxygen Flow Rate   100%


 


Vent Mode   A/c


 


Vent Rate   14


 


Mechanical Rate   Yes


 


PEEP   5.0


 


Pressure Support Vent   500


 


Sodium  


 


Potassium  


 


Chloride  


 


Carbon Dioxide  


 


Anion Gap  


 


BUN  


 


Creatinine  


 


Creat Clearance w eGFR  


 


Random Glucose  


 


Lactic Acid  0.7 


 


Calcium  


 


Phosphorus  


 


Magnesium  


 


Total Bilirubin  


 


AST  


 


ALT  


 


Alkaline Phosphatase  


 


Troponin I  


 


Total Protein  


 


Albumin  


 


IgG  








Active Medications











Generic Name Dose Route Start Last Admin





  Trade Name Freq  PRN Reason Stop Dose Admin


 


Acetaminophen  650 mg  11/01/18 05:44  11/05/18 18:10





  Tylenol Suppository -  UT   650 mg





  Q6H PRN   Administration





  FEVER   





     





     





     


 


Acetaminophen  650 mg  11/10/18 14:09  





  Tylenol -  PO   





  Q6H PRN   





  PAIN LEVEL 1 - 3   





     





     





     


 


Allopurinol  100 mg  11/09/18 10:00  11/11/18 09:44





  Zyloprim -  PO   100 mg





  BID [facility]   Administration





     





     





     





     


 


Chlorhexidine Gluconate  15 ml  11/10/18 23:45  11/11/18 09:44





  Peridex -  MM   15 ml





  BID [facility]   Administration





     





     





     





     


 


Cholecalciferol  1,000 unit  11/18/18 10:00  





  Vitamin D3 -  PO   





  DAILY [facility]   





     





     





     





     


 


Dexamethasone Sodium Phosphate  10 mg  11/10/18 12:15  11/11/18 09:47





  Decadron Injection -  IVPUSH   10 mg





  BID [facility]   Administration





     





     





     





     


 


Diphenhydramine HCl  25 mg  11/09/18 12:00  





  Benadryl Injection -  IVPB   





  ONCE PRN   





  DURING PLASMAPHERESIS   





     





     





     


 


Ergocalciferol  8,000 units  11/11/18 10:00  





  Drisdol Oral Solution -  PO  11/17/18 08:37  





  DAILY [facility]   





     





     





     





     


 


Heparin Sodium (Porcine)  5 ml  11/07/18 15:26  





  Hep-Lock -  IVPUSH   





  PRN PRN   





  Heparin   





     





     





     


 


Heparin Sodium (Porcine)  1,000 unit  11/08/18 06:38  





  Heparin -  IVPUSH   





  PRN PRN   





  Heparin   





     





     





     


 


Heparin Sodium (Porcine)  5,000 unit  11/08/18 06:38  





  Heparin -  IVPUSH   





  PRN PRN   





  Heparin   





     





     





     


 


Fentanyl 500 mcg/ Dextrose  100 mls @ 5 mls/hr  11/07/18 11:45  11/11/18 09:00





  IVPB   25 mcg/hr





  TITR [facility]   5 mls/hr





     Titration





     





  Protocol   





  25 MCG/HR   


 


Heparin Sodium/Dextrose  25,000 units in 500 mls @ 20 mls/hr  11/08/18 06:45  11 /11/18 09:57





  Heparin Infusion -  IVPB   500 units/hr





  TITR [facility]   10 mls/hr





     Titration





     





  Protocol   





  1,000 UNITS/HR   


 


Ceftazidime 1 gm/ Dextrose  50 mls @ 200 mls/hr  11/11/18 12:00  





  IVPB   





  DAILY [facility]   





     





     





  Protocol   





     


 


Lactulose  20 gm  11/09/18 09:38  11/11/18 06:10





  Cephulac (Oral Use)  PO   20 gm





  TID [facility]   Administration





     





     





     





     


 


Metoprolol Tartrate  25 mg  11/09/18 13:15  11/11/18 09:44





  Lopressor -  PO   25 mg





  BID [facility]   Administration





     





     





     





     


 


Metoprolol Tartrate  5 mg  11/09/18 13:04  





  Lopressor Injection -  IVPUSH   





  Q8H PRN   





  TACHYCARDIA   





     





     





     


 


Pantoprazole Sodium  40 mg  11/07/18 12:00  11/11/18 09:48





  Protonix Iv  IVPUSH   40 mg





  DAILY [facility]   Administration





     





     





     





     


 


Thiamine HCl  100 mg  10/30/18 22:12  11/11/18 09:44





  Vitamin B1 -  PO   100 mg





  DAILY [facility]   Administration





     





     





     





     











ASSESSMENT/PLAN:





Problem List


Low suspicion for Hyperviscosity 


Atrial Fibrillation with RVR 


AMS 


Acute Respiratory Failure 


Suspected Aspiration 


HAM (acute kidney injury)


Anemia 


No PTX on CXR 








PLAN: 


On ventilator support AC mode- PEEP 5 cm H2O   


Monitor vitals 


monitor intake - output 


repeat cxr in am 


repeat abg in am 


Monitor off sedation  


ABX coverage as per ID 


Rate control 


Plasmapheresis per Heme


Enteral feeds


protonix for gi prophylaxis














Visit type





- Emergency Visit


Emergency Visit: Yes


ED Registration Date: 10/30/18


Care time: The patient presented to the Emergency Department on the above date 

and was hospitalized for further evaluation of their emergent condition.





- New Patient


This patient is new to me today: Yes


Date on this admission: 11/12/18





- Critical Care


Critical Care patient: Yes


Total Critical Care Time (in minutes): 45


Critical Care Statement: The care of this patient involved high complexity 

decision making to prevent further life threatening deterioration of the patient

's condition and/or to evaluate & treat vital organ system(s) failure or risk 

of failure.

## 2018-11-11 NOTE — PN
Progress Note, Physician


Chief Complaint: 





Events noted


Remains on mechanical ventilation


History of Present Illness: 





Patient was seen and examined in ICU. Remains on vent support. Chart was 

reviewed








- Current Medication List


Current Medications: 


Active Medications





Acetaminophen (Tylenol Suppository -)  650 mg [place] Q6H PRN


   PRN Reason: FEVER


   Last Admin: 11/05/18 18:10 Dose:  650 mg


Acetaminophen (Tylenol -)  650 mg PO Q6H PRN


   PRN Reason: PAIN LEVEL 1 - 3


Allopurinol (Zyloprim -)  100 mg PO BID [facility]


   Last Admin: 11/11/18 09:44 Dose:  100 mg


Chlorhexidine Gluconate (Peridex -)  15 ml MM BID [facility]


   Last Admin: 11/11/18 09:44 Dose:  15 ml


Cholecalciferol (Vitamin D3 -)  1,000 unit PO DAILY [facility]


Dexamethasone Sodium Phosphate (Decadron Injection -)  10 mg IVPUSH BID [facility]


   Last Admin: 11/11/18 09:47 Dose:  10 mg


Diphenhydramine HCl (Benadryl Injection -)  25 mg IVPB ONCE PRN


   PRN Reason: DURING PLASMAPHERESIS


Ergocalciferol (Drisdol Oral Solution -)  8,000 units PO DAILY [facility]


   Stop: 11/17/18 08:37


Heparin Sodium (Porcine) (Hep-Lock -)  5 ml IVPUSH PRN PRN


   PRN Reason: Heparin


Heparin Sodium (Porcine) (Heparin -)  1,000 unit IVPUSH PRN PRN


   PRN Reason: Heparin


Heparin Sodium (Porcine) (Heparin -)  5,000 unit IVPUSH PRN PRN


   PRN Reason: Heparin


Vancomycin HCl (Vancomycin (Pre-Docked))  1,000 mg in 250 mls @ 166.667 mls/hr 

IVPB Q24H [facility]; Protocol


   Last Admin: 11/10/18 19:52 Dose:  166.667 mls/hr


Piperacillin Sod/Tazobactam (Sod 2.25 gm/ Dextrose)  50 mls @ 100 mls/hr IVPB 

Q8H-IV [facility]; Protocol


   Last Admin: 11/11/18 09:43 Dose:  100 mls/hr


Fentanyl 500 mcg/ Dextrose  100 mls @ 5 mls/hr IVPB TITR [facility]; Protocol


   Last Admin: 11/11/18 06:15 Dose:  100 mcg/hr, 20 mls/hr


Heparin Sodium/Dextrose (Heparin Infusion -)  25,000 units in 500 mls @ 20 mls/

hr IVPB TITR [facility]; Protocol


   Last Titration: 11/11/18 09:57 Dose:  500 units/hr, 10 mls/hr


Lactulose (Cephulac (Oral Use))  20 gm PO TID [facility]


   Last Admin: 11/11/18 06:10 Dose:  20 gm


Metoprolol Tartrate (Lopressor -)  25 mg PO BID [facility]


   Last Admin: 11/11/18 09:44 Dose:  25 mg


Metoprolol Tartrate (Lopressor Injection -)  5 mg IVPUSH Q8H PRN


   PRN Reason: TACHYCARDIA


Pantoprazole Sodium (Protonix Iv)  40 mg IVPUSH DAILY [facility]


   Last Admin: 11/11/18 09:48 Dose:  40 mg


Thiamine HCl (Vitamin B1 -)  100 mg PO DAILY [facility]


   Last Admin: 11/11/18 09:44 Dose:  100 mg











- Objective


Vital Signs: 


 Vital Signs











Temperature  98.4 F   11/11/18 10:00


 


Pulse Rate  70   11/11/18 10:00


 


Respiratory Rate  14   11/11/18 10:00


 


Blood Pressure  90/60   11/11/18 10:00


 


O2 Sat by Pulse Oximetry (%)  100   11/11/18 09:42











HENT: Yes: Atraumatic


Neck: Yes: Supple


Cardiovascular: Yes: Pulse Irregular, S1, S2


Respiratory: Yes: Diminished, Mechanically Ventilated


Gastrointestinal: Yes: Normal Bowel Sounds, Soft.  No: Tenderness


Edema: No


Labs: 


 CBC, BMP





 11/11/18 06:00 





 11/11/18 06:00 





 INR, PTT











INR  1.36  (0.83-1.09)  H  11/11/18  06:00    


 


Fibrinogen  290.0 mg/dL (238-498)   11/11/18  06:00    














- ....Imaging


Chest X-ray: Pending





Problem List





- Problems


(1) Anemia


Code(s): D64.9 - ANEMIA, UNSPECIFIED   





(2) Atrial fibrillation with RVR


Code(s): I48.91 - UNSPECIFIED ATRIAL FIBRILLATION   





(3) Hyperlipidemia


Code(s): E78.5 - HYPERLIPIDEMIA, UNSPECIFIED   


Qualifiers: 


   Hyperlipidemia type: pure hypercholesterolemia   Qualified Code(s): E78.00 - 

Pure hypercholesterolemia, unspecified; E78.0 - Pure hypercholesterolemia   





(4) Multiple myeloma


Code(s): C90.00 - MULTIPLE MYELOMA NOT HAVING ACHIEVED REMISSION   


Qualifiers: 


   Multiple myeloma remission status: not in remission   Qualified Code(s): 

C90.00 - Multiple myeloma not having achieved remission   





(5) Toxic metabolic encephalopathy


Code(s): G92 - TOXIC ENCEPHALOPATHY   





(6) Acute-on-chronic kidney injury


Code(s): N17.9 - ACUTE KIDNEY FAILURE, UNSPECIFIED; N18.9 - CHRONIC KIDNEY 

DISEASE, UNSPECIFIED   


Qualifiers: 


   Acute renal failure type: unspecified   Chronic kidney disease stage: 

unspecified stage   Qualified Code(s): N17.9 - Acute kidney failure, unspecified

; N18.9 - Chronic kidney disease, unspecified   





(7) Demand ischemia


Code(s): I24.8 - OTHER FORMS OF ACUTE ISCHEMIC HEART DISEASE   





(8) History of ETOH abuse


Code(s): Z87.898 - PERSONAL HISTORY OF OTHER SPECIFIED CONDITIONS   





(9) Paraproteinemia


Code(s): D89.2 - HYPERGAMMAGLOBULINEMIA, UNSPECIFIED   





(10) Persistent atrial fibrillation


Code(s): I48.1 - PERSISTENT ATRIAL FIBRILLATION   





(11) Chronic thromboembolic disease


Code(s): I74.9 - EMBOLISM AND THROMBOSIS OF UNSPECIFIED ARTERY   





(12) Coronary artery disease


Code(s): I25.10 - ATHSCL HEART DISEASE OF NATIVE CORONARY ARTERY W/O ANG PCTRS 

  


Qualifiers: 


   Coronary Disease-Associated Artery/Lesion type: native artery   Native vs. 

transplanted heart: native heart   Associated angina: without angina   

Qualified Code(s): I25.10 - Atherosclerotic heart disease of native coronary 

artery without angina pectoris   





(13) Diastolic dysfunction without heart failure


Code(s): I51.9 - HEART DISEASE, UNSPECIFIED   





(14) HTN (hypertension)


Code(s): I10 - ESSENTIAL (PRIMARY) HYPERTENSION   


Qualifiers: 


   Hypertension type: essential hypertension   Qualified Code(s): I10 - 

Essential (primary) hypertension   





(15) Hypertrophic cardiomyopathy


Code(s): I42.2 - OTHER HYPERTROPHIC CARDIOMYOPATHY   





(16) Left ventricular systolic dysfunction


Code(s): I51.9 - HEART DISEASE, UNSPECIFIED   





Assessment/Plan





1. Acute hypoxic respiratory failure, suspect aspiration pneumonia 


2. Toxic metabolic encephalopathy, possible hyperviscosity syndrome and sepsis


2. Acute on CKD


3. Hypercalcemia, paraproteinemia confirmed multiple myeloma, hyperviscosity 

syndrome


4. History of bilateral SFA occlusion post thrombectomy, referable to embolic 

disease related to persistent atrial fibrillation


5. Non obstructive CAD on R&L heart cath/coronary angiography with demand 

ischemia


6. 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 

subendocardial ischemia, compensated/euvolemic


7. Persistent atrial fibrillation with RVR [id number] score of 6 on heparin gtt


8. Labile HTN


9. Poor compliance with medical therapy administration and medical F/U


10. ETOH dependence





PLAN:


1. Heparin drip for now while off Xarelto 15 qd (renal dosing)


2. Lopressor 25 BID as hemodynamics tolerate


3. Plasmapharesis per hematology input


4. Empiric antibiotic course 


5. Enteral feeds, monitor ammonia levels


6. Ventilator management as per Critical Care team





Guarded


Olvin Hobbs MD

## 2018-11-12 LAB
ALBUMIN SERPL-MCNC: 2.2 G/DL (ref 3.4–5)
ALBUMIN SERPL-MCNC: 2.3 G/DL (ref 3.4–5)
ALP SERPL-CCNC: 121 U/L (ref 45–117)
ALP SERPL-CCNC: 127 U/L (ref 45–117)
ALT SERPL-CCNC: 45 U/L (ref 13–61)
ALT SERPL-CCNC: 54 U/L (ref 13–61)
ANION GAP SERPL CALC-SCNC: 6 MMOL/L (ref 8–16)
ANION GAP SERPL CALC-SCNC: 7 MMOL/L (ref 8–16)
ANISOCYTOSIS BLD QL: (no result)
AST SERPL-CCNC: 39 U/L (ref 15–37)
AST SERPL-CCNC: 48 U/L (ref 15–37)
BASO STIPL BLD QL SMEAR: (no result)
BASOPHILS # BLD: 0.2 % (ref 0–2)
BASOPHILS # BLD: 0.3 % (ref 0–2)
BILIRUB SERPL-MCNC: 0.2 MG/DL (ref 0.2–1)
BILIRUB SERPL-MCNC: 0.3 MG/DL (ref 0.2–1)
BUN SERPL-MCNC: 67 MG/DL (ref 7–18)
BUN SERPL-MCNC: 74 MG/DL (ref 7–18)
CALCIUM SERPL-MCNC: 6.4 MG/DL (ref 8.5–10.1)
CALCIUM SERPL-MCNC: 6.5 MG/DL (ref 8.5–10.1)
CHLORIDE SERPL-SCNC: 112 MMOL/L (ref 98–107)
CHLORIDE SERPL-SCNC: 114 MMOL/L (ref 98–107)
CO2 SERPL-SCNC: 20 MMOL/L (ref 21–32)
CO2 SERPL-SCNC: 20 MMOL/L (ref 21–32)
CREAT SERPL-MCNC: 2.8 MG/DL (ref 0.55–1.3)
CREAT SERPL-MCNC: 2.9 MG/DL (ref 0.55–1.3)
DACRYOCYTES BLD QL SMEAR: (no result)
DEPRECATED RDW RBC AUTO: 16.4 % (ref 11.9–15.9)
DEPRECATED RDW RBC AUTO: 16.9 % (ref 11.9–15.9)
EOSINOPHIL # BLD: 0.1 % (ref 0–4.5)
EOSINOPHIL # BLD: 0.2 % (ref 0–4.5)
GLUCOSE SERPL-MCNC: 116 MG/DL (ref 74–106)
GLUCOSE SERPL-MCNC: 180 MG/DL (ref 74–106)
HCT VFR BLD CALC: 20.8 % (ref 35.4–49)
HCT VFR BLD CALC: 21.8 % (ref 35.4–49)
HGB BLD-MCNC: 7 GM/DL (ref 11.7–16.9)
HGB BLD-MCNC: 7.4 GM/DL (ref 11.7–16.9)
LYMPHOCYTES # BLD: 12.6 % (ref 8–40)
LYMPHOCYTES # BLD: 13.2 % (ref 8–40)
MACROCYTES BLD QL: (no result)
MAGNESIUM SERPL-MCNC: 2.5 MG/DL (ref 1.8–2.4)
MCH RBC QN AUTO: 30 PG (ref 25.7–33.7)
MCH RBC QN AUTO: 30.3 PG (ref 25.7–33.7)
MCHC RBC AUTO-ENTMCNC: 33.6 G/DL (ref 32–35.9)
MCHC RBC AUTO-ENTMCNC: 34 G/DL (ref 32–35.9)
MCV RBC: 89.1 FL (ref 80–96)
MCV RBC: 89.2 FL (ref 80–96)
MONOCYTES # BLD AUTO: 3.9 % (ref 3.8–10.2)
MONOCYTES # BLD AUTO: 4.5 % (ref 3.8–10.2)
NEUTROPHILS # BLD: 82.4 % (ref 42.8–82.8)
NEUTROPHILS # BLD: 82.6 % (ref 42.8–82.8)
PHOSPHATE SERPL-MCNC: 3.5 MG/DL (ref 2.5–4.9)
PLATELET # BLD AUTO: 143 K/MM3 (ref 134–434)
PLATELET # BLD AUTO: 161 K/MM3 (ref 134–434)
PLATELET BLD QL SMEAR: (no result)
PLATELET BLD QL SMEAR: ADEQUATE
PMV BLD: 9.4 FL (ref 7.5–11.1)
PMV BLD: 9.6 FL (ref 7.5–11.1)
POTASSIUM SERPLBLD-SCNC: 4.5 MMOL/L (ref 3.5–5.1)
POTASSIUM SERPLBLD-SCNC: 4.6 MMOL/L (ref 3.5–5.1)
PROT SERPL-MCNC: 8.3 G/DL (ref 6.4–8.2)
PROT SERPL-MCNC: 8.6 G/DL (ref 6.4–8.2)
RBC # BLD AUTO: 2.33 M/MM3 (ref 4–5.6)
RBC # BLD AUTO: 2.45 M/MM3 (ref 4–5.6)
SODIUM SERPL-SCNC: 139 MMOL/L (ref 136–145)
SODIUM SERPL-SCNC: 140 MMOL/L (ref 136–145)
WBC # BLD AUTO: 7.7 K/MM3 (ref 4–10)
WBC # BLD AUTO: 8.3 K/MM3 (ref 4–10)

## 2018-11-12 PROCEDURE — 30233N1 TRANSFUSION OF NONAUTOLOGOUS RED BLOOD CELLS INTO PERIPHERAL VEIN, PERCUTANEOUS APPROACH: ICD-10-PCS | Performed by: FAMILY MEDICINE

## 2018-11-12 RX ADMIN — Medication SCH UNITS: at 09:19

## 2018-11-12 RX ADMIN — CHLORHEXIDINE GLUCONATE 0.12% ORAL RINSE SCH ML: 1.2 LIQUID ORAL at 21:17

## 2018-11-12 RX ADMIN — PROPOFOL SCH MLS/HR: 10 INJECTION, EMULSION INTRAVENOUS at 09:17

## 2018-11-12 RX ADMIN — ALLOPURINOL SCH MG: 100 TABLET ORAL at 21:17

## 2018-11-12 RX ADMIN — PROPOFOL SCH: 10 INJECTION, EMULSION INTRAVENOUS at 21:05

## 2018-11-12 RX ADMIN — DEXAMETHASONE SODIUM PHOSPHATE SCH MG: 10 INJECTION, SOLUTION INTRAMUSCULAR; INTRAVENOUS at 21:17

## 2018-11-12 RX ADMIN — CEFTAZIDIME SCH MLS/HR: 1 INJECTION, POWDER, FOR SOLUTION INTRAMUSCULAR; INTRAVENOUS at 09:30

## 2018-11-12 RX ADMIN — CHLORHEXIDINE GLUCONATE 0.12% ORAL RINSE SCH ML: 1.2 LIQUID ORAL at 09:18

## 2018-11-12 RX ADMIN — METOPROLOL TARTRATE SCH: 25 TABLET, FILM COATED ORAL at 09:21

## 2018-11-12 RX ADMIN — LACTULOSE SCH GM: 20 SOLUTION ORAL at 21:17

## 2018-11-12 RX ADMIN — Medication SCH MG: at 09:22

## 2018-11-12 RX ADMIN — LACTULOSE SCH GM: 20 SOLUTION ORAL at 06:26

## 2018-11-12 RX ADMIN — HEPARIN SODIUM SCH: 5000 INJECTION, SOLUTION INTRAVENOUS at 21:03

## 2018-11-12 RX ADMIN — LACTULOSE SCH GM: 20 SOLUTION ORAL at 15:22

## 2018-11-12 RX ADMIN — DEXTROSE MONOHYDRATE SCH: 50 INJECTION, SOLUTION INTRAVENOUS at 21:04

## 2018-11-12 RX ADMIN — METOPROLOL TARTRATE SCH: 25 TABLET, FILM COATED ORAL at 21:17

## 2018-11-12 RX ADMIN — ALLOPURINOL SCH MG: 100 TABLET ORAL at 09:22

## 2018-11-12 RX ADMIN — DEXAMETHASONE SODIUM PHOSPHATE SCH MG: 10 INJECTION, SOLUTION INTRAMUSCULAR; INTRAVENOUS at 09:20

## 2018-11-12 RX ADMIN — DEXTROSE MONOHYDRATE SCH MLS/HR: 50 INJECTION, SOLUTION INTRAVENOUS at 06:26

## 2018-11-12 RX ADMIN — PANTOPRAZOLE SODIUM SCH MG: 40 INJECTION, POWDER, FOR SOLUTION INTRAVENOUS at 09:18

## 2018-11-12 NOTE — PN
Progress Note (short form)





- Note


Progress Note: 





Patient seen and examined





Feels well








 Last Vital Signs











Temp Pulse Resp BP Pulse Ox


 


 99.6 F   109 H  18   96/56 L  100 


 


 11/09/18 02:00  11/09/18 06:06  11/09/18 06:39  11/09/18 06:06  11/08/18 20:33








Cor: RSR, No murmurs, No gallops


Lungs: Clear to P&A


Abd: Soft, Normal bowel sounds, No organomegaly


Ext:No significant edema








Labs/Meds reviewed





A/P





78 year old gentleman with hx of CKD, Afib on A/C, CAD, CKD, Hypertension, 

Hyperlipidemia, PVD who presented with AMS/Confusion and found to have HAM/

hypercalcemia/anemia


acute desaturation 





Clinical picture concerning for myeloma


SFLCA --Kappa chains--2000s/ratio 438


IgG > 7grams


IgM < 5





? hyperviscosity due to paraproteinemia


serum viscosity pending








No improvement in mental status


Holding pheresis


On dexamethasone 20mg daily since 11/10


palliative care consult


neurology follow up

## 2018-11-12 NOTE — PN
Progress Note, Physician





- Current Medication List


Current Medications: 


Active Medications





Acetaminophen (Tylenol Suppository -)  650 mg [place] Q6H PRN


   PRN Reason: FEVER


   Last Admin: 11/05/18 18:10 Dose:  650 mg


Acetaminophen (Tylenol -)  650 mg PO Q6H PRN


   PRN Reason: PAIN LEVEL 1 - 3


Allopurinol (Zyloprim -)  100 mg PO BID [facility]


   Last Admin: 11/12/18 09:22 Dose:  100 mg


Chlorhexidine Gluconate (Peridex -)  15 ml MM BID [facility]


   Last Admin: 11/12/18 09:18 Dose:  15 ml


Cholecalciferol (Vitamin D3 -)  1,000 unit PO DAILY [facility]


Dexamethasone Sodium Phosphate (Decadron Injection -)  10 mg IVPUSH BID [facility]


   Last Admin: 11/12/18 09:20 Dose:  10 mg


Diphenhydramine HCl (Benadryl Injection -)  25 mg IVPB ONCE PRN


   PRN Reason: DURING PLASMAPHERESIS


Ergocalciferol (Drisdol Oral Solution -)  8,000 units PO DAILY [facility]


   Stop: 11/17/18 08:37


   Last Admin: 11/12/18 09:19 Dose:  8,000 units


Heparin Sodium (Porcine) (Hep-Lock -)  5 ml IVPUSH PRN PRN


   PRN Reason: Heparin


Heparin Sodium (Porcine) (Heparin -)  1,000 unit IVPUSH PRN PRN


   PRN Reason: Heparin


Heparin Sodium (Porcine) (Heparin -)  5,000 unit IVPUSH PRN PRN


   PRN Reason: Heparin


Fentanyl 500 mcg/ Dextrose  100 mls @ 5 mls/hr IVPB TITR [facility]; Protocol


   Last Admin: 11/12/18 06:26 Dose:  25 mcg/hr, 5 mls/hr


Heparin Sodium/Dextrose (Heparin Infusion -)  25,000 units in 500 mls @ 20 mls/

hr IVPB TITR [facility]; Protocol


   Last Admin: 11/11/18 15:31 Dose:  500 units/hr, 10 mls/hr


Ceftazidime 1 gm/ Dextrose  50 mls @ 200 mls/hr IVPB DAILY [facility]; Protocol


   Last Admin: 11/12/18 09:30 Dose:  200 mls/hr


Propofol (Diprivan -)  1,000,000 mcg in 100 mls @ 2.479 mls/hr IVPB TITR [facility]; 

Protocol


   Last Admin: 11/12/18 09:17 Dose:  7 mcg/kg/min, 3.471 mls/hr


Lactulose (Cephulac (Oral Use))  20 gm PO TID [facility]


   Last Admin: 11/12/18 06:26 Dose:  20 gm


Metoprolol Tartrate (Lopressor -)  25 mg PO BID [facility]


   Last Admin: 11/12/18 09:21 Dose:  Not Given


Metoprolol Tartrate (Lopressor Injection -)  5 mg IVPUSH Q8H PRN


   PRN Reason: TACHYCARDIA


Pantoprazole Sodium (Protonix Iv)  40 mg IVPUSH DAILY [facility]


   Last Admin: 11/12/18 09:18 Dose:  40 mg


Thiamine HCl (Vitamin B1 -)  100 mg PO DAILY [facility]


   Last Admin: 11/12/18 09:22 Dose:  100 mg











- Objective


Vital Signs: 


 Vital Signs











Temperature  98.2 F   11/12/18 06:00


 


Pulse Rate  84   11/12/18 08:29


 


Respiratory Rate  14   11/12/18 08:42


 


Blood Pressure  106/66   11/12/18 08:00


 


O2 Sat by Pulse Oximetry (%)  97   11/12/18 08:42











Cardiovascular: Yes: S1


Respiratory: Yes: Mechanically Ventilated


Gastrointestinal: Yes: Normal Bowel Sounds, Soft


Neurological: Yes: Unresponsive (on sedation)


Labs: 


 CBC, BMP





 11/12/18 05:30 





 11/12/18 05:30 





 INR, PTT











INR  1.36  (0.83-1.09)  H  11/11/18  06:00    


 


Fibrinogen  290.0 mg/dL (238-498)   11/11/18  06:00    














Problem List





- Problems


(1) Toxic metabolic encephalopathy


Code(s): G92 - TOXIC ENCEPHALOPATHY   





(2) Cellulitis


Code(s): L03.90 - CELLULITIS, UNSPECIFIED   


Qualifiers: 


   Site of cellulitis: extremity   Site of cellulitis of extremity: lower 

extremity   Laterality: right   Qualified Code(s): L03.115 - Cellulitis of 

right lower limb   





(3) Sepsis


Code(s): A41.9 - SEPSIS, UNSPECIFIED ORGANISM   





(4) Artery occlusion


Code(s): I70.90 - UNSPECIFIED ATHEROSCLEROSIS   





(5) Hypercalcemia


Code(s): E83.52 - HYPERCALCEMIA   





(6) Paroxysmal atrial fibrillation


Code(s): I48.0 - PAROXYSMAL ATRIAL FIBRILLATION   





(7) HAM (acute kidney injury)


Code(s): N17.9 - ACUTE KIDNEY FAILURE, UNSPECIFIED   





Assessment/Plan








- Problems


(1) HAM (acute kidney injury)


Assessment/Plan: 


-Nephrology on board


-Cr stable


-monitor trend


-U/S renal/bladder unremarkable


Code(s): N17.9 - ACUTE KIDNEY FAILURE, UNSPECIFIED   





(2) Atrial fibrillation with RVR


Assessment/Plan: 


-On heparin drip


-rate controlled


-cardiology on board


-Tele monitor


Code(s): I48.91 - UNSPECIFIED ATRIAL FIBRILLATION   





(3) Sepsis


Assessment/Plan: 


-upon admission


-ID on board


-Cultures:


 Microbiology





11/05/18 19:45   Blood - Peripheral Venous   Blood Culture - Final


                            NO GROWTH AFTER 5 DAYS INCUBATION


11/05/18 19:20   Blood - Peripheral Venous   Blood Culture - Final


                            NO GROWTH AFTER 5 DAYS INCUBATION


11/06/18 14:30   Sputum - Endotrachea Suction/Ventilator   Gram Stain - Final


11/06/18 14:30   Sputum - Endotrachea Suction/Ventilator   Sputum Culture - 

Preliminary


                            Non Lactose Fermenting Gnb


11/06/18 14:30   Urine - Urine Garces   Legionella Antigen - Final


11/06/18 14:30   Urine - Urine Garces   Streptococcus pneumoniae Antigen (M - 

Final


10/30/18 17:02   Blood - Peripheral Venous   Blood Culture - Final


                            NO GROWTH AFTER 5 DAYS INCUBATION


10/30/18 17:02   Blood - Peripheral Venous   Blood Culture - Final


                            NO GROWTH AFTER 5 DAYS INCUBATION


10/30/18 17:02   Urine - Urine - Catheterized   Urine Culture - Final


                            NO GROWTH OBTAINED


10/31/18 19:15   Nasopharyngeal Swab   Influenza Types A,B Antigen - Final


10/31/18 19:15   Nasopharyngeal Swab    - Final





-On IV Zosyn and Vanco


Code(s): A41.9 - SEPSIS, UNSPECIFIED ORGANISM   





(4) Toxic metabolic encephalopathy


Code(s): G92 - TOXIC ENCEPHALOPATHY   





(5) Altered mental status


Assessment/Plan: 


-CT head x 2 negative


-Seen by Neurology


-multifactorial


Code(s): R41.82 - ALTERED MENTAL STATUS, UNSPECIFIED   





(6) Anemia


Assessment/Plan: 


chronic, at baseline


-Check stool OB-pending


-Iron profile, B12, thyroid profile unremarkable


-monitor trend


-Transfuse only if Hg <7.0 to avoid fluid overload as he is chronically anemic


Code(s): D64.9 - ANEMIA, UNSPECIFIED

## 2018-11-12 NOTE — PN
Teaching Attending Note


Name of Resident: Arnav Bernard





ATTENDING PHYSICIAN STATEMENT





I saw and evaluated the patient.


I reviewed the resident's note and discussed the case with the resident.


I agree with the resident's findings and plan as documented.








SUBJECTIVE:





Pt seen and examined in the ICU. Remains intubated, poorly responsive off 

sedation. Good tidal volumes on CPAP/PS but some accessory muscle use.





OBJECTIVE:





 Vital Signs











 Period  Temp  Pulse  Resp  BP Sys/Varghese  Pulse Ox


 


 Last 24 Hr  98.2 F-98.8 F  74-92  12-21  /52-71  








 Intake & Output











 11/09/18 11/10/18 11/11/18 11/12/18





 23:59 23:59 23:59 23:59


 


Intake Total 2958 1630 2542 1003


 


Output Total 1450 1225 375 500


 


Balance 1338 323 9972 503


 


Weight 80.314 kg 81.675 kg 82.639 kg 83.971 kg








Gen:  intubated, poorly responsive, tachypneic


Heart: RRR


Lung: bilateral rhonchi


Abd: soft, nontender


Ext: no edema





 CBC, BMP





 11/12/18 05:30 





 11/12/18 05:30 





Active Medications





Acetaminophen (Tylenol Suppository -)  650 mg AL Q6H PRN


   PRN Reason: FEVER


   Last Admin: 11/05/18 18:10 Dose:  650 mg


Acetaminophen (Tylenol -)  650 mg PO Q6H PRN


   PRN Reason: PAIN LEVEL 1 - 3


Allopurinol (Zyloprim -)  100 mg PO BID [facility]


   Last Admin: 11/12/18 09:22 Dose:  100 mg


Chlorhexidine Gluconate (Peridex -)  15 ml MM BID [facility]


   Last Admin: 11/12/18 09:18 Dose:  15 ml


Cholecalciferol (Vitamin D3 -)  1,000 unit PO DAILY [facility]


Dexamethasone Sodium Phosphate (Decadron Injection -)  10 mg IVPUSH BID [facility]


   Last Admin: 11/12/18 09:20 Dose:  10 mg


Diphenhydramine HCl (Benadryl Injection -)  25 mg IVPB ONCE PRN


   PRN Reason: DURING PLASMAPHERESIS


Ergocalciferol (Drisdol Oral Solution -)  8,000 units PO DAILY [facility]


   Stop: 11/17/18 08:37


   Last Admin: 11/12/18 09:19 Dose:  8,000 units


Furosemide (Lasix Injection -)  60 mg IVPUSH ONCE ONE


   Stop: 11/12/18 12:10


Heparin Sodium (Porcine) (Hep-Lock -)  5 ml IVPUSH PRN PRN


   PRN Reason: Heparin


Heparin Sodium (Porcine) (Heparin -)  1,000 unit IVPUSH PRN PRN


   PRN Reason: Heparin


Heparin Sodium (Porcine) (Heparin -)  5,000 unit IVPUSH PRN PRN


   PRN Reason: Heparin


Fentanyl 500 mcg/ Dextrose  100 mls @ 5 mls/hr IVPB TITR [facility]; Protocol


   Last Admin: 11/12/18 06:26 Dose:  25 mcg/hr, 5 mls/hr


Heparin Sodium/Dextrose (Heparin Infusion -)  25,000 units in 500 mls @ 20 mls/

hr IVPB TITR [facility]; Protocol


   Last Admin: 11/11/18 15:31 Dose:  500 units/hr, 10 mls/hr


Ceftazidime 1 gm/ Dextrose  50 mls @ 200 mls/hr IVPB DAILY [facility]; Protocol


   Last Admin: 11/12/18 09:30 Dose:  200 mls/hr


Propofol (Diprivan -)  1,000,000 mcg in 100 mls @ 2.479 mls/hr IVPB TITR [facility]; 

Protocol


   Last Admin: 11/12/18 09:17 Dose:  7 mcg/kg/min, 3.471 mls/hr


Lactulose (Cephulac (Oral Use))  20 gm PO TID [facility]


   Last Admin: 11/12/18 06:26 Dose:  20 gm


Metoprolol Tartrate (Lopressor -)  25 mg PO BID [facility]


   Last Admin: 11/12/18 09:21 Dose:  Not Given


Metoprolol Tartrate (Lopressor Injection -)  5 mg IVPUSH Q8H PRN


   PRN Reason: TACHYCARDIA


Pantoprazole Sodium (Protonix Iv)  40 mg IVPUSH DAILY [facility]


   Last Admin: 11/12/18 09:18 Dose:  40 mg


Thiamine HCl (Vitamin B1 -)  100 mg PO DAILY [facility]


   Last Admin: 11/12/18 09:22 Dose:  100 mg








ASSESSMENT AND PLAN:


Acute Hypoxic Respiratory Failure


Altered Mental Status


Pneumonia


Multiple Myeloma


Acute on Chronic Renal Failure


Metabolic Acidosis


LV Diastolic Dysfunction


Atrial Fibrillation


CAD


+Troponins likely Demand Ischemia


PAD


Hyperlipidemia


HTN





-  continue antibiotics


-  decadron per oncology


-  lasix today


-  monitor urine output, creatinine


-  replete lytes


-  rate control


-  continue anticoagulation


-  minimize sedation to assess mental status 


-  spontaneous breathing trials as tolerated but would not extubate until 

mental status improved


-  enteral feeds


-  DVT/GI prophylaxis


-  continue ICU monitoring











critical care time spent in reviewing chart, evaluating patient and formulating 

plan 35 min

## 2018-11-12 NOTE — PN
Progress Note (short form)





- Note


Progress Note: 





Patient seen and examined at bedside in the ICU


Intubated


mental status minimal to no improvement











 


 Vital Signs











Temperature  98.2 F   11/12/18 06:00


 


Pulse Rate  84   11/12/18 08:29


 


Respiratory Rate  13   11/12/18 11:10


 


Blood Pressure  106/66   11/12/18 08:00


 


O2 Sat by Pulse Oximetry (%)  97   11/12/18 08:42

















 Intake & Output











 11/11/18 11/12/18 11/12/18





 23:59 11:59 23:59


 


Intake Total 1250 1003 


 


Output Total 200 500 


 


Balance 1050 503 


 


Weight  83.971 kg 


 


Intake:   


 


   263 


 


    #20 RAC 11/11/18  57 


 


    DIPRIVAN - 1,000,000 mcg 10 35 





    In 100 ml @ 5 MCG/KG/MIN   





    2.479 mls/hr IVPB TITR   





    [facility] Rx#:[id number]   


 


    HEPARIN INFUSION - 25,000 120 114 





    units In 500 ml @ 1,000   





    UNITS/HR 20 mls/hr IVPB   





    TITR [facility] Rx#:[id number]   


 


    Sublimaze Injection - 500 60 57 





    Mcg In D5w - 90 ml @ 25   





    MCG/HR 5 mls/hr IVPB TITR   





    [facility] Rx#:[id number]   


 


  IVPB 300  


 


  Tube Feeding 540 540 


 


  Tube Irrigant 220 200 


 


Output:   


 


  Urine 200 500 


 


    Garces 200 500 


 


Other:   


 


  Voiding Method Indwelling Catheter Indwelling Catheter 


 


  Bowel Movement Yes Yes 


 


  # Bowel Movements 3 2 


 


  Body Mass Index (BMI)   25.0


 


  Weight Measurement Method  Built in [facility] 























PE:


Lying in bed intubated. Tries to open eyes to name. Less responsive overall 

from last Thursday  


 Poor dentition


PERRL senile arcus bilaterally


Irregular No murmur


soft non tender non distended








 








 











  11/07/18 11/07/18 11/07/18





  16:20 16:20 16:20


 


WBC  4.5  


 


RBC  3.16 L  


 


Hgb  9.5 L  


 


Hct  28.5 L  


 


MCV  90.2  


 


MCHC  33.5  


 


RDW  15.7  


 


Plt Count  130 L  


 


Neutrophils %  74.4  


 


Lymphocytes %  20.5  D  


 


Monocytes %  3.4 L  


 


Eosinophils %  1.4  


 


Basophils %  0.3  


 


INR   2.07 H 


 


Sodium    143


 


Potassium    4.2


 


Chloride    114 H


 


Carbon Dioxide    17 L


 


Anion Gap    12


 


BUN    55 H


 


Creatinine    2.1 H














  11/08/18 11/08/18





  05:30 05:30


 


WBC  4.8 


 


RBC  3.03 L 


 


Hgb  9.0 L 


 


Hct  27.2 L 


 


MCV  89.9 


 


MCHC  33.1 


 


RDW  16.4 H 


 


Plt Count  117 L 


 


Neutrophils %  73.4 


 


Lymphocytes %  20.9 


 


Monocytes %  3.8 


 


Eosinophils %  1.7 


 


Basophils %  0.2 


 


INR  


 


Sodium   140


 


Potassium   4.4


 


Chloride   114 H


 


Carbon Dioxide   19 L


 


Anion Gap   7 L


 


BUN   52 H


 


Creatinine   2.2 H








 











 11/05/18 19:45 Blood Culture - Preliminary





 Blood - Peripheral Venous    NO GROWTH OBTAINED AFTER 48 HOURS, INCUBATION TO 

CONTINUE





    FOR 3 DAYS.


 


 11/05/18 19:20 Blood Culture - Preliminary





 Blood - Peripheral Venous    NO GROWTH OBTAINED AFTER 48 HOURS, INCUBATION TO 

CONTINUE





    FOR 3 DAYS.


 


 11/06/18 14:30 Gram Stain - Final





 Sputum - Endotrachea Suction/Ventilator Sputum Culture - Pending


 


 11/06/18 14:30 Legionella Antigen - Final





 Urine - Urine Garces Streptococcus pneumoniae Antigen (M - Final


 


 10/30/18 17:02 Blood Culture - Final





 Blood - Peripheral Venous    NO GROWTH AFTER 5 DAYS INCUBATION


 


 10/30/18 17:02 Blood Culture - Final





 Blood - Peripheral Venous    NO GROWTH AFTER 5 DAYS INCUBATION


 


 10/30/18 17:02 Urine Culture - Final





 Urine - Urine - Catheterized    NO GROWTH OBTAINED


 


 10/31/18 19:15 Influenza Types A,B Antigen - Final





 Nasopharyngeal Swab  - Final











 








78M with multiple medical problems presents to the hospital after being found 

down by neighbors found to have HAM and hypercalcemia with altered mental 

status.





Problem List:


Altered mental status


toxic metabolic encephalopathy


Hypercalcemia likely from malignancy


HAM on CKD


History of alcohol abuse


HTN


HLD


CAD 


Afib 


diastolic dysfunction


troponinemia


Hypertrophic cardiomypoathy (apical)


sepsis secondary to RLL pneumonia


acute hypoxemic respiratory failure


Vitamin D deficiency 


multiple myeloma





Plan:


Calcium WNL today


Patient s/p pharesis


SPEP noted total protein and globulins elevated 


 Kappa/lambda light chains-noted 


Kappa/Lambda ratio > 100 consistent with Multiple myeloma


M spike elevated elevated at 6.8 previously 4.7 


. Baseline Immunoglobulins done at time of rapid repsonse second set shows 

decreased of IgG from about grams to about 3 grams after first pharesis.


 bone (metastatic) survey and if negative will get Bone scan.- when acute 

issues resolved 


Of note Bone scan may not show any lesions if it is from multiple myeloma but 

will sold solid tumors


Can consider MRI of C/T/L spine if patient stops moving lower extremities or 

concern for lytic lesions of the spine. At this time the patient is moving his 

lower extremities so will hold off for now.


PTH-borderline low appropriate given hypercalcemia, PTHrP low


Vit D low


ABx per ID


replete calcium per ICU


Palliative consult to discuss goals of care with family


Thank you for this consult

## 2018-11-12 NOTE — PN
Progress Note, Physician


History of Present Illness: 


Poorly responsive off sedation on vent, rate-controlled afib resumed on 

lopressor and heparin gtt.





- Current Medication List


Current Medications: 


Active Medications





Acetaminophen (Tylenol Suppository -)  650 mg UT Q6H PRN


   PRN Reason: FEVER


   Last Admin: 11/05/18 18:10 Dose:  650 mg


Acetaminophen (Tylenol -)  650 mg PO Q6H PRN


   PRN Reason: PAIN LEVEL 1 - 3


Allopurinol (Zyloprim -)  100 mg PO BID [facility]


   Last Admin: 11/12/18 09:22 Dose:  100 mg


Chlorhexidine Gluconate (Peridex -)  15 ml MM BID [facility]


   Last Admin: 11/12/18 09:18 Dose:  15 ml


Cholecalciferol (Vitamin D3 -)  1,000 unit PO DAILY [facility]


Dexamethasone Sodium Phosphate (Decadron Injection -)  10 mg IVPUSH BID [facility]


   Last Admin: 11/12/18 09:20 Dose:  10 mg


Diphenhydramine HCl (Benadryl Injection -)  25 mg IVPB ONCE PRN


   PRN Reason: DURING PLASMAPHERESIS


Ergocalciferol (Drisdol Oral Solution -)  8,000 units PO DAILY [facility]


   Stop: 11/17/18 08:37


   Last Admin: 11/12/18 09:19 Dose:  8,000 units


Heparin Sodium (Porcine) (Hep-Lock -)  5 ml IVPUSH PRN PRN


   PRN Reason: Heparin


Heparin Sodium (Porcine) (Heparin -)  1,000 unit IVPUSH PRN PRN


   PRN Reason: Heparin


Heparin Sodium (Porcine) (Heparin -)  5,000 unit IVPUSH PRN PRN


   PRN Reason: Heparin


Fentanyl 500 mcg/ Dextrose  100 mls @ 5 mls/hr IVPB TITR [facility]; Protocol


   Last Admin: 11/12/18 06:26 Dose:  25 mcg/hr, 5 mls/hr


Heparin Sodium/Dextrose (Heparin Infusion -)  25,000 units in 500 mls @ 20 mls/

hr IVPB TITR [facility]; Protocol


   Last Admin: 11/11/18 15:31 Dose:  500 units/hr, 10 mls/hr


Ceftazidime 1 gm/ Dextrose  50 mls @ 200 mls/hr IVPB DAILY [facility]; Protocol


   Last Admin: 11/12/18 09:30 Dose:  200 mls/hr


Propofol (Diprivan -)  1,000,000 mcg in 100 mls @ 2.479 mls/hr IVPB TITR [facility]; 

Protocol


   Last Admin: 11/12/18 09:17 Dose:  7 mcg/kg/min, 3.471 mls/hr


Lactulose (Cephulac (Oral Use))  20 gm PO TID [facility]


   Last Admin: 11/12/18 06:26 Dose:  20 gm


Metoprolol Tartrate (Lopressor -)  25 mg PO BID [facility]


   Last Admin: 11/12/18 09:21 Dose:  Not Given


Metoprolol Tartrate (Lopressor Injection -)  5 mg IVPUSH Q8H PRN


   PRN Reason: TACHYCARDIA


Pantoprazole Sodium (Protonix Iv)  40 mg IVPUSH DAILY [facility]


   Last Admin: 11/12/18 09:18 Dose:  40 mg


Thiamine HCl (Vitamin B1 -)  100 mg PO DAILY [facility]


   Last Admin: 11/12/18 09:22 Dose:  100 mg











- Objective


Vital Signs: 


 Vital Signs











Temperature  98.2 F   11/12/18 06:00


 


Pulse Rate  84   11/12/18 08:29


 


Respiratory Rate  14   11/12/18 08:42


 


Blood Pressure  106/66   11/12/18 08:00


 


O2 Sat by Pulse Oximetry (%)  97   11/12/18 08:42











Constitutional: Yes: No Distress, Calm


Neck: Yes: Supple


Cardiovascular: Yes: Pulse Irregular, Murmur (2/6 SM)


Respiratory: Yes: Intubated, Mechanically Ventilated, Rhonchi


Gastrointestinal: Yes: Normal Bowel Sounds, Soft


Edema: No


Labs: 


 CBC, BMP





 11/12/18 05:30 





 11/12/18 05:30 





 INR, PTT











INR  1.36  (0.83-1.09)  H  11/11/18  06:00    


 


Fibrinogen  290.0 mg/dL (238-498)   11/11/18  06:00    














- ....Imaging


Chest X-ray: Report Reviewed (Stable changes)


EKG: Report Reviewed (Tele: Afib)





Problem List





- Problems


(1) Atrial fibrillation with RVR


Code(s): I48.91 - UNSPECIFIED ATRIAL FIBRILLATION   





(2) Acute-on-chronic kidney injury


Code(s): N17.9 - ACUTE KIDNEY FAILURE, UNSPECIFIED; N18.9 - CHRONIC KIDNEY 

DISEASE, UNSPECIFIED   


Qualifiers: 


   Acute renal failure type: unspecified   Chronic kidney disease stage: 

unspecified stage   Qualified Code(s): N17.9 - Acute kidney failure, unspecified

; N18.9 - Chronic kidney disease, unspecified   





(3) Demand ischemia


Code(s): I24.8 - OTHER FORMS OF ACUTE ISCHEMIC HEART DISEASE   





(4) Hypercalcemia


Code(s): E83.52 - HYPERCALCEMIA   





(5) Nonadherence to medication


Code(s): Z91.14 - PATIENT'S OTHER NONCOMPLIANCE WITH MEDICATION REGIMEN   





(6) Paraproteinemia


Code(s): D89.2 - HYPERGAMMAGLOBULINEMIA, UNSPECIFIED   





(7) Anticoagulant long-term use


Code(s): Z79.01 - LONG TERM (CURRENT) USE OF ANTICOAGULANTS   





(8) Chronic thromboembolic disease


Code(s): I74.9 - EMBOLISM AND THROMBOSIS OF UNSPECIFIED ARTERY   





(9) Coronary artery disease


Code(s): I25.10 - ATHSCL HEART DISEASE OF NATIVE CORONARY ARTERY W/O ANG PCTRS 

  


Qualifiers: 


   Coronary Disease-Associated Artery/Lesion type: native artery   Native vs. 

transplanted heart: native heart   Associated angina: without angina   

Qualified Code(s): I25.10 - Atherosclerotic heart disease of native coronary 

artery without angina pectoris   





(10) Diastolic dysfunction without heart failure


Code(s): I51.9 - HEART DISEASE, UNSPECIFIED   





(11) HTN (hypertension)


Code(s): I10 - ESSENTIAL (PRIMARY) HYPERTENSION   


Qualifiers: 


   Hypertension type: essential hypertension   Qualified Code(s): I10 - 

Essential (primary) hypertension   





(12) Hypertrophic cardiomyopathy


Code(s): I42.2 - OTHER HYPERTROPHIC CARDIOMYOPATHY   





(13) Hyperlipidemia


Code(s): E78.5 - HYPERLIPIDEMIA, UNSPECIFIED   


Qualifiers: 


   Hyperlipidemia type: pure hypercholesterolemia   Qualified Code(s): E78.00 - 

Pure hypercholesterolemia, unspecified; E78.0 - Pure hypercholesterolemia   





(14) Multiple myeloma


Code(s): C90.00 - MULTIPLE MYELOMA NOT HAVING ACHIEVED REMISSION   


Qualifiers: 


   Multiple myeloma remission status: not in remission   Qualified Code(s): 

C90.00 - Multiple myeloma not having achieved remission   





Assessment/Plan


R&LHc at [facility] 1/11/2017 showing nonobstructive CAD, severe LV apical 

hypertrophic cardiomyopathy, mildly elevated right sided pressures, Mynx 

deployed right CFA access site. Study is consistent with apical hypertrophy (

spade-like) variant of hypertrophic cardiomyopathy, planned for optimal medical 

therapy. 


Echocardiogram: 07/11/2018 Mod cLVH, severe DYANA, mod TR RVSP 50-60 mmHg, mod-

severe MR


Echocardiogram: 10/16/2018 Normal LV size with hyperdynamic LVEF 75%, mild BSH, 

normal RV size and fxn, severe LAE, mod-severe MR, mod TR





1. Acute hypoxic respiratory failure, suspected aspiration pneumonia 


2. Toxic metabolic encephelopathy, possible hyperviscosity syndrome, sepsis


3. 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 


4. Hypercalcemia, paraproteinemia, anemia-> confirmed multiple myeloma, 

hyperviscosity syndrome


5. History of bilateral SFA occlusion post thrombectomy, referable to embolic 

disease related to persistent atrial fibrillation with [id number] score of 6, 

history of poor compliance with anticoagulation and medical F/U


6. Non obstructive CAD on R&c coronary angiography with demand ischemia


7. 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 

subendocardial ischemia, compensated/euvolemic


8. Persistent atrial fibrillation with RVR [id number] score of 6 on heparin gtt


9. Labile HTN


10. Poor compliance with medical therapy administration and medical F/U


11. Tobacco abuse


12. ETOH dependence





PLAN:





1. Heparin gtt for now while off Xarelto 15 qd (renal dosing)


2. Lopressor 25 bid as hemodynamics tolerate


3. Post plasmapharesis per hematology input


4. Empiric renal-dosed abx course pending C&S


5. Enteral feeds, lactulose for hyperammonemia, monitor ammonia levels


6. Ventilator management, wean off sedation, considering GOC

## 2018-11-12 NOTE — PN
Physical Exam: 


SUBJECTIVE: Patient seen and examined at bedside. No acute events overnight. 

Spoke with Dr. Richey and the Onc team, we will hold plasmapharesis today. 

Patient remains intubated and despite being off sedation has a poor mental 

status. 








Spoke with his sister at length yesterday. Will continue to speak with her 

regarding goals of care. 





Sister Hien Argueta: [phone] 


- She is in [place]


- She says they use to speak on a monthly basis. Last time they spoke was in 

the summer time. 








OBJECTIVE:





 Vital Signs











 Period  Temp  Pulse  Resp  BP Sys/Varghese  Pulse Ox


 


 Last 24 Hr  98.2 F-98.8 F  74-92  12-21  /52-71  








GENERAL: The patient is intubated, not sedated, awake and poorly responsive, 

not alert, and not oriented.


HEAD: Normal with no signs of trauma.


EYES: PERRL, sclera anicteric, conjunctiva clear. 


ENT: Ears normal, nares patent, dry mucous membranes


NECK: Trachea midline. 


LUNGS: bilateral diffuse rhonchi


HEART: Regular rate and irregular rhythm. 


ABDOMEN: Soft, nondistended.


EXTREMITIES: 2+ pulses, warm, well-perfused, no edema, scattered ulcers. 


NEUROLOGICAL: Cannot assess secondary to clinical condition. 


SKIN: Warm, dry, normal turgor, scattered ulcers on the legs

















 Laboratory Results - last 24 hr











  11/09/18 11/11/18 11/12/18





  09:11 15:00 05:30


 


WBC   


 


RBC   


 


Hgb   


 


Hct   


 


MCV   


 


MCH   


 


MCHC   


 


RDW   


 


Plt Count   


 


MPV   


 


Absolute Neuts (auto)   


 


Neutrophils %   


 


Neutrophils % (Manual)   


 


Band Neutrophils %   


 


Lymphocytes %   


 


Lymphocytes % (Manual)   


 


Monocytes %   


 


Monocytes % (Manual)   


 


Eosinophils %   


 


Eosinophils % (Manual)   


 


Basophils %   


 


Basophils % (Manual)   


 


Myelocytes % (Man)   


 


Promyelocytes % (Man)   


 


Blast Cells % (Manual)   


 


Nucleated RBC %   


 


Metamyelocytes   


 


Hypochromia   


 


Platelet Estimate   


 


Polychromasia   


 


Poikilocytosis   


 


Basophilic Stippling   


 


Anisocytosis   


 


Microcytosis   


 


Macrocytosis   


 


Tear Drop Cells   


 


PTT (Actin FS)   75.6 H 


 


Sodium   


 


Potassium   


 


Chloride   


 


Carbon Dioxide   


 


Anion Gap   


 


BUN   


 


Creatinine   


 


Creat Clearance w eGFR   


 


Random Glucose   


 


Calcium   


 


Phosphorus   


 


Magnesium   


 


Total Bilirubin   


 


AST   


 


ALT   


 


Alkaline Phosphatase   


 


Total Protein   


 


Albumin   


 


Random Vancomycin    29.7 H


 


Blood Type  B POSITIVE  


 


Antibody Screen  Negative  


 


Crossmatch  See Detail  














  11/12/18 11/12/18 11/12/18





  05:30 05:30 05:30


 


WBC   8.3 


 


RBC   2.33 L 


 


Hgb   7.0 L 


 


Hct   20.8 L 


 


MCV   89.2 


 


MCH   30.0 


 


MCHC   33.6 


 


RDW   16.9 H 


 


Plt Count   143 


 


MPV   9.4 


 


Absolute Neuts (auto)   6.9 


 


Neutrophils %   82.6 


 


Neutrophils % (Manual)   72.4 


 


Band Neutrophils %   4.1 


 


Lymphocytes %   13.2  D 


 


Lymphocytes % (Manual)   16.3  D 


 


Monocytes %   3.9 


 


Monocytes % (Manual)   3 L 


 


Eosinophils %   0.1 


 


Eosinophils % (Manual)   0.0  D 


 


Basophils %   0.2 


 


Basophils % (Manual)   0.0 


 


Myelocytes % (Man)   0  D 


 


Promyelocytes % (Man)   0 


 


Blast Cells % (Manual)   0 


 


Nucleated RBC %   2 H 


 


Metamyelocytes   3 H D 


 


Hypochromia   2+ 


 


Platelet Estimate   Decreased 


 


Polychromasia   2+ 


 


Poikilocytosis   0 


 


Basophilic Stippling   2+ 


 


Anisocytosis   2+ 


 


Microcytosis   1+ 


 


Macrocytosis   2+ 


 


Tear Drop Cells   1+ 


 


PTT (Actin FS)  56.2 H  


 


Sodium    139


 


Potassium    4.5


 


Chloride    112 H


 


Carbon Dioxide    20 L


 


Anion Gap    7 L


 


BUN    67 H


 


Creatinine    2.8 H


 


Creat Clearance w eGFR    21.97


 


Random Glucose    180 H


 


Calcium    6.4 L*


 


Phosphorus    3.5


 


Magnesium    2.5 H


 


Total Bilirubin    0.2


 


AST    39 H


 


ALT    45


 


Alkaline Phosphatase    127 H


 


Total Protein    8.3 H


 


Albumin    2.3 L


 


Random Vancomycin   


 


Blood Type   


 


Antibody Screen   


 


Crossmatch   








Active Medications











Generic Name Dose Route Start Last Admin





  Trade Name Freq  PRN Reason Stop Dose Admin


 


Acetaminophen  650 mg  11/01/18 05:44  11/05/18 18:10





  Tylenol Suppository -  NC   650 mg





  Q6H PRN   Administration





  FEVER   





     





     





     


 


Acetaminophen  650 mg  11/10/18 14:09  





  Tylenol -  PO   





  Q6H PRN   





  PAIN LEVEL 1 - 3   





     





     





     


 


Allopurinol  100 mg  11/09/18 10:00  11/12/18 09:22





  Zyloprim -  PO   100 mg





  BID [facility]   Administration





     





     





     





     


 


Chlorhexidine Gluconate  15 ml  11/10/18 23:45  11/12/18 09:18





  Peridex -  MM   15 ml





  BID [facility]   Administration





     





     





     





     


 


Cholecalciferol  1,000 unit  11/18/18 10:00  





  Vitamin D3 -  PO   





  DAILY [facility]   





     





     





     





     


 


Dexamethasone Sodium Phosphate  10 mg  11/10/18 12:15  11/12/18 09:20





  Decadron Injection -  IVPUSH   10 mg





  BID [facility]   Administration





     





     





     





     


 


Diphenhydramine HCl  25 mg  11/09/18 12:00  





  Benadryl Injection -  IVPB   





  ONCE PRN   





  DURING PLASMAPHERESIS   





     





     





     


 


Ergocalciferol  8,000 units  11/11/18 10:00  11/12/18 09:19





  Drisdol Oral Solution -  PO  11/17/18 08:37  8,000 units





  DAILY [facility]   Administration





     





     





     





     


 


Heparin Sodium (Porcine)  5 ml  11/07/18 15:26  





  Hep-Lock -  IVPUSH   





  PRN PRN   





  Heparin   





     





     





     


 


Heparin Sodium (Porcine)  1,000 unit  11/08/18 06:38  





  Heparin -  IVPUSH   





  PRN PRN   





  Heparin   





     





     





     


 


Heparin Sodium (Porcine)  5,000 unit  11/08/18 06:38  





  Heparin -  IVPUSH   





  PRN PRN   





  Heparin   





     





     





     


 


Fentanyl 500 mcg/ Dextrose  100 mls @ 5 mls/hr  11/07/18 11:45  11/12/18 06:26





  IVPB   25 mcg/hr





  TITR [facility]   5 mls/hr





     Administration





     





  Protocol   





  25 MCG/HR   


 


Heparin Sodium/Dextrose  25,000 units in 500 mls @ 20 mls/hr  11/08/18 06:45  11 /11/18 15:31





  Heparin Infusion -  IVPB   500 units/hr





  TITR [facility]   10 mls/hr





     Administration





     





  Protocol   





  1,000 UNITS/HR   


 


Ceftazidime 1 gm/ Dextrose  50 mls @ 200 mls/hr  11/11/18 12:00  11/12/18 09:30





  IVPB   200 mls/hr





  DAILY [facility]   Administration





     





     





  Protocol   





     


 


Propofol  1,000,000 mcg in 100 mls @ 2.479 mls/hr  11/11/18 13:45  11/12/18 09:

17





  Diprivan -  IVPB   7 mcg/kg/min





  TITR [facility]   3.471 mls/hr





     Administration





     





  Protocol   





  5 MCG/KG/MIN   


 


Lactulose  20 gm  11/09/18 09:38  11/12/18 06:26





  Cephulac (Oral Use)  PO   20 gm





  TID [facility]   Administration





     





     





     





     


 


Metoprolol Tartrate  25 mg  11/09/18 13:15  11/12/18 09:21





  Lopressor -  PO   Not Given





  BID [facility]   





     





     





     





     


 


Metoprolol Tartrate  5 mg  11/09/18 13:04  





  Lopressor Injection -  IVPUSH   





  Q8H PRN   





  TACHYCARDIA   





     





     





     


 


Pantoprazole Sodium  40 mg  11/07/18 12:00  11/12/18 09:18





  Protonix Iv  IVPUSH   40 mg





  DAILY [facility]   Administration





     





     





     





     


 


Thiamine HCl  100 mg  10/30/18 22:12  11/12/18 09:22





  Vitamin B1 -  PO   100 mg





  DAILY [facility]   Administration





     





     





     





     











ASSESSMENT/PLAN:





Assessment: During sedation vacation today patient's mental status was 

unimpressive. He was not alert, oriented, or responsive. 





Got hold of the patient's Sister Hien Argueta: [phone] to discuss 

goals of care. 


Will discuss his DNR/DNI status. Speak about possible Trach tube. 





Patient has a daughter named Mayuri


Patient also has a son Oral (Named after father) 


- Unknown how to get in touch with them. 





Consult placed to palliative care and  to help locate and get hold 

of children. 


- Spoke with Dr. phan today (Covering for Dr. Youngblood) He will come assess 

the patient tomorrow morning. 





Plan:





ID: 


- Rhonchi heard on Lung auscultation


- CXR shows pulmonary infiltrate, possible PNA vs empyema vs paramnemonic 

effusion 


- Abx coverage switched to ceftazidime. 


- ID on board





Cardio: 


- Sporadically goes in and out of V-Tach 


- Cards recommends restarting Lopressor 25 mg BID PO


- Afib w RVR: Patient is receiving metoprolol 5 mg PRN


- diastolic dysfunction + hypertrophic cardiomyopathy


- CAD with multiple stents


- Cardio consulted and on board. 





Pulm: 


- Intubated


- Patient has b/l pleural effusions.


- Giving 60 of lasix today. 


- No pneumothorax


- b/l diffuse rhonchi


- infiltrate on CXR: Abx- ceftazidime








Renal: 


- Acute on Chronic kidney injury


- BUN: 67 Cr:2.8. Pre-renal etiology.


- Renal consulted and on board. 


- Not receiving plasmapharesis today. 





Neuro: 


- Patient is not alert or oriented. He responds to painful stimuli. 


- Head CT showed no acute pathology


- Spoke with Dr. phan today (Covering for Dr. Youngblood) He will come assess 

the patient tomorrow morning. 





Heme/Onc: 


- Patient's Hb dropped to 7. Transfusing 1 unit of PRBC today. 


- Patient not receiving plasmapharesis today. 


- Patient has hypocalcemia today - Will replete. 


- Paraproteinemia


- Probable Multiple Myeloma, SPEP and UPEP suggestive. 


- Patient fulfills new criteria  for multiple myeloma with free kappa/ free 

lambda light chain  ratio of 432 (>100 is diagnostic of myeloma) 


- Kappa/Lambda ratio > 100 consistent with Multiple myeloma


- M spike elevated elevated at 6.8 previously 4.7 








Endo: 


- Hypocalcemia


- Glucose wnl


- Parathyroid hormone WNL


- PTH-borderline low appropriate given hypercalcemia, PTHrP low





F/E/N: 


F: No standing fluids due to fluid overload in lungs


E: Will replete lytes PRN


N: tube feeds. 





Code Status: Full Code 


Dispo: Patient will continue to receive ICU level care











Visit type





- Emergency Visit


Emergency Visit: Yes


ED Registration Date: 10/30/18


Care time: The patient presented to the Emergency Department on the above date 

and was hospitalized for further evaluation of their emergent condition.





- New Patient


This patient is new to me today: No





- Critical Care


Critical Care patient: Yes


Total Critical Care Time (in minutes): 36


Critical Care Statement: The care of this patient involved high complexity 

decision making to prevent further life threatening deterioration of the patient

's condition and/or to evaluate & treat vital organ system(s) failure or risk 

of failure.

## 2018-11-12 NOTE — PN
Progress Note, Physician


Chief Complaint: 





The patient seen in the ICU. Still with diarrhea. 


Possibly related to the Lactulose. 


Intubated, and vent supported.


Patient remains poorly responsive. Minimal response to pain. 


Maintains good urine output. 


IV fluids well tolerated. 


History of Present Illness: 





This is a 78 year old gentleman with hx of CKD, Afib on A/C, CAD, CKD, 

Hypertension, Hyperlipidemia, PVD who presented with AMS/Confusion and found to 

have HAM. The patient is intubated and supported by mechanical vent. In ICU. 


Poorly responsive. received plasmapheresis, Calcium supplemets. 








- Current Medication List


Current Medications: 


Active Medications





Acetaminophen (Tylenol Suppository -)  650 mg [facility] Q6H PRN


   PRN Reason: FEVER


   Last Admin: 11/05/18 18:10 Dose:  650 mg


Acetaminophen (Tylenol -)  650 mg PO Q6H PRN


   PRN Reason: PAIN LEVEL 1 - 3


Allopurinol (Zyloprim -)  100 mg PO BID [facility]


   Last Admin: 11/12/18 09:22 Dose:  100 mg


Chlorhexidine Gluconate (Peridex -)  15 ml MM BID [facility]


   Last Admin: 11/12/18 09:18 Dose:  15 ml


Cholecalciferol (Vitamin D3 -)  1,000 unit PO DAILY [facility]


Dexamethasone Sodium Phosphate (Decadron Injection -)  10 mg IVPUSH BID [facility]


   Last Admin: 11/12/18 09:20 Dose:  10 mg


Diphenhydramine HCl (Benadryl Injection -)  25 mg IVPB ONCE PRN


   PRN Reason: DURING PLASMAPHERESIS


Ergocalciferol (Drisdol Oral Solution -)  8,000 units PO DAILY [facility]


   Stop: 11/17/18 08:37


   Last Admin: 11/12/18 09:19 Dose:  8,000 units


Heparin Sodium (Porcine) (Hep-Lock -)  5 ml IVPUSH PRN PRN


   PRN Reason: Heparin


Heparin Sodium (Porcine) (Heparin -)  1,000 unit IVPUSH PRN PRN


   PRN Reason: Heparin


Heparin Sodium (Porcine) (Heparin -)  5,000 unit IVPUSH PRN PRN


   PRN Reason: Heparin


Fentanyl 500 mcg/ Dextrose  100 mls @ 5 mls/hr IVPB TITR [facility]; Protocol


   Last Admin: 11/12/18 06:26 Dose:  25 mcg/hr, 5 mls/hr


Heparin Sodium/Dextrose (Heparin Infusion -)  25,000 units in 500 mls @ 20 mls/

hr IVPB TITR [facility]; Protocol


   Last Admin: 11/11/18 15:31 Dose:  500 units/hr, 10 mls/hr


Ceftazidime 1 gm/ Dextrose  50 mls @ 200 mls/hr IVPB DAILY [facility]; Protocol


   Last Admin: 11/12/18 09:30 Dose:  200 mls/hr


Propofol (Diprivan -)  1,000,000 mcg in 100 mls @ 2.479 mls/hr IVPB TITR [facility]; 

Protocol


   Last Admin: 11/12/18 09:17 Dose:  7 mcg/kg/min, 3.471 mls/hr


Lactulose (Cephulac (Oral Use))  20 gm PO TID [facility]


   Last Admin: 11/12/18 06:26 Dose:  20 gm


Metoprolol Tartrate (Lopressor -)  25 mg PO BID [facility]


   Last Admin: 11/12/18 09:21 Dose:  Not Given


Metoprolol Tartrate (Lopressor Injection -)  5 mg IVPUSH Q8H PRN


   PRN Reason: TACHYCARDIA


Pantoprazole Sodium (Protonix Iv)  40 mg IVPUSH DAILY [facility]


   Last Admin: 11/12/18 09:18 Dose:  40 mg


Thiamine HCl (Vitamin B1 -)  100 mg PO DAILY [facility]


   Last Admin: 11/12/18 09:22 Dose:  100 mg











- Objective


Vital Signs: 


 Vital Signs











Temperature  98.2 F   11/12/18 06:00


 


Pulse Rate  84   11/12/18 08:29


 


Respiratory Rate  14   11/12/18 08:42


 


Blood Pressure  106/66   11/12/18 08:00


 


O2 Sat by Pulse Oximetry (%)  97   11/12/18 08:42











HENT: Yes: Atraumatic


Neck: Yes: Trachea Midline


Cardiovascular: Yes: Tachycardia, S1, S2


Respiratory: Yes: CTA Bilaterally, Rhonchi


Gastrointestinal: Yes: Soft.  No: Distention


Neurological: Yes: Unresponsive


Labs: 


 CBC, BMP





 11/12/18 05:30 





 11/12/18 05:30 





 INR, PTT











INR  1.36  (0.83-1.09)  H  11/11/18  06:00    


 


Fibrinogen  290.0 mg/dL (238-498)   11/11/18  06:00    














Problem List





- Problems


(1) Multiple myeloma


Code(s): C90.00 - MULTIPLE MYELOMA NOT HAVING ACHIEVED REMISSION   


Qualifiers: 


   Multiple myeloma remission status: not in remission   Qualified Code(s): 

C90.00 - Multiple myeloma not having achieved remission   





(2) HAM (acute kidney injury)


Code(s): N17.9 - ACUTE KIDNEY FAILURE, UNSPECIFIED   





(3) Altered mental status


Code(s): R41.82 - ALTERED MENTAL STATUS, UNSPECIFIED   





(4) Anemia


Code(s): D64.9 - ANEMIA, UNSPECIFIED   





(5) Atrial fibrillation with RVR


Code(s): I48.91 - UNSPECIFIED ATRIAL FIBRILLATION   





(6) Cellulitis


Code(s): L03.90 - CELLULITIS, UNSPECIFIED   


Qualifiers: 


   Site of cellulitis: extremity   Site of cellulitis of extremity: lower 

extremity   Laterality: right   Qualified Code(s): L03.115 - Cellulitis of 

right lower limb   





(7) Hyperlipidemia


Code(s): E78.5 - HYPERLIPIDEMIA, UNSPECIFIED   


Qualifiers: 


   Hyperlipidemia type: pure hypercholesterolemia   Qualified Code(s): E78.00 - 

Pure hypercholesterolemia, unspecified; E78.0 - Pure hypercholesterolemia   





(8) Sepsis


Code(s): A41.9 - SEPSIS, UNSPECIFIED ORGANISM   





(9) Hypercalcemia


Code(s): E83.52 - HYPERCALCEMIA   





Assessment/Plan





This is a 78 year old gentleman with hx of CKD, Afib on A/C, CAD, CKD, 

Hypertension, Hyperlipidemia, PVD who presented with AMS/Confusion and found to 

have HAM. 





The patient has IgG Myeloma.





Serum Calcium had dropped to 6.5. Received Calcium supplements today also. 








Has progressive hyperchloremic Metabolic acidosis, most leikely related to the 

diarrhea. if Sodium Bicarb supplements are given, should consider large amounts 

of Calcium supplemets, to avoid sudden and severe hypocalcemia, to avoid tetany 

and seizure. 





Should consider Discontinuing Lactulose. 





The patient's mental status not easily explained. ? Most likely Anoxic 

encephalopathy.














Will monitor the renal/ elctrolyte profile. 





Thank you.  





Leeann Pérez MD

## 2018-11-12 NOTE — PN
Progress Note, Physician


History of Present Illness: 





 Intubated on mechanical ventilation


 Opens eyes to tactile stimulus but does not make eye contact


 Temps down   Afebrile


 Repeat BC no growth


 Sputum c/s  (R) xanthomonas


 





 


 


 





- Current Medication List


Current Medications: 


Active Medications





Acetaminophen (Tylenol Suppository -)  650 mg [place] Q6H PRN


   PRN Reason: FEVER


   Last Admin: 11/05/18 18:10 Dose:  650 mg


Acetaminophen (Tylenol -)  650 mg PO Q6H PRN


   PRN Reason: PAIN LEVEL 1 - 3


Allopurinol (Zyloprim -)  100 mg PO BID [facility]


   Last Admin: 11/12/18 09:22 Dose:  100 mg


Chlorhexidine Gluconate (Peridex -)  15 ml MM BID [facility]


   Last Admin: 11/12/18 09:18 Dose:  15 ml


Cholecalciferol (Vitamin D3 -)  1,000 unit PO DAILY [facility]


Dexamethasone Sodium Phosphate (Decadron Injection -)  10 mg IVPUSH BID [facility]


   Last Admin: 11/12/18 09:20 Dose:  10 mg


Diphenhydramine HCl (Benadryl Injection -)  25 mg IVPB ONCE PRN


   PRN Reason: DURING PLASMAPHERESIS


Ergocalciferol (Drisdol Oral Solution -)  8,000 units PO DAILY [facility]


   Stop: 11/17/18 08:37


   Last Admin: 11/12/18 09:19 Dose:  8,000 units


Heparin Sodium (Porcine) (Hep-Lock -)  5 ml IVPUSH PRN PRN


   PRN Reason: Heparin


Heparin Sodium (Porcine) (Heparin -)  1,000 unit IVPUSH PRN PRN


   PRN Reason: Heparin


Heparin Sodium (Porcine) (Heparin -)  5,000 unit IVPUSH PRN PRN


   PRN Reason: Heparin


Fentanyl 500 mcg/ Dextrose  100 mls @ 5 mls/hr IVPB TITR [facility]; Protocol


   Last Admin: 11/12/18 06:26 Dose:  25 mcg/hr, 5 mls/hr


Heparin Sodium/Dextrose (Heparin Infusion -)  25,000 units in 500 mls @ 20 mls/

hr IVPB TITR [facility]; Protocol


   Last Admin: 11/11/18 15:31 Dose:  500 units/hr, 10 mls/hr


Ceftazidime 1 gm/ Dextrose  50 mls @ 200 mls/hr IVPB DAILY [facility]; Protocol


   Last Admin: 11/12/18 09:30 Dose:  200 mls/hr


Propofol (Diprivan -)  1,000,000 mcg in 100 mls @ 2.479 mls/hr IVPB TITR [facility]; 

Protocol


   Last Admin: 11/12/18 09:17 Dose:  7 mcg/kg/min, 3.471 mls/hr


Lactulose (Cephulac (Oral Use))  20 gm PO TID [facility]


   Last Admin: 11/12/18 06:26 Dose:  20 gm


Metoprolol Tartrate (Lopressor -)  25 mg PO BID [facility]


   Last Admin: 11/12/18 09:21 Dose:  Not Given


Metoprolol Tartrate (Lopressor Injection -)  5 mg IVPUSH Q8H PRN


   PRN Reason: TACHYCARDIA


Pantoprazole Sodium (Protonix Iv)  40 mg IVPUSH DAILY [facility]


   Last Admin: 11/12/18 09:18 Dose:  40 mg


Thiamine HCl (Vitamin B1 -)  100 mg PO DAILY [facility]


   Last Admin: 11/12/18 09:22 Dose:  100 mg











- Objective


Vital Signs: 


 Vital Signs











Temperature  98.2 F   11/12/18 06:00


 


Pulse Rate  84   11/12/18 08:29


 


Respiratory Rate  14   11/12/18 08:42


 


Blood Pressure  106/66   11/12/18 08:00


 


O2 Sat by Pulse Oximetry (%)  97   11/12/18 08:42











Constitutional: Yes: No Distress


Cardiovascular: Yes: Regular Rate and Rhythm, S1, S2


Respiratory: Yes: Mechanically Ventilated


Gastrointestinal: Yes: Normal Bowel Sounds, Soft.  No: Tenderness


Edema: Yes


Labs: 


 CBC, BMP





 11/12/18 05:30 





 11/12/18 05:30 





 INR, PTT











INR  1.36  (0.83-1.09)  H  11/11/18  06:00    


 


Fibrinogen  290.0 mg/dL (238-498)   11/11/18  06:00    














Assessment/Plan


Respiratory failure


 RLL pneumonia


 Toxic metabolic encephalopathy


 Hypercalcemia


 Renal failure


 Myeloma


 Hyperviscosity syndrome


 


  


  


 Continue ceftazidime, adjusted for renal failure


 Ventilatory support

## 2018-11-13 LAB
ALBUMIN SERPL-MCNC: 2.2 G/DL (ref 3.4–5)
ALP SERPL-CCNC: 129 U/L (ref 45–117)
ALT SERPL-CCNC: 67 U/L (ref 13–61)
ANION GAP SERPL CALC-SCNC: 5 MMOL/L (ref 8–16)
ARTERIAL BLOOD GAS PCO2: 41.7 MMHG (ref 35–45)
ARTERIAL PATENCY WRIST A: POSITIVE
AST SERPL-CCNC: 77 U/L (ref 15–37)
BASE EXCESS BLDA CALC-SCNC: -7 MEQ/L (ref -2–2)
BASOPHILS # BLD: 0.2 % (ref 0–2)
BILIRUB SERPL-MCNC: 0.2 MG/DL (ref 0.2–1)
BUN SERPL-MCNC: 79 MG/DL (ref 7–18)
CALCIUM SERPL-MCNC: 6.7 MG/DL (ref 8.5–10.1)
CHLORIDE SERPL-SCNC: 113 MMOL/L (ref 98–107)
CO2 SERPL-SCNC: 21 MMOL/L (ref 21–32)
CREAT SERPL-MCNC: 3 MG/DL (ref 0.55–1.3)
DEPRECATED RDW RBC AUTO: 16.3 % (ref 11.9–15.9)
EOSINOPHIL # BLD: 0.1 % (ref 0–4.5)
GLUCOSE SERPL-MCNC: 148 MG/DL (ref 74–106)
HCT VFR BLD CALC: 24.4 % (ref 35.4–49)
HGB BLD-MCNC: 8.1 GM/DL (ref 11.7–16.9)
LYMPHOCYTES # BLD: 12.5 % (ref 8–40)
MAGNESIUM SERPL-MCNC: 2.6 MG/DL (ref 1.8–2.4)
MCH RBC QN AUTO: 29.9 PG (ref 25.7–33.7)
MCHC RBC AUTO-ENTMCNC: 33.3 G/DL (ref 32–35.9)
MCV RBC: 89.6 FL (ref 80–96)
MONOCYTES # BLD AUTO: 5.1 % (ref 3.8–10.2)
NEUTROPHILS # BLD: 82.1 % (ref 42.8–82.8)
PHOSPHATE SERPL-MCNC: 5.7 MG/DL (ref 2.5–4.9)
PLATELET # BLD AUTO: 177 K/MM3 (ref 134–434)
PMV BLD: 9.1 FL (ref 7.5–11.1)
PO2 BLDA: 92.8 MMHG (ref 70–100)
POTASSIUM SERPLBLD-SCNC: 4.7 MMOL/L (ref 3.5–5.1)
PROT SERPL-MCNC: 9.5 G/DL (ref 6.4–8.2)
RBC # BLD AUTO: 2.73 M/MM3 (ref 4–5.6)
SAO2 % BLDA: 96.3 % (ref 90–98.9)
SODIUM SERPL-SCNC: 139 MMOL/L (ref 136–145)
WBC # BLD AUTO: 9 K/MM3 (ref 4–10)

## 2018-11-13 RX ADMIN — DEXTROSE MONOHYDRATE SCH MLS/HR: 50 INJECTION, SOLUTION INTRAVENOUS at 19:27

## 2018-11-13 RX ADMIN — METOPROLOL TARTRATE SCH MG: 25 TABLET, FILM COATED ORAL at 21:02

## 2018-11-13 RX ADMIN — LACTULOSE SCH: 20 SOLUTION ORAL at 14:00

## 2018-11-13 RX ADMIN — CHLORHEXIDINE GLUCONATE 0.12% ORAL RINSE SCH ML: 1.2 LIQUID ORAL at 21:03

## 2018-11-13 RX ADMIN — Medication SCH MG: at 10:48

## 2018-11-13 RX ADMIN — DEXTROSE MONOHYDRATE SCH: 50 INJECTION, SOLUTION INTRAVENOUS at 20:47

## 2018-11-13 RX ADMIN — CHLORHEXIDINE GLUCONATE 0.12% ORAL RINSE SCH ML: 1.2 LIQUID ORAL at 10:59

## 2018-11-13 RX ADMIN — ALLOPURINOL SCH MG: 100 TABLET ORAL at 21:02

## 2018-11-13 RX ADMIN — DEXAMETHASONE SODIUM PHOSPHATE SCH MG: 10 INJECTION, SOLUTION INTRAMUSCULAR; INTRAVENOUS at 21:02

## 2018-11-13 RX ADMIN — HEPARIN SODIUM SCH: 5000 INJECTION, SOLUTION INTRAVENOUS at 20:47

## 2018-11-13 RX ADMIN — ALLOPURINOL SCH MG: 100 TABLET ORAL at 10:59

## 2018-11-13 RX ADMIN — DEXAMETHASONE SODIUM PHOSPHATE SCH MG: 10 INJECTION, SOLUTION INTRAMUSCULAR; INTRAVENOUS at 10:48

## 2018-11-13 RX ADMIN — METOPROLOL TARTRATE SCH MG: 25 TABLET, FILM COATED ORAL at 10:48

## 2018-11-13 RX ADMIN — DEXTROSE MONOHYDRATE SCH MLS/HR: 50 INJECTION, SOLUTION INTRAVENOUS at 06:32

## 2018-11-13 RX ADMIN — PROPOFOL SCH MLS/HR: 10 INJECTION, EMULSION INTRAVENOUS at 17:00

## 2018-11-13 RX ADMIN — PANTOPRAZOLE SODIUM SCH MG: 40 INJECTION, POWDER, FOR SOLUTION INTRAVENOUS at 10:59

## 2018-11-13 RX ADMIN — CEFTAZIDIME SCH MLS/HR: 1 INJECTION, POWDER, FOR SOLUTION INTRAMUSCULAR; INTRAVENOUS at 10:48

## 2018-11-13 RX ADMIN — LACTULOSE SCH GM: 20 SOLUTION ORAL at 06:32

## 2018-11-13 RX ADMIN — Medication SCH UNITS: at 10:58

## 2018-11-13 RX ADMIN — LACTULOSE SCH GM: 20 SOLUTION ORAL at 21:02

## 2018-11-13 NOTE — PN
Progress Note (short form)





- Note


Progress Note: 





Renal follow up for Hypercalcemia/HAM





Pt seen and examined in the ICU


remains intubated


no change in clinical status


making urine via fenton


 Vital Signs











Temperature  100.4 F H  11/13/18 14:00


 


Pulse Rate  95 H  11/13/18 12:00


 


Respiratory Rate  21 H  11/13/18 14:08


 


Blood Pressure  126/81   11/13/18 14:00


 


O2 Sat by Pulse Oximetry (%)  100   11/13/18 10:15








 Intake & Output











 11/10/18 11/11/18 11/12/18 11/13/18





 23:59 23:59 23:59 23:59


 


Intake Total 1630 2542 2064 986


 


Output Total 1853 765 2711 300


 


Balance 405 2167 1064 686


 


Weight 81.675 kg 82.639 kg 83.971 kg 83.915 kg








NAD


on vent via ET tube


RRR


Dec BS, no rales


no Le edema


 CBC, BMP





 11/13/18 05:10 





 11/13/18 05:10 





 Laboratory Tests











  11/13/18





  05:10


 


Calcium  6.7 L*


 


Phosphorus  5.7 H


 


Magnesium  2.6 H


 


Albumin  2.2 L














 Current Medications





Acetaminophen (Tylenol Suppository -)  650 mg [place] Q6H PRN


   PRN Reason: FEVER


   Last Admin: 11/05/18 18:10 Dose:  650 mg


Acetaminophen (Tylenol -)  650 mg PO Q6H PRN


   PRN Reason: PAIN LEVEL 1 - 3


Allopurinol (Zyloprim -)  100 mg PO BID [facility]


   Last Admin: 11/13/18 10:59 Dose:  100 mg


Chlorhexidine Gluconate (Peridex -)  15 ml MM BID [facility]


   Last Admin: 11/13/18 10:59 Dose:  15 ml


Cholecalciferol (Vitamin D3 -)  1,000 unit PO DAILY [facility]


Dexamethasone Sodium Phosphate (Decadron Injection -)  10 mg IVPUSH BID [facility]


   Last Admin: 11/13/18 10:48 Dose:  10 mg


Diphenhydramine HCl (Benadryl Injection -)  25 mg IVPB ONCE PRN


   PRN Reason: DURING PLASMAPHERESIS


Ergocalciferol (Drisdol Oral Solution -)  8,000 units PO DAILY [facility]


   Stop: 11/17/18 08:37


   Last Admin: 11/13/18 10:58 Dose:  8,000 units


Heparin Sodium (Porcine) (Hep-Lock -)  5 ml IVPUSH PRN PRN


   PRN Reason: Heparin


Heparin Sodium (Porcine) (Heparin -)  1,000 unit IVPUSH PRN PRN


   PRN Reason: Heparin


Heparin Sodium (Porcine) (Heparin -)  5,000 unit IVPUSH PRN PRN


   PRN Reason: Heparin


Fentanyl 500 mcg/ Dextrose  100 mls @ 5 mls/hr IVPB TITR [facility]; Protocol


   Last Admin: 11/13/18 06:32 Dose:  50 mcg/hr, 10 mls/hr


Heparin Sodium/Dextrose (Heparin Infusion -)  25,000 units in 500 mls @ 20 mls/

hr IVPB TITR [facility]; Protocol


   Last Admin: 11/12/18 21:03 Dose:  Not Given


Ceftazidime 1 gm/ Dextrose  50 mls @ 200 mls/hr IVPB DAILY [facility]; Protocol


   Last Admin: 11/13/18 10:48 Dose:  200 mls/hr


Propofol (Diprivan -)  1,000,000 mcg in 100 mls @ 2.479 mls/hr IVPB TITR [facility]; 

Protocol


   Last Admin: 11/12/18 21:05 Dose:  Not Given


Lactulose (Cephulac (Oral Use))  20 gm PO TID [facility]


   Last Admin: 11/13/18 06:32 Dose:  20 gm


Metoprolol Tartrate (Lopressor Injection -)  5 mg IVPUSH Q8H PRN


   PRN Reason: TACHYCARDIA


Metoprolol Tartrate (Lopressor -)  25 mg PO BID [facility]


   Last Admin: 11/13/18 10:48 Dose:  25 mg


Pantoprazole Sodium (Protonix Iv)  40 mg IVPUSH DAILY [facility]


   Last Admin: 11/13/18 10:59 Dose:  40 mg


Thiamine HCl (Vitamin B1 -)  100 mg PO DAILY [facility]


   Last Admin: 11/13/18 10:48 Dose:  100 mg











78 year old gentleman with hx of CKD, Afib on A/C, CAD, CKD, Hypertension, 

Hyperlipidemia, PVD who presented with AMS/Confusion and found to have HAM.





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 

showed no stones or obstruction, UA w/o protein but UPCR ~0.5 indicating non-

albumin proteinuria)


#AMS of unclear etiology (metabolic encephalopathy)


#Anemia 


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


#Hyperdense lesion on US of the kidney 


#Normal anion gap metabolic acidosis 





Renal function stable at this time and pt remains non-oliguric at this time 


no indication for RRT at the present time


s/p plasmapharesis for hyperviscocity per Heme


on IV steroids


Prognosis remains poor given lack of improvement in mental status


Continue vent support


trend renal function and electrolytes daily


can consider d/c jossy Coronado DO

## 2018-11-13 NOTE — PN
Progress Note (short form)





- Note


Progress Note: 





Patient seen and examined at bedside in the ICU


Intubated


off propofol and fentanyl since 9am


tolerating CPAP- no tachycardia or tachypnea on CPAP











 


 








 Vital Signs











Temperature  99.8 F H  11/13/18 12:00


 


Pulse Rate  95 H  11/13/18 12:00


 


Respiratory Rate  16   11/13/18 12:00


 


Blood Pressure  110/74   11/13/18 12:00


 


O2 Sat by Pulse Oximetry (%)  100   11/13/18 10:15

















 Intake & Output











 11/11/18 11/12/18 11/12/18





 23:59 11:59 23:59


 


Intake Total 1250 1003 


 


Output Total 200 500 


 


Balance 1050 503 


 


Weight  83.971 kg 


 


Intake:   


 


   263 


 


    #20 RAC 11/11/18  57 


 


    DIPRIVAN - 1,000,000 mcg 10 35 





    In 100 ml @ 5 MCG/KG/MIN   





    2.479 mls/hr IVPB TITR   





    [facility] Rx#:[id number]   


 


    HEPARIN INFUSION - 25,000 120 114 





    units In 500 ml @ 1,000   





    UNITS/HR 20 mls/hr IVPB   





    TITR [facility] Rx#:[id number]   


 


    Sublimaze Injection - 500 60 57 





    Mcg In D5w - 90 ml @ 25   





    MCG/HR 5 mls/hr IVPB TITR   





    [facility] Rx#:[id number]   


 


  IVPB 300  


 


  Tube Feeding 540 540 


 


  Tube Irrigant 220 200 


 


Output:   


 


  Urine 200 500 


 


    Garces 200 500 


 


Other:   


 


  Voiding Method Indwelling Catheter Indwelling Catheter 


 


  Bowel Movement Yes Yes 


 


  # Bowel Movements 3 2 


 


  Body Mass Index (BMI)   25.0


 


  Weight Measurement Method  Built in [facility] 























PE:


Lying in bed intubated. Tries to open eyes to name. Less responsive from 

yesterday propofol held 


 Poor dentition


PERRL senile arcus bilaterally


Irregular No murmur


soft non tender non distended








 





 











  11/12/18 11/12/18 11/13/18





  18:00 18:00 05:10


 


WBC  7.7   9.0


 


RBC  2.45 L   2.73 L


 


Hgb  7.4 L   8.1 L


 


Hct  21.8 L   24.4 L


 


MCV  89.1   89.6


 


MCHC  34.0   33.3


 


RDW  16.4 H   16.3 H


 


Plt Count  161   177


 


Neutrophils %  82.4   82.1


 


Lymphocytes %  12.6   12.5


 


Monocytes %  4.5   5.1


 


Eosinophils %  0.2  D   0.1


 


Basophils %  0.3   0.2


 


Sodium   140 


 


Potassium   4.6 


 


Chloride   114 H 


 


Carbon Dioxide   20 L 


 


Anion Gap   6 L 


 


BUN   74 H 


 


Creatinine   2.9 H 














  11/13/18





  05:10


 


WBC 


 


RBC 


 


Hgb 


 


Hct 


 


MCV 


 


MCHC 


 


RDW 


 


Plt Count 


 


Neutrophils % 


 


Lymphocytes % 


 


Monocytes % 


 


Eosinophils % 


 


Basophils % 


 


Sodium  139


 


Potassium  4.7


 


Chloride  113 H


 


Carbon Dioxide  21


 


Anion Gap  5 L


 


BUN  79 H


 


Creatinine  3.0 H








 











 11/10/18 15:00 Blood Culture - Preliminary





 Blood - Peripheral Venous    NO GROWTH OBTAINED AFTER 48 HOURS, INCUBATION TO 

CONTINUE





    FOR 3 DAYS.


 


 11/10/18 14:00 Blood Culture - Preliminary





 Blood - Central Line    NO GROWTH OBTAINED AFTER 48 HOURS, INCUBATION TO 

CONTINUE





    FOR 3 DAYS.


 


 11/06/18 14:30 Gram Stain - Final





 Sputum - Endotrachea Suction/Ventilator Sputum Culture - Final





    Stenotrophomon.(X.)Maltophilia


 


 11/05/18 19:45 Blood Culture - Final





 Blood - Peripheral Venous    NO GROWTH AFTER 5 DAYS INCUBATION


 


 11/05/18 19:20 Blood Culture - Final





 Blood - Peripheral Venous    NO GROWTH AFTER 5 DAYS INCUBATION


 


 11/06/18 14:30 Legionella Antigen - Final





 Urine - Urine Garces Streptococcus pneumoniae Antigen (M - Final


 


 10/30/18 17:02 Blood Culture - Final





 Blood - Peripheral Venous    NO GROWTH AFTER 5 DAYS INCUBATION


 


 10/30/18 17:02 Blood Culture - Final





 Blood - Peripheral Venous    NO GROWTH AFTER 5 DAYS INCUBATION


 


 10/30/18 17:02 Urine Culture - Final





 Urine - Urine - Catheterized    NO GROWTH OBTAINED


 


 10/31/18 19:15 Influenza Types A,B Antigen - Final





 Nasopharyngeal Swab  - Final














 








78M with multiple medical problems presents to the hospital after being found 

down by neighbors found to have HAM and hypercalcemia with altered mental 

status.





Problem List:


Altered mental status


toxic metabolic encephalopathy


Hypercalcemia likely from malignancy


HAM on CKD


History of alcohol abuse


HTN


HLD


CAD 


Afib 


diastolic dysfunction


troponinemia


Hypertrophic cardiomypoathy (apical)


sepsis secondary to RLL pneumonia


acute hypoxemic respiratory failure


Vitamin D deficiency 


multiple myeloma





Plan:


Calcium WNL today


Patient s/p pharesis x2


SPEP noted total protein and globulins elevated 


 Kappa/lambda light chains-noted 


Kappa/Lambda ratio > 100 consistent with Multiple myeloma


M spike elevated elevated at 6.8 previously 4.7 


. Baseline Immunoglobulins done at time of rapid repsonse second set shows 

decreased of IgG from about 5 grams to about 3 grams after first pharesis.


 bone (metastatic) survey and if negative will get Bone scan.- when acute 

issues resolved 


Of note Bone scan may not show any lesions if it is from multiple myeloma but 

will sold solid tumors


Can consider MRI of C/T/L spine if patient stops moving lower extremities or 

concern for lytic lesions of the spine. At this time the patient is moving his 

lower extremities so will hold off for now.


PTH-borderline low appropriate given hypercalcemia, PTHrP low


Vit D low


ABx per ID


replete calcium per ICU


Palliative consult to discuss goals of care with family-Pending 


Thank you for this consult

## 2018-11-13 NOTE — PN
Progress Note, Physician





- Current Medication List


Current Medications: 


Active Medications





Acetaminophen (Tylenol Suppository -)  650 mg [place] Q6H PRN


   PRN Reason: FEVER


   Last Admin: 11/05/18 18:10 Dose:  650 mg


Acetaminophen (Tylenol -)  650 mg PO Q6H PRN


   PRN Reason: PAIN LEVEL 1 - 3


Allopurinol (Zyloprim -)  100 mg PO BID [facility]


   Last Admin: 11/12/18 21:17 Dose:  100 mg


Chlorhexidine Gluconate (Peridex -)  15 ml MM BID [facility]


   Last Admin: 11/12/18 21:17 Dose:  15 ml


Cholecalciferol (Vitamin D3 -)  1,000 unit PO DAILY [facility]


Dexamethasone Sodium Phosphate (Decadron Injection -)  10 mg IVPUSH BID [facility]


   Last Admin: 11/12/18 21:17 Dose:  10 mg


Diphenhydramine HCl (Benadryl Injection -)  25 mg IVPB ONCE PRN


   PRN Reason: DURING PLASMAPHERESIS


Ergocalciferol (Drisdol Oral Solution -)  8,000 units PO DAILY [facility]


   Stop: 11/17/18 08:37


   Last Admin: 11/12/18 09:19 Dose:  8,000 units


Heparin Sodium (Porcine) (Hep-Lock -)  5 ml IVPUSH PRN PRN


   PRN Reason: Heparin


Heparin Sodium (Porcine) (Heparin -)  1,000 unit IVPUSH PRN PRN


   PRN Reason: Heparin


Heparin Sodium (Porcine) (Heparin -)  5,000 unit IVPUSH PRN PRN


   PRN Reason: Heparin


Fentanyl 500 mcg/ Dextrose  100 mls @ 5 mls/hr IVPB TITR [facility]; Protocol


   Last Admin: 11/13/18 06:32 Dose:  50 mcg/hr, 10 mls/hr


Heparin Sodium/Dextrose (Heparin Infusion -)  25,000 units in 500 mls @ 20 mls/

hr IVPB TITR [facility]; Protocol


   Last Admin: 11/12/18 21:03 Dose:  Not Given


Ceftazidime 1 gm/ Dextrose  50 mls @ 200 mls/hr IVPB DAILY [facility]; Protocol


   Last Admin: 11/12/18 09:30 Dose:  200 mls/hr


Propofol (Diprivan -)  1,000,000 mcg in 100 mls @ 2.479 mls/hr IVPB TITR [facility]; 

Protocol


   Last Admin: 11/12/18 21:05 Dose:  Not Given


Lactulose (Cephulac (Oral Use))  20 gm PO TID [facility]


   Last Admin: 11/13/18 06:32 Dose:  20 gm


Metoprolol Tartrate (Lopressor Injection -)  5 mg IVPUSH Q8H PRN


   PRN Reason: TACHYCARDIA


Metoprolol Tartrate (Lopressor -)  25 mg PO BID [facility]


Pantoprazole Sodium (Protonix Iv)  40 mg IVPUSH DAILY [facility]


   Last Admin: 11/12/18 09:18 Dose:  40 mg


Thiamine HCl (Vitamin B1 -)  100 mg PO DAILY [facility]


   Last Admin: 11/12/18 09:22 Dose:  100 mg











- Objective


Vital Signs: 


 Vital Signs











Temperature  99.2 F   11/13/18 06:00


 


Pulse Rate  88   11/13/18 06:00


 


Respiratory Rate  16   11/13/18 06:30


 


Blood Pressure  115/75   11/13/18 06:00


 


O2 Sat by Pulse Oximetry (%)  100   11/12/18 19:12











Cardiovascular: Yes: S1, S2


Respiratory: Yes: Mechanically Ventilated


Gastrointestinal: Yes: Normal Bowel Sounds, Soft


Labs: 


 CBC, BMP





 11/13/18 05:10 





 11/13/18 05:10 





 INR, PTT











INR  1.36  (0.83-1.09)  H  11/11/18  06:00    


 


Fibrinogen  290.0 mg/dL (238-498)   11/11/18  06:00    














Problem List





- Problems


(1) Toxic metabolic encephalopathy


Code(s): G92 - TOXIC ENCEPHALOPATHY   





(2) Cellulitis


Code(s): L03.90 - CELLULITIS, UNSPECIFIED   


Qualifiers: 


   Site of cellulitis: extremity   Site of cellulitis of extremity: lower 

extremity   Laterality: right   Qualified Code(s): L03.115 - Cellulitis of 

right lower limb   





(3) Sepsis


Code(s): A41.9 - SEPSIS, UNSPECIFIED ORGANISM   





(4) Artery occlusion


Code(s): I70.90 - UNSPECIFIED ATHEROSCLEROSIS   





(5) Hypercalcemia


Code(s): E83.52 - HYPERCALCEMIA   





(6) Paroxysmal atrial fibrillation


Code(s): I48.0 - PAROXYSMAL ATRIAL FIBRILLATION   





(7) HAM (acute kidney injury)


Code(s): N17.9 - ACUTE KIDNEY FAILURE, UNSPECIFIED   





Assessment/Plan








- Problems


(1) HAM (acute kidney injury)


Assessment/Plan: 


-Nephrology on board


-Cr stable


-monitor trend


-U/S renal/bladder unremarkable


Code(s): N17.9 - ACUTE KIDNEY FAILURE, UNSPECIFIED   





(2) Atrial fibrillation with RVR


Assessment/Plan: 


-On heparin drip


-rate controlled


-cardiology on board


-Tele monitor


Code(s): I48.91 - UNSPECIFIED ATRIAL FIBRILLATION   





(3) Sepsis


Assessment/Plan: 


-upon admission


-ID on board


-Cultures:


 Microbiology





11/05/18 19:45   Blood - Peripheral Venous   Blood Culture - Final


                            NO GROWTH AFTER 5 DAYS INCUBATION


11/05/18 19:20   Blood - Peripheral Venous   Blood Culture - Final


                            NO GROWTH AFTER 5 DAYS INCUBATION


11/06/18 14:30   Sputum - Endotrachea Suction/Ventilator   Gram Stain - Final


11/06/18 14:30   Sputum - Endotrachea Suction/Ventilator   Sputum Culture - 

Preliminary


                            Non Lactose Fermenting Gnb


11/06/18 14:30   Urine - Urine Garces   Legionella Antigen - Final


11/06/18 14:30   Urine - Urine Garces   Streptococcus pneumoniae Antigen (M - 

Final


10/30/18 17:02   Blood - Peripheral Venous   Blood Culture - Final


                            NO GROWTH AFTER 5 DAYS INCUBATION


10/30/18 17:02   Blood - Peripheral Venous   Blood Culture - Final


                            NO GROWTH AFTER 5 DAYS INCUBATION


10/30/18 17:02   Urine - Urine - Catheterized   Urine Culture - Final


                            NO GROWTH OBTAINED


10/31/18 19:15   Nasopharyngeal Swab   Influenza Types A,B Antigen - Final


10/31/18 19:15   Nasopharyngeal Swab    - Final





-On IV Zosyn and Vanco


Code(s): A41.9 - SEPSIS, UNSPECIFIED ORGANISM   





(4) Toxic metabolic encephalopathy


Code(s): G92 - TOXIC ENCEPHALOPATHY   





(5) Altered mental status


Assessment/Plan: 


-CT head x 2 negative


-Seen by Neurology


-multifactorial


Code(s): R41.82 - ALTERED MENTAL STATUS, UNSPECIFIED   





(6) Anemia


Assessment/Plan: 


chronic, at baseline


-Check stool OB-pending


-Iron profile, B12, thyroid profile unremarkable


-monitor trend


-Transfuse only if Hg <7.0 to avoid fluid overload as he is chronically anemic


Code(s): D64.9 - ANEMIA, UNSPECIFIED

## 2018-11-13 NOTE — PN
Progress Note (short form)





- Note


Progress Note: 


Chief Complaint: Events noted, notes reviewed, remains intubated, remains in 

atrial fibrillation on A/C with Heparin





History of Present Illness: 


Seen and examined in the ICU. Events noted, notes reviewed, remains intubated, 

remains in atrial fibrillation on A/C with Heparin





B-Blockers not administered due to hypotension





&[facility] coronary angiography performed 1/11/2017 revealed non-obstructive CAD, 

severe LV apical hypertrophic cardiomyopathy, mildly elevated right sided 

pressures/study is consistent with apical hypertrophy (spade-like) variant of 

hypertrophic cardiomyopathy





Echocardiography dated 07/11/2018 Mod cLVH, severe DYANA, moderate TR RVSP 50-60 

mmHg, moderate-severe MR





Echocardiography dated 10/16/2018 Normal LV size with hyperdynamic LVEF 75%, 

mild BSH, normal RV size and function, severe LAE, moderate-severe MR, mod TR


 





- Current Medication List


 


 Current Medications





Acetaminophen (Tylenol Suppository -)  650 mg [place] Q6H PRN


   PRN Reason: FEVER


   Last Admin: 11/05/18 18:10 Dose:  650 mg


Acetaminophen (Tylenol -)  650 mg PO Q6H PRN


   PRN Reason: PAIN LEVEL 1 - 3


Allopurinol (Zyloprim -)  100 mg PO BID [facility]


   Last Admin: 11/12/18 21:17 Dose:  100 mg


Chlorhexidine Gluconate (Peridex -)  15 ml MM BID [facility]


   Last Admin: 11/12/18 21:17 Dose:  15 ml


Cholecalciferol (Vitamin D3 -)  1,000 unit PO DAILY [facility]


Dexamethasone Sodium Phosphate (Decadron Injection -)  10 mg IVPUSH BID [facility]


   Last Admin: 11/12/18 21:17 Dose:  10 mg


Diphenhydramine HCl (Benadryl Injection -)  25 mg IVPB ONCE PRN


   PRN Reason: DURING PLASMAPHERESIS


Ergocalciferol (Drisdol Oral Solution -)  8,000 units PO DAILY [facility]


   Stop: 11/17/18 08:37


   Last Admin: 11/12/18 09:19 Dose:  8,000 units


Heparin Sodium (Porcine) (Hep-Lock -)  5 ml IVPUSH PRN PRN


   PRN Reason: Heparin


Heparin Sodium (Porcine) (Heparin -)  1,000 unit IVPUSH PRN PRN


   PRN Reason: Heparin


Heparin Sodium (Porcine) (Heparin -)  5,000 unit IVPUSH PRN PRN


   PRN Reason: Heparin


Fentanyl 500 mcg/ Dextrose  100 mls @ 5 mls/hr IVPB TITR [facility]; Protocol


   Last Admin: 11/13/18 06:32 Dose:  50 mcg/hr, 10 mls/hr


Heparin Sodium/Dextrose (Heparin Infusion -)  25,000 units in 500 mls @ 20 mls/

hr IVPB TITR [facility]; Protocol


   Last Admin: 11/12/18 21:03 Dose:  Not Given


Ceftazidime 1 gm/ Dextrose  50 mls @ 200 mls/hr IVPB DAILY [facility]; Protocol


   Last Admin: 11/12/18 09:30 Dose:  200 mls/hr


Propofol (Diprivan -)  1,000,000 mcg in 100 mls @ 2.479 mls/hr IVPB TITR [facility]; 

Protocol


   Last Admin: 11/12/18 21:05 Dose:  Not Given


Lactulose (Cephulac (Oral Use))  20 gm PO TID [facility]


   Last Admin: 11/13/18 06:32 Dose:  20 gm


Metoprolol Tartrate (Lopressor -)  25 mg PO BID [facility]


   Last Admin: 11/12/18 21:17 Dose:  Not Given


Metoprolol Tartrate (Lopressor Injection -)  5 mg IVPUSH Q8H PRN


   PRN Reason: TACHYCARDIA


Pantoprazole Sodium (Protonix Iv)  40 mg IVPUSH DAILY [facility]


   Last Admin: 11/12/18 09:18 Dose:  40 mg


Thiamine HCl (Vitamin B1 -)  100 mg PO DAILY [facility]


   Last Admin: 11/12/18 09:22 Dose:  100 mg








Review of Systems





Unable to obtain





- Objective


Vital Signs: 


 


 Last Vital Signs











Temp Pulse Resp BP Pulse Ox


 


 99.2 F   88   14   115/75   100 


 


 11/13/18 06:00  11/13/18 06:00  11/13/18 06:00  11/13/18 06:00  11/12/18 19:12








 Intake & Output











 11/10/18 11/11/18 11/12/18 11/13/18





 23:59 23:59 23:59 23:59


 


Intake Total 1630 2542 2064 986


 


Output Total 3359 569 6009 300


 


Balance 405 2167 1064 686


 


Weight 180 lb 1 oz 182 lb 3 oz 185 lb 2 oz 185 lb














Neck: Supple Negative JVD No Bruit


Cardiovascular: S1 S2 Irregularly Irregular Grade 2/6 Systolic Murmur Apical


Chest: Scattered Rhonchi Bilaterally


Gastrointestinal: Soft Benign Normal Bowel Sounds


Ext: No Edema 1+ Bilateral femoral Pulses





Labs: 


 


 ABG Results











ABG pH  7.21  (7.35-7.45)  L*  11/11/18  06:00    


 


ABG pCO2 at Pt Temp  45.4 mmHg (35-45)  H  11/11/18  06:00    


 


ABG pO2 at Pt Temp  390.0 mmHg ()  H* D 11/11/18  06:00    


 


ABG HCO3  17.6 meq/L (22-26)  L  11/11/18  06:00    


 


ABG O2 Sat (Measured)  99.3 % (90-98.9)  H  11/11/18  06:00    


 


ABG O2 Content  13.3 % vol (15-22)  L  11/11/18  06:00    


 


ABG Base Excess  -9.2 meq/l (-2-2)  L  11/11/18  06:00    








 CBC, BMP





 11/13/18 05:10 





BMP pending from this AM





 Hepatic Panel











Total Bilirubin  0.3 mg/dL (0.2-1)   11/12/18  18:00    


 


AST  48 U/L (15-37)  H  11/12/18  18:00    


 


ALT  54 U/L (13-61)   11/12/18  18:00    


 


Alkaline Phosphatase  121 U/L ()  H  11/12/18  18:00    


 


Albumin  2.2 g/dl (3.4-5.0)  L  11/12/18  18:00    








 INR, PTT











INR  1.36  (0.83-1.09)  H  11/11/18  06:00    


 


Fibrinogen  290.0 mg/dL (238-498)   11/11/18  06:00    











Assessment/Plan





ASSESSMENT:





1. Acute hypoxic respiratory failure, suspected aspiration pneumonia 


2. Toxic metabolic encephelopathy, rule out CVA while off DOAC's, probable 

sepsis syndrome


3. Acute on CKD 


4. Paraproteinemia, possible multiple myeloma


5. 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 

disease related to persistent atrial fibrillation with [id number] score of 6


6. CAD with evidence of demand ischemic injury, non obstructive CAD on [facility] 

coronary angiogrpahy angina pectoris


7. 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 

chronic class I-II NYHA classification LV failure, compensated/euvolemic


8. Persistent atrial fibrillation [id number] score of 6 currently on Heparin 

therapy


9. HTN


10. Anemia


11. Poor compliance with medical therapy administration and medical F/U


12. Tobacco abuse


13. ETOH dependence





PLAN:





1. Continue Heparin with caution and close monitoring of CBC/Hg, maintain Hg 

equal or > 8.0, pending extubation at which point resume Xarelto, therapy to be 

continued indefinitely unless it is absolutely contraindicated considering his 

[id number] score of 6 


2. Continue Lopressor via NGT and hold if SBP equal or < 8.0


3. Antibiotics as per the primary team


4. Ventilator management as per the ICU team 














Armand Mckenzie MD

## 2018-11-13 NOTE — PN
Teaching Attending Note


Name of Resident: Arnav Bernard





ATTENDING PHYSICIAN STATEMENT





I saw and evaluated the patient.


I reviewed the resident's note and discussed the case with the resident.


I agree with the resident's findings and plan as documented.








SUBJECTIVE:





Pt seen and examined in the ICU. Remains intubated, poorly responsive. Attempts 

to open eyes when stimulated.





OBJECTIVE:





 Vital Signs











 Period  Temp  Pulse  Resp  BP Sys/Varghese  Pulse Ox


 


 Last 24 Hr  98.6 F-99.5 F    13-18  /59-85  








 Intake & Output











 11/10/18 11/11/18 11/12/18 11/13/18





 23:59 23:59 23:59 23:59


 


Intake Total 1630 2542 2064 986


 


Output Total 8191 871 8213 300


 


Balance 405 2167 1064 686


 


Weight 81.675 kg 82.639 kg 83.971 kg 83.915 kg








Gen:  intubated, poorly responsive


Heart: tachycardic, irregular


Lung: scattered rhonchi


Abd: soft, nontender


Ext: no edema





 CBC, BMP





 11/13/18 05:10 





 11/13/18 05:10 





Active Medications





Acetaminophen (Tylenol Suppository -)  650 mg [place] Q6H PRN


   PRN Reason: FEVER


   Last Admin: 11/05/18 18:10 Dose:  650 mg


Acetaminophen (Tylenol -)  650 mg PO Q6H PRN


   PRN Reason: PAIN LEVEL 1 - 3


Allopurinol (Zyloprim -)  100 mg PO BID [facility]


   Last Admin: 11/13/18 10:59 Dose:  100 mg


Chlorhexidine Gluconate (Peridex -)  15 ml MM BID [facility]


   Last Admin: 11/13/18 10:59 Dose:  15 ml


Cholecalciferol (Vitamin D3 -)  1,000 unit PO DAILY [facility]


Dexamethasone Sodium Phosphate (Decadron Injection -)  10 mg IVPUSH BID [facility]


   Last Admin: 11/13/18 10:48 Dose:  10 mg


Diphenhydramine HCl (Benadryl Injection -)  25 mg IVPB ONCE PRN


   PRN Reason: DURING PLASMAPHERESIS


Ergocalciferol (Drisdol Oral Solution -)  8,000 units PO DAILY [facility]


   Stop: 11/17/18 08:37


   Last Admin: 11/13/18 10:58 Dose:  8,000 units


Heparin Sodium (Porcine) (Hep-Lock -)  5 ml IVPUSH PRN PRN


   PRN Reason: Heparin


Heparin Sodium (Porcine) (Heparin -)  1,000 unit IVPUSH PRN PRN


   PRN Reason: Heparin


Heparin Sodium (Porcine) (Heparin -)  5,000 unit IVPUSH PRN PRN


   PRN Reason: Heparin


Fentanyl 500 mcg/ Dextrose  100 mls @ 5 mls/hr IVPB TITR [facility]; Protocol


   Last Admin: 11/13/18 06:32 Dose:  50 mcg/hr, 10 mls/hr


Heparin Sodium/Dextrose (Heparin Infusion -)  25,000 units in 500 mls @ 20 mls/

hr IVPB TITR [facility]; Protocol


   Last Admin: 11/12/18 21:03 Dose:  Not Given


Ceftazidime 1 gm/ Dextrose  50 mls @ 200 mls/hr IVPB DAILY [facility]; Protocol


   Last Admin: 11/13/18 10:48 Dose:  200 mls/hr


Propofol (Diprivan -)  1,000,000 mcg in 100 mls @ 2.479 mls/hr IVPB TITR [facility]; 

Protocol


   Last Admin: 11/12/18 21:05 Dose:  Not Given


Lactulose (Cephulac (Oral Use))  20 gm PO TID [facility]


   Last Admin: 11/13/18 06:32 Dose:  20 gm


Metoprolol Tartrate (Lopressor Injection -)  5 mg IVPUSH Q8H PRN


   PRN Reason: TACHYCARDIA


Metoprolol Tartrate (Lopressor -)  25 mg PO BID [facility]


   Last Admin: 11/13/18 10:48 Dose:  25 mg


Pantoprazole Sodium (Protonix Iv)  40 mg IVPUSH DAILY [facility]


   Last Admin: 11/13/18 10:59 Dose:  40 mg


Thiamine HCl (Vitamin B1 -)  100 mg PO DAILY [facility]


   Last Admin: 11/13/18 10:48 Dose:  100 mg








ASSESSMENT AND PLAN:


Acute Hypoxic Respiratory Failure


Altered Mental Status


Metabolic Encephalopathy


Pneumonia


Multiple Myeloma


Acute on Chronic Renal Failure


Metabolic Acidosis


LV Diastolic Dysfunction


Atrial Fibrillation


CAD


+Troponins likely Demand Ischemia


PAD


Hyperlipidemia


HTN





-  continue antibiotics


-  decadron per oncology


-  lasix today


-  monitor urine output, creatinine


-  replete lytes


-  rate control


-  continue anticoagulation


-  minimize sedation to assess mental status 


-  spontaneous breathing trials as tolerated but would not extubate until 

mental status improved


-  enteral feeds


-  DVT/GI prophylaxis


-  continue discussions regarding goals of care, may need tracheostomy


-  palliative care eval


-  continue ICU monitoring











critical care time spent in reviewing chart, evaluating patient and formulating 

plan 35 min

## 2018-11-13 NOTE — PROC
Intubation





- Intubation


Reason for Intubation: Other (Air leak from old ET tube cuff noted, requiring 

placement of new tube.)


Time of Intubation: 16:35


Intubation Method: orotracheal


Blade used: bougie 


Tube Size (cm): 8.0


Tube position @ lip (cm): 24


Tube position confirmed by: CO2 detector, Breath sounds


Breath Sounds after Intubation: right greater than left


Post Intubation Xray: Yes


Remarks: 


Air leak from ET tube cuff noted, requiring placement of new tube. Anesthesia 

called to assist w/ reintubation w/ use of bougie. Tube size 8mm inserted to 

24cm at the lip. Tube position confirmed by b/l breath sounds (R side >L side) 

and CO2 detector. CXR ordered.

## 2018-11-13 NOTE — CON.NEURO
Consult


Consult Specialty:: Neurology


Referred by:: Dr. Davalos


Reason for Consultation:: Unresponsive





- History of Present Illness


Chief Complaint: Unresponsive


History of Present Illness: 


 77 y/o man with a PMHx of Persistent Afib [id number] with recurrent 

peripheral arterial embolism due to previous noncompliance with Xarelto due to 

lack of social support, Diabetes Mellitus, Apical Hypertrophic Cardiomyopathy, 

Alcohol Abuse, nonobstructive CAD h/o anaphylaxis after eating [employer]- 

shrimp and boneless spare ribs, suspected myeloma (due to paraproteinemia and M 

spike, SPEP/UPEP results not yet found), recent admission for vasovagal near 

syncope and HAM presented with altered mental status after being found down at 

his home by EMS after his neighbor found mail piling up, etc.  He was brought 

to the ER and was arousable and following commands but not answering questions 

appropriately and is altered and cannot provide any acurate history; I am told 

he was still wearing his wristband from his last discharge.  He was found to be 

hypothermic and tachycardic with a marked hypercalcemia and HAM.  This is 

similar to his prior presentation when he was found to be hypercalcemic (

discharged with Ca of 11 corrected) and hypothermic.  His Ca is even more off 

this time. Hospital course significant for confirmation of multiple myeloma 

diagnosis, respiratory crisis and intubation, and further decline of mental 

status.  Also noted was hypercalcemia and hyperammonemia felt to non-hepatic in 

origin.  Despite lactulose therapy, though, ammonia level has only fallen from 

91 to 90.  We are now called back because of failure to wake up and in fact 

worsening mentation.  








- History Source


History Provided By: Medical Record


Limitations to Obtaining History: Clinical Condition





- Past Medical History


Cardio/Vascular: Yes: AFIB, HTN, MI


Renal/: Yes: Renal Failure, Renal Inusuff


Additional Medical History: peripheral arterial diseases/p stent in LLE





- Past Surgical History


Past Surgical History: Yes: Hernia Repair





- Alcohol/Substance Use


Hx Alcohol Use: Yes


History of Substance Use: reports: None





- Smoking History


Smoking history: Former smoker


Have you smoked in the past 12 months: No


Aproximately how many cigarettes per day: 10


If you are a former smoker, when did you quit?: 12.28.16





Home Medications





- Allergies


Allergies/Adverse Reactions: 


 Allergies











Allergy/AdvReac Type Severity Reaction Status Date / Time


 


valsartan Allergy Severe  Verified 10/30/18 16:47














- Home Medications


Home Medications: 


Ambulatory Orders





Atorvastatin Ca [Lipitor] 40 mg PO HS #30 tablet 07/12/18 


Rivaroxaban [Xarelto -] 20 mg PO DAILY@1800 #30 tablet 07/12/18 


Carvedilol [Coreg -] 6.25 mg PO BID #60 tablet 10/17/18 











Physical Exam-Neuro


Vital Signs: 


 Vital Signs











Temperature  99.8 F H  11/13/18 12:00


 


Pulse Rate  95 H  11/13/18 12:00


 


Respiratory Rate  16   11/13/18 12:00


 


Blood Pressure  110/74   11/13/18 12:00


 


O2 Sat by Pulse Oximetry (%)  100   11/13/18 10:15











Constitutional: Yes: Cachectic, Other (on ventilator)


Labs: 


 CBC, BMP





 11/13/18 05:10 





 11/13/18 05:10 





 INR, PTT











INR  1.36  (0.83-1.09)  H  11/11/18  06:00    


 


Fibrinogen  290.0 mg/dL (238-498)   11/11/18  06:00    














- Neuro Exam


Level Of Consciousness: Yes: Comatose


Eyes: Yes: FUNMILAYO (dolls eyes intact, positive corneals bilaterally )


Speech: Other (intubated and unresponsive to vocal or tactile stimuli)


Motor Strength: 0/5: Left Arm, Right Arm, Left Leg, Right Leg (no spontaneous 

movements and none elicited by noxiuos stimuli)





Imaging





- Results


Cat Scan: Report Reviewed, Image Reviewed (11/9 no focal large lesions,)





Problem List





- Problems


(1) HAM (acute kidney injury)


Code(s): N17.9 - ACUTE KIDNEY FAILURE, UNSPECIFIED   





(2) Atrial fibrillation with RVR


Code(s): I48.91 - UNSPECIFIED ATRIAL FIBRILLATION   





(3) Cellulitis


Code(s): L03.90 - CELLULITIS, UNSPECIFIED   


Qualifiers: 


   Qualified Code(s): L03.115 - Cellulitis of right lower limb   





(4) Hyperlipidemia


Code(s): E78.5 - HYPERLIPIDEMIA, UNSPECIFIED   


Qualifiers: 


   Qualified Code(s): E78.00 - Pure hypercholesterolemia, unspecified; E78.0 - 

Pure hypercholesterolemia   





(5) Hypothermia


Code(s): T68.XXXA - HYPOTHERMIA, INITIAL ENCOUNTER   


Qualifiers: 


   Qualified Code(s): T68.XXXA - Hypothermia, initial encounter   





(6) Sepsis


Code(s): A41.9 - SEPSIS, UNSPECIFIED ORGANISM   





(7) Toxic metabolic encephalopathy


Code(s): G92 - TOXIC ENCEPHALOPATHY   





(8) Acute-on-chronic kidney injury


Code(s): N17.9 - ACUTE KIDNEY FAILURE, UNSPECIFIED; N18.9 - CHRONIC KIDNEY 

DISEASE, UNSPECIFIED   


Qualifiers: 


   Qualified Code(s): N17.9 - Acute kidney failure, unspecified; N18.9 - 

Chronic kidney disease, unspecified   





Assessment/Plan


This still looks like metabolic encephalopathy much more than structural. His 

calcium remains low, and despite lactulose, his ammonia level is still quite 

high.  Would push lactulose to maintain diarrhea, and see if this helps.  

Thanks.

## 2018-11-13 NOTE — PN
Physical Exam: 


SUBJECTIVE: Patient seen and examined at bedside. He remains intubated and 

sedated. The sedation was stopped at 8:30 AM for a sedation vacation and so we 

can assess his mental status. No acute events overnight. 





- Slight worsening of Kidney function today. BUN: 79, Cr: 3.0





During the patient's sedation vacation yesterday he was on CPAP mode of the 

vent for ~ 5 hours without any respiratory difficulty and was consistently 

pulling tidal volumes greater than 700 and he was not tachypneic. We did not 

extubate him because we do not have a good grasp on his mental status and 

capabilities. 





Neurology was consulted yesterday and will come and visit the patient today. 


Palliative care consulted yesterday. 





I spoke with hsi sister Shyla niño who states that the patient has a son 

and daughter in the area. I placed a  consult to help obtain 

information about the son and daughter to try and move forward with a plan for 

advanced directives for the patient. 








OBJECTIVE:





 Vital Signs











 Period  Temp  Pulse  Resp  BP Sys/Varghese  Pulse Ox


 


 Last 24 Hr  98.6 F-99.2 F    13-18  /59-78  











GENERAL: The patient is intubated and sedated.


HEAD: Normal with no signs of trauma.


EYES: PERRL, sclera anicteric, conjunctiva clear. 


ENT: Ears normal, nares patent, dry mucous membranes


NECK: Trachea midline. 


LUNGS: bilateral diffuse rhonchi


HEART: Regular rate and irregular rhythm. 


ABDOMEN: Soft, nondistended.


EXTREMITIES: 2+ pulses, warm, well-perfused, no edema, scattered ulcers. 


NEUROLOGICAL: Cannot assess secondary to clinical condition. 


SKIN: Warm, dry, normal turgor, scattered ulcers on the legs














 Laboratory Results - last 24 hr











  11/09/18 11/12/18 11/12/18





  09:11 05:30 05:30


 


WBC   


 


RBC   


 


Hgb   


 


Hct   


 


MCV   


 


MCH   


 


MCHC   


 


RDW   


 


Plt Count   


 


MPV   


 


Absolute Neuts (auto)   


 


Neutrophils %   


 


Neutrophils % (Manual)   72.4 


 


Band Neutrophils %   4.1 


 


Lymphocytes %   


 


Lymphocytes % (Manual)   16.3  D 


 


Monocytes %   


 


Monocytes % (Manual)   3 L 


 


Eosinophils %   


 


Eosinophils % (Manual)   0.0  D 


 


Basophils %   


 


Basophils % (Manual)   0.0 


 


Myelocytes % (Man)   0  D 


 


Promyelocytes % (Man)   0 


 


Blast Cells % (Manual)   0 


 


Nucleated RBC %   


 


Metamyelocytes   3 H D 


 


Hypochromia   2+ 


 


Platelet Estimate   Decreased 


 


Polychromasia   2+ 


 


Poikilocytosis   0 


 


Basophilic Stippling   2+ 


 


Anisocytosis   2+ 


 


Microcytosis   1+ 


 


Macrocytosis   2+ 


 


Tear Drop Cells   1+ 


 


PTT (Actin FS)   


 


Puncture Site   


 


ABG pH   


 


ABG pCO2 at Pt Temp   


 


ABG pO2 at Pt Temp   


 


ABG HCO3   


 


ABG O2 Sat (Measured)   


 


ABG O2 Content   


 


ABG Base Excess   


 


Chacho Test   


 


O2 Delivery Device   


 


Oxygen Flow Rate   


 


Vent Rate   


 


Mechanical Rate   


 


PEEP   


 


Pressure Support Vent   


 


Sodium   


 


Potassium   


 


Chloride   


 


Carbon Dioxide   


 


Anion Gap   


 


BUN   


 


Creatinine   


 


Creat Clearance w eGFR   


 


Random Glucose   


 


Calcium   


 


Phosphorus    3.5


 


Magnesium    2.5 H


 


Total Bilirubin   


 


AST   


 


ALT   


 


Alkaline Phosphatase   


 


Ammonia   


 


Total Protein   


 


Albumin   


 


Blood Type  B POSITIVE  


 


Antibody Screen  Negative  


 


Crossmatch  See Detail  














  11/12/18 11/12/18 11/12/18





  18:00 18:00 18:00


 


WBC   7.7 


 


RBC   2.45 L 


 


Hgb   7.4 L 


 


Hct   21.8 L 


 


MCV   89.1 


 


MCH   30.3 


 


MCHC   34.0 


 


RDW   16.4 H 


 


Plt Count   161 


 


MPV   9.6 


 


Absolute Neuts (auto)   6.3 


 


Neutrophils %   82.4 


 


Neutrophils % (Manual)   75.0 


 


Band Neutrophils %   3.0 


 


Lymphocytes %   12.6 


 


Lymphocytes % (Manual)   14.0 


 


Monocytes %   4.5 


 


Monocytes % (Manual)   8  D 


 


Eosinophils %   0.2  D 


 


Eosinophils % (Manual)   


 


Basophils %   0.3 


 


Basophils % (Manual)   


 


Myelocytes % (Man)   


 


Promyelocytes % (Man)   


 


Blast Cells % (Manual)   


 


Nucleated RBC %   3 H 


 


Metamyelocytes   


 


Hypochromia   1+ 


 


Platelet Estimate   Adequate 


 


Polychromasia   


 


Poikilocytosis   


 


Basophilic Stippling   


 


Anisocytosis   


 


Microcytosis   


 


Macrocytosis   


 


Tear Drop Cells   


 


PTT (Actin FS)   


 


Puncture Site   


 


ABG pH   


 


ABG pCO2 at Pt Temp   


 


ABG pO2 at Pt Temp   


 


ABG HCO3   


 


ABG O2 Sat (Measured)   


 


ABG O2 Content   


 


ABG Base Excess   


 


Chacho Test   


 


O2 Delivery Device   


 


Oxygen Flow Rate   


 


Vent Rate   


 


Mechanical Rate   


 


PEEP   


 


Pressure Support Vent   


 


Sodium    140


 


Potassium    4.6


 


Chloride    114 H


 


Carbon Dioxide    20 L


 


Anion Gap    6 L


 


BUN    74 H


 


Creatinine    2.9 H


 


Creat Clearance w eGFR    21.10


 


Random Glucose    116 H


 


Calcium    6.5 L*


 


Phosphorus   


 


Magnesium   


 


Total Bilirubin    0.3


 


AST    48 H


 


ALT    54


 


Alkaline Phosphatase    121 H


 


Ammonia  90.40 H  


 


Total Protein    8.6 H


 


Albumin    2.2 L


 


Blood Type   


 


Antibody Screen   


 


Crossmatch   














  11/13/18 11/13/18 11/13/18





  05:10 05:10 05:10


 


WBC   9.0 


 


RBC   2.73 L 


 


Hgb   8.1 L 


 


Hct   24.4 L 


 


MCV   89.6 


 


MCH   29.9 


 


MCHC   33.3 


 


RDW   16.3 H 


 


Plt Count   177 


 


MPV   9.1 


 


Absolute Neuts (auto)   7.4 


 


Neutrophils %   82.1 


 


Neutrophils % (Manual)   


 


Band Neutrophils %   


 


Lymphocytes %   12.5 


 


Lymphocytes % (Manual)   


 


Monocytes %   5.1 


 


Monocytes % (Manual)   


 


Eosinophils %   0.1 


 


Eosinophils % (Manual)   


 


Basophils %   0.2 


 


Basophils % (Manual)   


 


Myelocytes % (Man)   


 


Promyelocytes % (Man)   


 


Blast Cells % (Manual)   


 


Nucleated RBC %   3 H 


 


Metamyelocytes   


 


Hypochromia   


 


Platelet Estimate   


 


Polychromasia   


 


Poikilocytosis   


 


Basophilic Stippling   


 


Anisocytosis   


 


Microcytosis   


 


Macrocytosis   


 


Tear Drop Cells   


 


PTT (Actin FS)  55.9 H  


 


Puncture Site   


 


ABG pH   


 


ABG pCO2 at Pt Temp   


 


ABG pO2 at Pt Temp   


 


ABG HCO3   


 


ABG O2 Sat (Measured)   


 


ABG O2 Content   


 


ABG Base Excess   


 


Chacho Test   


 


O2 Delivery Device   


 


Oxygen Flow Rate   


 


Vent Rate   


 


Mechanical Rate   


 


PEEP   


 


Pressure Support Vent   


 


Sodium    139


 


Potassium    4.7


 


Chloride    113 H


 


Carbon Dioxide    21


 


Anion Gap    5 L


 


BUN    79 H


 


Creatinine    3.0 H


 


Creat Clearance w eGFR    20.29


 


Random Glucose    148 H


 


Calcium    6.7 L*


 


Phosphorus    5.7 H


 


Magnesium    2.6 H


 


Total Bilirubin    0.2


 


AST    77 H


 


ALT    67 H


 


Alkaline Phosphatase    129 H


 


Ammonia   


 


Total Protein    9.5 H


 


Albumin    2.2 L


 


Blood Type   


 


Antibody Screen   


 


Crossmatch   














  11/13/18





  06:00


 


WBC 


 


RBC 


 


Hgb 


 


Hct 


 


MCV 


 


MCH 


 


MCHC 


 


RDW 


 


Plt Count 


 


MPV 


 


Absolute Neuts (auto) 


 


Neutrophils % 


 


Neutrophils % (Manual) 


 


Band Neutrophils % 


 


Lymphocytes % 


 


Lymphocytes % (Manual) 


 


Monocytes % 


 


Monocytes % (Manual) 


 


Eosinophils % 


 


Eosinophils % (Manual) 


 


Basophils % 


 


Basophils % (Manual) 


 


Myelocytes % (Man) 


 


Promyelocytes % (Man) 


 


Blast Cells % (Manual) 


 


Nucleated RBC % 


 


Metamyelocytes 


 


Hypochromia 


 


Platelet Estimate 


 


Polychromasia 


 


Poikilocytosis 


 


Basophilic Stippling 


 


Anisocytosis 


 


Microcytosis 


 


Macrocytosis 


 


Tear Drop Cells 


 


PTT (Actin FS) 


 


Puncture Site  Right radial


 


ABG pH  7.28 L


 


ABG pCO2 at Pt Temp  41.7


 


ABG pO2 at Pt Temp  92.8  D


 


ABG HCO3  18.8 L


 


ABG O2 Sat (Measured)  96.3


 


ABG O2 Content  11.3 L


 


ABG Base Excess  -7.0 L


 


Chacho Test  Positive


 


O2 Delivery Device  Mech vent


 


Oxygen Flow Rate  40


 


Vent Rate  14


 


Mechanical Rate  Y


 


PEEP  5.0


 


Pressure Support Vent  500


 


Sodium 


 


Potassium 


 


Chloride 


 


Carbon Dioxide 


 


Anion Gap 


 


BUN 


 


Creatinine 


 


Creat Clearance w eGFR 


 


Random Glucose 


 


Calcium 


 


Phosphorus 


 


Magnesium 


 


Total Bilirubin 


 


AST 


 


ALT 


 


Alkaline Phosphatase 


 


Ammonia 


 


Total Protein 


 


Albumin 


 


Blood Type 


 


Antibody Screen 


 


Crossmatch 








Active Medications











Generic Name Dose Route Start Last Admin





  Trade Name Freq  PRN Reason Stop Dose Admin


 


Acetaminophen  650 mg  11/01/18 05:44  11/05/18 18:10





  Tylenol Suppository -  OR   650 mg





  Q6H PRN   Administration





  FEVER   





     





     





     


 


Acetaminophen  650 mg  11/10/18 14:09  





  Tylenol -  PO   





  Q6H PRN   





  PAIN LEVEL 1 - 3   





     





     





     


 


Allopurinol  100 mg  11/09/18 10:00  11/12/18 21:17





  Zyloprim -  PO   100 mg





  BID AVA   Administration





     





     





     





     


 


Calcium Gluconate  1,000 mg  11/13/18 08:18  





  Calcium Gluconate 10% -  IVPB  11/13/18 08:19  





  ONCE ONE   





     





     





     





     


 


Chlorhexidine Gluconate  15 ml  11/10/18 23:45  11/12/18 21:17





  Peridex -  MM   15 ml





  BID AVA   Administration





     





     





     





     


 


Cholecalciferol  1,000 unit  11/18/18 10:00  





  Vitamin D3 -  PO   





  DAILY [facility]   





     





     





     





     


 


Dexamethasone Sodium Phosphate  10 mg  11/10/18 12:15  11/12/18 21:17





  Decadron Injection -  IVPUSH   10 mg





  BID AVA   Administration





     





     





     





     


 


Diphenhydramine HCl  25 mg  11/09/18 12:00  





  Benadryl Injection -  IVPB   





  ONCE PRN   





  DURING PLASMAPHERESIS   





     





     





     


 


Ergocalciferol  8,000 units  11/11/18 10:00  11/12/18 09:19





  Drisdol Oral Solution -  PO  11/17/18 08:37  8,000 units





  DAILY AVA   Administration





     





     





     





     


 


Heparin Sodium (Porcine)  5 ml  11/07/18 15:26  





  Hep-Lock -  IVPUSH   





  PRN PRN   





  Heparin   





     





     





     


 


Heparin Sodium (Porcine)  1,000 unit  11/08/18 06:38  





  Heparin -  IVPUSH   





  PRN PRN   





  Heparin   





     





     





     


 


Heparin Sodium (Porcine)  5,000 unit  11/08/18 06:38  





  Heparin -  IVPUSH   





  PRN PRN   





  Heparin   





     





     





     


 


Fentanyl 500 mcg/ Dextrose  100 mls @ 5 mls/hr  11/07/18 11:45  11/13/18 06:32





  IVPB   50 mcg/hr





  TITR AVA   10 mls/hr





     Administration





     





  Protocol   





  25 MCG/HR   


 


Heparin Sodium/Dextrose  25,000 units in 500 mls @ 20 mls/hr  11/08/18 06:45  11 /12/18 21:03





  Heparin Infusion -  IVPB   Not Given





  TITR AVA   





     





     





  Protocol   





  1,000 UNITS/HR   


 


Ceftazidime 1 gm/ Dextrose  50 mls @ 200 mls/hr  11/11/18 12:00  11/12/18 09:30





  IVPB   200 mls/hr





  DAILY AVA   Administration





     





     





  Protocol   





     


 


Propofol  1,000,000 mcg in 100 mls @ 2.479 mls/hr  11/11/18 13:45  11/12/18 21:

05





  Diprivan -  IVPB   Not Given





  TITR AVA   





     





     





  Protocol   





  5 MCG/KG/MIN   


 


Lactulose  20 gm  11/09/18 09:38  11/13/18 06:32





  Cephulac (Oral Use)  PO   20 gm





  TID AVA   Administration





     





     





     





     


 


Metoprolol Tartrate  5 mg  11/09/18 13:04  





  Lopressor Injection -  IVPUSH   





  Q8H PRN   





  TACHYCARDIA   





     





     





     


 


Metoprolol Tartrate  25 mg  11/13/18 10:00  





  Lopressor -  PO   





  BID AVA   





     





     





     





     


 


Pantoprazole Sodium  40 mg  11/07/18 12:00  11/12/18 09:18





  Protonix Iv  IVPUSH   40 mg





  DAILY AVA   Administration





     





     





     





     


 


Thiamine HCl  100 mg  10/30/18 22:12  11/12/18 09:22





  Vitamin B1 -  PO   100 mg





  DAILY AVA   Administration





     





     





     





     











ASSESSMENT/PLAN:





Assessment: Not much has changed since yesterday. He was able to tolerate CPAP 

mode of the vent for 5 hours without any respiratory distress. He was not 

tachypneic, and was consistently pulling tidal volumes greater than 700. We did 

not extubate because we couldn't assess his mental status. 





Patient has a daughter named Mayuri


Patient also has a son Oral (Named after father) 


- Unknown how to get in touch with them. 





-  consult placed. Trying to get hold of children


- Neuro consult placed. (Dr. Phan will come today) 


- Palliative care consult placed





Plan:





ID: 


- Rhonchi heard on Lung auscultation


- CXR shows pulmonary infiltrate, possible PNA vs empyema vs paramnemonic 

effusion 


- Abx coverage switched to ceftazidime. 


- ID on board





Cardio: 


- Sporadically goes in and out of [facility] 


- Cards recommends restarting Lopressor 25 mg BID PO


- Afib w RVR: Patient is receiving metoprolol 5 mg PRN


- diastolic dysfunction + hypertrophic cardiomyopathy


- CAD with multiple stents


- Cardio consulted and on board. 





Pulm: 


- Intubated


- Patient has b/l pleural effusions.


- Giving 60 of lasix today. 


- No pneumothorax


- b/l diffuse rhonchi


- infiltrate on CXR: Abx- ceftazidime








Renal: 


- Acute on Chronic kidney injury


- BUN: 79 Cr:3.0. Pre-renal etiology.


- Renal consulted and on board. 








Neuro: 


- Patient is not alert or oriented. He occasionally responds to painful 

stimuli. 


- Head CT showed no acute pathology


- Dr. phan (Covering for Dr. Youngblood) will come assess the patient. 





Heme/Onc: 


- Patient's Hb dropped to 7 two days prior. Transfused 1 unit of PRBC. Today hb 

up to 8.1


- Will transfuse tp keep hb > 8 


- Patient not receiving plasmapharesis today. 


- Patient has hypocalcemia today - Will replete. 


- Paraproteinemia


- Probable Multiple Myeloma, SPEP and UPEP suggestive. 


- Patient fulfills new criteria  for multiple myeloma with free kappa/ free 

lambda light chain  ratio of 432 (>100 is diagnostic of myeloma) 


- Kappa/Lambda ratio > 100 consistent with Multiple myeloma


- M spike elevated elevated at 6.8 previously 4.7 





Endo: 


- Hypocalcemia


- Glucose wnl


- Parathyroid hormone WNL


- PTH-borderline low appropriate given hypercalcemia, PTHrP low





F/E/N: 


F: No standing fluids due to fluid overload in lungs


E: Will replete lytes PRN


N: tube feeds. 





Code Status: Full Code 


- Continue speaking with family for goals of care. 





Dispo: Patient will continue to receive ICU level care








Visit type





- Emergency Visit


Emergency Visit: Yes


ED Registration Date: 10/30/18


Care time: The patient presented to the Emergency Department on the above date 

and was hospitalized for further evaluation of their emergent condition.





- New Patient


This patient is new to me today: No





- Critical Care


Critical Care patient: Yes


Total Critical Care Time (in minutes): 36


Critical Care Statement: The care of this patient involved high complexity 

decision making to prevent further life threatening deterioration of the patient

's condition and/or to evaluate & treat vital organ system(s) failure or risk 

of failure.

## 2018-11-14 LAB
ACANTHOCYTES BLD QL SMEAR: 0
ALBUMIN SERPL-MCNC: 2 G/DL (ref 3.4–5)
ALP SERPL-CCNC: 159 U/L (ref 45–117)
ALT SERPL-CCNC: 210 U/L (ref 13–61)
ANION GAP SERPL CALC-SCNC: 8 MMOL/L (ref 8–16)
ANISOCYTOSIS BLD QL: 0
ARTERIAL BLOOD GAS PCO2: 35.2 MMHG (ref 35–45)
ARTERIAL PATENCY WRIST A: POSITIVE
AST SERPL-CCNC: 171 U/L (ref 15–37)
BASE EXCESS BLDA CALC-SCNC: -6.4 MEQ/L (ref -2–2)
BASO STIPL BLD QL SMEAR: 0
BASOPHILS # BLD: 0.4 % (ref 0–2)
BILIRUB SERPL-MCNC: 0.3 MG/DL (ref 0.2–1)
BUN SERPL-MCNC: 89 MG/DL (ref 7–18)
CALCIUM SERPL-MCNC: 6.4 MG/DL (ref 8.5–10.1)
CHLORIDE SERPL-SCNC: 114 MMOL/L (ref 98–107)
CO2 SERPL-SCNC: 19 MMOL/L (ref 21–32)
CREAT SERPL-MCNC: 2.8 MG/DL (ref 0.55–1.3)
DACRYOCYTES BLD QL SMEAR: 0
DEPRECATED RDW RBC AUTO: 16.9 % (ref 11.9–15.9)
DOHLE BOD BLD QL SMEAR: 0
EOSINOPHIL # BLD: 0.1 % (ref 0–4.5)
GLUCOSE SERPL-MCNC: 158 MG/DL (ref 74–106)
HCT VFR BLD CALC: 24.4 % (ref 35.4–49)
HELMET CELLS BLD QL SMEAR: 0
HGB BLD-MCNC: 8.1 GM/DL (ref 11.7–16.9)
HOWELL-JOLLY BOD BLD QL SMEAR: 0
LYMPHOCYTES # BLD: 12.2 % (ref 8–40)
MACROCYTES BLD QL: 0
MAGNESIUM SERPL-MCNC: 2.7 MG/DL (ref 1.8–2.4)
MCH RBC QN AUTO: 29.9 PG (ref 25.7–33.7)
MCHC RBC AUTO-ENTMCNC: 33 G/DL (ref 32–35.9)
MCV RBC: 90.6 FL (ref 80–96)
MONOCYTES # BLD AUTO: 3.1 % (ref 3.8–10.2)
NEUTROPHILS # BLD: 84.2 % (ref 42.8–82.8)
OVALOCYTES BLD QL SMEAR: 0
PHOSPHATE SERPL-MCNC: 5.1 MG/DL (ref 2.5–4.9)
PLATELET # BLD AUTO: 199 K/MM3 (ref 134–434)
PLATELET BLD QL SMEAR: NORMAL
PMV BLD: 9.1 FL (ref 7.5–11.1)
PO2 BLDA: 85.3 MMHG (ref 70–100)
POTASSIUM SERPLBLD-SCNC: 5.1 MMOL/L (ref 3.5–5.1)
PROT SERPL-MCNC: 10.1 G/DL (ref 6.4–8.2)
RBC # BLD AUTO: 2.69 M/MM3 (ref 4–5.6)
ROULEAUX BLD QL SMEAR: 0
SAO2 % BLDA: 95.6 % (ref 90–98.9)
SICKLE CELLS BLD QL SMEAR: 0
SODIUM SERPL-SCNC: 142 MMOL/L (ref 136–145)
TARGETS BLD QL SMEAR: 0
TOXIC GRANULES BLD QL SMEAR: 0
WBC # BLD AUTO: 7.4 K/MM3 (ref 4–10)

## 2018-11-14 RX ADMIN — CHLORHEXIDINE GLUCONATE 0.12% ORAL RINSE SCH ML: 1.2 LIQUID ORAL at 21:26

## 2018-11-14 RX ADMIN — ACETAMINOPHEN PRN MG: 325 TABLET ORAL at 09:23

## 2018-11-14 RX ADMIN — PROPOFOL SCH MLS/HR: 10 INJECTION, EMULSION INTRAVENOUS at 12:44

## 2018-11-14 RX ADMIN — CEFTAZIDIME SCH MLS/HR: 1 INJECTION, POWDER, FOR SOLUTION INTRAMUSCULAR; INTRAVENOUS at 11:00

## 2018-11-14 RX ADMIN — CHLORHEXIDINE GLUCONATE 0.12% ORAL RINSE SCH ML: 1.2 LIQUID ORAL at 09:25

## 2018-11-14 RX ADMIN — LACTULOSE SCH GM: 20 SOLUTION ORAL at 21:25

## 2018-11-14 RX ADMIN — PROPOFOL SCH MLS/HR: 10 INJECTION, EMULSION INTRAVENOUS at 21:25

## 2018-11-14 RX ADMIN — Medication SCH MG: at 09:25

## 2018-11-14 RX ADMIN — DEXAMETHASONE SODIUM PHOSPHATE SCH MG: 10 INJECTION, SOLUTION INTRAMUSCULAR; INTRAVENOUS at 09:25

## 2018-11-14 RX ADMIN — METOPROLOL TARTRATE SCH MG: 50 TABLET, FILM COATED ORAL at 21:25

## 2018-11-14 RX ADMIN — LACTULOSE SCH GM: 20 SOLUTION ORAL at 05:28

## 2018-11-14 RX ADMIN — DEXTROSE MONOHYDRATE SCH: 50 INJECTION, SOLUTION INTRAVENOUS at 11:47

## 2018-11-14 RX ADMIN — METOPROLOL TARTRATE SCH MG: 50 TABLET, FILM COATED ORAL at 11:00

## 2018-11-14 RX ADMIN — ENOXAPARIN SODIUM SCH MG: 80 INJECTION SUBCUTANEOUS at 12:33

## 2018-11-14 RX ADMIN — METOPROLOL TARTRATE SCH MG: 25 TABLET, FILM COATED ORAL at 09:24

## 2018-11-14 RX ADMIN — DEXTROSE MONOHYDRATE SCH MLS/HR: 50 INJECTION, SOLUTION INTRAVENOUS at 04:29

## 2018-11-14 RX ADMIN — LACTULOSE SCH GM: 20 SOLUTION ORAL at 14:47

## 2018-11-14 RX ADMIN — PANTOPRAZOLE SODIUM SCH MG: 40 INJECTION, POWDER, FOR SOLUTION INTRAVENOUS at 09:25

## 2018-11-14 RX ADMIN — DEXAMETHASONE SODIUM PHOSPHATE SCH MG: 10 INJECTION, SOLUTION INTRAMUSCULAR; INTRAVENOUS at 21:26

## 2018-11-14 RX ADMIN — HEPARIN SODIUM SCH: 5000 INJECTION, SOLUTION INTRAVENOUS at 09:24

## 2018-11-14 RX ADMIN — ALLOPURINOL SCH MG: 100 TABLET ORAL at 09:24

## 2018-11-14 RX ADMIN — ALLOPURINOL SCH MG: 100 TABLET ORAL at 21:25

## 2018-11-14 RX ADMIN — Medication SCH UNITS: at 11:00

## 2018-11-14 NOTE — PN
Physical Exam: 


SUBJECTIVE: Patient seen and examined at bedside. There was air leak in his ET 

tube last night so they took it out and put in a new ET tube with a bougie. 

Otherwise no acute events overnight. Patient remains to not have a meaningful 

mental status. 





Still no word from daughter or son in the area regarding advanced directives 

and whether or not to do a trach.








OBJECTIVE:





 Vital Signs











 Period  Temp  Pulse  Resp  BP Sys/Varghese  Pulse Ox


 


 Last 24 Hr  99.2 F-101.6 F    14-23  /68-81  97-98








GENERAL: The patient is intubated and sedated.


HEAD: Normal with no signs of trauma.


EYES: PERRL, sclera anicteric, conjunctiva clear. 


ENT: Ears normal, nares patent, dry mucous membranes


NECK: Trachea midline. 


LUNGS: bilateral diffuse rhonchi


HEART: Regular rate and irregular rhythm. 


ABDOMEN: Soft, nondistended.


EXTREMITIES: 2+ pulses, warm, well-perfused, no edema, scattered ulcers. 


NEUROLOGICAL: Cannot assess secondary to clinical condition. 


SKIN: Warm, dry, normal turgor, scattered ulcers on the legs














 Laboratory Results - last 24 hr











  11/14/18 11/14/18 11/14/18





  05:30 05:30 05:30


 


WBC   7.4 


 


RBC   2.69 L 


 


Hgb   8.1 L 


 


Hct   24.4 L 


 


MCV   90.6 


 


MCH   29.9 


 


MCHC   33.0 


 


RDW   16.9 H 


 


Plt Count   199 


 


MPV   9.1 


 


Absolute Neuts (auto)   6.3 


 


Neutrophils %   84.2 H 


 


Neutrophils % (Manual)   78.1 


 


Band Neutrophils %   3.1 


 


Lymphocytes %   12.2 


 


Lymphocytes % (Manual)   11.5 


 


Monocytes %   3.1 L 


 


Monocytes % (Manual)   1 L 


 


Eosinophils %   0.1 


 


Eosinophils % (Manual)   0.0 


 


Basophils %   0.4 


 


Basophils % (Manual)   0.0 


 


Myelocytes % (Man)   1 


 


Promyelocytes % (Man)   0 


 


Blast Cells % (Manual)   0 


 


Nucleated RBC %   6 H 


 


Metamyelocytes   4 H D 


 


Hypochromia   0 


 


Toxic Granulation   0 


 


Dohle Bodies   0 


 


Platelet Estimate   Normal 


 


Platelet Comment   Present 


 


Polychromasia   0 


 


Poikilocytosis   0 


 


Basophilic Stippling   0 


 


Anisocytosis   0 


 


Microcytosis   0 


 


Macrocytosis   0 


 


Spherocytes   0 


 


Sickle Cells   0 


 


Target Cells   0 


 


Tear Drop Cells   0 


 


Ovalocytes   0 


 


Stomatocytes   0 


 


Helmet Cells   0 


 


[place] Bodies   0 


 


Cabot Rings   0 


 


[place] Cells   0 


 


Acanthocytes (Spur)   0 


 


Rouleaux   0 


 


Fragmented RBCs   0 


 


Schistocytes   0 


 


PTT (Actin FS)  57.6 H  


 


Puncture Site   


 


ABG pH   


 


ABG pCO2 at Pt Temp   


 


ABG pO2 at Pt Temp   


 


ABG HCO3   


 


ABG O2 Sat (Measured)   


 


ABG O2 Content   


 


ABG Base Excess   


 


Chacho Test   


 


O2 Delivery Device   


 


Oxygen Flow Rate   


 


Vent Mode   


 


Vent Rate   


 


Mechanical Rate   


 


PEEP   


 


Pressure Support Vent   


 


Sodium    142


 


Potassium    5.1


 


Chloride    114 H


 


Carbon Dioxide    19 L


 


Anion Gap    8


 


BUN    89 H


 


Creatinine    2.8 H


 


Creat Clearance w eGFR    21.97


 


Random Glucose    158 H


 


Lactic Acid   


 


Calcium    6.4 L*


 


Phosphorus    5.1 H


 


Magnesium    2.7 H


 


Total Bilirubin    0.3


 


AST    171 H


 


ALT    210 H


 


Alkaline Phosphatase    159 H


 


Ammonia   


 


Total Protein    10.1 H


 


Albumin    2.0 L














  11/14/18 11/14/18 11/14/18





  05:30 06:00 09:58


 


WBC   


 


RBC   


 


Hgb   


 


Hct   


 


MCV   


 


MCH   


 


MCHC   


 


RDW   


 


Plt Count   


 


MPV   


 


Absolute Neuts (auto)   


 


Neutrophils %   


 


Neutrophils % (Manual)   


 


Band Neutrophils %   


 


Lymphocytes %   


 


Lymphocytes % (Manual)   


 


Monocytes %   


 


Monocytes % (Manual)   


 


Eosinophils %   


 


Eosinophils % (Manual)   


 


Basophils %   


 


Basophils % (Manual)   


 


Myelocytes % (Man)   


 


Promyelocytes % (Man)   


 


Blast Cells % (Manual)   


 


Nucleated RBC %   


 


Metamyelocytes   


 


Hypochromia   


 


Toxic Granulation   


 


Dohle Bodies   


 


Platelet Estimate   


 


Platelet Comment   


 


Polychromasia   


 


Poikilocytosis   


 


Basophilic Stippling   


 


Anisocytosis   


 


Microcytosis   


 


Macrocytosis   


 


Spherocytes   


 


Sickle Cells   


 


Target Cells   


 


Tear Drop Cells   


 


Ovalocytes   


 


Stomatocytes   


 


Helmet Cells   


 


[place] Bodies   


 


Cabot Rings   


 


[place] Cells   


 


Acanthocytes (Spur)   


 


Rouleaux   


 


Fragmented RBCs   


 


Schistocytes   


 


PTT (Actin FS)   


 


Puncture Site   Left radial 


 


ABG pH   7.34 L 


 


ABG pCO2 at Pt Temp   35.2 


 


ABG pO2 at Pt Temp   85.3 


 


ABG HCO3   18.3 L 


 


ABG O2 Sat (Measured)   95.6 


 


ABG O2 Content   11.3 L 


 


ABG Base Excess   -6.4 L 


 


Chacho Test   Positive 


 


O2 Delivery Device   Mech vent 


 


Oxygen Flow Rate   40 


 


Vent Mode   A/c 


 


Vent Rate   14 


 


Mechanical Rate   Yes 


 


PEEP   5.0 


 


Pressure Support Vent   500 


 


Sodium   


 


Potassium   


 


Chloride   


 


Carbon Dioxide   


 


Anion Gap   


 


BUN   


 


Creatinine   


 


Creat Clearance w eGFR   


 


Random Glucose   


 


Lactic Acid  1.2  


 


Calcium   


 


Phosphorus   


 


Magnesium   


 


Total Bilirubin   


 


AST   


 


ALT   


 


Alkaline Phosphatase   


 


Ammonia    83.50 H


 


Total Protein   


 


Albumin   








Active Medications











Generic Name Dose Route Start Last Admin





  Trade Name Freq  PRN Reason Stop Dose Admin


 


Acetaminophen  650 mg  11/01/18 05:44  11/05/18 18:10





  Tylenol Suppository -  [place]   650 mg





  Q6H PRN   Administration





  FEVER   





     





     





     


 


Acetaminophen  650 mg  11/10/18 14:09  11/14/18 09:23





  Tylenol -  PO   650 mg





  Q6H PRN   Administration





  PAIN LEVEL 1 - 3   





     





     





     


 


Allopurinol  100 mg  11/09/18 10:00  11/14/18 09:24





  Zyloprim -  PO   100 mg





  BID AVA   Administration





     





     





     





     


 


Chlorhexidine Gluconate  15 ml  11/10/18 23:45  11/14/18 09:25





  Peridex -  MM   15 ml





  BID AVA   Administration





     





     





     





     


 


Cholecalciferol  1,000 unit  11/18/18 10:00  





  Vitamin D3 -  PO   





  DAILY [facility]   





     





     





     





     


 


Dexamethasone Sodium Phosphate  10 mg  11/10/18 12:15  11/14/18 09:25





  Decadron Injection -  IVPUSH   10 mg





  BID AVA   Administration





     





     





     





     


 


Diphenhydramine HCl  25 mg  11/09/18 12:00  





  Benadryl Injection -  IVPB   





  ONCE PRN   





  DURING PLASMAPHERESIS   





     





     





     


 


Enoxaparin Sodium  80 mg  11/14/18 12:00  11/14/18 12:33





  Lovenox -  SQ   80 mg





  DAILY AV[facility]   Administration





     





     





     





     


 


Ergocalciferol  8,000 units  11/11/18 10:00  11/14/18 11:00





  Drisdol Oral Solution -  PO  11/17/18 08:37  8,000 units





  DAILY [facility]   Administration





     





     





     





     


 


Heparin Sodium (Porcine)  1,000 unit  11/08/18 06:38  





  Heparin -  IVPUSH   





  PRN PRN   





  Heparin   





     





     





     


 


Fentanyl 500 mcg/ Dextrose  100 mls @ 5 mls/hr  11/07/18 11:45  11/14/18 11:47





  IVPB   Not Given





  TITR AV[facility]   





     





     





  Protocol   





  25 MCG/HR   


 


Ceftazidime 1 gm/ Dextrose  50 mls @ 200 mls/hr  11/11/18 12:00  11/14/18 11:00





  IVPB   200 mls/hr





  DAILY AV[facility]   Administration





     





     





  Protocol   





     


 


Propofol  1,000,000 mcg in 100 mls @ 2.479 mls/hr  11/11/18 13:45  11/14/18 12:

44





  Diprivan -  IVPB   10 mcg/kg/min





  TITR AVA   4.958 mls/hr





     Administration





     





  Protocol   





  5 MCG/KG/MIN   


 


Lactulose  20 gm  11/09/18 09:38  11/14/18 05:28





  Cephulac (Oral Use)  PO   20 gm





  TID AVA   Administration





     





     





     





     


 


Metoprolol Tartrate  5 mg  11/09/18 13:04  





  Lopressor Injection -  IVPUSH   





  Q8H PRN   





  TACHYCARDIA   





     





     





     


 


Metoprolol Tartrate  50 mg  11/14/18 09:40  11/14/18 11:00





  Lopressor -  PO   25 mg





  BID AVA   Administration





     





     





     





     


 


Pantoprazole Sodium  40 mg  11/07/18 12:00  11/14/18 09:25





  Protonix Iv  IVPUSH   40 mg





  DAILY AVA   Administration





     





     





     





     


 


Thiamine HCl  100 mg  10/30/18 22:12  11/14/18 09:25





  Vitamin B1 -  PO   100 mg





  DAILY AVA   Administration





     





     





     





     











ASSESSMENT/PLAN:





Assessment: Not much has changed since yesterday. ET tube replaced. 





Patient has a daughter named Mayuri


Patient also has a son Oral (Named after father) 


- Unknown how to get in touch with them. 





-  consult placed. Trying to get hold of children


- Palliative care on the case





Plan:





ID: 


- Rhonchi heard on Lung auscultation


- CXR shows pulmonary infiltrate, possible PNA vs empyema vs paramnemonic 

effusion 


- Abx coverage switched to ceftazidime. 


- ID on board





Cardio: 


- Sporadically goes in and out of V-Tach 


- Cards recommends restarting Lopressor 25 mg BID PO


- Afib w RVR: Patient is receiving metoprolol 5 mg PRN


- diastolic dysfunction + hypertrophic cardiomyopathy


- CAD with multiple stents


- Cardio consulted and on board. 





Pulm: 


- Intubated


- Patient has b/l pleural effusions.


- Giving 60 of lasix today. 


- No pneumothorax


- b/l diffuse rhonchi


- infiltrate on CXR: Abx- ceftazidime








Renal: 


- Acute on Chronic kidney injury


- BUN: 89 Cr:2.8. Pre-renal etiology.


- Renal consulted and on board. 





Neuro: 


- Patient is not alert or oriented. He occasionally responds to painful 

stimuli. 


- Head CT showed no acute pathology


- Dr. phan (Covering for Dr. Youngblood) will come assess the patient. 


- Ammonia elevated - Patient receiving lactulose





Heme/Onc: 


- Hb today was 8.1


- Will transfuse tp keep hb > 8 


- Patient not receiving plasmapharesis today. 


- Patient has hypocalcemia today - Will replete. 


- Paraproteinemia


- Probable Multiple Myeloma, SPEP and UPEP suggestive. 


- Patient fulfills new criteria  for multiple myeloma with free kappa/ free 

lambda light chain  ratio of 432 (>100 is diagnostic of myeloma) 


- Kappa/Lambda ratio > 100 consistent with Multiple myeloma


- M spike elevated elevated at 6.8 previously 4.7 





Endo: 


- Hypocalcemia


- Glucose wnl


- Parathyroid hormone WNL


- PTH-borderline low appropriate given hypercalcemia, PTHrP low





F/E/N: 


F: No standing fluids due to fluid overload in lungs


E: Will replete lytes PRN


N: tube feeds. 





Code Status: Full Code 


- Continue speaking with family for goals of care.


- Patient will likely need a trach 





Dispo: Patient will continue to receive ICU level care








Visit type





- Emergency Visit


Emergency Visit: Yes


ED Registration Date: 10/30/18


Care time: The patient presented to the Emergency Department on the above date 

and was hospitalized for further evaluation of their emergent condition.





- New Patient


This patient is new to me today: No





- Critical Care


Critical Care patient: Yes


Total Critical Care Time (in minutes): 36


Critical Care Statement: The care of this patient involved high complexity 

decision making to prevent further life threatening deterioration of the patient

's condition and/or to evaluate & treat vital organ system(s) failure or risk 

of failure.

## 2018-11-14 NOTE — PN
Progress Note, Physician





- Current Medication List


Current Medications: 


Active Medications





Acetaminophen (Tylenol Suppository -)  650 mg FL Q6H PRN


   PRN Reason: FEVER


   Last Admin: 11/05/18 18:10 Dose:  650 mg


Acetaminophen (Tylenol -)  650 mg PO Q6H PRN


   PRN Reason: PAIN LEVEL 1 - 3


   Last Admin: 11/14/18 09:23 Dose:  650 mg


Allopurinol (Zyloprim -)  100 mg PO BID [facility]


   Last Admin: 11/14/18 09:24 Dose:  100 mg


Calcium Gluconate (Calcium Gluconate 10% -)  1,000 mg IVPB ONCE ONE


   Stop: 11/14/18 09:46


Chlorhexidine Gluconate (Peridex -)  15 ml MM BID [facility]


   Last Admin: 11/14/18 09:25 Dose:  15 ml


Cholecalciferol (Vitamin D3 -)  1,000 unit PO DAILY [facility]


Dexamethasone Sodium Phosphate (Decadron Injection -)  10 mg IVPUSH BID [facility]


   Last Admin: 11/14/18 09:25 Dose:  10 mg


Diphenhydramine HCl (Benadryl Injection -)  25 mg IVPB ONCE PRN


   PRN Reason: DURING PLASMAPHERESIS


Ergocalciferol (Drisdol Oral Solution -)  8,000 units PO DAILY [facility]


   Stop: 11/17/18 08:37


   Last Admin: 11/13/18 10:58 Dose:  8,000 units


Heparin Sodium (Porcine) (Hep-Lock -)  5 ml IVPUSH PRN PRN


   PRN Reason: Heparin


Heparin Sodium (Porcine) (Heparin -)  1,000 unit IVPUSH PRN PRN


   PRN Reason: Heparin


Heparin Sodium (Porcine) (Heparin -)  5,000 unit IVPUSH PRN PRN


   PRN Reason: Heparin


Fentanyl 500 mcg/ Dextrose  100 mls @ 5 mls/hr IVPB TITR [facility]; Protocol


   Last Admin: 11/14/18 04:29 Dose:  25 mcg/hr, 5 mls/hr


Heparin Sodium/Dextrose (Heparin Infusion -)  25,000 units in 500 mls @ 20 mls/

hr IVPB TITR [facility]; Protocol


   Last Admin: 11/14/18 09:24 Dose:  Not Given


Ceftazidime 1 gm/ Dextrose  50 mls @ 200 mls/hr IVPB DAILY [facility]; Protocol


   Last Admin: 11/13/18 10:48 Dose:  200 mls/hr


Propofol (Diprivan -)  1,000,000 mcg in 100 mls @ 2.479 mls/hr IVPB TITR [facility]; 

Protocol


   Last Admin: 11/13/18 17:00 Dose:  7 mcg/kg/min, 3.471 mls/hr


Lactulose (Cephulac (Oral Use))  20 gm PO TID [facility]


   Last Admin: 11/14/18 05:28 Dose:  20 gm


Metoprolol Tartrate (Lopressor Injection -)  5 mg IVPUSH Q8H PRN


   PRN Reason: TACHYCARDIA


Metoprolol Tartrate (Lopressor -)  50 mg PO BID [facility]


Pantoprazole Sodium (Protonix Iv)  40 mg IVPUSH DAILY [facility]


   Last Admin: 11/14/18 09:25 Dose:  40 mg


Thiamine HCl (Vitamin B1 -)  100 mg PO DAILY [facility]


   Last Admin: 11/14/18 09:25 Dose:  100 mg











- Objective


Vital Signs: 


 Vital Signs











Temperature  100.7 F H  11/14/18 08:00


 


Pulse Rate  123 H  11/14/18 08:00


 


Respiratory Rate  19   11/14/18 09:15


 


Blood Pressure  134/77   11/14/18 08:00


 


O2 Sat by Pulse Oximetry (%)  98   11/14/18 08:00











Cardiovascular: Yes: S1, S2


Respiratory: Yes: Regular, CTA Bilaterally


Gastrointestinal: Yes: Normal Bowel Sounds, Soft


Labs: 


 CBC, BMP





 11/14/18 05:30 





 11/14/18 05:30 





 INR, PTT











INR  1.36  (0.83-1.09)  H  11/11/18  06:00    


 


Fibrinogen  290.0 mg/dL (238-498)   11/11/18  06:00    














Problem List





- Problems


(1) Toxic metabolic encephalopathy


Code(s): G92 - TOXIC ENCEPHALOPATHY   





(2) Cellulitis


Code(s): L03.90 - CELLULITIS, UNSPECIFIED   


Qualifiers: 


   Site of cellulitis: extremity   Site of cellulitis of extremity: lower 

extremity   Laterality: right   Qualified Code(s): L03.115 - Cellulitis of 

right lower limb   





(3) Sepsis


Code(s): A41.9 - SEPSIS, UNSPECIFIED ORGANISM   





(4) Artery occlusion


Code(s): I70.90 - UNSPECIFIED ATHEROSCLEROSIS   





(5) Hypercalcemia


Code(s): E83.52 - HYPERCALCEMIA   





(6) Paroxysmal atrial fibrillation


Code(s): I48.0 - PAROXYSMAL ATRIAL FIBRILLATION   





(7) HAM (acute kidney injury)


Code(s): N17.9 - ACUTE KIDNEY FAILURE, UNSPECIFIED   





Assessment/Plan








- Problems


(1) HAM (acute kidney injury)


Assessment/Plan: 


-Nephrology on board


-Cr stable


-monitor trend


-U/S renal/bladder unremarkable


Code(s): N17.9 - ACUTE KIDNEY FAILURE, UNSPECIFIED   





(2) Atrial fibrillation with RVR


Assessment/Plan: 


-On heparin drip


-rate controlled


-cardiology on board


-Tele monitor


Code(s): I48.91 - UNSPECIFIED ATRIAL FIBRILLATION   





(3) Sepsis


Assessment/Plan: 


-upon admission


-ID on board


-Cultures:


 Microbiology





11/05/18 19:45   Blood - Peripheral Venous   Blood Culture - Final


                            NO GROWTH AFTER 5 DAYS INCUBATION


11/05/18 19:20   Blood - Peripheral Venous   Blood Culture - Final


                            NO GROWTH AFTER 5 DAYS INCUBATION


11/06/18 14:30   Sputum - Endotrachea Suction/Ventilator   Gram Stain - Final


11/06/18 14:30   Sputum - Endotrachea Suction/Ventilator   Sputum Culture - 

Preliminary


                            Non Lactose Fermenting Gnb


11/06/18 14:30   Urine - Urine Garces   Legionella Antigen - Final


11/06/18 14:30   Urine - Urine Garces   Streptococcus pneumoniae Antigen (M - 

Final


10/30/18 17:02   Blood - Peripheral Venous   Blood Culture - Final


                            NO GROWTH AFTER 5 DAYS INCUBATION


10/30/18 17:02   Blood - Peripheral Venous   Blood Culture - Final


                            NO GROWTH AFTER 5 DAYS INCUBATION


10/30/18 17:02   Urine - Urine - Catheterized   Urine Culture - Final


                            NO GROWTH OBTAINED


10/31/18 19:15   Nasopharyngeal Swab   Influenza Types A,B Antigen - Final


10/31/18 19:15   Nasopharyngeal Swab    - Final





-On IV Zosyn and Vanco


Code(s): A41.9 - SEPSIS, UNSPECIFIED ORGANISM   





(4) Toxic metabolic encephalopathy


Code(s): G92 - TOXIC ENCEPHALOPATHY   





(5) Respiratory Failure


Assessment/Plan: 


-On vent


-pulm on case


-Trach


Code(s): R41.82 - ALTERED MENTAL STATUS, UNSPECIFIED   





(6) Anemia


Assessment/Plan: 


chronic, at baseline


-Check stool OB-pending


-Iron profile, B12, thyroid profile unremarkable


-monitor trend


-Transfuse only if Hg <7.0 to avoid fluid overload as he is chronically anemic


Code(s): D64.9 - ANEMIA, UNSPECIFIED

## 2018-11-14 NOTE — PN
Teaching Attending Note


Name of Resident: Arnav Bernard





ATTENDING PHYSICIAN STATEMENT





I saw and evaluated the patient.


I reviewed the resident's note and discussed the case with the resident.


I agree with the resident's findings and plan as documented.








SUBJECTIVE:





Pt seen and examined in the ICU. Reintubated yesterday due to cuff leak. 

Remains poorly responsive off sedation. Febrile overnight.





OBJECTIVE:





 Vital Signs











 Period  Temp  Pulse  Resp  BP Sys/Varghese  Pulse Ox


 


 Last 24 Hr  99.2 F-100.9 F    14-23  /68-81  97-98








 Intake & Output











 11/11/18 11/12/18 11/13/18 11/14/18





 23:59 23:59 23:59 23:59


 


Intake Total 2542 2064 1377 960


 


Output Total 375 1000 1000 400


 


Balance 2167 1064 377 560


 


Weight 82.639 kg 83.971 kg 83.915 kg 83.518 kg








Gen:  intubated, poorly responsive, tachypneic


Heart: irregular


Lung: scattered rhonchi


Abd: soft, nontender


Ext: no edema





 CBC, BMP





 11/14/18 05:30 





 11/14/18 05:30 





Active Medications





Acetaminophen (Tylenol Suppository -)  650 mg [place] Q6H PRN


   PRN Reason: FEVER


   Last Admin: 11/05/18 18:10 Dose:  650 mg


Acetaminophen (Tylenol -)  650 mg PO Q6H PRN


   PRN Reason: PAIN LEVEL 1 - 3


   Last Admin: 11/14/18 09:23 Dose:  650 mg


Allopurinol (Zyloprim -)  100 mg PO BID [facility]


   Last Admin: 11/14/18 09:24 Dose:  100 mg


Chlorhexidine Gluconate (Peridex -)  15 ml MM BID [facility]


   Last Admin: 11/14/18 09:25 Dose:  15 ml


Cholecalciferol (Vitamin D3 -)  1,000 unit PO DAILY [facility]


Dexamethasone Sodium Phosphate (Decadron Injection -)  10 mg IVPUSH BID [facility]


   Last Admin: 11/14/18 09:25 Dose:  10 mg


Diphenhydramine HCl (Benadryl Injection -)  25 mg IVPB ONCE PRN


   PRN Reason: DURING PLASMAPHERESIS


Ergocalciferol (Drisdol Oral Solution -)  8,000 units PO DAILY [facility]


   Stop: 11/17/18 08:37


   Last Admin: 11/14/18 11:00 Dose:  8,000 units


Heparin Sodium (Porcine) (Hep-Lock -)  5 ml IVPUSH PRN PRN


   PRN Reason: Heparin


Heparin Sodium (Porcine) (Heparin -)  1,000 unit IVPUSH PRN PRN


   PRN Reason: Heparin


Heparin Sodium (Porcine) (Heparin -)  5,000 unit IVPUSH PRN PRN


   PRN Reason: Heparin


Fentanyl 500 mcg/ Dextrose  100 mls @ 5 mls/hr IVPB TITR [facility]; Protocol


   Last Admin: 11/14/18 04:29 Dose:  25 mcg/hr, 5 mls/hr


Heparin Sodium/Dextrose (Heparin Infusion -)  25,000 units in 500 mls @ 20 mls/

hr IVPB TITR [facility]; Protocol


   Last Admin: 11/14/18 09:24 Dose:  Not Given


Ceftazidime 1 gm/ Dextrose  50 mls @ 200 mls/hr IVPB DAILY [facility]; Protocol


   Last Admin: 11/14/18 11:00 Dose:  200 mls/hr


Propofol (Diprivan -)  1,000,000 mcg in 100 mls @ 2.479 mls/hr IVPB TITR [facility]; 

Protocol


   Last Admin: 11/13/18 17:00 Dose:  7 mcg/kg/min, 3.471 mls/hr


Lactulose (Cephulac (Oral Use))  20 gm PO TID [facility]


   Last Admin: 11/14/18 05:28 Dose:  20 gm


Metoprolol Tartrate (Lopressor Injection -)  5 mg IVPUSH Q8H PRN


   PRN Reason: TACHYCARDIA


Metoprolol Tartrate (Lopressor -)  50 mg PO BID [facility]


   Last Admin: 11/14/18 11:00 Dose:  25 mg


Pantoprazole Sodium (Protonix Iv)  40 mg IVPUSH DAILY [facility]


   Last Admin: 11/14/18 09:25 Dose:  40 mg


Thiamine HCl (Vitamin B1 -)  100 mg PO DAILY [facility]


   Last Admin: 11/14/18 09:25 Dose:  100 mg








ASSESSMENT AND PLAN:


Acute Hypoxic Respiratory Failure


Altered Mental Status


Metabolic Encephalopathy


Pneumonia


Multiple Myeloma


Acute on Chronic Renal Failure


Metabolic Acidosis


LV Diastolic Dysfunction


Atrial Fibrillation


CAD


+Troponins likely Demand Ischemia


PAD


Hyperlipidemia


HTN





-  continue antibiotics


-  reculture


-  d/c central line


-  decadron per oncology


-  lasix as needed


-  monitor urine output, creatinine


-  rate control


-  continue anticoagulation


-  minimize sedation to assess mental status 


-  spontaneous breathing trials as tolerated but would not extubate until 

mental status improved


-  enteral feeds


-  DVT/GI prophylaxis


-  continue discussions regarding goals of care, will likely need tracheostomy


-  palliative care eval


-  continue ICU monitoring











critical care time spent in reviewing chart, evaluating patient and formulating 

plan 35 min

## 2018-11-14 NOTE — PN
Progress Note, Physician


History of Present Illness: 


Poorly responsive on vent, persistent afib with RVR on lopressor and heparin 

gtt.





- Current Medication List


Current Medications: 


Active Medications





Acetaminophen (Tylenol Suppository -)  650 mg NC Q6H PRN


   PRN Reason: FEVER


   Last Admin: 11/05/18 18:10 Dose:  650 mg


Acetaminophen (Tylenol -)  650 mg PO Q6H PRN


   PRN Reason: PAIN LEVEL 1 - 3


   Last Admin: 11/14/18 09:23 Dose:  650 mg


Allopurinol (Zyloprim -)  100 mg PO BID [facility]


   Last Admin: 11/14/18 09:24 Dose:  100 mg


Chlorhexidine Gluconate (Peridex -)  15 ml MM BID [facility]


   Last Admin: 11/14/18 09:25 Dose:  15 ml


Cholecalciferol (Vitamin D3 -)  1,000 unit PO DAILY [facility]


Dexamethasone Sodium Phosphate (Decadron Injection -)  10 mg IVPUSH BID [facility]


   Last Admin: 11/14/18 09:25 Dose:  10 mg


Diphenhydramine HCl (Benadryl Injection -)  25 mg IVPB ONCE PRN


   PRN Reason: DURING PLASMAPHERESIS


Ergocalciferol (Drisdol Oral Solution -)  8,000 units PO DAILY [facility]


   Stop: 11/17/18 08:37


   Last Admin: 11/13/18 10:58 Dose:  8,000 units


Heparin Sodium (Porcine) (Hep-Lock -)  5 ml IVPUSH PRN PRN


   PRN Reason: Heparin


Heparin Sodium (Porcine) (Heparin -)  1,000 unit IVPUSH PRN PRN


   PRN Reason: Heparin


Heparin Sodium (Porcine) (Heparin -)  5,000 unit IVPUSH PRN PRN


   PRN Reason: Heparin


Fentanyl 500 mcg/ Dextrose  100 mls @ 5 mls/hr IVPB TITR [facility]; Protocol


   Last Admin: 11/14/18 04:29 Dose:  25 mcg/hr, 5 mls/hr


Heparin Sodium/Dextrose (Heparin Infusion -)  25,000 units in 500 mls @ 20 mls/

hr IVPB TITR [facility]; Protocol


   Last Admin: 11/14/18 09:24 Dose:  Not Given


Ceftazidime 1 gm/ Dextrose  50 mls @ 200 mls/hr IVPB DAILY [facility]; Protocol


   Last Admin: 11/13/18 10:48 Dose:  200 mls/hr


Propofol (Diprivan -)  1,000,000 mcg in 100 mls @ 2.479 mls/hr IVPB TITR [facility]; 

Protocol


   Last Admin: 11/13/18 17:00 Dose:  7 mcg/kg/min, 3.471 mls/hr


Lactulose (Cephulac (Oral Use))  20 gm PO TID [facility]


   Last Admin: 11/14/18 05:28 Dose:  20 gm


Metoprolol Tartrate (Lopressor Injection -)  5 mg IVPUSH Q8H PRN


   PRN Reason: TACHYCARDIA


Metoprolol Tartrate (Lopressor -)  50 mg PO BID [facility]


Pantoprazole Sodium (Protonix Iv)  40 mg IVPUSH DAILY [facility]


   Last Admin: 11/14/18 09:25 Dose:  40 mg


Thiamine HCl (Vitamin B1 -)  100 mg PO DAILY [facility]


   Last Admin: 11/14/18 09:25 Dose:  100 mg











- Objective


Vital Signs: 


 Vital Signs











Temperature  100.9 F H  11/14/18 10:00


 


Pulse Rate  105 H  11/14/18 10:00


 


Respiratory Rate  23 H  11/14/18 10:00


 


Blood Pressure  129/81   11/14/18 10:00


 


O2 Sat by Pulse Oximetry (%)  98   11/14/18 08:00











Constitutional: Yes: No Distress, Calm


Neck: Yes: Supple


Cardiovascular: Yes: Tachycardia, Pulse Irregular


Respiratory: Yes: Intubated, Mechanically Ventilated


Gastrointestinal: Yes: Normal Bowel Sounds, Soft


Edema: No


Labs: 


 CBC, BMP





 11/14/18 05:30 





 11/14/18 05:30 





 INR, PTT











INR  1.36  (0.83-1.09)  H  11/11/18  06:00    


 


Fibrinogen  290.0 mg/dL (238-498)   11/11/18  06:00    














- ....Imaging


Chest X-ray: Report Reviewed (Dense left base)


EKG: Report Reviewed (Tele: Rapid afib)





Problem List





- Problems


(1) Atrial fibrillation with RVR


Code(s): I48.91 - UNSPECIFIED ATRIAL FIBRILLATION   





(2) Acute-on-chronic kidney injury


Code(s): N17.9 - ACUTE KIDNEY FAILURE, UNSPECIFIED; N18.9 - CHRONIC KIDNEY 

DISEASE, UNSPECIFIED   


Qualifiers: 


   Acute renal failure type: unspecified   Chronic kidney disease stage: 

unspecified stage   Qualified Code(s): N17.9 - Acute kidney failure, unspecified

; N18.9 - Chronic kidney disease, unspecified   





(3) Demand ischemia


Code(s): I24.8 - OTHER FORMS OF ACUTE ISCHEMIC HEART DISEASE   





(4) Hypercalcemia


Code(s): E83.52 - HYPERCALCEMIA   





(5) Nonadherence to medication


Code(s): Z91.14 - PATIENT'S OTHER NONCOMPLIANCE WITH MEDICATION REGIMEN   





(6) Paraproteinemia


Code(s): D89.2 - HYPERGAMMAGLOBULINEMIA, UNSPECIFIED   





(7) Anticoagulant long-term use


Code(s): Z79.01 - LONG TERM (CURRENT) USE OF ANTICOAGULANTS   





(8) Chronic thromboembolic disease


Code(s): I74.9 - EMBOLISM AND THROMBOSIS OF UNSPECIFIED ARTERY   





(9) Coronary artery disease


Code(s): I25.10 - ATHSCL HEART DISEASE OF NATIVE CORONARY ARTERY W/O ANG PCTRS 

  


Qualifiers: 


   Coronary Disease-Associated Artery/Lesion type: native artery   Native vs. 

transplanted heart: native heart   Associated angina: without angina   

Qualified Code(s): I25.10 - Atherosclerotic heart disease of native coronary 

artery without angina pectoris   





(10) Diastolic dysfunction without heart failure


Code(s): I51.9 - HEART DISEASE, UNSPECIFIED   





(11) HTN (hypertension)


Code(s): I10 - ESSENTIAL (PRIMARY) HYPERTENSION   


Qualifiers: 


   Hypertension type: essential hypertension   Qualified Code(s): I10 - 

Essential (primary) hypertension   





(12) Hypertrophic cardiomyopathy


Code(s): I42.2 - OTHER HYPERTROPHIC CARDIOMYOPATHY   





(13) Hyperlipidemia


Code(s): E78.5 - HYPERLIPIDEMIA, UNSPECIFIED   


Qualifiers: 


   Hyperlipidemia type: pure hypercholesterolemia   Qualified Code(s): E78.00 - 

Pure hypercholesterolemia, unspecified; E78.0 - Pure hypercholesterolemia   





(14) Multiple myeloma


Code(s): C90.00 - MULTIPLE MYELOMA NOT HAVING ACHIEVED REMISSION   


Qualifiers: 


   Multiple myeloma remission status: not in remission   Qualified Code(s): 

C90.00 - Multiple myeloma not having achieved remission   





Assessment/Plan


R&LHc at [facility] 1/11/2017 showing nonobstructive CAD, severe LV apical 

hypertrophic cardiomyopathy, mildly elevated right sided pressures, Mynx 

deployed right CFA access site. Study is consistent with apical hypertrophy (

spade-like) variant of hypertrophic cardiomyopathy, planned for optimal medical 

therapy. 


Echocardiogram: 07/11/2018 Mod cLVH, severe DYANA, mod TR RVSP 50-60 mmHg, mod-

severe MR


Echocardiogram: 10/16/2018 Normal LV size with hyperdynamic LVEF 75%, mild BSH, 

normal RV size and fxn, severe LAE, mod-severe MR, mod TR





1. Acute hypoxic respiratory failure, suspected aspiration pneumonia 


2. Toxic metabolic encephelopathy, possible hyperviscosity syndrome, sepsis


3. 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 


4. Hypercalcemia, paraproteinemia, anemia-> confirmed multiple myeloma, 

hyperviscosity syndrome


5. History of bilateral SFA occlusion post thrombectomy, referable to embolic 

disease related to persistent atrial fibrillation with [id number] score of 6, 

history of poor compliance with anticoagulation and medical F/U


6. Non obstructive CAD on R&LHc coronary angiography with demand ischemia


7. 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 

subendocardial ischemia, compensated/euvolemic


8. Persistent atrial fibrillation with RVR [id number] score of 6 on heparin gtt


9. Labile HTN


10. Poor compliance with medical therapy administration and medical F/U


11. Tobacco abuse


12. ETOH dependence





PLAN:





1. Heparin gtt for now while off Xarelto 15 qd (renal dosing)


2. Increased Lopressor 50 bid as hemodynamics tolerate with IV Lopressor as 

needed for rate-control


3. Empiric renal-dosed abx course pending C&S


4. Enteral feeds, lactulose for hyperammonemia, monitor ammonia levels


5. Ventilator management, wean off sedation, IV steroids with GI protection

## 2018-11-14 NOTE — PN
Progress Note (short form)





- Note


Progress Note: 





Renal follow up for Hypercalcemia/HAM





Pt seen and examined in the ICU


on Vent


Febrile this am


making urine via fenton


on 40% FiO2














 Vital Signs











Temperature  100.9 F H  11/14/18 10:00


 


Pulse Rate  105 H  11/14/18 10:00


 


Respiratory Rate  23 H  11/14/18 10:00


 


Blood Pressure  129/81   11/14/18 10:00


 


O2 Sat by Pulse Oximetry (%)  98   11/14/18 08:00











 Intake & Output











 11/11/18 11/12/18 11/13/18 11/14/18





 23:59 23:59 23:59 23:59


 


Intake Total 2542 2064 1377 960


 


Output Total 375 1000 1000 400


 


Balance 2167 1064 377 560


 


Weight 82.639 kg 83.971 kg 83.915 kg 83.518 kg








NAD


sedated on vent


RRR


DEc Bs, no rales


soft NT/ND


no LE edema 


 CBC, BMP





 11/14/18 05:30 





 11/14/18 05:30 








 Laboratory Tests











  11/14/18





  05:30


 


Calcium  6.4 L*


 


Phosphorus  5.1 H


 


Magnesium  2.7 H


 


Albumin  2.0 L











 Current Medications





Acetaminophen (Tylenol Suppository -)  650 mg [place] Q6H PRN


   PRN Reason: FEVER


   Last Admin: 11/05/18 18:10 Dose:  650 mg


Acetaminophen (Tylenol -)  650 mg PO Q6H PRN


   PRN Reason: PAIN LEVEL 1 - 3


   Last Admin: 11/14/18 09:23 Dose:  650 mg


Allopurinol (Zyloprim -)  100 mg PO BID [facility]


   Last Admin: 11/14/18 09:24 Dose:  100 mg


Chlorhexidine Gluconate (Peridex -)  15 ml MM BID [facility]


   Last Admin: 11/14/18 09:25 Dose:  15 ml


Cholecalciferol (Vitamin D3 -)  1,000 unit PO DAILY [facility]


Dexamethasone Sodium Phosphate (Decadron Injection -)  10 mg IVPUSH BID [facility]


   Last Admin: 11/14/18 09:25 Dose:  10 mg


Diphenhydramine HCl (Benadryl Injection -)  25 mg IVPB ONCE PRN


   PRN Reason: DURING PLASMAPHERESIS


Ergocalciferol (Drisdol Oral Solution -)  8,000 units PO DAILY [facility]


   Stop: 11/17/18 08:37


   Last Admin: 11/13/18 10:58 Dose:  8,000 units


Heparin Sodium (Porcine) (Hep-Lock -)  5 ml IVPUSH PRN PRN


   PRN Reason: Heparin


Heparin Sodium (Porcine) (Heparin -)  1,000 unit IVPUSH PRN PRN


   PRN Reason: Heparin


Heparin Sodium (Porcine) (Heparin -)  5,000 unit IVPUSH PRN PRN


   PRN Reason: Heparin


Fentanyl 500 mcg/ Dextrose  100 mls @ 5 mls/hr IVPB TITR [facility]; Protocol


   Last Admin: 11/14/18 04:29 Dose:  25 mcg/hr, 5 mls/hr


Heparin Sodium/Dextrose (Heparin Infusion -)  25,000 units in 500 mls @ 20 mls/

hr IVPB TITR [facility]; Protocol


   Last Admin: 11/14/18 09:24 Dose:  Not Given


Ceftazidime 1 gm/ Dextrose  50 mls @ 200 mls/hr IVPB DAILY [facility]; Protocol


   Last Admin: 11/13/18 10:48 Dose:  200 mls/hr


Propofol (Diprivan -)  1,000,000 mcg in 100 mls @ 2.479 mls/hr IVPB TITR [facility]; 

Protocol


   Last Admin: 11/13/18 17:00 Dose:  7 mcg/kg/min, 3.471 mls/hr


Lactulose (Cephulac (Oral Use))  20 gm PO TID [facility]


   Last Admin: 11/14/18 05:28 Dose:  20 gm


Metoprolol Tartrate (Lopressor Injection -)  5 mg IVPUSH Q8H PRN


   PRN Reason: TACHYCARDIA


Metoprolol Tartrate (Lopressor -)  50 mg PO BID [facility]


Pantoprazole Sodium (Protonix Iv)  40 mg IVPUSH DAILY [facility]


   Last Admin: 11/14/18 09:25 Dose:  40 mg


Thiamine HCl (Vitamin B1 -)  100 mg PO DAILY [facility]


   Last Admin: 11/14/18 09:25 Dose:  100 mg





78 year old gentleman with hx of CKD, Afib on A/C, CAD, CKD, Hypertension, 

Hyperlipidemia, PVD who presented with AMS/Confusion and found to have HAM.





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 

showed no stones or obstruction, UA w/o protein but UPCR ~0.5 indicating non-

albumin proteinuria)


#AMS  (metabolic encephalopathy)


#Anemia 


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


#Hyperdense lesion on US of the kidney 


#Normal anion gap metabolic acidosis 





Renal function stbale at this time, no indication for RRT 


Corrected serum Ca is now WNL 


Oncology following, on IV decadron 


Trend serum bicarb, if persistently less then 20 would restart oral sodium 

bicarb


Continue vent support


prognosis is poor





Ricky Chang DO

## 2018-11-14 NOTE — PN
Progress Note (short form)





- Note


Progress Note: 





PATIENT SEEN AND EXAMINED 





Remains intubated and poorly responsive


 Despite plamapheresis, correction of CA++, lactulose for elevated NH3, 

antibiotics  for infection,  patients mental status has not appreciably 

improved. 


 Last Vital Signs











Temp Pulse Resp BP Pulse Ox


 


 100.3 F H  92 H  21 H  135/82   98 


 


 11/14/18 20:00  11/14/18 22:00  11/14/18 22:00  11/14/18 22:00  11/14/18 20:39





Intubated 


Lungs relatively clear anteriorly 


Cor -irregular Abd- soft 


Ext- no significant edema


 CBC, BMP





 11/14/18 05:30 





 11/14/18 05:30 





 Current Medications











Generic Name Dose Route Start Last Admin





  Trade Name Freq  PRN Reason Stop Dose Admin


 


Acetaminophen  650 mg  11/01/18 05:44  11/05/18 18:10





  Tylenol Suppository -  AL   650 mg





  Q6H PRN   Administration





  FEVER   





     





     





     


 


Acetaminophen  650 mg  11/10/18 14:09  11/14/18 09:23





  Tylenol -  PO   650 mg





  Q6H PRN   Administration





  PAIN LEVEL 1 - 3   





     





     





     


 


Allopurinol  100 mg  11/09/18 10:00  11/14/18 21:25





  Zyloprim -  PO   100 mg





  BID [facility]   Administration





     





     





     





     


 


Chlorhexidine Gluconate  15 ml  11/10/18 23:45  11/14/18 21:26





  Peridex -  MM   15 ml





  BID [facility]   Administration





     





     





     





     


 


Cholecalciferol  1,000 unit  11/18/18 10:00  





  Vitamin D3 -  PO   





  DAILY [facility]   





     





     





     





     


 


Dexamethasone Sodium Phosphate  10 mg  11/10/18 12:15  11/14/18 21:26





  Decadron Injection -  IVPUSH   10 mg





  BID [facility]   Administration





     





     





     





     


 


Diphenhydramine HCl  25 mg  11/09/18 12:00  





  Benadryl Injection -  IVPB   





  ONCE PRN   





  DURING PLASMAPHERESIS   





     





     





     


 


Enoxaparin Sodium  80 mg  11/14/18 12:00  11/14/18 12:33





  Lovenox -  SQ   80 mg





  DAILY [facility]   Administration





     





     





     





     


 


Ergocalciferol  8,000 units  11/11/18 10:00  11/14/18 11:00





  Drisdol Oral Solution -  PO  11/17/18 08:37  8,000 units





  DAILY [facility]   Administration





     





     





     





     


 


Heparin Sodium (Porcine)  1,000 unit  11/08/18 06:38  





  Heparin -  IVPUSH   





  PRN PRN   





  Heparin   





     





     





     


 


Fentanyl 500 mcg/ Dextrose  100 mls @ 5 mls/hr  11/07/18 11:45  11/14/18 11:47





  IVPB   Not Given





  TITR [facility]   





     





     





  Protocol   





  25 MCG/HR   


 


Ceftazidime 1 gm/ Dextrose  50 mls @ 200 mls/hr  11/11/18 12:00  11/14/18 11:00





  IVPB   200 mls/hr





  DAILY [facility]   Administration





     





     





  Protocol   





     


 


Propofol  1,000,000 mcg in 100 mls @ 2.479 mls/hr  11/11/18 13:45  11/14/18 21:

25





  Diprivan -  IVPB   10 mcg/kg/min





  TITR [facility]   4.958 mls/hr





     Administration





     





  Protocol   





  5 MCG/KG/MIN   


 


Lactulose  20 gm  11/09/18 09:38  11/14/18 21:25





  Cephulac (Oral Use)  PO   20 gm





  TID [facility]   Administration





     





     





     





     


 


Metoprolol Tartrate  5 mg  11/09/18 13:04  





  Lopressor Injection -  IVPUSH   





  Q8H PRN   





  TACHYCARDIA   





     





     





     


 


Metoprolol Tartrate  50 mg  11/14/18 09:40  11/14/18 21:25





  Lopressor -  PO   50 mg





  BID [facility]   Administration





     





     





     





     


 


Pantoprazole Sodium  40 mg  11/07/18 12:00  11/14/18 09:25





  Protonix Iv  IVPUSH   40 mg





  DAILY [facility]   Administration





     





     





     





     


 


Thiamine HCl  100 mg  10/30/18 22:12  11/14/18 09:25





  Vitamin B1 -  PO   100 mg





  DAILY [facility]   Administration





     





     





     





     








Impression:





AMS


Ventilatory failure 


Febrile/pneumonia


Multiple myeloma


HAM/CKD 


DVT prophylaxis





Completing course of high dose steroids without   significant response to 

therapy. Unless neurology has a treatable cause of mental status changes, 


Not unreasonable to have discussions re: GOC.

## 2018-11-15 LAB
ACANTHOCYTES BLD QL SMEAR: 0
ALBUMIN SERPL-MCNC: 1.9 G/DL (ref 3.4–5)
ALP SERPL-CCNC: 188 U/L (ref 45–117)
ALT SERPL-CCNC: 604 U/L (ref 13–61)
ANION GAP SERPL CALC-SCNC: 5 MMOL/L (ref 8–16)
ANISOCYTOSIS BLD QL: 0
ARTERIAL BLOOD GAS PCO2: 41.7 MMHG (ref 35–45)
ARTERIAL PATENCY WRIST A: POSITIVE
AST SERPL-CCNC: 678 U/L (ref 15–37)
BASE EXCESS BLDA CALC-SCNC: -6.4 MEQ/L (ref -2–2)
BASO STIPL BLD QL SMEAR: 0
BASOPHILS # BLD: 0.2 % (ref 0–2)
BILIRUB SERPL-MCNC: 0.3 MG/DL (ref 0.2–1)
BUN SERPL-MCNC: 98 MG/DL (ref 7–18)
CALCIUM SERPL-MCNC: 6.5 MG/DL (ref 8.5–10.1)
CHLORIDE SERPL-SCNC: 114 MMOL/L (ref 98–107)
CO2 SERPL-SCNC: 22 MMOL/L (ref 21–32)
CREAT SERPL-MCNC: 2.6 MG/DL (ref 0.55–1.3)
DACRYOCYTES BLD QL SMEAR: 0
DEPRECATED RDW RBC AUTO: 16.9 % (ref 11.9–15.9)
DOHLE BOD BLD QL SMEAR: 0
EOSINOPHIL # BLD: 0.1 % (ref 0–4.5)
GLUCOSE SERPL-MCNC: 217 MG/DL (ref 74–106)
HCT VFR BLD CALC: 24.6 % (ref 35.4–49)
HELMET CELLS BLD QL SMEAR: 0
HGB BLD-MCNC: 8.2 GM/DL (ref 11.7–16.9)
HOWELL-JOLLY BOD BLD QL SMEAR: 0
INR BLD: 1.28 (ref 0.83–1.09)
LYMPHOCYTES # BLD: 11.2 % (ref 8–40)
MACROCYTES BLD QL: 0
MAGNESIUM SERPL-MCNC: 3 MG/DL (ref 1.8–2.4)
MCH RBC QN AUTO: 30.1 PG (ref 25.7–33.7)
MCHC RBC AUTO-ENTMCNC: 33.1 G/DL (ref 32–35.9)
MCV RBC: 90.8 FL (ref 80–96)
MONOCYTES # BLD AUTO: 4.1 % (ref 3.8–10.2)
NEUTROPHILS # BLD: 84.4 % (ref 42.8–82.8)
OVALOCYTES BLD QL SMEAR: 0
PHOSPHATE SERPL-MCNC: 5.4 MG/DL (ref 2.5–4.9)
PLATELET # BLD AUTO: 214 K/MM3 (ref 134–434)
PLATELET BLD QL SMEAR: NORMAL
PMV BLD: 8.8 FL (ref 7.5–11.1)
PO2 BLDA: 94 MMHG (ref 70–100)
POTASSIUM SERPLBLD-SCNC: 5.3 MMOL/L (ref 3.5–5.1)
PROT SERPL-MCNC: 10.4 G/DL (ref 6.4–8.2)
PT PNL PPP: 15.1 SEC (ref 9.7–13)
RBC # BLD AUTO: 2.71 M/MM3 (ref 4–5.6)
ROULEAUX BLD QL SMEAR: 0
SAO2 % BLDA: 96.3 % (ref 90–98.9)
SICKLE CELLS BLD QL SMEAR: 0
SODIUM SERPL-SCNC: 140 MMOL/L (ref 136–145)
TARGETS BLD QL SMEAR: 0
TOXIC GRANULES BLD QL SMEAR: 0
WBC # BLD AUTO: 8.9 K/MM3 (ref 4–10)

## 2018-11-15 RX ADMIN — ALLOPURINOL SCH MG: 100 TABLET ORAL at 21:16

## 2018-11-15 RX ADMIN — PROPOFOL SCH MLS/HR: 10 INJECTION, EMULSION INTRAVENOUS at 21:14

## 2018-11-15 RX ADMIN — LACTULOSE SCH GM: 20 SOLUTION ORAL at 05:38

## 2018-11-15 RX ADMIN — LACTULOSE SCH GM: 20 SOLUTION ORAL at 21:15

## 2018-11-15 RX ADMIN — Medication SCH MG: at 09:41

## 2018-11-15 RX ADMIN — DEXAMETHASONE SODIUM PHOSPHATE SCH MG: 10 INJECTION, SOLUTION INTRAMUSCULAR; INTRAVENOUS at 09:41

## 2018-11-15 RX ADMIN — CHLORHEXIDINE GLUCONATE 0.12% ORAL RINSE SCH ML: 1.2 LIQUID ORAL at 21:16

## 2018-11-15 RX ADMIN — ACETAMINOPHEN PRN MG: 325 TABLET ORAL at 16:36

## 2018-11-15 RX ADMIN — CEFTAZIDIME SCH MLS/HR: 1 INJECTION, POWDER, FOR SOLUTION INTRAMUSCULAR; INTRAVENOUS at 09:39

## 2018-11-15 RX ADMIN — CHLORHEXIDINE GLUCONATE 0.12% ORAL RINSE SCH ML: 1.2 LIQUID ORAL at 09:42

## 2018-11-15 RX ADMIN — PROPOFOL SCH: 10 INJECTION, EMULSION INTRAVENOUS at 05:38

## 2018-11-15 RX ADMIN — ALLOPURINOL SCH MG: 100 TABLET ORAL at 09:41

## 2018-11-15 RX ADMIN — METOPROLOL TARTRATE SCH MG: 50 TABLET, FILM COATED ORAL at 09:41

## 2018-11-15 RX ADMIN — RIFAXIMIN SCH MG: 550 TABLET ORAL at 21:16

## 2018-11-15 RX ADMIN — DEXTROSE MONOHYDRATE SCH MLS/HR: 50 INJECTION, SOLUTION INTRAVENOUS at 15:03

## 2018-11-15 RX ADMIN — LACTULOSE SCH GM: 20 SOLUTION ORAL at 15:03

## 2018-11-15 RX ADMIN — METOPROLOL TARTRATE SCH MG: 50 TABLET, FILM COATED ORAL at 21:15

## 2018-11-15 RX ADMIN — ENOXAPARIN SODIUM SCH MG: 80 INJECTION SUBCUTANEOUS at 09:40

## 2018-11-15 RX ADMIN — PANTOPRAZOLE SODIUM SCH MG: 40 INJECTION, POWDER, FOR SOLUTION INTRAVENOUS at 09:41

## 2018-11-15 RX ADMIN — Medication SCH UNITS: at 09:42

## 2018-11-15 RX ADMIN — DEXAMETHASONE SODIUM PHOSPHATE SCH MG: 10 INJECTION, SOLUTION INTRAMUSCULAR; INTRAVENOUS at 21:15

## 2018-11-15 NOTE — PN
Progress Note, Physician


History of Present Illness: 





 Intubated on mechanical ventilation


 Poorly responsive


 Temps down   


 Repeat BC no growth


 Sputum c/s  (R) xanthomonas


 





 


 


 





- Current Medication List


Current Medications: 


Active Medications





Acetaminophen (Tylenol -)  650 mg PO Q6H PRN


   PRN Reason: PAIN LEVEL 1 - 3


   Last Admin: 11/15/18 16:36 Dose:  650 mg


Acetaminophen (Tylenol Suppository -)  650 mg RC Q6H PRN


   PRN Reason: FEVER


Allopurinol (Zyloprim -)  100 mg PO BID [facility]


   Last Admin: 11/15/18 09:41 Dose:  100 mg


Chlorhexidine Gluconate (Peridex -)  15 ml MM BID [facility]


   Last Admin: 11/15/18 09:42 Dose:  15 ml


Cholecalciferol (Vitamin D3 -)  1,000 unit PO DAILY [facility]


Dexamethasone Sodium Phosphate (Decadron Injection -)  10 mg IVPUSH BID [facility]


   Last Admin: 11/15/18 09:41 Dose:  10 mg


Diphenhydramine HCl (Benadryl Injection -)  25 mg IVPB ONCE PRN


   PRN Reason: DURING PLASMAPHERESIS


Ergocalciferol (Drisdol Oral Solution -)  8,000 units PO DAILY [facility]


   Stop: 11/17/18 08:37


   Last Admin: 11/15/18 09:42 Dose:  8,000 units


Fentanyl 500 mcg/ Dextrose  100 mls @ 5 mls/hr IVPB TITR [facility]; Protocol


   Last Admin: 11/15/18 15:03 Dose:  25 mcg/hr, 5 mls/hr


Ceftazidime 1 gm/ Dextrose  50 mls @ 200 mls/hr IVPB DAILY [facility]; Protocol


   Last Admin: 11/15/18 09:39 Dose:  200 mls/hr


Propofol (Diprivan -)  1,000,000 mcg in 100 mls @ 2.479 mls/hr IVPB TITR [facility]; 

Protocol


   Last Admin: 11/15/18 05:38 Dose:  Not Given


Lactulose (Cephulac (Oral Use))  20 gm PO TID [facility]


   Last Admin: 11/15/18 15:03 Dose:  20 gm


Metoprolol Tartrate (Lopressor Injection -)  5 mg IVPUSH Q8H PRN


   PRN Reason: TACHYCARDIA


Metoprolol Tartrate (Lopressor -)  50 mg PO BID [facility]


   Last Admin: 11/15/18 09:41 Dose:  50 mg


Metronidazole (Flagyl -)  500 mg PO TID [facility]


   Stop: 11/16/18 13:59


   Last Admin: 11/15/18 15:03 Dose:  500 mg


Pantoprazole Sodium (Protonix Iv)  40 mg IVPUSH DAILY [facility]


   Last Admin: 11/15/18 09:41 Dose:  40 mg


Rifaximin (Xifaxan -)  550 mg PO BID [facility]


Thiamine HCl (Vitamin B1 -)  100 mg PO DAILY [facility]


   Last Admin: 11/15/18 09:41 Dose:  100 mg











- Objective


Vital Signs: 


 Vital Signs











Temperature  100.2 F H  11/15/18 16:00


 


Pulse Rate  109 H  11/15/18 18:00


 


Respiratory Rate  17   11/15/18 18:35


 


Blood Pressure  116/66   11/15/18 18:00


 


O2 Sat by Pulse Oximetry (%)  98   11/14/18 20:39











Constitutional: Yes: No Distress


Cardiovascular: Yes: Regular Rate and Rhythm, S1, S2


Respiratory: Yes: Mechanically Ventilated


Gastrointestinal: Yes: Normal Bowel Sounds, Soft


Labs: 


 CBC, BMP





 11/15/18 05:30 





 11/15/18 05:30 





 INR, PTT











INR  1.28  (0.83-1.09)  H  11/15/18  05:30    


 


Fibrinogen  290.0 mg/dL (238-498)   11/11/18  06:00    














Assessment/Plan


Respiratory failure


 RLL pneumonia


 Acalculus  Cholecystitis


 Toxic metabolic encephalopathy


 Hypercalcemia


 Renal failure


 Myeloma


 Hyperviscosity syndrome


 


  


  


 Continue ceftazidime/flagyl, adjusted for renal failure


 Ventilatory support

## 2018-11-15 NOTE — PN
Progress Note (short form)





- Note


Progress Note: 





Patient's daughter Moisés Youssef called.  Reports she was unaware her father 

was hospitalized and that she has not spoken to him in three years due to a 

fall out after her brother was killed.  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 

her father and was very close to her father.  She states he does not have a 

wife and the next of kin would be her.  Patient states her dad is full code and 

is to have everything done.  Reports she is very emotional and would be coming 

in 11/15/18 to speak to the attendings, , social work, and 

palliative care team.





Patient currently lives in [place] 


MOISÉS YOUSSEF TELEPHONE NUMBER : [phone]

## 2018-11-15 NOTE — CON.GI
Consult


Consult Specialty:: GI


Referred by:: Dr Davalos





- History of Present Illness


History of Present Illness: 





&7 y/o male was asked to be seen because of severe hepatocellular injury. 

Patient has multiple medical problems including multiple myeloma, 

hyperviscosity syndrome, metabolic encephalopathy, Respiratory failure, 

Pneumonia, sepsis CKD was noted to have severe elevation of his liver enzymes. 

There were no reports of abdomnal pain. The abdominal ultrasound revealed 

possible acalculaous cholecystits and top normal CBD.





- Past Medical History


Cardio/Vascular: Yes: AFIB, HTN, MI


Renal/: Yes: Renal Failure, Renal Inusuff


Additional Medical History: peripheral arterial diseases/p stent in LLE





- Past Surgical History


Past Surgical History: Yes: Hernia Repair





- Alcohol/Substance Use


Hx Alcohol Use: Yes


History of Substance Use: reports: None





- Smoking History


Smoking history: Former smoker


Have you smoked in the past 12 months: No


Aproximately how many cigarettes per day: 10


If you are a former smoker, when did you quit?: 12.28.16





Home Medications





- Allergies


Allergies/Adverse Reactions: 


 Allergies











Allergy/AdvReac Type Severity Reaction Status Date / Time


 


valsartan Allergy Severe  Verified 10/30/18 16:47














- Home Medications


Home Medications: 


Ambulatory Orders





Atorvastatin Ca [Lipitor] 40 mg PO HS #30 tablet 07/12/18 


Rivaroxaban [Xarelto -] 20 mg PO DAILY@1800 #30 tablet 07/12/18 


Carvedilol [Coreg -] 6.25 mg PO BID #60 tablet 10/17/18 











Review of Systems


Unable to obtain ROS, reason: --intubated





Physical Exam-GI


Vital Signs: 


 Vital Signs











Temperature  98 F   11/15/18 10:00


 


Pulse Rate  110 H  11/15/18 14:00


 


Respiratory Rate  17   11/15/18 16:06


 


Blood Pressure  111/76   11/15/18 14:00


 


O2 Sat by Pulse Oximetry (%)  98   11/14/18 20:39











Constitutional: Yes: No Distress


Eyes: Yes: Conjunctiva Clear


HENT: Yes: Atraumatic


Neck: Yes: Supple


Cardiovascular: Yes: Regular Rate and Rhythm


Respiratory: Yes: CTA Bilaterally


...Palpate: Yes: Soft.  No: Firm/Rigid, Guarding, Hepatomegaly, Mass, Pulsatile 

Mass, Splenomegaly, Tenderness


Labs: 


 CBC, BMP





 11/15/18 05:30 





 11/15/18 05:30 





 INR, PTT











INR  1.28  (0.83-1.09)  H  11/15/18  05:30    


 


Fibrinogen  290.0 mg/dL (238-498)   11/11/18  06:00    








 Current Medications











Generic Name Dose Route Start Last Admin





  Trade Name Freq  PRN Reason Stop Dose Admin


 


Acetaminophen  650 mg  11/10/18 14:09  11/16/18 06:36





  Tylenol -  PO   650 mg





  Q6H PRN   Administration





  PAIN LEVEL 1 - 3   





     





     





     


 


Acetaminophen  650 mg  11/15/18 15:54  





  Tylenol Suppository -  RC   





  Q6H PRN   





  FEVER   





     





     





     


 


Allopurinol  100 mg  11/09/18 10:00  11/16/18 09:02





  Zyloprim -  PO   100 mg





  BID AVA   Administration





     





     





     





     


 


Chlorhexidine Gluconate  15 ml  11/10/18 23:45  11/16/18 09:03





  Peridex -  MM   15 ml





  BID AVA   Administration





     





     





     





     


 


Cholecalciferol  1,000 unit  11/18/18 10:00  





  Vitamin D3 -  PO   





  DAILY AVA   





     





     





     





     


 


Dexamethasone Sodium Phosphate  10 mg  11/10/18 12:15  11/16/18 09:03





  Decadron Injection -  IVPUSH   10 mg





  BID AVA   Administration





     





     





     





     


 


Diphenhydramine HCl  25 mg  11/09/18 12:00  





  Benadryl Injection -  IVPB   





  ONCE PRN   





  DURING PLASMAPHERESIS   





     





     





     


 


Ergocalciferol  8,000 units  11/11/18 10:00  11/16/18 09:02





  Drisdol Oral Solution -  PO  11/17/18 08:37  8,000 units





  DAILY AVA   Administration





     





     





     





     


 


Fentanyl 500 mcg/ Dextrose  100 mls @ 5 mls/hr  11/07/18 11:45  11/15/18 15:03





  IVPB   25 mcg/hr





  TITR AVA   5 mls/hr





     Administration





     





  Protocol   





  25 MCG/HR   


 


Ceftazidime 1 gm/ Dextrose  50 mls @ 200 mls/hr  11/11/18 12:00  11/16/18 09:01





  IVPB   200 mls/hr





  DAILY AVA   Administration





     





     





  Protocol   





     


 


Propofol  1,000,000 mcg in 100 mls @ 2.479 mls/hr  11/11/18 13:45  11/15/18 21:

14





  Diprivan -  IVPB   10 mcg/kg/min





  TITR AVA   4.958 mls/hr





     Administration





     





  Protocol   





  5 MCG/KG/MIN   


 


Vancomycin HCl  1,000 mg in 250 mls @ 166.667 mls/hr  11/16/18 11:15  





  Vancomycin (Pre-Docked)  IVPB  11/16/18 12:44  





  ONCE ONE   





     





     





  Protocol   





     


 


Lactulose  20 gm  11/09/18 09:38  11/16/18 05:48





  Cephulac (Oral Use)  PO   20 gm





  TID AVA   Administration





     





     





     





     


 


Metoprolol Tartrate  5 mg  11/09/18 13:04  





  Lopressor Injection -  IVPUSH   





  Q8H PRN   





  TACHYCARDIA   





     





     





     


 


Metoprolol Tartrate  75 mg  11/16/18 08:30  11/16/18 09:02





  Lopressor -  PO   75 mg





  BID AVA   Administration





     





     





     





     


 


Metronidazole  500 mg  11/15/18 14:00  11/16/18 05:48





  Flagyl -  PO  11/16/18 13:59  500 mg





  TID AVA   Administration





     





     





     





     


 


Pantoprazole Sodium  40 mg  11/07/18 12:00  11/16/18 09:04





  Protonix Iv  IVPUSH   40 mg





  DAILY AVA   Administration





     





     





     





     


 


Rifaximin  550 mg  11/15/18 22:00  11/16/18 09:02





  Xifaxan -  PO   550 mg





  BID AVA   Administration





     





     





     





     


 


Thiamine HCl  100 mg  10/30/18 22:12  11/16/18 09:02





  Vitamin B1 -  PO   100 mg





  DAILY AVA   Administration





     





     





     





     














Problem List





- Problems


(1) Elevated LFTs


Assessment/Plan: 


most likely secondary to ischemic hepatits which is multifactorial including 

sepsis, episodes of hypotension and hyperviscosity syndrome





R> keep well hydrated 


keep MAP greater than 60


patieint with poor prognosis


Code(s): R94.5 - ABNORMAL RESULTS OF LIVER FUNCTION STUDIES   





(2) Acalculous cholecystitis


Assessment/Plan: 


R> will need  consulation with IR to evaluate as patient is high risk for any 

procedure


Code(s): K81.9 - CHOLECYSTITIS, UNSPECIFIED

## 2018-11-15 NOTE — PN
Physical Exam: 


SUBJECTIVE: Patient seen and examined at bedside. He remains intubated and 

sedated. He was febrile ysterday to 101.6. US was performed and showed a 

thickened gallbladder wall, pericholecystic fluid, which likely represents 

acalculous cholecystitis. There was also mild dilatation of the CBD but that 

might be normal for age. Patient's ammonia was elevated today to 97.05 despite 

being on lactulose. LFTs are also elevated. ALT: 604. AST: 678. 





Patient's oldest daughter called the hospital and said she did not know that 

her father was hospitalized. She states she is the next of kin and said she 

will come visit him in the hospital today. She said he is full code and all 

supportive measures should be performed. 








OBJECTIVE:





 Vital Signs











 Period  Temp  Pulse  Resp  BP Sys/Varghese  Pulse Ox


 


 Last 24 Hr  98 F-100.7 F    15-21  100-135/71-94  98











GENERAL: The patient is intubated and sedated.


HEAD: Normal with no signs of trauma.


EYES: PERRL, sclera anicteric. 


ENT: Ears normal, nares patent, dry mucous membranes


NECK: Trachea midline. 


LUNGS: bilateral diffuse rhonchi


HEART: Tachycardic rate and irregular rhythm. 


ABDOMEN: Soft, nondistended.


EXTREMITIES: 2+ pulses, warm, well-perfused, no edema, scattered ulcers. 


NEUROLOGICAL: Cannot assess secondary to clinical condition. 


SKIN: Warm, dry, normal turgor, scattered ulcers on the legs














 Laboratory Results - last 24 hr











  11/05/18 11/09/18 11/14/18





  16:00 09:11 12:30


 


WBC   


 


RBC   


 


Hgb   


 


Hct   


 


MCV   


 


MCH   


 


MCHC   


 


RDW   


 


Plt Count   


 


MPV   


 


Absolute Neuts (auto)   


 


Neutrophils %   


 


Lymphocytes %   


 


Monocytes %   


 


Eosinophils %   


 


Basophils %   


 


Nucleated RBC %   


 


Blood Viscosity  4.0 H  


 


PT with INR   


 


INR   


 


Anticoagulation Therapy   


 


Puncture Site   


 


ABG pH   


 


ABG pCO2 at Pt Temp   


 


ABG pO2 at Pt Temp   


 


ABG HCO3   


 


ABG O2 Sat (Measured)   


 


ABG O2 Content   


 


ABG Base Excess   


 


Chacho Test   


 


O2 Delivery Device   


 


Oxygen Flow Rate   


 


Vent Mode   


 


Vent Rate   


 


Mechanical Rate   


 


PEEP   


 


Pressure Support Vent   


 


Sodium   


 


Potassium   


 


Chloride   


 


Carbon Dioxide   


 


Anion Gap   


 


BUN   


 


Creatinine   


 


Creat Clearance w eGFR   


 


Random Glucose   


 


Lactic Acid   


 


Calcium   


 


Phosphorus   


 


Magnesium   


 


Total Bilirubin   


 


AST   


 


ALT   


 


Alkaline Phosphatase   


 


Ammonia   


 


Troponin I   


 


Total Protein   


 


Albumin   


 


Stool Occult Blood    Negative


 


Blood Type   B POSITIVE 


 


Antibody Screen   Negative 


 


Crossmatch   See Detail 














  11/15/18 11/15/18 11/15/18





  05:30 05:30 05:30


 


WBC   


 


RBC   


 


Hgb   


 


Hct   


 


MCV   


 


MCH   


 


MCHC   


 


RDW   


 


Plt Count   


 


MPV   


 


Absolute Neuts (auto)   


 


Neutrophils %   


 


Lymphocytes %   


 


Monocytes %   


 


Eosinophils %   


 


Basophils %   


 


Nucleated RBC %   


 


Blood Viscosity   


 


PT with INR   15.10 H 


 


INR   1.28 H 


 


Anticoagulation Therapy   


 


Puncture Site   


 


ABG pH   


 


ABG pCO2 at Pt Temp   


 


ABG pO2 at Pt Temp   


 


ABG HCO3   


 


ABG O2 Sat (Measured)   


 


ABG O2 Content   


 


ABG Base Excess   


 


Chacho Test   


 


O2 Delivery Device   


 


Oxygen Flow Rate   


 


Vent Mode   


 


Vent Rate   


 


Mechanical Rate   


 


PEEP   


 


Pressure Support Vent   


 


Sodium    140


 


Potassium    5.3 H


 


Chloride    114 H


 


Carbon Dioxide    22


 


Anion Gap    5 L


 


BUN    98 H


 


Creatinine    2.6 H


 


Creat Clearance w eGFR    23.93


 


Random Glucose    217 H


 


Lactic Acid   


 


Calcium    6.5 L*


 


Phosphorus    5.4 H


 


Magnesium    3.0 H


 


Total Bilirubin    0.3


 


AST    678 H


 


ALT    604 H


 


Alkaline Phosphatase    188 H


 


Ammonia  97.05 H  


 


Troponin I   


 


Total Protein    10.4 H


 


Albumin    1.9 L


 


Stool Occult Blood   


 


Blood Type   


 


Antibody Screen   


 


Crossmatch   














  11/15/18 11/15/18 11/15/18





  05:30 05:30 05:30


 


WBC  8.9  


 


RBC  2.71 L  


 


Hgb  8.2 L  


 


Hct  24.6 L  


 


MCV  90.8  


 


MCH  30.1  


 


MCHC  33.1  


 


RDW  16.9 H  


 


Plt Count  214  


 


MPV  8.8  


 


Absolute Neuts (auto)  7.5  


 


Neutrophils %  84.4 H  


 


Lymphocytes %  11.2  


 


Monocytes %  4.1  


 


Eosinophils %  0.1  


 


Basophils %  0.2  


 


Nucleated RBC %  10 H  


 


Blood Viscosity   


 


PT with INR   


 


INR   


 


Anticoagulation Therapy   


 


Puncture Site   


 


ABG pH   


 


ABG pCO2 at Pt Temp   


 


ABG pO2 at Pt Temp   


 


ABG HCO3   


 


ABG O2 Sat (Measured)   


 


ABG O2 Content   


 


ABG Base Excess   


 


Chacho Test   


 


O2 Delivery Device   


 


Oxygen Flow Rate   


 


Vent Mode   


 


Vent Rate   


 


Mechanical Rate   


 


PEEP   


 


Pressure Support Vent   


 


Sodium   


 


Potassium   


 


Chloride   


 


Carbon Dioxide   


 


Anion Gap   


 


BUN   


 


Creatinine   


 


Creat Clearance w eGFR   


 


Random Glucose   


 


Lactic Acid   1.2 


 


Calcium   


 


Phosphorus   


 


Magnesium   


 


Total Bilirubin   


 


AST   


 


ALT   


 


Alkaline Phosphatase   


 


Ammonia   


 


Troponin I    0.47 H


 


Total Protein   


 


Albumin   


 


Stool Occult Blood   


 


Blood Type   


 


Antibody Screen   


 


Crossmatch   














  11/15/18





  05:50


 


WBC 


 


RBC 


 


Hgb 


 


Hct 


 


MCV 


 


MCH 


 


MCHC 


 


RDW 


 


Plt Count 


 


MPV 


 


Absolute Neuts (auto) 


 


Neutrophils % 


 


Lymphocytes % 


 


Monocytes % 


 


Eosinophils % 


 


Basophils % 


 


Nucleated RBC % 


 


Blood Viscosity 


 


PT with INR 


 


INR 


 


Anticoagulation Therapy  No Result Required.


 


Puncture Site  Right radial


 


ABG pH  7.29 L


 


ABG pCO2 at Pt Temp  41.7


 


ABG pO2 at Pt Temp  94.0


 


ABG HCO3  19.3 L


 


ABG O2 Sat (Measured)  96.3


 


ABG O2 Content  14.3 L


 


ABG Base Excess  -6.4 L


 


Chacho Test  Positive


 


O2 Delivery Device  Mech vent


 


Oxygen Flow Rate  40


 


Vent Mode  A/c prvc


 


Vent Rate  14


 


Mechanical Rate  No Result Required.


 


PEEP  5.0


 


Pressure Support Vent  500


 


Sodium 


 


Potassium 


 


Chloride 


 


Carbon Dioxide 


 


Anion Gap 


 


BUN 


 


Creatinine 


 


Creat Clearance w eGFR 


 


Random Glucose 


 


Lactic Acid 


 


Calcium 


 


Phosphorus 


 


Magnesium 


 


Total Bilirubin 


 


AST 


 


ALT 


 


Alkaline Phosphatase 


 


Ammonia 


 


Troponin I 


 


Total Protein 


 


Albumin 


 


Stool Occult Blood 


 


Blood Type 


 


Antibody Screen 


 


Crossmatch 








Active Medications











Generic Name Dose Route Start Last Admin





  Trade Name Freq  PRN Reason Stop Dose Admin


 


Acetaminophen  650 mg  11/01/18 05:44  11/05/18 18:10





  Tylenol Suppository -  [place]   650 mg





  Q6H PRN   Administration





  FEVER   





     





     





     


 


Acetaminophen  650 mg  11/10/18 14:09  11/14/18 09:23





  Tylenol -  PO   650 mg





  Q6H PRN   Administration





  PAIN LEVEL 1 - 3   





     





     





     


 


Allopurinol  100 mg  11/09/18 10:00  11/15/18 09:41





  Zyloprim -  PO   100 mg





  BID [facility]   Administration





     





     





     





     


 


Chlorhexidine Gluconate  15 ml  11/10/18 23:45  11/15/18 09:42





  Peridex -  MM   15 ml





  BID [facility]   Administration





     





     





     





     


 


Cholecalciferol  1,000 unit  11/18/18 10:00  





  Vitamin D3 -  PO   





  DAILY [facility]   





     





     





     





     


 


Dexamethasone Sodium Phosphate  10 mg  11/10/18 12:15  11/15/18 09:41





  Decadron Injection -  IVPUSH   10 mg





  BID [facility]   Administration





     





     





     





     


 


Diphenhydramine HCl  25 mg  11/09/18 12:00  





  Benadryl Injection -  IVPB   





  ONCE PRN   





  DURING PLASMAPHERESIS   





     





     





     


 


Enoxaparin Sodium  80 mg  11/14/18 12:00  11/15/18 09:40





  Lovenox -  SQ   80 mg





  DAILY [facility]   Administration





     





     





     





     


 


Ergocalciferol  8,000 units  11/11/18 10:00  11/15/18 09:42





  Drisdol Oral Solution -  PO  11/17/18 08:37  8,000 units





  DAILY [facility]   Administration





     





     





     





     


 


Fentanyl 500 mcg/ Dextrose  100 mls @ 5 mls/hr  11/07/18 11:45  11/14/18 11:47





  IVPB   Not Given





  TITR AVA   





     





     





  Protocol   





  25 MCG/HR   


 


Ceftazidime 1 gm/ Dextrose  50 mls @ 200 mls/hr  11/11/18 12:00  11/15/18 09:39





  IVPB   200 mls/hr





  DAILY AVA   Administration





     





     





  Protocol   





     


 


Propofol  1,000,000 mcg in 100 mls @ 2.479 mls/hr  11/11/18 13:45  11/15/18 05:

38





  Diprivan -  IVPB   Not Given





  TITR AVA   





     





     





  Protocol   





  5 MCG/KG/MIN   


 


Lactulose  20 gm  11/09/18 09:38  11/15/18 05:38





  Cephulac (Oral Use)  PO   20 gm





  TID AVA   Administration





     





     





     





     


 


Metoprolol Tartrate  5 mg  11/09/18 13:04  





  Lopressor Injection -  IVPUSH   





  Q8H PRN   





  TACHYCARDIA   





     





     





     


 


Metoprolol Tartrate  50 mg  11/14/18 09:40  11/15/18 09:41





  Lopressor -  PO   50 mg





  BID AVA   Administration





     





     





     





     


 


Metronidazole  500 mg  11/15/18 14:00  





  Flagyl -  PO  11/16/18 13:59  





  TID AVA   





     





     





     





     


 


Pantoprazole Sodium  40 mg  11/07/18 12:00  11/15/18 09:41





  Protonix Iv  IVPUSH   40 mg





  DAILY AVA   Administration





     





     





     





     


 


Rifaximin  550 mg  11/15/18 22:00  





  Xifaxan -  PO   





  BID AVA   





     





     





     





     


 


Thiamine HCl  100 mg  10/30/18 22:12  11/15/18 09:41





  Vitamin B1 -  PO   100 mg





  DAILY AVA   Administration





     





     





     





     











ASSESSMENT/PLAN: He was febrile yesterday to 101.6. US was performed and showed 

a thickened gallbladder wall, pericholecystic fluid, which likely represents 

acalculous cholecystitis. There was also mild dilatation of the CBD but that 

might be normal for age. Patient's ammonia was elevated today to 97.05 despite 

being on lactulose. LFTs are also elevated. ALT: 604. AST: 678. 





Patient's oldest daughter called the hospital and said she did not know that 

her father was hospitalized. She states she is the next of kin and said she 

will come visit him in the hospital today. She said he is full code and all 

supportive measures should be performed. 





- We have added rifaximin given the elevated ammonia level despite lactulose. 


- Started patient on Flagyl, in addition to his ceftazidime as a result of his 

acalculous cholecystitis.


- Spoke with ID - they are on board with new ABx additions. 


- GI consult placed (Dr. Tomas). Spoke with him on phone. Recommendations 

appreciated. 


- We will start holding off lovenox tomorrow in case patient needs operation. 


- Asking oncology how long they want to continue decadron as it is likely 

contributing to his HAM. 





- Palliative care on the case





Plan:





ID: 


- Rhonchi heard on Lung auscultation


- CXR shows pulmonary infiltrate in the left upper lobe


- Abx coverage switched to ceftazidime. 


- ID on board


- Added flagyl for acalculous cholecystitis


- Added rifaximin for elevated ammonia level





Cardio: 


- Sporadically goes in and out of [facility] 


- Cards recommends restarting Lopressor 25 mg BID PO


- Afib w RVR: Patient is receiving metoprolol 5 mg PRN


- diastolic dysfunction + hypertrophic cardiomyopathy


- CAD with multiple stents


- Cardio consulted and on board. 





Pulm: 


- Intubated


- Patient has b/l pleural effusions.


- Giving 60 of lasix today. 


- No pneumothorax


- b/l diffuse rhonchi


- infiltrate on CXR: Abx- ceftazidime








Renal: 


- Acute on Chronic kidney injury


- BUN: 98 Cr:2.6. Pre-renal etiology.


- Renal consulted and on board. 





Neuro: 


- Patient is not alert or oriented.


- Head CT showed no acute pathology


- Neuro consulted and on board. (Dr. Youngblood + Dr. phan) 


- Ammonia elevated - Patient receiving lactulose


- We added rifaximin to the medication regimen today for elevated ammonia. 





Heme/Onc: 


- Hb today was 8.2


- Will transfuse tp keep hb > 8 


- Patient not receiving plasmapharesis today. 


- Patient has hypocalcemia today - Will replete. 


- Paraproteinemia


- Patient fulfills new criteria  for multiple myeloma with free kappa/ free 

lambda light chain  ratio of 432 (>100 is diagnostic of myeloma) 


- Kappa/Lambda ratio > 100 consistent with Multiple myeloma


- M spike elevated elevated at 6.8 previously 4.7 





Endo: 


- Hypocalcemia


- Glucose wnl


- Parathyroid hormone WNL


- PTH-borderline low appropriate given hypercalcemia, PTHrP low





Prophylaxis: 


- Will stop lovenox tomorrow


- Protonix





F/E/N: 


F: No standing fluids due to fluid overload in lungs


E: Will replete lytes PRN


N: tube feeds. 





Code Status: Full Code 


- Continue speaking with family for goals of care.


- Patient will likely need a trach 





Dispo: Patient will continue to receive ICU level care











Visit type





- Emergency Visit


Emergency Visit: Yes


ED Registration Date: 10/30/18


Care time: The patient presented to the Emergency Department on the above date 

and was hospitalized for further evaluation of their emergent condition.





- New Patient


This patient is new to me today: No





- Critical Care


Critical Care patient: Yes


Total Critical Care Time (in minutes): 36


Critical Care Statement: The care of this patient involved high complexity 

decision making to prevent further life threatening deterioration of the patient

's condition and/or to evaluate & treat vital organ system(s) failure or risk 

of failure.

## 2018-11-15 NOTE — PN
Progress Note, Physician


Chief Complaint: 





patient is sedated and intubated


on heparin drip





- Current Medication List


Current Medications: 


Active Medications





Acetaminophen (Tylenol Suppository -)  650 mg [place] Q6H PRN


   PRN Reason: FEVER


   Last Admin: 11/05/18 18:10 Dose:  650 mg


Acetaminophen (Tylenol -)  650 mg PO Q6H PRN


   PRN Reason: PAIN LEVEL 1 - 3


   Last Admin: 11/14/18 09:23 Dose:  650 mg


Allopurinol (Zyloprim -)  100 mg PO BID [facility]


   Last Admin: 11/15/18 09:41 Dose:  100 mg


Chlorhexidine Gluconate (Peridex -)  15 ml MM BID [facility]


   Last Admin: 11/15/18 09:42 Dose:  15 ml


Cholecalciferol (Vitamin D3 -)  1,000 unit PO DAILY [facility]


Dexamethasone Sodium Phosphate (Decadron Injection -)  10 mg IVPUSH BID [facility]


   Last Admin: 11/15/18 09:41 Dose:  10 mg


Diphenhydramine HCl (Benadryl Injection -)  25 mg IVPB ONCE PRN


   PRN Reason: DURING PLASMAPHERESIS


Enoxaparin Sodium (Lovenox -)  80 mg SQ DAILY [facility]


   Last Admin: 11/15/18 09:40 Dose:  80 mg


Ergocalciferol (Drisdol Oral Solution -)  8,000 units PO DAILY [facility]


   Stop: 11/17/18 08:37


   Last Admin: 11/15/18 09:42 Dose:  8,000 units


Fentanyl 500 mcg/ Dextrose  100 mls @ 5 mls/hr IVPB TITR [facility]; Protocol


   Last Admin: 11/14/18 11:47 Dose:  Not Given


Ceftazidime 1 gm/ Dextrose  50 mls @ 200 mls/hr IVPB DAILY [facility]; Protocol


   Last Admin: 11/15/18 09:39 Dose:  200 mls/hr


Propofol (Diprivan -)  1,000,000 mcg in 100 mls @ 2.479 mls/hr IVPB TITR [facility]; 

Protocol


   Last Admin: 11/15/18 05:38 Dose:  Not Given


Lactulose (Cephulac (Oral Use))  20 gm PO TID [facility]


   Last Admin: 11/15/18 05:38 Dose:  20 gm


Metoprolol Tartrate (Lopressor Injection -)  5 mg IVPUSH Q8H PRN


   PRN Reason: TACHYCARDIA


Metoprolol Tartrate (Lopressor -)  50 mg PO BID [facility]


   Last Admin: 11/15/18 09:41 Dose:  50 mg


Pantoprazole Sodium (Protonix Iv)  40 mg IVPUSH DAILY [facility]


   Last Admin: 11/15/18 09:41 Dose:  40 mg


Thiamine HCl (Vitamin B1 -)  100 mg PO DAILY [facility]


   Last Admin: 11/15/18 09:41 Dose:  100 mg











- Objective


Vital Signs: 


 Vital Signs











Temperature  98 F   11/15/18 10:00


 


Pulse Rate  96 H  11/15/18 10:00


 


Respiratory Rate  16   11/15/18 10:00


 


Blood Pressure  118/77   11/15/18 10:00


 


O2 Sat by Pulse Oximetry (%)  98   11/14/18 20:39











HENT: Yes: Other


Neck: Yes: Other (intubated)


Cardiovascular: Yes: Regular Rate and Rhythm, S1, S2


Respiratory: Yes: Mechanically Ventilated


Gastrointestinal: Yes: Normal Bowel Sounds, Soft


Labs: 


 CBC, BMP





 11/15/18 05:30 





 11/15/18 05:30 





 INR, PTT











INR  1.28  (0.83-1.09)  H  11/15/18  05:30    


 


Fibrinogen  290.0 mg/dL (238-498)   11/11/18  06:00    














Problem List





- Problems


(1) Elevated LFTs


Assessment/Plan: 


ultrasound of liver noted suggestive of acalculous cholecystitis, will get GI 

consult


Code(s): R94.5 - ABNORMAL RESULTS OF LIVER FUNCTION STUDIES   





(2) HAM (acute kidney injury)


Assessment/Plan: 


bun/cr is better from 2.8 to 2.6


serum bicarb is now 22


corrected calcium


Code(s): N17.9 - ACUTE KIDNEY FAILURE, UNSPECIFIED   





(3) Atrial fibrillation with RVR


Assessment/Plan: 


heparin drip 


cardiac monitor


rate control with metoprolol


Code(s): I48.91 - UNSPECIFIED ATRIAL FIBRILLATION   





(4) Respiratory failure


Assessment/Plan: 


intubated


propofol/fentanyl


Code(s): J96.90 - RESPIRATORY FAILURE, UNSP, UNSP W HYPOXIA OR HYPERCAPNIA   





(5) Toxic metabolic encephalopathy


Assessment/Plan: 


Microbiology





11/14/18 11:20   Urine - Urine Garces   Urine Culture - Final


                              NO GROWTH OBTAINED


11/06/18 14:30   Urine - Urine Garces   Legionella Antigen - Final


11/06/18 14:30   Urine - Urine Garces   Streptococcus pneumoniae Antigen (M - 

Final


11/06/18 14:30   Sputum - Endotrachea Suction/Ventilator   Gram Stain - Final


11/06/18 14:30   Sputum - Endotrachea Suction/Ventilator   Sputum Culture - 

Final


                              Stenotrophomon.(X.)Maltophilia


11/14/18 09:58   Blood - Peripheral Venous   Blood Culture - Preliminary


                              NO GROWTH OBTAINED AFTER 24 HOURS, INCUBATION TO 

CONTINUE


                              FOR 4 DAYS.


11/14/18 09:50   Blood - Peripheral Venous   Blood Culture - Preliminary


                              NO GROWTH OBTAINED AFTER 24 HOURS, INCUBATION TO 

CONTINUE


                              FOR 4 DAYS.


11/10/18 15:00   Blood - Peripheral Venous   Blood Culture - Preliminary


                              NO GROWTH OBTAINED AFTER 96 HOURS, INCUBATION TO 

CONTINUE


                              FOR 1 DAYS.


11/10/18 14:00   Blood - Central Line   Blood Culture - Preliminary


                              NO GROWTH OBTAINED AFTER 96 HOURS, INCUBATION TO 

CONTINUE


                              FOR 1 DAYS.





afebrile normal WBC repeat cultures as above


on ceftazidime - 


Code(s): G92 - TOXIC ENCEPHALOPATHY   





(6) Altered mental status


Assessment/Plan: 


maybe secondary to toxic metabolic encephalopathy


neurology consult appreciated


on lactulose TID still persistently elevated Nh3 level


on steroid as well dexamethasone - no improvement in mental status


s/p plasmapheresis


dvt ppx lovenox


Code(s): R41.82 - ALTERED MENTAL STATUS, UNSPECIFIED

## 2018-11-15 NOTE — PN
Progress Note, Physician


History of Present Illness: 


Poorly responsive on vent, rate-controlled persistent afib on lopressor and 

heparin gtt.





- Current Medication List


Current Medications: 


Active Medications





Acetaminophen (Tylenol Suppository -)  650 mg [place] Q6H PRN


   PRN Reason: FEVER


   Last Admin: 11/05/18 18:10 Dose:  650 mg


Acetaminophen (Tylenol -)  650 mg PO Q6H PRN


   PRN Reason: PAIN LEVEL 1 - 3


   Last Admin: 11/14/18 09:23 Dose:  650 mg


Allopurinol (Zyloprim -)  100 mg PO BID [facility]


   Last Admin: 11/15/18 09:41 Dose:  100 mg


Chlorhexidine Gluconate (Peridex -)  15 ml MM BID [facility]


   Last Admin: 11/15/18 09:42 Dose:  15 ml


Cholecalciferol (Vitamin D3 -)  1,000 unit PO DAILY [facility]


Dexamethasone Sodium Phosphate (Decadron Injection -)  10 mg IVPUSH BID [facility]


   Last Admin: 11/15/18 09:41 Dose:  10 mg


Diphenhydramine HCl (Benadryl Injection -)  25 mg IVPB ONCE PRN


   PRN Reason: DURING PLASMAPHERESIS


Enoxaparin Sodium (Lovenox -)  80 mg SQ DAILY [facility]


   Last Admin: 11/15/18 09:40 Dose:  80 mg


Ergocalciferol (Drisdol Oral Solution -)  8,000 units PO DAILY [facility]


   Stop: 11/17/18 08:37


   Last Admin: 11/15/18 09:42 Dose:  8,000 units


Fentanyl 500 mcg/ Dextrose  100 mls @ 5 mls/hr IVPB TITR [facility]; Protocol


   Last Admin: 11/14/18 11:47 Dose:  Not Given


Ceftazidime 1 gm/ Dextrose  50 mls @ 200 mls/hr IVPB DAILY [facility]; Protocol


   Last Admin: 11/15/18 09:39 Dose:  200 mls/hr


Propofol (Diprivan -)  1,000,000 mcg in 100 mls @ 2.479 mls/hr IVPB TITR [facility]; 

Protocol


   Last Admin: 11/15/18 05:38 Dose:  Not Given


Lactulose (Cephulac (Oral Use))  20 gm PO TID [facility]


   Last Admin: 11/15/18 05:38 Dose:  20 gm


Metoprolol Tartrate (Lopressor Injection -)  5 mg IVPUSH Q8H PRN


   PRN Reason: TACHYCARDIA


Metoprolol Tartrate (Lopressor -)  50 mg PO BID [facility]


   Last Admin: 11/15/18 09:41 Dose:  50 mg


Metronidazole (Flagyl -)  500 mg PO TID [facility]


   Stop: 11/16/18 13:59


Pantoprazole Sodium (Protonix Iv)  40 mg IVPUSH DAILY [facility]


   Last Admin: 11/15/18 09:41 Dose:  40 mg


Rifaximin (Xifaxan -)  550 mg PO BID [facility]


Thiamine HCl (Vitamin B1 -)  100 mg PO DAILY [facility]


   Last Admin: 11/15/18 09:41 Dose:  100 mg











- Objective


Vital Signs: 


 Vital Signs











Temperature  98 F   11/15/18 10:00


 


Pulse Rate  96 H  11/15/18 10:00


 


Respiratory Rate  16   11/15/18 10:00


 


Blood Pressure  118/77   11/15/18 10:00


 


O2 Sat by Pulse Oximetry (%)  98   11/14/18 20:39











Constitutional: Yes: No Distress, Calm


Neck: Yes: Supple


Cardiovascular: Yes: Pulse Irregular, Murmur (2/6 SM)


Respiratory: Yes: Intubated, Mechanically Ventilated


Gastrointestinal: Yes: Normal Bowel Sounds, Soft


Edema: No


Labs: 


 CBC, BMP





 11/15/18 05:30 





 11/15/18 05:30 





 INR, PTT











INR  1.28  (0.83-1.09)  H  11/15/18  05:30    


 


Fibrinogen  290.0 mg/dL (238-498)   11/11/18  06:00    














- ....Imaging


Chest X-ray: Report Reviewed (Increased KAMILA infiltrate)


Ultrasound: Report Reviewed (Acalculous cholecystitis)


EKG: Report Reviewed (Tele: Afib)





Problem List





- Problems


(1) Atrial fibrillation with RVR


Code(s): I48.91 - UNSPECIFIED ATRIAL FIBRILLATION   





(2) Acute-on-chronic kidney injury


Code(s): N17.9 - ACUTE KIDNEY FAILURE, UNSPECIFIED; N18.9 - CHRONIC KIDNEY 

DISEASE, UNSPECIFIED   


Qualifiers: 


   Acute renal failure type: unspecified   Chronic kidney disease stage: 

unspecified stage   Qualified Code(s): N17.9 - Acute kidney failure, unspecified

; N18.9 - Chronic kidney disease, unspecified   





(3) Demand ischemia


Code(s): I24.8 - OTHER FORMS OF ACUTE ISCHEMIC HEART DISEASE   





(4) Hypercalcemia


Code(s): E83.52 - HYPERCALCEMIA   





(5) Nonadherence to medication


Code(s): Z91.14 - PATIENT'S OTHER NONCOMPLIANCE WITH MEDICATION REGIMEN   





(6) Paraproteinemia


Code(s): D89.2 - HYPERGAMMAGLOBULINEMIA, UNSPECIFIED   





(7) Anticoagulant long-term use


Code(s): Z79.01 - LONG TERM (CURRENT) USE OF ANTICOAGULANTS   





(8) Chronic thromboembolic disease


Code(s): I74.9 - EMBOLISM AND THROMBOSIS OF UNSPECIFIED ARTERY   





(9) Coronary artery disease


Code(s): I25.10 - ATHSCL HEART DISEASE OF NATIVE CORONARY ARTERY W/O ANG PCTRS 

  


Qualifiers: 


   Coronary Disease-Associated Artery/Lesion type: native artery   Native vs. 

transplanted heart: native heart   Associated angina: without angina   

Qualified Code(s): I25.10 - Atherosclerotic heart disease of native coronary 

artery without angina pectoris   





(10) Diastolic dysfunction without heart failure


Code(s): I51.9 - HEART DISEASE, UNSPECIFIED   





(11) HTN (hypertension)


Code(s): I10 - ESSENTIAL (PRIMARY) HYPERTENSION   


Qualifiers: 


   Hypertension type: essential hypertension   Qualified Code(s): I10 - 

Essential (primary) hypertension   





(12) Hypertrophic cardiomyopathy


Code(s): I42.2 - OTHER HYPERTROPHIC CARDIOMYOPATHY   





(13) Hyperlipidemia


Code(s): E78.5 - HYPERLIPIDEMIA, UNSPECIFIED   


Qualifiers: 


   Hyperlipidemia type: pure hypercholesterolemia   Qualified Code(s): E78.00 - 

Pure hypercholesterolemia, unspecified; E78.0 - Pure hypercholesterolemia   





(14) Multiple myeloma


Code(s): C90.00 - MULTIPLE MYELOMA NOT HAVING ACHIEVED REMISSION   


Qualifiers: 


   Multiple myeloma remission status: not in remission   Qualified Code(s): 

C90.00 - Multiple myeloma not having achieved remission   





(15) Acalculous cholecystitis


Code(s): K81.9 - CHOLECYSTITIS, UNSPECIFIED   





Assessment/Plan


R&LHc at [facility] 1/11/2017 showing nonobstructive CAD, severe LV apical 

hypertrophic cardiomyopathy, mildly elevated right sided pressures, Mynx 

deployed right CFA access site. Study is consistent with apical hypertrophy (

spade-like) variant of hypertrophic cardiomyopathy, planned for optimal medical 

therapy. 


Echocardiogram: 07/11/2018 Mod cLVH, severe DYANA, mod TR RVSP 50-60 mmHg, mod-

severe MR


Echocardiogram: 10/16/2018 Normal LV size with hyperdynamic LVEF 75%, mild BSH, 

normal RV size and fxn, severe LAE, mod-severe MR, mod TR





1. Acute hypoxic respiratory failure, suspected aspiration pneumonia 


2. Toxic metabolic encephelopathy, possible hyperviscosity syndrome, sepsis


3. 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 


4. Hypercalcemia, paraproteinemia, anemia-> confirmed multiple myeloma, 

hyperviscosity syndrome


5. History of bilateral SFA occlusion post thrombectomy, referable to embolic 

disease related to persistent atrial fibrillation with [id number] score of 6, 

history of poor compliance with anticoagulation and medical F/U


6. Non obstructive CAD on R&LHc coronary angiography with demand ischemia


7. 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 

subendocardial ischemia, compensated/euvolemic


8. Persistent atrial fibrillation with RVR [id number] score of 6 on heparin gtt


9. Labile HTN


10. Acalculous Cholecystitis





PLAN:





1. Lovenox for now while off Xarelto 15 qd (renal dosing)


2. Continue Lopressor 50 bid as hemodynamics tolerate with IV Lopressor as 

needed for rate-control


3. Empiric renal-dosed abx course pending C&S


4. Enteral feeds, lactulose for hyperammonemia, monitor ammonia levels


5. Ventilator management, wean off sedation, IV steroids with GI protection


6. Consider cholecystotomy decompression per GI recs

## 2018-11-15 NOTE — PN
Progress Note (short form)





- Note


Progress Note: 





Renal follow up for Hypercalcemia/HAM





Pt seen and examined at the bedside


on Vent via ET tube


no overnight events


making urine via fenton


no overt change in clinical status 





 Vital Signs











Temperature  98 F   11/15/18 10:00


 


Pulse Rate  96 H  11/15/18 10:00


 


Respiratory Rate  16   11/15/18 10:00


 


Blood Pressure  118/77   11/15/18 10:00


 


O2 Sat by Pulse Oximetry (%)  98   11/14/18 20:39








 Intake & Output











 11/12/18 11/13/18 11/14/18 11/15/18





 23:59 23:59 23:59 23:59


 


Intake Total 2064 1377 960 860


 


Output Total 1000 1000 1325 350


 


Balance 1064 377 -365 510


 


Weight 83.971 kg 83.915 kg 83.518 kg 80.938 kg





NAD


on Vent


RRR


Dec BS, no rales


no LE edema 


 CBC, BMP





 11/15/18 05:30 





 11/15/18 05:30 





 Current Medications





Acetaminophen (Tylenol Suppository -)  650 mg [place] Q6H PRN


   PRN Reason: FEVER


   Last Admin: 11/05/18 18:10 Dose:  650 mg


Acetaminophen (Tylenol -)  650 mg PO Q6H PRN


   PRN Reason: PAIN LEVEL 1 - 3


   Last Admin: 11/14/18 09:23 Dose:  650 mg


Allopurinol (Zyloprim -)  100 mg PO BID [facility]


   Last Admin: 11/15/18 09:41 Dose:  100 mg


Chlorhexidine Gluconate (Peridex -)  15 ml MM BID [facility]


   Last Admin: 11/15/18 09:42 Dose:  15 ml


Cholecalciferol (Vitamin D3 -)  1,000 unit PO DAILY [facility]


Dexamethasone Sodium Phosphate (Decadron Injection -)  10 mg IVPUSH BID [facility]


   Last Admin: 11/15/18 09:41 Dose:  10 mg


Diphenhydramine HCl (Benadryl Injection -)  25 mg IVPB ONCE PRN


   PRN Reason: DURING PLASMAPHERESIS


Enoxaparin Sodium (Lovenox -)  80 mg SQ DAILY [facility]


   Last Admin: 11/15/18 09:40 Dose:  80 mg


Ergocalciferol (Drisdol Oral Solution -)  8,000 units PO DAILY [facility]


   Stop: 11/17/18 08:37


   Last Admin: 11/15/18 09:42 Dose:  8,000 units


Fentanyl 500 mcg/ Dextrose  100 mls @ 5 mls/hr IVPB TITR [facility]; Protocol


   Last Admin: 11/14/18 11:47 Dose:  Not Given


Ceftazidime 1 gm/ Dextrose  50 mls @ 200 mls/hr IVPB DAILY [facility]; Protocol


   Last Admin: 11/15/18 09:39 Dose:  200 mls/hr


Propofol (Diprivan -)  1,000,000 mcg in 100 mls @ 2.479 mls/hr IVPB TITR [facility]; 

Protocol


   Last Admin: 11/15/18 05:38 Dose:  Not Given


Lactulose (Cephulac (Oral Use))  20 gm PO TID [facility]


   Last Admin: 11/15/18 05:38 Dose:  20 gm


Metoprolol Tartrate (Lopressor Injection -)  5 mg IVPUSH Q8H PRN


   PRN Reason: TACHYCARDIA


Metoprolol Tartrate (Lopressor -)  50 mg PO BID [facility]


   Last Admin: 11/15/18 09:41 Dose:  50 mg


Metronidazole (Flagyl -)  500 mg PO TID [facility]


   Stop: 11/16/18 13:59


Pantoprazole Sodium (Protonix Iv)  40 mg IVPUSH DAILY [facility]


   Last Admin: 11/15/18 09:41 Dose:  40 mg


Rifaximin (Xifaxan -)  550 mg PO BID [facility]


Thiamine HCl (Vitamin B1 -)  100 mg PO DAILY [facility]


   Last Admin: 11/15/18 09:41 Dose:  100 mg














78 year old gentleman with hx of CKD, Afib on A/C, CAD, CKD, Hypertension, 

Hyperlipidemia, PVD who presented with AMS/Confusion and found to have HAM.





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 

showed no stones or obstruction, UA w/o protein but UPCR ~0.5 indicating non-

albumin proteinuria)


#AMS 


#Newly diagnosed multiple myloma 


#Anemia 


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


#Hyperdense lesion on US of the kidney 


#Normal anion gap metabolic acidosis 





Renal function essentially unchanged, BUN rising but likely due to steroids


no acute indication for RRT at this time 


continue supportive care, keep MAP > 65


Trend urine output 


Trend H/H, transfuse as per ICU parameters 


prognosis is poor





Ricky Chang DO

## 2018-11-15 NOTE — PN
Teaching Attending Note


Name of Resident: Arnav Bernard





ATTENDING PHYSICIAN STATEMENT





I saw and evaluated the patient.


I reviewed the resident's note and discussed the case with the resident.


I agree with the resident's findings and plan as documented.








SUBJECTIVE:





Pt seen and examined in the ICU. Remains intubated, sedated. Febrile overnight 

with rising LFTs. Abdominal ultrasound showing distended gall bladder with 

thickened wall. Contact made with daughter yesterday who states she will come 

visit today.





OBJECTIVE:





 Vital Signs











 Period  Temp  Pulse  Resp  BP Sys/Varghese  Pulse Ox


 


 Last 24 Hr  98 F-101.6 F    15-23  100-135/71-94  98








 Intake & Output











 11/12/18 11/13/18 11/14/18 11/15/18





 23:59 23:59 23:59 23:59


 


Intake Total 2064 1377 960 860


 


Output Total 1000 1000 1325 350


 


Balance 1064 377 -365 510


 


Weight 83.971 kg 83.915 kg 83.518 kg 80.938 kg








Gen:  intubated, sedated


Heart: irregular


Lung: scattered rhonchi


Abd: soft, nontender


Ext: no edema





 CBC, BMP





 11/15/18 05:30 





 11/15/18 05:30 





Active Medications





Acetaminophen (Tylenol Suppository -)  650 mg VT Q6H PRN


   PRN Reason: FEVER


   Last Admin: 11/05/18 18:10 Dose:  650 mg


Acetaminophen (Tylenol -)  650 mg PO Q6H PRN


   PRN Reason: PAIN LEVEL 1 - 3


   Last Admin: 11/14/18 09:23 Dose:  650 mg


Allopurinol (Zyloprim -)  100 mg PO BID [facility]


   Last Admin: 11/15/18 09:41 Dose:  100 mg


Chlorhexidine Gluconate (Peridex -)  15 ml MM BID [facility]


   Last Admin: 11/15/18 09:42 Dose:  15 ml


Cholecalciferol (Vitamin D3 -)  1,000 unit PO DAILY [facility]


Dexamethasone Sodium Phosphate (Decadron Injection -)  10 mg IVPUSH BID [facility]


   Last Admin: 11/15/18 09:41 Dose:  10 mg


Diphenhydramine HCl (Benadryl Injection -)  25 mg IVPB ONCE PRN


   PRN Reason: DURING PLASMAPHERESIS


Enoxaparin Sodium (Lovenox -)  80 mg SQ DAILY [facility]


   Last Admin: 11/15/18 09:40 Dose:  80 mg


Ergocalciferol (Drisdol Oral Solution -)  8,000 units PO DAILY [facility]


   Stop: 11/17/18 08:37


   Last Admin: 11/15/18 09:42 Dose:  8,000 units


Fentanyl 500 mcg/ Dextrose  100 mls @ 5 mls/hr IVPB TITR [facility]; Protocol


   Last Admin: 11/14/18 11:47 Dose:  Not Given


Ceftazidime 1 gm/ Dextrose  50 mls @ 200 mls/hr IVPB DAILY [facility]; Protocol


   Last Admin: 11/15/18 09:39 Dose:  200 mls/hr


Propofol (Diprivan -)  1,000,000 mcg in 100 mls @ 2.479 mls/hr IVPB TITR [facility]; 

Protocol


   Last Admin: 11/15/18 05:38 Dose:  Not Given


Lactulose (Cephulac (Oral Use))  20 gm PO TID [facility]


   Last Admin: 11/15/18 05:38 Dose:  20 gm


Metoprolol Tartrate (Lopressor Injection -)  5 mg IVPUSH Q8H PRN


   PRN Reason: TACHYCARDIA


Metoprolol Tartrate (Lopressor -)  50 mg PO BID [facility]


   Last Admin: 11/15/18 09:41 Dose:  50 mg


Metronidazole (Flagyl -)  500 mg PO TID [facility]


   Stop: 11/16/18 13:59


Pantoprazole Sodium (Protonix Iv)  40 mg IVPUSH DAILY [facility]


   Last Admin: 11/15/18 09:41 Dose:  40 mg


Rifaximin (Xifaxan -)  550 mg PO BID [facility]


Thiamine HCl (Vitamin B1 -)  100 mg PO DAILY [facility]


   Last Admin: 11/15/18 09:41 Dose:  100 mg








ASSESSMENT AND PLAN:


Acute Hypoxic Respiratory Failure


Altered Mental Status


Metabolic Encephalopathy


Pneumonia


Acalculous Cholecystitis


Multiple Myeloma


Acute on Chronic Renal Failure


Metabolic Acidosis


LV Diastolic Dysfunction


Atrial Fibrillation


CAD


+Troponins likely Demand Ischemia


PAD


Hyperlipidemia


HTN





-  continue antibiotics


-  f/u cultures


-  GI eval for cholecystitis


-  trend LFTs


-  decadron per oncology


-  monitor urine output, creatinine


-  rate control


-  continue anticoagulation


-  minimize sedation to assess mental status 


-  spontaneous breathing trials as tolerated but would not extubate until 

mental status improved


-  hold enteral feeds


-  DVT/GI prophylaxis


-  continue discussions regarding goals of care, will likely need tracheostomy


-  palliative care on board


-  continue ICU monitoring


-  poor overall prognosis











critical care time spent in reviewing chart, evaluating patient and formulating 

plan 35 min

## 2018-11-16 LAB
ALBUMIN SERPL-MCNC: 1.6 G/DL (ref 3.4–5)
ALBUMIN SERPL-MCNC: 1.8 G/DL (ref 3.4–5)
ALP SERPL-CCNC: 125 U/L (ref 45–117)
ALP SERPL-CCNC: 163 U/L (ref 45–117)
ALT SERPL-CCNC: 433 U/L (ref 13–61)
ALT SERPL-CCNC: 572 U/L (ref 13–61)
ANION GAP SERPL CALC-SCNC: 6 MMOL/L (ref 8–16)
ANION GAP SERPL CALC-SCNC: 7 MMOL/L (ref 8–16)
ANION GAP SERPL CALC-SCNC: 7 MMOL/L (ref 8–16)
ANISOCYTOSIS BLD QL: (no result)
AST SERPL-CCNC: 168 U/L (ref 15–37)
AST SERPL-CCNC: 333 U/L (ref 15–37)
BASO STIPL BLD QL SMEAR: (no result)
BASOPHILS # BLD: 0.4 % (ref 0–2)
BILIRUB SERPL-MCNC: 0.3 MG/DL (ref 0.2–1)
BILIRUB SERPL-MCNC: 0.4 MG/DL (ref 0.2–1)
BUN SERPL-MCNC: 109 MG/DL (ref 7–18)
BUN SERPL-MCNC: 118 MG/DL (ref 7–18)
BUN SERPL-MCNC: 121 MG/DL (ref 7–18)
CALCIUM SERPL-MCNC: 6.3 MG/DL (ref 8.5–10.1)
CALCIUM SERPL-MCNC: 6.5 MG/DL (ref 8.5–10.1)
CALCIUM SERPL-MCNC: 6.6 MG/DL (ref 8.5–10.1)
CHLORIDE SERPL-SCNC: 120 MMOL/L (ref 98–107)
CHLORIDE SERPL-SCNC: 121 MMOL/L (ref 98–107)
CHLORIDE SERPL-SCNC: 122 MMOL/L (ref 98–107)
CO2 SERPL-SCNC: 17 MMOL/L (ref 21–32)
CO2 SERPL-SCNC: 18 MMOL/L (ref 21–32)
CO2 SERPL-SCNC: 20 MMOL/L (ref 21–32)
CREAT SERPL-MCNC: 2.9 MG/DL (ref 0.55–1.3)
CREAT SERPL-MCNC: 3 MG/DL (ref 0.55–1.3)
CREAT SERPL-MCNC: 3.1 MG/DL (ref 0.55–1.3)
DEPRECATED RDW RBC AUTO: 17.5 % (ref 11.9–15.9)
EOSINOPHIL # BLD: 0.1 % (ref 0–4.5)
GLUCOSE SERPL-MCNC: 128 MG/DL (ref 74–106)
GLUCOSE SERPL-MCNC: 147 MG/DL (ref 74–106)
GLUCOSE SERPL-MCNC: 172 MG/DL (ref 74–106)
HCT VFR BLD CALC: 25.9 % (ref 35.4–49)
HGB BLD-MCNC: 8.5 GM/DL (ref 11.7–16.9)
LYMPHOCYTES # BLD: 9 % (ref 8–40)
MACROCYTES BLD QL: (no result)
MAGNESIUM SERPL-MCNC: 3.2 MG/DL (ref 1.8–2.4)
MCH RBC QN AUTO: 29.7 PG (ref 25.7–33.7)
MCHC RBC AUTO-ENTMCNC: 32.7 G/DL (ref 32–35.9)
MCV RBC: 90.9 FL (ref 80–96)
MONOCYTES # BLD AUTO: 2.6 % (ref 3.8–10.2)
NEUTROPHILS # BLD: 87.9 % (ref 42.8–82.8)
PHOSPHATE SERPL-MCNC: 5.4 MG/DL (ref 2.5–4.9)
PLATELET # BLD AUTO: 231 K/MM3 (ref 134–434)
PLATELET BLD QL SMEAR: NORMAL
PMV BLD: 9.5 FL (ref 7.5–11.1)
POTASSIUM SERPLBLD-SCNC: 6 MMOL/L (ref 3.5–5.1)
POTASSIUM SERPLBLD-SCNC: 6.1 MMOL/L (ref 3.5–5.1)
POTASSIUM SERPLBLD-SCNC: 6.2 MMOL/L (ref 3.5–5.1)
PROT SERPL-MCNC: 10.8 G/DL (ref 6.4–8.2)
PROT SERPL-MCNC: 9.7 G/DL (ref 6.4–8.2)
RBC # BLD AUTO: 2.85 M/MM3 (ref 4–5.6)
ROULEAUX BLD QL SMEAR: (no result)
SODIUM SERPL-SCNC: 145 MMOL/L (ref 136–145)
SODIUM SERPL-SCNC: 145 MMOL/L (ref 136–145)
SODIUM SERPL-SCNC: 147 MMOL/L (ref 136–145)
TARGETS BLD QL SMEAR: (no result)
URATE SERPL-SCNC: 8.4 MG/DL (ref 2.6–7.2)
WBC # BLD AUTO: 12.3 K/MM3 (ref 4–10)
WBC NRBC COR # BLD: 10.42 K/MM3

## 2018-11-16 RX ADMIN — ALLOPURINOL SCH MG: 100 TABLET ORAL at 21:07

## 2018-11-16 RX ADMIN — SODIUM CHLORIDE SCH MLS/HR: 9 INJECTION, SOLUTION INTRAVENOUS at 18:02

## 2018-11-16 RX ADMIN — HUMAN INSULIN ONE: 100 INJECTION, SOLUTION SUBCUTANEOUS at 18:04

## 2018-11-16 RX ADMIN — PROPOFOL SCH MLS/HR: 10 INJECTION, EMULSION INTRAVENOUS at 12:25

## 2018-11-16 RX ADMIN — CALCIUM GLUCONATE ONE: 94 INJECTION, SOLUTION INTRAVENOUS at 18:03

## 2018-11-16 RX ADMIN — ALBUTEROL SULFATE SCH AMP: 2.5 SOLUTION RESPIRATORY (INHALATION) at 09:10

## 2018-11-16 RX ADMIN — SODIUM POLYSTYRENE SULFONATE ONE: 15 SUSPENSION ORAL; RECTAL at 18:04

## 2018-11-16 RX ADMIN — ALBUTEROL SULFATE SCH AMP: 2.5 SOLUTION RESPIRATORY (INHALATION) at 09:25

## 2018-11-16 RX ADMIN — CHLORHEXIDINE GLUCONATE 0.12% ORAL RINSE SCH ML: 1.2 LIQUID ORAL at 09:03

## 2018-11-16 RX ADMIN — SODIUM CHLORIDE SCH: 9 INJECTION, SOLUTION INTRAVENOUS at 18:04

## 2018-11-16 RX ADMIN — ALLOPURINOL SCH MG: 100 TABLET ORAL at 09:02

## 2018-11-16 RX ADMIN — SODIUM CHLORIDE STA MLS/HR: 9 INJECTION, SOLUTION INTRAVENOUS at 18:02

## 2018-11-16 RX ADMIN — PANTOPRAZOLE SODIUM SCH MG: 40 INJECTION, POWDER, FOR SOLUTION INTRAVENOUS at 09:04

## 2018-11-16 RX ADMIN — Medication SCH UNITS: at 09:02

## 2018-11-16 RX ADMIN — SODIUM POLYSTYRENE SULFONATE ONE GM: 15 SUSPENSION ORAL; RECTAL at 18:02

## 2018-11-16 RX ADMIN — DEXTROSE MONOHYDRATE ONE: 25 INJECTION, SOLUTION INTRAVENOUS at 18:04

## 2018-11-16 RX ADMIN — LACTULOSE SCH GM: 20 SOLUTION ORAL at 05:48

## 2018-11-16 RX ADMIN — ACETAMINOPHEN PRN MG: 325 TABLET ORAL at 16:25

## 2018-11-16 RX ADMIN — LACTULOSE SCH GM: 20 SOLUTION ORAL at 21:04

## 2018-11-16 RX ADMIN — ALBUTEROL SULFATE SCH AMP: 2.5 SOLUTION RESPIRATORY (INHALATION) at 08:40

## 2018-11-16 RX ADMIN — ALBUTEROL SULFATE SCH AMP: 2.5 SOLUTION RESPIRATORY (INHALATION) at 18:52

## 2018-11-16 RX ADMIN — DEXTROSE MONOHYDRATE SCH MLS/HR: 50 INJECTION, SOLUTION INTRAVENOUS at 12:29

## 2018-11-16 RX ADMIN — RIFAXIMIN SCH MG: 550 TABLET ORAL at 21:08

## 2018-11-16 RX ADMIN — CHLORHEXIDINE GLUCONATE 0.12% ORAL RINSE SCH ML: 1.2 LIQUID ORAL at 21:05

## 2018-11-16 RX ADMIN — SODIUM BICARBONATE SCH MLS/HR: 84 INJECTION, SOLUTION INTRAVENOUS at 23:25

## 2018-11-16 RX ADMIN — LACTULOSE SCH GM: 20 SOLUTION ORAL at 14:39

## 2018-11-16 RX ADMIN — SODIUM CHLORIDE STA: 9 INJECTION, SOLUTION INTRAVENOUS at 18:04

## 2018-11-16 RX ADMIN — Medication SCH MG: at 09:02

## 2018-11-16 RX ADMIN — ALBUTEROL SULFATE SCH AMP: 2.5 SOLUTION RESPIRATORY (INHALATION) at 08:55

## 2018-11-16 RX ADMIN — METOPROLOL TARTRATE SCH MG: 50 TABLET, FILM COATED ORAL at 09:02

## 2018-11-16 RX ADMIN — ACETAMINOPHEN PRN MG: 325 TABLET ORAL at 06:36

## 2018-11-16 RX ADMIN — SODIUM CHLORIDE SCH MLS/HR: 9 INJECTION, SOLUTION INTRAVENOUS at 16:35

## 2018-11-16 RX ADMIN — METOPROLOL TARTRATE SCH MG: 50 TABLET, FILM COATED ORAL at 21:05

## 2018-11-16 RX ADMIN — ALBUTEROL SULFATE SCH AMP: 2.5 SOLUTION RESPIRATORY (INHALATION) at 06:30

## 2018-11-16 RX ADMIN — HUMAN INSULIN ONE UNITS: 100 INJECTION, SOLUTION SUBCUTANEOUS at 18:01

## 2018-11-16 RX ADMIN — CALCIUM GLUCONATE ONE MG: 94 INJECTION, SOLUTION INTRAVENOUS at 18:03

## 2018-11-16 RX ADMIN — DEXAMETHASONE SODIUM PHOSPHATE SCH MG: 10 INJECTION, SOLUTION INTRAMUSCULAR; INTRAVENOUS at 09:03

## 2018-11-16 RX ADMIN — DEXTROSE MONOHYDRATE ONE GM: 25 INJECTION, SOLUTION INTRAVENOUS at 18:02

## 2018-11-16 RX ADMIN — CEFTAZIDIME SCH MLS/HR: 1 INJECTION, POWDER, FOR SOLUTION INTRAMUSCULAR; INTRAVENOUS at 09:01

## 2018-11-16 RX ADMIN — ALBUTEROL SULFATE SCH AMP: 2.5 SOLUTION RESPIRATORY (INHALATION) at 18:45

## 2018-11-16 RX ADMIN — RIFAXIMIN SCH MG: 550 TABLET ORAL at 09:02

## 2018-11-16 NOTE — PN
Progress Note (short form)





- Note


Progress Note: 





Potassium rising 





discussed with dr oliver and dr panchal 





started on IV fluid. 


will give him anti hyperkalemic treatment. 


albuterol, kaxylate, insulin with d50, buster gluconate.

## 2018-11-16 NOTE — PN
Progress Note (short form)





- Note


Progress Note: 





Patient seen and examined





Intubated


Unresponsive





 Last Vital Signs











Temp Pulse Resp BP Pulse Ox


 


 101 F H  124 H  17   138/74   99 


 


 11/16/18 18:00  11/16/18 18:02  11/16/18 18:32  11/16/18 18:02  11/16/18 11:47











Cor: RSR, No murmurs, No gallops


Lungs: Clear to P&A


Abd: Soft, Normal bowel sounds, No organomegaly


Ext:No significant edema





 Abnormal Lab Results











  11/16/18 11/16/18 11/16/18





  05:30 07:00 16:05


 


WBC  12.3 H  


 


RBC  2.85 L  


 


Hgb  8.5 L  


 


Hct  25.9 L  


 


RDW  17.5 H  


 


Absolute Neuts (auto)  8.9 H  


 


Neutrophils %  87.9 H  


 


Lymphocytes % (Manual)  6.2 L D  


 


Monocytes %  2.6 L  


 


Nucleated RBC %  18 H*  


 


Metamyelocytes  4 H D  


 


PTT (Actin FS)   


 


Potassium   6.0 H  6.1 H*


 


Chloride   120 H  121 H


 


Carbon Dioxide   20 L  17 L


 


Anion Gap   6 L  7 L


 


BUN   109 H*  118 H*


 


Creatinine   2.9 H  3.0 H


 


Random Glucose   128 H  147 H


 


Uric Acid    8.4 H


 


Calcium   6.3 L*  6.6 L*


 


Phosphorus   5.4 H 


 


Magnesium   3.2 H 


 


AST   333 H 


 


ALT   572 H 


 


Alkaline Phosphatase   163 H 


 


CK-MB (CK-2)    7.3 H


 


Troponin I   0.69 H*  0.71 H*


 


Total Protein   10.8 H 


 


Albumin   1.8 L 














  11/16/18





  16:05


 


WBC 


 


RBC 


 


Hgb 


 


Hct 


 


RDW 


 


Absolute Neuts (auto) 


 


Neutrophils % 


 


Lymphocytes % (Manual) 


 


Monocytes % 


 


Nucleated RBC % 


 


Metamyelocytes 


 


PTT (Actin FS)  19.2 L


 


Potassium 


 


Chloride 


 


Carbon Dioxide 


 


Anion Gap 


 


BUN 


 


Creatinine 


 


Random Glucose 


 


Uric Acid 


 


Calcium 


 


Phosphorus 


 


Magnesium 


 


AST 


 


ALT 


 


Alkaline Phosphatase 


 


CK-MB (CK-2) 


 


Troponin I 


 


Total Protein 


 


Albumin 








 





A/P





78 year old gentleman with hx of CKD, Afib on A/C, CAD, CKD, Hypertension, 

Hyperlipidemia, PVD who presented with AMS/Confusion and found to have HAM/

hypercalcemia/anemia


SFLCA --Kappa chains--2000s/ratio 438


IgG > 7grams


IgM < 5





myeloma





renal failure





altered mental status





fevers





overall poor prognosis

## 2018-11-16 NOTE — PN
Progress Note (short form)





- Note


Progress Note: 





Renal follow up for Hypercalcemia/HAM





Pt seen and examined in the ICU


on the Vent, FiO2 40%


sedated


making urine via fenton








 Vital Signs











Temperature  100.3 F H  11/16/18 10:00


 


Pulse Rate  105 H  11/16/18 10:04


 


Respiratory Rate  15   11/16/18 11:47


 


Blood Pressure  127/83   11/16/18 10:00


 


O2 Sat by Pulse Oximetry (%)  99   11/16/18 11:47








 Intake & Output











 11/13/18 11/14/18 11/15/18 11/16/18





 23:59 23:59 23:59 23:59


 


Intake Total 7030 954 7384 130


 


Output Total 1000 1325 650 800


 


Balance 377 -365 640 -670


 


Weight 83.915 kg 83.518 kg 80.938 kg 80.541 kg





NAD


on Vent via ET tube


tachycardic, no M/R


Dec Bs, no rales


no LE edema





 CBC, BMP





 11/16/18 05:30 





 11/16/18 07:00 





 Current Medications





Acetaminophen (Tylenol -)  650 mg PO Q6H PRN


   PRN Reason: PAIN LEVEL 1 - 3


   Last Admin: 11/16/18 06:36 Dose:  650 mg


Acetaminophen (Tylenol Suppository -)  650 mg RC Q6H PRN


   PRN Reason: FEVER


Allopurinol (Zyloprim -)  100 mg PO BID [facility]


   Last Admin: 11/16/18 09:02 Dose:  100 mg


Chlorhexidine Gluconate (Peridex -)  15 ml MM BID [facility]


   Last Admin: 11/16/18 09:03 Dose:  15 ml


Cholecalciferol (Vitamin D3 -)  1,000 unit PO DAILY [facility]


Dexamethasone Sodium Phosphate (Decadron Injection -)  10 mg IVPUSH BID [facility]


   Last Admin: 11/16/18 09:03 Dose:  10 mg


Diphenhydramine HCl (Benadryl Injection -)  25 mg IVPB ONCE PRN


   PRN Reason: DURING PLASMAPHERESIS


Ergocalciferol (Drisdol Oral Solution -)  8,000 units PO DAILY [facility]


   Stop: 11/17/18 08:37


   Last Admin: 11/16/18 09:02 Dose:  8,000 units


Fentanyl 500 mcg/ Dextrose  100 mls @ 5 mls/hr IVPB TITR [facility]; Protocol


   Last Admin: 11/15/18 15:03 Dose:  25 mcg/hr, 5 mls/hr


Ceftazidime 1 gm/ Dextrose  50 mls @ 200 mls/hr IVPB DAILY [facility]; Protocol


   Last Admin: 11/16/18 09:01 Dose:  200 mls/hr


Propofol (Diprivan -)  1,000,000 mcg in 100 mls @ 2.479 mls/hr IVPB TITR [facility]; 

Protocol


   Last Admin: 11/15/18 21:14 Dose:  10 mcg/kg/min, 4.958 mls/hr


Vancomycin HCl (Vancomycin (Pre-Docked))  1,000 mg in 250 mls @ 166.667 mls/hr 

IVPB ONCE ONE; Protocol


   Stop: 11/16/18 12:44


Lactulose (Cephulac (Oral Use))  20 gm PO TID [facility]


   Last Admin: 11/16/18 05:48 Dose:  20 gm


Metoprolol Tartrate (Lopressor Injection -)  5 mg IVPUSH Q8H PRN


   PRN Reason: TACHYCARDIA


Metoprolol Tartrate (Lopressor -)  75 mg PO BID [facility]


   Last Admin: 11/16/18 09:02 Dose:  75 mg


Metronidazole (Flagyl -)  500 mg PO TID [facility]


   Stop: 11/16/18 13:59


   Last Admin: 11/16/18 05:48 Dose:  500 mg


Pantoprazole Sodium (Protonix Iv)  40 mg IVPUSH DAILY [facility]


   Last Admin: 11/16/18 09:04 Dose:  40 mg


Rifaximin (Xifaxan -)  550 mg PO BID [facility]


   Last Admin: 11/16/18 09:02 Dose:  550 mg


Thiamine HCl (Vitamin B1 -)  100 mg PO DAILY [facility]


   Last Admin: 11/16/18 09:02 Dose:  100 mg














78 year old gentleman with hx of CKD, Afib on A/C, CAD, CKD, Hypertension, 

Hyperlipidemia, PVD who presented with AMS/Confusion and found to have HAM.





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 

showed no stones or obstruction, UA w/o protein but UPCR ~0.5 indicating non-

albumin proteinuria)


#AMS 


#Newly diagnosed multiple myloma 


#Anemia 


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


#Hyperdense lesion on US of the kidney 


#Normal anion gap metabolic acidosis 


#Hyperkalemia (r/o tumor lysis)





Renal function stable at this time, BUN high due to steroids


no acute need for RRT at this time


K trending up, underlying tumor lysis needs to be ruled out as pt with elevated 

K, Phos and low Ca


s/p kayexalate this am


repeat K this afternoon


Check uric acid levels to eval for TLS, also check CK levels to r/o propofol 

infusion syndrome


pt is on decadron IV for myloma  


continue vent support


prognosis is poor


family meeting to be held today


pt is not a candidate for RRT given significant co-morbid conditions and poor 

overall prognosis 





Ricky Chang DO

## 2018-11-16 NOTE — PN
Progress Note, Physician





- Current Medication List


Current Medications: 


Active Medications





Acetaminophen (Tylenol -)  650 mg PO Q6H PRN


   PRN Reason: PAIN LEVEL 1 - 3


   Last Admin: 11/16/18 06:36 Dose:  650 mg


Acetaminophen (Tylenol Suppository -)  650 mg RC Q6H PRN


   PRN Reason: FEVER


Allopurinol (Zyloprim -)  100 mg PO BID [facility]


   Last Admin: 11/16/18 09:02 Dose:  100 mg


Chlorhexidine Gluconate (Peridex -)  15 ml MM BID [facility]


   Last Admin: 11/16/18 09:03 Dose:  15 ml


Cholecalciferol (Vitamin D3 -)  1,000 unit PO DAILY [facility]


Dexamethasone Sodium Phosphate (Decadron Injection -)  10 mg IVPUSH BID [facility]


   Last Admin: 11/16/18 09:03 Dose:  10 mg


Diphenhydramine HCl (Benadryl Injection -)  25 mg IVPB ONCE PRN


   PRN Reason: DURING PLASMAPHERESIS


Ergocalciferol (Drisdol Oral Solution -)  8,000 units PO DAILY [facility]


   Stop: 11/17/18 08:37


   Last Admin: 11/16/18 09:02 Dose:  8,000 units


Fentanyl 500 mcg/ Dextrose  100 mls @ 5 mls/hr IVPB TITR [facility]; Protocol


   Last Admin: 11/15/18 15:03 Dose:  25 mcg/hr, 5 mls/hr


Ceftazidime 1 gm/ Dextrose  50 mls @ 200 mls/hr IVPB DAILY [facility]; Protocol


   Last Admin: 11/16/18 09:01 Dose:  200 mls/hr


Propofol (Diprivan -)  1,000,000 mcg in 100 mls @ 2.479 mls/hr IVPB TITR [facility]; 

Protocol


   Last Admin: 11/15/18 21:14 Dose:  10 mcg/kg/min, 4.958 mls/hr


Vancomycin HCl (Vancomycin (Pre-Docked))  1,000 mg in 250 mls @ 166.667 mls/hr 

IVPB ONCE ONE; Protocol


   Stop: 11/16/18 12:44


Lactulose (Cephulac (Oral Use))  20 gm PO TID [facility]


   Last Admin: 11/16/18 05:48 Dose:  20 gm


Metoprolol Tartrate (Lopressor Injection -)  5 mg IVPUSH Q8H PRN


   PRN Reason: TACHYCARDIA


Metoprolol Tartrate (Lopressor -)  75 mg PO BID [facility]


   Last Admin: 11/16/18 09:02 Dose:  75 mg


Metronidazole (Flagyl -)  500 mg PO TID [facility]


   Stop: 11/16/18 13:59


   Last Admin: 11/16/18 05:48 Dose:  500 mg


Pantoprazole Sodium (Protonix Iv)  40 mg IVPUSH DAILY [facility]


   Last Admin: 11/16/18 09:04 Dose:  40 mg


Rifaximin (Xifaxan -)  550 mg PO BID [facility]


   Last Admin: 11/16/18 09:02 Dose:  550 mg


Thiamine HCl (Vitamin B1 -)  100 mg PO DAILY [facility]


   Last Admin: 11/16/18 09:02 Dose:  100 mg











- Objective


Vital Signs: 


 Vital Signs











Temperature  100.3 F H  11/16/18 10:00


 


Pulse Rate  105 H  11/16/18 10:04


 


Respiratory Rate  16   11/16/18 11:59


 


Blood Pressure  127/83   11/16/18 10:00


 


O2 Sat by Pulse Oximetry (%)  99   11/16/18 11:47











Cardiovascular: Yes: S1, S2


Respiratory: Yes: Mechanically Ventilated, Rhonchi


Gastrointestinal: Yes: Normal Bowel Sounds, Soft


Labs: 


 CBC, BMP





 11/16/18 05:30 





 11/16/18 07:00 





 INR, PTT











INR  1.28  (0.83-1.09)  H  11/15/18  05:30    


 


Fibrinogen  290.0 mg/dL (238-498)   11/11/18  06:00    














Problem List





- Problems


(1) Toxic metabolic encephalopathy


Code(s): G92 - TOXIC ENCEPHALOPATHY   





(2) Cellulitis


Code(s): L03.90 - CELLULITIS, UNSPECIFIED   


Qualifiers: 


   Site of cellulitis: extremity   Site of cellulitis of extremity: lower 

extremity   Laterality: right   Qualified Code(s): L03.115 - Cellulitis of 

right lower limb   





(3) Sepsis


Code(s): A41.9 - SEPSIS, UNSPECIFIED ORGANISM   





(4) Artery occlusion


Code(s): I70.90 - UNSPECIFIED ATHEROSCLEROSIS   





(5) Hypercalcemia


Code(s): E83.52 - HYPERCALCEMIA   





(6) Paroxysmal atrial fibrillation


Code(s): I48.0 - PAROXYSMAL ATRIAL FIBRILLATION   





(7) HAM (acute kidney injury)


Code(s): N17.9 - ACUTE KIDNEY FAILURE, UNSPECIFIED   





Assessment/Plan





- Problems


(1) Elevated LFTs


Assessment/Plan: 


ultrasound of liver noted suggestive of acalculous cholecystitis, will get GI 

consult--D/W DR GHOSH PROBABLY ISCHEMIC DOES NOT RECOMMEND DRAINAGE AT THIS 

TIME


Code(s): R94.5 - ABNORMAL RESULTS OF LIVER FUNCTION STUDIES   





(2) HAM (acute kidney injury)


Assessment/Plan: 


bun/cr is better from 2.8 to 2.6


serum bicarb is now 22


corrected calcium


Code(s): N17.9 - ACUTE KIDNEY FAILURE, UNSPECIFIED   





(3) Atrial fibrillation with RVR


Assessment/Plan: 


heparin drip 


cardiac monitor


rate control with metoprolol


Code(s): I48.91 - UNSPECIFIED ATRIAL FIBRILLATION   





(4) Respiratory failure


Assessment/Plan: 


intubated


propofol/fentanyl


Code(s): J96.90 - RESPIRATORY FAILURE, UNSP, UNSP W HYPOXIA OR HYPERCAPNIA   





(5) Toxic metabolic encephalopathy


Assessment/Plan: 


Microbiology





11/14/18 11:20   Urine - Urine Garces   Urine Culture - Final


                              NO GROWTH OBTAINED


11/06/18 14:30   Urine - Urine Garces   Legionella Antigen - Final


11/06/18 14:30   Urine - Urine Garces   Streptococcus pneumoniae Antigen (M - 

Final


11/06/18 14:30   Sputum - Endotrachea Suction/Ventilator   Gram Stain - Final


11/06/18 14:30   Sputum - Endotrachea Suction/Ventilator   Sputum Culture - 

Final


                              Stenotrophomon.(X.)Maltophilia


11/14/18 09:58   Blood - Peripheral Venous   Blood Culture - Preliminary


                              NO GROWTH OBTAINED AFTER 24 HOURS, INCUBATION TO 

CONTINUE


                              FOR 4 DAYS.


11/14/18 09:50   Blood - Peripheral Venous   Blood Culture - Preliminary


                              NO GROWTH OBTAINED AFTER 24 HOURS, INCUBATION TO 

CONTINUE


                              FOR 4 DAYS.


11/10/18 15:00   Blood - Peripheral Venous   Blood Culture - Preliminary


                              NO GROWTH OBTAINED AFTER 96 HOURS, INCUBATION TO 

CONTINUE


                              FOR 1 DAYS.


11/10/18 14:00   Blood - Central Line   Blood Culture - Preliminary


                              NO GROWTH OBTAINED AFTER 96 HOURS, INCUBATION TO 

CONTINUE


                              FOR 1 DAYS.





afebrile normal WBC repeat cultures as above


on ceftazidime - 


Code(s): G92 - TOXIC ENCEPHALOPATHY   





(6) Altered mental status


Assessment/Plan: 


maybe secondary to toxic metabolic encephalopathy


neurology consult appreciated


on lactulose TID still persistently elevated Nh3 level


on steroid as well dexamethasone - no improvement in mental status


s/p plasmapheresis


dvt ppx lovenox


Code(s): R41.82 - ALTERED MENTAL STATUS, UNSPECIFIED

## 2018-11-16 NOTE — PN
Progress Note, Physician


History of Present Illness: 


Poorly responsive on vent, rapid persistent afib on lopressor and heparin gtt.





- Current Medication List


Current Medications: 


Active Medications





Acetaminophen (Tylenol -)  650 mg PO Q6H PRN


   PRN Reason: PAIN LEVEL 1 - 3


   Last Admin: 11/16/18 06:36 Dose:  650 mg


Acetaminophen (Tylenol Suppository -)  650 mg RC Q6H PRN


   PRN Reason: FEVER


Allopurinol (Zyloprim -)  100 mg PO BID [facility]


   Last Admin: 11/16/18 09:02 Dose:  100 mg


Chlorhexidine Gluconate (Peridex -)  15 ml MM BID [facility]


   Last Admin: 11/16/18 09:03 Dose:  15 ml


Cholecalciferol (Vitamin D3 -)  1,000 unit PO DAILY [facility]


Dexamethasone Sodium Phosphate (Decadron Injection -)  10 mg IVPUSH BID [facility]


   Last Admin: 11/16/18 09:03 Dose:  10 mg


Diphenhydramine HCl (Benadryl Injection -)  25 mg IVPB ONCE PRN


   PRN Reason: DURING PLASMAPHERESIS


Ergocalciferol (Drisdol Oral Solution -)  8,000 units PO DAILY [facility]


   Stop: 11/17/18 08:37


   Last Admin: 11/16/18 09:02 Dose:  8,000 units


Fentanyl 500 mcg/ Dextrose  100 mls @ 5 mls/hr IVPB TITR [facility]; Protocol


   Last Admin: 11/16/18 12:29 Dose:  25 mcg/hr, 5 mls/hr


Ceftazidime 1 gm/ Dextrose  50 mls @ 200 mls/hr IVPB DAILY [facility]; Protocol


   Last Admin: 11/16/18 09:01 Dose:  200 mls/hr


Propofol (Diprivan -)  1,000,000 mcg in 100 mls @ 2.479 mls/hr IVPB TITR [facility]; 

Protocol


   Last Admin: 11/16/18 12:25 Dose:  10 mcg/kg/min, 4.958 mls/hr


Lactulose (Cephulac (Oral Use))  20 gm PO TID [facility]


   Last Admin: 11/16/18 14:39 Dose:  20 gm


Metoprolol Tartrate (Lopressor Injection -)  5 mg IVPUSH Q8H PRN


   PRN Reason: TACHYCARDIA


Metoprolol Tartrate (Lopressor -)  75 mg PO BID [facility]


   Last Admin: 11/16/18 09:02 Dose:  75 mg


Pantoprazole Sodium (Protonix Iv)  40 mg IVPUSH DAILY [facility]


   Last Admin: 11/16/18 09:04 Dose:  40 mg


Rifaximin (Xifaxan -)  550 mg PO BID [facility]


   Last Admin: 11/16/18 09:02 Dose:  550 mg


Thiamine HCl (Vitamin B1 -)  100 mg PO DAILY [facility]


   Last Admin: 11/16/18 09:02 Dose:  100 mg











- Objective


Vital Signs: 


 Vital Signs











Temperature  100.3 F H  11/16/18 10:00


 


Pulse Rate  117 H  11/16/18 12:00


 


Respiratory Rate  16   11/16/18 14:38


 


Blood Pressure  113/87   11/16/18 12:00


 


O2 Sat by Pulse Oximetry (%)  99   11/16/18 11:47











Constitutional: Yes: No Distress, Calm, Thin


Neck: Yes: Supple


Cardiovascular: Yes: Tachycardia, Pulse Irregular


Respiratory: Yes: Intubated, Mechanically Ventilated


Gastrointestinal: Yes: Normal Bowel Sounds, Soft


Edema: No


Labs: 


 CBC, BMP





 11/16/18 05:30 





 11/16/18 07:00 





 INR, PTT











INR  1.28  (0.83-1.09)  H  11/15/18  05:30    


 


Fibrinogen  290.0 mg/dL (238-498)   11/11/18  06:00    














- ....Imaging


Chest X-ray: Report Reviewed (Improved left hemithorax)


EKG: Report Reviewed (Afib @ 96 RBBB), Image Reviewed





Problem List





- Problems


(1) Atrial fibrillation with RVR


Code(s): I48.91 - UNSPECIFIED ATRIAL FIBRILLATION   





(2) Acute-on-chronic kidney injury


Code(s): N17.9 - ACUTE KIDNEY FAILURE, UNSPECIFIED; N18.9 - CHRONIC KIDNEY 

DISEASE, UNSPECIFIED   


Qualifiers: 


   Acute renal failure type: unspecified   Chronic kidney disease stage: 

unspecified stage   Qualified Code(s): N17.9 - Acute kidney failure, unspecified

; N18.9 - Chronic kidney disease, unspecified   





(3) Demand ischemia


Code(s): I24.8 - OTHER FORMS OF ACUTE ISCHEMIC HEART DISEASE   





(4) Hypercalcemia


Code(s): E83.52 - HYPERCALCEMIA   





(5) Nonadherence to medication


Code(s): Z91.14 - PATIENT'S OTHER NONCOMPLIANCE WITH MEDICATION REGIMEN   





(6) Paraproteinemia


Code(s): D89.2 - HYPERGAMMAGLOBULINEMIA, UNSPECIFIED   





(7) Anticoagulant long-term use


Code(s): Z79.01 - LONG TERM (CURRENT) USE OF ANTICOAGULANTS   





(8) Chronic thromboembolic disease


Code(s): I74.9 - EMBOLISM AND THROMBOSIS OF UNSPECIFIED ARTERY   





(9) Coronary artery disease


Code(s): I25.10 - ATHSCL HEART DISEASE OF NATIVE CORONARY ARTERY W/O ANG PCTRS 

  


Qualifiers: 


   Coronary Disease-Associated Artery/Lesion type: native artery   Native vs. 

transplanted heart: native heart   Associated angina: without angina   

Qualified Code(s): I25.10 - Atherosclerotic heart disease of native coronary 

artery without angina pectoris   





(10) Diastolic dysfunction without heart failure


Code(s): I51.9 - HEART DISEASE, UNSPECIFIED   





(11) HTN (hypertension)


Code(s): I10 - ESSENTIAL (PRIMARY) HYPERTENSION   


Qualifiers: 


   Hypertension type: essential hypertension   Qualified Code(s): I10 - 

Essential (primary) hypertension   





(12) Hypertrophic cardiomyopathy


Code(s): I42.2 - OTHER HYPERTROPHIC CARDIOMYOPATHY   





(13) Hyperlipidemia


Code(s): E78.5 - HYPERLIPIDEMIA, UNSPECIFIED   


Qualifiers: 


   Hyperlipidemia type: pure hypercholesterolemia   Qualified Code(s): E78.00 - 

Pure hypercholesterolemia, unspecified; E78.0 - Pure hypercholesterolemia   





(14) Multiple myeloma


Code(s): C90.00 - MULTIPLE MYELOMA NOT HAVING ACHIEVED REMISSION   


Qualifiers: 


   Multiple myeloma remission status: not in remission   Qualified Code(s): 

C90.00 - Multiple myeloma not having achieved remission   





(15) Acalculous cholecystitis


Code(s): K81.9 - CHOLECYSTITIS, UNSPECIFIED   





Assessment/Plan


R&LHc at [facility] 1/11/2017 showing nonobstructive CAD, severe LV apical 

hypertrophic cardiomyopathy, mildly elevated right sided pressures, Mynx 

deployed right CFA access site. Study is consistent with apical hypertrophy (

spade-like) variant of hypertrophic cardiomyopathy, planned for optimal medical 

therapy. 


Echocardiogram: 07/11/2018 Mod cLVH, severe DYANA, mod TR RVSP 50-60 mmHg, mod-

severe MR


Echocardiogram: 10/16/2018 Normal LV size with hyperdynamic LVEF 75%, mild BSH, 

normal RV size and fxn, severe LAE, mod-severe MR, mod TR





1. Acute hypoxic respiratory failure, suspected aspiration pneumonia 


2. Toxic metabolic encephelopathy, possible hyperviscosity syndrome, sepsis


3. 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 


4. Hypercalcemia, paraproteinemia, anemia-> confirmed multiple myeloma, 

hyperviscosity syndrome


5. History of bilateral SFA occlusion post thrombectomy, referable to embolic 

disease related to persistent atrial fibrillation with [id number] score of 6, 

history of poor compliance with anticoagulation and medical F/U


6. Non obstructive CAD on R&LHc coronary angiography with demand ischemia


7. 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 

subendocardial ischemia, compensated/euvolemic


8. Persistent atrial fibrillation with RVR [id number] score of 6 on heparin gtt


9. Labile HTN


10. Acalculous Cholecystitis


11. Ischemic hepatitis





PLAN:





1. Lovenox for now while off Xarelto 15 qd (renal dosing)


2. Uptitrated Lopressor 75 bid as hemodynamics tolerate with IV Lopressor as 

needed for rate-control


3. Empiric renal-dosed abx course pending C&S


4. Enteral feeds, lactulose for hyperammonemia, monitor ammonia levels


5. Ventilator management, wean off sedation, IV steroids with GI protection


6. Consider IR cholecystotomy decompression per GI recs


7. Trend LFTs

## 2018-11-16 NOTE — PN
Progress Note, Physician


History of Present Illness: 





 Intubated on mechanical ventilation


 Poorly responsive


 low grade temps


 Repeat BC no growth


 Sputum c/s  normal lili


WBC WNL


 No LFTs today


 





 


 


 





- Current Medication List


Current Medications: 


Active Medications





Acetaminophen (Tylenol -)  650 mg PO Q6H PRN


   PRN Reason: PAIN LEVEL 1 - 3


   Last Admin: 11/16/18 06:36 Dose:  650 mg


Acetaminophen (Tylenol Suppository -)  650 mg RC Q6H PRN


   PRN Reason: FEVER


Allopurinol (Zyloprim -)  100 mg PO BID [facility]


   Last Admin: 11/16/18 09:02 Dose:  100 mg


Chlorhexidine Gluconate (Peridex -)  15 ml MM BID [facility]


   Last Admin: 11/16/18 09:03 Dose:  15 ml


Cholecalciferol (Vitamin D3 -)  1,000 unit PO DAILY [facility]


Dexamethasone Sodium Phosphate (Decadron Injection -)  10 mg IVPUSH BID [facility]


   Last Admin: 11/16/18 09:03 Dose:  10 mg


Diphenhydramine HCl (Benadryl Injection -)  25 mg IVPB ONCE PRN


   PRN Reason: DURING PLASMAPHERESIS


Ergocalciferol (Drisdol Oral Solution -)  8,000 units PO DAILY [facility]


   Stop: 11/17/18 08:37


   Last Admin: 11/16/18 09:02 Dose:  8,000 units


Fentanyl 500 mcg/ Dextrose  100 mls @ 5 mls/hr IVPB TITR [facility]; Protocol


   Last Admin: 11/15/18 15:03 Dose:  25 mcg/hr, 5 mls/hr


Ceftazidime 1 gm/ Dextrose  50 mls @ 200 mls/hr IVPB DAILY [facility]; Protocol


   Last Admin: 11/16/18 09:01 Dose:  200 mls/hr


Propofol (Diprivan -)  1,000,000 mcg in 100 mls @ 2.479 mls/hr IVPB TITR [facility]; 

Protocol


   Last Admin: 11/15/18 21:14 Dose:  10 mcg/kg/min, 4.958 mls/hr


Lactulose (Cephulac (Oral Use))  20 gm PO TID [facility]


   Last Admin: 11/16/18 05:48 Dose:  20 gm


Metoprolol Tartrate (Lopressor Injection -)  5 mg IVPUSH Q8H PRN


   PRN Reason: TACHYCARDIA


Metoprolol Tartrate (Lopressor -)  75 mg PO BID [facility]


   Last Admin: 11/16/18 09:02 Dose:  75 mg


Metronidazole (Flagyl -)  500 mg PO TID [facility]


   Stop: 11/16/18 13:59


   Last Admin: 11/16/18 05:48 Dose:  500 mg


Pantoprazole Sodium (Protonix Iv)  40 mg IVPUSH DAILY [facility]


   Last Admin: 11/16/18 09:04 Dose:  40 mg


Rifaximin (Xifaxan -)  550 mg PO BID [facility]


   Last Admin: 11/16/18 09:02 Dose:  550 mg


Thiamine HCl (Vitamin B1 -)  100 mg PO DAILY [facility]


   Last Admin: 11/16/18 09:02 Dose:  100 mg











- Objective


Vital Signs: 


 Vital Signs











Temperature  99.6 F   11/16/18 06:00


 


Pulse Rate  105 H  11/16/18 10:04


 


Respiratory Rate  15   11/16/18 10:04


 


Blood Pressure  125/77   11/16/18 08:00


 


O2 Sat by Pulse Oximetry (%)  99   11/16/18 10:04











Constitutional: Yes: No Distress


Cardiovascular: Yes: Regular Rate and Rhythm, S1, S2


Respiratory: Yes: Mechanically Ventilated


Gastrointestinal: Yes: Normal Bowel Sounds, Soft.  No: Tenderness


Labs: 


 CBC, BMP





 11/16/18 05:30 





 11/16/18 07:00 





 INR, PTT











INR  1.28  (0.83-1.09)  H  11/15/18  05:30    


 


Fibrinogen  290.0 mg/dL (238-498)   11/11/18  06:00    














Assessment/Plan


Respiratory failure


 RLL pneumonia


 Acalculus  Cholecystitis


 Toxic metabolic encephalopathy


 Hypercalcemia


 Renal failure


 Myeloma


 Hyperviscosity syndrome


 


  


  


 Continue ceftazidime/flagyl, adjusted for renal failure


 Redose vancomycin


 Ventilatory support


 Prognosis poor

## 2018-11-16 NOTE — PN
Physical Exam: 


SUBJECTIVE: Patient seen and examined in the ICU.  Remains intubated and 

sedated.  Intermittent fever. No gross change in overall mental status. GOC/

family meeting today w/ daughter. 








OBJECTIVE:





 Vital Signs











 Period  Temp  Pulse  Resp  BP Sys/Varghese  Pulse Ox


 


 Last 24 Hr  98.6 F-100.3 F    14-24  111-127/66-87  98-99











GENERAL: The patient is intubated and sedated.


HEAD: NCAT


EYES:  sclera anicteric. 


ENT: Ears normal, nares patent, dry mucous membranes


NECK: Trachea midline. 


LUNGS: bilateral diffuse rhonchi


HEART: Tachycardic rate and irregular rhythm. 


ABDOMEN: Soft, nondistended.


EXTREMITIES: 2+ pulses, warm, well-perfused, no edema, scattered ulcers. 


NEUROLOGICAL: Cannot assess secondary to clinical condition. 


SKIN: Warm, dry, normal turgor, scattered ulcers on the legs














 Laboratory Results - last 24 hr











  11/15/18 11/16/18 11/16/18





  05:30 05:30 05:30


 


WBC   12.3 H 


 


Corrected WBC (auto)   10.42 


 


RBC   2.85 L 


 


Hgb   8.5 L 


 


Hct   25.9 L 


 


MCV   90.9 


 


MCH   29.7 


 


MCHC   32.7 


 


RDW   17.5 H 


 


Plt Count   231 


 


MPV   9.5 


 


Absolute Neuts (auto)   8.9 H 


 


Neutrophils %   87.9 H 


 


Neutrophils % (Manual)  75.0  78.1 


 


Band Neutrophils %  5.2  6.3 


 


Lymphocytes %   9.0 


 


Lymphocytes % (Manual)  14.6  D  6.2 L D 


 


Monocytes %   2.6 L 


 


Monocytes % (Manual)  3 L D  4 


 


Eosinophils %   0.1 


 


Eosinophils % (Manual)  0.0  0.0 


 


Basophils %   0.4 


 


Basophils % (Manual)  0.0  1.0  D 


 


Myelocytes % (Man)  2  D  0  D 


 


Promyelocytes % (Man)  0  0 


 


Blast Cells % (Manual)  0  0 


 


Nucleated RBC %   18 H* 


 


Metamyelocytes  0  D  4 H D 


 


Hypochromia  0  0 


 


Toxic Granulation  0  


 


Dohle Bodies  0  


 


Platelet Estimate  Normal  Normal 


 


Platelet Comment   Present 


 


Polychromasia  0  3+ 


 


Poikilocytosis  0  1+ 


 


Basophilic Stippling  0  2+ 


 


Anisocytosis  0  3+ 


 


Microcytosis  0  3+ 


 


Macrocytosis  0  1+ 


 


Spherocytes  0  


 


Sickle Cells  0  


 


Target Cells  0  1+ 


 


Tear Drop Cells  0  


 


Ovalocytes  0  


 


Stomatocytes  0  


 


Helmet Cells  0  


 


[place] Bodies  0  


 


Cabot Rings  0  


 


Jeanette Cells  0  


 


Acanthocytes (Spur)  0  


 


Rouleaux  0  3+ 


 


Fragmented RBCs  0  


 


Schistocytes  0  


 


Sodium    Cancelled


 


Potassium    Cancelled


 


Chloride    Cancelled


 


Carbon Dioxide    Cancelled


 


Anion Gap    Cancelled


 


BUN    Cancelled


 


Creatinine    Cancelled


 


Creat Clearance w eGFR    Cancelled


 


Random Glucose    Cancelled


 


Lactic Acid   


 


Calcium    Cancelled


 


Phosphorus    Cancelled


 


Magnesium    Cancelled


 


Total Bilirubin    Cancelled


 


AST    Cancelled


 


ALT    Cancelled


 


Alkaline Phosphatase    Cancelled


 


Troponin I    Cancelled


 


Total Protein    Cancelled


 


Albumin    Cancelled














  11/16/18 11/16/18





  05:30 07:00


 


WBC  


 


Corrected WBC (auto)  


 


RBC  


 


Hgb  


 


Hct  


 


MCV  


 


MCH  


 


MCHC  


 


RDW  


 


Plt Count  


 


MPV  


 


Absolute Neuts (auto)  


 


Neutrophils %  


 


Neutrophils % (Manual)  


 


Band Neutrophils %  


 


Lymphocytes %  


 


Lymphocytes % (Manual)  


 


Monocytes %  


 


Monocytes % (Manual)  


 


Eosinophils %  


 


Eosinophils % (Manual)  


 


Basophils %  


 


Basophils % (Manual)  


 


Myelocytes % (Man)  


 


Promyelocytes % (Man)  


 


Blast Cells % (Manual)  


 


Nucleated RBC %  


 


Metamyelocytes  


 


Hypochromia  


 


Toxic Granulation  


 


Dohle Bodies  


 


Platelet Estimate  


 


Platelet Comment  


 


Polychromasia  


 


Poikilocytosis  


 


Basophilic Stippling  


 


Anisocytosis  


 


Microcytosis  


 


Macrocytosis  


 


Spherocytes  


 


Sickle Cells  


 


Target Cells  


 


Tear Drop Cells  


 


Ovalocytes  


 


Stomatocytes  


 


Helmet Cells  


 


[place] Bodies  


 


Cabot Rings  


 


Jeanette Cells  


 


Acanthocytes (Spur)  


 


Rouleaux  


 


Fragmented RBCs  


 


Schistocytes  


 


Sodium   145


 


Potassium   6.0 H


 


Chloride   120 H


 


Carbon Dioxide   20 L


 


Anion Gap   6 L


 


BUN   109 H*


 


Creatinine   2.9 H


 


Creat Clearance w eGFR   21.10


 


Random Glucose   128 H


 


Lactic Acid  1.2 


 


Calcium   6.3 L*


 


Phosphorus   5.4 H


 


Magnesium   3.2 H


 


Total Bilirubin   0.3


 


AST   333 H


 


ALT   572 H


 


Alkaline Phosphatase   163 H


 


Troponin I   0.69 H*


 


Total Protein   10.8 H


 


Albumin   1.8 L








Active Medications











Generic Name Dose Route Start Last Admin





  Trade Name Freq  PRN Reason Stop Dose Admin


 


Acetaminophen  650 mg  11/10/18 14:09  11/16/18 06:36





  Tylenol -  PO   650 mg





  Q6H PRN   Administration





  PAIN LEVEL 1 - 3   





     





     





     


 


Acetaminophen  650 mg  11/15/18 15:54  





  Tylenol Suppository -  RC   





  Q6H PRN   





  FEVER   





     





     





     


 


Allopurinol  100 mg  11/09/18 10:00  11/16/18 09:02





  Zyloprim -  PO   100 mg





  BID AVA   Administration





     





     





     





     


 


Chlorhexidine Gluconate  15 ml  11/10/18 23:45  11/16/18 09:03





  Peridex -  MM   15 ml





  BID AVA   Administration





     





     





     





     


 


Cholecalciferol  1,000 unit  11/18/18 10:00  





  Vitamin D3 -  PO   





  DAILY AVA   





     





     





     





     


 


Dexamethasone Sodium Phosphate  10 mg  11/10/18 12:15  11/16/18 09:03





  Decadron Injection -  IVPUSH   10 mg





  BID AVA   Administration





     





     





     





     


 


Diphenhydramine HCl  25 mg  11/09/18 12:00  





  Benadryl Injection -  IVPB   





  ONCE PRN   





  DURING PLASMAPHERESIS   





     





     





     


 


Ergocalciferol  8,000 units  11/11/18 10:00  11/16/18 09:02





  Drisdol Oral Solution -  PO  11/17/18 08:37  8,000 units





  DAILY AVA   Administration





     





     





     





     


 


Fentanyl 500 mcg/ Dextrose  100 mls @ 5 mls/hr  11/07/18 11:45  11/16/18 12:29





  IVPB   25 mcg/hr





  TITR AVA   5 mls/hr





     Administration





     





  Protocol   





  25 MCG/HR   


 


Ceftazidime 1 gm/ Dextrose  50 mls @ 200 mls/hr  11/11/18 12:00  11/16/18 09:01





  IVPB   200 mls/hr





  DAILY AVA   Administration





     





     





  Protocol   





     


 


Propofol  1,000,000 mcg in 100 mls @ 2.479 mls/hr  11/11/18 13:45  11/16/18 12:

25





  Diprivan -  IVPB   10 mcg/kg/min





  TITR AVA   4.958 mls/hr





     Administration





     





  Protocol   





  5 MCG/KG/MIN   


 


Lactulose  20 gm  11/09/18 09:38  11/16/18 05:48





  Cephulac (Oral Use)  PO   20 gm





  TID AVA   Administration





     





     





     





     


 


Metoprolol Tartrate  5 mg  11/09/18 13:04  





  Lopressor Injection -  IVPUSH   





  Q8H PRN   





  TACHYCARDIA   





     





     





     


 


Metoprolol Tartrate  75 mg  11/16/18 08:30  11/16/18 09:02





  Lopressor -  PO   75 mg





  BID AVA   Administration





     





     





     





     


 


Pantoprazole Sodium  40 mg  11/07/18 12:00  11/16/18 09:04





  Protonix Iv  IVPUSH   40 mg





  DAILY AVA   Administration





     





     





     





     


 


Rifaximin  550 mg  11/15/18 22:00  11/16/18 09:02





  Xifaxan -  PO   550 mg





  BID AVA   Administration





     





     





     





     


 


Thiamine HCl  100 mg  10/30/18 22:12  11/16/18 09:02





  Vitamin B1 -  PO   100 mg





  DAILY AVA   Administration





     





     





     





     











ASSESSMENT/PLAN:


79 yo m PMH of A-Fib, Acute on chronic kidney injury, CAD w stents, 

hypercalcemia, medication non-adherence, paraproteinemia, thromboembolic disease

, diastolic heart dysfunction without failure, HTN, HLD, hypertrophic 

cardiomyopathy is here after a rapid response. He was on the floors when it was 

noticed that he has respiratory insufficiency and was desaturating, requiring 

ICU monitoring.  





GI: 


metabolic encephalopathy 


-ammonia was elevated yesterday to 97.05 despite being on lactulose. 


-LFTs are also elevated but downtrended today. transamintitis most likely 

secondary to ischemic hepatits which is multifactorial including sepsis, 

episodes of hypotension and hyperviscosity syndrome. 


-c/w rifaximin for elevated ammonia level





acalculous cholecystitis?


US shows thickened gallbladder wall, pericholecystic fluid, suspicious for 

acalculous cholecystitis. There was also mild dilatation of the CBD but that 

might be normal for age. 


-Spoke w/ IR, who feel image does not represent  acalculous cholecystitisand 

does not require any IR drainage 


-will dc flagyl, ID on board 





ID: 


continues to spike fevers


- Rhonchi heard on Lung auscultation


- CXR shows pulmonary infiltrate in the left upper lobe


- c/w ceftazidime. 


- ID on board


- c/w rifaximin for elevated ammonia level


-Spoke w/ IR, who feel image does not represent  acalculous cholecystitisand 

does not require any IR drainage 


-will dc flagyl, ID on board 





Cardio: 


- Sporadically goes in and out of V-Tach 


- increase Lopressor to 75 mg BID PO


- Afib w RVR: Patient is receiving IV metoprolol 5 mg PRN


- diastolic dysfunction + hypertrophic cardiomyopathy


- CAD with multiple stents


- Cardio consulted and on board. 





Pulm: 


- Intubated


- Patient has b/l pleural effusions.


- s/p 60 of lasix yesterday. 


- No pneumothorax


- b/l diffuse rhonchi


- infiltrate on CXR: Abx- ceftazidime





Renal: 


- Acute on Chronic kidney injury


- BUN: 109 Cr:2.9. Pre-renal etiology.


- Renal consulted and on board. 





Neuro: 


- Patient is not alert or oriented.


- Head CT showed no acute pathology


- Neuro consulted and on board. (Dr. Youngblood + Dr. phan) 


- Ammonia elevated - Patient receiving lactulose


- c/w rifaximin for elevated ammonia. 





Heme/Onc: 


- Hb today was 8.5


- Will transfuse to keep hb > 8 


- Patient not receiving plasmapharesis today. 


- Paraproteinemia


- Patient fulfills new criteria  for multiple myeloma with free kappa/ free 

lambda light chain  ratio of 432 (>100 is diagnostic of myeloma) 


- Kappa/Lambda ratio > 100 consistent with Multiple myeloma


- M spike elevated elevated at 6.8 previously 4.7 


-c/w decadron for now per oncology although likely contributing to his HAM, 

will reasses after family meeting/GOC 





Endo: 


- Parathyroid hormone WNL


- PTH-borderline low appropriate given hypercalcemia, PTHrP low





Prophylaxis: 


- heparin gtt 


- Protonix





F/E/N: 


F: No standing fluids due to fluid overload in lungs


E: Will replete lytes PRN


N: tube feeds. 





Code Status: Full Code 


- GOC/family meeting today w/ daughter. 


- Patient will likely need a trach 





Dispo: Patient will continue to receive ICU level care





Visit type





- Emergency Visit


Emergency Visit: Yes


ED Registration Date: 10/30/18


Care time: The patient presented to the Emergency Department on the above date 

and was hospitalized for further evaluation of their emergent condition.





- New Patient


This patient is new to me today: Yes


Date on this admission: 11/16/18





- Critical Care


Critical Care patient: Yes


Total Critical Care Time (in minutes): 40


Critical Care Statement: The care of this patient involved high complexity 

decision making to prevent further life threatening deterioration of the patient

's condition and/or to evaluate & treat vital organ system(s) failure or risk 

of failure.

## 2018-11-16 NOTE — EKG
Test Reason : 

Blood Pressure : ***/*** mmHG

Vent. Rate : 096 BPM     Atrial Rate : 115 BPM

   P-R Int : 000 ms          QRS Dur : 138 ms

    QT Int : 322 ms       P-R-T Axes : 000 -80 079 degrees

   QTc Int : 406 ms

 

ATRIAL FIBRILLATION

RIGHT BUNDLE BRANCH BLOCK

LEFT ANTERIOR FASCICULAR BLOCK

*** BIFASCICULAR BLOCK ***

ABNORMAL ECG

 

Confirmed by ALANIS EL MD ([id number]) on 11/16/2018 8:48:48 AM

 

Referred By:             Confirmed By:ALANIS EL MD

## 2018-11-16 NOTE — PN
Teaching Attending Note


Name of Resident: Ganga Mccormick





ATTENDING PHYSICIAN STATEMENT





I saw and evaluated the patient.


I reviewed the resident's note and discussed the case with the resident.


I agree with the resident's findings and plan as documented.








SUBJECTIVE:


Patient seen and examined in the ICU.  Remains intubated, sedated on propofol.  

Intermittent fever.  


No gross change in overall mental status. 








OBJECTIVE:





  


 Intake & Output











 11/13/18 11/14/18 11/15/18 11/16/18





 23:59 23:59 23:59 23:59


 


Intake Total 9540 326 8292 130


 


Output Total 1000 1325 650 800


 


Balance 377 -979 640 -670


 


Weight 185 lb 184 lb 2 oz 178 lb 7 oz 177 lb 9 oz








 Last Vital Signs











Temp Pulse Resp BP Pulse Ox


 


 100.3 F H  117 H  24 H  113/87   99 


 


 11/16/18 10:00  11/16/18 12:00  11/16/18 12:00  11/16/18 12:00  11/16/18 11:47








Active Medications





Acetaminophen (Tylenol -)  650 mg PO Q6H PRN


   PRN Reason: PAIN LEVEL 1 - 3


   Last Admin: 11/16/18 06:36 Dose:  650 mg


Acetaminophen (Tylenol Suppository -)  650 mg RC Q6H PRN


   PRN Reason: FEVER


Allopurinol (Zyloprim -)  100 mg PO BID [facility]


   Last Admin: 11/16/18 09:02 Dose:  100 mg


Chlorhexidine Gluconate (Peridex -)  15 ml MM BID [facility]


   Last Admin: 11/16/18 09:03 Dose:  15 ml


Cholecalciferol (Vitamin D3 -)  1,000 unit PO DAILY [facility]


Dexamethasone Sodium Phosphate (Decadron Injection -)  10 mg IVPUSH BID [facility]


   Last Admin: 11/16/18 09:03 Dose:  10 mg


Diphenhydramine HCl (Benadryl Injection -)  25 mg IVPB ONCE PRN


   PRN Reason: DURING PLASMAPHERESIS


Ergocalciferol (Drisdol Oral Solution -)  8,000 units PO DAILY [facility]


   Stop: 11/17/18 08:37


   Last Admin: 11/16/18 09:02 Dose:  8,000 units


Fentanyl 500 mcg/ Dextrose  100 mls @ 5 mls/hr IVPB TITR [facility]; Protocol


   Last Admin: 11/16/18 12:29 Dose:  25 mcg/hr, 5 mls/hr


Ceftazidime 1 gm/ Dextrose  50 mls @ 200 mls/hr IVPB DAILY [facility]; Protocol


   Last Admin: 11/16/18 09:01 Dose:  200 mls/hr


Propofol (Diprivan -)  1,000,000 mcg in 100 mls @ 2.479 mls/hr IVPB TITR [facility]; 

Protocol


   Last Admin: 11/16/18 12:25 Dose:  10 mcg/kg/min, 4.958 mls/hr


Lactulose (Cephulac (Oral Use))  20 gm PO TID [facility]


   Last Admin: 11/16/18 05:48 Dose:  20 gm


Metoprolol Tartrate (Lopressor Injection -)  5 mg IVPUSH Q8H PRN


   PRN Reason: TACHYCARDIA


Metoprolol Tartrate (Lopressor -)  75 mg PO BID [facility]


   Last Admin: 11/16/18 09:02 Dose:  75 mg


Metronidazole (Flagyl -)  500 mg PO TID [facility]


   Stop: 11/16/18 13:59


   Last Admin: 11/16/18 05:48 Dose:  500 mg


Pantoprazole Sodium (Protonix Iv)  40 mg IVPUSH DAILY [facility]


   Last Admin: 11/16/18 09:04 Dose:  40 mg


Rifaximin (Xifaxan -)  550 mg PO BID [facility]


   Last Admin: 11/16/18 09:02 Dose:  550 mg


Thiamine HCl (Vitamin B1 -)  100 mg PO DAILY [facility]


   Last Admin: 11/16/18 09:02 Dose:  100 mg








Gen:  intubated, sedated


Heart: irregular


Lung: scattered rhonchi


Abd: soft, nontender


Ext: no edema


 Laboratory Results - last 24 hr











  11/15/18 11/16/18 11/16/18





  05:30 05:30 05:30


 


WBC   12.3 H 


 


Corrected WBC (auto)   10.42 


 


RBC   2.85 L 


 


Hgb   8.5 L 


 


Hct   25.9 L 


 


MCV   90.9 


 


MCH   29.7 


 


MCHC   32.7 


 


RDW   17.5 H 


 


Plt Count   231 


 


MPV   9.5 


 


Absolute Neuts (auto)   8.9 H 


 


Neutrophils %   87.9 H 


 


Neutrophils % (Manual)  75.0  78.1 


 


Band Neutrophils %  5.2  6.3 


 


Lymphocytes %   9.0 


 


Lymphocytes % (Manual)  14.6  D  6.2 L D 


 


Monocytes %   2.6 L 


 


Monocytes % (Manual)  3 L D  4 


 


Eosinophils %   0.1 


 


Eosinophils % (Manual)  0.0  0.0 


 


Basophils %   0.4 


 


Basophils % (Manual)  0.0  1.0  D 


 


Myelocytes % (Man)  2  D  0  D 


 


Promyelocytes % (Man)  0  0 


 


Blast Cells % (Manual)  0  0 


 


Nucleated RBC %   18 H* 


 


Metamyelocytes  0  D  4 H D 


 


Hypochromia  0  0 


 


Toxic Granulation  0  


 


Dohle Bodies  0  


 


Platelet Estimate  Normal  Normal 


 


Platelet Comment   Present 


 


Polychromasia  0  3+ 


 


Poikilocytosis  0  1+ 


 


Basophilic Stippling  0  2+ 


 


Anisocytosis  0  3+ 


 


Microcytosis  0  3+ 


 


Macrocytosis  0  1+ 


 


Spherocytes  0  


 


Sickle Cells  0  


 


Target Cells  0  1+ 


 


Tear Drop Cells  0  


 


Ovalocytes  0  


 


Stomatocytes  0  


 


Helmet Cells  0  


 


[place] Bodies  0  


 


Cabot Rings  0  


 


Jeanette Cells  0  


 


Acanthocytes (Spur)  0  


 


Rouleaux  0  3+ 


 


Fragmented RBCs  0  


 


Schistocytes  0  


 


Sodium    Cancelled


 


Potassium    Cancelled


 


Chloride    Cancelled


 


Carbon Dioxide    Cancelled


 


Anion Gap    Cancelled


 


BUN    Cancelled


 


Creatinine    Cancelled


 


Creat Clearance w eGFR    Cancelled


 


Random Glucose    Cancelled


 


Lactic Acid   


 


Calcium    Cancelled


 


Phosphorus    Cancelled


 


Magnesium    Cancelled


 


Total Bilirubin    Cancelled


 


AST    Cancelled


 


ALT    Cancelled


 


Alkaline Phosphatase    Cancelled


 


Troponin I    Cancelled


 


Total Protein    Cancelled


 


Albumin    Cancelled














  11/16/18 11/16/18





  05:30 07:00


 


WBC  


 


Corrected WBC (auto)  


 


RBC  


 


Hgb  


 


Hct  


 


MCV  


 


MCH  


 


MCHC  


 


RDW  


 


Plt Count  


 


MPV  


 


Absolute Neuts (auto)  


 


Neutrophils %  


 


Neutrophils % (Manual)  


 


Band Neutrophils %  


 


Lymphocytes %  


 


Lymphocytes % (Manual)  


 


Monocytes %  


 


Monocytes % (Manual)  


 


Eosinophils %  


 


Eosinophils % (Manual)  


 


Basophils %  


 


Basophils % (Manual)  


 


Myelocytes % (Man)  


 


Promyelocytes % (Man)  


 


Blast Cells % (Manual)  


 


Nucleated RBC %  


 


Metamyelocytes  


 


Hypochromia  


 


Toxic Granulation  


 


Dohle Bodies  


 


Platelet Estimate  


 


Platelet Comment  


 


Polychromasia  


 


Poikilocytosis  


 


Basophilic Stippling  


 


Anisocytosis  


 


Microcytosis  


 


Macrocytosis  


 


Spherocytes  


 


Sickle Cells  


 


Target Cells  


 


Tear Drop Cells  


 


Ovalocytes  


 


Stomatocytes  


 


Helmet Cells  


 


[place] Bodies  


 


Cabot Rings  


 


Jeanette Cells  


 


Acanthocytes (Spur)  


 


Rouleaux  


 


Fragmented RBCs  


 


Schistocytes  


 


Sodium   145


 


Potassium   6.0 H


 


Chloride   120 H


 


Carbon Dioxide   20 L


 


Anion Gap   6 L


 


BUN   109 H*


 


Creatinine   2.9 H


 


Creat Clearance w eGFR   21.10


 


Random Glucose   128 H


 


Lactic Acid  1.2 


 


Calcium   6.3 L*


 


Phosphorus   5.4 H


 


Magnesium   3.2 H


 


Total Bilirubin   0.3


 


AST   333 H


 


ALT   572 H


 


Alkaline Phosphatase   163 H


 


Troponin I   0.69 H*


 


Total Protein   10.8 H


 


Albumin   1.8 L














ASSESSMENT AND PLAN:


Acute Hypoxic Respiratory Failure


Altered Mental Status


Metabolic Encephalopathy


Pneumonia


Acalculous Cholecystitis


Multiple Myeloma


Acute on Chronic Renal Failure


Metabolic Acidosis


LV Diastolic Dysfunction


Atrial Fibrillation


CAD


+Troponins likely Demand Ischemia


PAD


Hyperlipidemia


HTN





-  continue antibiotics per ID 


-  GI eval for cholecystitis


-  trend LFTs


-  monitor urine output, creatinine


-  rate control


-  continue anticoagulation


-  minimize sedation to assess mental status 


-  spontaneous breathing trials as tolerated


-  DVT/GI prophylaxis


-  continue discussions regarding goals of care: Compassionate extubation 

versus Tracheostomy


-  palliative care involved 


-  continue ICU monitoring


-  grave overall prognosis








Dr Blum 


Critical care time spent in reviewing chart, evaluating patient and formulating 

plan 35 min

## 2018-11-17 LAB
ALBUMIN SERPL-MCNC: 1.5 G/DL (ref 3.4–5)
ALP SERPL-CCNC: 114 U/L (ref 45–117)
ALT SERPL-CCNC: 383 U/L (ref 13–61)
ANION GAP SERPL CALC-SCNC: 3 MMOL/L (ref 8–16)
ANION GAP SERPL CALC-SCNC: 5 MMOL/L (ref 8–16)
ANISOCYTOSIS BLD QL: (no result)
ARTERIAL BLOOD GAS PCO2: 33.8 MMHG (ref 35–45)
ARTERIAL PATENCY WRIST A: POSITIVE
AST SERPL-CCNC: 107 U/L (ref 15–37)
BASE EXCESS BLDA CALC-SCNC: -2.8 MEQ/L (ref -2–2)
BASO STIPL BLD QL SMEAR: (no result)
BASOPHILS # BLD: 0.4 % (ref 0–2)
BILIRUB SERPL-MCNC: 0.3 MG/DL (ref 0.2–1)
BUN SERPL-MCNC: 109 MG/DL (ref 7–18)
BUN SERPL-MCNC: 111 MG/DL (ref 7–18)
CALCIUM SERPL-MCNC: 6.2 MG/DL (ref 8.5–10.1)
CALCIUM SERPL-MCNC: 6.3 MG/DL (ref 8.5–10.1)
CHLORIDE SERPL-SCNC: 122 MMOL/L (ref 98–107)
CHLORIDE SERPL-SCNC: 123 MMOL/L (ref 98–107)
CO2 SERPL-SCNC: 21 MMOL/L (ref 21–32)
CO2 SERPL-SCNC: 23 MMOL/L (ref 21–32)
CREAT SERPL-MCNC: 2.7 MG/DL (ref 0.55–1.3)
CREAT SERPL-MCNC: 2.9 MG/DL (ref 0.55–1.3)
DEPRECATED RDW RBC AUTO: 16.8 % (ref 11.9–15.9)
EOSINOPHIL # BLD: 0.1 % (ref 0–4.5)
GLUCOSE SERPL-MCNC: 101 MG/DL (ref 74–106)
GLUCOSE SERPL-MCNC: 115 MG/DL (ref 74–106)
HCT VFR BLD CALC: 20.7 % (ref 35.4–49)
HGB BLD-MCNC: 6.9 GM/DL (ref 11.7–16.9)
LYMPHOCYTES # BLD: 10.7 % (ref 8–40)
MACROCYTES BLD QL: (no result)
MAGNESIUM SERPL-MCNC: 3 MG/DL (ref 1.8–2.4)
MCH RBC QN AUTO: 30.2 PG (ref 25.7–33.7)
MCHC RBC AUTO-ENTMCNC: 33.4 G/DL (ref 32–35.9)
MCV RBC: 90.5 FL (ref 80–96)
MONOCYTES # BLD AUTO: 4.4 % (ref 3.8–10.2)
NEUTROPHILS # BLD: 84.4 % (ref 42.8–82.8)
PHOSPHATE SERPL-MCNC: 5 MG/DL (ref 2.5–4.9)
PLATELET # BLD AUTO: 212 K/MM3 (ref 134–434)
PLATELET BLD QL SMEAR: NORMAL
PMV BLD: 9 FL (ref 7.5–11.1)
PO2 BLDA: 118 MMHG (ref 70–100)
POTASSIUM SERPLBLD-SCNC: 5.4 MMOL/L (ref 3.5–5.1)
POTASSIUM SERPLBLD-SCNC: 5.4 MMOL/L (ref 3.5–5.1)
PROT SERPL-MCNC: 9.8 G/DL (ref 6.4–8.2)
RBC # BLD AUTO: 2.28 M/MM3 (ref 4–5.6)
ROULEAUX BLD QL SMEAR: (no result)
SAO2 % BLDA: 98.3 % (ref 90–98.9)
SODIUM SERPL-SCNC: 148 MMOL/L (ref 136–145)
SODIUM SERPL-SCNC: 150 MMOL/L (ref 136–145)
WBC # BLD AUTO: 8.7 K/MM3 (ref 4–10)

## 2018-11-17 RX ADMIN — CHLORHEXIDINE GLUCONATE 0.12% ORAL RINSE SCH ML: 1.2 LIQUID ORAL at 09:13

## 2018-11-17 RX ADMIN — LACTULOSE SCH GM: 20 SOLUTION ORAL at 14:06

## 2018-11-17 RX ADMIN — CEFTAZIDIME SCH MLS/HR: 1 INJECTION, POWDER, FOR SOLUTION INTRAMUSCULAR; INTRAVENOUS at 09:13

## 2018-11-17 RX ADMIN — Medication SCH MG: at 09:13

## 2018-11-17 RX ADMIN — METOPROLOL TARTRATE SCH: 50 TABLET, FILM COATED ORAL at 09:13

## 2018-11-17 RX ADMIN — CHLORHEXIDINE GLUCONATE 0.12% ORAL RINSE SCH ML: 1.2 LIQUID ORAL at 21:44

## 2018-11-17 RX ADMIN — METOPROLOL TARTRATE SCH MG: 50 TABLET, FILM COATED ORAL at 21:44

## 2018-11-17 RX ADMIN — SODIUM BICARBONATE SCH MLS/HR: 84 INJECTION, SOLUTION INTRAVENOUS at 14:06

## 2018-11-17 RX ADMIN — LACTULOSE SCH GM: 20 SOLUTION ORAL at 21:43

## 2018-11-17 RX ADMIN — SODIUM BICARBONATE SCH: 84 INJECTION, SOLUTION INTRAVENOUS at 20:52

## 2018-11-17 RX ADMIN — PANTOPRAZOLE SODIUM SCH MG: 40 INJECTION, POWDER, FOR SOLUTION INTRAVENOUS at 09:13

## 2018-11-17 RX ADMIN — RIFAXIMIN SCH MG: 550 TABLET ORAL at 09:13

## 2018-11-17 RX ADMIN — PROPOFOL SCH: 10 INJECTION, EMULSION INTRAVENOUS at 00:05

## 2018-11-17 RX ADMIN — SODIUM CHLORIDE SCH: 9 INJECTION, SOLUTION INTRAVENOUS at 15:52

## 2018-11-17 RX ADMIN — RIFAXIMIN SCH MG: 550 TABLET ORAL at 21:44

## 2018-11-17 RX ADMIN — LACTULOSE SCH GM: 20 SOLUTION ORAL at 06:31

## 2018-11-17 RX ADMIN — ALLOPURINOL SCH MG: 100 TABLET ORAL at 09:13

## 2018-11-17 RX ADMIN — ALLOPURINOL SCH MG: 100 TABLET ORAL at 21:45

## 2018-11-17 NOTE — PN
Progress Note (short form)





- Note


Progress Note: 





PULM/CCM





SUBJECTIVE:


Patient seen and examined in the ICU.  





-Crit slow drop on hep gtt, no overt signs of bleeding


-mental status remains poor


-family gathering to discuss palliative extubation vs trach, updated but 

undecided at this time











OBJECTIVE:





  


 








 Vital Signs











Temp  97.8 F   11/17/18 06:00


 


Pulse  101 H  11/17/18 07:05


 


Resp  15   11/17/18 07:05


 


BP  96/54 L  11/17/18 06:00


 


Pulse Ox  99   11/17/18 07:05








 Intake & Output











 11/16/18 11/16/18 11/17/18





 11:59 23:59 11:59


 


Intake Total 130 780 800


 


Output Total 800 900 500


 


Balance -670 -120 300


 


Weight 80.541 kg  80.541 kg


 


Intake:   


 


  IV 70 410 550


 


    DIPRIVAN - 1,000,000 mcg 35 60 60





    In 100 ml @ 5 MCG/KG/MIN   





    2.479 mls/hr IVPB TITR   





    [facility] Rx#:[id number]   


 


    Heparin - 25,000 Unit In  40 240





    Normal Saline - 495 ml @   





    1,000 UNIT/HR 20 mls/hr   





    IV TITR [facility] Rx#:   





    [id number]   


 


    Normal Saline - 250 ml @  250 250





    250 mls/hr IV ASDIR STA   





    Rx#:[id number]   


 


    Sublimaze Injection - 500 35 60 





    Mcg In D5w - 90 ml @ 25   





    MCG/HR 5 mls/hr IVPB TITR   





    [facility] Rx#:[id number]   


 


  IVPB  250 250


 


  Tube Irrigant 60 120 


 


Output:   


 


  Urine 800 900 500


 


    Garces 800 900 500


 


Other:   


 


  Voiding Method Indwelling Catheter Incontinent 


 


  Bowel Movement Yes No No


 


  # Bowel Movements 1 1 


 


  Body Mass Index (BMI)  24.1 


 


  [facility]











Active Medications





Acetaminophen (Tylenol -)  650 mg PO Q6H PRN


   PRN Reason: PAIN LEVEL 1 - 3


   Last Admin: 11/16/18 16:25 Dose:  650 mg


Acetaminophen (Tylenol Suppository -)  650 mg RC Q6H PRN


   PRN Reason: FEVER


Allopurinol (Zyloprim -)  100 mg PO BID [facility]


   Last Admin: 11/16/18 21:07 Dose:  100 mg


Chlorhexidine Gluconate (Peridex -)  15 ml MM BID [facility]


   Last Admin: 11/16/18 21:05 Dose:  15 ml


Cholecalciferol (Vitamin D3 -)  1,000 unit PO DAILY [facility]


Diphenhydramine HCl (Benadryl Injection -)  25 mg IVPB ONCE PRN


   PRN Reason: DURING PLASMAPHERESIS


Ergocalciferol (Drisdol Oral Solution -)  8,000 units PO DAILY [facility]


   Stop: 11/17/18 08:37


   Last Admin: 11/16/18 09:02 Dose:  8,000 units


Heparin Sodium (Porcine) (Heparin -)  1,000 unit IVPUSH PRN PRN


   PRN Reason: Heparin


Heparin Sodium (Porcine) (Heparin -)  5,000 unit IVPUSH PRN PRN


   PRN Reason: Heparin


Ceftazidime 1 gm/ Dextrose  50 mls @ 200 mls/hr IVPB DAILY [facility]; Protocol


   Last Admin: 11/16/18 09:01 Dose:  200 mls/hr


Heparin Sodium (Porcine) 25, (000 unit/ Sodium Chloride)  500 mls @ 20 mls/hr 

IV TITR [facility]; Protocol


   Last Admin: 11/16/18 16:35 Dose:  1,000 unit/hr, 20 mls/hr


Sodium Bicarbonate 75 meq/ (Sodium Chloride)  1,075 mls @ 100 mls/hr IV Q10H [facility]


   Last Admin: 11/16/18 23:25 Dose:  100 mls/hr


Lactulose (Cephulac (Oral Use))  20 gm PO TID [facility]


   Last Admin: 11/17/18 06:31 Dose:  20 gm


Metoprolol Tartrate (Lopressor Injection -)  5 mg IVPUSH Q8H PRN


   PRN Reason: TACHYCARDIA


Metoprolol Tartrate (Lopressor -)  75 mg PO BID [facility]


   Last Admin: 11/16/18 21:05 Dose:  75 mg


Pantoprazole Sodium (Protonix Iv)  40 mg IVPUSH DAILY [facility]


   Last Admin: 11/16/18 09:04 Dose:  40 mg


Rifaximin (Xifaxan -)  550 mg PO BID [facility]


   Last Admin: 11/16/18 21:08 Dose:  550 mg


Thiamine HCl (Vitamin B1 -)  100 mg PO DAILY [facility]


   Last Admin: 11/16/18 09:02 Dose:  100 mg








Gen:  intubated, poorly responsive


Heart: irregular


Lung: scattered rhonchi


Abd: soft, nontender


Ext: no edema


  


 Laboratory Results - last 24 hr











  11/16/18 11/16/18 11/16/18





  16:05 16:05 22:00


 


WBC   


 


RBC   


 


Hgb   


 


Hct   


 


MCV   


 


MCH   


 


MCHC   


 


RDW   


 


Plt Count   


 


MPV   


 


Absolute Neuts (auto)   


 


Neutrophils %   


 


Lymphocytes %   


 


Monocytes %   


 


Eosinophils %   


 


Basophils %   


 


Nucleated RBC %   


 


PTT (Actin FS)   19.2 L 


 


Puncture Site   


 


ABG pH   


 


ABG pCO2 at Pt Temp   


 


ABG pO2 at Pt Temp   


 


ABG HCO3   


 


ABG O2 Sat (Measured)   


 


ABG O2 Content   


 


ABG Base Excess   


 


Chacho Test   


 


Oxygen Flow Rate   


 


Vent Mode   


 


Vent Rate   


 


PEEP   


 


Pressure Support Vent   


 


Sodium  145  


 


Potassium  6.1 H*   Cancelled


 


Chloride  121 H  


 


Carbon Dioxide  17 L  


 


Anion Gap  7 L  


 


BUN  118 H*  


 


Creatinine  3.0 H  


 


Creat Clearance w eGFR  20.29  


 


Random Glucose  147 H  


 


Uric Acid  8.4 H  


 


Calcium  6.6 L*  


 


Phosphorus   


 


Magnesium   


 


Total Bilirubin   


 


AST   


 


ALT   


 


Alkaline Phosphatase   


 


Ammonia   


 


Creatine Kinase  187  


 


Creatine Kinase Index  3.9  


 


CK-MB (CK-2)  7.3 H  


 


Troponin I  0.71 H*  


 


Total Protein   


 


Albumin   


 


Blood Type   


 


Antibody Screen   


 


Crossmatch   














  11/16/18 11/16/18 11/17/18





  22:00 22:00 05:30


 


WBC    8.7


 


RBC    2.28 L


 


Hgb    6.9 L*


 


Hct    20.7 L D


 


MCV    90.5


 


MCH    30.2


 


MCHC    33.4


 


RDW    16.8 H


 


Plt Count    212


 


MPV    9.0


 


Absolute Neuts (auto)    7.3


 


Neutrophils %    84.4 H


 


Lymphocytes %    10.7


 


Monocytes %    4.4


 


Eosinophils %    0.1


 


Basophils %    0.4


 


Nucleated RBC %    4 H


 


PTT (Actin FS)  69.4 H  


 


Puncture Site   


 


ABG pH   


 


ABG pCO2 at Pt Temp   


 


ABG pO2 at Pt Temp   


 


ABG HCO3   


 


ABG O2 Sat (Measured)   


 


ABG O2 Content   


 


ABG Base Excess   


 


Chacho Test   


 


Oxygen Flow Rate   


 


Vent Mode   


 


Vent Rate   


 


PEEP   


 


Pressure Support Vent   


 


Sodium   147 H 


 


Potassium   6.2 H* 


 


Chloride   122 H 


 


Carbon Dioxide   18 L 


 


Anion Gap   7 L 


 


BUN   121 H* 


 


Creatinine   3.1 H 


 


Creat Clearance w eGFR   19.53 


 


Random Glucose   172 H 


 


Uric Acid   


 


Calcium   6.5 L* 


 


Phosphorus   


 


Magnesium   


 


Total Bilirubin   0.4 


 


AST   168 H 


 


ALT   433 H 


 


Alkaline Phosphatase   125 H 


 


Ammonia   


 


Creatine Kinase   


 


Creatine Kinase Index   


 


CK-MB (CK-2)   


 


Troponin I   


 


Total Protein   9.7 H 


 


Albumin   1.6 L 


 


Blood Type   


 


Antibody Screen   


 


Crossmatch   














  11/17/18 11/17/18 11/17/18





  05:30 05:30 06:15


 


WBC   


 


RBC   


 


Hgb   


 


Hct   


 


MCV   


 


MCH   


 


MCHC   


 


RDW   


 


Plt Count   


 


MPV   


 


Absolute Neuts (auto)   


 


Neutrophils %   


 


Lymphocytes %   


 


Monocytes %   


 


Eosinophils %   


 


Basophils %   


 


Nucleated RBC %   


 


PTT (Actin FS)   


 


Puncture Site    Right radial


 


ABG pH    7.41


 


ABG pCO2 at Pt Temp    33.8 L


 


ABG pO2 at Pt Temp    118.0 H D


 


ABG HCO3    20.9 L


 


ABG O2 Sat (Measured)    98.3


 


ABG O2 Content    10.4 L


 


ABG Base Excess    -2.8 L


 


Chacho Test    Positive


 


Oxygen Flow Rate    40


 


Vent Mode    A/cprvc


 


Vent Rate    14


 


PEEP    5.0


 


Pressure Support Vent    500


 


Sodium  150 H  


 


Potassium  5.4 H  


 


Chloride  123 H  


 


Carbon Dioxide  21  


 


Anion Gap  5 L  


 


BUN  111 H*  


 


Creatinine  2.9 H  


 


Creat Clearance w eGFR  21.10  


 


Random Glucose  115 H  


 


Uric Acid   


 


Calcium  6.2 L*  


 


Phosphorus  5.0 H  


 


Magnesium  3.0 H  


 


Total Bilirubin  0.3  


 


AST  107 H  


 


ALT  383 H  


 


Alkaline Phosphatase  114  


 


Ammonia   62.00 H 


 


Creatine Kinase   


 


Creatine Kinase Index   


 


CK-MB (CK-2)   


 


Troponin I   


 


Total Protein  9.8 H  


 


Albumin  1.5 L  


 


Blood Type   


 


Antibody Screen   


 


Crossmatch   














  11/17/18





  08:05


 


WBC 


 


RBC 


 


Hgb 


 


Hct 


 


MCV 


 


MCH 


 


MCHC 


 


RDW 


 


Plt Count 


 


MPV 


 


Absolute Neuts (auto) 


 


Neutrophils % 


 


Lymphocytes % 


 


Monocytes % 


 


Eosinophils % 


 


Basophils % 


 


Nucleated RBC % 


 


PTT (Actin FS) 


 


Puncture Site 


 


ABG pH 


 


ABG pCO2 at Pt Temp 


 


ABG pO2 at Pt Temp 


 


ABG HCO3 


 


ABG O2 Sat (Measured) 


 


ABG O2 Content 


 


ABG Base Excess 


 


Chacho Test 


 


Oxygen Flow Rate 


 


Vent Mode 


 


Vent Rate 


 


PEEP 


 


Pressure Support Vent 


 


Sodium 


 


Potassium 


 


Chloride 


 


Carbon Dioxide 


 


Anion Gap 


 


BUN 


 


Creatinine 


 


Creat Clearance w eGFR 


 


Random Glucose 


 


Uric Acid 


 


Calcium 


 


Phosphorus 


 


Magnesium 


 


Total Bilirubin 


 


AST 


 


ALT 


 


Alkaline Phosphatase 


 


Ammonia 


 


Creatine Kinase 


 


Creatine Kinase Index 


 


CK-MB (CK-2) 


 


Troponin I 


 


Total Protein 


 


Albumin 


 


Blood Type  B POSITIVE


 


Antibody Screen  Negative


 


Crossmatch  See Detail




















ASSESSMENT AND PLAN:


Acute Hypoxic Respiratory Failure


Altered Mental Status


Metabolic Encephalopathy


Pneumonia


Acalculous Cholecystitis


Multiple Myeloma


Acute on Chronic Renal Failure


Metabolic Acidosis


LV Diastolic Dysfunction


Atrial Fibrillation


CAD


+Troponins likely Demand Ischemia


PAD


Hyperlipidemia


HTN





-  continue antibiotics per ID 


-  GI eval for cholecystitis, no indication for intervention at this time


-  trend LFTs


-  monitor urine output, creatinine


-  rate control


-  continue anticoagulation


-  minimize sedation to assess mental status 


-  spontaneous breathing trials as tolerated


-  DVT/GI prophylaxis


-  continue discussions regarding goals of care: Compassionate extubation 

versus Tracheostomy


-  palliative care involved 


-  continue ICU monitoring


-  grave overall prognosis, poor mental status, renal failure, resp failure








Ervin BannerP


[id number]





35min CCT

## 2018-11-17 NOTE — PN
Progress Note (short form)





- Note


Progress Note: 





remains sedated and intubated


anemic, receiving blood transfusion


intermittent fevers





 Vital Signs











 Period  Temp  Pulse  Resp  BP Sys/Varghese  Pulse Ox


 


 Last 24 Hr  97.6 F-101 F    14-20  /54-80  








cor-rrr


lulngs decreased bs at bases


abd soft,nt


ext no edema





 CBC, BMP





 11/17/18 05:30 





 11/17/18 05:30 











lfts improved


cxray unchanged





blood cultures negative 11/14





a/


respiartory failure


RLL pneumonia


acalculous cholycystitis


renal failure


multiple myeloma


hyperviscosity syndrome





continue fortaz/flagyl





transfusion today

## 2018-11-17 NOTE — PN
Progress Note, Physician


History of Present Illness: 


Poorly responsive on vent, rapid persistent afib overnight on lopressor and 

heparin gtt. Family by bedside.





- Current Medication List


Current Medications: 


Active Medications





Acetaminophen (Tylenol -)  650 mg PO Q6H PRN


   PRN Reason: PAIN LEVEL 1 - 3


   Last Admin: 11/16/18 16:25 Dose:  650 mg


Acetaminophen (Tylenol Suppository -)  650 mg RC Q6H PRN


   PRN Reason: FEVER


Allopurinol (Zyloprim -)  100 mg PO BID [facility]


   Last Admin: 11/17/18 09:13 Dose:  100 mg


Chlorhexidine Gluconate (Peridex -)  15 ml MM BID [facility]


   Last Admin: 11/17/18 09:13 Dose:  15 ml


Cholecalciferol (Vitamin D3 -)  1,000 unit PO DAILY [facility]


Diphenhydramine HCl (Benadryl Injection -)  25 mg IVPB ONCE PRN


   PRN Reason: DURING PLASMAPHERESIS


Heparin Sodium (Porcine) (Heparin -)  1,000 unit IVPUSH PRN PRN


   PRN Reason: Heparin


Heparin Sodium (Porcine) (Heparin -)  5,000 unit IVPUSH PRN PRN


   PRN Reason: Heparin


Ceftazidime 1 gm/ Dextrose  50 mls @ 200 mls/hr IVPB DAILY [facility]; Protocol


   Last Admin: 11/17/18 09:13 Dose:  200 mls/hr


Heparin Sodium (Porcine) 25, (000 unit/ Sodium Chloride)  500 mls @ 20 mls/hr 

IV TITR [facility]; Protocol


   Last Admin: 11/16/18 16:35 Dose:  1,000 unit/hr, 20 mls/hr


Sodium Bicarbonate 75 meq/ (Sodium Chloride)  1,075 mls @ 100 mls/hr IV Q10H [facility]


   Last Admin: 11/16/18 23:25 Dose:  100 mls/hr


Lactulose (Cephulac (Oral Use))  20 gm PO TID [facility]


   Last Admin: 11/17/18 06:31 Dose:  20 gm


Metoprolol Tartrate (Lopressor Injection -)  5 mg IVPUSH Q8H PRN


   PRN Reason: TACHYCARDIA


Metoprolol Tartrate (Lopressor -)  75 mg PO BID [facility]


   Last Admin: 11/17/18 09:13 Dose:  Not Given


Pantoprazole Sodium (Protonix Iv)  40 mg IVPUSH DAILY [facility]


   Last Admin: 11/17/18 09:13 Dose:  40 mg


Rifaximin (Xifaxan -)  550 mg PO BID [facility]


   Last Admin: 11/17/18 09:13 Dose:  550 mg


Thiamine HCl (Vitamin B1 -)  100 mg PO DAILY [facility]


   Last Admin: 11/17/18 09:13 Dose:  100 mg











- Objective


Vital Signs: 


 Vital Signs











Temperature  97.6 F   11/17/18 10:00


 


Pulse Rate  99 H  11/17/18 10:00


 


Respiratory Rate  16   11/17/18 10:00


 


Blood Pressure  115/79   11/17/18 10:00


 


O2 Sat by Pulse Oximetry (%)  100   11/17/18 09:00











Constitutional: Yes: No Distress, Calm, Thin


Neck: Yes: Supple


Cardiovascular: Yes: Tachycardia, Pulse Irregular


Respiratory: Yes: Intubated, Mechanically Ventilated, Rhonchi


Gastrointestinal: Yes: Soft, Hypoactive Bowel Sounds


Edema: No


Labs: 


 CBC, BMP





 11/17/18 05:30 





 11/17/18 05:30 





 INR, PTT











INR  1.28  (0.83-1.09)  H  11/15/18  05:30    


 


Fibrinogen  290.0 mg/dL (238-498)   11/11/18  06:00    














- ....Imaging


Chest X-ray: Report Reviewed (Stable)





Problem List





- Problems


(1) Atrial fibrillation with RVR


Code(s): I48.91 - UNSPECIFIED ATRIAL FIBRILLATION   





(2) Acute-on-chronic kidney injury


Code(s): N17.9 - ACUTE KIDNEY FAILURE, UNSPECIFIED; N18.9 - CHRONIC KIDNEY 

DISEASE, UNSPECIFIED   


Qualifiers: 


   Acute renal failure type: unspecified   Chronic kidney disease stage: 

unspecified stage   Qualified Code(s): N17.9 - Acute kidney failure, unspecified

; N18.9 - Chronic kidney disease, unspecified   





(3) Demand ischemia


Code(s): I24.8 - OTHER FORMS OF ACUTE ISCHEMIC HEART DISEASE   





(4) Hypercalcemia


Code(s): E83.52 - HYPERCALCEMIA   





(5) Nonadherence to medication


Code(s): Z91.14 - PATIENT'S OTHER NONCOMPLIANCE WITH MEDICATION REGIMEN   





(6) Paraproteinemia


Code(s): D89.2 - HYPERGAMMAGLOBULINEMIA, UNSPECIFIED   





(7) Anticoagulant long-term use


Code(s): Z79.01 - LONG TERM (CURRENT) USE OF ANTICOAGULANTS   





(8) Chronic thromboembolic disease


Code(s): I74.9 - EMBOLISM AND THROMBOSIS OF UNSPECIFIED ARTERY   





(9) Coronary artery disease


Code(s): I25.10 - ATHSCL HEART DISEASE OF NATIVE CORONARY ARTERY W/O ANG PCTRS 

  


Qualifiers: 


   Coronary Disease-Associated Artery/Lesion type: native artery   Native vs. 

transplanted heart: native heart   Associated angina: without angina   

Qualified Code(s): I25.10 - Atherosclerotic heart disease of native coronary 

artery without angina pectoris   





(10) Diastolic dysfunction without heart failure


Code(s): I51.9 - HEART DISEASE, UNSPECIFIED   





(11) HTN (hypertension)


Code(s): I10 - ESSENTIAL (PRIMARY) HYPERTENSION   


Qualifiers: 


   Hypertension type: essential hypertension   Qualified Code(s): I10 - 

Essential (primary) hypertension   





(12) Hypertrophic cardiomyopathy


Code(s): I42.2 - OTHER HYPERTROPHIC CARDIOMYOPATHY   





(13) Hyperlipidemia


Code(s): E78.5 - HYPERLIPIDEMIA, UNSPECIFIED   


Qualifiers: 


   Hyperlipidemia type: pure hypercholesterolemia   Qualified Code(s): E78.00 - 

Pure hypercholesterolemia, unspecified; E78.0 - Pure hypercholesterolemia   





(14) Multiple myeloma


Code(s): C90.00 - MULTIPLE MYELOMA NOT HAVING ACHIEVED REMISSION   


Qualifiers: 


   Multiple myeloma remission status: not in remission   Qualified Code(s): 

C90.00 - Multiple myeloma not having achieved remission   





(15) Acalculous cholecystitis


Code(s): K81.9 - CHOLECYSTITIS, UNSPECIFIED   





Assessment/Plan


R&LHc at [facility] 1/11/2017 showing nonobstructive CAD, severe LV apical 

hypertrophic cardiomyopathy, mildly elevated right sided pressures, Mynx 

deployed right CFA access site. Study is consistent with apical hypertrophy (

spade-like) variant of hypertrophic cardiomyopathy, planned for optimal medical 

therapy. 


Echocardiogram: 07/11/2018 Mod cLVH, severe DYANA, mod TR RVSP 50-60 mmHg, mod-

severe MR


Echocardiogram: 10/16/2018 Normal LV size with hyperdynamic LVEF 75%, mild BSH, 

normal RV size and fxn, severe LAE, mod-severe MR, mod TR





1. Acute hypoxic respiratory failure, suspected aspiration pneumonia 


2. Toxic metabolic encephelopathy, possible hyperviscosity syndrome, sepsis


3. 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 


4. Hypercalcemia, paraproteinemia, anemia-> confirmed multiple myeloma, 

hyperviscosity syndrome


5. History of bilateral SFA occlusion post thrombectomy, referable to embolic 

disease related to persistent atrial fibrillation with [id number] score of 6, 

history of poor compliance with anticoagulation and medical F/U


6. Non obstructive CAD on R&LHc coronary angiography with demand ischemia


7. 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 

subendocardial ischemia, compensated/euvolemic


8. Persistent atrial fibrillation with RVR [id number] score of 6 on heparin gtt


9. Labile HTN


10. Acalculous Cholecystitis


11. Ischemic hepatitis


12. Anemia





PLAN:





1. Heparin gtt while off Xarelto 15 qd (renal dosing). transfuse to maintain Hgb

>8.0


2. Uptitrated Lopressor 75 bid as hemodynamics tolerate with IV Lopressor as 

needed for rate-control


3. Empiric renal-dosed abx course pending C&S


4. Enteral feeds, lactulose for hyperammonemia, monitor ammonia levels


5. Ventilator management, wean off sedation, IV steroids with GI protection


6. Consider IR cholecystotomy decompression per GI recs


7. LFTs downtrending


8. Continue discussions regarding goals of care with family: Compassionate 

extubation versus Tracheostomy

## 2018-11-17 NOTE — PN
Progress Note, Physician


Chief Complaint: 





INTUBATED, SEDATED


EVENTS AND NOTES REVIEWED





- Current Medication List


Current Medications: 


Active Medications





Acetaminophen (Tylenol -)  650 mg PO Q6H PRN


   PRN Reason: PAIN LEVEL 1 - 3


   Last Admin: 11/16/18 16:25 Dose:  650 mg


Acetaminophen (Tylenol Suppository -)  650 mg RC Q6H PRN


   PRN Reason: FEVER


Allopurinol (Zyloprim -)  100 mg PO BID [facility]


   Last Admin: 11/17/18 09:13 Dose:  100 mg


Chlorhexidine Gluconate (Peridex -)  15 ml MM BID [facility]


   Last Admin: 11/17/18 09:13 Dose:  15 ml


Cholecalciferol (Vitamin D3 -)  1,000 unit PO DAILY [facility]


Diphenhydramine HCl (Benadryl Injection -)  25 mg IVPB ONCE PRN


   PRN Reason: DURING PLASMAPHERESIS


Heparin Sodium (Porcine) (Heparin -)  1,000 unit IVPUSH PRN PRN


   PRN Reason: Heparin


Heparin Sodium (Porcine) (Heparin -)  5,000 unit IVPUSH PRN PRN


   PRN Reason: Heparin


Ceftazidime 1 gm/ Dextrose  50 mls @ 200 mls/hr IVPB DAILY [facility]; Protocol


   Last Admin: 11/17/18 09:13 Dose:  200 mls/hr


Heparin Sodium (Porcine) 25, (000 unit/ Sodium Chloride)  500 mls @ 20 mls/hr 

IV TITR [facility]; Protocol


   Last Admin: 11/16/18 16:35 Dose:  1,000 unit/hr, 20 mls/hr


Sodium Bicarbonate 75 meq/ (Sodium Chloride)  1,075 mls @ 100 mls/hr IV Q10H [facility]


   Last Admin: 11/16/18 23:25 Dose:  100 mls/hr


Lactulose (Cephulac (Oral Use))  20 gm PO TID [facility]


   Last Admin: 11/17/18 06:31 Dose:  20 gm


Metoprolol Tartrate (Lopressor Injection -)  5 mg IVPUSH Q8H PRN


   PRN Reason: TACHYCARDIA


Metoprolol Tartrate (Lopressor -)  75 mg PO BID [facility]


   Last Admin: 11/17/18 09:13 Dose:  Not Given


Pantoprazole Sodium (Protonix Iv)  40 mg IVPUSH DAILY [facility]


   Last Admin: 11/17/18 09:13 Dose:  40 mg


Rifaximin (Xifaxan -)  550 mg PO BID [facility]


   Last Admin: 11/17/18 09:13 Dose:  550 mg


Thiamine HCl (Vitamin B1 -)  100 mg PO DAILY [facility]


   Last Admin: 11/17/18 09:13 Dose:  100 mg











- Objective


Vital Signs: 


 Vital Signs











Temperature  97.6 F   11/17/18 10:00


 


Pulse Rate  98 H  11/17/18 12:00


 


Respiratory Rate  16   11/17/18 12:00


 


Blood Pressure  107/77   11/17/18 12:00


 


O2 Sat by Pulse Oximetry (%)  100   11/17/18 09:00











Constitutional: Yes: Other


Cardiovascular: Yes: Pulse Irregular


Respiratory: Yes: Mechanically Ventilated


Gastrointestinal: Yes: Other


Genitourinary: Yes: Garces Present


Musculoskeletal: Yes: Muscle Weakness


Extremities: Yes: Other


Neurological: Yes: Unresponsive


Labs: 


 CBC, BMP





 11/17/18 05:30 





 11/17/18 05:30 





 INR, PTT











INR  1.28  (0.83-1.09)  H  11/15/18  05:30    


 


Fibrinogen  290.0 mg/dL (238-498)   11/11/18  06:00    














Problem List





- Problems


(1) HAM (acute kidney injury)


Code(s): N17.9 - ACUTE KIDNEY FAILURE, UNSPECIFIED   





(2) Atrial fibrillation with RVR


Code(s): I48.91 - UNSPECIFIED ATRIAL FIBRILLATION   





(3) Hyperlipidemia


Code(s): E78.5 - HYPERLIPIDEMIA, UNSPECIFIED   


Qualifiers: 


   Hyperlipidemia type: pure hypercholesterolemia   Qualified Code(s): E78.00 - 

Pure hypercholesterolemia, unspecified; E78.0 - Pure hypercholesterolemia   





(4) Hypothermia


Code(s): T68.XXXA - HYPOTHERMIA, INITIAL ENCOUNTER   


Qualifiers: 


   Encounter type: initial encounter   Qualified Code(s): T68.XXXA - Hypothermia

, initial encounter   





(5) Acute-on-chronic kidney injury


Code(s): N17.9 - ACUTE KIDNEY FAILURE, UNSPECIFIED; N18.9 - CHRONIC KIDNEY 

DISEASE, UNSPECIFIED   


Qualifiers: 


   Acute renal failure type: unspecified   Chronic kidney disease stage: 

unspecified stage   Qualified Code(s): N17.9 - Acute kidney failure, unspecified

; N18.9 - Chronic kidney disease, unspecified   





(6) Generalized weakness


Code(s): R53.1 - WEAKNESS   





(7) History of ETOH abuse


Code(s): Z87.898 - PERSONAL HISTORY OF OTHER SPECIFIED CONDITIONS   





(8) Hypercalcemia


Code(s): E83.52 - HYPERCALCEMIA   





(9) Hypertrophic cardiomyopathy


Code(s): I42.2 - OTHER HYPERTROPHIC CARDIOMYOPATHY   





(10) Toxic metabolic encephalopathy


Code(s): G92 - TOXIC ENCEPHALOPATHY   





(11) Sepsis


Code(s): A41.9 - SEPSIS, UNSPECIFIED ORGANISM   





(12) Respiratory failure


Code(s): J96.90 - RESPIRATORY FAILURE, UNSP, UNSP W HYPOXIA OR HYPERCAPNIA   





Assessment/Plan





RESP FAILURE INUBATED


PULM F/U


WEAN OFF AS TOLERATED


IV ABX PER ID


MONITOR CARDIAC FUNCTION


HEPARIN IV


NEURO EVAL

## 2018-11-17 NOTE — PN
Progress Note, Physician


Chief Complaint: 





Altered mental status


History of Present Illness: 


Pt unable to report





- Current Medication List


Current Medications: 


Active Medications





Acetaminophen (Tylenol -)  650 mg PO Q6H PRN


   PRN Reason: PAIN LEVEL 1 - 3


   Last Admin: 11/16/18 16:25 Dose:  650 mg


Acetaminophen (Tylenol Suppository -)  650 mg RC Q6H PRN


   PRN Reason: FEVER


Allopurinol (Zyloprim -)  100 mg PO BID [facility]


   Last Admin: 11/17/18 09:13 Dose:  100 mg


Chlorhexidine Gluconate (Peridex -)  15 ml MM BID [facility]


   Last Admin: 11/17/18 09:13 Dose:  15 ml


Cholecalciferol (Vitamin D3 -)  1,000 unit PO DAILY [facility]


Diphenhydramine HCl (Benadryl Injection -)  25 mg IVPB ONCE PRN


   PRN Reason: DURING PLASMAPHERESIS


Heparin Sodium (Porcine) (Heparin -)  1,000 unit IVPUSH PRN PRN


   PRN Reason: Heparin


Heparin Sodium (Porcine) (Heparin -)  5,000 unit IVPUSH PRN PRN


   PRN Reason: Heparin


Ceftazidime 1 gm/ Dextrose  50 mls @ 200 mls/hr IVPB DAILY [facility]; Protocol


   Last Admin: 11/17/18 09:13 Dose:  200 mls/hr


Sodium Bicarbonate 75 meq/ (Sodium Chloride)  1,075 mls @ 100 mls/hr IV Q10H [facility]


   Last Admin: 11/17/18 14:06 Dose:  100 mls/hr


Metronidazole (Flagyl 500mg Premixed Ivpb -)  500 mg in 100 mls @ 100 mls/hr 

IVPB BID [facility]


   Last Admin: 11/17/18 15:51 Dose:  100 mls/hr


Lactulose (Cephulac (Oral Use))  20 gm PO TID [facility]


   Last Admin: 11/17/18 14:06 Dose:  20 gm


Metoprolol Tartrate (Lopressor Injection -)  5 mg IVPUSH Q8H PRN


   PRN Reason: TACHYCARDIA


Metoprolol Tartrate (Lopressor -)  75 mg PO BID [facility]


   Last Admin: 11/17/18 09:13 Dose:  Not Given


Pantoprazole Sodium (Protonix Iv)  40 mg IVPUSH DAILY [facility]


   Last Admin: 11/17/18 09:13 Dose:  40 mg


Rifaximin (Xifaxan -)  550 mg PO BID [facility]


   Last Admin: 11/17/18 09:13 Dose:  550 mg


Thiamine HCl (Vitamin B1 -)  100 mg PO DAILY [facility]


   Last Admin: 11/17/18 09:13 Dose:  100 mg











- Objective


Vital Signs: 


 Vital Signs











Temperature  97.8 F   11/17/18 16:00


 


Pulse Rate  108 H  11/17/18 16:00


 


Respiratory Rate  14   11/17/18 17:46


 


Blood Pressure  119/81   11/17/18 16:00


 


O2 Sat by Pulse Oximetry (%)  100   11/17/18 09:00











Cardiovascular: Yes: WNL, Regular Rate and Rhythm


Respiratory: Yes: Intubated, Mechanically Ventilated.  No: WNL


Gastrointestinal: Yes: Soft, Abdomen, Obese, Hepatomegaly


Edema: Yes


Neurological: Yes: Unresponsive


Labs: 


 CBC, BMP





 11/17/18 05:30 





 11/17/18 14:40 





 INR, PTT











INR  1.28  (0.83-1.09)  H  11/15/18  05:30    


 


Fibrinogen  290.0 mg/dL (238-498)   11/11/18  06:00    














Problem List





- Problems


(1) Multiple myeloma


Assessment/Plan: 


78M with CAD, CKD , Afib on CAC, PVD admitted with altered mental status and 

found to have anemia, worsening of renal function and hypercalcemia. Found to 

have IgG kappa myeloma. S/p plasmapheresis and 5 days of dex with no 

improvement in mental status.


Agree with PRBC transfusion


Ongoing GOC discussions 


Code(s): C90.00 - MULTIPLE MYELOMA NOT HAVING ACHIEVED REMISSION   


Qualifiers: 


   Multiple myeloma remission status: not in remission   Qualified Code(s): 

C90.00 - Multiple myeloma not having achieved remission

## 2018-11-17 NOTE — PN
Progress Note (short form)





- Note


Progress Note: 





Renal follow up for Hypercalcemia/HAM





Pt seen and examined at the bedside


on Vent, sedated


making urine via fenton


no pressers required


on IVF





 Vital Signs











Temperature  97.8 F   11/17/18 06:00


 


Pulse Rate  101 H  11/17/18 07:05


 


Respiratory Rate  15   11/17/18 07:05


 


Blood Pressure  96/54 L  11/17/18 06:00


 


O2 Sat by Pulse Oximetry (%)  99   11/17/18 07:05








 Intake & Output











 11/14/18 11/15/18 11/16/18 11/17/18





 23:59 23:59 23:59 23:59


 


Intake Total 960 1290 910 800


 


Output Total 9716 786 6349 500


 


Balance -365 640 -790 300


 


Weight 83.518 kg 80.938 kg 80.541 kg 80.541 kg





NAD, sedated


ET tube in place


tachycardic, No M/R


Dec Bs


soft NT/ND


No LE edema


fenton in place 


 CBC, BMP





 11/17/18 05:30 





 11/17/18 05:30 


 Laboratory Tests











  11/17/18





  05:30


 


Calcium  6.2 L*


 


Phosphorus  5.0 H


 


Magnesium  3.0 H


 


Albumin  1.5 L











 Current Medications





Acetaminophen (Tylenol -)  650 mg PO Q6H PRN


   PRN Reason: PAIN LEVEL 1 - 3


   Last Admin: 11/16/18 16:25 Dose:  650 mg


Acetaminophen (Tylenol Suppository -)  650 mg RC Q6H PRN


   PRN Reason: FEVER


Allopurinol (Zyloprim -)  100 mg PO BID [facility]


   Last Admin: 11/16/18 21:07 Dose:  100 mg


Chlorhexidine Gluconate (Peridex -)  15 ml MM BID [facility]


   Last Admin: 11/16/18 21:05 Dose:  15 ml


Cholecalciferol (Vitamin D3 -)  1,000 unit PO DAILY [facility]


Diphenhydramine HCl (Benadryl Injection -)  25 mg IVPB ONCE PRN


   PRN Reason: DURING PLASMAPHERESIS


Ergocalciferol (Drisdol Oral Solution -)  8,000 units PO DAILY [facility]


   Stop: 11/17/18 08:37


   Last Admin: 11/16/18 09:02 Dose:  8,000 units


Heparin Sodium (Porcine) (Heparin -)  1,000 unit IVPUSH PRN PRN


   PRN Reason: Heparin


Heparin Sodium (Porcine) (Heparin -)  5,000 unit IVPUSH PRN PRN


   PRN Reason: Heparin


Ceftazidime 1 gm/ Dextrose  50 mls @ 200 mls/hr IVPB DAILY [facility]; Protocol


   Last Admin: 11/16/18 09:01 Dose:  200 mls/hr


Heparin Sodium (Porcine) 25, (000 unit/ Sodium Chloride)  500 mls @ 20 mls/hr 

IV TIT[facility]; Protocol


   Last Admin: 11/16/18 16:35 Dose:  1,000 unit/hr, 20 mls/hr


Sodium Bicarbonate 75 meq/ (Sodium Chloride)  1,075 mls @ 100 mls/hr IV Q10H [facility]


   Last Admin: 11/16/18 23:25 Dose:  100 mls/hr


Lactulose (Cephulac (Oral Use))  20 gm PO TID [facility]


   Last Admin: 11/17/18 06:31 Dose:  20 gm


Metoprolol Tartrate (Lopressor Injection -)  5 mg IVPUSH Q8H PRN


   PRN Reason: TACHYCARDIA


Metoprolol Tartrate (Lopressor -)  75 mg PO BID [facility]


   Last Admin: 11/16/18 21:05 Dose:  75 mg


Pantoprazole Sodium (Protonix Iv)  40 mg IVPUSH DAILY [facility]


   Last Admin: 11/16/18 09:04 Dose:  40 mg


Rifaximin (Xifaxan -)  550 mg PO BID [facility]


   Last Admin: 11/16/18 21:08 Dose:  550 mg


Thiamine HCl (Vitamin B1 -)  100 mg PO DAILY [facility]


   Last Admin: 11/16/18 09:02 Dose:  100 mg

















78 year old gentleman with hx of CKD, Afib on A/C, CAD, CKD, Hypertension, 

Hyperlipidemia, PVD who presented with AMS/Confusion and found to have HAM.





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 

showed no stones or obstruction, UA w/o protein but UPCR ~0.5 indicating non-

albumin proteinuria)


#AMS 


#Newly diagnosed multiple myloma 


#Anemia 


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


#Hyperdense lesion on US of the kidney 


#Normal anion gap metabolic acidosis 


#Hyperkalemia (r/o tumor lysis)





Hyperkalemia mildly improved 


would continue moderate IVF with bicarb to move K intracellular and increae 

distal dilivery of sodium to promote potassium excretion


Trend serum K, Ca Q6h 


If corrected Ca < 7.5 would discontinue bicarb


Keep MAP > 65


Prognosis is very poor and RRT would not signficnatly extend life or improve 

quality of life given co-morbid conditions 


Heme Follow up, steroids being held at this time 


Transfuse PRBC as per ICU protocol 





Ricky Chang DO

## 2018-11-18 LAB
ANION GAP SERPL CALC-SCNC: 5 MMOL/L (ref 8–16)
BUN SERPL-MCNC: 102 MG/DL (ref 7–18)
CALCIUM SERPL-MCNC: 6 MG/DL (ref 8.5–10.1)
CHLORIDE SERPL-SCNC: 124 MMOL/L (ref 98–107)
CO2 SERPL-SCNC: 23 MMOL/L (ref 21–32)
CREAT SERPL-MCNC: 2.7 MG/DL (ref 0.55–1.3)
DEPRECATED RDW RBC AUTO: 16.3 % (ref 11.9–15.9)
GLUCOSE SERPL-MCNC: 88 MG/DL (ref 74–106)
HCT VFR BLD CALC: 26.3 % (ref 35.4–49)
HGB BLD-MCNC: 8.6 GM/DL (ref 11.7–16.9)
MCH RBC QN AUTO: 29.3 PG (ref 25.7–33.7)
MCHC RBC AUTO-ENTMCNC: 32.9 G/DL (ref 32–35.9)
MCV RBC: 89 FL (ref 80–96)
PLATELET # BLD AUTO: 210 K/MM3 (ref 134–434)
PMV BLD: 8.6 FL (ref 7.5–11.1)
POTASSIUM SERPLBLD-SCNC: 5.2 MMOL/L (ref 3.5–5.1)
RBC # BLD AUTO: 2.95 M/MM3 (ref 4–5.6)
SODIUM SERPL-SCNC: 151 MMOL/L (ref 136–145)
WBC # BLD AUTO: 6.1 K/MM3 (ref 4–10)

## 2018-11-18 RX ADMIN — CHLORHEXIDINE GLUCONATE 0.12% ORAL RINSE SCH ML: 1.2 LIQUID ORAL at 09:20

## 2018-11-18 RX ADMIN — PANTOPRAZOLE SODIUM SCH MG: 40 INJECTION, POWDER, FOR SOLUTION INTRAVENOUS at 09:15

## 2018-11-18 RX ADMIN — ALLOPURINOL SCH MG: 100 TABLET ORAL at 09:02

## 2018-11-18 RX ADMIN — RIFAXIMIN SCH: 550 TABLET ORAL at 16:48

## 2018-11-18 RX ADMIN — SODIUM BICARBONATE SCH MLS/HR: 84 INJECTION, SOLUTION INTRAVENOUS at 16:48

## 2018-11-18 RX ADMIN — METOPROLOL TARTRATE SCH MG: 50 TABLET, FILM COATED ORAL at 22:23

## 2018-11-18 RX ADMIN — LACTULOSE SCH GM: 20 SOLUTION ORAL at 05:57

## 2018-11-18 RX ADMIN — CEFTAZIDIME SCH MLS/HR: 1 INJECTION, POWDER, FOR SOLUTION INTRAMUSCULAR; INTRAVENOUS at 10:25

## 2018-11-18 RX ADMIN — METOPROLOL TARTRATE SCH MG: 50 TABLET, FILM COATED ORAL at 09:01

## 2018-11-18 RX ADMIN — SODIUM BICARBONATE SCH: 84 INJECTION, SOLUTION INTRAVENOUS at 05:55

## 2018-11-18 RX ADMIN — LACTULOSE SCH GM: 20 SOLUTION ORAL at 15:00

## 2018-11-18 RX ADMIN — LACTULOSE SCH GM: 20 SOLUTION ORAL at 22:22

## 2018-11-18 RX ADMIN — CHLORHEXIDINE GLUCONATE 0.12% ORAL RINSE SCH ML: 1.2 LIQUID ORAL at 22:23

## 2018-11-18 RX ADMIN — RIFAXIMIN SCH MG: 550 TABLET ORAL at 22:24

## 2018-11-18 RX ADMIN — VITAMIN D, TAB 1000IU (100/BT) SCH UNIT: 25 TAB at 09:02

## 2018-11-18 RX ADMIN — ALLOPURINOL SCH MG: 100 TABLET ORAL at 22:24

## 2018-11-18 RX ADMIN — Medication SCH MG: at 09:01

## 2018-11-18 NOTE — PN
Progress Note, Physician


Chief Complaint: 





Altered mental status


History of Present Illness: 


Pt unable to report





- Current Medication List


Current Medications: 


Active Medications





Acetaminophen (Tylenol -)  650 mg PO Q6H PRN


   PRN Reason: PAIN LEVEL 1 - 3


   Last Admin: 11/16/18 16:25 Dose:  650 mg


Acetaminophen (Tylenol Suppository -)  650 mg RC Q6H PRN


   PRN Reason: FEVER


Allopurinol (Zyloprim -)  100 mg PO BID [facility]


   Last Admin: 11/17/18 21:45 Dose:  100 mg


Chlorhexidine Gluconate (Peridex -)  15 ml MM BID [facility]


   Last Admin: 11/17/18 21:44 Dose:  15 ml


Cholecalciferol (Vitamin D3 -)  1,000 unit PO DAILY [facility]


Diphenhydramine HCl (Benadryl Injection -)  25 mg IVPB ONCE PRN


   PRN Reason: DURING PLASMAPHERESIS


Heparin Sodium (Porcine) (Heparin -)  1,000 unit IVPUSH PRN PRN


   PRN Reason: Heparin


Heparin Sodium (Porcine) (Heparin -)  5,000 unit IVPUSH PRN PRN


   PRN Reason: Heparin


Ceftazidime 1 gm/ Dextrose  50 mls @ 200 mls/hr IVPB DAILY [facility]; Protocol


   Last Admin: 11/17/18 09:13 Dose:  200 mls/hr


Sodium Bicarbonate 75 meq/ (Sodium Chloride)  1,075 mls @ 100 mls/hr IV Q10H [facility]


   Last Admin: 11/18/18 05:55 Dose:  Not Given


Metronidazole (Flagyl 500mg Premixed Ivpb -)  500 mg in 100 mls @ 100 mls/hr 

IVPB BID [facility]


   Last Admin: 11/17/18 21:43 Dose:  100 mls/hr


Lactulose (Cephulac (Oral Use))  20 gm PO TID [facility]


   Last Admin: 11/18/18 05:57 Dose:  20 gm


Metoprolol Tartrate (Lopressor Injection -)  5 mg IVPUSH Q8H PRN


   PRN Reason: TACHYCARDIA


Metoprolol Tartrate (Lopressor -)  75 mg PO BID [facility]


   Last Admin: 11/17/18 21:44 Dose:  75 mg


Pantoprazole Sodium (Protonix Iv)  40 mg IVPUSH DAILY [facility]


   Last Admin: 11/17/18 09:13 Dose:  40 mg


Rifaximin (Xifaxan -)  550 mg PO BID [facility]


   Last Admin: 11/17/18 21:44 Dose:  550 mg


Thiamine HCl (Vitamin B1 -)  100 mg PO DAILY [facility]


   Last Admin: 11/17/18 09:13 Dose:  100 mg











- Objective


Vital Signs: 


 Vital Signs











Temperature  99.8 F H  11/18/18 06:00


 


Pulse Rate  116 H  11/18/18 08:20


 


Respiratory Rate  18   11/18/18 08:20


 


Blood Pressure  115/59 L  11/18/18 08:00


 


O2 Sat by Pulse Oximetry (%)  98   11/18/18 08:20











Cardiovascular: Yes: Regular Rate and Rhythm


Respiratory: Yes: Intubated, Mechanically Ventilated


Gastrointestinal: Yes: Soft.  No: Palpable Mass


Edema: No


Labs: 


 CBC, BMP





 11/18/18 05:30 





 11/17/18 14:40 





 INR, PTT











INR  1.28  (0.83-1.09)  H  11/15/18  05:30    


 


Fibrinogen  290.0 mg/dL (238-498)   11/11/18  06:00    














Problem List





- Problems


(1) Multiple myeloma


Assessment/Plan: 


78M with CAD, CKD , Afib on CAC, PVD admitted with altered mental status and 

found to have anemia, worsening of renal function and hypercalcemia. Found to 

have IgG kappa myeloma. S/p plasmapheresis and 5 days of dex with no 

improvement in mental status. Responded well to PRBC transfusion yesterday


Ongoing GOC discussions 


Code(s): C90.00 - MULTIPLE MYELOMA NOT HAVING ACHIEVED REMISSION   


Qualifiers: 


   Multiple myeloma remission status: not in remission   Qualified Code(s): 

C90.00 - Multiple myeloma not having achieved remission

## 2018-11-18 NOTE — PN
Progress Note (short form)





- Note


Progress Note: 





Renal follow up for Hypercalcemia/HAM





Pt seen and examined in the ICU


on the Vent


no overnight events


no diarrhea overnight 





 Vital Signs











Temperature  99.8 F H  11/18/18 06:00


 


Pulse Rate  116 H  11/18/18 08:20


 


Respiratory Rate  18   11/18/18 08:20


 


Blood Pressure  115/59 L  11/18/18 08:00


 


O2 Sat by Pulse Oximetry (%)  98   11/18/18 08:20








 Intake & Output











 11/15/18 11/16/18 11/17/18 11/18/18





 23:59 23:59 23:59 23:59


 


Intake Total 2507 030 5337 100


 


Output Total 650 1700 1600 300


 


Balance 640 -790 1180 -200


 


Weight 80.938 kg 80.541 kg 80.541 kg 80.921 kg








 CBC, BMP





 11/18/18 05:30 





 11/17/18 14:40 





 Current Medications





Acetaminophen (Tylenol -)  650 mg PO Q6H PRN


   PRN Reason: PAIN LEVEL 1 - 3


   Last Admin: 11/16/18 16:25 Dose:  650 mg


Acetaminophen (Tylenol Suppository -)  650 mg RC Q6H PRN


   PRN Reason: FEVER


Allopurinol (Zyloprim -)  100 mg PO BID [facility]


   Last Admin: 11/17/18 21:45 Dose:  100 mg


Chlorhexidine Gluconate (Peridex -)  15 ml MM BID [facility]


   Last Admin: 11/17/18 21:44 Dose:  15 ml


Cholecalciferol (Vitamin D3 -)  1,000 unit PO DAILY [facility]


Diphenhydramine HCl (Benadryl Injection -)  25 mg IVPB ONCE PRN


   PRN Reason: DURING PLASMAPHERESIS


Heparin Sodium (Porcine) (Heparin -)  1,000 unit IVPUSH PRN PRN


   PRN Reason: Heparin


Heparin Sodium (Porcine) (Heparin -)  5,000 unit IVPUSH PRN PRN


   PRN Reason: Heparin


Ceftazidime 1 gm/ Dextrose  50 mls @ 200 mls/hr IVPB DAILY [facility]; Protocol


   Last Admin: 11/17/18 09:13 Dose:  200 mls/hr


Sodium Bicarbonate 75 meq/ (Sodium Chloride)  1,075 mls @ 100 mls/hr IV Q10H [facility]


   Last Admin: 11/18/18 05:55 Dose:  Not Given


Metronidazole (Flagyl 500mg Premixed Ivpb -)  500 mg in 100 mls @ 100 mls/hr 

IVPB BID [facility]


   Last Admin: 11/17/18 21:43 Dose:  100 mls/hr


Lactulose (Cephulac (Oral Use))  20 gm PO TID [facility]


   Last Admin: 11/18/18 05:57 Dose:  20 gm


Metoprolol Tartrate (Lopressor Injection -)  5 mg IVPUSH Q8H PRN


   PRN Reason: TACHYCARDIA


Metoprolol Tartrate (Lopressor -)  75 mg PO BID [facility]


   Last Admin: 11/17/18 21:44 Dose:  75 mg


Pantoprazole Sodium (Protonix Iv)  40 mg IVPUSH DAILY [facility]


   Last Admin: 11/17/18 09:13 Dose:  40 mg


Rifaximin (Xifaxan -)  550 mg PO BID [facility]


   Last Admin: 11/17/18 21:44 Dose:  550 mg


Thiamine HCl (Vitamin B1 -)  100 mg PO DAILY [facility]


   Last Admin: 11/17/18 09:13 Dose:  100 mg














78 year old gentleman with hx of CKD, Afib on A/C, CAD, CKD, Hypertension, 

Hyperlipidemia, PVD who presented with AMS/Confusion and found to have HAM.





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 

showed no stones or obstruction, UA w/o protein but UPCR ~0.5 indicating non-

albumin proteinuria)


#AMS 


#Newly diagnosed multiple myloma 


#Anemia 


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


#Hyperdense lesion on US of the kidney 


#Normal anion gap metabolic acidosis 


#Hyperkalemia (r/o tumor lysis)








Ricky Chang DO

## 2018-11-18 NOTE — PN
Progress Note, Physician


Chief Complaint: 





INTUBATED ON VENT SUPPORT


SEDATED





- Current Medication List


Current Medications: 


Active Medications





Acetaminophen (Tylenol -)  650 mg PO Q6H PRN


   PRN Reason: PAIN LEVEL 1 - 3


   Last Admin: 11/16/18 16:25 Dose:  650 mg


Acetaminophen (Tylenol Suppository -)  650 mg RC Q6H PRN


   PRN Reason: FEVER


Allopurinol (Zyloprim -)  100 mg PO BID [facility]


   Last Admin: 11/18/18 09:02 Dose:  100 mg


Chlorhexidine Gluconate (Peridex -)  15 ml MM BID [facility]


   Last Admin: 11/17/18 21:44 Dose:  15 ml


Cholecalciferol (Vitamin D3 -)  1,000 unit PO DAILY [facility]


   Last Admin: 11/18/18 09:02 Dose:  1,000 unit


Diphenhydramine HCl (Benadryl Injection -)  25 mg IVPB ONCE PRN


   PRN Reason: DURING PLASMAPHERESIS


Heparin Sodium (Porcine) (Heparin -)  1,000 unit IVPUSH PRN PRN


   PRN Reason: Heparin


Heparin Sodium (Porcine) (Heparin -)  5,000 unit IVPUSH PRN PRN


   PRN Reason: Heparin


Ceftazidime 1 gm/ Dextrose  50 mls @ 200 mls/hr IVPB DAILY [facility]; Protocol


   Last Admin: 11/17/18 09:13 Dose:  200 mls/hr


Sodium Bicarbonate 75 meq/ (Sodium Chloride)  1,075 mls @ 100 mls/hr IV Q10H [facility]


   Last Admin: 11/18/18 05:55 Dose:  Not Given


Metronidazole (Flagyl 500mg Premixed Ivpb -)  500 mg in 100 mls @ 100 mls/hr 

IVPB BID [facility]


   Last Admin: 11/18/18 09:00 Dose:  100 mls/hr


Lactulose (Cephulac (Oral Use))  20 gm PO TID [facility]


   Last Admin: 11/18/18 05:57 Dose:  20 gm


Metoprolol Tartrate (Lopressor Injection -)  5 mg IVPUSH Q8H PRN


   PRN Reason: TACHYCARDIA


Metoprolol Tartrate (Lopressor -)  75 mg PO BID [facility]


   Last Admin: 11/18/18 09:01 Dose:  75 mg


Pantoprazole Sodium (Protonix Iv)  40 mg IVPUSH DAILY [facility]


   Last Admin: 11/17/18 09:13 Dose:  40 mg


Rifaximin (Xifaxan -)  550 mg PO BID [facility]


   Last Admin: 11/17/18 21:44 Dose:  550 mg


Thiamine HCl (Vitamin B1 -)  100 mg PO DAILY [facility]


   Last Admin: 11/18/18 09:01 Dose:  100 mg











- Objective


Vital Signs: 


 Vital Signs











Temperature  99.8 F H  11/18/18 06:00


 


Pulse Rate  116 H  11/18/18 08:20


 


Respiratory Rate  18   11/18/18 08:20


 


Blood Pressure  115/59 L  11/18/18 08:00


 


O2 Sat by Pulse Oximetry (%)  98   11/18/18 08:20











Constitutional: Yes: Other


Cardiovascular: Yes: Tachycardia


Respiratory: Yes: Mechanically Ventilated


Gastrointestinal: Yes: Soft


Genitourinary: Yes: Garces Present


Musculoskeletal: Yes: Muscle Weakness


Integumentary: Yes: Other


Wound/Incision: Yes: Dressing Dry and Intact


Neurological: Yes: Confusion, Unresponsive


Labs: 


 CBC, BMP





 11/18/18 05:30 





 11/17/18 14:40 





 INR, PTT











INR  1.28  (0.83-1.09)  H  11/15/18  05:30    


 


Fibrinogen  290.0 mg/dL (238-498)   11/11/18  06:00    














Problem List





- Problems


(1) HAM (acute kidney injury)


Code(s): N17.9 - ACUTE KIDNEY FAILURE, UNSPECIFIED   





(2) Atrial fibrillation with RVR


Code(s): I48.91 - UNSPECIFIED ATRIAL FIBRILLATION   





(3) Hyperlipidemia


Code(s): E78.5 - HYPERLIPIDEMIA, UNSPECIFIED   


Qualifiers: 


   Hyperlipidemia type: pure hypercholesterolemia   Qualified Code(s): E78.00 - 

Pure hypercholesterolemia, unspecified; E78.0 - Pure hypercholesterolemia   





(4) Hypothermia


Code(s): T68.XXXA - HYPOTHERMIA, INITIAL ENCOUNTER   


Qualifiers: 


   Encounter type: initial encounter   Qualified Code(s): T68.XXXA - Hypothermia

, initial encounter   





(5) Acute-on-chronic kidney injury


Code(s): N17.9 - ACUTE KIDNEY FAILURE, UNSPECIFIED; N18.9 - CHRONIC KIDNEY 

DISEASE, UNSPECIFIED   


Qualifiers: 


   Acute renal failure type: unspecified   Chronic kidney disease stage: 

unspecified stage   Qualified Code(s): N17.9 - Acute kidney failure, unspecified

; N18.9 - Chronic kidney disease, unspecified   





(6) Generalized weakness


Code(s): R53.1 - WEAKNESS   





(7) History of ETOH abuse


Code(s): Z87.898 - PERSONAL HISTORY OF OTHER SPECIFIED CONDITIONS   





(8) Hypercalcemia


Code(s): E83.52 - HYPERCALCEMIA   





(9) Hypertrophic cardiomyopathy


Code(s): I42.2 - OTHER HYPERTROPHIC CARDIOMYOPATHY   





(10) Toxic metabolic encephalopathy


Code(s): G92 - TOXIC ENCEPHALOPATHY   





(11) Sepsis


Code(s): A41.9 - SEPSIS, UNSPECIFIED ORGANISM   





(12) Respiratory failure


Code(s): J96.90 - RESPIRATORY FAILURE, UNSP, UNSP W HYPOXIA OR HYPERCAPNIA   





Assessment/Plan





RESP FAILURE INUBATED


AWAITING FAMILY DECISION FOR


ADVANCED DIRECTIVE.


WILL NEED TRACHEOSOTOMY?


PULM F/U


WEAN OFF AS TOLERATED


IV ABX PER ID


MONITOR CARDIAC FUNCTION


HEPARIN IV


NEURO EVAL

## 2018-11-18 NOTE — PN
Progress Note (short form)





- Note


Progress Note: 





PULM/CCM





SUBJECTIVE:


Patient seen and examined in the ICU.  





-more than approp bump to PRBC from yesterday


-still with poor mental status


-family meeting without clear decision, said will come back today  (most feel 

would not want trach)


-cr downtrending slightly, low grade temp, hemodynamically stable








OBJECTIVE:





  


 





 Vital Signs











Temp  99.8 F H  11/18/18 06:00


 


Pulse  116 H  11/18/18 08:20


 


Resp  18   11/18/18 08:20


 


BP  115/59 L  11/18/18 08:00


 


Pulse Ox  98   11/18/18 08:20








 Intake & Output











 11/17/18 11/17/18 11/18/18





 11:59 23:59 11:59


 


Intake Total 800 1980 100


 


Output Total 500 1100 300


 


Balance 300 880 -200


 


Weight 80.541 kg  80.921 kg


 


Intake:   


 


   1360 


 


    DIPRIVAN - 1,000,000 mcg 60  





    In 100 ml @ 5 MCG/KG/MIN   





    2.479 mls/hr IVPB TITR   





    AVA Rx#:[id number]   


 


    Heparin - 25,000 Unit In 240 160 





    Normal Saline - 495 ml @   





    1,000 UNIT/HR 20 mls/hr   





    IV TITR AVA Rx#:   





    [id number]   


 


    Normal Saline - 1,000 ml  1200 





    @ 100 mls/hr IV ASDIR AVA   





    Rx#:[id number]   


 


    Normal Saline - 250 ml @ 250  





    250 mls/hr IV ASDIR STA   





    Rx#:[id number]   


 


  IVPB 250 50 


 


  Packed Cells  350 


 


  Tube Irrigant  220 100


 


Output:   


 


  Urine 500 1100 300


 


    Garces 500 1100 300


 


Other:   


 


  Voiding Method Incontinent Incontinent 


 


  Bowel Movement No Yes Yes


 


  Weight Measurement Method Built in Bedscale  Built in Bedscale











Active Medications





Acetaminophen (Tylenol -)  650 mg PO Q6H PRN


   PRN Reason: PAIN LEVEL 1 - 3


   Last Admin: 11/16/18 16:25 Dose:  650 mg


Acetaminophen (Tylenol Suppository -)  650 mg RC Q6H PRN


   PRN Reason: FEVER


Allopurinol (Zyloprim -)  100 mg PO BID [facility]


   Last Admin: 11/17/18 21:45 Dose:  100 mg


Chlorhexidine Gluconate (Peridex -)  15 ml MM BID [facility]


   Last Admin: 11/17/18 21:44 Dose:  15 ml


Cholecalciferol (Vitamin D3 -)  1,000 unit PO DAILY [facility]


Diphenhydramine HCl (Benadryl Injection -)  25 mg IVPB ONCE PRN


   PRN Reason: DURING PLASMAPHERESIS


Heparin Sodium (Porcine) (Heparin -)  1,000 unit IVPUSH PRN PRN


   PRN Reason: Heparin


Heparin Sodium (Porcine) (Heparin -)  5,000 unit IVPUSH PRN PRN


   PRN Reason: Heparin


Ceftazidime 1 gm/ Dextrose  50 mls @ 200 mls/hr IVPB DAILY [facility]; Protocol


   Last Admin: 11/17/18 09:13 Dose:  200 mls/hr


Sodium Bicarbonate 75 meq/ (Sodium Chloride)  1,075 mls @ 100 mls/hr IV Q10H [facility]


   Last Admin: 11/18/18 05:55 Dose:  Not Given


Metronidazole (Flagyl 500mg Premixed Ivpb -)  500 mg in 100 mls @ 100 mls/hr 

IVPB BID [facility]


   Last Admin: 11/17/18 21:43 Dose:  100 mls/hr


Lactulose (Cephulac (Oral Use))  20 gm PO TID [facility]


   Last Admin: 11/18/18 05:57 Dose:  20 gm


Metoprolol Tartrate (Lopressor Injection -)  5 mg IVPUSH Q8H PRN


   PRN Reason: TACHYCARDIA


Metoprolol Tartrate (Lopressor -)  75 mg PO BID [facility]


   Last Admin: 11/17/18 21:44 Dose:  75 mg


Pantoprazole Sodium (Protonix Iv)  40 mg IVPUSH DAILY [facility]


   Last Admin: 11/17/18 09:13 Dose:  40 mg


Rifaximin (Xifaxan -)  550 mg PO BID [facility]


   Last Admin: 11/17/18 21:44 Dose:  550 mg


Thiamine HCl (Vitamin B1 -)  100 mg PO DAILY [facility]


   Last Admin: 11/17/18 09:13 Dose:  100 mg











Gen:  intubated, poorly responsive


Heart: irregular


Lung: scattered rhonchi


Abd: soft, nontender


Ext: no edema


  


  Laboratory Results - last 24 hr











  11/17/18 11/17/18 11/17/18





  05:30 08:05 14:40


 


WBC   


 


RBC   


 


Hgb   


 


Hct   


 


MCV   


 


MCH   


 


MCHC   


 


RDW   


 


Plt Count   


 


MPV   


 


Neutrophils % (Manual)  80.4  


 


Band Neutrophils %  3.1  


 


Lymphocytes % (Manual)  13.4  D  


 


Monocytes % (Manual)  1 L  


 


Eosinophils % (Manual)  0.0  


 


Basophils % (Manual)  0.0  


 


Myelocytes % (Man)  2  D  


 


Promyelocytes % (Man)  0  


 


Blast Cells % (Manual)  0  


 


Metamyelocytes  0  D  


 


Hypochromia  0  


 


Platelet Estimate  Normal  


 


Polychromasia  1+  


 


Poikilocytosis  0  


 


Basophilic Stippling  1+  


 


Anisocytosis  2+  


 


Microcytosis  1+  


 


Macrocytosis  1+  


 


Rouleaux  2+  


 


PTT (Actin FS)   


 


Sodium    148 H


 


Potassium    5.4 H


 


Chloride    122 H


 


Carbon Dioxide    23


 


Anion Gap    3 L


 


BUN    109 H*


 


Creatinine    2.7 H


 


Creat Clearance w eGFR    22.91


 


Random Glucose    101


 


Calcium    6.3 L*


 


Blood Type   B POSITIVE 


 


Antibody Screen   Negative 


 


Crossmatch   See Detail 














  11/18/18 11/18/18





  05:30 05:30


 


WBC  6.1 


 


RBC  2.95 L 


 


Hgb  8.6 L 


 


Hct  26.3 L D 


 


MCV  89.0 


 


MCH  29.3 


 


MCHC  32.9 


 


RDW  16.3 H 


 


Plt Count  210 


 


MPV  8.6 


 


Neutrophils % (Manual)  


 


Band Neutrophils %  


 


Lymphocytes % (Manual)  


 


Monocytes % (Manual)  


 


Eosinophils % (Manual)  


 


Basophils % (Manual)  


 


Myelocytes % (Man)  


 


Promyelocytes % (Man)  


 


Blast Cells % (Manual)  


 


Metamyelocytes  


 


Hypochromia  


 


Platelet Estimate  


 


Polychromasia  


 


Poikilocytosis  


 


Basophilic Stippling  


 


Anisocytosis  


 


Microcytosis  


 


Macrocytosis  


 


Rouleaux  


 


PTT (Actin FS)   32.0


 


Sodium  


 


Potassium  


 


Chloride  


 


Carbon Dioxide  


 


Anion Gap  


 


BUN  


 


Creatinine  


 


Creat Clearance w eGFR  


 


Random Glucose  


 


Calcium  


 


Blood Type  


 


Antibody Screen  


 


Crossmatch  

















ASSESSMENT AND PLAN:


Acute Hypoxic Respiratory Failure


Altered Mental Status


Metabolic Encephalopathy


Pneumonia


Acalculous Cholecystitis


Multiple Myeloma


Acute on Chronic Renal Failure


Metabolic Acidosis


LV Diastolic Dysfunction


Atrial Fibrillation


CAD


+Troponins likely Demand Ischemia


PAD


Hyperlipidemia


HTN





-  continue antibiotics per ID 


-  trend LFTs


-  monitor urine output, creatinine


-  rate control


-  continue anticoagulation


-  minimize sedation to assess mental status , rifaxamin and lactulose for any 

component of HE


-  spontaneous breathing trials as tolerated, but no mental status to protect 

airway


-  DVT/GI prophylaxis


-  continue discussions regarding goals of care: Compassionate extubation 

versus Tracheostomy


-  palliative care involved 


-  continue ICU monitoring


-  grave overall prognosis, MM with poor mental status, renal failure, resp 

failure








[facility]


[id number]





35min CCT

## 2018-11-18 NOTE — PN
Progress Note (short form)





- Note


Progress Note: 





Renal follow up for Hypercalcemia/AHM





Pt seen and examined in the ICU


on vent via ET tube


no overnight events


no diarrhea 


on IVF


no pressers 


 Vital Signs











Temperature  99.8 F H  11/18/18 06:00


 


Pulse Rate  116 H  11/18/18 08:20


 


Respiratory Rate  18   11/18/18 08:20


 


Blood Pressure  115/59 L  11/18/18 08:00


 


O2 Sat by Pulse Oximetry (%)  98   11/18/18 08:20








 Intake & Output











 11/15/18 11/16/18 11/17/18 11/18/18





 23:59 23:59 23:59 23:59


 


Intake Total 5939 035 4865 100


 


Output Total 650 1700 1600 300


 


Balance 640 -790 1180 -200


 


Weight 80.938 kg 80.541 kg 80.541 kg 80.921 kg








NAD


on Vent via ET tube


RRR


Dec BS at the lung bases


soft NT/ND


no LE edema 


 CBC, BMP





 11/18/18 05:30 





 11/17/18 14:40 





 Current Medications





Acetaminophen (Tylenol -)  650 mg PO Q6H PRN


   PRN Reason: PAIN LEVEL 1 - 3


   Last Admin: 11/16/18 16:25 Dose:  650 mg


Acetaminophen (Tylenol Suppository -)  650 mg RC Q6H PRN


   PRN Reason: FEVER


Allopurinol (Zyloprim -)  100 mg PO BID [facility]


   Last Admin: 11/17/18 21:45 Dose:  100 mg


Chlorhexidine Gluconate (Peridex -)  15 ml MM BID [facility]


   Last Admin: 11/17/18 21:44 Dose:  15 ml


Cholecalciferol (Vitamin D3 -)  1,000 unit PO DAILY [facility]


Diphenhydramine HCl (Benadryl Injection -)  25 mg IVPB ONCE PRN


   PRN Reason: DURING PLASMAPHERESIS


Heparin Sodium (Porcine) (Heparin -)  1,000 unit IVPUSH PRN PRN


   PRN Reason: Heparin


Heparin Sodium (Porcine) (Heparin -)  5,000 unit IVPUSH PRN PRN


   PRN Reason: Heparin


Ceftazidime 1 gm/ Dextrose  50 mls @ 200 mls/hr IVPB DAILY [facility]; Protocol


   Last Admin: 11/17/18 09:13 Dose:  200 mls/hr


Sodium Bicarbonate 75 meq/ (Sodium Chloride)  1,075 mls @ 100 mls/hr IV Q10H [facility]


   Last Admin: 11/18/18 05:55 Dose:  Not Given


Metronidazole (Flagyl 500mg Premixed Ivpb -)  500 mg in 100 mls @ 100 mls/hr 

IVPB BID [facility]


   Last Admin: 11/17/18 21:43 Dose:  100 mls/hr


Lactulose (Cephulac (Oral Use))  20 gm PO TID [facility]


   Last Admin: 11/18/18 05:57 Dose:  20 gm


Metoprolol Tartrate (Lopressor Injection -)  5 mg IVPUSH Q8H PRN


   PRN Reason: TACHYCARDIA


Metoprolol Tartrate (Lopressor -)  75 mg PO BID [facility]


   Last Admin: 11/17/18 21:44 Dose:  75 mg


Pantoprazole Sodium (Protonix Iv)  40 mg IVPUSH DAILY [facility]


   Last Admin: 11/17/18 09:13 Dose:  40 mg


Rifaximin (Xifaxan -)  550 mg PO BID [facility]


   Last Admin: 11/17/18 21:44 Dose:  550 mg


Thiamine HCl (Vitamin B1 -)  100 mg PO DAILY [facility]


   Last Admin: 11/17/18 09:13 Dose:  100 mg











78 year old gentleman with hx of CKD, Afib on A/C, CAD, CKD, Hypertension, 

Hyperlipidemia, PVD who presented with AMS/Confusion and found to have HAM.





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 

showed no stones or obstruction, UA w/o protein but UPCR ~0.5 indicating non-

albumin proteinuria)


#AMS 


#Newly diagnosed multiple myloma 


#Anemia 


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


#Hyperdense lesion on US of the kidney 


#Normal anion gap metabolic acidosis 


#Hyperkalemia (r/o tumor lysis)





Renal function stable, BUN very high due to steroids


Serum potassium stable as of yesterday afternoon, will check BMP this am


on Bicab gtt, if serum K < 5 can stop bicarb


Hold bicarb gtt if corrected Ca < 7.5


Trend serum K, Ca Q8H


Transfuse as per ICU


Vent support


Prognosis remains very poor 








Ricky Chang DO

## 2018-11-18 NOTE — PN
Progress Note (short form)





- Note


Progress Note: 





unresponsive


intubated





 Vital Signs











 Period  Temp  Pulse  Resp  BP Sys/Varghese  Pulse Ox


 


 Last 24 Hr  97.8 F-100.4 F    14-19  100-119/59-81  








cor-rrr


lungs decreased bs at bases


abd soft,nt


ext no edema





 CBC, BMP





 11/18/18 05:30 





 11/17/18 14:40 





 Microbiology





11/14/18 09:58   Blood - Peripheral Venous   Blood Culture - Preliminary


                            NO GROWTH OBTAINED AFTER 96 HOURS, INCUBATION TO 

CONTINUE


                            FOR 1 DAYS.


11/14/18 09:50   Blood - Peripheral Venous   Blood Culture - Preliminary


                            NO GROWTH OBTAINED AFTER 96 HOURS, INCUBATION TO 

CONTINUE


                            FOR 1 DAYS.


11/14/18 12:00   Sputum - Endotrachea Suction/Ventilator   Gram Stain - Final


11/14/18 12:00   Sputum - Endotrachea Suction/Ventilator   Sputum Culture - 

Preliminary


                            Gram Negative Jimi


                            Non Lactose Fermenting Gnb


11/10/18 15:00   Blood - Peripheral Venous   Blood Culture - Final


                            NO GROWTH AFTER 5 DAYS INCUBATION


11/10/18 14:00   Blood - Central Line   Blood Culture - Final


                            NO GROWTH AFTER 5 DAYS INCUBATION


11/14/18 11:20   Urine - Urine Garces   Urine Culture - Final


                            NO GROWTH OBTAINED


11/06/18 14:30   Sputum - Endotrachea Suction/Ventilator   Gram Stain - Final


11/06/18 14:30   Sputum - Endotrachea Suction/Ventilator   Sputum Culture - 

Final


                            Stenotrophomon.(X.)Maltophilia


11/05/18 19:45   Blood - Peripheral Venous   Blood Culture - Final


                            NO GROWTH AFTER 5 DAYS INCUBATION


11/05/18 19:20   Blood - Peripheral Venous   Blood Culture - Final


                            NO GROWTH AFTER 5 DAYS INCUBATION


11/06/18 14:30   Urine - Urine Garces   Legionella Antigen - Final


11/06/18 14:30   Urine - Urine Garces   Streptococcus pneumoniae Antigen (M - 

Final


10/30/18 17:02   Blood - Peripheral Venous   Blood Culture - Final


                            NO GROWTH AFTER 5 DAYS INCUBATION


10/30/18 17:02   Blood - Peripheral Venous   Blood Culture - Final


                            NO GROWTH AFTER 5 DAYS INCUBATION


10/30/18 17:02   Urine - Urine - Catheterized   Urine Culture - Final


                            NO GROWTH OBTAINED


10/31/18 19:15   Nasopharyngeal Swab   Influenza Types A,B Antigen - Final


10/31/18 19:15   Nasopharyngeal Swab    - Final








 





lfts improved


cxray unchanged





blood cultures negative 11/14





a/


respiratory failure-remains intubated


RLL pneumonia


acalculous cholycystitis


renal failure


multiple myeloma


hyperviscosity syndrome





continue fortaz/flagyl


overall poor mental status


family meetings in progress

## 2018-11-19 LAB
ALBUMIN SERPL-MCNC: 1.3 G/DL (ref 3.4–5)
ALP SERPL-CCNC: 84 U/L (ref 45–117)
ALT SERPL-CCNC: 180 U/L (ref 13–61)
ANION GAP SERPL CALC-SCNC: 5 MMOL/L (ref 8–16)
ANISOCYTOSIS BLD QL: (no result)
ARTERIAL BLOOD GAS PCO2: 29.3 MMHG (ref 35–45)
AST SERPL-CCNC: 52 U/L (ref 15–37)
BASE EXCESS BLDA CALC-SCNC: 2.3 MEQ/L (ref -2–2)
BASOPHILS # BLD: 0.3 % (ref 0–2)
BILIRUB SERPL-MCNC: 0.6 MG/DL (ref 0.2–1)
BUN SERPL-MCNC: 99 MG/DL (ref 7–18)
CALCIUM SERPL-MCNC: 6.1 MG/DL (ref 8.5–10.1)
CHLORIDE SERPL-SCNC: 124 MMOL/L (ref 98–107)
CO2 SERPL-SCNC: 25 MMOL/L (ref 21–32)
CREAT SERPL-MCNC: 2.5 MG/DL (ref 0.55–1.3)
DEPRECATED RDW RBC AUTO: 16.1 % (ref 11.9–15.9)
DEPRECATED RDW RBC AUTO: 16.5 % (ref 11.9–15.9)
EOSINOPHIL # BLD: 1.3 % (ref 0–4.5)
GLUCOSE SERPL-MCNC: 85 MG/DL (ref 74–106)
HCT VFR BLD CALC: 24 % (ref 35.4–49)
HCT VFR BLD CALC: 24.5 % (ref 35.4–49)
HGB BLD-MCNC: 7.9 GM/DL (ref 11.7–16.9)
HGB BLD-MCNC: 8.4 GM/DL (ref 11.7–16.9)
LYMPHOCYTES # BLD: 11.4 % (ref 8–40)
MACROCYTES BLD QL: (no result)
MAGNESIUM SERPL-MCNC: 3 MG/DL (ref 1.8–2.4)
MCH RBC QN AUTO: 29.5 PG (ref 25.7–33.7)
MCH RBC QN AUTO: 31.2 PG (ref 25.7–33.7)
MCHC RBC AUTO-ENTMCNC: 32.7 G/DL (ref 32–35.9)
MCHC RBC AUTO-ENTMCNC: 34.4 G/DL (ref 32–35.9)
MCV RBC: 90.3 FL (ref 80–96)
MCV RBC: 90.5 FL (ref 80–96)
MONOCYTES # BLD AUTO: 1.7 % (ref 3.8–10.2)
NEUTROPHILS # BLD: 85.3 % (ref 42.8–82.8)
PHOSPHATE SERPL-MCNC: 3.7 MG/DL (ref 2.5–4.9)
PLATELET # BLD AUTO: 187 K/MM3 (ref 134–434)
PLATELET # BLD AUTO: 195 K/MM3 (ref 134–434)
PLATELET BLD QL SMEAR: ADEQUATE
PMV BLD: 8.7 FL (ref 7.5–11.1)
PMV BLD: 9 FL (ref 7.5–11.1)
PO2 BLDA: 80.2 MMHG (ref 70–100)
POTASSIUM SERPLBLD-SCNC: 5.1 MMOL/L (ref 3.5–5.1)
PROT SERPL-MCNC: 9.4 G/DL (ref 6.4–8.2)
RBC # BLD AUTO: 2.66 M/MM3 (ref 4–5.6)
RBC # BLD AUTO: 2.71 M/MM3 (ref 4–5.6)
SAO2 % BLDA: 96.1 % (ref 90–98.9)
SODIUM SERPL-SCNC: 153 MMOL/L (ref 136–145)
WBC # BLD AUTO: 4.9 K/MM3 (ref 4–10)
WBC # BLD AUTO: 5.3 K/MM3 (ref 4–10)

## 2018-11-19 RX ADMIN — SODIUM CHLORIDE SCH MLS/HR: 4.5 INJECTION, SOLUTION INTRAVENOUS at 13:39

## 2018-11-19 RX ADMIN — SODIUM BICARBONATE SCH: 84 INJECTION, SOLUTION INTRAVENOUS at 01:15

## 2018-11-19 RX ADMIN — ALLOPURINOL SCH MG: 100 TABLET ORAL at 10:10

## 2018-11-19 RX ADMIN — CEFTAZIDIME SCH MLS/HR: 1 INJECTION, POWDER, FOR SOLUTION INTRAMUSCULAR; INTRAVENOUS at 10:00

## 2018-11-19 RX ADMIN — ALLOPURINOL SCH MG: 100 TABLET ORAL at 21:24

## 2018-11-19 RX ADMIN — PANTOPRAZOLE SODIUM SCH MG: 40 INJECTION, POWDER, FOR SOLUTION INTRAVENOUS at 10:10

## 2018-11-19 RX ADMIN — METOPROLOL TARTRATE SCH MG: 50 TABLET, FILM COATED ORAL at 21:23

## 2018-11-19 RX ADMIN — CHLORHEXIDINE GLUCONATE 0.12% ORAL RINSE SCH ML: 1.2 LIQUID ORAL at 21:23

## 2018-11-19 RX ADMIN — LACTULOSE SCH GM: 20 SOLUTION ORAL at 15:40

## 2018-11-19 RX ADMIN — METOPROLOL TARTRATE SCH MG: 50 TABLET, FILM COATED ORAL at 10:10

## 2018-11-19 RX ADMIN — RIFAXIMIN SCH MG: 550 TABLET ORAL at 21:24

## 2018-11-19 RX ADMIN — RIFAXIMIN SCH MG: 550 TABLET ORAL at 10:10

## 2018-11-19 RX ADMIN — LACTULOSE SCH GM: 20 SOLUTION ORAL at 21:23

## 2018-11-19 RX ADMIN — Medication SCH MG: at 10:11

## 2018-11-19 RX ADMIN — SODIUM BICARBONATE SCH: 84 INJECTION, SOLUTION INTRAVENOUS at 14:05

## 2018-11-19 RX ADMIN — LACTULOSE SCH GM: 20 SOLUTION ORAL at 05:54

## 2018-11-19 RX ADMIN — VITAMIN D, TAB 1000IU (100/BT) SCH UNIT: 25 TAB at 10:10

## 2018-11-19 RX ADMIN — CHLORHEXIDINE GLUCONATE 0.12% ORAL RINSE SCH ML: 1.2 LIQUID ORAL at 10:11

## 2018-11-19 NOTE — PN
Progress Note, Physician


Chief Complaint: 





patient intubated sedated





- Current Medication List


Current Medications: 


Active Medications





Acetaminophen (Tylenol -)  650 mg PO Q6H PRN


   PRN Reason: PAIN LEVEL 1 - 3


   Last Admin: 11/16/18 16:25 Dose:  650 mg


Acetaminophen (Tylenol Suppository -)  650 mg RC Q6H PRN


   PRN Reason: FEVER


Allopurinol (Zyloprim -)  100 mg PO BID [facility]


   Last Admin: 11/19/18 10:10 Dose:  100 mg


Chlorhexidine Gluconate (Peridex -)  15 ml MM BID [facility]


   Last Admin: 11/19/18 10:11 Dose:  15 ml


Cholecalciferol (Vitamin D3 -)  1,000 unit PO DAILY [facility]


   Last Admin: 11/19/18 10:10 Dose:  1,000 unit


Diphenhydramine HCl (Benadryl Injection -)  25 mg IVPB ONCE PRN


   PRN Reason: DURING PLASMAPHERESIS


Heparin Sodium (Porcine) (Heparin -)  1,000 unit IVPUSH PRN PRN


   PRN Reason: Heparin


Heparin Sodium (Porcine) (Heparin -)  5,000 unit IVPUSH PRN PRN


   PRN Reason: Heparin


Ceftazidime 1 gm/ Dextrose  50 mls @ 200 mls/hr IVPB DAILY [facility]; Protocol


   Last Admin: 11/18/18 10:25 Dose:  200 mls/hr


Sodium Bicarbonate 75 meq/ (Sodium Chloride)  1,075 mls @ 100 mls/hr IV Q10H [facility]


   Last Admin: 11/19/18 01:15 Dose:  Not Given


Metronidazole (Flagyl 500mg Premixed Ivpb -)  500 mg in 100 mls @ 100 mls/hr 

IVPB BID [facility]


   Last Admin: 11/19/18 10:10 Dose:  100 mls/hr


Lactulose (Cephulac (Oral Use))  20 gm PO TID [facility]


   Last Admin: 11/19/18 05:54 Dose:  20 gm


Metoprolol Tartrate (Lopressor Injection -)  5 mg IVPUSH Q8H PRN


   PRN Reason: TACHYCARDIA


Metoprolol Tartrate (Lopressor -)  75 mg PO BID [facility]


   Last Admin: 11/19/18 10:10 Dose:  75 mg


Pantoprazole Sodium (Protonix Iv)  40 mg IVPUSH DAILY [facility]


   Last Admin: 11/19/18 10:10 Dose:  40 mg


Rifaximin (Xifaxan -)  550 mg PO BID [facility]


   Last Admin: 11/19/18 10:10 Dose:  550 mg


Thiamine HCl (Vitamin B1 -)  100 mg PO DAILY [facility]


   Last Admin: 11/19/18 10:11 Dose:  100 mg











- Objective


Vital Signs: 


 Vital Signs











Temperature  100 F H  11/19/18 05:55


 


Pulse Rate  123 H  11/19/18 08:50


 


Respiratory Rate  16   11/19/18 08:50


 


Blood Pressure  111/71   11/19/18 08:00


 


O2 Sat by Pulse Oximetry (%)  98   11/19/18 08:50











Constitutional: Yes: Calm


Cardiovascular: Yes: Regular Rate and Rhythm, S1, S2


Respiratory: Yes: Mechanically Ventilated


Gastrointestinal: Yes: Normal Bowel Sounds, Soft


Labs: 


 CBC, BMP





 11/19/18 05:30 





 11/19/18 05:30 





 INR, PTT











INR  1.28  (0.83-1.09)  H  11/15/18  05:30    


 


Fibrinogen  290.0 mg/dL (238-498)   11/11/18  06:00    














Problem List





- Problems


(1) Elevated LFTs


Assessment/Plan: 


ultrasound of liver noted suggestive of acalculous cholecystitis, will get GI 

consult- noted 


lft trending down


regarding acalculous cholecystits possible iR evaluation for cholecystostomy 

decopmression


currently awaitng family decision regards goals of care


Code(s): R94.5 - ABNORMAL RESULTS OF LIVER FUNCTION STUDIES   





(2) HAM (acute kidney injury)


Assessment/Plan: 


bun/cr is better from 2.8 to 2.ivf 


serum bicarb is now 22


corrected calcium


Code(s): N17.9 - ACUTE KIDNEY FAILURE, UNSPECIFIED   





(3) Atrial fibrillation with RVR


Assessment/Plan: 


heparin drip 


cardiac monitor


rate control with metoprolol








(4) Respiratory failure


Assessment/Plan: 


intubated


propofol/fentanyl


aviating family decision regarding goals of care possible compassionate weaning 

vs tracheostomy


Code(s): J96.90 - RESPIRATORY FAILURE, UNSP, UNSP W HYPOXIA OR HYPERCAPNIA   





(5) Toxic metabolic encephalopathy


Assessment/Plan: 


Microbiology





11/14/18 11:20   Urine - Urine Garces   Urine Culture - Final


                              NO GROWTH OBTAINED


11/06/18 14:30   Urine - Urine Garces   Legionella Antigen - Final


11/06/18 14:30   Urine - Urine Garces   Streptococcus pneumoniae Antigen (M - 

Final


11/06/18 14:30   Sputum - Endotrachea Suction/Ventilator   Gram Stain - Final


11/06/18 14:30   Sputum - Endotrachea Suction/Ventilator   Sputum Culture - 

Final


                              Stenotrophomon.(X.)Maltophilia


11/14/18 09:58   Blood - Peripheral Venous   Blood Culture - Preliminary


                              NO GROWTH OBTAINED AFTER 24 HOURS, INCUBATION TO 

CONTINUE


                              FOR 4 DAYS.


11/14/18 09:50   Blood - Peripheral Venous   Blood Culture - Preliminary


                              NO GROWTH OBTAINED AFTER 24 HOURS, INCUBATION TO 

CONTINUE


                              FOR 4 DAYS.


11/10/18 15:00   Blood - Peripheral Venous   Blood Culture - Preliminary


                              NO GROWTH OBTAINED AFTER 96 HOURS, INCUBATION TO 

CONTINUE


                              FOR 1 DAYS.


11/10/18 14:00   Blood - Central Line   Blood Culture - Preliminary


                              NO GROWTH OBTAINED AFTER 96 HOURS, INCUBATION TO 

CONTINUE


                              FOR 1 DAYS.





afebrile normal WBC repeat cultures as above


on ceftazidime - and flagly


temp 101


Code(s): G92 - TOXIC ENCEPHALOPATHY   





(6) Altered mental status


Assessment/Plan: 


maybe secondary to toxic metabolic encephalopathy


neurology consult appreciated


on lactulose TID still persistently elevated Nh3 level


on steroid as well dexamethasone - no improvement in mental status


s/p plasmapheresis


dvt ppx lovenox


Code(s): R41.82 - ALTERED MENTAL STATUS, UNSPECIFIED

## 2018-11-19 NOTE — EKG
Test Reason : 

Blood Pressure : ***/*** mmHG

Vent. Rate : 100 BPM     Atrial Rate : 394 BPM

   P-R Int : 000 ms          QRS Dur : 136 ms

    QT Int : 364 ms       P-R-T Axes : 000 -78 028 degrees

   QTc Int : 469 ms

 

ATRIAL FIBRILLATION

RIGHT BUNDLE BRANCH BLOCK

LEFT ANTERIOR FASCICULAR BLOCK

*** BIFASCICULAR BLOCK ***

ABNORMAL ECG

WHEN COMPARED WITH ECG OF 16-NOV-2018 08:32,

NO SIGNIFICANT CHANGE WAS FOUND

Confirmed by CHAVA GRAVES MD ([id number]) on 11/19/2018 6:32:44 PM

 

Referred By:             Confirmed By:CHAVA GRAVES MD

## 2018-11-19 NOTE — PN
Physical Exam: 


SUBJECTIVE: Patient seen and examined in the ICU.  Remains intubated and 

sedated.  Intermittent fever. No gross change in overall mental status. GOC/

family meeting initiated and ongoing. 








OBJECTIVE:





 Vital Signs











 Period  Temp  Pulse  Resp  BP Sys/Varghese  Pulse Ox


 


 Last 24 Hr  99.8 F-101.1 F    14-21  105-120/68-78  98-99











GENERAL: The patient is intubated and sedated.


HEAD: NCAT


EYES:  sclera anicteric. 


ENT: Ears normal, nares patent, dry mucous membranes


NECK: Trachea midline. 


LUNGS: bilateral diffuse rhonchi


HEART: Tachycardic rate and irregular rhythm. 


ABDOMEN: Soft, nondistended.


EXTREMITIES: 2+ pulses, warm, well-perfused, no edema, scattered ulcers. 


NEUROLOGICAL: Cannot assess secondary to clinical condition. 


SKIN: Warm, dry, normal turgor, scattered ulcers on the legs














 Laboratory Results - last 24 hr











  11/18/18 11/19/18 11/19/18





  18:06 05:30 05:30


 


WBC   5.3 


 


RBC   2.66 L 


 


Hgb   7.9 L 


 


Hct   24.0 L 


 


MCV   90.3 


 


MCH   29.5 


 


MCHC   32.7 


 


RDW   16.1 H 


 


Plt Count   195 


 


MPV   9.0 


 


PTT (Actin FS)    28.8


 


Puncture Site   


 


ABG pH   


 


ABG pCO2 at Pt Temp   


 


ABG pO2 at Pt Temp   


 


ABG HCO3   


 


ABG O2 Sat (Measured)   


 


ABG O2 Content   


 


ABG Base Excess   


 


Chacho Test   


 


Oxygen Flow Rate   


 


PEEP   


 


Sodium  151 H  


 


Potassium  5.2 H  


 


Chloride  124 H  


 


Carbon Dioxide  23  


 


Anion Gap  5 L  


 


BUN  102 H  


 


Creatinine  2.7 H  


 


Creat Clearance w eGFR  22.91  


 


Random Glucose  88  


 


Calcium  6.0 L*  


 


Phosphorus   


 


Magnesium   


 


Total Bilirubin   


 


AST   


 


ALT   


 


Alkaline Phosphatase   


 


Total Protein   


 


Albumin   














  11/19/18 11/19/18





  05:30 06:00


 


WBC  


 


RBC  


 


Hgb  


 


Hct  


 


MCV  


 


MCH  


 


MCHC  


 


RDW  


 


Plt Count  


 


MPV  


 


PTT (Actin FS)  


 


Puncture Site   Right radial


 


ABG pH   7.53 H


 


ABG pCO2 at Pt Temp   29.3 L


 


ABG pO2 at Pt Temp   80.2  D


 


ABG HCO3   24.4


 


ABG O2 Sat (Measured)   96.1


 


ABG O2 Content   12.3 L


 


ABG Base Excess   2.3 H


 


Chacho Test   No Result Required.


 


Oxygen Flow Rate   Yes


 


PEEP   


 


Sodium  153 H 


 


Potassium  5.1 


 


Chloride  124 H 


 


Carbon Dioxide  25 


 


Anion Gap  5 L 


 


BUN  99 H 


 


Creatinine  2.5 H 


 


Creat Clearance w eGFR  25.04 


 


Random Glucose  85 


 


Calcium  6.1 L* 


 


Phosphorus  3.7 


 


Magnesium  3.0 H 


 


Total Bilirubin  0.6 


 


AST  52 H 


 


ALT  180 H 


 


Alkaline Phosphatase  84 


 


Total Protein  9.4 H 


 


Albumin  1.3 L 








Active Medications











Generic Name Dose Route Start Last Admin





  Trade Name Freq  PRN Reason Stop Dose Admin


 


Acetaminophen  650 mg  11/10/18 14:09  11/16/18 16:25





  Tylenol -  PO   650 mg





  Q6H PRN   Administration





  PAIN LEVEL 1 - 3   





     





     





     


 


Acetaminophen  650 mg  11/15/18 15:54  





  Tylenol Suppository -  RC   





  Q6H PRN   





  FEVER   





     





     





     


 


Allopurinol  100 mg  11/09/18 10:00  11/19/18 10:10





  Zyloprim -  PO   100 mg





  BID AVA   Administration





     





     





     





     


 


Chlorhexidine Gluconate  15 ml  11/10/18 23:45  11/19/18 10:11





  Peridex -  MM   15 ml





  BID AVA   Administration





     





     





     





     


 


Cholecalciferol  1,000 unit  11/18/18 10:00  11/19/18 10:10





  Vitamin D3 -  PO   1,000 unit





  DAILY AVA   Administration





     





     





     





     


 


Diphenhydramine HCl  25 mg  11/09/18 12:00  





  Benadryl Injection -  IVPB   





  ONCE PRN   





  DURING PLASMAPHERESIS   





     





     





     


 


Heparin Sodium (Porcine)  1,000 unit  11/16/18 15:20  





  Heparin -  IVPUSH   





  PRN PRN   





  Heparin   





     





     





     


 


Heparin Sodium (Porcine)  5,000 unit  11/16/18 15:20  





  Heparin -  IVPUSH   





  PRN PRN   





  Heparin   





     





     





     


 


Metronidazole  500 mg in 100 mls @ 100 mls/hr  11/17/18 14:30  11/19/18 10:10





  Flagyl 500mg Premixed Ivpb -  IVPB   100 mls/hr





  BID AVA   Administration





     





     





     





     


 


Sodium Chloride  1,000 mls @ 75 mls/hr  11/19/18 11:30  





  1/2 Normal Saline  IV   





  ASDIR AVA   





     





     





     





     


 


Levofloxacin  250 mg in 50 mls @ 50 mls/hr  11/19/18 12:45  





  Levaquin 250 Mg Premixed Ivpb -  IVPB   





  DAILY [facility]   





     





     





  Protocol   





     


 


Lactulose  20 gm  11/09/18 09:38  11/19/18 05:54





  Cephulac (Oral Use)  PO   20 gm





  TID AVA   Administration





     





     





     





     


 


Metoprolol Tartrate  5 mg  11/09/18 13:04  





  Lopressor Injection -  IVPUSH   





  Q8H PRN   





  TACHYCARDIA   





     





     





     


 


Metoprolol Tartrate  75 mg  11/16/18 08:30  11/19/18 10:10





  Lopressor -  PO   75 mg





  BID AVA   Administration





     





     





     





     


 


Pantoprazole Sodium  40 mg  11/07/18 12:00  11/19/18 10:10





  Protonix Iv  IVPUSH   40 mg





  DAILY AVA   Administration





     





     





     





     


 


Rifaximin  550 mg  11/15/18 22:00  11/19/18 10:10





  Xifaxan -  PO   550 mg





  BID AVA   Administration





     





     





     





     


 


Thiamine HCl  100 mg  10/30/18 22:12  11/19/18 10:11





  Vitamin B1 -  PO   100 mg





  DAILY AVA   Administration





     





     





     





     











ASSESSMENT/PLAN:


79 yo m PMH of A-Fib, Acute on chronic kidney injury, CAD w stents, 

hypercalcemia, medication non-adherence, paraproteinemia, thromboembolic disease

, diastolic heart dysfunction without failure, HTN, HLD, hypertrophic 

cardiomyopathy is here after a rapid response. He was on the floors when it was 

noticed that he has respiratory insufficiency and was desaturating, requiring 

ICU monitoring. GOC/family meeting initiated and ongoing. 








GI: 


metabolic encephalopathy 


-ammonia improving  w/ lactulose and rifaximin


-LFTs are also elevated but downtrending. transamintitis most likely secondary 

to ischemic hepatits which is multifactorial including sepsis, episodes of 

hypotension and hyperviscosity syndrome. 





acalculous cholecystitis?


US shows thickened gallbladder wall, pericholecystic fluid, suspicious for 

acalculous cholecystitis. There was also mild dilatation of the CBD but that 

might be normal for age. 


-Spoke w/ IR, who feel image does not represent  acalculous cholecystitisand 

does not require any IR drainage 





ID: 


continues to spike fevers


- Rhonchi heard on Lung auscultation


-RLL pneumonia


reculture in light of fever


Substitute levaquin for ceftazidime


Redose Vancomycin


- c/w flagyl. 


- ID on board


- c/w rifaximin for elevated ammonia level





Cardio: 


Afib


- Lopressor 75 mg BID PO


-IV metoprolol 5 mg PRN


- diastolic dysfunction + hypertrophic cardiomyopathy


- CAD with multiple stents


- Cardio consulted and on board.


Heparin gtt while off Xarelto 15 qd (renal dosing). transfuse to maintain Hgb>

8.0 





Pulm: 


- Intubated


- Patient has b/l pleural effusions.


- No pneumothorax


- b/l diffuse rhonchi


- infiltrate on CXR: Abx- Substitute levaquin for ceftazidime


Redose Vancomycin


- c/w flagyl. 





Renal: 


- Acute on Chronic kidney injury


HAM ATN vs. Cast nephropathy  (FeNa was 2.1% indicating tubular injury, US 

showed no stones or obstruction, UA w/o protein but UPCR ~0.5 indicating non-

albumin proteinuria)


- Renal consulted and on board. 


Hyperkalemia (r/o tumor lysis)


serum K is improved, will d/c bicarb gtt


start hypotonic saline with dextrose 





Neuro: 


- Patient is not alert or oriented.


- Head CT showed no acute pathology


- Neuro consulted and on board. (Dr. Youngblood + Dr. phan) 


- Ammonia elevated - Patient receiving lactulose


- c/w rifaximin for elevated ammonia. 





Heme/Onc: 


- monitor H/H


- Will transfuse to keep hb > 8 


- Patient not receiving plasmapharesis today. 


- Paraproteinemia


- Patient fulfills new criteria  for multiple myeloma with free kappa/ free 

lambda light chain  ratio of 432 (>100 is diagnostic of myeloma) 


- Kappa/Lambda ratio > 100 consistent with Multiple myeloma


- M spike elevated elevated at 6.8 previously 4.7 


-steroids being held at this time 





Endo: 


- Parathyroid hormone WNL


- PTH-borderline low appropriate given hypercalcemia, PTHrP low





Prophylaxis: 


- heparin gtt 


- Protonix





F/E/N: 


F: hypotonic saline with dextrose 


E: Will replete lytes PRN


N: tube feeds. 





Code Status: Full Code 


- GOC/family meeting initiated and ongoing. 


- Compassionate extubation versus Tracheostomy





Dispo: Patient will continue to receive ICU level care





Visit type





- Emergency Visit


Emergency Visit: Yes


ED Registration Date: 10/30/18


Care time: The patient presented to the Emergency Department on the above date 

and was hospitalized for further evaluation of their emergent condition.





- New Patient


This patient is new to me today: Yes


Date on this admission: 11/19/18





- Critical Care


Critical Care patient: Yes


Total Critical Care Time (in minutes): 40


Critical Care Statement: The care of this patient involved high complexity 

decision making to prevent further life threatening deterioration of the patient

's condition and/or to evaluate & treat vital organ system(s) failure or risk 

of failure.

## 2018-11-19 NOTE — PN
Progress Note, Physician


History of Present Illness: 


Unresponsive on ventilator


  Febrile  101.1


  WBC WNL


  Cr 2.5


  Sputum (R) Xanthomonas


  


 





 


 


 





- Current Medication List


Current Medications: 


Active Medications





Acetaminophen (Tylenol -)  650 mg PO Q6H PRN


   PRN Reason: PAIN LEVEL 1 - 3


   Last Admin: 11/16/18 16:25 Dose:  650 mg


Acetaminophen (Tylenol Suppository -)  650 mg RC Q6H PRN


   PRN Reason: FEVER


Allopurinol (Zyloprim -)  100 mg PO BID [facility]


   Last Admin: 11/19/18 10:10 Dose:  100 mg


Chlorhexidine Gluconate (Peridex -)  15 ml MM BID [facility]


   Last Admin: 11/19/18 10:11 Dose:  15 ml


Cholecalciferol (Vitamin D3 -)  1,000 unit PO DAILY [facility]


   Last Admin: 11/19/18 10:10 Dose:  1,000 unit


Diphenhydramine HCl (Benadryl Injection -)  25 mg IVPB ONCE PRN


   PRN Reason: DURING PLASMAPHERESIS


Heparin Sodium (Porcine) (Heparin -)  1,000 unit IVPUSH PRN PRN


   PRN Reason: Heparin


Heparin Sodium (Porcine) (Heparin -)  5,000 unit IVPUSH PRN PRN


   PRN Reason: Heparin


Ceftazidime 1 gm/ Dextrose  50 mls @ 200 mls/hr IVPB DAILY [facility]; Protocol


   Last Admin: 11/18/18 10:25 Dose:  200 mls/hr


Metronidazole (Flagyl 500mg Premixed Ivpb -)  500 mg in 100 mls @ 100 mls/hr 

IVPB BID [facility]


   Last Admin: 11/19/18 10:10 Dose:  100 mls/hr


Sodium Chloride (1/2 Normal Saline)  1,000 mls @ 75 mls/hr IV ASDIR [facility]


Lactulose (Cephulac (Oral Use))  20 gm PO TID [facility]


   Last Admin: 11/19/18 05:54 Dose:  20 gm


Metoprolol Tartrate (Lopressor Injection -)  5 mg IVPUSH Q8H PRN


   PRN Reason: TACHYCARDIA


Metoprolol Tartrate (Lopressor -)  75 mg PO BID [facility]


   Last Admin: 11/19/18 10:10 Dose:  75 mg


Pantoprazole Sodium (Protonix Iv)  40 mg IVPUSH DAILY [facility]


   Last Admin: 11/19/18 10:10 Dose:  40 mg


Rifaximin (Xifaxan -)  550 mg PO BID [facility]


   Last Admin: 11/19/18 10:10 Dose:  550 mg


Thiamine HCl (Vitamin B1 -)  100 mg PO DAILY [facility]


   Last Admin: 11/19/18 10:11 Dose:  100 mg











- Objective


Vital Signs: 


 Vital Signs











Temperature  101.1 F H  11/19/18 10:00


 


Pulse Rate  101 H  11/19/18 10:00


 


Respiratory Rate  18   11/19/18 11:27


 


Blood Pressure  114/77   11/19/18 10:00


 


O2 Sat by Pulse Oximetry (%)  98   11/19/18 10:00











Constitutional: Yes: No Distress


Cardiovascular: Yes: Regular Rate and Rhythm, S1, S2


Respiratory: Yes: Mechanically Ventilated


Gastrointestinal: Yes: Normal Bowel Sounds, Soft.  No: Tenderness


Edema: Yes


Edema: LLE: 1+, RLE: 1+


Labs: 


 CBC, BMP





 11/19/18 05:30 





 11/19/18 05:30 





 INR, PTT











INR  1.28  (0.83-1.09)  H  11/15/18  05:30    


 


Fibrinogen  290.0 mg/dL (238-498)   11/11/18  06:00    














Assessment/Plan


Respiratory failure


 RLL pneumonia


 Acalculus  Cholecystitis


 Toxic metabolic encephalopathy


 Hypercalcemia


 Renal failure


 Myeloma


 Hyperviscosity syndrome


 


  


 Will reculture in light of fever


  Substitute levaquin for ceftazidime


  Redose Vancomycin


  Ventilatory support


  Prognosis poor

## 2018-11-19 NOTE — PN
Progress Note (short form)





- Note


Progress Note: 





Renal follow up for Hypercalcemia/HAM





Pt seen and examined in the ICU


on the vent, no overnight events








 Vital Signs











Temperature  100 F H  11/19/18 05:55


 


Pulse Rate  123 H  11/19/18 08:50


 


Respiratory Rate  16   11/19/18 08:50


 


Blood Pressure  111/71   11/19/18 08:00


 


O2 Sat by Pulse Oximetry (%)  98   11/19/18 08:50








 Intake & Output











 11/16/18 11/17/18 11/18/18 11/19/18





 23:59 23:59 23:59 23:59


 


Intake Total 910 2780 1300 1200


 


Output Total 1700 1600 1800 300


 


Balance -790 1180 -500 900


 


Weight 80.541 kg 80.541 kg 80.921 kg 81.737 kg





NAD


on vent via ET tube


Dec BS


no LE edema





 CBC, BMP





 11/19/18 05:30 





 11/19/18 05:30 





 Laboratory Tests











  11/17/18 11/19/18





  14:40 05:30


 


Creat Clearance w eGFR  22.91 


 


Calcium  6.3 L*  6.1 L*


 


Phosphorus   3.7


 


Magnesium   3.0 H


 


Albumin   1.3 L








 Current Medications





Acetaminophen (Tylenol -)  650 mg PO Q6H PRN


   PRN Reason: PAIN LEVEL 1 - 3


   Last Admin: 11/16/18 16:25 Dose:  650 mg


Acetaminophen (Tylenol Suppository -)  650 mg RC Q6H PRN


   PRN Reason: FEVER


Allopurinol (Zyloprim -)  100 mg PO BID [facility]


   Last Admin: 11/19/18 10:10 Dose:  100 mg


Chlorhexidine Gluconate (Peridex -)  15 ml MM BID [facility]


   Last Admin: 11/19/18 10:11 Dose:  15 ml


Cholecalciferol (Vitamin D3 -)  1,000 unit PO DAILY [facility]


   Last Admin: 11/19/18 10:10 Dose:  1,000 unit


Diphenhydramine HCl (Benadryl Injection -)  25 mg IVPB ONCE PRN


   PRN Reason: DURING PLASMAPHERESIS


Heparin Sodium (Porcine) (Heparin -)  1,000 unit IVPUSH PRN PRN


   PRN Reason: Heparin


Heparin Sodium (Porcine) (Heparin -)  5,000 unit IVPUSH PRN PRN


   PRN Reason: Heparin


Ceftazidime 1 gm/ Dextrose  50 mls @ 200 mls/hr IVPB DAILY [facility]; Protocol


   Last Admin: 11/18/18 10:25 Dose:  200 mls/hr


Sodium Bicarbonate 75 meq/ (Sodium Chloride)  1,075 mls @ 100 mls/hr IV Q10H [facility]


   Last Admin: 11/19/18 01:15 Dose:  Not Given


Metronidazole (Flagyl 500mg Premixed Ivpb -)  500 mg in 100 mls @ 100 mls/hr 

IVPB BID [facility]


   Last Admin: 11/19/18 10:10 Dose:  100 mls/hr


Lactulose (Cephulac (Oral Use))  20 gm PO TID [facility]


   Last Admin: 11/19/18 05:54 Dose:  20 gm


Metoprolol Tartrate (Lopressor Injection -)  5 mg IVPUSH Q8H PRN


   PRN Reason: TACHYCARDIA


Metoprolol Tartrate (Lopressor -)  75 mg PO BID [facility]


   Last Admin: 11/19/18 10:10 Dose:  75 mg


Pantoprazole Sodium (Protonix Iv)  40 mg IVPUSH DAILY [facility]


   Last Admin: 11/19/18 10:10 Dose:  40 mg


Rifaximin (Xifaxan -)  550 mg PO BID [facility]


   Last Admin: 11/19/18 10:10 Dose:  550 mg


Thiamine HCl (Vitamin B1 -)  100 mg PO DAILY [facility]


   Last Admin: 11/19/18 10:11 Dose:  100 mg

















78 year old gentleman with hx of CKD, Afib on A/C, CAD, CKD, Hypertension, 

Hyperlipidemia, PVD who presented with AMS/Confusion and found to have HAM.





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 

showed no stones or obstruction, UA w/o protein but UPCR ~0.5 indicating non-

albumin proteinuria)


#AMS 


#Newly diagnosed multiple myloma 


#Anemia 


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


#Hyperdense lesion on US of the kidney 


#Normal anion gap metabolic acidosis 


#Hyperkalemia (r/o tumor lysis)





Renal function stable at this time


pt is non-oliguric


serum K is improved, will d/c bicarb gtt


start hypotonic saline with dextrose 


Trend Ca


supportive care


tube fees as per ICU


Prognosis is poor





Ricky Chang DO

## 2018-11-19 NOTE — PN
Teaching Attending Note


Name of Resident: Ganga Mccormick





ATTENDING PHYSICIAN STATEMENT





I saw and evaluated the patient.


I reviewed the resident's note and discussed the case with the resident.


I agree with the resident's findings and plan as documented.








SUBJECTIVE:


Patient seen and examined in the ICU.  Remains intubated, poorly responsive.   


No gross change in overall mental status / condition. 


Family still deciding on GOC. 





OBJECTIVE:





  


  


 Intake & Output











 11/16/18 11/17/18 11/18/18 11/19/18





 23:59 23:59 23:59 23:59


 


Intake Total 910 2780 1300 1200


 


Output Total 1700 1600 1800 300


 


Balance -790 1180 -500 900


 


Weight 177 lb 9 oz 177 lb 9 oz 178 lb 6.4 oz 180 lb 3.2 oz








 Last Vital Signs











Temp Pulse Resp BP Pulse Ox


 


 101.1 F H  101 H  18   114/77   98 


 


 11/19/18 10:00  11/19/18 10:00  11/19/18 11:27  11/19/18 10:00  11/19/18 10:00








Active Medications





Acetaminophen (Tylenol -)  650 mg PO Q6H PRN


   PRN Reason: PAIN LEVEL 1 - 3


   Last Admin: 11/16/18 16:25 Dose:  650 mg


Acetaminophen (Tylenol Suppository -)  650 mg RC Q6H PRN


   PRN Reason: FEVER


Allopurinol (Zyloprim -)  100 mg PO BID [facility]


   Last Admin: 11/19/18 10:10 Dose:  100 mg


Chlorhexidine Gluconate (Peridex -)  15 ml MM BID [facility]


   Last Admin: 11/19/18 10:11 Dose:  15 ml


Cholecalciferol (Vitamin D3 -)  1,000 unit PO DAILY [facility]


   Last Admin: 11/19/18 10:10 Dose:  1,000 unit


Diphenhydramine HCl (Benadryl Injection -)  25 mg IVPB ONCE PRN


   PRN Reason: DURING PLASMAPHERESIS


Heparin Sodium (Porcine) (Heparin -)  1,000 unit IVPUSH PRN PRN


   PRN Reason: Heparin


Heparin Sodium (Porcine) (Heparin -)  5,000 unit IVPUSH PRN PRN


   PRN Reason: Heparin


Ceftazidime 1 gm/ Dextrose  50 mls @ 200 mls/hr IVPB DAILY [facility]; Protocol


   Last Admin: 11/18/18 10:25 Dose:  200 mls/hr


Metronidazole (Flagyl 500mg Premixed Ivpb -)  500 mg in 100 mls @ 100 mls/hr 

IVPB BID [facility]


   Last Admin: 11/19/18 10:10 Dose:  100 mls/hr


Sodium Chloride (1/2 Normal Saline)  1,000 mls @ 75 mls/hr IV ASDIR [facility]


Lactulose (Cephulac (Oral Use))  20 gm PO TID [facility]


   Last Admin: 11/19/18 05:54 Dose:  20 gm


Metoprolol Tartrate (Lopressor Injection -)  5 mg IVPUSH Q8H PRN


   PRN Reason: TACHYCARDIA


Metoprolol Tartrate (Lopressor -)  75 mg PO BID [facility]


   Last Admin: 11/19/18 10:10 Dose:  75 mg


Pantoprazole Sodium (Protonix Iv)  40 mg IVPUSH DAILY [facility]


   Last Admin: 11/19/18 10:10 Dose:  40 mg


Rifaximin (Xifaxan -)  550 mg PO BID [facility]


   Last Admin: 11/19/18 10:10 Dose:  550 mg


Thiamine HCl (Vitamin B1 -)  100 mg PO DAILY [facility]


   Last Admin: 11/19/18 10:11 Dose:  100 mg











Gen:  intubated, poorly responsive 


Heart: irregular


Lung: scattered rhonchi


Abd: soft, nontender


Ext: no edema


 


 Laboratory Results - last 24 hr











  11/18/18 11/19/18 11/19/18





  18:06 05:30 05:30


 


WBC   5.3 


 


RBC   2.66 L 


 


Hgb   7.9 L 


 


Hct   24.0 L 


 


MCV   90.3 


 


MCH   29.5 


 


MCHC   32.7 


 


RDW   16.1 H 


 


Plt Count   195 


 


MPV   9.0 


 


PTT (Actin FS)    28.8


 


Puncture Site   


 


ABG pH   


 


ABG pCO2 at Pt Temp   


 


ABG pO2 at Pt Temp   


 


ABG HCO3   


 


ABG O2 Sat (Measured)   


 


ABG O2 Content   


 


ABG Base Excess   


 


Chacho Test   


 


Oxygen Flow Rate   


 


PEEP   


 


Sodium  151 H  


 


Potassium  5.2 H  


 


Chloride  124 H  


 


Carbon Dioxide  23  


 


Anion Gap  5 L  


 


BUN  102 H  


 


Creatinine  2.7 H  


 


Creat Clearance w eGFR  22.91  


 


Random Glucose  88  


 


Calcium  6.0 L*  


 


Phosphorus   


 


Magnesium   


 


Total Bilirubin   


 


AST   


 


ALT   


 


Alkaline Phosphatase   


 


Total Protein   


 


Albumin   














  11/19/18 11/19/18





  05:30 06:00


 


WBC  


 


RBC  


 


Hgb  


 


Hct  


 


MCV  


 


MCH  


 


MCHC  


 


RDW  


 


Plt Count  


 


MPV  


 


PTT (Actin FS)  


 


Puncture Site   Right radial


 


ABG pH   7.53 H


 


ABG pCO2 at Pt Temp   29.3 L


 


ABG pO2 at Pt Temp   80.2  D


 


ABG HCO3   24.4


 


ABG O2 Sat (Measured)   96.1


 


ABG O2 Content   12.3 L


 


ABG Base Excess   2.3 H


 


Chacho Test   No Result Required.


 


Oxygen Flow Rate   Yes


 


PEEP   


 


Sodium  153 H 


 


Potassium  5.1 


 


Chloride  124 H 


 


Carbon Dioxide  25 


 


Anion Gap  5 L 


 


BUN  99 H 


 


Creatinine  2.5 H 


 


Creat Clearance w eGFR  25.04 


 


Random Glucose  85 


 


Calcium  6.1 L* 


 


Phosphorus  3.7 


 


Magnesium  3.0 H 


 


Total Bilirubin  0.6 


 


AST  52 H 


 


ALT  180 H 


 


Alkaline Phosphatase  84 


 


Total Protein  9.4 H 


 


Albumin  1.3 L 














ASSESSMENT AND PLAN:


Acute Hypoxic Respiratory Failure


Altered Mental Status


Metabolic Encephalopathy


Pneumonia


Acalculous Cholecystitis


Multiple Myeloma


Acute on Chronic Renal Failure


Metabolic Acidosis


LV Diastolic Dysfunction


Atrial Fibrillation


CAD


+Troponins likely Demand Ischemia


PAD


Hyperlipidemia


HTN





-  continue antibiotics per ID 


-  monitor urine output, creatinine


-  rate control


-  continue anticoagulation


-  minimize sedation to assess mental status 


-  spontaneous breathing trials as tolerated


-  DVT/GI prophylaxis


-  continue discussions regarding goals of care: Compassionate extubation 

versus Tracheostomy


-  palliative care involved 


-  continue ICU monitoring


-  grave overall prognosis








Dr Blum 


Critical care time spent in reviewing chart, evaluating patient and formulating 

plan 35 min

## 2018-11-19 NOTE — PN
Progress Note, Physician


History of Present Illness: 


Poorly responsive on vent, persistent afib on lopressor and heparin gtt.





- Current Medication List


Current Medications: 


Active Medications





Acetaminophen (Tylenol -)  650 mg PO Q6H PRN


   PRN Reason: PAIN LEVEL 1 - 3


   Last Admin: 11/16/18 16:25 Dose:  650 mg


Acetaminophen (Tylenol Suppository -)  650 mg RC Q6H PRN


   PRN Reason: FEVER


Allopurinol (Zyloprim -)  100 mg PO BID [facility]


   Last Admin: 11/19/18 10:10 Dose:  100 mg


Chlorhexidine Gluconate (Peridex -)  15 ml MM BID [facility]


   Last Admin: 11/19/18 10:11 Dose:  15 ml


Cholecalciferol (Vitamin D3 -)  1,000 unit PO DAILY [facility]


   Last Admin: 11/19/18 10:10 Dose:  1,000 unit


Diphenhydramine HCl (Benadryl Injection -)  25 mg IVPB ONCE PRN


   PRN Reason: DURING PLASMAPHERESIS


Heparin Sodium (Porcine) (Heparin -)  1,000 unit IVPUSH PRN PRN


   PRN Reason: Heparin


Heparin Sodium (Porcine) (Heparin -)  5,000 unit IVPUSH PRN PRN


   PRN Reason: Heparin


Ceftazidime 1 gm/ Dextrose  50 mls @ 200 mls/hr IVPB DAILY [facility]; Protocol


   Last Admin: 11/18/18 10:25 Dose:  200 mls/hr


Sodium Bicarbonate 75 meq/ (Sodium Chloride)  1,075 mls @ 100 mls/hr IV Q10H [facility]


   Last Admin: 11/19/18 01:15 Dose:  Not Given


Metronidazole (Flagyl 500mg Premixed Ivpb -)  500 mg in 100 mls @ 100 mls/hr 

IVPB BID [facility]


   Last Admin: 11/19/18 10:10 Dose:  100 mls/hr


Lactulose (Cephulac (Oral Use))  20 gm PO TID [facility]


   Last Admin: 11/19/18 05:54 Dose:  20 gm


Metoprolol Tartrate (Lopressor Injection -)  5 mg IVPUSH Q8H PRN


   PRN Reason: TACHYCARDIA


Metoprolol Tartrate (Lopressor -)  75 mg PO BID [facility]


   Last Admin: 11/19/18 10:10 Dose:  75 mg


Pantoprazole Sodium (Protonix Iv)  40 mg IVPUSH DAILY [facility]


   Last Admin: 11/19/18 10:10 Dose:  40 mg


Rifaximin (Xifaxan -)  550 mg PO BID [facility]


   Last Admin: 11/19/18 10:10 Dose:  550 mg


Thiamine HCl (Vitamin B1 -)  100 mg PO DAILY [facility]


   Last Admin: 11/19/18 10:11 Dose:  100 mg











- Objective


Vital Signs: 


 Vital Signs











Temperature  101.1 F H  11/19/18 10:00


 


Pulse Rate  101 H  11/19/18 10:00


 


Respiratory Rate  14   11/19/18 10:00


 


Blood Pressure  114/77   11/19/18 10:00


 


O2 Sat by Pulse Oximetry (%)  98   11/19/18 10:00











Constitutional: Yes: No Distress, Calm


Neck: Yes: Supple


Cardiovascular: Yes: Pulse Irregular


Respiratory: Yes: Intubated, Mechanically Ventilated


Gastrointestinal: Yes: Normal Bowel Sounds, Soft


Edema: No


Labs: 


 CBC, BMP





 11/19/18 05:30 





 11/19/18 05:30 





 INR, PTT











INR  1.28  (0.83-1.09)  H  11/15/18  05:30    


 


Fibrinogen  290.0 mg/dL (238-498)   11/11/18  06:00    














- ....Imaging


EKG: Report Reviewed (Tele: Afib)





Problem List





- Problems


(1) Atrial fibrillation with RVR


Code(s): I48.91 - UNSPECIFIED ATRIAL FIBRILLATION   





(2) Acute-on-chronic kidney injury


Code(s): N17.9 - ACUTE KIDNEY FAILURE, UNSPECIFIED; N18.9 - CHRONIC KIDNEY 

DISEASE, UNSPECIFIED   


Qualifiers: 


   Acute renal failure type: unspecified   Chronic kidney disease stage: 

unspecified stage   Qualified Code(s): N17.9 - Acute kidney failure, unspecified

; N18.9 - Chronic kidney disease, unspecified   





(3) Demand ischemia


Code(s): I24.8 - OTHER FORMS OF ACUTE ISCHEMIC HEART DISEASE   





(4) Hypercalcemia


Code(s): E83.52 - HYPERCALCEMIA   





(5) Nonadherence to medication


Code(s): Z91.14 - PATIENT'S OTHER NONCOMPLIANCE WITH MEDICATION REGIMEN   





(6) Paraproteinemia


Code(s): D89.2 - HYPERGAMMAGLOBULINEMIA, UNSPECIFIED   





(7) Anticoagulant long-term use


Code(s): Z79.01 - LONG TERM (CURRENT) USE OF ANTICOAGULANTS   





(8) Chronic thromboembolic disease


Code(s): I74.9 - EMBOLISM AND THROMBOSIS OF UNSPECIFIED ARTERY   





(9) Coronary artery disease


Code(s): I25.10 - ATHSCL HEART DISEASE OF NATIVE CORONARY ARTERY W/O ANG PCTRS 

  


Qualifiers: 


   Coronary Disease-Associated Artery/Lesion type: native artery   Native vs. 

transplanted heart: native heart   Associated angina: without angina   

Qualified Code(s): I25.10 - Atherosclerotic heart disease of native coronary 

artery without angina pectoris   





(10) Diastolic dysfunction without heart failure


Code(s): I51.9 - HEART DISEASE, UNSPECIFIED   





(11) HTN (hypertension)


Code(s): I10 - ESSENTIAL (PRIMARY) HYPERTENSION   


Qualifiers: 


   Hypertension type: essential hypertension   Qualified Code(s): I10 - 

Essential (primary) hypertension   





(12) Hypertrophic cardiomyopathy


Code(s): I42.2 - OTHER HYPERTROPHIC CARDIOMYOPATHY   





(13) Hyperlipidemia


Code(s): E78.5 - HYPERLIPIDEMIA, UNSPECIFIED   


Qualifiers: 


   Hyperlipidemia type: pure hypercholesterolemia   Qualified Code(s): E78.00 - 

Pure hypercholesterolemia, unspecified; E78.0 - Pure hypercholesterolemia   





(14) Multiple myeloma


Code(s): C90.00 - MULTIPLE MYELOMA NOT HAVING ACHIEVED REMISSION   


Qualifiers: 


   Multiple myeloma remission status: not in remission   Qualified Code(s): 

C90.00 - Multiple myeloma not having achieved remission   





(15) Acalculous cholecystitis


Code(s): K81.9 - CHOLECYSTITIS, UNSPECIFIED   





Assessment/Plan


R&LHc at [facility] 1/11/2017 showing nonobstructive CAD, severe LV apical 

hypertrophic cardiomyopathy, mildly elevated right sided pressures, Mynx 

deployed right CFA access site. Study is consistent with apical hypertrophy (

spade-like) variant of hypertrophic cardiomyopathy, planned for optimal medical 

therapy. 


Echocardiogram: 07/11/2018 Mod cLVH, severe DYANA, mod TR RVSP 50-60 mmHg, mod-

severe MR


Echocardiogram: 10/16/2018 Normal LV size with hyperdynamic LVEF 75%, mild BSH, 

normal RV size and fxn, severe LAE, mod-severe MR, mod TR





1. Acute hypoxic respiratory failure, suspected aspiration pneumonia 


2. Toxic metabolic encephelopathy, possible hyperviscosity syndrome, sepsis


3. 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 


4. Hypercalcemia, paraproteinemia, anemia-> confirmed multiple myeloma, 

hyperviscosity syndrome


5. History of bilateral SFA occlusion post thrombectomy, referable to embolic 

disease related to persistent atrial fibrillation with [id number] score of 6, 

history of poor compliance with anticoagulation and medical F/U


6. Non obstructive CAD on R&LHc coronary angiography with demand ischemia


7. 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 

subendocardial ischemia, compensated/euvolemic


8. Persistent atrial fibrillation with RVR [id number] score of 6 on heparin gtt


9. Labile HTN


10. Acalculous Cholecystitis


11. Ischemic hepatitis


12. Anemia


13. Hypernatremia





PLAN:





1. Heparin gtt while off Xarelto 15 qd (renal dosing). transfuse to maintain Hgb

>8.0


2. Uptitrated Lopressor 75 bid as hemodynamics tolerate with IV Lopressor as 

needed for rate-control


3. Empiric renal-dosed abx course pending C&S


4. Enteral feeds with free water repletion, lactulose for hyperammonemia, 

monitor ammonia levels


5. Ventilator management, wean off sedation, GI protection


6. Consider IR cholecystotomy decompression per GI recs


7. LFTs downtrending


8. Continue discussions regarding goals of care with family: Compassionate 

extubation versus Tracheostomy

## 2018-11-20 LAB
ALBUMIN SERPL-MCNC: 1.2 G/DL (ref 3.4–5)
ALP SERPL-CCNC: 76 U/L (ref 45–117)
ALT SERPL-CCNC: 126 U/L (ref 13–61)
ANION GAP SERPL CALC-SCNC: 6 MMOL/L (ref 8–16)
ARTERIAL BLOOD GAS PCO2: 29.8 MMHG (ref 35–45)
ARTERIAL PATENCY WRIST A: POSITIVE
AST SERPL-CCNC: 55 U/L (ref 15–37)
BASE EXCESS BLDA CALC-SCNC: -0.3 MEQ/L (ref -2–2)
BILIRUB SERPL-MCNC: 0.4 MG/DL (ref 0.2–1)
BUN SERPL-MCNC: 92 MG/DL (ref 7–18)
CALCIUM SERPL-MCNC: 5.6 MG/DL (ref 8.5–10.1)
CHLORIDE SERPL-SCNC: 122 MMOL/L (ref 98–107)
CO2 SERPL-SCNC: 23 MMOL/L (ref 21–32)
CREAT SERPL-MCNC: 2.7 MG/DL (ref 0.55–1.3)
DEPRECATED RDW RBC AUTO: 16.1 % (ref 11.9–15.9)
GLUCOSE SERPL-MCNC: 106 MG/DL (ref 74–106)
HCT VFR BLD CALC: 23.3 % (ref 35.4–49)
HGB BLD-MCNC: 7.5 GM/DL (ref 11.7–16.9)
MAGNESIUM SERPL-MCNC: 2.7 MG/DL (ref 1.8–2.4)
MCH RBC QN AUTO: 29.7 PG (ref 25.7–33.7)
MCHC RBC AUTO-ENTMCNC: 32.1 G/DL (ref 32–35.9)
MCV RBC: 92.4 FL (ref 80–96)
PHOSPHATE SERPL-MCNC: 4.1 MG/DL (ref 2.5–4.9)
PLATELET # BLD AUTO: 167 K/MM3 (ref 134–434)
PMV BLD: 9.1 FL (ref 7.5–11.1)
PO2 BLDA: 95.7 MMHG (ref 70–100)
POTASSIUM SERPLBLD-SCNC: 5.1 MMOL/L (ref 3.5–5.1)
PROT SERPL-MCNC: 9.2 G/DL (ref 6.4–8.2)
RBC # BLD AUTO: 2.52 M/MM3 (ref 4–5.6)
SAO2 % BLDA: 97.9 % (ref 90–98.9)
SODIUM SERPL-SCNC: 152 MMOL/L (ref 136–145)
WBC # BLD AUTO: 5.7 K/MM3 (ref 4–10)

## 2018-11-20 RX ADMIN — METOPROLOL TARTRATE SCH: 50 TABLET, FILM COATED ORAL at 09:48

## 2018-11-20 RX ADMIN — LACTULOSE SCH GM: 20 SOLUTION ORAL at 21:04

## 2018-11-20 RX ADMIN — CHLORHEXIDINE GLUCONATE 0.12% ORAL RINSE SCH ML: 1.2 LIQUID ORAL at 21:05

## 2018-11-20 RX ADMIN — ALLOPURINOL SCH MG: 100 TABLET ORAL at 09:48

## 2018-11-20 RX ADMIN — VITAMIN D, TAB 1000IU (100/BT) SCH UNIT: 25 TAB at 09:48

## 2018-11-20 RX ADMIN — Medication SCH MG: at 09:48

## 2018-11-20 RX ADMIN — RIFAXIMIN SCH MG: 550 TABLET ORAL at 09:48

## 2018-11-20 RX ADMIN — PANTOPRAZOLE SODIUM SCH MG: 40 INJECTION, POWDER, FOR SOLUTION INTRAVENOUS at 09:45

## 2018-11-20 RX ADMIN — ALLOPURINOL SCH MG: 100 TABLET ORAL at 21:05

## 2018-11-20 RX ADMIN — CHLORHEXIDINE GLUCONATE 0.12% ORAL RINSE SCH ML: 1.2 LIQUID ORAL at 09:47

## 2018-11-20 RX ADMIN — METOPROLOL TARTRATE SCH MG: 50 TABLET, FILM COATED ORAL at 21:05

## 2018-11-20 RX ADMIN — RIFAXIMIN SCH MG: 550 TABLET ORAL at 21:05

## 2018-11-20 RX ADMIN — METOPROLOL TARTRATE PRN MG: 5 INJECTION, SOLUTION INTRAVENOUS at 19:38

## 2018-11-20 RX ADMIN — LACTULOSE SCH GM: 20 SOLUTION ORAL at 15:00

## 2018-11-20 RX ADMIN — LACTULOSE SCH GM: 20 SOLUTION ORAL at 05:48

## 2018-11-20 RX ADMIN — SODIUM CHLORIDE SCH MLS/HR: 4.5 INJECTION, SOLUTION INTRAVENOUS at 19:30

## 2018-11-20 NOTE — PN
Progress Note (short form)





- Note


Progress Note: 


Patient seen and examined at [facility] the ICU. Patient remains intubated with 

minimal sedation, however no gross change in overall mental status.  Patient 

has had intermittent fevers.  Overnight patient's hemoglobin dropped to <8.0 

and is now receiving 1 unit of PRBC.   Ongoing discussion of GOC with family.  





 


 Vital Signs











Temperature  99.6 F   11/20/18 06:00


 


Pulse Rate  109 H  11/20/18 12:00


 


Respiratory Rate  14   11/20/18 12:00


 


Blood Pressure  101/78   11/20/18 12:00


 


O2 Sat by Pulse Oximetry (%)  98   11/19/18 20:00











GENERAL: Intubated and sedated 


ENT: Dry mucous membranes


LUNGS: bilateral diffuse rhonchi


HEART: Tachycardic rate and irregular rhythm. 


ABDOMEN: Soft, nondistended, normoactive bowel sounds 


EXTREMITIES: No edema, scattered ulcers. 


NEUROLOGICAL: Cannot assess secondary to clinical condition. 


SKIN: Warm, dry, normal turgor, scattered ulcers on the legs





 Laboratory Results 





 11/20/18 05:30 





 11/20/18 05:30 














  11/20/18





  05:30


 


Calcium  5.6 L*


 


Phosphorus  4.1


 


Magnesium  2.7 H


 


AST  55 H


 


ALT  126 H


 


Total Protein  9.2 H


 


Albumin  1.2 L




















Patient is a 78 year old male who presented to the hospital after being found 

unresponsive by his neighbors.  Patient had AMS, HAM, hypercalcemia and 

admitted for further monitoring and management. 





Problem List:


Altered mental status


Toxic metabolic encephalopathy


Hypercalcemia likely from malignancy


HAM on CKD


History of alcohol abuse


HTN


HLD


CAD 


Afib 


diastolic dysfunction


troponinemia


Hypertrophic cardiomypoathy (apical)


sepsis secondary to RLL pneumonia


acute hypoxemic respiratory failure


Vitamin D deficiency 


multiple myeloma





Plan:


-Patient currently being transfused due to hgb <8 


-Continues to have elevated creatinine and now with hypocalcemia 


-S/p plasmapheresis and 5 days of dex with no improvement in mental status. 


-SPEP noted total protein and globulins elevated 


-Kappa/Lambda ratio > 100 consistent with Multiple myeloma


-ABx per ID


-replete calcium per ICU


-Palliative consult to discuss goals of care with family

## 2018-11-20 NOTE — PN
Progress Note, Physician





- Current Medication List


Current Medications: 


Active Medications





Acetaminophen (Tylenol -)  650 mg PO Q6H PRN


   PRN Reason: PAIN LEVEL 1 - 3


   Last Admin: 11/16/18 16:25 Dose:  650 mg


Acetaminophen (Tylenol Suppository -)  650 mg RC Q6H PRN


   PRN Reason: FEVER


Allopurinol (Zyloprim -)  100 mg PO BID [facility]


   Last Admin: 11/19/18 21:24 Dose:  100 mg


Chlorhexidine Gluconate (Peridex -)  15 ml MM BID [facility]


   Last Admin: 11/19/18 21:23 Dose:  15 ml


Cholecalciferol (Vitamin D3 -)  1,000 unit PO DAILY [facility]


   Last Admin: 11/19/18 10:10 Dose:  1,000 unit


Diphenhydramine HCl (Benadryl Injection -)  25 mg IVPB ONCE PRN


   PRN Reason: DURING PLASMAPHERESIS


Heparin Sodium (Porcine) (Heparin -)  1,000 unit IVPUSH PRN PRN


   PRN Reason: Heparin


Heparin Sodium (Porcine) (Heparin -)  5,000 unit IVPUSH PRN PRN


   PRN Reason: Heparin


Metronidazole (Flagyl 500mg Premixed Ivpb -)  500 mg in 100 mls @ 100 mls/hr 

IVPB BID [facility]


   Last Admin: 11/19/18 21:23 Dose:  100 mls/hr


Sodium Chloride (1/2 Normal Saline)  1,000 mls @ 75 mls/hr IV ASDIR [facility]


   Last Admin: 11/19/18 13:39 Dose:  75 mls/hr


Levofloxacin (Levaquin 250 Mg Premixed Ivpb -)  250 mg in 50 mls @ 50 mls/hr 

IVPB DAILY [facility]; Protocol


   Last Admin: 11/19/18 13:12 Dose:  50 mls/hr


Lactulose (Cephulac (Oral Use))  20 gm PO TID [facility]


   Last Admin: 11/20/18 05:48 Dose:  20 gm


Metoprolol Tartrate (Lopressor Injection -)  5 mg IVPUSH Q8H PRN


   PRN Reason: TACHYCARDIA


Metoprolol Tartrate (Lopressor -)  75 mg PO BID [facility]


   Last Admin: 11/19/18 21:23 Dose:  75 mg


Pantoprazole Sodium (Protonix Iv)  40 mg IVPUSH DAILY [facility]


   Last Admin: 11/19/18 10:10 Dose:  40 mg


Rifaximin (Xifaxan -)  550 mg PO BID [facility]


   Last Admin: 11/19/18 21:24 Dose:  550 mg


Thiamine HCl (Vitamin B1 -)  100 mg PO DAILY [facility]


   Last Admin: 11/19/18 10:11 Dose:  100 mg











- Objective


Vital Signs: 


 Vital Signs











Temperature  99.6 F   11/20/18 06:00


 


Pulse Rate  111 H  11/20/18 08:00


 


Respiratory Rate  17   11/20/18 08:00


 


Blood Pressure  100/63   11/20/18 08:00


 


O2 Sat by Pulse Oximetry (%)  98   11/19/18 20:00











Cardiovascular: Yes: S1, S2


Respiratory: Yes: Mechanically Ventilated


Gastrointestinal: Yes: Normal Bowel Sounds, Soft


Labs: 


 CBC, BMP





 11/20/18 05:30 





 11/20/18 05:30 





 INR, PTT











INR  1.28  (0.83-1.09)  H  11/15/18  05:30    


 


Fibrinogen  290.0 mg/dL (238-498)   11/11/18  06:00    














Problem List





- Problems


(1) Toxic metabolic encephalopathy


Code(s): G92 - TOXIC ENCEPHALOPATHY   





(2) Cellulitis


Code(s): L03.90 - CELLULITIS, UNSPECIFIED   


Qualifiers: 


   Site of cellulitis: extremity   Site of cellulitis of extremity: lower 

extremity   Laterality: right   Qualified Code(s): L03.115 - Cellulitis of 

right lower limb   





(3) Sepsis


Code(s): A41.9 - SEPSIS, UNSPECIFIED ORGANISM   





(4) Artery occlusion


Code(s): I70.90 - UNSPECIFIED ATHEROSCLEROSIS   





(5) Hypercalcemia


Code(s): E83.52 - HYPERCALCEMIA   





(6) Paroxysmal atrial fibrillation


Code(s): I48.0 - PAROXYSMAL ATRIAL FIBRILLATION   





(7) HAM (acute kidney injury)


Code(s): N17.9 - ACUTE KIDNEY FAILURE, UNSPECIFIED   





Assessment/Plan








- Problems


(1) Elevated LFTs


Assessment/Plan: 


ultrasound of liver noted suggestive of acalculous cholecystitis, will get GI 

consult- noted 


lft trending down


regarding acalculous cholecystits possible iR evaluation for cholecystostomy 

decopmression


currently awaitng family decision regards goals of care


Code(s): R94.5 - ABNORMAL RESULTS OF LIVER FUNCTION STUDIES   





(2) HAM (acute kidney injury)


Assessment/Plan: 


bun/cr is better from 2.8 to 2 on .ivf 


serum bicarb is now 22


corrected calcium


Code(s): N17.9 - ACUTE KIDNEY FAILURE, UNSPECIFIED   





(3) Atrial fibrillation with RVR


Assessment/Plan: 


heparin drip 


cardiac monitor


rate control with metoprolol








(4) Respiratory failure


Assessment/Plan: 


intubated


propofol/fentanyl


aviating family decision regarding goals of care possible compassionate weaning 

vs tracheostomy


Code(s): J96.90 - RESPIRATORY FAILURE, UNSP, UNSP W HYPOXIA OR HYPERCAPNIA   





(5) Toxic metabolic encephalopathy


Assessment/Plan: 


Microbiology





11/14/18 11:20   Urine - Urine Garces   Urine Culture - Final


                              NO GROWTH OBTAINED


11/06/18 14:30   Urine - Urine Garces   Legionella Antigen - Final


11/06/18 14:30   Urine - Urine Garces   Streptococcus pneumoniae Antigen (M - 

Final


11/06/18 14:30   Sputum - Endotrachea Suction/Ventilator   Gram Stain - Final


11/06/18 14:30   Sputum - Endotrachea Suction/Ventilator   Sputum Culture - 

Final


                              Stenotrophomon.(X.)Maltophilia


11/14/18 09:58   Blood - Peripheral Venous   Blood Culture - Preliminary


                              NO GROWTH OBTAINED AFTER 24 HOURS, INCUBATION TO 

CONTINUE


                              FOR 4 DAYS.


11/14/18 09:50   Blood - Peripheral Venous   Blood Culture - Preliminary


                              NO GROWTH OBTAINED AFTER 24 HOURS, INCUBATION TO 

CONTINUE


                              FOR 4 DAYS.


11/10/18 15:00   Blood - Peripheral Venous   Blood Culture - Preliminary


                              NO GROWTH OBTAINED AFTER 96 HOURS, INCUBATION TO 

CONTINUE


                              FOR 1 DAYS.


11/10/18 14:00   Blood - Central Line   Blood Culture - Preliminary


                              NO GROWTH OBTAINED AFTER 96 HOURS, INCUBATION TO 

CONTINUE


                              FOR 1 DAYS.





afebrile normal WBC repeat cultures as above


on ceftazidime - and flagly


temp 101


Code(s): G92 - TOXIC ENCEPHALOPATHY   





(6) Altered mental status


Assessment/Plan: 


maybe secondary to toxic metabolic encephalopathy


neurology consult appreciated


on lactulose TID still persistently elevated Nh3 level


on steroid as well dexamethasone - no improvement in mental status


s/p plasmapheresis


dvt ppx lovenox


Code(s): R41.82 - ALTERED MENTAL STATUS, UNSPECIFIED

## 2018-11-20 NOTE — PN
Physical Exam: 


SUBJECTIVE: Patient seen and examined in the ICU.  Remains intubated and 

sedated.  Intermittent fever. No gross change in overall mental status. GOC/

family meeting initiated and ongoing. noted to have intermittent episodes of V-

tach.  Heparin on hold related to dropping Hg, transfusing 1 u prbc 








OBJECTIVE:





 Vital Signs











 Period  Temp  Pulse  Resp  BP Sys/Varghese  Pulse Ox


 


 Last 24 Hr  99.6 F-100.9 F  104-129  14-19  /59-84  98











GENERAL: The patient is intubated and sedated.


HEAD: NCAT


EYES:  sclera anicteric. 


ENT: Ears normal, nares patent, dry mucous membranes


NECK: Trachea midline. 


LUNGS: bilateral diffuse rhonchi


HEART: Tachycardic rate and irregular rhythm. 


ABDOMEN: Soft, nondistended.


EXTREMITIES: 2+ pulses, warm, well-perfused, no edema, scattered ulcers. 


NEUROLOGICAL: Cannot assess secondary to clinical condition. 


SKIN: Warm, dry, normal turgor, scattered ulcers on the legs














 Laboratory Results - last 24 hr











  11/17/18 11/19/18 11/20/18





  08:05 16:00 05:30


 


WBC   4.9  5.7


 


RBC   2.71 L  2.52 L


 


Hgb   8.4 L  7.5 L


 


Hct   24.5 L  23.3 L


 


MCV   90.5  92.4


 


MCH   31.2  29.7


 


MCHC   34.4  32.1


 


RDW   16.5 H  16.1 H


 


Plt Count   187  167


 


MPV   8.7  9.1


 


Absolute Neuts (auto)   4.2 


 


Neutrophils %   85.3 H 


 


Neutrophils % (Manual)   80.0 


 


Band Neutrophils %   4.0 


 


Lymphocytes %   11.4 


 


Lymphocytes % (Manual)   10.0  D 


 


Monocytes %   1.7 L 


 


Monocytes % (Manual)   6  D 


 


Eosinophils %   1.3  D 


 


Basophils %   0.3 


 


Nucleated RBC %   3 H 


 


Hypochromia   1+ 


 


Platelet Estimate   Adequate 


 


Polychromasia   1+ 


 


Anisocytosis   3+ 


 


Microcytosis   2+ 


 


Macrocytosis   1+ 


 


PTT (Actin FS)   


 


Anticoagulation Therapy   


 


Puncture Site   


 


ABG pH   


 


ABG pCO2 at Pt Temp   


 


ABG pO2 at Pt Temp   


 


ABG HCO3   


 


ABG O2 Sat (Measured)   


 


ABG O2 Content   


 


ABG Base Excess   


 


Chacho Test   


 


O2 Delivery Device   


 


Oxygen Flow Rate   


 


Vent Mode   


 


Vent Rate   


 


Mechanical Rate   


 


PEEP   


 


Pressure Support Vent   


 


Sodium   


 


Potassium   


 


Chloride   


 


Carbon Dioxide   


 


Anion Gap   


 


BUN   


 


Creatinine   


 


Creat Clearance w eGFR   


 


Random Glucose   


 


Calcium   


 


Phosphorus   


 


Magnesium   


 


Total Bilirubin   


 


AST   


 


ALT   


 


Alkaline Phosphatase   


 


Total Protein   


 


Albumin   


 


Blood Type  B POSITIVE  


 


Antibody Screen  Negative  


 


Crossmatch  See Detail  














  11/20/18 11/20/18 11/20/18





  05:30 05:30 07:00


 


WBC   


 


RBC   


 


Hgb   


 


Hct   


 


MCV   


 


MCH   


 


MCHC   


 


RDW   


 


Plt Count   


 


MPV   


 


Absolute Neuts (auto)   


 


Neutrophils %   


 


Neutrophils % (Manual)   


 


Band Neutrophils %   


 


Lymphocytes %   


 


Lymphocytes % (Manual)   


 


Monocytes %   


 


Monocytes % (Manual)   


 


Eosinophils %   


 


Basophils %   


 


Nucleated RBC %   


 


Hypochromia   


 


Platelet Estimate   


 


Polychromasia   


 


Anisocytosis   


 


Microcytosis   


 


Macrocytosis   


 


PTT (Actin FS)  28.9  


 


Anticoagulation Therapy    No Result Required.


 


Puncture Site    Right radial


 


ABG pH    7.50 H


 


ABG pCO2 at Pt Temp    29.8 L


 


ABG pO2 at Pt Temp    95.7


 


ABG HCO3    22.8


 


ABG O2 Sat (Measured)    97.9


 


ABG O2 Content    4.6 L*


 


ABG Base Excess    -0.3


 


Chacho Test    Positive


 


O2 Delivery Device    Alcprvc


 


Oxygen Flow Rate    40%


 


Vent Mode    No Result Required.


 


Vent Rate    No Result Required.


 


Mechanical Rate    No Result Required.


 


PEEP    5.0


 


Pressure Support Vent    500


 


Sodium   152 H 


 


Potassium   5.1 


 


Chloride   122 H 


 


Carbon Dioxide   23 


 


Anion Gap   6 L 


 


BUN   92 H 


 


Creatinine   2.7 H 


 


Creat Clearance w eGFR   22.91 


 


Random Glucose   106 


 


Calcium   5.6 L* 


 


Phosphorus   4.1 


 


Magnesium   2.7 H 


 


Total Bilirubin   0.4 


 


AST   55 H 


 


ALT   126 H 


 


Alkaline Phosphatase   76 


 


Total Protein   9.2 H 


 


Albumin   1.2 L 


 


Blood Type   


 


Antibody Screen   


 


Crossmatch   








Active Medications











Generic Name Dose Route Start Last Admin





  Trade Name Freq  PRN Reason Stop Dose Admin


 


Acetaminophen  650 mg  11/10/18 14:09  11/16/18 16:25





  Tylenol -  PO   650 mg





  Q6H PRN   Administration





  PAIN LEVEL 1 - 3   





     





     





     


 


Acetaminophen  650 mg  11/15/18 15:54  





  Tylenol Suppository -  RC   





  Q6H PRN   





  FEVER   





     





     





     


 


Allopurinol  100 mg  11/09/18 10:00  11/20/18 09:48





  Zyloprim -  PO   100 mg





  BID AVA   Administration





     





     





     





     


 


Chlorhexidine Gluconate  15 ml  11/10/18 23:45  11/20/18 09:47





  Peridex -  MM   15 ml





  BID AVA   Administration





     





     





     





     


 


Cholecalciferol  1,000 unit  11/18/18 10:00  11/20/18 09:48





  Vitamin D3 -  PO   1,000 unit





  DAILY AVA   Administration





     





     





     





     


 


Diphenhydramine HCl  25 mg  11/09/18 12:00  





  Benadryl Injection -  IVPB   





  ONCE PRN   





  DURING PLASMAPHERESIS   





     





     





     


 


Heparin Sodium (Porcine)  1,000 unit  11/16/18 15:20  





  Heparin -  IVPUSH   





  PRN PRN   





  Heparin   





     





     





     


 


Heparin Sodium (Porcine)  5,000 unit  11/16/18 15:20  





  Heparin -  IVPUSH   





  PRN PRN   





  Heparin   





     





     





     


 


Metronidazole  500 mg in 100 mls @ 100 mls/hr  11/17/18 14:30  11/20/18 09:44





  Flagyl 500mg Premixed Ivpb -  IVPB   100 mls/hr





  BID AVA   Administration





     





     





     





     


 


Sodium Chloride  1,000 mls @ 75 mls/hr  11/19/18 11:30  11/19/18 13:39





  1/2 Normal Saline  IV   75 mls/hr





  ASDIR AVA   Administration





     





     





     





     


 


Levofloxacin  250 mg in 50 mls @ 50 mls/hr  11/19/18 12:45  11/20/18 09:54





  Levaquin 250 Mg Premixed Ivpb -  IVPB   50 mls/hr





  DAILY AVA   Administration





     





     





  Protocol   





     


 


Lactulose  20 gm  11/09/18 09:38  11/20/18 05:48





  Cephulac (Oral Use)  PO   20 gm





  TID AVA   Administration





     





     





     





     


 


Metoprolol Tartrate  5 mg  11/09/18 13:04  





  Lopressor Injection -  IVPUSH   





  Q8H PRN   





  TACHYCARDIA   





     





     





     


 


Metoprolol Tartrate  75 mg  11/16/18 08:30  11/20/18 09:48





  Lopressor -  PO   Not Given





  BID AVA   





     





     





     





     


 


Pantoprazole Sodium  40 mg  11/07/18 12:00  11/20/18 09:45





  Protonix Iv  IVPUSH   40 mg





  DAILY AVA   Administration





     





     





     





     


 


Rifaximin  550 mg  11/15/18 22:00  11/20/18 09:48





  Xifaxan -  PO   550 mg





  BID AVA   Administration





     





     





     





     


 


Thiamine HCl  100 mg  10/30/18 22:12  11/20/18 09:48





  Vitamin B1 -  PO   100 mg





  DAILY AVA   Administration





     





     





     





     











ASSESSMENT/PLAN:


77 yo m PMH of A-Fib, Acute on chronic kidney injury, CAD w stents, 

hypercalcemia, medication non-adherence, paraproteinemia, thromboembolic disease

, diastolic heart dysfunction without failure, HTN, HLD, hypertrophic 

cardiomyopathy is here after a rapid response. He was on the floors when it was 

noticed that he has respiratory insufficiency and was desaturating, requiring 

ICU monitoring. GOC/family meeting initiated and ongoing. 








GI: 


metabolic encephalopathy 


-ammonia improving  w/ lactulose and rifaximin


-LFTs are also elevated but downtrending. transamintitis most likely secondary 

to ischemic hepatits which is multifactorial including sepsis, episodes of 

hypotension and hyperviscosity syndrome. 





acalculous cholecystitis?


US shows thickened gallbladder wall, pericholecystic fluid, suspicious for 

acalculous cholecystitis. There was also mild dilatation of the CBD but that 

might be normal for age. 


-Spoke w/ IR, who feel image does not represent  acalculous cholecystitisand 

does not require any IR drainage 





ID: 


continues to spike fevers


- Rhonchi heard on Lung auscultation


-RLL pneumonia


f/u cx


off of ceftazidime


Vancomycin Redosed 


- c/w levaquin/flagyl. 


- ID on board


- c/w rifaximin for elevated ammonia level





Cardio: 


Afib -[id number] score of 6 


- Lopressor 75 mg BID PO


-IV metoprolol 5 mg PRN


- diastolic dysfunction + hypertrophic cardiomyopathy


- CAD with multiple stents


- Cardio consulted and on board.


off Xarelto 15 qd (renal dosing) while on heparin gtt. transfuse to maintain Hgb

>8.0 


Heparin on hold related to dropping Hg, transfusing 1 u prbc 


- Sporadically goes in and out of V-Tach - noted to have intermittent episodes 

of V-tach this AM. sushma salazar, Ca repleted. spoke to cardio nothing to do at this 

point due to pt extensive cardiac disease. Can give amio if v-tach is sustained 

and persistent. 





Pulm: 


- Intubated


- Patient has b/l pleural effusions.


- No pneumothorax


- b/l diffuse rhonchi


- infiltrate on CXR: Abx- Substitute levaquin for ceftazidime


Vancomycin Redosed 


- c/w levaquin/flagyl. 





Renal: 


- Acute on Chronic kidney injury


HAM ATN vs. Cast nephropathy  (FeNa was 2.1% indicating tubular injury, US 

showed no stones or obstruction, UA w/o protein but UPCR ~0.5 indicating non-

albumin proteinuria)


- Renal consulted and on board. 


Hyperkalemia (r/o tumor lysis)


serum K is improved, s/p bicarb gtt


hypotonic saline with dextrose, can dc IVF if pt is tolerating feeds with free 

water 





Neuro: 


- Patient is not alert or oriented.


- Head CT showed no acute pathology


- Neuro consulted and on board. (Dr. Youngblood + Dr. phan) 


- Ammonia elevated - Patient receiving lactulose


- c/w rifaximin for elevated ammonia. 





Heme/Onc: 


- monitor H/H


- Will transfuse to keep hb > 8 


- Patient not receiving plasmapharesis today. 


- Paraproteinemia


- Patient fulfills new criteria  for multiple myeloma with free kappa/ free 

lambda light chain  ratio of 432 (>100 is diagnostic of myeloma) 


- Kappa/Lambda ratio > 100 consistent with Multiple myeloma


- M spike elevated elevated at 6.8 previously 4.7 


-steroids being held at this time 





Endo: 


- Parathyroid hormone WNL


- PTH-borderline low appropriate given hypercalcemia, PTHrP low





Prophylaxis: 


- Heparin on hold related to dropping Hg, transfusing 1 u prbc 


- Protonix





F/E/N: 


F: hypotonic saline with dextrose, can dc IVF if pt is tolerating feeds with 

free water  


E: Will replete lytes PRN


N: tube feeds (low potassium feeds). consider increasing free water as pt 

remains hypernatremic 





Code Status: Full Code 


- GOC/family meeting initiated and ongoing. 


- Compassionate extubation versus Tracheostomy





Dispo: Patient will continue to receive ICU level care





Visit type





- Emergency Visit


Emergency Visit: Yes


ED Registration Date: 10/30/18


Care time: The patient presented to the Emergency Department on the above date 

and was hospitalized for further evaluation of their emergent condition.





- New Patient


This patient is new to me today: Yes


Date on this admission: 11/20/18





- Critical Care


Critical Care patient: Yes


Total Critical Care Time (in minutes): 38


Critical Care Statement: The care of this patient involved high complexity 

decision making to prevent further life threatening deterioration of the patient

's condition and/or to evaluate & treat vital organ system(s) failure or risk 

of failure.

## 2018-11-20 NOTE — PN
Progress Note (short form)





- Note


Progress Note: 





Chart/hx. reviewed, recent events noted.


Pt. is intubated, non-verbal, to painful stimulii vinces minimally. CN- + 

response to visual threat bilaterally, unable to track examiner. _ 

bilat.corneal reflexes+cervicocilliarry reflex. No Dol's eye movements elicited 

as pt. is awake.


-Mr. Avila is responding to visual threat-is able to perceive visual stimulii 

and brain stem fx. is present. even though he has cerebral function present his 

prognosis for meaningful neurologic recovery is guarded.

## 2018-11-20 NOTE — PN
Progress Note, Physician


History of Present Illness: 


 Unresponsive on ventilator


  Low grade temps


  WBC WNL


  Repeat BC pending


  


 





 


 


 





- Current Medication List


Current Medications: 


Active Medications





Acetaminophen (Tylenol -)  650 mg PO Q6H PRN


   PRN Reason: PAIN LEVEL 1 - 3


   Last Admin: 11/16/18 16:25 Dose:  650 mg


Acetaminophen (Tylenol Suppository -)  650 mg RC Q6H PRN


   PRN Reason: FEVER


Allopurinol (Zyloprim -)  100 mg PO BID [facility]


   Last Admin: 11/19/18 21:24 Dose:  100 mg


Chlorhexidine Gluconate (Peridex -)  15 ml MM BID [facility]


   Last Admin: 11/19/18 21:23 Dose:  15 ml


Cholecalciferol (Vitamin D3 -)  1,000 unit PO DAILY [facility]


   Last Admin: 11/19/18 10:10 Dose:  1,000 unit


Diphenhydramine HCl (Benadryl Injection -)  25 mg IVPB ONCE PRN


   PRN Reason: DURING PLASMAPHERESIS


Heparin Sodium (Porcine) (Heparin -)  1,000 unit IVPUSH PRN PRN


   PRN Reason: Heparin


Heparin Sodium (Porcine) (Heparin -)  5,000 unit IVPUSH PRN PRN


   PRN Reason: Heparin


Metronidazole (Flagyl 500mg Premixed Ivpb -)  500 mg in 100 mls @ 100 mls/hr 

IVPB BID [facility]


   Last Admin: 11/19/18 21:23 Dose:  100 mls/hr


Sodium Chloride (1/2 Normal Saline)  1,000 mls @ 75 mls/hr IV ASDIR [facility]


   Last Admin: 11/19/18 13:39 Dose:  75 mls/hr


Levofloxacin (Levaquin 250 Mg Premixed Ivpb -)  250 mg in 50 mls @ 50 mls/hr 

IVPB DAILY [facility]; Protocol


   Last Admin: 11/19/18 13:12 Dose:  50 mls/hr


Lactulose (Cephulac (Oral Use))  20 gm PO TID [facility]


   Last Admin: 11/20/18 05:48 Dose:  20 gm


Metoprolol Tartrate (Lopressor Injection -)  5 mg IVPUSH Q8H PRN


   PRN Reason: TACHYCARDIA


Metoprolol Tartrate (Lopressor -)  75 mg PO BID [facility]


   Last Admin: 11/19/18 21:23 Dose:  75 mg


Pantoprazole Sodium (Protonix Iv)  40 mg IVPUSH DAILY [facility]


   Last Admin: 11/19/18 10:10 Dose:  40 mg


Rifaximin (Xifaxan -)  550 mg PO BID [facility]


   Last Admin: 11/19/18 21:24 Dose:  550 mg


Thiamine HCl (Vitamin B1 -)  100 mg PO DAILY [facility]


   Last Admin: 11/19/18 10:11 Dose:  100 mg











- Objective


Vital Signs: 


 Vital Signs











Temperature  99.6 F   11/20/18 06:00


 


Pulse Rate  111 H  11/20/18 08:00


 


Respiratory Rate  17   11/20/18 08:00


 


Blood Pressure  100/63   11/20/18 08:00


 


O2 Sat by Pulse Oximetry (%)  98   11/19/18 20:00











Constitutional: Yes: No Distress


Eyes: Yes: Conjunctiva Clear


Cardiovascular: Yes: Regular Rate and Rhythm, S1, S2


Respiratory: Yes: Mechanically Ventilated


Gastrointestinal: Yes: Normal Bowel Sounds, Soft.  No: Tenderness


Edema: Yes


Edema: LLE: 1+, RLE: 1+


Labs: 


 CBC, BMP





 11/20/18 05:30 





 11/20/18 05:30 





 INR, PTT











INR  1.28  (0.83-1.09)  H  11/15/18  05:30    


 


Fibrinogen  290.0 mg/dL (238-498)   11/11/18  06:00    














Assessment/Plan


Respiratory failure


 RLL pneumonia


 Acalculus  Cholecystitis


 Toxic metabolic encephalopathy


 Hypercalcemia


 Renal failure


 Myeloma


 Hyperviscosity syndrome


 


  


 


  Continue levaquin/ flagyl


  Vancomycin redosed


  Ventilatory support


  Prognosis poor

## 2018-11-20 NOTE — PN
Progress Note (short form)





- Note


Progress Note: 





Renal follow up for Hypercalcemia/HAM





Pt seen and examined in the ICU


on vent, no overnight events


making urine via fenton


continues to have low grade fevers 











 Vital Signs











Temperature  99.6 F   11/20/18 06:00


 


Pulse Rate  107 H  11/20/18 10:00


 


Respiratory Rate  14   11/20/18 10:00


 


Blood Pressure  96/67   11/20/18 10:00


 


O2 Sat by Pulse Oximetry (%)  98   11/19/18 20:00








 Intake & Output











 11/17/18 11/18/18 11/19/18 11/20/18





 23:59 23:59 23:59 23:59


 


Intake Total 2780 1300 2900 2860


 


Output Total 1600 1800 1500 400


 


Balance 1180 -500 1400 2460


 


Weight 80.541 kg 80.921 kg 81.737 kg 81.737 kg





NAD


on vent via ET tube


tachycardic


dec BS, no rales


No bladder distension


No LE edema


 CBC, BMP





 11/20/18 05:30 





 11/20/18 05:30 


 Laboratory Tests











  11/20/18





  05:30


 


Calcium  5.6 L*


 


Phosphorus  4.1


 


Magnesium  2.7 H


 


Albumin  1.2 L











 Current Medications





Acetaminophen (Tylenol -)  650 mg PO Q6H PRN


   PRN Reason: PAIN LEVEL 1 - 3


   Last Admin: 11/16/18 16:25 Dose:  650 mg


Acetaminophen (Tylenol Suppository -)  650 mg RC Q6H PRN


   PRN Reason: FEVER


Allopurinol (Zyloprim -)  100 mg PO BID [facility]


   Last Admin: 11/20/18 09:48 Dose:  100 mg


Chlorhexidine Gluconate (Peridex -)  15 ml MM BID [facility]


   Last Admin: 11/20/18 09:47 Dose:  15 ml


Cholecalciferol (Vitamin D3 -)  1,000 unit PO DAILY [facility]


   Last Admin: 11/20/18 09:48 Dose:  1,000 unit


Diphenhydramine HCl (Benadryl Injection -)  25 mg IVPB ONCE PRN


   PRN Reason: DURING PLASMAPHERESIS


Heparin Sodium (Porcine) (Heparin -)  1,000 unit IVPUSH PRN PRN


   PRN Reason: Heparin


Heparin Sodium (Porcine) (Heparin -)  5,000 unit IVPUSH PRN PRN


   PRN Reason: Heparin


Metronidazole (Flagyl 500mg Premixed Ivpb -)  500 mg in 100 mls @ 100 mls/hr 

IVPB BID [facility]


   Last Admin: 11/20/18 09:44 Dose:  100 mls/hr


Sodium Chloride (1/2 Normal Saline)  1,000 mls @ 75 mls/hr IV ASDIR [facility]


   Last Admin: 11/19/18 13:39 Dose:  75 mls/hr


Levofloxacin (Levaquin 250 Mg Premixed Ivpb -)  250 mg in 50 mls @ 50 mls/hr 

IVPB DAILY [facility]; Protocol


   Last Admin: 11/20/18 09:54 Dose:  50 mls/hr


Lactulose (Cephulac (Oral Use))  20 gm PO TID [facility]


   Last Admin: 11/20/18 05:48 Dose:  20 gm


Metoprolol Tartrate (Lopressor Injection -)  5 mg IVPUSH Q8H PRN


   PRN Reason: TACHYCARDIA


Metoprolol Tartrate (Lopressor -)  75 mg PO BID [facility]


   Last Admin: 11/20/18 09:48 Dose:  Not Given


Pantoprazole Sodium (Protonix Iv)  40 mg IVPUSH DAILY [facility]


   Last Admin: 11/20/18 09:45 Dose:  40 mg


Rifaximin (Xifaxan -)  550 mg PO BID [facility]


   Last Admin: 11/20/18 09:48 Dose:  550 mg


Thiamine HCl (Vitamin B1 -)  100 mg PO DAILY [facility]


   Last Admin: 11/20/18 09:48 Dose:  100 mg








78 year old gentleman with hx of CKD, Afib on A/C, CAD, CKD, Hypertension, 

Hyperlipidemia, PVD who presented with AMS/Confusion and found to have HAM.





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 

showed no stones or obstruction, UA w/o protein but UPCR ~0.5 indicating non-

albumin proteinuria)


#AMS 


#Newly diagnosed multiple myloma 


#Anemia 


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


#Hyperdense lesion on US of the kidney 


#Normal anion gap metabolic acidosis 


#Hyperkalemia (r/o tumor lysis)





Renal function stable and pt remains non-oliguric 


no acute indication for RRT and pt would not be a good candidate given co-

morbid conditions and poor overall prognosis


continue supportive care, keep MAP > 65


continue tube feeds (low potassium feeds)


can discontinue IVF if pt is tolerating feeds with free water 


consider increasing free water as pt remains hypernatremic 


trend renal function and electrolytes 


Oncology folllow up 


prognosis is poor 





Ricky Chang DO

## 2018-11-20 NOTE — PN
Progress Note (short form)





- Note


Progress Note: 


Chief Complaint: Events noted, notes reviewed, remains intubated, remains in 

atrial fibrillation off of A/C-Heparin, related to dropping H/H





History of Present Illness: 


Seen and examined in the ICU. Events noted, notes reviewed, remains intubated, 

remains in atrial fibrillation off of A/C-Heparin, related to dropping H/H





Remains on B-Blockers





[facility] coronary angiography performed 1/11/2017 revealed non-obstructive CAD, 

severe LV apical hypertrophic cardiomyopathy, mildly elevated right sided 

pressures/study is consistent with apical hypertrophy (spade-like) variant of 

hypertrophic cardiomyopathy





Echocardiography dated 07/11/2018 Mod cLVH, severe DYANA, moderate TR RVSP 50-60 

mmHg, moderate-severe MR





Echocardiography dated 10/16/2018 Normal LV size with hyperdynamic LVEF 75%, 

mild BSH, normal RV size and function, severe LAE, moderate-severe MR, mod TR


 





- Current Medication List


 


 Current Medications





Acetaminophen (Tylenol -)  650 mg PO Q6H PRN


   PRN Reason: PAIN LEVEL 1 - 3


   Last Admin: 11/16/18 16:25 Dose:  650 mg


Acetaminophen (Tylenol Suppository -)  650 mg RC Q6H PRN


   PRN Reason: FEVER


Allopurinol (Zyloprim -)  100 mg PO BID [facility]


   Last Admin: 11/19/18 21:24 Dose:  100 mg


Chlorhexidine Gluconate (Peridex -)  15 ml MM BID [facility]


   Last Admin: 11/19/18 21:23 Dose:  15 ml


Cholecalciferol (Vitamin D3 -)  1,000 unit PO DAILY [facility]


   Last Admin: 11/19/18 10:10 Dose:  1,000 unit


Diphenhydramine HCl (Benadryl Injection -)  25 mg IVPB ONCE PRN


   PRN Reason: DURING PLASMAPHERESIS


Heparin Sodium (Porcine) (Heparin -)  1,000 unit IVPUSH PRN PRN


   PRN Reason: Heparin


Heparin Sodium (Porcine) (Heparin -)  5,000 unit IVPUSH PRN PRN


   PRN Reason: Heparin


Metronidazole (Flagyl 500mg Premixed Ivpb -)  500 mg in 100 mls @ 100 mls/hr 

IVPB BID [facility]


   Last Admin: 11/19/18 21:23 Dose:  100 mls/hr


Sodium Chloride (1/2 Normal Saline)  1,000 mls @ 75 mls/hr IV ASDIR [facility]


   Last Admin: 11/19/18 13:39 Dose:  75 mls/hr


Levofloxacin (Levaquin 250 Mg Premixed Ivpb -)  250 mg in 50 mls @ 50 mls/hr 

IVPB DAILY [facility]; Protocol


   Last Admin: 11/19/18 13:12 Dose:  50 mls/hr


Lactulose (Cephulac (Oral Use))  20 gm PO TID [facility]


   Last Admin: 11/20/18 05:48 Dose:  20 gm


Metoprolol Tartrate (Lopressor Injection -)  5 mg IVPUSH Q8H PRN


   PRN Reason: TACHYCARDIA


Metoprolol Tartrate (Lopressor -)  75 mg PO BID [facility]


   Last Admin: 11/19/18 21:23 Dose:  75 mg


Pantoprazole Sodium (Protonix Iv)  40 mg IVPUSH DAILY [facility]


   Last Admin: 11/19/18 10:10 Dose:  40 mg


Rifaximin (Xifaxan -)  550 mg PO BID [facility]


   Last Admin: 11/19/18 21:24 Dose:  550 mg


Thiamine HCl (Vitamin B1 -)  100 mg PO DAILY [facility]


   Last Admin: 11/19/18 10:11 Dose:  100 mg








Review of Systems





Unable to obtain





- Objective


Vital Signs: 


 


 Last Vital Signs











Temp Pulse Resp BP Pulse Ox


 


 99.6 F   104 H  19   94/61   98 


 


 11/20/18 06:00  11/20/18 06:00  11/20/18 06:00  11/20/18 06:00  11/19/18 20:00








 Intake & Output











 11/17/18 11/18/18 11/19/18 11/20/18





 23:59 23:59 23:59 23:59


 


Intake Total 2780 1300 2900 2860


 


Output Total 1600 1800 1500 400


 


Balance 1180 -500 1400 2460


 


Weight 177 lb 9 oz 178 lb 6.4 oz 180 lb 3.2 oz 180 lb 3.2 oz











Neck: Supple Negative JVD No Bruit


Cardiovascular: S1 S2 Irregularly Irregular Grade 2/6 Systolic Murmur Apical


Chest: Scattered Rhonchi Bilaterally


Gastrointestinal: Soft Benign Normal Bowel Sounds


Ext: No Edema 1+ Bilateral femoral Pulses





Labs: 


 


 CBC, BMP





 11/20/18 05:30 





BMP pending from this AM





 Hepatic Panel











Total Bilirubin  0.6 mg/dL (0.2-1)   11/19/18  05:30    


 


AST  52 U/L (15-37)  H  11/19/18  05:30    


 


ALT  180 U/L (13-61)  H  11/19/18  05:30    


 


Alkaline Phosphatase  84 U/L ()   11/19/18  05:30    


 


Albumin  1.3 g/dl (3.4-5.0)  L  11/19/18  05:30    














Assessment/Plan





ASSESSMENT:





1. Acute hypoxic respiratory failure, suspected aspiration pneumonia with 

sepsis syndrome 


2. Toxic metabolic encephelopathy, rule out CVA while off A/C


3. 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


4. Paraproteinemia, hypercalcemia and anemia confirmed multiple myeloma


5. 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 

disease related to persistent atrial fibrillation with [id number] score of 6


6. CAD with evidence of demand ischemic injury, non obstructive CAD on [facility] 

coronary angiogrpahy angina pectoris


7. 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 

chronic class I-II NYHA classification LV failure, compensated/euvolemic


8. Persistent atrial fibrillation [id number] score of 6 currently off of 

Heparin therapy, related to anemia


9. HTN


10. Anemia


11. Acalculous Cholecystitis


12. Ischemic hepatitis





PLAN:





1. Heparin on hold related to dropping Hg, transfuse to maintain Hg equal or > 

8.0, ideally A/C therapy to be continued indefinitely unless it is absolutely 

contraindicated considering his [id number] score of 6 


2. Continue Lopressor 


3. Antibiotics as per the primary team


4. Ventilator management as per the ICU team 


5. Overall poor prognosis family to decide regarding compassionate extubation 

versus tracheostomy














Armand Mckenzie MD

## 2018-11-20 NOTE — PN
Teaching Attending Note


Name of Resident: Ganga Mccormick





ATTENDING PHYSICIAN STATEMENT





I saw and evaluated the patient.


I reviewed the resident's note and discussed the case with the resident.


I agree with the resident's findings and plan as documented.








SUBJECTIVE:


Patient seen and examined in the ICU.  Remains intubated, minimally responsive 

off sedation.   


No gross change in overall mental status / condition.  Receiving pRBCs. 


Family still deciding on GOC. 





OBJECTIVE:





 


  


 Intake & Output











 11/17/18 11/18/18 11/19/18 11/20/18





 23:59 23:59 23:59 23:59


 


Intake Total 2780 1300 2900 2860


 


Output Total 1600 1800 1500 400


 


Balance 1180 -500 1400 2460


 


Weight 177 lb 9 oz 178 lb 6.4 oz 180 lb 3.2 oz 180 lb 3.2 oz











  


 


 Last Vital Signs











Temp Pulse Resp BP Pulse Ox


 


 99.6 F   107 H  14   96/67   98 


 


 11/20/18 06:00  11/20/18 10:00  11/20/18 10:00  11/20/18 10:00  11/19/18 20:00








Active Medications





Acetaminophen (Tylenol -)  650 mg PO Q6H PRN


   PRN Reason: PAIN LEVEL 1 - 3


   Last Admin: 11/16/18 16:25 Dose:  650 mg


Acetaminophen (Tylenol Suppository -)  650 mg RC Q6H PRN


   PRN Reason: FEVER


Allopurinol (Zyloprim -)  100 mg PO BID [facility]


   Last Admin: 11/20/18 09:48 Dose:  100 mg


Chlorhexidine Gluconate (Peridex -)  15 ml MM BID [facility]


   Last Admin: 11/20/18 09:47 Dose:  15 ml


Cholecalciferol (Vitamin D3 -)  1,000 unit PO DAILY [facility]


   Last Admin: 11/20/18 09:48 Dose:  1,000 unit


Diphenhydramine HCl (Benadryl Injection -)  25 mg IVPB ONCE PRN


   PRN Reason: DURING PLASMAPHERESIS


Heparin Sodium (Porcine) (Heparin -)  1,000 unit IVPUSH PRN PRN


   PRN Reason: Heparin


Heparin Sodium (Porcine) (Heparin -)  5,000 unit IVPUSH PRN PRN


   PRN Reason: Heparin


Metronidazole (Flagyl 500mg Premixed Ivpb -)  500 mg in 100 mls @ 100 mls/hr 

IVPB BID [facility]


   Last Admin: 11/20/18 09:44 Dose:  100 mls/hr


Sodium Chloride (1/2 Normal Saline)  1,000 mls @ 75 mls/hr IV ASDIR [facility]


   Last Admin: 11/19/18 13:39 Dose:  75 mls/hr


Levofloxacin (Levaquin 250 Mg Premixed Ivpb -)  250 mg in 50 mls @ 50 mls/hr 

IVPB DAILY [facility]; Protocol


   Last Admin: 11/20/18 09:54 Dose:  50 mls/hr


Lactulose (Cephulac (Oral Use))  20 gm PO TID [facility]


   Last Admin: 11/20/18 05:48 Dose:  20 gm


Metoprolol Tartrate (Lopressor Injection -)  5 mg IVPUSH Q8H PRN


   PRN Reason: TACHYCARDIA


Metoprolol Tartrate (Lopressor -)  75 mg PO BID [facility]


   Last Admin: 11/20/18 09:48 Dose:  Not Given


Pantoprazole Sodium (Protonix Iv)  40 mg IVPUSH DAILY [facility]


   Last Admin: 11/20/18 09:45 Dose:  40 mg


Rifaximin (Xifaxan -)  550 mg PO BID [facility]


   Last Admin: 11/20/18 09:48 Dose:  550 mg


Thiamine HCl (Vitamin B1 -)  100 mg PO DAILY [facility]


   Last Admin: 11/20/18 09:48 Dose:  100 mg








Gen:  intubated, minimally responsive 


Heart: irregular


Lung: scattered rhonchi


Abd: soft, nontender


Ext: no edema


 


  Laboratory Results - last 24 hr











  11/17/18 11/19/18 11/20/18





  08:05 16:00 05:30


 


WBC   4.9  5.7


 


RBC   2.71 L  2.52 L


 


Hgb   8.4 L  7.5 L


 


Hct   24.5 L  23.3 L


 


MCV   90.5  92.4


 


MCH   31.2  29.7


 


MCHC   34.4  32.1


 


RDW   16.5 H  16.1 H


 


Plt Count   187  167


 


MPV   8.7  9.1


 


Absolute Neuts (auto)   4.2 


 


Neutrophils %   85.3 H 


 


Neutrophils % (Manual)   80.0 


 


Band Neutrophils %   4.0 


 


Lymphocytes %   11.4 


 


Lymphocytes % (Manual)   10.0  D 


 


Monocytes %   1.7 L 


 


Monocytes % (Manual)   6  D 


 


Eosinophils %   1.3  D 


 


Basophils %   0.3 


 


Nucleated RBC %   3 H 


 


Hypochromia   1+ 


 


Platelet Estimate   Adequate 


 


Polychromasia   1+ 


 


Anisocytosis   3+ 


 


Microcytosis   2+ 


 


Macrocytosis   1+ 


 


PTT (Actin FS)   


 


Anticoagulation Therapy   


 


Puncture Site   


 


ABG pH   


 


ABG pCO2 at Pt Temp   


 


ABG pO2 at Pt Temp   


 


ABG HCO3   


 


ABG O2 Sat (Measured)   


 


ABG O2 Content   


 


ABG Base Excess   


 


Chacho Test   


 


O2 Delivery Device   


 


Oxygen Flow Rate   


 


Vent Mode   


 


Vent Rate   


 


Mechanical Rate   


 


PEEP   


 


Pressure Support Vent   


 


Sodium   


 


Potassium   


 


Chloride   


 


Carbon Dioxide   


 


Anion Gap   


 


BUN   


 


Creatinine   


 


Creat Clearance w eGFR   


 


Random Glucose   


 


Calcium   


 


Phosphorus   


 


Magnesium   


 


Total Bilirubin   


 


AST   


 


ALT   


 


Alkaline Phosphatase   


 


Total Protein   


 


Albumin   


 


Blood Type  B POSITIVE  


 


Antibody Screen  Negative  


 


Crossmatch  See Detail  














  11/20/18 11/20/18 11/20/18





  05:30 05:30 07:00


 


WBC   


 


RBC   


 


Hgb   


 


Hct   


 


MCV   


 


MCH   


 


MCHC   


 


RDW   


 


Plt Count   


 


MPV   


 


Absolute Neuts (auto)   


 


Neutrophils %   


 


Neutrophils % (Manual)   


 


Band Neutrophils %   


 


Lymphocytes %   


 


Lymphocytes % (Manual)   


 


Monocytes %   


 


Monocytes % (Manual)   


 


Eosinophils %   


 


Basophils %   


 


Nucleated RBC %   


 


Hypochromia   


 


Platelet Estimate   


 


Polychromasia   


 


Anisocytosis   


 


Microcytosis   


 


Macrocytosis   


 


PTT (Actin FS)  28.9  


 


Anticoagulation Therapy    No Result Required.


 


Puncture Site    Right radial


 


ABG pH    7.50 H


 


ABG pCO2 at Pt Temp    29.8 L


 


ABG pO2 at Pt Temp    95.7


 


ABG HCO3    22.8


 


ABG O2 Sat (Measured)    97.9


 


ABG O2 Content    4.6 L*


 


ABG Base Excess    -0.3


 


Chacho Test    Positive


 


O2 Delivery Device    Alcprvc


 


Oxygen Flow Rate    40%


 


Vent Mode    No Result Required.


 


Vent Rate    No Result Required.


 


Mechanical Rate    No Result Required.


 


PEEP    5.0


 


Pressure Support Vent    500


 


Sodium   152 H 


 


Potassium   5.1 


 


Chloride   122 H 


 


Carbon Dioxide   23 


 


Anion Gap   6 L 


 


BUN   92 H 


 


Creatinine   2.7 H 


 


Creat Clearance w eGFR   22.91 


 


Random Glucose   106 


 


Calcium   5.6 L* 


 


Phosphorus   4.1 


 


Magnesium   2.7 H 


 


Total Bilirubin   0.4 


 


AST   55 H 


 


ALT   126 H 


 


Alkaline Phosphatase   76 


 


Total Protein   9.2 H 


 


Albumin   1.2 L 


 


Blood Type   


 


Antibody Screen   


 


Crossmatch   











ASSESSMENT AND PLAN:


Acute Hypoxic Respiratory Failure


Altered Mental Status


Metabolic Encephalopathy


Pneumonia


Acalculous Cholecystitis


Multiple Myeloma


Acute on Chronic Renal Failure


Metabolic Acidosis


LV Diastolic Dysfunction


Atrial Fibrillation


CAD


+Troponins likely Demand Ischemia


PAD


Hyperlipidemia


HTN





-  continue antibiotics per ID 


-  monitor urine output, creatinine


-  rate control


-  Normal transfusion thresholds 


-  Minimize sedation to assess mental status 


-  spontaneous breathing trials as tolerated


-  DVT/GI prophylaxis


-  continue discussions regarding goals of care: Compassionate extubation 

versus Tracheostomy


-  palliative care involved 


-  continue ICU monitoring


-  grave overall prognosis








Dr Blum 


Critical care time spent in reviewing chart, evaluating patient and formulating 

plan 35 min

## 2018-11-21 LAB
ALBUMIN SERPL-MCNC: 1.2 G/DL (ref 3.4–5)
ALP SERPL-CCNC: 111 U/L (ref 45–117)
ALT SERPL-CCNC: 114 U/L (ref 13–61)
ANION GAP SERPL CALC-SCNC: 5 MMOL/L (ref 8–16)
ANISOCYTOSIS BLD QL: (no result)
ARTERIAL BLOOD GAS PCO2: 30.7 MMHG (ref 35–45)
ARTERIAL PATENCY WRIST A: POSITIVE
AST SERPL-CCNC: 82 U/L (ref 15–37)
BASE EXCESS BLDA CALC-SCNC: -3.8 MEQ/L (ref -2–2)
BASOPHILS # BLD: 0.3 % (ref 0–2)
BILIRUB SERPL-MCNC: 0.4 MG/DL (ref 0.2–1)
BUN SERPL-MCNC: 83 MG/DL (ref 7–18)
CALCIUM SERPL-MCNC: 5.9 MG/DL (ref 8.5–10.1)
CHLORIDE SERPL-SCNC: 120 MMOL/L (ref 98–107)
CO2 SERPL-SCNC: 23 MMOL/L (ref 21–32)
CREAT SERPL-MCNC: 2.6 MG/DL (ref 0.55–1.3)
DEPRECATED RDW RBC AUTO: 16.5 % (ref 11.9–15.9)
EOSINOPHIL # BLD: 2.6 % (ref 0–4.5)
GLUCOSE SERPL-MCNC: 130 MG/DL (ref 74–106)
HCT VFR BLD CALC: 27.2 % (ref 35.4–49)
HGB BLD-MCNC: 8.8 GM/DL (ref 11.7–16.9)
LYMPHOCYTES # BLD: 13.3 % (ref 8–40)
MACROCYTES BLD QL: (no result)
MAGNESIUM SERPL-MCNC: 2.6 MG/DL (ref 1.8–2.4)
MCH RBC QN AUTO: 30.1 PG (ref 25.7–33.7)
MCHC RBC AUTO-ENTMCNC: 32.3 G/DL (ref 32–35.9)
MCV RBC: 93.2 FL (ref 80–96)
MONOCYTES # BLD AUTO: 2.3 % (ref 3.8–10.2)
NEUTROPHILS # BLD: 81.5 % (ref 42.8–82.8)
PHOSPHATE SERPL-MCNC: 5 MG/DL (ref 2.5–4.9)
PLATELET # BLD AUTO: 163 K/MM3 (ref 134–434)
PMV BLD: 9.4 FL (ref 7.5–11.1)
PO2 BLDA: 87.6 MMHG (ref 70–100)
POTASSIUM SERPLBLD-SCNC: 5.1 MMOL/L (ref 3.5–5.1)
PROT SERPL-MCNC: 9.6 G/DL (ref 6.4–8.2)
RBC # BLD AUTO: 2.91 M/MM3 (ref 4–5.6)
SAO2 % BLDA: 95.5 % (ref 90–98.9)
SODIUM SERPL-SCNC: 148 MMOL/L (ref 136–145)
WBC # BLD AUTO: 5.5 K/MM3 (ref 4–10)

## 2018-11-21 RX ADMIN — Medication SCH MG: at 10:09

## 2018-11-21 RX ADMIN — LACTULOSE SCH GM: 20 SOLUTION ORAL at 14:28

## 2018-11-21 RX ADMIN — LACTULOSE SCH GM: 20 SOLUTION ORAL at 06:28

## 2018-11-21 RX ADMIN — CHLORHEXIDINE GLUCONATE 0.12% ORAL RINSE SCH ML: 1.2 LIQUID ORAL at 21:57

## 2018-11-21 RX ADMIN — ALLOPURINOL SCH MG: 100 TABLET ORAL at 21:59

## 2018-11-21 RX ADMIN — HEPARIN SODIUM SCH MLS/HR: 5000 INJECTION, SOLUTION INTRAVENOUS at 21:55

## 2018-11-21 RX ADMIN — METOPROLOL TARTRATE SCH MG: 50 TABLET, FILM COATED ORAL at 10:09

## 2018-11-21 RX ADMIN — ALLOPURINOL SCH MG: 100 TABLET ORAL at 10:09

## 2018-11-21 RX ADMIN — RIFAXIMIN SCH MG: 550 TABLET ORAL at 21:57

## 2018-11-21 RX ADMIN — SODIUM CHLORIDE SCH MLS/HR: 4.5 INJECTION, SOLUTION INTRAVENOUS at 06:28

## 2018-11-21 RX ADMIN — METOPROLOL TARTRATE SCH MG: 50 TABLET, FILM COATED ORAL at 21:57

## 2018-11-21 RX ADMIN — VITAMIN D, TAB 1000IU (100/BT) SCH UNIT: 25 TAB at 10:09

## 2018-11-21 RX ADMIN — PANTOPRAZOLE SODIUM SCH MG: 40 INJECTION, POWDER, FOR SOLUTION INTRAVENOUS at 10:08

## 2018-11-21 RX ADMIN — LACTULOSE SCH GM: 20 SOLUTION ORAL at 21:56

## 2018-11-21 RX ADMIN — RIFAXIMIN SCH MG: 550 TABLET ORAL at 10:09

## 2018-11-21 RX ADMIN — CHLORHEXIDINE GLUCONATE 0.12% ORAL RINSE SCH ML: 1.2 LIQUID ORAL at 10:08

## 2018-11-21 NOTE — PN
Progress Note, Physician


History of Present Illness: 


Poorly responsive on vent, persistent afib on lopressor and heparin gtt.





- Current Medication List


Current Medications: 


Active Medications





Acetaminophen (Tylenol -)  650 mg PO Q6H PRN


   PRN Reason: PAIN LEVEL 1 - 3


   Last Admin: 11/16/18 16:25 Dose:  650 mg


Acetaminophen (Tylenol Suppository -)  650 mg RC Q6H PRN


   PRN Reason: FEVER


Allopurinol (Zyloprim -)  100 mg PO BID [facility]


   Last Admin: 11/21/18 10:09 Dose:  100 mg


Chlorhexidine Gluconate (Peridex -)  15 ml MM BID [facility]


   Last Admin: 11/21/18 10:08 Dose:  15 ml


Cholecalciferol (Vitamin D3 -)  1,000 unit PO DAILY [facility]


   Last Admin: 11/21/18 10:09 Dose:  1,000 unit


Diphenhydramine HCl (Benadryl Injection -)  25 mg IVPB ONCE PRN


   PRN Reason: DURING PLASMAPHERESIS


Heparin Sodium (Porcine) (Heparin -)  1,000 unit IVPUSH PRN PRN


   PRN Reason: Heparin


Heparin Sodium (Porcine) (Heparin -)  5,000 unit IVPUSH PRN PRN


   PRN Reason: Heparin


Metronidazole (Flagyl 500mg Premixed Ivpb -)  500 mg in 100 mls @ 100 mls/hr 

IVPB BID [facility]


   Last Admin: 11/21/18 10:07 Dose:  100 mls/hr


Levofloxacin (Levaquin 250 Mg Premixed Ivpb -)  250 mg in 50 mls @ 50 mls/hr 

IVPB DAILY [facility]; Protocol


   Last Admin: 11/21/18 10:08 Dose:  50 mls/hr


HEPARIN SOD,PORK IN 0.45% NACL (Heparin-1/2ns 25,000 Units/500)  25,000 units 

in 500 mls @ 20 mls/hr IVPB [facility]; Protocol


Lactulose (Cephulac (Oral Use))  20 gm PO TID [facility]


   Last Admin: 11/21/18 06:28 Dose:  20 gm


Metoprolol Tartrate (Lopressor Injection -)  5 mg IVPUSH Q8H PRN


   PRN Reason: TACHYCARDIA


   Last Admin: 11/20/18 19:38 Dose:  5 mg


Metoprolol Tartrate (Lopressor -)  75 mg PO BID [facility]


   Last Admin: 11/21/18 10:09 Dose:  75 mg


Pantoprazole Sodium (Protonix Iv)  40 mg IVPUSH DAILY [facility]


   Last Admin: 11/21/18 10:08 Dose:  40 mg


Rifaximin (Xifaxan -)  550 mg PO BID [facility]


   Last Admin: 11/21/18 10:09 Dose:  550 mg


Thiamine HCl (Vitamin B1 -)  100 mg PO DAILY [facility]


   Last Admin: 11/21/18 10:09 Dose:  100 mg











- Objective


Vital Signs: 


 Vital Signs











Temperature  98.0 F   11/21/18 14:00


 


Pulse Rate  100 H  11/21/18 14:00


 


Respiratory Rate  16   11/21/18 14:00


 


Blood Pressure  94/70   11/21/18 14:00


 


O2 Sat by Pulse Oximetry (%)  97   11/21/18 08:45











Constitutional: Yes: No Distress, Calm, Thin


Neck: Yes: Supple


Cardiovascular: Yes: Pulse Irregular


Respiratory: Yes: Intubated, Mechanically Ventilated, Rhonchi


Gastrointestinal: Yes: Normal Bowel Sounds, Soft


Edema: No


Labs: 


 CBC, BMP





 11/21/18 05:30 





 11/21/18 05:30 





 INR, PTT











INR  1.28  (0.83-1.09)  H  11/15/18  05:30    


 


Fibrinogen  290.0 mg/dL (238-498)   11/11/18  06:00    














- ....Imaging


EKG: Report Reviewed (Tele: Rate-controlled afib)





Problem List





- Problems


(1) Atrial fibrillation with RVR


Code(s): I48.91 - UNSPECIFIED ATRIAL FIBRILLATION   





(2) Acute-on-chronic kidney injury


Code(s): N17.9 - ACUTE KIDNEY FAILURE, UNSPECIFIED; N18.9 - CHRONIC KIDNEY 

DISEASE, UNSPECIFIED   


Qualifiers: 


   Acute renal failure type: unspecified   Chronic kidney disease stage: 

unspecified stage   Qualified Code(s): N17.9 - Acute kidney failure, unspecified

; N18.9 - Chronic kidney disease, unspecified   





(3) Demand ischemia


Code(s): I24.8 - OTHER FORMS OF ACUTE ISCHEMIC HEART DISEASE   





(4) Hypercalcemia


Code(s): E83.52 - HYPERCALCEMIA   





(5) Nonadherence to medication


Code(s): Z91.14 - PATIENT'S OTHER NONCOMPLIANCE WITH MEDICATION REGIMEN   





(6) Paraproteinemia


Code(s): D89.2 - HYPERGAMMAGLOBULINEMIA, UNSPECIFIED   





(7) Anticoagulant long-term use


Code(s): Z79.01 - LONG TERM (CURRENT) USE OF ANTICOAGULANTS   





(8) Chronic thromboembolic disease


Code(s): I74.9 - EMBOLISM AND THROMBOSIS OF UNSPECIFIED ARTERY   





(9) Coronary artery disease


Code(s): I25.10 - ATHSCL HEART DISEASE OF NATIVE CORONARY ARTERY W/O ANG PCTRS 

  


Qualifiers: 


   Coronary Disease-Associated Artery/Lesion type: native artery   Native vs. 

transplanted heart: native heart   Associated angina: without angina   

Qualified Code(s): I25.10 - Atherosclerotic heart disease of native coronary 

artery without angina pectoris   





(10) Diastolic dysfunction without heart failure


Code(s): I51.9 - HEART DISEASE, UNSPECIFIED   





(11) HTN (hypertension)


Code(s): I10 - ESSENTIAL (PRIMARY) HYPERTENSION   


Qualifiers: 


   Hypertension type: essential hypertension   Qualified Code(s): I10 - 

Essential (primary) hypertension   





(12) Hypertrophic cardiomyopathy


Code(s): I42.2 - OTHER HYPERTROPHIC CARDIOMYOPATHY   





(13) Hyperlipidemia


Code(s): E78.5 - HYPERLIPIDEMIA, UNSPECIFIED   


Qualifiers: 


   Hyperlipidemia type: pure hypercholesterolemia   Qualified Code(s): E78.00 - 

Pure hypercholesterolemia, unspecified; E78.0 - Pure hypercholesterolemia   





(14) Multiple myeloma


Code(s): C90.00 - MULTIPLE MYELOMA NOT HAVING ACHIEVED REMISSION   


Qualifiers: 


   Multiple myeloma remission status: not in remission   Qualified Code(s): 

C90.00 - Multiple myeloma not having achieved remission   





(15) Acalculous cholecystitis


Code(s): K81.9 - CHOLECYSTITIS, UNSPECIFIED   





Assessment/Plan


R&LHc at [facility] 1/11/2017 showing nonobstructive CAD, severe LV apical 

hypertrophic cardiomyopathy, mildly elevated right sided pressures, Mynx 

deployed right CFA access site. Study is consistent with apical hypertrophy (

spade-like) variant of hypertrophic cardiomyopathy, planned for optimal medical 

therapy. 


Echocardiogram: 07/11/2018 Mod cLVH, severe DYANA, mod TR RVSP 50-60 mmHg, mod-

severe MR


Echocardiogram: 10/16/2018 Normal LV size with hyperdynamic LVEF 75%, mild BSH, 

normal RV size and fxn, severe LAE, mod-severe MR, mod TR





1. Acute hypoxic respiratory failure, suspected aspiration pneumonia 


2. Toxic metabolic encephelopathy, possible hyperviscosity syndrome, sepsis


3. 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 


4. Hypercalcemia, paraproteinemia, anemia-> confirmed multiple myeloma, 

hyperviscosity syndrome


5. History of bilateral SFA occlusion post thrombectomy, referable to embolic 

disease related to persistent atrial fibrillation with [id number] score of 6, 

history of poor compliance with anticoagulation and medical F/U


6. Non obstructive CAD on R&LHc coronary angiography with demand ischemia


7. 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 

subendocardial ischemia, compensated/euvolemic


8. Persistent atrial fibrillation with RVR [id number] score of 6 on heparin gtt


9. Labile HTN


10. Acalculous Cholecystitis


11. Ischemic hepatitis


12. Anemia


13. Hypernatremia





PLAN:





1. Heparin gtt while off Xarelto 15 qd (renal dosing). transfuse to maintain Hgb

>8.0


2. Uptitrated Lopressor 75 bid as hemodynamics tolerate with IV Lopressor as 

needed for rate-control


3. Empiric renal-dosed abx course pending C&S


4. Enteral feeds with free water repletion, lactulose for hyperammonemia, 

monitor ammonia levels


5. Ventilator management, wean off sedation, GI protection


6. Consider IR cholecystotomy decompression per GI recs


7. LFTs downtrending


8. Continue discussions regarding goals of care with family: Compassionate 

extubation versus Tracheostomy

## 2018-11-21 NOTE — PN
Teaching Attending Note


Name of Resident: Ganga Mccormick





ATTENDING PHYSICIAN STATEMENT





I saw and evaluated the patient.


I reviewed the resident's note and discussed the case with the resident.


I agree with the resident's findings and plan as documented.








SUBJECTIVE:





Pt seen and examined in the ICU. Remains intubated, poorly responsive off 

sedation. No pressors. Vented on volume assist control with 40% FiO2. Awaiting 

decision from family but have been difficult to contact.





OBJECTIVE:





 Vital Signs











 Period  Temp  Pulse  Resp  BP Sys/Varghese  Pulse Ox


 


 Last 24 Hr  98.7 F-100.1 F    14-19  /54-74  97-98








 Intake & Output











 11/18/18 11/19/18 11/20/18 11/21/18





 23:59 23:59 23:59 23:59


 


Intake Total 1300 2900 3900 2140


 


Output Total 1800 1500 1200 300


 


Balance -500 1400 2700 1840


 


Weight 80.921 kg 81.737 kg 81.737 kg 84.051 kg








Gen:  intubated, poorly responsive


Heart: RRR


Lung: scattered rhonchi


Abd: soft, nontender


Ext: no edema





 CBC, BMP





 11/21/18 05:30 





 11/21/18 05:30 





Active Medications





Acetaminophen (Tylenol -)  650 mg PO Q6H PRN


   PRN Reason: PAIN LEVEL 1 - 3


   Last Admin: 11/16/18 16:25 Dose:  650 mg


Acetaminophen (Tylenol Suppository -)  650 mg RC Q6H PRN


   PRN Reason: FEVER


Allopurinol (Zyloprim -)  100 mg PO BID [facility]


   Last Admin: 11/21/18 10:09 Dose:  100 mg


Chlorhexidine Gluconate (Peridex -)  15 ml MM BID [facility]


   Last Admin: 11/21/18 10:08 Dose:  15 ml


Cholecalciferol (Vitamin D3 -)  1,000 unit PO DAILY [facility]


   Last Admin: 11/21/18 10:09 Dose:  1,000 unit


Diphenhydramine HCl (Benadryl Injection -)  25 mg IVPB ONCE PRN


   PRN Reason: DURING PLASMAPHERESIS


Heparin Sodium (Porcine) (Heparin -)  1,000 unit IVPUSH PRN PRN


   PRN Reason: Heparin


Heparin Sodium (Porcine) (Heparin -)  5,000 unit IVPUSH PRN PRN


   PRN Reason: Heparin


Metronidazole (Flagyl 500mg Premixed Ivpb -)  500 mg in 100 mls @ 100 mls/hr 

IVPB BID [facility]


   Last Admin: 11/21/18 10:07 Dose:  100 mls/hr


Sodium Chloride (1/2 Normal Saline)  1,000 mls @ 75 mls/hr IV ASDIR [facility]


   Last Admin: 11/21/18 06:28 Dose:  75 mls/hr


Levofloxacin (Levaquin 250 Mg Premixed Ivpb -)  250 mg in 50 mls @ 50 mls/hr 

IVPB DAILY [facility]; Protocol


   Last Admin: 11/21/18 10:08 Dose:  50 mls/hr


HEPARIN SOD,PORK IN 0.45% NACL (Heparin-1/2ns 25,000 Units/500)  25,000 units 

in 500 mls @ 20 mls/hr IVPB TITR [facility]; Protocol


Lactulose (Cephulac (Oral Use))  20 gm PO TID [facility]


   Last Admin: 11/21/18 06:28 Dose:  20 gm


Metoprolol Tartrate (Lopressor Injection -)  5 mg IVPUSH Q8H PRN


   PRN Reason: TACHYCARDIA


   Last Admin: 11/20/18 19:38 Dose:  5 mg


Metoprolol Tartrate (Lopressor -)  75 mg PO BID [facility]


   Last Admin: 11/21/18 10:09 Dose:  75 mg


Pantoprazole Sodium (Protonix Iv)  40 mg IVPUSH DAILY [facility]


   Last Admin: 11/21/18 10:08 Dose:  40 mg


Rifaximin (Xifaxan -)  550 mg PO BID [facility]


   Last Admin: 11/21/18 10:09 Dose:  550 mg


Thiamine HCl (Vitamin B1 -)  100 mg PO DAILY [facility]


   Last Admin: 11/21/18 10:09 Dose:  100 mg








ASSESSMENT AND PLAN:


Acute Hypoxic Respiratory Failure


Altered Mental Status


Metabolic Encephalopathy


Pneumonia


Acalculous Cholecystitis


Multiple Myeloma


Acute on Chronic Renal Failure


Metabolic Acidosis


LV Diastolic Dysfunction


Atrial Fibrillation


CAD


+Troponins likely Demand Ischemia


PAD


Hyperlipidemia


HTN





-  continue antibiotics per ID 


-  monitor urine output, creatinine


-  rate control


-  minimize sedation to assess mental status 


-  spontaneous breathing trials as tolerated but would not extubate unless 

mental status improved


-  DVT/GI prophylaxis


-  continue discussions regarding goals of care, if unable to reach family will 

need tracheostomy as he is approaching 3 weeks intubated


-  palliative care input appreciated


-  continue ICU monitoring


-  poor overall prognosis for meaningful recovery








critical care time spent in reviewing chart, evaluating patient and formulating 

plan 35 min

## 2018-11-21 NOTE — PN
Physical Exam: 


SUBJECTIVE: Patient seen and examined in the ICU.  Remains intubated, poorly 

responsive off sedation.  low grade fever overnight. No gross change in overall 

mental status. No pressors. GOC/family meeting initiated and ongoing. s/p 1 u 

prbc 








OBJECTIVE:





 Vital Signs











 Period  Temp  Pulse  Resp  BP Sys/Varghese  Pulse Ox


 


 Last 24 Hr  98.9 F-100.1 F    14-19  /54-74  97-98











GENERAL: Remains intubated, poorly responsive off sedation


HEAD: NCAT


EYES:  sclera anicteric. 


ENT: Ears normal, nares patent, dry mucous membranes


NECK: Trachea midline. 


LUNGS: bilateral diffuse rhonchi


HEART: Tachycardic rate and irregular rhythm. 


ABDOMEN: Soft, nondistended.


EXTREMITIES: 2+ pulses, warm, well-perfused, no edema, scattered ulcers. 


NEUROLOGICAL: Cannot assess secondary to clinical condition. 


SKIN: Warm, dry, normal turgor, scattered ulcers on the legs














 Laboratory Results - last 24 hr











  11/21/18 11/21/18 11/21/18





  05:30 05:30 05:30


 


WBC   5.5 


 


RBC   2.91 L 


 


Hgb   8.8 L 


 


Hct   27.2 L D 


 


MCV   93.2 


 


MCH   30.1 


 


MCHC   32.3 


 


RDW   16.5 H 


 


Plt Count   163 


 


MPV   9.4 


 


Absolute Neuts (auto)   4.5 


 


Neutrophils %   81.5 


 


Neutrophils % (Manual)   83.2 H 


 


Band Neutrophils %   4.2 


 


Lymphocytes %   13.3 


 


Lymphocytes % (Manual)   9.5 


 


Monocytes %   2.3 L 


 


Monocytes % (Manual)   0 L D 


 


Eosinophils %   2.6  D 


 


Eosinophils % (Manual)   2.1  D 


 


Basophils %   0.3 


 


Basophils % (Manual)   0.0 


 


Myelocytes % (Man)   1  D 


 


Promyelocytes % (Man)   0 


 


Blast Cells % (Manual)   0 


 


Nucleated RBC %   6 H 


 


Metamyelocytes   0 


 


Anisocytosis   1+ 


 


Macrocytosis   1+ 


 


PTT (Actin FS)  29.1  


 


Puncture Site   


 


ABG pH   


 


ABG pCO2 at Pt Temp   


 


ABG pO2 at Pt Temp   


 


ABG HCO3   


 


ABG O2 Sat (Measured)   


 


ABG O2 Content   


 


ABG Base Excess   


 


Chacho Test   


 


O2 Delivery Device   


 


Oxygen Flow Rate   


 


PEEP   


 


Sodium    148 H


 


Potassium    5.1


 


Chloride    120 H


 


Carbon Dioxide    23


 


Anion Gap    5 L


 


BUN    83 H


 


Creatinine    2.6 H


 


Creat Clearance w eGFR    23.93


 


Random Glucose    130 H


 


Calcium    5.9 L*


 


Phosphorus    5.0 H


 


Magnesium    2.6 H


 


Total Bilirubin    0.4


 


AST    82 H


 


ALT    114 H


 


Alkaline Phosphatase    111


 


Total Protein    9.6 H


 


Albumin    1.2 L














  11/21/18





  07:20


 


WBC 


 


RBC 


 


Hgb 


 


Hct 


 


MCV 


 


MCH 


 


MCHC 


 


RDW 


 


Plt Count 


 


MPV 


 


Absolute Neuts (auto) 


 


Neutrophils % 


 


Neutrophils % (Manual) 


 


Band Neutrophils % 


 


Lymphocytes % 


 


Lymphocytes % (Manual) 


 


Monocytes % 


 


Monocytes % (Manual) 


 


Eosinophils % 


 


Eosinophils % (Manual) 


 


Basophils % 


 


Basophils % (Manual) 


 


Myelocytes % (Man) 


 


Promyelocytes % (Man) 


 


Blast Cells % (Manual) 


 


Nucleated RBC % 


 


Metamyelocytes 


 


Anisocytosis 


 


Macrocytosis 


 


PTT (Actin FS) 


 


Puncture Site  Right radial


 


ABG pH  7.42


 


ABG pCO2 at Pt Temp  30.7 L


 


ABG pO2 at Pt Temp  87.6


 


ABG HCO3  19.5 L


 


ABG O2 Sat (Measured)  95.5


 


ABG O2 Content  12.5 L


 


ABG Base Excess  -3.8 L


 


Chacho Test  Positive


 


O2 Delivery Device  A-c


 


Oxygen Flow Rate  Yes


 


PEEP  10.0


 


Sodium 


 


Potassium 


 


Chloride 


 


Carbon Dioxide 


 


Anion Gap 


 


BUN 


 


Creatinine 


 


Creat Clearance w eGFR 


 


Random Glucose 


 


Calcium 


 


Phosphorus 


 


Magnesium 


 


Total Bilirubin 


 


AST 


 


ALT 


 


Alkaline Phosphatase 


 


Total Protein 


 


Albumin 








Active Medications











Generic Name Dose Route Start Last Admin





  Trade Name Freq  PRN Reason Stop Dose Admin


 


Acetaminophen  650 mg  11/10/18 14:09  11/16/18 16:25





  Tylenol -  PO   650 mg





  Q6H PRN   Administration





  PAIN LEVEL 1 - 3   





     





     





     


 


Acetaminophen  650 mg  11/15/18 15:54  





  Tylenol Suppository -  RC   





  Q6H PRN   





  FEVER   





     





     





     


 


Allopurinol  100 mg  11/09/18 10:00  11/21/18 10:09





  Zyloprim -  PO   100 mg





  BID [facility]   Administration





     





     





     





     


 


Chlorhexidine Gluconate  15 ml  11/10/18 23:45  11/21/18 10:08





  Peridex -  MM   15 ml





  BID [facility]   Administration





     





     





     





     


 


Cholecalciferol  1,000 unit  11/18/18 10:00  11/21/18 10:09





  Vitamin D3 -  PO   1,000 unit





  DAILY [facility]   Administration





     





     





     





     


 


Diphenhydramine HCl  25 mg  11/09/18 12:00  





  Benadryl Injection -  IVPB   





  ONCE PRN   





  DURING PLASMAPHERESIS   





     





     





     


 


Heparin Sodium (Porcine)  1,000 unit  11/21/18 12:23  





  Heparin -  IVPUSH   





  PRN PRN   





  Heparin   





     





     





     


 


Heparin Sodium (Porcine)  5,000 unit  11/21/18 12:23  





  Heparin -  IVPUSH   





  PRN PRN   





  Heparin   





     





     





     


 


Metronidazole  500 mg in 100 mls @ 100 mls/hr  11/17/18 14:30  11/21/18 10:07





  Flagyl 500mg Premixed Ivpb -  IVPB   100 mls/hr





  BID [facility]   Administration





     





     





     





     


 


Sodium Chloride  1,000 mls @ 75 mls/hr  11/19/18 11:30  11/21/18 06:28





  1/2 Normal Saline  IV   75 mls/hr





  ASDIR [facility]   Administration





     





     





     





     


 


Levofloxacin  250 mg in 50 mls @ 50 mls/hr  11/19/18 12:45  11/21/18 10:08





  Levaquin 250 Mg Premixed Ivpb -  IVPB   50 mls/hr





  DAILY [facility]   Administration





     





     





  Protocol   





     


 


HEPARIN SOD,PORK IN 0.45% NACL  25,000 units in 500 mls @ 20 mls/hr  11/21/18 13

:00  





  Heparin-1/2ns 25,000 Units/500  IVPB   





  TITR [facility]   





     





     





  Protocol   





  1,000 UNITS/HR   


 


Lactulose  20 gm  11/09/18 09:38  11/21/18 06:28





  Cephulac (Oral Use)  PO   20 gm





  TID [facility]   Administration





     





     





     





     


 


Metoprolol Tartrate  5 mg  11/09/18 13:04  11/20/18 19:38





  Lopressor Injection -  IVPUSH   5 mg





  Q8H PRN   Administration





  TACHYCARDIA   





     





     





     


 


Metoprolol Tartrate  75 mg  11/16/18 08:30  11/21/18 10:09





  Lopressor -  PO   75 mg





  BID [facility]   Administration





     





     





     





     


 


Pantoprazole Sodium  40 mg  11/07/18 12:00  11/21/18 10:08





  Protonix Iv  IVPUSH   40 mg





  DAILY [facility]   Administration





     





     





     





     


 


Rifaximin  550 mg  11/15/18 22:00  11/21/18 10:09





  Xifaxan -  PO   550 mg





  BID [facility]   Administration





     





     





     





     


 


Thiamine HCl  100 mg  10/30/18 22:12  11/21/18 10:09





  Vitamin B1 -  PO   100 mg





  DAILY [facility]   Administration





     





     





     





     











ASSESSMENT/PLAN:


77 yo m PMH of A-Fib, Acute on chronic kidney injury, CAD w stents, 

hypercalcemia, medication non-adherence, paraproteinemia, thromboembolic disease

, diastolic heart dysfunction without failure, HTN, HLD, hypertrophic 

cardiomyopathy is here after a rapid response. He was on the floors when it was 

noticed that he has respiratory insufficiency and was desaturating, requiring 

ICU monitoring. GOC/family meeting initiated and ongoing. 








GI: 


metabolic encephalopathy 


-ammonia improving  w/ lactulose and rifaximin


-LFTs are also elevated but downtrending. transamintitis most likely secondary 

to ischemic hepatits which is multifactorial including sepsis, episodes of 

hypotension and hyperviscosity syndrome. 





acalculous cholecystitis?


US shows thickened gallbladder wall, pericholecystic fluid, suspicious for 

acalculous cholecystitis. There was also mild dilatation of the CBD but that 

might be normal for age. 


-Spoke w/ IR, who feel image does not represent  acalculous cholecystitisand 

does not require any IR drainage 





ID: 


low grade fever overnight.


- Rhonchi heard on Lung auscultation


-RLL pneumonia


bcx 11/19/18 neg 


off of ceftazidime


Vancomycin Redosed 11/19/18 


- c/w levaquin/flagyl. 


- ID on board


- c/w rifaximin for elevated ammonia level





Cardio: 


Afib -[id number] score of 6 


- Lopressor 75 mg BID PO


-IV metoprolol 5 mg PRN


- diastolic dysfunction + hypertrophic cardiomyopathy


- CAD with multiple stents


- Cardio consulted and on board.


off Xarelto 15 qd (renal dosing) while on heparin gtt. transfuse to maintain Hgb

>8.0 


s/p 1 u prbc 11/20/18, H/H improved...will restart heparin gtt w/ close H/H 

monitoring 


- Sporadically goes in and out of V-Tach - noted to have intermittent episodes 

of V-tach this AM. sushma nl, Ca repleted. spoke to cardio nothing to do at this 

point due to pt extensive cardiac disease. Can give amio if v-tach is sustained 

and persistent. 





Pulm: 


- Intubated


- Patient has b/l pleural effusions.


- No pneumothorax


- b/l diffuse rhonchi


- infiltrate on CXR: Abx- Substituting levaquin for ceftazidime


Vancomycin Redosed 11/19/18 


- c/w levaquin/flagyl. 





Renal: 


- Acute on Chronic kidney injury


HAM ATN vs. Cast nephropathy  (FeNa was 2.1% indicating tubular injury, US 

showed no stones or obstruction, UA w/o protein but UPCR ~0.5 indicating non-

albumin proteinuria)


- Renal consulted and on board. 


Hyperkalemia (r/o tumor lysis)


serum K is improved, s/p bicarb gtt


hypotonic saline, can dc IVF if pt is tolerating feeds with free water 





Neuro: 


- Patient is not alert or oriented.


Remains intubated, poorly responsive off sedation


- Head CT showed no acute pathology


- Neuro consulted and on board. (Dr. Youngblood + Dr. phan) 


- Ammonia elevated - Patient receiving lactulose


- c/w rifaximin for elevated ammonia. 





Heme/Onc: 


- monitor H/H


- Will transfuse to keep hb > 8 


- Patient not receiving plasmapharesis today. 


- Paraproteinemia


- Patient fulfills new criteria  for multiple myeloma with free kappa/ free 

lambda light chain  ratio of 432 (>100 is diagnostic of myeloma) 


- Kappa/Lambda ratio > 100 consistent with Multiple myeloma


- M spike elevated elevated at 6.8 previously 4.7 


-steroids being held at this time 


s/p 1 u prbc 11/20/18, H/H improved...will restart heparin gtt w/ close H/H 

monitoring 





Endo: 


- Parathyroid hormone WNL


- PTH-borderline low appropriate given hypercalcemia, PTHrP low





Prophylaxis: 


- s/p 1 u prbc 11/20/18, H/H improved...will restart heparin gtt w/ close H/H 

monitoring 


- Protonix





F/E/N: 


F: hypotonic saline, can dc IVF if pt is tolerating feeds with free water  


E: Will replete lytes PRN


N: tube feeds (low potassium feeds). consider increasing free water as pt 

remains hypernatremic 





Code Status: Full Code 


- GOC/family meeting initiated and ongoing. if unable to reach family will need 

tracheostomy as he is approaching 3 weeks intubated


- Compassionate extubation versus Tracheostomy





Dispo: Patient will continue to receive ICU level care








Visit type





- Emergency Visit


Emergency Visit: Yes


ED Registration Date: 10/30/18


Care time: The patient presented to the Emergency Department on the above date 

and was hospitalized for further evaluation of their emergent condition.





- New Patient


This patient is new to me today: Yes


Date on this admission: 11/21/18





- Critical Care


Critical Care patient: Yes


Total Critical Care Time (in minutes): 39


Critical Care Statement: The care of this patient involved high complexity 

decision making to prevent further life threatening deterioration of the patient

's condition and/or to evaluate & treat vital organ system(s) failure or risk 

of failure.

## 2018-11-21 NOTE — PN
Progress Note, Physician


History of Present Illness: 


 Unresponsive on ventilator


  temps down


  WBC WNL


  Repeat BC  no growth


  


 





 


 


 





- Current Medication List


Current Medications: 


Active Medications





Acetaminophen (Tylenol -)  650 mg PO Q6H PRN


   PRN Reason: PAIN LEVEL 1 - 3


   Last Admin: 11/16/18 16:25 Dose:  650 mg


Acetaminophen (Tylenol Suppository -)  650 mg RC Q6H PRN


   PRN Reason: FEVER


Allopurinol (Zyloprim -)  100 mg PO BID [facility]


   Last Admin: 11/21/18 10:09 Dose:  100 mg


Chlorhexidine Gluconate (Peridex -)  15 ml MM BID [facility]


   Last Admin: 11/21/18 10:08 Dose:  15 ml


Cholecalciferol (Vitamin D3 -)  1,000 unit PO DAILY [facility]


   Last Admin: 11/21/18 10:09 Dose:  1,000 unit


Diphenhydramine HCl (Benadryl Injection -)  25 mg IVPB ONCE PRN


   PRN Reason: DURING PLASMAPHERESIS


Heparin Sodium (Porcine) (Heparin -)  1,000 unit IVPUSH PRN PRN


   PRN Reason: Heparin


Heparin Sodium (Porcine) (Heparin -)  5,000 unit IVPUSH PRN PRN


   PRN Reason: Heparin


Metronidazole (Flagyl 500mg Premixed Ivpb -)  500 mg in 100 mls @ 100 mls/hr 

IVPB BID [facility]


   Last Admin: 11/21/18 10:07 Dose:  100 mls/hr


Sodium Chloride (1/2 Normal Saline)  1,000 mls @ 75 mls/hr IV ASDIR [facility]


   Last Admin: 11/21/18 06:28 Dose:  75 mls/hr


Levofloxacin (Levaquin 250 Mg Premixed Ivpb -)  250 mg in 50 mls @ 50 mls/hr 

IVPB DAILY [facility]; Protocol


   Last Admin: 11/21/18 10:08 Dose:  50 mls/hr


Lactulose (Cephulac (Oral Use))  20 gm PO TID [facility]


   Last Admin: 11/21/18 06:28 Dose:  20 gm


Metoprolol Tartrate (Lopressor Injection -)  5 mg IVPUSH Q8H PRN


   PRN Reason: TACHYCARDIA


   Last Admin: 11/20/18 19:38 Dose:  5 mg


Metoprolol Tartrate (Lopressor -)  75 mg PO BID [facility]


   Last Admin: 11/21/18 10:09 Dose:  75 mg


Pantoprazole Sodium (Protonix Iv)  40 mg IVPUSH DAILY [facility]


   Last Admin: 11/21/18 10:08 Dose:  40 mg


Rifaximin (Xifaxan -)  550 mg PO BID [facility]


   Last Admin: 11/21/18 10:09 Dose:  550 mg


Thiamine HCl (Vitamin B1 -)  100 mg PO DAILY [facility]


   Last Admin: 11/21/18 10:09 Dose:  100 mg











- Objective


Vital Signs: 


 Vital Signs











Temperature  98.9 F   11/21/18 10:00


 


Pulse Rate  114 H  11/21/18 10:00


 


Respiratory Rate  18   11/21/18 11:27


 


Blood Pressure  111/72   11/21/18 10:00


 


O2 Sat by Pulse Oximetry (%)  97   11/21/18 08:45











Constitutional: Yes: No Distress


Cardiovascular: Yes: Regular Rate and Rhythm, S1, S2


Respiratory: Yes: Mechanically Ventilated


Gastrointestinal: Yes: Normal Bowel Sounds, Soft.  No: Tenderness


Edema: Yes


Labs: 


 CBC, BMP





 11/21/18 05:30 





 11/21/18 05:30 





 INR, PTT











INR  1.28  (0.83-1.09)  H  11/15/18  05:30    


 


Fibrinogen  290.0 mg/dL (238-498)   11/11/18  06:00    














Assessment/Plan


Respiratory failure


 RLL pneumonia


 Acalculus  Cholecystitis


 Toxic metabolic encephalopathy


 Hypercalcemia


 Renal failure


 Myeloma


 Hyperviscosity syndrome


 


  


 


  Continue levaquin/ flagyl


  


  Ventilatory support


  Prognosis poor

## 2018-11-21 NOTE — PN
Progress Note, Physician


Chief Complaint: 





REMAINS IN ICU INTUBATED


EVENTS AND NOTES REVIEWED





- Current Medication List


Current Medications: 


Active Medications





Acetaminophen (Tylenol -)  650 mg PO Q6H PRN


   PRN Reason: PAIN LEVEL 1 - 3


   Last Admin: 11/16/18 16:25 Dose:  650 mg


Acetaminophen (Tylenol Suppository -)  650 mg RC Q6H PRN


   PRN Reason: FEVER


Allopurinol (Zyloprim -)  100 mg PO BID [facility]


   Last Admin: 11/21/18 10:09 Dose:  100 mg


Chlorhexidine Gluconate (Peridex -)  15 ml MM BID [facility]


   Last Admin: 11/21/18 10:08 Dose:  15 ml


Cholecalciferol (Vitamin D3 -)  1,000 unit PO DAILY [facility]


   Last Admin: 11/21/18 10:09 Dose:  1,000 unit


Diphenhydramine HCl (Benadryl Injection -)  25 mg IVPB ONCE PRN


   PRN Reason: DURING PLASMAPHERESIS


Heparin Sodium (Porcine) (Heparin -)  1,000 unit IVPUSH PRN PRN


   PRN Reason: Heparin


Heparin Sodium (Porcine) (Heparin -)  5,000 unit IVPUSH PRN PRN


   PRN Reason: Heparin


Metronidazole (Flagyl 500mg Premixed Ivpb -)  500 mg in 100 mls @ 100 mls/hr 

IVPB BID [facility]


   Last Admin: 11/21/18 10:07 Dose:  100 mls/hr


Sodium Chloride (1/2 Normal Saline)  1,000 mls @ 75 mls/hr IV ASDIR [facility]


   Last Admin: 11/21/18 06:28 Dose:  75 mls/hr


Levofloxacin (Levaquin 250 Mg Premixed Ivpb -)  250 mg in 50 mls @ 50 mls/hr 

IVPB DAILY [facility]; Protocol


   Last Admin: 11/21/18 10:08 Dose:  50 mls/hr


HEPARIN SOD,PORK IN 0.45% NACL (Heparin-1/2ns 25,000 Units/500)  25,000 units 

in 500 mls @ 20 mls/hr IVPB TITR [facility]; Protocol


Lactulose (Cephulac (Oral Use))  20 gm PO TID [facility]


   Last Admin: 11/21/18 06:28 Dose:  20 gm


Metoprolol Tartrate (Lopressor Injection -)  5 mg IVPUSH Q8H PRN


   PRN Reason: TACHYCARDIA


   Last Admin: 11/20/18 19:38 Dose:  5 mg


Metoprolol Tartrate (Lopressor -)  75 mg PO BID [facility]


   Last Admin: 11/21/18 10:09 Dose:  75 mg


Pantoprazole Sodium (Protonix Iv)  40 mg IVPUSH DAILY [facility]


   Last Admin: 11/21/18 10:08 Dose:  40 mg


Rifaximin (Xifaxan -)  550 mg PO BID [facility]


   Last Admin: 11/21/18 10:09 Dose:  550 mg


Thiamine HCl (Vitamin B1 -)  100 mg PO DAILY [facility]


   Last Admin: 11/21/18 10:09 Dose:  100 mg











- Objective


Vital Signs: 


 Vital Signs











Temperature  98.9 F   11/21/18 10:00


 


Pulse Rate  87   11/21/18 12:00


 


Respiratory Rate  19   11/21/18 12:00


 


Blood Pressure  96/67   11/21/18 12:00


 


O2 Sat by Pulse Oximetry (%)  97   11/21/18 08:45











Constitutional: Yes: Other


Cardiovascular: Yes: Pulse Irregular


Respiratory: Yes: Diminished, Mechanically Ventilated


Gastrointestinal: Yes: Soft


Genitourinary: Yes: Garces Present


Musculoskeletal: Yes: Muscle Weakness


Integumentary: Yes: Other


Neurological: Yes: Unresponsive


Labs: 


 CBC, BMP





 11/21/18 05:30 





 11/21/18 05:30 





 INR, PTT











INR  1.28  (0.83-1.09)  H  11/15/18  05:30    


 


Fibrinogen  290.0 mg/dL (238-498)   11/11/18  06:00    














Problem List





- Problems


(1) HAM (acute kidney injury)


Code(s): N17.9 - ACUTE KIDNEY FAILURE, UNSPECIFIED   





(2) Atrial fibrillation with RVR


Code(s): I48.91 - UNSPECIFIED ATRIAL FIBRILLATION   





(3) Hyperlipidemia


Code(s): E78.5 - HYPERLIPIDEMIA, UNSPECIFIED   


Qualifiers: 


   Hyperlipidemia type: pure hypercholesterolemia   Qualified Code(s): E78.00 - 

Pure hypercholesterolemia, unspecified; E78.0 - Pure hypercholesterolemia   





(4) Hypothermia


Code(s): T68.XXXA - HYPOTHERMIA, INITIAL ENCOUNTER   


Qualifiers: 


   Encounter type: initial encounter   Qualified Code(s): T68.XXXA - Hypothermia

, initial encounter   





(5) Acute-on-chronic kidney injury


Code(s): N17.9 - ACUTE KIDNEY FAILURE, UNSPECIFIED; N18.9 - CHRONIC KIDNEY 

DISEASE, UNSPECIFIED   


Qualifiers: 


   Acute renal failure type: unspecified   Chronic kidney disease stage: 

unspecified stage   Qualified Code(s): N17.9 - Acute kidney failure, unspecified

; N18.9 - Chronic kidney disease, unspecified   





(6) Generalized weakness


Code(s): R53.1 - WEAKNESS   





(7) History of ETOH abuse


Code(s): Z87.898 - PERSONAL HISTORY OF OTHER SPECIFIED CONDITIONS   





(8) Hypercalcemia


Code(s): E83.52 - HYPERCALCEMIA   





(9) Hypertrophic cardiomyopathy


Code(s): I42.2 - OTHER HYPERTROPHIC CARDIOMYOPATHY   





(10) Toxic metabolic encephalopathy


Code(s): G92 - TOXIC ENCEPHALOPATHY   





(11) Sepsis


Code(s): A41.9 - SEPSIS, UNSPECIFIED ORGANISM   





(12) Respiratory failure


Code(s): J96.90 - RESPIRATORY FAILURE, UNSP, UNSP W HYPOXIA OR HYPERCAPNIA   





Assessment/Plan





NOW VENT DEPENDENT INTUBATED FOR MANY DAYS


WILL NEED TRACHEOSTOMY.


IV ABX CONTINUE PER ID.


02 SUPPORT 40% O2


UNABLE TO WEAN OFF VENT.


CARDIO F/U ON HEPARIN IV FOR AC.


MONITOR LABS, FREQUENT TURNING AND OFF LOADING


TO PREVENT SKIN BREAK DOWN


NGT FEEDS, AMINO ACIDS FOR PROTEIN 


AND PREVENT SKIN ULCERS.


ADVANCED DIRECTIVES UNCLEAR, AWAIT FAMILY DECISION


AT THIS PROGNOSIS IS POOR FOR A FULL MEANINGFUL RECOVERY


LABS DAILY MONITOR ELECTROLYTES.


NEURO EVAL


MVI/THIAMINE CONTINUE


AGREE WITH PULMONARY TO DECREASE SEDATION TO EVALUATE MENTAL STATUS

## 2018-11-21 NOTE — PN
Progress Note (short form)





- Note


Progress Note: 





Renal follow up for Hypercalcemia/HAM





Pt seen and examined in the ICU


on vent, no overnight events


no fevers


on vent


getting IVF and tube feeds 








 Vital Signs











Temperature  98.9 F   11/21/18 10:00


 


Pulse Rate  87   11/21/18 12:00


 


Respiratory Rate  19   11/21/18 12:00


 


Blood Pressure  96/67   11/21/18 12:00


 


O2 Sat by Pulse Oximetry (%)  97   11/21/18 08:45








 Intake & Output











 11/18/18 11/19/18 11/20/18 11/21/18





 23:59 23:59 23:59 23:59


 


Intake Total 1300 2900 3900 2140


 


Output Total 1800 1500 1200 300


 


Balance -500 1400 2700 1840


 


Weight 80.921 kg 81.737 kg 81.737 kg 84.051 kg

















NAD


on vent via ET tube


tachycardic


dec BS, no rales


No bladder distension


No LE edema


 CBC, BMP





 11/21/18 05:30 





 11/21/18 05:30 





 Current Medications





Acetaminophen (Tylenol -)  650 mg PO Q6H PRN


   PRN Reason: PAIN LEVEL 1 - 3


   Last Admin: 11/16/18 16:25 Dose:  650 mg


Acetaminophen (Tylenol Suppository -)  650 mg RC Q6H PRN


   PRN Reason: FEVER


Allopurinol (Zyloprim -)  100 mg PO BID [facility]


   Last Admin: 11/21/18 10:09 Dose:  100 mg


Chlorhexidine Gluconate (Peridex -)  15 ml MM BID [facility]


   Last Admin: 11/21/18 10:08 Dose:  15 ml


Cholecalciferol (Vitamin D3 -)  1,000 unit PO DAILY [facility]


   Last Admin: 11/21/18 10:09 Dose:  1,000 unit


Diphenhydramine HCl (Benadryl Injection -)  25 mg IVPB ONCE PRN


   PRN Reason: DURING PLASMAPHERESIS


Heparin Sodium (Porcine) (Heparin -)  1,000 unit IVPUSH PRN PRN


   PRN Reason: Heparin


Heparin Sodium (Porcine) (Heparin -)  5,000 unit IVPUSH PRN PRN


   PRN Reason: Heparin


Metronidazole (Flagyl 500mg Premixed Ivpb -)  500 mg in 100 mls @ 100 mls/hr 

IVPB BID [facility]


   Last Admin: 11/21/18 10:07 Dose:  100 mls/hr


Sodium Chloride (1/2 Normal Saline)  1,000 mls @ 75 mls/hr IV ASDIR [facility]


   Last Admin: 11/21/18 06:28 Dose:  75 mls/hr


Levofloxacin (Levaquin 250 Mg Premixed Ivpb -)  250 mg in 50 mls @ 50 mls/hr 

IVPB DAILY [facility]; Protocol


   Last Admin: 11/21/18 10:08 Dose:  50 mls/hr


HEPARIN SOD,PORK IN 0.45% NACL (Heparin-1/2ns 25,000 Units/500)  25,000 units 

in 500 mls @ 20 mls/hr IVPB TITR [facility]; Protocol


Lactulose (Cephulac (Oral Use))  20 gm PO TID [facility]


   Last Admin: 11/21/18 06:28 Dose:  20 gm


Metoprolol Tartrate (Lopressor Injection -)  5 mg IVPUSH Q8H PRN


   PRN Reason: TACHYCARDIA


   Last Admin: 11/20/18 19:38 Dose:  5 mg


Metoprolol Tartrate (Lopressor -)  75 mg PO BID [facility]


   Last Admin: 11/21/18 10:09 Dose:  75 mg


Pantoprazole Sodium (Protonix Iv)  40 mg IVPUSH DAILY [facility]


   Last Admin: 11/21/18 10:08 Dose:  40 mg


Rifaximin (Xifaxan -)  550 mg PO BID [facility]


   Last Admin: 11/21/18 10:09 Dose:  550 mg


Thiamine HCl (Vitamin B1 -)  100 mg PO DAILY [facility]


   Last Admin: 11/21/18 10:09 Dose:  100 mg











78 year old gentleman with hx of CKD, Afib on A/C, CAD, CKD, Hypertension, 

Hyperlipidemia, PVD who presented with AMS/Confusion and found to have HAM.





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 

showed no stones or obstruction, UA w/o protein but UPCR ~0.5 indicating non-

albumin proteinuria)


#AMS 


#Newly diagnosed multiple myloma 


#Anemia 


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


#Hyperdense lesion on US of the kidney 


#Normal anion gap metabolic acidosis 


#Hyperkalemia (r/o tumor lysis)





Renal function stable and pt remains non-oliguric 


no acute indication for RRT and pt would not be a good candidate given co-

morbid conditions and poor overall prognosis


pt on tube feeds, tolerating it well


can d/c IVF


pt should be getting ~1L of free water daily with tube feeds


corrected Ca is WNL 


trend H/H


prognosis is poor 





Ricky Chang DO

## 2018-11-22 LAB
ALBUMIN SERPL-MCNC: 1.1 G/DL (ref 3.4–5)
ALP SERPL-CCNC: 110 U/L (ref 45–117)
ALT SERPL-CCNC: 85 U/L (ref 13–61)
ANION GAP SERPL CALC-SCNC: 5 MMOL/L (ref 8–16)
ARTERIAL BLOOD GAS PCO2: 29 MMHG (ref 35–45)
ARTERIAL PATENCY WRIST A: POSITIVE
AST SERPL-CCNC: 62 U/L (ref 15–37)
BASE EXCESS BLDA CALC-SCNC: -2.9 MEQ/L (ref -2–2)
BASOPHILS # BLD: 0.3 % (ref 0–2)
BILIRUB SERPL-MCNC: 0.3 MG/DL (ref 0.2–1)
BUN SERPL-MCNC: 74 MG/DL (ref 7–18)
CALCIUM SERPL-MCNC: 5.9 MG/DL (ref 8.5–10.1)
CHLORIDE SERPL-SCNC: 120 MMOL/L (ref 98–107)
CO2 SERPL-SCNC: 21 MMOL/L (ref 21–32)
CREAT SERPL-MCNC: 2.4 MG/DL (ref 0.55–1.3)
DEPRECATED RDW RBC AUTO: 16.1 % (ref 11.9–15.9)
EOSINOPHIL # BLD: 3 % (ref 0–4.5)
GLUCOSE SERPL-MCNC: 105 MG/DL (ref 74–106)
HCT VFR BLD CALC: 23.8 % (ref 35.4–49)
HGB BLD-MCNC: 7.8 GM/DL (ref 11.7–16.9)
LYMPHOCYTES # BLD: 12.9 % (ref 8–40)
MAGNESIUM SERPL-MCNC: 2.1 MG/DL (ref 1.8–2.4)
MCH RBC QN AUTO: 30.3 PG (ref 25.7–33.7)
MCHC RBC AUTO-ENTMCNC: 32.7 G/DL (ref 32–35.9)
MCV RBC: 92.6 FL (ref 80–96)
MONOCYTES # BLD AUTO: 2.2 % (ref 3.8–10.2)
NEUTROPHILS # BLD: 81.6 % (ref 42.8–82.8)
PHOSPHATE SERPL-MCNC: 3.3 MG/DL (ref 2.5–4.9)
PLATELET # BLD AUTO: 129 K/MM3 (ref 134–434)
PMV BLD: 9.4 FL (ref 7.5–11.1)
PO2 BLDA: 102 MMHG (ref 70–100)
POTASSIUM SERPLBLD-SCNC: 4.8 MMOL/L (ref 3.5–5.1)
PROT SERPL-MCNC: 8.6 G/DL (ref 6.4–8.2)
RBC # BLD AUTO: 2.57 M/MM3 (ref 4–5.6)
SAO2 % BLDA: 97.4 % (ref 90–98.9)
SODIUM SERPL-SCNC: 147 MMOL/L (ref 136–145)
WBC # BLD AUTO: 4.7 K/MM3 (ref 4–10)

## 2018-11-22 RX ADMIN — LACTULOSE SCH GM: 20 SOLUTION ORAL at 21:25

## 2018-11-22 RX ADMIN — VITAMIN D, TAB 1000IU (100/BT) SCH UNIT: 25 TAB at 09:28

## 2018-11-22 RX ADMIN — HEPARIN SODIUM SCH MLS/HR: 5000 INJECTION, SOLUTION INTRAVENOUS at 21:24

## 2018-11-22 RX ADMIN — METOPROLOL TARTRATE SCH MG: 50 TABLET, FILM COATED ORAL at 09:31

## 2018-11-22 RX ADMIN — CHLORHEXIDINE GLUCONATE 0.12% ORAL RINSE SCH ML: 1.2 LIQUID ORAL at 21:27

## 2018-11-22 RX ADMIN — RIFAXIMIN SCH MG: 550 TABLET ORAL at 09:39

## 2018-11-22 RX ADMIN — LACTULOSE SCH GM: 20 SOLUTION ORAL at 14:19

## 2018-11-22 RX ADMIN — METOPROLOL TARTRATE SCH MG: 50 TABLET, FILM COATED ORAL at 21:26

## 2018-11-22 RX ADMIN — DEXTROSE AND SODIUM CHLORIDE SCH MLS/HR: 5; .33 INJECTION, SOLUTION INTRAVENOUS at 09:52

## 2018-11-22 RX ADMIN — CHLORHEXIDINE GLUCONATE 0.12% ORAL RINSE SCH ML: 1.2 LIQUID ORAL at 09:39

## 2018-11-22 RX ADMIN — LACTULOSE SCH: 20 SOLUTION ORAL at 06:16

## 2018-11-22 RX ADMIN — PANTOPRAZOLE SODIUM SCH MG: 40 INJECTION, POWDER, FOR SOLUTION INTRAVENOUS at 09:23

## 2018-11-22 RX ADMIN — ALLOPURINOL SCH MG: 100 TABLET ORAL at 21:27

## 2018-11-22 RX ADMIN — ALLOPURINOL SCH MG: 100 TABLET ORAL at 09:28

## 2018-11-22 RX ADMIN — RIFAXIMIN SCH MG: 550 TABLET ORAL at 21:26

## 2018-11-22 RX ADMIN — Medication SCH MG: at 09:28

## 2018-11-22 NOTE — PN
Progress Note, Physician


History of Present Illness: 


 Unresponsive on ventilator


  Afebrile


  WBC WNL


  BC  no growth


  


 





 


 


 





- Current Medication List


Current Medications: 


Active Medications





Acetaminophen (Tylenol -)  650 mg PO Q6H PRN


   PRN Reason: PAIN LEVEL 1 - 3


   Last Admin: 11/16/18 16:25 Dose:  650 mg


Acetaminophen (Tylenol Suppository -)  650 mg RC Q6H PRN


   PRN Reason: FEVER


Allopurinol (Zyloprim -)  100 mg PO BID [facility]


   Last Admin: 11/22/18 09:28 Dose:  100 mg


Chlorhexidine Gluconate (Peridex -)  15 ml MM BID [facility]


   Last Admin: 11/22/18 09:39 Dose:  15 ml


Cholecalciferol (Vitamin D3 -)  1,000 unit PO DAILY [facility]


   Last Admin: 11/22/18 09:28 Dose:  1,000 unit


Diphenhydramine HCl (Benadryl Injection -)  25 mg IVPB ONCE PRN


   PRN Reason: DURING PLASMAPHERESIS


Heparin Sodium (Porcine) (Heparin -)  1,000 unit IVPUSH PRN PRN


   PRN Reason: Heparin


   Last Admin: 11/22/18 08:03 Dose:  1,000 unit


Heparin Sodium (Porcine) (Heparin -)  5,000 unit IVPUSH PRN PRN


   PRN Reason: Heparin


Metronidazole (Flagyl 500mg Premixed Ivpb -)  500 mg in 100 mls @ 100 mls/hr 

IVPB BID [facility]


   Last Admin: 11/22/18 09:23 Dose:  100 mls/hr


Levofloxacin (Levaquin 250 Mg Premixed Ivpb -)  250 mg in 50 mls @ 50 mls/hr 

IVPB DAILY [facility]; Protocol


   Last Admin: 11/22/18 09:23 Dose:  50 mls/hr


HEPARIN SOD,PORK IN 0.45% NACL (Heparin-1/2ns 25,000 Units/500)  25,000 units 

in 500 mls @ 20 mls/hr IVPB TITR [facility]; Protocol


   Last Titration: 11/22/18 07:50 Dose:  1,100 units/hr, 22 mls/hr


Dextrose/Sodium Chloride (D5-1/3ns -)  500 mls @ 83 mls/hr IV ASDIR [facility]


Lactulose (Cephulac (Oral Use))  20 gm PO TID [facility]


   Last Admin: 11/22/18 06:16 Dose:  Not Given


Metoprolol Tartrate (Lopressor Injection -)  5 mg IVPUSH Q8H PRN


   PRN Reason: TACHYCARDIA


   Last Admin: 11/20/18 19:38 Dose:  5 mg


Metoprolol Tartrate (Lopressor -)  75 mg PO BID [facility]


   Last Admin: 11/22/18 09:31 Dose:  75 mg


Pantoprazole Sodium (Protonix Iv)  40 mg IVPUSH DAILY [facility]


   Last Admin: 11/22/18 09:23 Dose:  40 mg


Rifaximin (Xifaxan -)  550 mg PO BID [facility]


   Last Admin: 11/22/18 09:39 Dose:  550 mg


Thiamine HCl (Vitamin B1 -)  100 mg PO DAILY [facility]


   Last Admin: 11/22/18 09:28 Dose:  100 mg











- Objective


Vital Signs: 


 Vital Signs











Temperature  98.2 F   11/22/18 06:00


 


Pulse Rate  96 H  11/22/18 08:52


 


Respiratory Rate  14   11/22/18 08:52


 


Blood Pressure  97/68   11/22/18 06:00


 


O2 Sat by Pulse Oximetry (%)  98   11/22/18 08:52











Constitutional: Yes: No Distress


Cardiovascular: Yes: Regular Rate and Rhythm, S1, S2


Respiratory: Yes: Mechanically Ventilated


Gastrointestinal: Yes: Normal Bowel Sounds, Soft.  No: Tenderness


Edema: Yes


Labs: 


 CBC, BMP





 11/22/18 05:30 





 11/22/18 05:30 





 INR, PTT











INR  1.28  (0.83-1.09)  H  11/15/18  05:30    


 


Fibrinogen  290.0 mg/dL (238-498)   11/11/18  06:00    














Assessment/Plan


Respiratory failure


 RLL pneumonia


 Acalculus  Cholecystitis


 Toxic metabolic encephalopathy


 Renal failure


 Myeloma


 Hyperviscosity syndrome


 


  


 


  Continue levaquin/ flagyl


  


  Ventilatory support


  Prognosis poor

## 2018-11-22 NOTE — PN
Progress Note (short form)





- Note


Progress Note: 





Renal follow up for Hypercalcemia/HAM





Pt seen and examined in the ICU


remains unresponsive on vent


no overnight events


making urine via fenton 


on tube feeds


 Vital Signs











Temperature  98.2 F   11/22/18 06:00


 


Pulse Rate  92 H  11/22/18 06:00


 


Respiratory Rate  19   11/22/18 07:02


 


Blood Pressure  97/68   11/22/18 06:00


 


O2 Sat by Pulse Oximetry (%)  97   11/21/18 08:45








 Intake & Output











 11/19/18 11/20/18 11/21/18 11/22/18





 23:59 23:59 23:59 23:59


 


Intake Total 2900 3900 2260 1040


 


Output Total 1500 1200 700 900


 


Balance 1400 2700 1560 140


 


Weight 81.737 kg 81.737 kg 84.051 kg 84.051 kg





on vent via ET tube


RRR, No M/R


No rales


soft NT/ND


no LE edema 


 CBC, BMP





 11/22/18 05:30 





 11/22/18 05:30 





 Current Medications





Acetaminophen (Tylenol -)  650 mg PO Q6H PRN


   PRN Reason: PAIN LEVEL 1 - 3


   Last Admin: 11/16/18 16:25 Dose:  650 mg


Acetaminophen (Tylenol Suppository -)  650 mg RC Q6H PRN


   PRN Reason: FEVER


Allopurinol (Zyloprim -)  100 mg PO BID [facility]


   Last Admin: 11/21/18 21:59 Dose:  100 mg


Chlorhexidine Gluconate (Peridex -)  15 ml MM BID [facility]


   Last Admin: 11/21/18 21:57 Dose:  15 ml


Cholecalciferol (Vitamin D3 -)  1,000 unit PO DAILY [facility]


   Last Admin: 11/21/18 10:09 Dose:  1,000 unit


Diphenhydramine HCl (Benadryl Injection -)  25 mg IVPB ONCE PRN


   PRN Reason: DURING PLASMAPHERESIS


Heparin Sodium (Porcine) (Heparin -)  1,000 unit IVPUSH PRN PRN


   PRN Reason: Heparin


Heparin Sodium (Porcine) (Heparin -)  5,000 unit IVPUSH PRN PRN


   PRN Reason: Heparin


Metronidazole (Flagyl 500mg Premixed Ivpb -)  500 mg in 100 mls @ 100 mls/hr 

IVPB BID [facility]


   Last Admin: 11/21/18 22:00 Dose:  100 mls/hr


Levofloxacin (Levaquin 250 Mg Premixed Ivpb -)  250 mg in 50 mls @ 50 mls/hr 

IVPB DAILY [facility]; Protocol


   Last Admin: 11/21/18 10:08 Dose:  50 mls/hr


HEPARIN SOD,PORK IN 0.45% NACL (Heparin-1/2ns 25,000 Units/500)  25,000 units 

in 500 mls @ 20 mls/hr IVPB TITR [facility]; Protocol


   Last Admin: 11/21/18 21:55 Dose:  1,000 units/hr, 20 mls/hr


Lactulose (Cephulac (Oral Use))  20 gm PO TID [facility]


   Last Admin: 11/22/18 06:16 Dose:  Not Given


Metoprolol Tartrate (Lopressor Injection -)  5 mg IVPUSH Q8H PRN


   PRN Reason: TACHYCARDIA


   Last Admin: 11/20/18 19:38 Dose:  5 mg


Metoprolol Tartrate (Lopressor -)  75 mg PO BID [facility]


   Last Admin: 11/21/18 21:57 Dose:  75 mg


Pantoprazole Sodium (Protonix Iv)  40 mg IVPUSH DAILY [facility]


   Last Admin: 11/21/18 10:08 Dose:  40 mg


Rifaximin (Xifaxan -)  550 mg PO BID [facility]


   Last Admin: 11/21/18 21:57 Dose:  550 mg


Thiamine HCl (Vitamin B1 -)  100 mg PO DAILY [facility]


   Last Admin: 11/21/18 10:09 Dose:  100 mg














78 year old gentleman with hx of CKD, Afib on A/C, CAD, CKD, Hypertension, 

Hyperlipidemia, PVD who presented with AMS/Confusion and found to have HAM.





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 

showed no stones or obstruction, UA w/o protein but UPCR ~0.5 indicating non-

albumin proteinuria)


#AMS 


#Newly diagnosed multiple myloma 


#Anemia 


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


#Hyperdense lesion on US of the kidney 


#Normal anion gap metabolic acidosis 


#Hyperkalemia (r/o tumor lysis)





Renal function stable/slightly improved and pt remains non-oliguric. No 

indication for RRT. 


continue supportive care, keep MAP > 65


continue tube feeds with free water 


Corrected Ca is WNL


Prognosis remains poor


family discussions regarding goals of care are on-going 








Ricky Chang DO

## 2018-11-22 NOTE — PN
Teaching Attending Note


Name of Resident: Ganga Mccormick





ATTENDING PHYSICIAN STATEMENT





I saw and evaluated the patient.


I reviewed the resident's note and discussed the case with the resident.


I agree with the resident's findings and plan as documented.








SUBJECTIVE:





Pt seen and examined in the ICU. Remains intubated, unresponsive off sedation. 

No fevers recorded. No pressors. Ongoing intermittent discussions with family 

without decision.





OBJECTIVE:





 Vital Signs











 Period  Temp  Pulse  Resp  BP Sys/Varghese  Pulse Ox


 


 Last 24 Hr  98.0 F-98.6 F    14-20  /57-73  98








 Intake & Output











 11/19/18 11/20/18 11/21/18 11/22/18





 23:59 23:59 23:59 23:59


 


Intake Total 2900 3900 2260 1040


 


Output Total 1500 1200 700 900


 


Balance 1400 2700 1560 140


 


Weight 81.737 kg 81.737 kg 84.051 kg 84.051 kg








Gen:  intubated, unresponsive


Heart: RRR


Lung: decreased breath sounds at the bases


Abd: soft, nontender


Ext: UE edema





 CBC, BMP





 11/22/18 05:30 





 11/22/18 05:30 





Active Medications





Acetaminophen (Tylenol -)  650 mg PO Q6H PRN


   PRN Reason: PAIN LEVEL 1 - 3


   Last Admin: 11/16/18 16:25 Dose:  650 mg


Acetaminophen (Tylenol Suppository -)  650 mg RC Q6H PRN


   PRN Reason: FEVER


Allopurinol (Zyloprim -)  100 mg PO BID [facility]


   Last Admin: 11/22/18 09:28 Dose:  100 mg


Chlorhexidine Gluconate (Peridex -)  15 ml MM BID [facility]


   Last Admin: 11/22/18 09:39 Dose:  15 ml


Cholecalciferol (Vitamin D3 -)  1,000 unit PO DAILY [facility]


   Last Admin: 11/22/18 09:28 Dose:  1,000 unit


Diphenhydramine HCl (Benadryl Injection -)  25 mg IVPB ONCE PRN


   PRN Reason: DURING PLASMAPHERESIS


Heparin Sodium (Porcine) (Heparin -)  1,000 unit IVPUSH PRN PRN


   PRN Reason: Heparin


   Last Admin: 11/22/18 08:03 Dose:  1,000 unit


Heparin Sodium (Porcine) (Heparin -)  5,000 unit IVPUSH PRN PRN


   PRN Reason: Heparin


Metronidazole (Flagyl 500mg Premixed Ivpb -)  500 mg in 100 mls @ 100 mls/hr 

IVPB BID [facility]


   Last Admin: 11/22/18 09:23 Dose:  100 mls/hr


Levofloxacin (Levaquin 250 Mg Premixed Ivpb -)  250 mg in 50 mls @ 50 mls/hr 

IVPB DAILY [facility]; Protocol


   Last Admin: 11/22/18 09:23 Dose:  50 mls/hr


HEPARIN SOD,PORK IN 0.45% NACL (Heparin-1/2ns 25,000 Units/500)  25,000 units 

in 500 mls @ 20 mls/hr IVPB TITR [facility]; Protocol


   Last Titration: 11/22/18 07:50 Dose:  1,100 units/hr, 22 mls/hr


Dextrose/Sodium Chloride (D5-1/3ns -)  500 mls @ 83 mls/hr IV ASDIR [facility]


   Last Admin: 11/22/18 09:52 Dose:  83 mls/hr


Lactulose (Cephulac (Oral Use))  20 gm PO TID [facility]


   Last Admin: 11/22/18 06:16 Dose:  Not Given


Metoprolol Tartrate (Lopressor Injection -)  5 mg IVPUSH Q8H PRN


   PRN Reason: TACHYCARDIA


   Last Admin: 11/20/18 19:38 Dose:  5 mg


Metoprolol Tartrate (Lopressor -)  75 mg PO BID [facility]


   Last Admin: 11/22/18 09:31 Dose:  75 mg


Pantoprazole Sodium (Protonix Iv)  40 mg IVPUSH DAILY [facility]


   Last Admin: 11/22/18 09:23 Dose:  40 mg


Rifaximin (Xifaxan -)  550 mg PO BID [facility]


   Last Admin: 11/22/18 09:39 Dose:  550 mg


Thiamine HCl (Vitamin B1 -)  100 mg PO DAILY [facility]


   Last Admin: 11/22/18 09:28 Dose:  100 mg








ASSESSMENT AND PLAN:


Acute Hypoxic Respiratory Failure


Altered Mental Status


Metabolic Encephalopathy


Pneumonia


Acalculous Cholecystitis


Multiple Myeloma


Acute on Chronic Renal Failure


Metabolic Acidosis


LV Diastolic Dysfunction


Atrial Fibrillation


CAD


+Troponins likely Demand Ischemia


PAD


Hyperlipidemia


HTN





-  continue antibiotics per ID 


-  monitor urine output, creatinine


-  rate control


-  minimize sedation to assess mental status 


-  spontaneous breathing trials as tolerated but would not extubate unless 

mental status improved


-  DVT/GI prophylaxis


-  continue discussions regarding goals of care, if unable to reach family will 

need tracheostomy as he is approaching 3 weeks intubated


-  palliative care input appreciated


-  continue ICU monitoring


-  poor overall prognosis for meaningful recovery








critical care time spent in reviewing chart, evaluating patient and formulating 

plan 35 min

## 2018-11-22 NOTE — PN
Progress Note, Physician





- Current Medication List


Current Medications: 


Active Medications





Acetaminophen (Tylenol -)  650 mg PO Q6H PRN


   PRN Reason: PAIN LEVEL 1 - 3


   Last Admin: 11/16/18 16:25 Dose:  650 mg


Acetaminophen (Tylenol Suppository -)  650 mg RC Q6H PRN


   PRN Reason: FEVER


Allopurinol (Zyloprim -)  100 mg PO BID [facility]


   Last Admin: 11/22/18 09:28 Dose:  100 mg


Chlorhexidine Gluconate (Peridex -)  15 ml MM BID [facility]


   Last Admin: 11/22/18 09:39 Dose:  15 ml


Cholecalciferol (Vitamin D3 -)  1,000 unit PO DAILY [facility]


   Last Admin: 11/22/18 09:28 Dose:  1,000 unit


Diphenhydramine HCl (Benadryl Injection -)  25 mg IVPB ONCE PRN


   PRN Reason: DURING PLASMAPHERESIS


Heparin Sodium (Porcine) (Heparin -)  1,000 unit IVPUSH PRN PRN


   PRN Reason: Heparin


   Last Admin: 11/22/18 08:03 Dose:  1,000 unit


Heparin Sodium (Porcine) (Heparin -)  5,000 unit IVPUSH PRN PRN


   PRN Reason: Heparin


Metronidazole (Flagyl 500mg Premixed Ivpb -)  500 mg in 100 mls @ 100 mls/hr 

IVPB BID [facility]


   Last Admin: 11/22/18 09:23 Dose:  100 mls/hr


Levofloxacin (Levaquin 250 Mg Premixed Ivpb -)  250 mg in 50 mls @ 50 mls/hr 

IVPB DAILY [facility]; Protocol


   Last Admin: 11/22/18 09:23 Dose:  50 mls/hr


HEPARIN SOD,PORK IN 0.45% NACL (Heparin-1/2ns 25,000 Units/500)  25,000 units 

in 500 mls @ 20 mls/hr IVPB TITR [facility]; Protocol


   Last Titration: 11/22/18 07:50 Dose:  1,100 units/hr, 22 mls/hr


Dextrose/Sodium Chloride (D5-1/3ns -)  500 mls @ 83 mls/hr IV ASDIR [facility]


   Last Admin: 11/22/18 09:52 Dose:  83 mls/hr


Lactulose (Cephulac (Oral Use))  20 gm PO TID [facility]


   Last Admin: 11/22/18 06:16 Dose:  Not Given


Metoprolol Tartrate (Lopressor Injection -)  5 mg IVPUSH Q8H PRN


   PRN Reason: TACHYCARDIA


   Last Admin: 11/20/18 19:38 Dose:  5 mg


Metoprolol Tartrate (Lopressor -)  75 mg PO BID [facility]


   Last Admin: 11/22/18 09:31 Dose:  75 mg


Pantoprazole Sodium (Protonix Iv)  40 mg IVPUSH DAILY [facility]


   Last Admin: 11/22/18 09:23 Dose:  40 mg


Rifaximin (Xifaxan -)  550 mg PO BID [facility]


   Last Admin: 11/22/18 09:39 Dose:  550 mg


Thiamine HCl (Vitamin B1 -)  100 mg PO DAILY [facility]


   Last Admin: 11/22/18 09:28 Dose:  100 mg











- Objective


Vital Signs: 


 Vital Signs











Temperature  98.4 F   11/22/18 10:00


 


Pulse Rate  92 H  11/22/18 12:00


 


Respiratory Rate  18   11/22/18 12:00


 


Blood Pressure  93/62   11/22/18 12:00


 


O2 Sat by Pulse Oximetry (%)  98   11/22/18 09:00











Cardiovascular: Yes: S1, S2


Respiratory: Yes: Mechanically Ventilated


Gastrointestinal: Yes: Normal Bowel Sounds, Soft


Labs: 


 CBC, BMP





 11/22/18 05:30 





 11/22/18 05:30 





 INR, PTT











INR  1.28  (0.83-1.09)  H  11/15/18  05:30    


 


Fibrinogen  290.0 mg/dL (238-498)   11/11/18  06:00    














Problem List





- Problems


(1) Toxic metabolic encephalopathy


Code(s): G92 - TOXIC ENCEPHALOPATHY   





(2) Cellulitis


Code(s): L03.90 - CELLULITIS, UNSPECIFIED   


Qualifiers: 


   Site of cellulitis: extremity   Site of cellulitis of extremity: lower 

extremity   Laterality: right   Qualified Code(s): L03.115 - Cellulitis of 

right lower limb   





(3) Sepsis


Code(s): A41.9 - SEPSIS, UNSPECIFIED ORGANISM   





(4) Artery occlusion


Code(s): I70.90 - UNSPECIFIED ATHEROSCLEROSIS   





(5) Hypercalcemia


Code(s): E83.52 - HYPERCALCEMIA   





(6) Paroxysmal atrial fibrillation


Code(s): I48.0 - PAROXYSMAL ATRIAL FIBRILLATION   





(7) HAM (acute kidney injury)


Code(s): N17.9 - ACUTE KIDNEY FAILURE, UNSPECIFIED   





Assessment/Plan








- Problems


(1) Anemia


Assessment/Plan: 


Monitor 


may need prbc





(2) HAM (acute kidney injury)


Assessment/Plan: 


bun/cr is better from 2.8 to 2 on .ivf 


serum bicarb is now 22


corrected calcium


Code(s): N17.9 - ACUTE KIDNEY FAILURE, UNSPECIFIED   





(3) Atrial fibrillation with RVR


Assessment/Plan: 


heparin drip 


cardiac monitor


rate control with metoprolol








(4) Respiratory failure


Assessment/Plan: 


intubated


propofol/fentanyl


aviating family decision regarding goals of care possible compassionate weaning 

vs tracheostomy


Code(s): J96.90 - RESPIRATORY FAILURE, UNSP, UNSP W HYPOXIA OR HYPERCAPNIA   





(5) Toxic metabolic encephalopathy


Assessment/Plan: 


Microbiology


Orders





11/17/18 14:30


metroNIDAZOLE 500 MG PREMIXED [Flagyl 500Mg Premixed Ivpb -] 500 mg in 100 ml 

IVPB BID 





11/19/18 12:45


levoFLOXacin 250 MG IVPB [Levaquin 250 mg Premixed Ivpb -] 250 mg in 50 ml IVPB 

DAILY 








 Microbiology











 11/19/18 13:30 Blood Culture - Preliminary





 Blood - Peripheral Venous    NO GROWTH OBTAINED AFTER 48 HOURS, INCUBATION TO 

CONTINUE





    FOR 3 DAYS.


 


 11/19/18 13:30 Blood Culture - Preliminary





 Blood - Peripheral Venous    NO GROWTH OBTAINED AFTER 48 HOURS, INCUBATION TO 

CONTINUE





    FOR 3 DAYS.

















Code(s): G92 - TOXIC ENCEPHALOPATHY   





(6) Altered mental status


Assessment/Plan: 


maybe secondary to toxic metabolic encephalopathy


neurology consult appreciated


on lactulose TID still persistently elevated Nh3 level


on steroid as well dexamethasone - no improvement in mental status


s/p plasmapheresis


dvt ppx lovenox


Code(s): R41.82 - ALTERED MENTAL STATUS, UNSPECIFIED

## 2018-11-22 NOTE — PN
Physical Exam: 


SUBJECTIVE: Patient seen and examined in the ICU.  Remains intubated, poorly 

responsive off sedation.  No fevers recorded. No pressors. No gross change in 

overall mental status. GOC/family meeting initiated and ongoing.








OBJECTIVE:





 Vital Signs











 Period  Temp  Pulse  Resp  BP Sys/Varghese  Pulse Ox


 


 Last 24 Hr  98.0 F-98.6 F    14-20  /57-73  98











GENERAL: Remains intubated, poorly responsive off sedation


HEAD: NCAT


EYES:  sclera anicteric. 


ENT: Ears normal, nares patent, dry mucous membranes


NECK: Trachea midline. 


LUNGS: bilateral diffuse rhonchi


HEART: Tachycardic rate and irregular rhythm. 


ABDOMEN: Soft, nondistended.


EXTREMITIES: 2+ pulses, warm, well-perfused, no edema, scattered ulcers. 


NEUROLOGICAL: Cannot assess secondary to clinical condition. 


SKIN: Warm, dry, normal turgor, scattered ulcers on the legs














 Laboratory Results - last 24 hr











  11/21/18 11/21/18 11/22/18





  05:30 18:00 05:30


 


WBC   


 


RBC   


 


Hgb   


 


Hct   


 


MCV   


 


MCH   


 


MCHC   


 


RDW   


 


Plt Count   


 


MPV   


 


Absolute Neuts (auto)   


 


Neutrophils %   


 


Neutrophils % (Manual)  83.2 H  


 


Band Neutrophils %  4.2  


 


Lymphocytes %   


 


Lymphocytes % (Manual)  9.5  


 


Monocytes %   


 


Monocytes % (Manual)  0 L D  


 


Eosinophils %   


 


Eosinophils % (Manual)  2.1  D  


 


Basophils %   


 


Basophils % (Manual)  0.0  


 


Myelocytes % (Man)  1  D  


 


Promyelocytes % (Man)  0  


 


Blast Cells % (Manual)  0  


 


Nucleated RBC %   


 


Metamyelocytes  0  


 


Anisocytosis  1+  


 


Macrocytosis  1+  


 


PTT (Actin FS)    48.2 H


 


Puncture Site   


 


ABG pH   


 


ABG pCO2 at Pt Temp   


 


ABG pO2 at Pt Temp   


 


ABG HCO3   


 


ABG O2 Sat (Measured)   


 


ABG O2 Content   


 


ABG Base Excess   


 


Chacho Test   


 


O2 Delivery Device   


 


Oxygen Flow Rate   


 


Vent Mode   


 


Vent Rate   


 


Mechanical Rate   


 


PEEP   


 


Pressure Support Vent   


 


Sodium   


 


Potassium   


 


Chloride   


 


Carbon Dioxide   


 


Anion Gap   


 


BUN   


 


Creatinine   


 


Creat Clearance w eGFR   


 


Random Glucose   


 


Calcium   


 


Phosphorus   


 


Magnesium   


 


Total Bilirubin   


 


AST   


 


ALT   


 


Alkaline Phosphatase   


 


Total Protein   


 


Albumin   


 


Stool Occult Blood   Negative 


 


Crossmatch   














  11/22/18 11/22/18 11/22/18





  05:30 05:30 06:30


 


WBC   4.7 


 


RBC   2.57 L 


 


Hgb   7.8 L 


 


Hct   23.8 L 


 


MCV   92.6 


 


MCH   30.3 


 


MCHC   32.7 


 


RDW   16.1 H 


 


Plt Count   129 L D 


 


MPV   9.4 


 


Absolute Neuts (auto)   3.8 


 


Neutrophils %   81.6 


 


Neutrophils % (Manual)   


 


Band Neutrophils %   


 


Lymphocytes %   12.9 


 


Lymphocytes % (Manual)   


 


Monocytes %   2.2 L 


 


Monocytes % (Manual)   


 


Eosinophils %   3.0 


 


Eosinophils % (Manual)   


 


Basophils %   0.3 


 


Basophils % (Manual)   


 


Myelocytes % (Man)   


 


Promyelocytes % (Man)   


 


Blast Cells % (Manual)   


 


Nucleated RBC %   6 H 


 


Metamyelocytes   


 


Anisocytosis   


 


Macrocytosis   


 


PTT (Actin FS)   


 


Puncture Site    Right radial


 


ABG pH    7.46 H


 


ABG pCO2 at Pt Temp    29.0 L


 


ABG pO2 at Pt Temp    102.0 H


 


ABG HCO3    20.2 L


 


ABG O2 Sat (Measured)    97.4


 


ABG O2 Content    8.4 L*


 


ABG Base Excess    -2.9 L


 


Chacho Test    Positive


 


O2 Delivery Device    Vent


 


Oxygen Flow Rate    40%


 


Vent Mode    A/c/prvc


 


Vent Rate    14


 


Mechanical Rate    Yes


 


PEEP    5.0


 


Pressure Support Vent    500


 


Sodium  147 H  


 


Potassium  4.8  


 


Chloride  120 H  


 


Carbon Dioxide  21  


 


Anion Gap  5 L  


 


BUN  74 H  


 


Creatinine  2.4 H  


 


Creat Clearance w eGFR  26.25  


 


Random Glucose  105  


 


Calcium  5.9 L*  


 


Phosphorus  3.3  


 


Magnesium  2.1  


 


Total Bilirubin  0.3  


 


AST  62 H  


 


ALT  85 H  


 


Alkaline Phosphatase  110  


 


Total Protein  8.6 H  


 


Albumin  1.1 L  


 


Stool Occult Blood   


 


Crossmatch   














  11/22/18





  09:40


 


WBC 


 


RBC 


 


Hgb 


 


Hct 


 


MCV 


 


MCH 


 


MCHC 


 


RDW 


 


Plt Count 


 


MPV 


 


Absolute Neuts (auto) 


 


Neutrophils % 


 


Neutrophils % (Manual) 


 


Band Neutrophils % 


 


Lymphocytes % 


 


Lymphocytes % (Manual) 


 


Monocytes % 


 


Monocytes % (Manual) 


 


Eosinophils % 


 


Eosinophils % (Manual) 


 


Basophils % 


 


Basophils % (Manual) 


 


Myelocytes % (Man) 


 


Promyelocytes % (Man) 


 


Blast Cells % (Manual) 


 


Nucleated RBC % 


 


Metamyelocytes 


 


Anisocytosis 


 


Macrocytosis 


 


PTT (Actin FS) 


 


Puncture Site 


 


ABG pH 


 


ABG pCO2 at Pt Temp 


 


ABG pO2 at Pt Temp 


 


ABG HCO3 


 


ABG O2 Sat (Measured) 


 


ABG O2 Content 


 


ABG Base Excess 


 


Chacho Test 


 


O2 Delivery Device 


 


Oxygen Flow Rate 


 


Vent Mode 


 


Vent Rate 


 


Mechanical Rate 


 


PEEP 


 


Pressure Support Vent 


 


Sodium 


 


Potassium 


 


Chloride 


 


Carbon Dioxide 


 


Anion Gap 


 


BUN 


 


Creatinine 


 


Creat Clearance w eGFR 


 


Random Glucose 


 


Calcium 


 


Phosphorus 


 


Magnesium 


 


Total Bilirubin 


 


AST 


 


ALT 


 


Alkaline Phosphatase 


 


Total Protein 


 


Albumin 


 


Stool Occult Blood 


 


Crossmatch  See Detail








Active Medications











Generic Name Dose Route Start Last Admin





  Trade Name Freq  PRN Reason Stop Dose Admin


 


Acetaminophen  650 mg  11/10/18 14:09  11/16/18 16:25





  Tylenol -  PO   650 mg





  Q6H PRN   Administration





  PAIN LEVEL 1 - 3   





     





     





     


 


Acetaminophen  650 mg  11/15/18 15:54  





  Tylenol Suppository -  RC   





  Q6H PRN   





  FEVER   





     





     





     


 


Allopurinol  100 mg  11/09/18 10:00  11/22/18 09:28





  Zyloprim -  PO   100 mg





  BID AVA   Administration





     





     





     





     


 


Chlorhexidine Gluconate  15 ml  11/10/18 23:45  11/22/18 09:39





  Peridex -  MM   15 ml





  BID AVA   Administration





     





     





     





     


 


Cholecalciferol  1,000 unit  11/18/18 10:00  11/22/18 09:28





  Vitamin D3 -  PO   1,000 unit





  DAILY AVA   Administration





     





     





     





     


 


Diphenhydramine HCl  25 mg  11/09/18 12:00  





  Benadryl Injection -  IVPB   





  ONCE PRN   





  DURING PLASMAPHERESIS   





     





     





     


 


Heparin Sodium (Porcine)  1,000 unit  11/21/18 12:23  11/22/18 08:03





  Heparin -  IVPUSH   1,000 unit





  PRN PRN   Administration





  Heparin   





     





     





     


 


Heparin Sodium (Porcine)  5,000 unit  11/21/18 12:23  





  Heparin -  IVPUSH   





  PRN PRN   





  Heparin   





     





     





     


 


Metronidazole  500 mg in 100 mls @ 100 mls/hr  11/17/18 14:30  11/22/18 09:23





  Flagyl 500mg Premixed Ivpb -  IVPB   100 mls/hr





  BID AVA   Administration





     





     





     





     


 


Levofloxacin  250 mg in 50 mls @ 50 mls/hr  11/19/18 12:45  11/22/18 09:23





  Levaquin 250 Mg Premixed Ivpb -  IVPB   50 mls/hr





  DAILY AVA   Administration





     





     





  Protocol   





     


 


HEPARIN SOD,PORK IN 0.45% NACL  25,000 units in 500 mls @ 20 mls/hr  11/21/18 13

:00  11/22/18 07:50





  Heparin-1/2ns 25,000 Units/500  IVPB   1,100 units/hr





  TITR AVA   22 mls/hr





     Titration





     





  Protocol   





  1,000 UNITS/HR   


 


Dextrose/Sodium Chloride  500 mls @ 83 mls/hr  11/22/18 09:15  11/22/18 09:52





  D5-1/3ns -  IV   83 mls/hr





  ASDIR AVA   Administration





     





     





     





     


 


Lactulose  20 gm  11/09/18 09:38  11/22/18 06:16





  Cephulac (Oral Use)  PO   Not Given





  TID AVA   





     





     





     





     


 


Metoprolol Tartrate  5 mg  11/09/18 13:04  11/20/18 19:38





  Lopressor Injection -  IVPUSH   5 mg





  Q8H PRN   Administration





  TACHYCARDIA   





     





     





     


 


Metoprolol Tartrate  75 mg  11/16/18 08:30  11/22/18 09:31





  Lopressor -  PO   75 mg





  BID AVA   Administration





     





     





     





     


 


Pantoprazole Sodium  40 mg  11/07/18 12:00  11/22/18 09:23





  Protonix Iv  IVPUSH   40 mg





  DAILY AVA   Administration





     





     





     





     


 


Rifaximin  550 mg  11/15/18 22:00  11/22/18 09:39





  Xifaxan -  PO   550 mg





  BID AVA   Administration





     





     





     





     


 


Thiamine HCl  100 mg  10/30/18 22:12  11/22/18 09:28





  Vitamin B1 -  PO   100 mg





  DAILY AVA   Administration





     





     





     





     











ASSESSMENT/PLAN:


77 yo m PMH of A-Fib, Acute on chronic kidney injury, CAD w stents, 

hypercalcemia, medication non-adherence, paraproteinemia, thromboembolic disease

, diastolic heart dysfunction without failure, HTN, HLD, hypertrophic 

cardiomyopathy is here after a rapid response. He was on the floors when it was 

noticed that he has respiratory insufficiency and was desaturating, requiring 

ICU monitoring. GOC/family meeting initiated and ongoing. 








GI: 


metabolic encephalopathy 


-ammonia improving  w/ lactulose and rifaximin


-LFTs are also elevated but downtrending. transamintitis most likely secondary 

to ischemic hepatits which is multifactorial including sepsis, episodes of 

hypotension and hyperviscosity syndrome. 





acalculous cholecystitis?


US shows thickened gallbladder wall, pericholecystic fluid, suspicious for 

acalculous cholecystitis. There was also mild dilatation of the CBD but that 

might be normal for age. 


-Spoke w/ IR, who feel image does not represent  acalculous cholecystitis and 

does not require any IR drainage 





ID: 


No fevers recorded. 


- Rhonchi heard on Lung auscultation


-RLL pneumonia


bcx 11/19/18 neg 


off of ceftazidime


Vancomycin Redosed 11/19/18 


- c/w levaquin/flagyl. 


- ID on board


- c/w rifaximin for elevated ammonia level





Cardio: 


Afib -[id number] score of 6 


- Lopressor 75 mg BID PO


-IV metoprolol 5 mg PRN


- diastolic dysfunction + hypertrophic cardiomyopathy


- CAD with multiple stents


- Cardio consulted and on board.


off Xarelto 15 qd (renal dosing) while on heparin gtt. transfuse to maintain Hgb

>8.0 


s/p 1 u prbc 11/20/18, Heparin with caution considering the dropping Hg, 

transfuse to maintain Hg equal or > 8.0, as outlined in prior notes ideally A/C 

therapy to be continued indefinitely unless it is absolutely contraindicated 

considering his [id number] score of 6 


- Sporadically goes in and out of V-Tach - Can give amio if v-tach is sustained 

and persistent. 





Pulm: 


- Intubated


- Patient has b/l pleural effusions.


- No pneumothorax


- b/l diffuse rhonchi


- infiltrate on CXR: Abx- Substituting levaquin for ceftazidime


Vancomycin Redosed 11/19/18 


- c/w levaquin/flagyl. 





Renal: 


- Acute on Chronic kidney injury


HAM ATN vs. Cast nephropathy  (FeNa was 2.1% indicating tubular injury, US 

showed no stones or obstruction, UA w/o protein but UPCR ~0.5 indicating non-

albumin proteinuria)


- Renal consulted and on board. 


Hyperkalemia (r/o tumor lysis)


serum K is improved, s/p bicarb gtt


D5 1/3 NS 83cc/hr





Neuro: 


- Patient is not alert or oriented.


Remains intubated, poorly responsive off sedation


- Head CT showed no acute pathology


- Neuro consulted and on board. (Dr. Youngblood + Dr. phan) 


- Ammonia elevated - Patient receiving lactulose


- c/w rifaximin for elevated ammonia. 





Heme/Onc: 


- monitor H/H


- Will transfuse to keep hb > 8 


- Patient not receiving plasmapharesis today. 


- Paraproteinemia


- Patient fulfills new criteria  for multiple myeloma with free kappa/ free 

lambda light chain  ratio of 432 (>100 is diagnostic of myeloma) 


- Kappa/Lambda ratio > 100 consistent with Multiple myeloma


- M spike elevated elevated at 6.8 previously 4.7 


-steroids being held at this time 


s/p 1 u prbc 11/20/18, Heparin with caution considering the dropping Hg, 

transfuse to maintain Hg equal or > 8.0, as outlined in prior notes ideally A/C 

therapy to be continued indefinitely unless it is absolutely contraindicated 

considering his [id number] score of 6 


-will give another unit prbc for drop in Hgb





Endo: 


- Parathyroid hormone WNL


- PTH-borderline low appropriate given hypercalcemia, PTHrP low





Prophylaxis: 


- Heparin with caution considering the dropping Hg, transfuse to maintain Hg 

equal or > 8.0, as outlined in prior notes ideally A/C therapy to be continued 

indefinitely unless it is absolutely contraindicated considering his 

[id number] score of 6


- Protonix





F/E/N: 


F:  D5 1/3 NS 83cc/hr


E: Will replete lytes PRN


N: tube feeds (low potassium feeds). with free water 





Code Status: Full Code 


- GOC/family meeting initiated and ongoing. if unable to reach family will need 

tracheostomy as he is approaching 3 weeks intubated


- Compassionate extubation versus Tracheostomy





Dispo: Patient will continue to receive ICU level care





Visit type





- Emergency Visit


Emergency Visit: Yes


ED Registration Date: 10/30/18


Care time: The patient presented to the Emergency Department on the above date 

and was hospitalized for further evaluation of their emergent condition.





- New Patient


This patient is new to me today: Yes


Date on this admission: 11/22/18





- Critical Care


Critical Care patient: Yes


Total Critical Care Time (in minutes): 40


Critical Care Statement: The care of this patient involved high complexity 

decision making to prevent further life threatening deterioration of the patient

's condition and/or to evaluate & treat vital organ system(s) failure or risk 

of failure.

## 2018-11-22 NOTE — PN
Progress Note (short form)





- Note


Progress Note: 


Chief Complaint: Events noted, notes reviewed, remains intubated no change in 

neurological status, remains in atrial fibrillation on A/C-Heparin





History of Present Illness: 


Seen and examined in the ICU. Events noted, notes reviewed, remains intubated 

no change in neurological status, remains in atrial fibrillation on A/C-Heparin





Remains on B-Blockers





[facility] coronary angiography performed 1/11/2017 revealed non-obstructive CAD, 

severe LV apical hypertrophic cardiomyopathy, mildly elevated right sided 

pressures/study is consistent with apical hypertrophy (spade-like) variant of 

hypertrophic cardiomyopathy





Echocardiography dated 07/11/2018 Mod cLVH, severe DYANA, moderate TR RVSP 50-60 

mmHg, moderate-severe MR





Echocardiography dated 10/16/2018 Normal LV size with hyperdynamic LVEF 75%, 

mild BSH, normal RV size and function, severe LAE, moderate-severe MR, mod TR


 





- Current Medication List


 


 Current Medications





Acetaminophen (Tylenol -)  650 mg PO Q6H PRN


   PRN Reason: PAIN LEVEL 1 - 3


   Last Admin: 11/16/18 16:25 Dose:  650 mg


Acetaminophen (Tylenol Suppository -)  650 mg RC Q6H PRN


   PRN Reason: FEVER


Allopurinol (Zyloprim -)  100 mg PO BID [facility]


   Last Admin: 11/21/18 21:59 Dose:  100 mg


Chlorhexidine Gluconate (Peridex -)  15 ml MM BID [facility]


   Last Admin: 11/21/18 21:57 Dose:  15 ml


Cholecalciferol (Vitamin D3 -)  1,000 unit PO DAILY [facility]


   Last Admin: 11/21/18 10:09 Dose:  1,000 unit


Diphenhydramine HCl (Benadryl Injection -)  25 mg IVPB ONCE PRN


   PRN Reason: DURING PLASMAPHERESIS


Heparin Sodium (Porcine) (Heparin -)  1,000 unit IVPUSH PRN PRN


   PRN Reason: Heparin


   Last Admin: 11/22/18 08:03 Dose:  1,000 unit


Heparin Sodium (Porcine) (Heparin -)  5,000 unit IVPUSH PRN PRN


   PRN Reason: Heparin


Metronidazole (Flagyl 500mg Premixed Ivpb -)  500 mg in 100 mls @ 100 mls/hr 

IVPB BID [facility]


   Last Admin: 11/21/18 22:00 Dose:  100 mls/hr


Levofloxacin (Levaquin 250 Mg Premixed Ivpb -)  250 mg in 50 mls @ 50 mls/hr 

IVPB DAILY [facility]; Protocol


   Last Admin: 11/21/18 10:08 Dose:  50 mls/hr


HEPARIN SOD,PORK IN 0.45% NACL (Heparin-1/2ns 25,000 Units/500)  25,000 units 

in 500 mls @ 20 mls/hr IVPB TITR [facility]; Protocol


   Last Titration: 11/22/18 07:50 Dose:  1,100 units/hr, 22 mls/hr


Lactulose (Cephulac (Oral Use))  20 gm PO TID [facility]


   Last Admin: 11/22/18 06:16 Dose:  Not Given


Metoprolol Tartrate (Lopressor Injection -)  5 mg IVPUSH Q8H PRN


   PRN Reason: TACHYCARDIA


   Last Admin: 11/20/18 19:38 Dose:  5 mg


Metoprolol Tartrate (Lopressor -)  75 mg PO BID [facility]


   Last Admin: 11/21/18 21:57 Dose:  75 mg


Pantoprazole Sodium (Protonix Iv)  40 mg IVPUSH DAILY [facility]


   Last Admin: 11/21/18 10:08 Dose:  40 mg


Rifaximin (Xifaxan -)  550 mg PO BID [facility]


   Last Admin: 11/21/18 21:57 Dose:  550 mg


Thiamine HCl (Vitamin B1 -)  100 mg PO DAILY [facility]


   Last Admin: 11/21/18 10:09 Dose:  100 mg





Review of Systems





Unable to obtain





- Objective


Vital Signs: 


 


 Last Vital Signs











Temp Pulse Resp BP Pulse Ox


 


 98.2 F   92 H  19   97/68   97 


 


 11/22/18 06:00  11/22/18 06:00  11/22/18 07:02  11/22/18 06:00  11/21/18 08:45








 Intake & Output











 11/19/18 11/20/18 11/21/18 11/22/18





 23:59 23:59 23:59 23:59


 


Intake Total 2900 3900 2260 1040


 


Output Total 1500 1200 700 900


 


Balance 1400 2700 1560 140


 


Weight 180 lb 3.2 oz 180 lb 3.2 oz 185 lb 4.8 oz 185 lb 4.8 oz











Neck: Supple Negative JVD No Bruit


Cardiovascular: S1 S2 Irregularly Irregular Grade 2/6 Systolic Murmur Apical


Chest: Scattered Rhonchi Bilaterally


Gastrointestinal: Soft Benign Normal Bowel Sounds


Ext: No Edema 





Labs: 


 


 CBC, BMP





 11/22/18 05:30 





 11/22/18 05:30 








Assessment/Plan





ASSESSMENT:





1. Acute hypoxic respiratory failure, suspected aspiration pneumonia with 

sepsis syndrome 


2. Toxic metabolic encephelopathy, rule out CVA 


3. 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


4. Paraproteinemia, hypercalcemia and anemia confirmed multiple myeloma


5. 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 

disease related to persistent atrial fibrillation with [id number] score of 6


6. CAD with evidence of demand ischemic injury, non obstructive CAD on [facility] 

coronary angiogrpahy angina pectoris


7. 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 

chronic class I-II NYHA classification LV failure, compensated/euvolemic


8. Persistent atrial fibrillation [id number] score of 6 currently resumed on 

Heparin therapy


9. HTN


10. Anemia, dropping H/H


11. Acalculous Cholecystitis


12. Ischemic hepatitis


13. Hypernatremia





PLAN:





1. Heparin with caution considering the dropping Hg, transfuse to maintain Hg 

equal or > 8.0, as outlined in prior notes ideally A/C therapy to be continued 

indefinitely unless it is absolutely contraindicated considering his 

[id number] score of 6 


2. Continue Lopressor 


3. Initiate D5 1/3 NS for the above noted Hypernatremia


4. Antibiotics as per the primary team


5. Ventilator management as per the ICU team 


6. As outlined in prior notes overall poor prognosis, family to decide 

regarding compassionate extubation versus tracheostomy














Armand Mckenzie MD

## 2018-11-23 LAB
ALBUMIN SERPL-MCNC: 1 G/DL (ref 3.4–5)
ALP SERPL-CCNC: 114 U/L (ref 45–117)
ALT SERPL-CCNC: 78 U/L (ref 13–61)
ANION GAP SERPL CALC-SCNC: 4 MMOL/L (ref 8–16)
ANISOCYTOSIS BLD QL: (no result)
ARTERIAL BLOOD GAS PCO2: 29.7 MMHG (ref 35–45)
ARTERIAL PATENCY WRIST A: POSITIVE
AST SERPL-CCNC: 62 U/L (ref 15–37)
BASE EXCESS BLDA CALC-SCNC: -2.9 MEQ/L (ref -2–2)
BASOPHILS # BLD: 0.4 % (ref 0–2)
BILIRUB SERPL-MCNC: 0.3 MG/DL (ref 0.2–1)
BUN SERPL-MCNC: 63 MG/DL (ref 7–18)
CALCIUM SERPL-MCNC: 6.4 MG/DL (ref 8.5–10.1)
CHLORIDE SERPL-SCNC: 120 MMOL/L (ref 98–107)
CO2 SERPL-SCNC: 21 MMOL/L (ref 21–32)
CREAT SERPL-MCNC: 2.2 MG/DL (ref 0.55–1.3)
DEPRECATED RDW RBC AUTO: 16.6 % (ref 11.9–15.9)
EOSINOPHIL # BLD: 2.4 % (ref 0–4.5)
GLUCOSE SERPL-MCNC: 130 MG/DL (ref 74–106)
HCT VFR BLD CALC: 26.7 % (ref 35.4–49)
HGB BLD-MCNC: 8.8 GM/DL (ref 11.7–16.9)
LYMPHOCYTES # BLD: 17.6 % (ref 8–40)
MACROCYTES BLD QL: 0
MAGNESIUM SERPL-MCNC: 2.1 MG/DL (ref 1.8–2.4)
MCH RBC QN AUTO: 30 PG (ref 25.7–33.7)
MCHC RBC AUTO-ENTMCNC: 32.9 G/DL (ref 32–35.9)
MCV RBC: 91.1 FL (ref 80–96)
MONOCYTES # BLD AUTO: 2.6 % (ref 3.8–10.2)
NEUTROPHILS # BLD: 77 % (ref 42.8–82.8)
PHOSPHATE SERPL-MCNC: 3 MG/DL (ref 2.5–4.9)
PLATELET # BLD AUTO: 116 K/MM3 (ref 134–434)
PLATELET BLD QL SMEAR: (no result)
PMV BLD: 8.9 FL (ref 7.5–11.1)
PO2 BLDA: 95.4 MMHG (ref 70–100)
POTASSIUM SERPLBLD-SCNC: 4.5 MMOL/L (ref 3.5–5.1)
PROT SERPL-MCNC: 8.8 G/DL (ref 6.4–8.2)
RBC # BLD AUTO: 2.94 M/MM3 (ref 4–5.6)
SAO2 % BLDA: 97.2 % (ref 90–98.9)
SODIUM SERPL-SCNC: 145 MMOL/L (ref 136–145)
WBC # BLD AUTO: 3.9 K/MM3 (ref 4–10)

## 2018-11-23 RX ADMIN — LACTULOSE SCH GM: 20 SOLUTION ORAL at 21:21

## 2018-11-23 RX ADMIN — CHLORHEXIDINE GLUCONATE 0.12% ORAL RINSE SCH ML: 1.2 LIQUID ORAL at 10:02

## 2018-11-23 RX ADMIN — ALLOPURINOL SCH MG: 100 TABLET ORAL at 10:01

## 2018-11-23 RX ADMIN — METOPROLOL TARTRATE SCH MG: 50 TABLET, FILM COATED ORAL at 10:01

## 2018-11-23 RX ADMIN — ACETAMINOPHEN PRN MG: 325 TABLET ORAL at 03:13

## 2018-11-23 RX ADMIN — HEPARIN SODIUM SCH MLS/HR: 5000 INJECTION, SOLUTION INTRAVENOUS at 15:20

## 2018-11-23 RX ADMIN — Medication SCH MG: at 10:01

## 2018-11-23 RX ADMIN — VITAMIN D, TAB 1000IU (100/BT) SCH UNIT: 25 TAB at 10:01

## 2018-11-23 RX ADMIN — METOPROLOL TARTRATE SCH MG: 50 TABLET, FILM COATED ORAL at 21:22

## 2018-11-23 RX ADMIN — RIFAXIMIN SCH MG: 550 TABLET ORAL at 10:01

## 2018-11-23 RX ADMIN — CHLORHEXIDINE GLUCONATE 0.12% ORAL RINSE SCH ML: 1.2 LIQUID ORAL at 21:22

## 2018-11-23 RX ADMIN — METOPROLOL TARTRATE SCH: 50 TABLET, FILM COATED ORAL at 10:13

## 2018-11-23 RX ADMIN — PANTOPRAZOLE SODIUM SCH MG: 40 INJECTION, POWDER, FOR SOLUTION INTRAVENOUS at 10:02

## 2018-11-23 RX ADMIN — ALLOPURINOL SCH MG: 100 TABLET ORAL at 21:21

## 2018-11-23 RX ADMIN — DEXTROSE AND SODIUM CHLORIDE SCH MLS/HR: 5; .33 INJECTION, SOLUTION INTRAVENOUS at 10:00

## 2018-11-23 RX ADMIN — LACTULOSE SCH GM: 20 SOLUTION ORAL at 05:43

## 2018-11-23 RX ADMIN — RIFAXIMIN SCH MG: 550 TABLET ORAL at 21:21

## 2018-11-23 RX ADMIN — LACTULOSE SCH GM: 20 SOLUTION ORAL at 15:16

## 2018-11-23 NOTE — PN
Progress Note, Physician


Chief Complaint: 





The patient seen in the ICU. Poorly responsive. 


Intubated, and vent supported.


Maintains good urine output. 


Tube feeding in progress. 


History of Present Illness: 





This is a 78 year old gentleman with hx of CKD, Afib on A/C, CAD, CKD, 

Hypertension, Hyperlipidemia, PVD who presented with AMS/Confusion and found to 

have BROOK. The patient is intubated and supported by mechanical vent. In ICU. 


Poorly responsive. Was treated with plasmapheresis, Calcium supplemets. 


The patient remains in Brook, non-oliguric, poorly responsive and with guarded 

prognosis. 








- Current Medication List


Current Medications: 


Active Medications





Acetaminophen (Tylenol -)  650 mg PO Q6H PRN


   PRN Reason: PAIN LEVEL 1 - 3


   Last Admin: 11/23/18 03:13 Dose:  650 mg


Acetaminophen (Tylenol Suppository -)  650 mg RC Q6H PRN


   PRN Reason: FEVER


Allopurinol (Zyloprim -)  100 mg PO BID [facility]


   Last Admin: 11/23/18 10:01 Dose:  100 mg


Chlorhexidine Gluconate (Peridex -)  15 ml MM BID [facility]


   Last Admin: 11/23/18 10:02 Dose:  15 ml


Cholecalciferol (Vitamin D3 -)  1,000 unit PO DAILY [facility]


   Last Admin: 11/23/18 10:01 Dose:  1,000 unit


Diphenhydramine HCl (Benadryl Injection -)  25 mg IVPB ONCE PRN


   PRN Reason: DURING PLASMAPHERESIS


Heparin Sodium (Porcine) (Heparin -)  1,000 unit IVPUSH PRN PRN


   PRN Reason: Heparin


   Last Admin: 11/22/18 08:03 Dose:  1,000 unit


Heparin Sodium (Porcine) (Heparin -)  5,000 unit IVPUSH PRN PRN


   PRN Reason: Heparin


Metronidazole (Flagyl 500mg Premixed Ivpb -)  500 mg in 100 mls @ 100 mls/hr 

IVPB BID [facility]


   Last Admin: 11/23/18 10:00 Dose:  100 mls/hr


Levofloxacin (Levaquin 250 Mg Premixed Ivpb -)  250 mg in 50 mls @ 50 mls/hr 

IVPB DAILY [facility]; Protocol


   Last Admin: 11/23/18 10:01 Dose:  50 mls/hr


HEPARIN SOD,PORK IN 0.45% NACL (Heparin-1/2ns 25,000 Units/500)  25,000 units 

in 500 mls @ 20 mls/hr IVPB TITR [facility]; Protocol


   Last Titration: 11/23/18 10:14 Dose:  900 units/hr, 18 mls/hr


Dextrose/Sodium Chloride (D5-1/3ns -)  500 mls @ 83 mls/hr IV ASDIR [facility]


   Last Admin: 11/23/18 10:00 Dose:  83 mls/hr


Lactulose (Cephulac (Oral Use))  20 gm PO TID [facility]


   Last Admin: 11/23/18 05:43 Dose:  20 gm


Metoprolol Tartrate (Lopressor Injection -)  5 mg IVPUSH Q8H PRN


   PRN Reason: TACHYCARDIA


   Last Admin: 11/20/18 19:38 Dose:  5 mg


Metoprolol Tartrate (Lopressor -)  75 mg PO BID [facility]


   Last Admin: 11/23/18 10:13 Dose:  Not Given


Pantoprazole Sodium (Protonix Iv)  40 mg IVPUSH DAILY [facility]


   Last Admin: 11/23/18 10:02 Dose:  40 mg


Rifaximin (Xifaxan -)  550 mg PO BID [facility]


   Last Admin: 11/23/18 10:01 Dose:  550 mg


Thiamine HCl (Vitamin B1 -)  100 mg PO DAILY [facility]


   Last Admin: 11/23/18 10:01 Dose:  100 mg











- Objective


Vital Signs: 


 Vital Signs











Temperature  99.4 F   11/23/18 08:00


 


Pulse Rate  93 H  11/23/18 08:00


 


Respiratory Rate  19   11/23/18 09:05


 


Blood Pressure  100/74   11/23/18 08:00


 


O2 Sat by Pulse Oximetry (%)  98   11/22/18 09:00











Constitutional: Yes: Cachectic


Neck: Yes: Trachea Midline


Cardiovascular: Yes: Tachycardia, S1, S2


Respiratory: Yes: Intubated, Mechanically Ventilated


Gastrointestinal: Yes: Soft, Other (Orogastic tube feeding in progress.)


Neurological: Yes: Unresponsive


Labs: 


 CBC, BMP





 11/23/18 05:30 





 11/23/18 05:30 





 INR, PTT











INR  1.28  (0.83-1.09)  H  11/15/18  05:30    


 


Fibrinogen  290.0 mg/dL (238-498)   11/11/18  06:00    














Problem List





- Problems


(1) Multiple myeloma


Code(s): C90.00 - MULTIPLE MYELOMA NOT HAVING ACHIEVED REMISSION   


Qualifiers: 


   Multiple myeloma remission status: not in remission   Qualified Code(s): 

C90.00 - Multiple myeloma not having achieved remission   





(2) BROOK (acute kidney injury)


Code(s): N17.9 - ACUTE KIDNEY FAILURE, UNSPECIFIED   





(3) Altered mental status


Code(s): R41.82 - ALTERED MENTAL STATUS, UNSPECIFIED   





(4) Anemia


Code(s): D64.9 - ANEMIA, UNSPECIFIED   





(5) Atrial fibrillation with RVR


Code(s): I48.91 - UNSPECIFIED ATRIAL FIBRILLATION   





(6) Cellulitis


Code(s): L03.90 - CELLULITIS, UNSPECIFIED   


Qualifiers: 


   Site of cellulitis: extremity   Site of cellulitis of extremity: lower 

extremity   Laterality: right   Qualified Code(s): L03.115 - Cellulitis of 

right lower limb   





(7) Hyperlipidemia


Code(s): E78.5 - HYPERLIPIDEMIA, UNSPECIFIED   


Qualifiers: 


   Hyperlipidemia type: pure hypercholesterolemia   Qualified Code(s): E78.00 - 

Pure hypercholesterolemia, unspecified; E78.0 - Pure hypercholesterolemia   





(8) Sepsis


Code(s): A41.9 - SEPSIS, UNSPECIFIED ORGANISM   





(9) Hypercalcemia


Code(s): E83.52 - HYPERCALCEMIA   





Assessment/Plan








78 year old gentleman with hx of CKD, Afib on A/C, CAD, CKD, Hypertension, 

Hyperlipidemia, PVD who presented with AMS/Confusion and found to have BROOK.





BROOK ATN vs. Cast nephropathy  (FeNa was 2.1% indicating tubular injury, US 

showed no stones or obstruction.





AMS 





Newly diagnosed multiple myloma 





Anemia 





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





Hyperdense lesion on US of the kidney 





Normal anion gap metabolic acidosis 





Hyperkalemia (r/o tumor lysis).. Serum K normal now. 








Overall prognosis remains poor.


Will continue the supportive care. 





Leeann Pérez MD

## 2018-11-23 NOTE — PN
Teaching Attending Note


Name of Resident: Ganga Mccormick





ATTENDING PHYSICIAN STATEMENT





I saw and evaluated the patient.


I reviewed the resident's note and discussed the case with the resident.


I agree with the resident's findings and plan as documented.








SUBJECTIVE:





Pt seen and examined in the ICU. Remains intubated, poorly responsive. Low 

grade temp overnight.





OBJECTIVE:





 Vital Signs











 Period  Temp  Pulse  Resp  BP Sys/Varghese  Pulse Ox


 


 Last 24 Hr  98.7 F-100.2 F    15-24  /55-76  








 Intake & Output











 11/20/18 11/21/18 11/22/18 11/23/18





 23:59 23:59 23:59 23:59


 


Intake Total 3900 2260 2050 698


 


Output Total 0258 125 1089 600


 


Balance 2700 1560 350 98


 


Weight 81.737 kg 84.051 kg 84.051 kg 








Gen:  intubated, poorly responsive, tachypneic


Heart: RRR


Lung: scattered rhonchi


Abd: soft, nontender


Ext: + edema





 CBC, BMP





 11/23/18 05:30 





 11/23/18 05:30 





Active Medications





Acetaminophen (Tylenol -)  650 mg PO Q6H PRN


   PRN Reason: PAIN LEVEL 1 - 3


   Last Admin: 11/23/18 03:13 Dose:  650 mg


Acetaminophen (Tylenol Suppository -)  650 mg RC Q6H PRN


   PRN Reason: FEVER


Allopurinol (Zyloprim -)  100 mg PO BID [facility]


   Last Admin: 11/23/18 10:01 Dose:  100 mg


Chlorhexidine Gluconate (Peridex -)  15 ml MM BID [facility]


   Last Admin: 11/23/18 10:02 Dose:  15 ml


Cholecalciferol (Vitamin D3 -)  1,000 unit PO DAILY [facility]


   Last Admin: 11/23/18 10:01 Dose:  1,000 unit


Diphenhydramine HCl (Benadryl Injection -)  25 mg IVPB ONCE PRN


   PRN Reason: DURING PLASMAPHERESIS


Heparin Sodium (Porcine) (Heparin -)  1,000 unit IVPUSH PRN PRN


   PRN Reason: Heparin


   Last Admin: 11/22/18 08:03 Dose:  1,000 unit


Heparin Sodium (Porcine) (Heparin -)  5,000 unit IVPUSH PRN PRN


   PRN Reason: Heparin


Metronidazole (Flagyl 500mg Premixed Ivpb -)  500 mg in 100 mls @ 100 mls/hr 

IVPB BID [facility]


   Last Admin: 11/23/18 10:00 Dose:  100 mls/hr


Levofloxacin (Levaquin 250 Mg Premixed Ivpb -)  250 mg in 50 mls @ 50 mls/hr 

IVPB DAILY [facility]; Protocol


   Last Admin: 11/23/18 10:01 Dose:  50 mls/hr


HEPARIN SOD,PORK IN 0.45% NACL (Heparin-1/2ns 25,000 Units/500)  25,000 units 

in 500 mls @ 20 mls/hr IVPB TITR [facility]; Protocol


   Last Titration: 11/23/18 10:14 Dose:  900 units/hr, 18 mls/hr


Dextrose/Sodium Chloride (D5-1/3ns -)  500 mls @ 83 mls/hr IV ASDIR [facility]


   Last Admin: 11/23/18 10:00 Dose:  83 mls/hr


Lactulose (Cephulac (Oral Use))  20 gm PO TID [facility]


   Last Admin: 11/23/18 05:43 Dose:  20 gm


Metoprolol Tartrate (Lopressor Injection -)  5 mg IVPUSH Q8H PRN


   PRN Reason: TACHYCARDIA


   Last Admin: 11/20/18 19:38 Dose:  5 mg


Metoprolol Tartrate (Lopressor -)  75 mg PO BID [facility]


   Last Admin: 11/23/18 10:13 Dose:  Not Given


Pantoprazole Sodium (Protonix Iv)  40 mg IVPUSH DAILY [facility]


   Last Admin: 11/23/18 10:02 Dose:  40 mg


Rifaximin (Xifaxan -)  550 mg PO BID [facility]


   Last Admin: 11/23/18 10:01 Dose:  550 mg


Thiamine HCl (Vitamin B1 -)  100 mg PO DAILY [facility]


   Last Admin: 11/23/18 10:01 Dose:  100 mg








ASSESSMENT AND PLAN:


Acute Hypoxic Respiratory Failure


Altered Mental Status


Metabolic Encephalopathy


Pneumonia


Acalculous Cholecystitis


Multiple Myeloma


Acute on Chronic Renal Failure


Metabolic Acidosis


LV Diastolic Dysfunction


Atrial Fibrillation


CAD


+Troponins likely Demand Ischemia


PAD


Hyperlipidemia


HTN





-  continue antibiotics


-  monitor urine output, creatinine


-  rate control


-  minimize sedation to assess mental status 


-  spontaneous breathing trials as tolerated but would not extubate unless 

mental status improved


-  DVT/GI prophylaxis


-  continue discussions regarding goals of care, if unable to reach family will 

need tracheostomy as he is approaching 3 weeks intubated


-  palliative care input appreciated


-  continue ICU monitoring


-  poor overall prognosis for meaningful recovery








critical care time spent in reviewing chart, evaluating patient and formulating 

plan 35 min

## 2018-11-23 NOTE — PN
Progress Note, Physician


History of Present Illness: 


 Unresponsive on ventilator


  Low grade temp


  Leukopenic


  BC  no growth


  


 





 


 


 





- Current Medication List


Current Medications: 


Active Medications





Acetaminophen (Tylenol -)  650 mg PO Q6H PRN


   PRN Reason: PAIN LEVEL 1 - 3


   Last Admin: 11/23/18 03:13 Dose:  650 mg


Acetaminophen (Tylenol Suppository -)  650 mg RC Q6H PRN


   PRN Reason: FEVER


Allopurinol (Zyloprim -)  100 mg PO BID [facility]


   Last Admin: 11/23/18 10:01 Dose:  100 mg


Chlorhexidine Gluconate (Peridex -)  15 ml MM BID [facility]


   Last Admin: 11/23/18 10:02 Dose:  15 ml


Cholecalciferol (Vitamin D3 -)  1,000 unit PO DAILY [facility]


   Last Admin: 11/23/18 10:01 Dose:  1,000 unit


Diphenhydramine HCl (Benadryl Injection -)  25 mg IVPB ONCE PRN


   PRN Reason: DURING PLASMAPHERESIS


Heparin Sodium (Porcine) (Heparin -)  1,000 unit IVPUSH PRN PRN


   PRN Reason: Heparin


   Last Admin: 11/22/18 08:03 Dose:  1,000 unit


Heparin Sodium (Porcine) (Heparin -)  5,000 unit IVPUSH PRN PRN


   PRN Reason: Heparin


Metronidazole (Flagyl 500mg Premixed Ivpb -)  500 mg in 100 mls @ 100 mls/hr 

IVPB BID [facility]


   Last Admin: 11/23/18 10:00 Dose:  100 mls/hr


Levofloxacin (Levaquin 250 Mg Premixed Ivpb -)  250 mg in 50 mls @ 50 mls/hr 

IVPB DAILY [facility]; Protocol


   Last Admin: 11/23/18 10:01 Dose:  50 mls/hr


HEPARIN SOD,PORK IN 0.45% NACL (Heparin-1/2ns 25,000 Units/500)  25,000 units 

in 500 mls @ 20 mls/hr IVPB TITR [facility]; Protocol


   Last Titration: 11/23/18 10:14 Dose:  900 units/hr, 18 mls/hr


Dextrose/Sodium Chloride (D5-1/3ns -)  500 mls @ 83 mls/hr IV ASDIR [facility]


   Last Admin: 11/23/18 10:00 Dose:  83 mls/hr


Lactulose (Cephulac (Oral Use))  20 gm PO TID [facility]


   Last Admin: 11/23/18 05:43 Dose:  20 gm


Metoprolol Tartrate (Lopressor Injection -)  5 mg IVPUSH Q8H PRN


   PRN Reason: TACHYCARDIA


   Last Admin: 11/20/18 19:38 Dose:  5 mg


Metoprolol Tartrate (Lopressor -)  75 mg PO BID [facility]


   Last Admin: 11/23/18 10:13 Dose:  Not Given


Pantoprazole Sodium (Protonix Iv)  40 mg IVPUSH DAILY [facility]


   Last Admin: 11/23/18 10:02 Dose:  40 mg


Rifaximin (Xifaxan -)  550 mg PO BID [facility]


   Last Admin: 11/23/18 10:01 Dose:  550 mg


Thiamine HCl (Vitamin B1 -)  100 mg PO DAILY [facility]


   Last Admin: 11/23/18 10:01 Dose:  100 mg











- Objective


Vital Signs: 


 Vital Signs











Temperature  99.4 F   11/23/18 08:00


 


Pulse Rate  102 H  11/23/18 12:00


 


Respiratory Rate  20   11/23/18 14:16


 


Blood Pressure  103/78   11/23/18 12:00


 


O2 Sat by Pulse Oximetry (%)  99   11/23/18 09:00











Constitutional: Yes: No Distress


Eyes: Yes: Conjunctiva Clear


Cardiovascular: Yes: Regular Rate and Rhythm, S1, S2


Respiratory: Yes: Mechanically Ventilated


Gastrointestinal: Yes: Normal Bowel Sounds, Soft.  No: Tenderness


Edema: Yes


Labs: 


 CBC, BMP





 11/23/18 05:30 





 11/23/18 05:30 





 INR, PTT











INR  1.28  (0.83-1.09)  H  11/15/18  05:30    


 


Fibrinogen  290.0 mg/dL (238-498)   11/11/18  06:00    














Assessment/Plan


Respiratory failure


 RLL pneumonia


 Acalculus  Cholecystitis


 Toxic metabolic encephalopathy


 Renal failure


 Myeloma


 Hyperviscosity syndrome


 


  


 


  Continue levaquin/ flagyl


  


  Ventilatory support


  Prognosis poor

## 2018-11-23 NOTE — PN
Progress Note (short form)





- Note


Progress Note: 


Chief Complaint: Events noted, notes reviewed, remains intubated no change in 

neurological status, remains in atrial fibrillation on A/C with Heparin





History of Present Illness: 


Seen and examined in the ICU. Events noted, notes reviewed, remains intubated 

no change in neurological status, remains in atrial fibrillation on A/C with 

Heparin





Remains on B-Blockers





[facility] coronary angiography performed 1/11/2017 revealed non-obstructive CAD, 

severe LV apical hypertrophic cardiomyopathy, mildly elevated right sided 

pressures/study is consistent with apical hypertrophy (spade-like) variant of 

hypertrophic cardiomyopathy





Echocardiography dated 07/11/2018 Mod cLVH, severe DYANA, moderate TR RVSP 50-60 

mmHg, moderate-severe MR





Echocardiography dated 10/16/2018 Normal LV size with hyperdynamic LVEF 75%, 

mild BSH, normal RV size and function, severe LAE, moderate-severe MR, mod TR


 





- Current Medication List


 


 Current Medications





Acetaminophen (Tylenol -)  650 mg PO Q6H PRN


   PRN Reason: PAIN LEVEL 1 - 3


   Last Admin: 11/23/18 03:13 Dose:  650 mg


Acetaminophen (Tylenol Suppository -)  650 mg RC Q6H PRN


   PRN Reason: FEVER


Allopurinol (Zyloprim -)  100 mg PO BID [facility]


   Last Admin: 11/22/18 21:27 Dose:  100 mg


Chlorhexidine Gluconate (Peridex -)  15 ml MM BID [facility]


   Last Admin: 11/22/18 21:27 Dose:  15 ml


Cholecalciferol (Vitamin D3 -)  1,000 unit PO DAILY [facility]


   Last Admin: 11/22/18 09:28 Dose:  1,000 unit


Diphenhydramine HCl (Benadryl Injection -)  25 mg IVPB ONCE PRN


   PRN Reason: DURING PLASMAPHERESIS


Heparin Sodium (Porcine) (Heparin -)  1,000 unit IVPUSH PRN PRN


   PRN Reason: Heparin


   Last Admin: 11/22/18 08:03 Dose:  1,000 unit


Heparin Sodium (Porcine) (Heparin -)  5,000 unit IVPUSH PRN PRN


   PRN Reason: Heparin


Metronidazole (Flagyl 500mg Premixed Ivpb -)  500 mg in 100 mls @ 100 mls/hr 

IVPB BID [facility]


   Last Admin: 11/22/18 21:25 Dose:  100 mls/hr


Levofloxacin (Levaquin 250 Mg Premixed Ivpb -)  250 mg in 50 mls @ 50 mls/hr 

IVPB DAILY [facility]; Protocol


   Last Admin: 11/22/18 09:23 Dose:  50 mls/hr


HEPARIN SOD,PORK IN 0.45% NACL (Heparin-1/2ns 25,000 Units/500)  25,000 units 

in 500 mls @ 20 mls/hr IVPB TITR [facility]; Protocol


   Last Titration: 11/23/18 00:29 Dose:  950 units/hr, 19 mls/hr


Dextrose/Sodium Chloride (D5-1/3ns -)  500 mls @ 83 mls/hr IV ASDIR [facility]


   Last Admin: 11/22/18 09:52 Dose:  83 mls/hr


Lactulose (Cephulac (Oral Use))  20 gm PO TID [facility]


   Last Admin: 11/23/18 05:43 Dose:  20 gm


Metoprolol Tartrate (Lopressor Injection -)  5 mg IVPUSH Q8H PRN


   PRN Reason: TACHYCARDIA


   Last Admin: 11/20/18 19:38 Dose:  5 mg


Metoprolol Tartrate (Lopressor -)  75 mg PO BID [facility]


   Last Admin: 11/22/18 21:26 Dose:  75 mg


Pantoprazole Sodium (Protonix Iv)  40 mg IVPUSH DAILY [facility]


   Last Admin: 11/22/18 09:23 Dose:  40 mg


Rifaximin (Xifaxan -)  550 mg PO BID [facility]


   Last Admin: 11/22/18 21:26 Dose:  550 mg


Thiamine HCl (Vitamin B1 -)  100 mg PO DAILY [facility]


   Last Admin: 11/22/18 09:28 Dose:  100 mg








Review of Systems





Unable to obtain





- Objective


Vital Signs: 


 


 Last Vital Signs











Temp Pulse Resp BP Pulse Ox


 


 100.2 F H  79   21 H  130/68   98 


 


 11/23/18 02:00  11/23/18 08:00  11/23/18 08:00  11/23/18 08:00  11/22/18 09:00








 Intake & Output











 11/20/18 11/21/18 11/22/18 11/23/18





 23:59 23:59 23:59 23:59


 


Intake Total 3900 2260 2050 698


 


Output Total 1594 872 1158 600


 


Balance 2700 1560 350 98


 


Weight 180 lb 3.2 oz 185 lb 4.8 oz 185 lb 4.8 oz 











Neck: Supple Negative JVD No Bruit


Cardiovascular: S1 S2 Irregularly Irregular Grade 2/6 Systolic Murmur Apical


Chest: Scattered Rhonchi Bilaterally


Gastrointestinal: Soft Benign Normal Bowel Sounds


Ext: No Edema 





Labs: 


 


 CBC, BMP





 11/23/18 05:30 





 11/23/18 05:30 








Assessment/Plan





ASSESSMENT:





1. Acute hypoxic respiratory failure, suspected aspiration pneumonia with 

sepsis syndrome 


2. Toxic metabolic encephelopathy, rule out CVA 


3. 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


4. Paraproteinemia confirmed multiple myeloma


5. 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 

disease related to persistent atrial fibrillation with [id number] score of 6


6. CAD with evidence of demand ischemic injury, non obstructive CAD on R&c 

coronary angiogrpahy angina pectoris


7. 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 

chronic class I-II NYHA classification LV failure, compensated/euvolemic


8. Persistent atrial fibrillation [id number] score of 6 currently on Heparin 

therapy


9. HTN


10. Anemia


11. Acalculous Cholecystitis


12. Ischemic hepatitis


13. Hypernatremia, resolved





PLAN:





1. Continue Heparin with caution, transfuse to maintain Hg equal or > 8.0, as 

outlined in prior notes ideally A/C therapy to be continued indefinitely unless 

it is absolutely contraindicated considering his [id number] score of 6 


2. Continue Lopressor 


3. Antibiotics as per the primary team


4. Ventilator management as per the ICU team 


5. As outlined in prior notes overall poor prognosis, family to decide 

regarding compassionate extubation versus tracheostomy














Armand Mckenzie MD

## 2018-11-23 NOTE — PN
Progress Note, Physician


Chief Complaint: 





patient intubated low grade temp of 100.2 


in icu








- Current Medication List


Current Medications: 


Active Medications





Acetaminophen (Tylenol -)  650 mg PO Q6H PRN


   PRN Reason: PAIN LEVEL 1 - 3


   Last Admin: 11/23/18 03:13 Dose:  650 mg


Acetaminophen (Tylenol Suppository -)  650 mg RC Q6H PRN


   PRN Reason: FEVER


Allopurinol (Zyloprim -)  100 mg PO BID [facility]


   Last Admin: 11/23/18 10:01 Dose:  100 mg


Chlorhexidine Gluconate (Peridex -)  15 ml MM BID [facility]


   Last Admin: 11/23/18 10:02 Dose:  15 ml


Cholecalciferol (Vitamin D3 -)  1,000 unit PO DAILY [facility]


   Last Admin: 11/23/18 10:01 Dose:  1,000 unit


Diphenhydramine HCl (Benadryl Injection -)  25 mg IVPB ONCE PRN


   PRN Reason: DURING PLASMAPHERESIS


Heparin Sodium (Porcine) (Heparin -)  1,000 unit IVPUSH PRN PRN


   PRN Reason: Heparin


   Last Admin: 11/22/18 08:03 Dose:  1,000 unit


Heparin Sodium (Porcine) (Heparin -)  5,000 unit IVPUSH PRN PRN


   PRN Reason: Heparin


Metronidazole (Flagyl 500mg Premixed Ivpb -)  500 mg in 100 mls @ 100 mls/hr 

IVPB BID [facility]


   Last Admin: 11/23/18 10:00 Dose:  100 mls/hr


Levofloxacin (Levaquin 250 Mg Premixed Ivpb -)  250 mg in 50 mls @ 50 mls/hr 

IVPB DAILY [facility]; Protocol


   Last Admin: 11/23/18 10:01 Dose:  50 mls/hr


HEPARIN SOD,PORK IN 0.45% NACL (Heparin-1/2ns 25,000 Units/500)  25,000 units 

in 500 mls @ 20 mls/hr IVPB TITR [facility]; Protocol


   Last Titration: 11/23/18 10:14 Dose:  900 units/hr, 18 mls/hr


Dextrose/Sodium Chloride (D5-1/3ns -)  500 mls @ 83 mls/hr IV [facility]


   Last Admin: 11/23/18 10:00 Dose:  83 mls/hr


Lactulose (Cephulac (Oral Use))  20 gm PO TID [facility]


   Last Admin: 11/23/18 05:43 Dose:  20 gm


Metoprolol Tartrate (Lopressor Injection -)  5 mg IVPUSH Q8H PRN


   PRN Reason: TACHYCARDIA


   Last Admin: 11/20/18 19:38 Dose:  5 mg


Metoprolol Tartrate (Lopressor -)  75 mg PO BID [facility]


   Last Admin: 11/23/18 10:13 Dose:  Not Given


Pantoprazole Sodium (Protonix Iv)  40 mg IVPUSH DAILY [facility]


   Last Admin: 11/23/18 10:02 Dose:  40 mg


Rifaximin (Xifaxan -)  550 mg PO BID [facility]


   Last Admin: 11/23/18 10:01 Dose:  550 mg


Thiamine HCl (Vitamin B1 -)  100 mg PO DAILY [facility]


   Last Admin: 11/23/18 10:01 Dose:  100 mg











- Objective


Vital Signs: 


 Vital Signs











Temperature  99.4 F   11/23/18 08:00


 


Pulse Rate  93 H  11/23/18 08:00


 


Respiratory Rate  19   11/23/18 09:05


 


Blood Pressure  100/74   11/23/18 08:00


 


O2 Sat by Pulse Oximetry (%)  98   11/22/18 09:00











Constitutional: Yes: Calm


HENT: Yes: Other (ng tube intubated)


Cardiovascular: Yes: Regular Rate and Rhythm, S1, S2


Respiratory: Yes: Mechanically Ventilated, Rhonchi


Gastrointestinal: Yes: Normal Bowel Sounds, Soft


Labs: 


 CBC, BMP





 11/23/18 05:30 





 11/23/18 05:30 





 INR, PTT











INR  1.28  (0.83-1.09)  H  11/15/18  05:30    


 


Fibrinogen  290.0 mg/dL (238-498)   11/11/18  06:00    














Problem List





- Problems


(1) Elevated LFTs


Assessment/Plan: 


ultrasound of liver noted suggestive of acalculous cholecystitis, will get GI 

consult- noted 


lft trending down





Code(s): R94.5 - ABNORMAL RESULTS OF LIVER FUNCTION STUDIES   





(2) HAM (acute kidney injury)


Assessment/Plan: 


HAM vs ATN- 


iv calcium- repleted


potassium now normal range- hyperkalemia improved


ultrasound hyperdense lesion noted in left renal cortex


Code(s): N17.9 - ACUTE KIDNEY FAILURE, UNSPECIFIED   





(3) Atrial fibrillation with RVR


Assessment/Plan: 


heparin drip 


cardiac monitor


rate control with metoprolol








(4) Respiratory failure


Assessment/Plan: 


intubated


propofol/fentanyl


aviating family decision regarding goals of care possible compassionate weaning 

vs tracheostomy


Code(s): J96.90 - RESPIRATORY FAILURE, UNSP, UNSP W HYPOXIA OR HYPERCAPNIA   





(5) Toxic metabolic encephalopathy


Assessment/Plan: 


Microbiology





11/14/18 11:20   Urine - Urine Garces   Urine Culture - Final


                              NO GROWTH OBTAINED


11/06/18 14:30   Urine - Urine Garces   Legionella Antigen - Final


11/06/18 14:30   Urine - Urine Garces   Streptococcus pneumoniae Antigen (M - 

Final


11/06/18 14:30   Sputum - Endotrachea Suction/Ventilator   Gram Stain - Final


11/06/18 14:30   Sputum - Endotrachea Suction/Ventilator   Sputum Culture - 

Final


                              Stenotrophomon.(X.)Maltophilia


11/14/18 09:58   Blood - Peripheral Venous   Blood Culture - Preliminary


                              NO GROWTH OBTAINED AFTER 24 HOURS, INCUBATION TO 

CONTINUE


                              FOR 4 DAYS.


11/14/18 09:50   Blood - Peripheral Venous   Blood Culture - Preliminary


                              NO GROWTH OBTAINED AFTER 24 HOURS, INCUBATION TO 

CONTINUE


                              FOR 4 DAYS.


11/10/18 15:00   Blood - Peripheral Venous   Blood Culture - Preliminary


                              NO GROWTH OBTAINED AFTER 96 HOURS, INCUBATION TO 

CONTINUE


                              FOR 1 DAYS.


11/10/18 14:00   Blood - Central Line   Blood Culture - Preliminary


                              NO GROWTH OBTAINED AFTER 96 HOURS, INCUBATION TO 

CONTINUE


                              FOR 1 DAYS.





RLL PNA


levaquin/flagyl


Code(s): G92 - TOXIC ENCEPHALOPATHY   





(6) Altered mental status


Assessment/Plan: 


maybe secondary to toxic metabolic encephalopathy- 


neurology consult appreciated


on lactulose TID still persistently elevated Nh3 level


s/p dexamethasone - no improvement in mental status


s/p plasmapheresis


dvt ppx lovenox


newly diagnosed Multiple Myeloma- anemia paraproteinemia, 


Code(s): R41.82 - ALTERED MENTAL STATUS, UNSPECIFIED

## 2018-11-23 NOTE — PN
Physical Exam: 


SUBJECTIVE: Patient seen and examined in the ICU.  Remains intubated, poorly 

responsive off sedation.  low grade temp overnight. No pressors. No gross 

change in overall mental status. GOC/family meeting initiated and ongoing.








OBJECTIVE:





 Vital Signs











 Period  Temp  Pulse  Resp  BP Sys/Varghese  Pulse Ox


 


 Last 24 Hr  98.7 F-100.2 F    15-24  /55-76  











GENERAL: Remains intubated, poorly responsive off sedation


HEAD: NCAT


EYES:  sclera anicteric. 


ENT: Ears normal, nares patent, dry mucous membranes


NECK: Trachea midline. 


LUNGS: bilateral diffuse rhonchi


HEART: Tachycardic rate and irregular rhythm. 


ABDOMEN: Soft, nondistended NT


EXTREMITIES: 2+ pulses, warm, well-perfused, no edema, scattered ulcers. 


NEUROLOGICAL: Cannot assess secondary to clinical condition. 


SKIN: Warm, dry, normal turgor, scattered ulcers on the legs











 Laboratory Results - last 24 hr











  11/17/18 11/22/18 11/22/18





  08:05 05:30 09:40


 


WBC   


 


RBC   


 


Hgb   


 


Hct   


 


MCV   


 


MCH   


 


MCHC   


 


RDW   


 


Plt Count   


 


MPV   


 


Absolute Neuts (auto)   


 


Total Counted   100 


 


Neutrophils %   


 


Neutrophils % (Manual)   84.0 H 


 


Band Neutrophils %   2.0 


 


Lymphocytes %   


 


Lymphocytes % (Manual)   11.0 


 


Monocytes %   


 


Eosinophils %   


 


Basophils %   


 


Promyelocytes % (Man)   1 


 


Nucleated RBC %   


 


Metamyelocytes   2 


 


PTT (Actin FS)   


 


Puncture Site   


 


ABG pH   


 


ABG pCO2 at Pt Temp   


 


ABG pO2 at Pt Temp   


 


ABG HCO3   


 


ABG O2 Sat (Measured)   


 


ABG O2 Content   


 


ABG Base Excess   


 


Chacho Test   


 


O2 Delivery Device   


 


Oxygen Flow Rate   


 


Vent Mode   


 


Vent Rate   


 


PEEP   


 


Pressure Support Vent   


 


Sodium   


 


Potassium   


 


Chloride   


 


Carbon Dioxide   


 


Anion Gap   


 


BUN   


 


Creatinine   


 


Creat Clearance w eGFR   


 


Random Glucose   


 


Calcium   


 


Phosphorus   


 


Magnesium   


 


Total Bilirubin   


 


AST   


 


ALT   


 


Alkaline Phosphatase   


 


Total Protein   


 


Albumin   


 


Blood Type  B POSITIVE   B POSITIVE


 


Antibody Screen  Negative   Negative


 


Crossmatch  See Detail   See Detail














  11/22/18 11/22/18 11/23/18





  13:41 20:00 05:30


 


WBC   


 


RBC   


 


Hgb   


 


Hct   


 


MCV   


 


MCH   


 


MCHC   


 


RDW   


 


Plt Count   


 


MPV   


 


Absolute Neuts (auto)   


 


Total Counted   


 


Neutrophils %   


 


Neutrophils % (Manual)   


 


Band Neutrophils %   


 


Lymphocytes %   


 


Lymphocytes % (Manual)   


 


Monocytes %   


 


Eosinophils %   


 


Basophils %   


 


Promyelocytes % (Man)   


 


Nucleated RBC %   


 


Metamyelocytes   


 


PTT (Actin FS)  90.8 H  83.4 H  85.3 H


 


Puncture Site   


 


ABG pH   


 


ABG pCO2 at Pt Temp   


 


ABG pO2 at Pt Temp   


 


ABG HCO3   


 


ABG O2 Sat (Measured)   


 


ABG O2 Content   


 


ABG Base Excess   


 


Chacho Test   


 


O2 Delivery Device   


 


Oxygen Flow Rate   


 


Vent Mode   


 


Vent Rate   


 


PEEP   


 


Pressure Support Vent   


 


Sodium   


 


Potassium   


 


Chloride   


 


Carbon Dioxide   


 


Anion Gap   


 


BUN   


 


Creatinine   


 


Creat Clearance w eGFR   


 


Random Glucose   


 


Calcium   


 


Phosphorus   


 


Magnesium   


 


Total Bilirubin   


 


AST   


 


ALT   


 


Alkaline Phosphatase   


 


Total Protein   


 


Albumin   


 


Blood Type   


 


Antibody Screen   


 


Crossmatch   














  11/23/18 11/23/18 11/23/18





  05:30 05:30 06:00


 


WBC  3.9 L  


 


RBC  2.94 L  


 


Hgb  8.8 L  


 


Hct  26.7 L  


 


MCV  91.1  


 


MCH  30.0  


 


MCHC  32.9  


 


RDW  16.6 H  


 


Plt Count  116 L  


 


MPV  8.9  


 


Absolute Neuts (auto)  3.0  


 


Total Counted   


 


Neutrophils %  77.0  


 


Neutrophils % (Manual)   


 


Band Neutrophils %   


 


Lymphocytes %  17.6  D  


 


Lymphocytes % (Manual)   


 


Monocytes %  2.6 L  


 


Eosinophils %  2.4  


 


Basophils %  0.4  


 


Promyelocytes % (Man)   


 


Nucleated RBC %  5 H  


 


Metamyelocytes   


 


PTT (Actin FS)   


 


Puncture Site    Right radial


 


ABG pH    7.44


 


ABG pCO2 at Pt Temp    29.7 L


 


ABG pO2 at Pt Temp    95.4


 


ABG HCO3    20.0 L


 


ABG O2 Sat (Measured)    97.2


 


ABG O2 Content    12.4 L


 


ABG Base Excess    -2.9 L


 


Chacho Test    Positive


 


O2 Delivery Device    Vent


 


Oxygen Flow Rate    40


 


Vent Mode    A/c


 


Vent Rate    14


 


PEEP    5.0


 


Pressure Support Vent    400


 


Sodium   145 


 


Potassium   4.5 


 


Chloride   120 H 


 


Carbon Dioxide   21 


 


Anion Gap   4 L 


 


BUN   63 H 


 


Creatinine   2.2 H 


 


Creat Clearance w eGFR   29.02 


 


Random Glucose   130 H 


 


Calcium   6.4 L* 


 


Phosphorus   3.0 


 


Magnesium   2.1 


 


Total Bilirubin   0.3 


 


AST   62 H 


 


ALT   78 H 


 


Alkaline Phosphatase   114 


 


Total Protein   8.8 H 


 


Albumin   1.0 L 


 


Blood Type   


 


Antibody Screen   


 


Crossmatch   








Active Medications











Generic Name Dose Route Start Last Admin





  Trade Name Freq  PRN Reason Stop Dose Admin


 


Acetaminophen  650 mg  11/10/18 14:09  11/23/18 03:13





  Tylenol -  PO   650 mg





  Q6H PRN   Administration





  PAIN LEVEL 1 - 3   





     





     





     


 


Acetaminophen  650 mg  11/15/18 15:54  





  Tylenol Suppository -  RC   





  Q6H PRN   





  FEVER   





     





     





     


 


Allopurinol  100 mg  11/09/18 10:00  11/23/18 10:01





  Zyloprim -  PO   100 mg





  BID [facility]   Administration





     





     





     





     


 


Chlorhexidine Gluconate  15 ml  11/10/18 23:45  11/23/18 10:02





  Peridex -  MM   15 ml





  BID [facility]   Administration





     





     





     





     


 


Cholecalciferol  1,000 unit  11/18/18 10:00  11/23/18 10:01





  Vitamin D3 -  PO   1,000 unit





  DAILY [facility]   Administration





     





     





     





     


 


Diphenhydramine HCl  25 mg  11/09/18 12:00  





  Benadryl Injection -  IVPB   





  ONCE PRN   





  DURING PLASMAPHERESIS   





     





     





     


 


Heparin Sodium (Porcine)  1,000 unit  11/21/18 12:23  11/22/18 08:03





  Heparin -  IVPUSH   1,000 unit





  PRN PRN   Administration





  Heparin   





     





     





     


 


Heparin Sodium (Porcine)  5,000 unit  11/21/18 12:23  





  Heparin -  IVPUSH   





  PRN PRN   





  Heparin   





     





     





     


 


Metronidazole  500 mg in 100 mls @ 100 mls/hr  11/17/18 14:30  11/23/18 10:00





  Flagyl 500mg Premixed Ivpb -  IVPB   100 mls/hr





  BID [facility]   Administration





     





     





     





     


 


Levofloxacin  250 mg in 50 mls @ 50 mls/hr  11/19/18 12:45  11/23/18 10:01





  Levaquin 250 Mg Premixed Ivpb -  IVPB   50 mls/hr





  DAILY [facility]   Administration





     





     





  Protocol   





     


 


HEPARIN SOD,PORK IN 0.45% NACL  25,000 units in 500 mls @ 20 mls/hr  11/21/18 13

:00  11/23/18 10:14





  Heparin-1/2ns 25,000 Units/500  IVPB   900 units/hr





  TITR [facility]   18 mls/hr





     Titration





     





  Protocol   





  1,000 UNITS/HR   


 


Dextrose/Sodium Chloride  500 mls @ 83 mls/hr  11/22/18 09:15  11/23/18 10:00





  D5-1/3ns -  IV   83 mls/hr





  ASDIR [facility]   Administration





     





     





     





     


 


Lactulose  20 gm  11/09/18 09:38  11/23/18 05:43





  Cephulac (Oral Use)  PO   20 gm





  TID [facility]   Administration





     





     





     





     


 


Metoprolol Tartrate  5 mg  11/09/18 13:04  11/20/18 19:38





  Lopressor Injection -  IVPUSH   5 mg





  Q8H PRN   Administration





  TACHYCARDIA   





     





     





     


 


Metoprolol Tartrate  75 mg  11/16/18 08:30  11/23/18 10:13





  Lopressor -  PO   Not Given





  BID [facility]   





     





     





     





     


 


Pantoprazole Sodium  40 mg  11/07/18 12:00  11/23/18 10:02





  Protonix Iv  IVPUSH   40 mg





  DAILY [facility]   Administration





     





     





     





     


 


Rifaximin  550 mg  11/15/18 22:00  11/23/18 10:01





  Xifaxan -  PO   550 mg





  BID [facility]   Administration





     





     





     





     


 


Thiamine HCl  100 mg  10/30/18 22:12  11/23/18 10:01





  Vitamin B1 -  PO   100 mg





  DAILY [facility]   Administration





     





     





     





     











ASSESSMENT/PLAN:


79 yo m PMH of A-Fib, Acute on chronic kidney injury, CAD w stents, 

hypercalcemia, medication non-adherence, paraproteinemia, thromboembolic disease

, diastolic heart dysfunction without failure, HTN, HLD, hypertrophic 

cardiomyopathy is here after a rapid response. He was on the floors when it was 

noticed that he has respiratory insufficiency and was desaturating, requiring 

ICU monitoring. GOC/family meeting initiated and ongoing. 








GI: 


metabolic encephalopathy 


-ammonia improving  w/ lactulose and rifaximin


-LFTs are also elevated but downtrending. transamintitis most likely secondary 

to ischemic hepatits which is multifactorial including sepsis, episodes of 

hypotension and hyperviscosity syndrome. 





acalculous cholecystitis?


US shows thickened gallbladder wall, pericholecystic fluid, suspicious for 

acalculous cholecystitis. There was also mild dilatation of the CBD but that 

might be normal for age. 


-Spoke w/ IR, who feel image does not represent  acalculous cholecystitis and 

does not require any IR drainage 





ID: 


No fevers recorded. 


- Rhonchi heard on Lung auscultation


-RLL pneumonia


bcx 11/19/18 neg 


off of ceftazidime


Vancomycin Redosed 11/19/18 


- c/w levaquin/flagyl. 


- ID on board


- c/w rifaximin for elevated ammonia level





Cardio: 


Afib -[id number] score of 6 


- Lopressor 75 mg BID PO


-IV metoprolol 5 mg PRN


- diastolic dysfunction + hypertrophic cardiomyopathy


- CAD with multiple stents


- Cardio consulted and on board.


off Xarelto 15 qd (renal dosing) while on heparin gtt. transfuse to maintain Hgb

>8.0 


s/p 1 u prbc 11/20/18, Heparin with caution considering the dropping Hg, 

transfuse to maintain Hg equal or > 8.0, as outlined in prior notes ideally A/C 

therapy to be continued indefinitely unless it is absolutely contraindicated 

considering his [id number] score of 6 


- Sporadically goes in and out of V-Tach - Can give amio if v-tach is sustained 

and persistent. 





Pulm: 


- Intubated


- Patient has b/l pleural effusions.


- No pneumothorax


- b/l diffuse rhonchi


- infiltrate on CXR: Abx- Substituting levaquin for ceftazidime


Vancomycin Redosed 11/19/18 


- c/w levaquin/flagyl. 





Renal: 


- Acute on Chronic kidney injury


HAM ATN vs. Cast nephropathy  (FeNa was 2.1% indicating tubular injury, US 

showed no stones or obstruction, UA w/o protein but UPCR ~0.5 indicating non-

albumin proteinuria)


- Renal consulted and on board. 


Hyperkalemia (r/o tumor lysis)


serum K is improved, s/p bicarb gtt


D5 1/3 NS 83cc/hr





Neuro: 


- Patient is not alert or oriented.


Remains intubated, poorly responsive off sedation


- Head CT showed no acute pathology


- Neuro consulted and on board. (Dr. Youngblood + Dr. phan) 


- Ammonia elevated - Patient receiving lactulose


- c/w rifaximin for elevated ammonia. 





Heme/Onc: 


- monitor H/H


- Will transfuse to keep hb > 8 


- Patient not receiving plasmapharesis today. 


- Paraproteinemia


- Patient fulfills new criteria  for multiple myeloma with free kappa/ free 

lambda light chain  ratio of 432 (>100 is diagnostic of myeloma) 


- Kappa/Lambda ratio > 100 consistent with Multiple myeloma


- M spike elevated elevated at 6.8 previously 4.7 


-steroids being held at this time 


s/p 1 u prbc 11/20/18, Heparin with caution considering the dropping Hg, 

transfuse to maintain Hg equal or > 8.0, as outlined in prior notes ideally A/C 

therapy to be continued indefinitely unless it is absolutely contraindicated 

considering his [id number] score of 6 


-s/p 1 u prbc 11/22/18...Hgb 8.8





Endo: 


- Parathyroid hormone WNL


- PTH-borderline low appropriate given hypercalcemia, PTHrP low





Prophylaxis: 


- Heparin with caution considering the dropping Hg, transfuse to maintain Hg 

equal or > 8.0, as outlined in prior notes ideally A/C therapy to be continued 

indefinitely unless it is absolutely contraindicated considering his 

[id number] score of 6


- Protonix





F/E/N: 


F:  D5 1/3 NS 83cc/hr


E: Will replete lytes PRN


N: tube feeds (low potassium feeds). with free water 





Code Status: Full Code 


- GOC/family meeting initiated and ongoing. if unable to reach family will need 

tracheostomy as he is approaching 3 weeks intubated


- Compassionate extubation versus Tracheostomy





Dispo: Patient will continue to receive ICU level care





Visit type





- Emergency Visit


Emergency Visit: Yes


ED Registration Date: 10/30/18


Care time: The patient presented to the Emergency Department on the above date 

and was hospitalized for further evaluation of their emergent condition.





- New Patient


This patient is new to me today: Yes


Date on this admission: 11/23/18





- Critical Care


Critical Care patient: Yes


Total Critical Care Time (in minutes): 37


Critical Care Statement: The care of this patient involved high complexity 

decision making to prevent further life threatening deterioration of the patient

's condition and/or to evaluate & treat vital organ system(s) failure or risk 

of failure.

## 2018-11-24 LAB
ALBUMIN SERPL-MCNC: 1 G/DL (ref 3.4–5)
ALP SERPL-CCNC: 126 U/L (ref 45–117)
ALT SERPL-CCNC: 71 U/L (ref 13–61)
ANION GAP SERPL CALC-SCNC: 3 MMOL/L (ref 8–16)
ANISOCYTOSIS BLD QL: (no result)
ARTERIAL BLOOD GAS PCO2: 32.9 MMHG (ref 35–45)
ARTERIAL PATENCY WRIST A: POSITIVE
AST SERPL-CCNC: 71 U/L (ref 15–37)
BASE EXCESS BLDA CALC-SCNC: -3.7 MEQ/L (ref -2–2)
BASOPHILS # BLD: 0.6 % (ref 0–2)
BILIRUB SERPL-MCNC: 0.3 MG/DL (ref 0.2–1)
BUN SERPL-MCNC: 53 MG/DL (ref 7–18)
CALCIUM SERPL-MCNC: 6.5 MG/DL (ref 8.5–10.1)
CHLORIDE SERPL-SCNC: 118 MMOL/L (ref 98–107)
CO2 SERPL-SCNC: 22 MMOL/L (ref 21–32)
CREAT SERPL-MCNC: 1.9 MG/DL (ref 0.55–1.3)
DEPRECATED RDW RBC AUTO: 16.5 % (ref 11.9–15.9)
EOSINOPHIL # BLD: 2.2 % (ref 0–4.5)
GLUCOSE SERPL-MCNC: 121 MG/DL (ref 74–106)
HCT VFR BLD CALC: 26.6 % (ref 35.4–49)
HGB BLD-MCNC: 8.7 GM/DL (ref 11.7–16.9)
LYMPHOCYTES # BLD: 15.5 % (ref 8–40)
MAGNESIUM SERPL-MCNC: 2 MG/DL (ref 1.8–2.4)
MCH RBC QN AUTO: 29.9 PG (ref 25.7–33.7)
MCHC RBC AUTO-ENTMCNC: 32.8 G/DL (ref 32–35.9)
MCV RBC: 91.2 FL (ref 80–96)
MONOCYTES # BLD AUTO: 3 % (ref 3.8–10.2)
NEUTROPHILS # BLD: 78.7 % (ref 42.8–82.8)
PHOSPHATE SERPL-MCNC: 3 MG/DL (ref 2.5–4.9)
PLATELET # BLD AUTO: 118 K/MM3 (ref 134–434)
PLATELET BLD QL SMEAR: (no result)
PMV BLD: 9.5 FL (ref 7.5–11.1)
PO2 BLDA: 88.9 MMHG (ref 70–100)
POTASSIUM SERPLBLD-SCNC: 4.5 MMOL/L (ref 3.5–5.1)
PROT SERPL-MCNC: 9 G/DL (ref 6.4–8.2)
RBC # BLD AUTO: 2.92 M/MM3 (ref 4–5.6)
SAO2 % BLDA: 96.3 % (ref 90–98.9)
SODIUM SERPL-SCNC: 142 MMOL/L (ref 136–145)
WBC # BLD AUTO: 4 K/MM3 (ref 4–10)

## 2018-11-24 RX ADMIN — METOPROLOL TARTRATE SCH MG: 50 TABLET, FILM COATED ORAL at 23:06

## 2018-11-24 RX ADMIN — CHLORHEXIDINE GLUCONATE 0.12% ORAL RINSE SCH ML: 1.2 LIQUID ORAL at 09:15

## 2018-11-24 RX ADMIN — RIFAXIMIN SCH MG: 550 TABLET ORAL at 23:06

## 2018-11-24 RX ADMIN — METOPROLOL TARTRATE PRN MG: 5 INJECTION, SOLUTION INTRAVENOUS at 18:44

## 2018-11-24 RX ADMIN — PANTOPRAZOLE SODIUM SCH MG: 40 INJECTION, POWDER, FOR SOLUTION INTRAVENOUS at 09:14

## 2018-11-24 RX ADMIN — LACTULOSE SCH GM: 20 SOLUTION ORAL at 05:19

## 2018-11-24 RX ADMIN — ALLOPURINOL SCH MG: 100 TABLET ORAL at 23:06

## 2018-11-24 RX ADMIN — METOPROLOL TARTRATE SCH MG: 50 TABLET, FILM COATED ORAL at 09:14

## 2018-11-24 RX ADMIN — Medication SCH MG: at 09:15

## 2018-11-24 RX ADMIN — VITAMIN D, TAB 1000IU (100/BT) SCH UNIT: 25 TAB at 09:14

## 2018-11-24 RX ADMIN — CHLORHEXIDINE GLUCONATE 0.12% ORAL RINSE SCH ML: 1.2 LIQUID ORAL at 23:06

## 2018-11-24 RX ADMIN — HEPARIN SODIUM SCH MLS/HR: 5000 INJECTION, SOLUTION INTRAVENOUS at 18:36

## 2018-11-24 RX ADMIN — ALLOPURINOL SCH MG: 100 TABLET ORAL at 09:14

## 2018-11-24 RX ADMIN — RIFAXIMIN SCH MG: 550 TABLET ORAL at 09:14

## 2018-11-24 NOTE — PN
Teaching Attending Note


Name of Resident: Rd Rudolph





ATTENDING PHYSICIAN STATEMENT





I saw and evaluated the patient.


I reviewed the resident's note and discussed the case with the resident.


I agree with the resident's findings and plan as documented.








SUBJECTIVE:





Pt seen and examined in the ICU. Clinically unchanged, remains intubated, 

poorly responsive off sedation. No fevers recorded.





OBJECTIVE:





 Vital Signs











 Period  Temp  Pulse  Resp  BP Sys/Varghese  Pulse Ox


 


 Last 24 Hr  97.4 F-98.4 F    15-23  /51-81  98-98








 Intake & Output











 11/21/18 11/22/18 11/23/18 11/24/18





 23:59 23:59 23:59 23:59


 


Intake Total 2260 2050 3267 2352


 


Output Total 700 1700 1350 500


 


Balance 1218 207 3775 1852


 


Weight 84.051 kg 84.051 kg  








Gen:  intubated, poorly responsive


Heart: RRR


Lung:  scattered rhonchi


Abd: soft, nontender


EXt: + UE edema





 CBC, BMP





 11/24/18 05:15 





 11/24/18 05:15 





Active Medications





Acetaminophen (Tylenol -)  650 mg PO Q6H PRN


   PRN Reason: PAIN LEVEL 1 - 3


   Last Admin: 11/23/18 03:13 Dose:  650 mg


Allopurinol (Zyloprim -)  100 mg PO BID [facility]


   Last Admin: 11/24/18 09:14 Dose:  100 mg


Chlorhexidine Gluconate (Peridex -)  15 ml MM BID [facility]


   Last Admin: 11/24/18 09:15 Dose:  15 ml


Cholecalciferol (Vitamin D3 -)  1,000 unit PO DAILY [facility]


   Last Admin: 11/24/18 09:14 Dose:  1,000 unit


Diphenhydramine HCl (Benadryl Injection -)  25 mg IVPB ONCE PRN


   PRN Reason: DURING PLASMAPHERESIS


Fentanyl (Sublimaze Injection -)  25 mcg IVPUSH Q2H PRN


   PRN Reason: PAIN LEVEL 7 - 10


   Stop: 11/24/18 18:14


   Last Admin: 11/24/18 04:55 Dose:  25 mcg


Heparin Sodium (Porcine) (Heparin -)  1,000 unit IVPUSH PRN PRN


   PRN Reason: Heparin


   Last Admin: 11/22/18 08:03 Dose:  1,000 unit


Heparin Sodium (Porcine) (Heparin -)  5,000 unit IVPUSH PRN PRN


   PRN Reason: Heparin


Metronidazole (Flagyl 500mg Premixed Ivpb -)  500 mg in 100 mls @ 100 mls/hr 

IVPB BID [facility]


   Last Admin: 11/24/18 09:14 Dose:  100 mls/hr


Levofloxacin (Levaquin 250 Mg Premixed Ivpb -)  250 mg in 50 mls @ 50 mls/hr 

IVPB DAILY [facility]; Protocol


   Last Admin: 11/24/18 09:14 Dose:  50 mls/hr


HEPARIN SOD,PORK IN 0.45% NACL (Heparin-1/2ns 25,000 Units/500)  25,000 units 

in 500 mls @ 20 mls/hr IVPB TITR [facility]; Protocol


   Last Titration: 11/24/18 06:34 Dose:  900 units/hr, 18 mls/hr


Metoprolol Tartrate (Lopressor Injection -)  5 mg IVPUSH Q8H PRN


   PRN Reason: TACHYCARDIA


   Last Admin: 11/20/18 19:38 Dose:  5 mg


Metoprolol Tartrate (Lopressor -)  75 mg PO BID [facility]


   Last Admin: 11/24/18 09:14 Dose:  75 mg


Pantoprazole Sodium (Protonix Iv)  40 mg IVPUSH DAILY [facility]


   Last Admin: 11/24/18 09:14 Dose:  40 mg


Rifaximin (Xifaxan -)  550 mg PO BID [facility]


   Last Admin: 11/24/18 09:14 Dose:  550 mg


Thiamine HCl (Vitamin B1 -)  100 mg PO DAILY [facility]


   Last Admin: 11/24/18 09:15 Dose:  100 mg








ASSESSMENT AND PLAN:


Acute Hypoxic Respiratory Failure


Altered Mental Status


Metabolic Encephalopathy


Pneumonia


Acalculous Cholecystitis


Multiple Myeloma


Acute on Chronic Renal Failure


Metabolic Acidosis


LV Diastolic Dysfunction


Atrial Fibrillation


CAD


+Troponins likely Demand Ischemia


PAD


Hyperlipidemia


HTN





-  continue antibiotics


-  monitor urine output, creatinine


-  rate control


-  minimize sedation to assess mental status 


-  spontaneous breathing trials as tolerated but would not extubate unless 

mental status improved


-  DVT/GI prophylaxis


-  continue discussions regarding goals of care, if unable to reach family will 

need tracheostomy as he is approaching 3 weeks intubated


-  palliative care input appreciated


-  continue ICU monitoring


-  poor overall prognosis for meaningful recovery








critical care time spent in reviewing chart, evaluating patient and formulating 

plan 35 min

## 2018-11-24 NOTE — PN
Progress Note, Physician


History of Present Illness: 


 Unresponsive on ventilator


  Low grade temp


  Diarrhea reported.  Lactulose stopped


  BC  no growth


  


 





 


 


 





- Current Medication List


Current Medications: 


Active Medications





Acetaminophen (Tylenol -)  650 mg PO Q6H PRN


   PRN Reason: PAIN LEVEL 1 - 3


   Last Admin: 11/23/18 03:13 Dose:  650 mg


Allopurinol (Zyloprim -)  100 mg PO BID [facility]


   Last Admin: 11/24/18 09:14 Dose:  100 mg


Chlorhexidine Gluconate (Peridex -)  15 ml MM BID [facility]


   Last Admin: 11/24/18 09:15 Dose:  15 ml


Cholecalciferol (Vitamin D3 -)  1,000 unit PO DAILY [facility]


   Last Admin: 11/24/18 09:14 Dose:  1,000 unit


Diphenhydramine HCl (Benadryl Injection -)  25 mg IVPB ONCE PRN


   PRN Reason: DURING PLASMAPHERESIS


Fentanyl (Sublimaze Injection -)  25 mcg IVPUSH Q2H PRN


   PRN Reason: PAIN LEVEL 7 - 10


   Stop: 11/24/18 18:14


   Last Admin: 11/24/18 04:55 Dose:  25 mcg


Heparin Sodium (Porcine) (Heparin -)  1,000 unit IVPUSH PRN PRN


   PRN Reason: Heparin


   Last Admin: 11/22/18 08:03 Dose:  1,000 unit


Heparin Sodium (Porcine) (Heparin -)  5,000 unit IVPUSH PRN PRN


   PRN Reason: Heparin


Metronidazole (Flagyl 500mg Premixed Ivpb -)  500 mg in 100 mls @ 100 mls/hr 

IVPB BID [facility]


   Last Admin: 11/24/18 09:14 Dose:  100 mls/hr


Levofloxacin (Levaquin 250 Mg Premixed Ivpb -)  250 mg in 50 mls @ 50 mls/hr 

IVPB DAILY [facility]; Protocol


   Last Admin: 11/24/18 09:14 Dose:  50 mls/hr


HEPARIN SOD,PORK IN 0.45% NACL (Heparin-1/2ns 25,000 Units/500)  25,000 units 

in 500 mls @ 20 mls/hr IVPB TITR [facility]; Protocol


   Last Titration: 11/24/18 06:34 Dose:  900 units/hr, 18 mls/hr


Metoprolol Tartrate (Lopressor Injection -)  5 mg IVPUSH Q8H PRN


   PRN Reason: TACHYCARDIA


   Last Admin: 11/20/18 19:38 Dose:  5 mg


Metoprolol Tartrate (Lopressor -)  75 mg PO BID [facility]


   Last Admin: 11/24/18 09:14 Dose:  75 mg


Pantoprazole Sodium (Protonix Iv)  40 mg IVPUSH DAILY [facility]


   Last Admin: 11/24/18 09:14 Dose:  40 mg


Rifaximin (Xifaxan -)  550 mg PO BID [facility]


   Last Admin: 11/24/18 09:14 Dose:  550 mg


Thiamine HCl (Vitamin B1 -)  100 mg PO DAILY [facility]


   Last Admin: 11/24/18 09:15 Dose:  100 mg











- Objective


Vital Signs: 


 Vital Signs











Temperature  98.4 F   11/24/18 06:00


 


Pulse Rate  110 H  11/24/18 08:00


 


Respiratory Rate  22 H  11/24/18 09:14


 


Blood Pressure  90/51 L  11/24/18 08:00


 


O2 Sat by Pulse Oximetry (%)  98   11/24/18 07:35











Constitutional: Yes: No Distress


Cardiovascular: Yes: Regular Rate and Rhythm, S1, S2


Respiratory: Yes: Mechanically Ventilated


Gastrointestinal: Yes: Normal Bowel Sounds, Soft.  No: Tenderness


Edema: Yes


Labs: 


 CBC, BMP





 11/24/18 05:15 





 11/24/18 05:15 





 INR, PTT











INR  1.28  (0.83-1.09)  H  11/15/18  05:30    


 


Fibrinogen  290.0 mg/dL (238-498)   11/11/18  06:00    














Assessment/Plan


Respiratory failure


 RLL pneumonia


 Acalculus  Cholecystitis


 Toxic metabolic encephalopathy


 Renal failure


 Myeloma


 Hyperviscosity syndrome


 Diarrhea


 


  


 


  Continue levaquin  day #5 / flagyl


  Check C difficile


  Ventilatory support


  Prognosis poor

## 2018-11-24 NOTE — PN
Progress Note, Physician





- Current Medication List


Current Medications: 


Active Medications





Acetaminophen (Tylenol -)  650 mg PO Q6H PRN


   PRN Reason: PAIN LEVEL 1 - 3


   Last Admin: 11/23/18 03:13 Dose:  650 mg


Allopurinol (Zyloprim -)  100 mg PO BID [facility]


   Last Admin: 11/24/18 09:14 Dose:  100 mg


Chlorhexidine Gluconate (Peridex -)  15 ml MM BID [facility]


   Last Admin: 11/24/18 09:15 Dose:  15 ml


Cholecalciferol (Vitamin D3 -)  1,000 unit PO DAILY [facility]


   Last Admin: 11/24/18 09:14 Dose:  1,000 unit


Diphenhydramine HCl (Benadryl Injection -)  25 mg IVPB ONCE PRN


   PRN Reason: DURING PLASMAPHERESIS


Fentanyl (Sublimaze Injection -)  25 mcg IVPUSH Q2H PRN


   PRN Reason: PAIN LEVEL 7 - 10


   Stop: 11/24/18 18:14


   Last Admin: 11/24/18 04:55 Dose:  25 mcg


Heparin Sodium (Porcine) (Heparin -)  1,000 unit IVPUSH PRN PRN


   PRN Reason: Heparin


   Last Admin: 11/22/18 08:03 Dose:  1,000 unit


Heparin Sodium (Porcine) (Heparin -)  5,000 unit IVPUSH PRN PRN


   PRN Reason: Heparin


Metronidazole (Flagyl 500mg Premixed Ivpb -)  500 mg in 100 mls @ 100 mls/hr 

IVPB BID [facility]


   Last Admin: 11/24/18 09:14 Dose:  100 mls/hr


Levofloxacin (Levaquin 250 Mg Premixed Ivpb -)  250 mg in 50 mls @ 50 mls/hr 

IVPB DAILY [facility]; Protocol


   Last Admin: 11/24/18 09:14 Dose:  50 mls/hr


HEPARIN SOD,PORK IN 0.45% NACL (Heparin-1/2ns 25,000 Units/500)  25,000 units 

in 500 mls @ 20 mls/hr IVPB TITR [facility]; Protocol


   Last Titration: 11/24/18 06:34 Dose:  900 units/hr, 18 mls/hr


Metoprolol Tartrate (Lopressor Injection -)  5 mg IVPUSH Q8H PRN


   PRN Reason: TACHYCARDIA


   Last Admin: 11/20/18 19:38 Dose:  5 mg


Metoprolol Tartrate (Lopressor -)  75 mg PO BID [facility]


   Last Admin: 11/24/18 09:14 Dose:  75 mg


Pantoprazole Sodium (Protonix Iv)  40 mg IVPUSH DAILY [facility]


   Last Admin: 11/24/18 09:14 Dose:  40 mg


Rifaximin (Xifaxan -)  550 mg PO BID [facility]


   Last Admin: 11/24/18 09:14 Dose:  550 mg


Thiamine HCl (Vitamin B1 -)  100 mg PO DAILY [facility]


   Last Admin: 11/24/18 09:15 Dose:  100 mg











- Objective


Vital Signs: 


 Vital Signs











Temperature  98.2 F   11/24/18 10:00


 


Pulse Rate  104 H  11/24/18 10:00


 


Respiratory Rate  21 H  11/24/18 10:00


 


Blood Pressure  102/72   11/24/18 10:00


 


O2 Sat by Pulse Oximetry (%)  98   11/24/18 07:35











Cardiovascular: Yes: S1, S2


Respiratory: Yes: Mechanically Ventilated


Gastrointestinal: Yes: Normal Bowel Sounds, Soft


Labs: 


 CBC, BMP





 11/24/18 05:15 





 11/24/18 05:15 





 INR, PTT











INR  1.28  (0.83-1.09)  H  11/15/18  05:30    


 


Fibrinogen  290.0 mg/dL (238-498)   11/11/18  06:00    














Problem List





- Problems


(1) Toxic metabolic encephalopathy


Code(s): G92 - TOXIC ENCEPHALOPATHY   





(2) Cellulitis


Code(s): L03.90 - CELLULITIS, UNSPECIFIED   


Qualifiers: 


   Site of cellulitis: extremity   Site of cellulitis of extremity: lower 

extremity   Laterality: right   Qualified Code(s): L03.115 - Cellulitis of 

right lower limb   





(3) Sepsis


Code(s): A41.9 - SEPSIS, UNSPECIFIED ORGANISM   





(4) Artery occlusion


Code(s): I70.90 - UNSPECIFIED ATHEROSCLEROSIS   





(5) Hypercalcemia


Code(s): E83.52 - HYPERCALCEMIA   





(6) Paroxysmal atrial fibrillation


Code(s): I48.0 - PAROXYSMAL ATRIAL FIBRILLATION   





(7) HAM (acute kidney injury)


Code(s): N17.9 - ACUTE KIDNEY FAILURE, UNSPECIFIED   





Assessment/Plan





- Problems


(1) Elevated LFTs


Assessment/Plan: 


ultrasound of liver noted suggestive of acalculous cholecystitis, will get GI 

consult- noted 


lft trending down





Code(s): R94.5 - ABNORMAL RESULTS OF LIVER FUNCTION STUDIES   





(2) HAM (acute kidney injury)


Assessment/Plan: 


HAM vs ATN- 


iv calcium- repleted


potassium now normal range- hyperkalemia improved


ultrasound hyperdense lesion noted in left renal cortex


Code(s): N17.9 - ACUTE KIDNEY FAILURE, UNSPECIFIED   





(3) Atrial fibrillation with RVR


Assessment/Plan: 


heparin drip 


cardiac monitor


rate control with metoprolol








(4) Respiratory failure


Assessment/Plan: 


intubated


propofol/fentanyl


aviating family decision regarding goals of care possible compassionate weaning 

vs tracheostomy


Code(s): J96.90 - RESPIRATORY FAILURE, UNSP, UNSP W HYPOXIA OR HYPERCAPNIA   





(5) Toxic metabolic encephalopathy


Assessment/Plan: 


Microbiology





11/14/18 11:20   Urine - Urine Garces   Urine Culture - Final


                              NO GROWTH OBTAINED


11/06/18 14:30   Urine - Urine Garces   Legionella Antigen - Final


11/06/18 14:30   Urine - Urine Garces   Streptococcus pneumoniae Antigen (M - 

Final


11/06/18 14:30   Sputum - Endotrachea Suction/Ventilator   Gram Stain - Final


11/06/18 14:30   Sputum - Endotrachea Suction/Ventilator   Sputum Culture - 

Final


                              Stenotrophomon.(X.)Maltophilia


11/14/18 09:58   Blood - Peripheral Venous   Blood Culture - Preliminary


                              NO GROWTH OBTAINED AFTER 24 HOURS, INCUBATION TO 

CONTINUE


                              FOR 4 DAYS.


11/14/18 09:50   Blood - Peripheral Venous   Blood Culture - Preliminary


                              NO GROWTH OBTAINED AFTER 24 HOURS, INCUBATION TO 

CONTINUE


                              FOR 4 DAYS.


11/10/18 15:00   Blood - Peripheral Venous   Blood Culture - Preliminary


                              NO GROWTH OBTAINED AFTER 96 HOURS, INCUBATION TO 

CONTINUE


                              FOR 1 DAYS.


11/10/18 14:00   Blood - Central Line   Blood Culture - Preliminary


                              NO GROWTH OBTAINED AFTER 96 HOURS, INCUBATION TO 

CONTINUE


                              FOR 1 DAYS.





RLL PNA


levaquin/flagyl


Code(s): G92 - TOXIC ENCEPHALOPATHY   





(6) Altered mental status


Assessment/Plan: 


maybe secondary to toxic metabolic encephalopathy- 


neurology consult appreciated


on lactulose TID still persistently elevated Nh3 level


s/p dexamethasone - no improvement in mental status


s/p plasmapheresis


dvt ppx lovenox


newly diagnosed Multiple Myeloma- anemia paraproteinemia, 


Code(s): R41.82 - ALTERED MENTAL STATUS, UNSPECIFIED

## 2018-11-24 NOTE — PN
Progress Note (short form)





- Note


Progress Note: 


2:30 AM





Patient has been having loose bowel movements. Rectal tube insert  for 

protection of decubiti ulcers. 





Noticed bright red blood from mouth, scanty in amount, most likely because 

patient has been bitting the tube. Fentanyl to be given PRN

## 2018-11-24 NOTE — PN
Physical Exam: 


SUBJECTIVE: Patient seen and examined





had diarrhoea overnight and rectal  tube was inserted. 


Stool for c diff ordered. 


pt off sedation. 


tolerating tube feed. 


on ventilator support. 








OBJECTIVE:





 Vital Signs











 Period  Temp  Pulse  Resp  BP Sys/Varghese  Pulse Ox


 


 Last 24 Hr  97.4 F-99.4 F    15-22  100-118/71-81  98-99











GENERAL: Remains intubated, poorly responsive off sedation


HEAD: NCAT


EYES:  sclera anicteric. 


ENT: Ears normal, nares patent, dry mucous membranes


NECK: Trachea midline. 


LUNGS: bilateral diffuse rhonchi


HEART: Tachycardic rate and irregular rhythm. 


ABDOMEN: Soft, nondistended NT


EXTREMITIES:  warm, well-perfused, no edema, scattered ulcers. 


SKIN: Warm, dry, 











 Laboratory Results - last 24 hr











  11/17/18 11/23/18 11/23/18





  08:05 05:30 05:30


 


WBC   


 


RBC   


 


Hgb   


 


Hct   


 


MCV   


 


MCH   


 


MCHC   


 


RDW   


 


Plt Count   


 


MPV   


 


Absolute Neuts (auto)   


 


Total Counted   


 


Neutrophils %   


 


Neutrophils % (Manual)    76.8


 


Band Neutrophils %    0.0


 


Lymphocytes %   


 


Lymphocytes % (Manual)    12.6  D


 


Monocytes %   


 


Monocytes % (Manual)    1 L D


 


Eosinophils %   


 


Eosinophils % (Manual)    5.3 H D


 


Basophils %   


 


Basophils % (Manual)    0.0


 


Myelocytes % (Man)    3 H D


 


Promyelocytes % (Man)    0


 


Blast Cells % (Manual)    0


 


Nucleated RBC %   


 


Metamyelocytes    1  D


 


Hypochromia    0


 


Platelet Estimate    Decreased


 


Platelet Comment   


 


Polychromasia    0


 


Poikilocytosis    0


 


Anisocytosis    1+


 


Microcytosis    1+


 


Macrocytosis    0


 


PTT (Actin FS)   85.3 H 


 


Puncture Site   


 


ABG pH   


 


ABG pCO2 at Pt Temp   


 


ABG pO2 at Pt Temp   


 


ABG HCO3   


 


ABG O2 Sat (Measured)   


 


ABG O2 Content   


 


ABG Base Excess   


 


Chacho Test   


 


O2 Delivery Device   


 


Oxygen Flow Rate   


 


Vent Mode   


 


Vent Rate   


 


Mechanical Rate   


 


PEEP   


 


Pressure Support Vent   


 


Sodium   


 


Potassium   


 


Chloride   


 


Carbon Dioxide   


 


Anion Gap   


 


BUN   


 


Creatinine   


 


Creat Clearance w eGFR   


 


Random Glucose   


 


Calcium   


 


Phosphorus   


 


Magnesium   


 


Total Bilirubin   


 


AST   


 


ALT   


 


Alkaline Phosphatase   


 


Total Protein   


 


Albumin   


 


Blood Type  B POSITIVE  


 


Antibody Screen  Negative  


 


Crossmatch  See Detail  














  11/23/18 11/24/18 11/24/18





  05:30 05:15 05:15


 


WBC    4.0


 


RBC    2.92 L


 


Hgb    8.7 L


 


Hct    26.6 L


 


MCV    91.2


 


MCH    29.9


 


MCHC    32.8


 


RDW    16.5 H


 


Plt Count    118 L


 


MPV    9.5


 


Absolute Neuts (auto)    3.1


 


Total Counted    100


 


Neutrophils %    78.7


 


Neutrophils % (Manual)    75.0


 


Band Neutrophils %    3.0


 


Lymphocytes %    15.5


 


Lymphocytes % (Manual)    14.0


 


Monocytes %    3.0 L


 


Monocytes % (Manual)    2 L D


 


Eosinophils %    2.2


 


Eosinophils % (Manual)    6.0 H


 


Basophils %    0.6


 


Basophils % (Manual)   


 


Myelocytes % (Man)   


 


Promyelocytes % (Man)   


 


Blast Cells % (Manual)   


 


Nucleated RBC %    6 H


 


Metamyelocytes   


 


Hypochromia   


 


Platelet Estimate    Decreased


 


Platelet Comment    No clumping noted


 


Polychromasia   


 


Poikilocytosis   


 


Anisocytosis    1+


 


Microcytosis   


 


Macrocytosis   


 


PTT (Actin FS)   74.1 H 


 


Puncture Site   


 


ABG pH   


 


ABG pCO2 at Pt Temp   


 


ABG pO2 at Pt Temp   


 


ABG HCO3   


 


ABG O2 Sat (Measured)   


 


ABG O2 Content   


 


ABG Base Excess   


 


Chacho Test   


 


O2 Delivery Device   


 


Oxygen Flow Rate   


 


Vent Mode   


 


Vent Rate   


 


Mechanical Rate   


 


PEEP   


 


Pressure Support Vent   


 


Sodium  145  


 


Potassium  4.5  


 


Chloride  120 H  


 


Carbon Dioxide  21  


 


Anion Gap  4 L  


 


BUN  63 H  


 


Creatinine  2.2 H  


 


Creat Clearance w eGFR  29.02  


 


Random Glucose  130 H  


 


Calcium  6.4 L*  


 


Phosphorus  3.0  


 


Magnesium  2.1  


 


Total Bilirubin  0.3  


 


AST  62 H  


 


ALT  78 H  


 


Alkaline Phosphatase  114  


 


Total Protein  8.8 H  


 


Albumin  1.0 L  


 


Blood Type   


 


Antibody Screen   


 


Crossmatch   














  11/24/18 11/24/18





  05:15 06:00


 


WBC  


 


RBC  


 


Hgb  


 


Hct  


 


MCV  


 


MCH  


 


MCHC  


 


RDW  


 


Plt Count  


 


MPV  


 


Absolute Neuts (auto)  


 


Total Counted  


 


Neutrophils %  


 


Neutrophils % (Manual)  


 


Band Neutrophils %  


 


Lymphocytes %  


 


Lymphocytes % (Manual)  


 


Monocytes %  


 


Monocytes % (Manual)  


 


Eosinophils %  


 


Eosinophils % (Manual)  


 


Basophils %  


 


Basophils % (Manual)  


 


Myelocytes % (Man)  


 


Promyelocytes % (Man)  


 


Blast Cells % (Manual)  


 


Nucleated RBC %  


 


Metamyelocytes  


 


Hypochromia  


 


Platelet Estimate  


 


Platelet Comment  


 


Polychromasia  


 


Poikilocytosis  


 


Anisocytosis  


 


Microcytosis  


 


Macrocytosis  


 


PTT (Actin FS)  


 


Puncture Site   Right radial


 


ABG pH   7.40


 


ABG pCO2 at Pt Temp   32.9 L


 


ABG pO2 at Pt Temp   88.9


 


ABG HCO3   20.0 L


 


ABG O2 Sat (Measured)   96.3


 


ABG O2 Content   12.2 L


 


ABG Base Excess   -3.7 L


 


Chacho Test   Positive


 


O2 Delivery Device   Mech vent


 


Oxygen Flow Rate   40%


 


Vent Mode   Ac/ prvc


 


Vent Rate   14


 


Mechanical Rate   Yes


 


PEEP   5.0


 


Pressure Support Vent   500


 


Sodium  142 


 


Potassium  4.5 


 


Chloride  118 H 


 


Carbon Dioxide  22 


 


Anion Gap  3 L 


 


BUN  53 H 


 


Creatinine  1.9 H 


 


Creat Clearance w eGFR  34.37 


 


Random Glucose  121 H 


 


Calcium  6.5 L* 


 


Phosphorus  3.0 


 


Magnesium  2.0 


 


Total Bilirubin  0.3 


 


AST  71 H 


 


ALT  71 H 


 


Alkaline Phosphatase  126 H 


 


Total Protein  9.0 H 


 


Albumin  1.0 L 


 


Blood Type  


 


Antibody Screen  


 


Crossmatch  








Active Medications











Generic Name Dose Route Start Last Admin





  Trade Name Freq  PRN Reason Stop Dose Admin


 


Acetaminophen  650 mg  11/10/18 14:09  11/23/18 03:13





  Tylenol -  PO   650 mg





  Q6H PRN   Administration





  PAIN LEVEL 1 - 3   





     





     





     


 


Allopurinol  100 mg  11/09/18 10:00  11/23/18 21:21





  Zyloprim -  PO   100 mg





  BID [facility]   Administration





     





     





     





     


 


Chlorhexidine Gluconate  15 ml  11/10/18 23:45  11/23/18 21:22





  Peridex -  MM   15 ml





  BID [facility]   Administration





     





     





     





     


 


Cholecalciferol  1,000 unit  11/18/18 10:00  11/23/18 10:01





  Vitamin D3 -  PO   1,000 unit





  DAILY [facility]   Administration





     





     





     





     


 


Diphenhydramine HCl  25 mg  11/09/18 12:00  





  Benadryl Injection -  IVPB   





  ONCE PRN   





  DURING PLASMAPHERESIS   





     





     





     


 


Fentanyl  25 mcg  11/23/18 18:07  11/24/18 04:55





  Sublimaze Injection -  IVPUSH  11/24/18 18:14  25 mcg





  Q2H PRN   Administration





  PAIN LEVEL 7 - 10   





     





     





     


 


Heparin Sodium (Porcine)  1,000 unit  11/21/18 12:23  11/22/18 08:03





  Heparin -  IVPUSH   1,000 unit





  PRN PRN   Administration





  Heparin   





     





     





     


 


Heparin Sodium (Porcine)  5,000 unit  11/21/18 12:23  





  Heparin -  IVPUSH   





  PRN PRN   





  Heparin   





     





     





     


 


Metronidazole  500 mg in 100 mls @ 100 mls/hr  11/17/18 14:30  11/23/18 21:21





  Flagyl 500mg Premixed Ivpb -  IVPB   100 mls/hr





  BID [facility]   Administration





     





     





     





     


 


Levofloxacin  250 mg in 50 mls @ 50 mls/hr  11/19/18 12:45  11/23/18 10:01





  Levaquin 250 Mg Premixed Ivpb -  IVPB   50 mls/hr





  DAILY [facility]   Administration





     





     





  Protocol   





     


 


HEPARIN SOD,PORK IN 0.45% NACL  25,000 units in 500 mls @ 20 mls/hr  11/21/18 13

:00  11/24/18 06:34





  Heparin-1/2ns 25,000 Units/500  IVPB   900 units/hr





  TITR [facility]   18 mls/hr





     Titration





     





  Protocol   





  1,000 UNITS/HR   


 


Dextrose/Sodium Chloride  500 mls @ 83 mls/hr  11/22/18 09:15  11/23/18 10:00





  D5-1/3ns -  IV   83 mls/hr





  ASDIR [facility]   Administration





     





     





     





     


 


Lactulose  20 gm  11/09/18 09:38  11/24/18 05:19





  Cephulac (Oral Use)  PO   20 gm





  TID [facility]   Administration





     





     





     





     


 


Metoprolol Tartrate  5 mg  11/09/18 13:04  11/20/18 19:38





  Lopressor Injection -  IVPUSH   5 mg





  Q8H PRN   Administration





  TACHYCARDIA   





     





     





     


 


Metoprolol Tartrate  75 mg  11/16/18 08:30  11/23/18 21:22





  Lopressor -  PO   75 mg





  BID AV[facility]   Administration





     





     





     





     


 


Pantoprazole Sodium  40 mg  11/07/18 12:00  11/23/18 10:02





  Protonix Iv  IVPUSH   40 mg





  DAILY [facility]   Administration





     





     





     





     


 


Rifaximin  550 mg  11/15/18 22:00  11/23/18 21:21





  Xifaxan -  PO   550 mg





  BID AVA   Administration





     





     





     





     


 


Thiamine HCl  100 mg  10/30/18 22:12  11/23/18 10:01





  Vitamin B1 -  PO   100 mg





  DAILY [facility]   Administration





     





     





     





     











ASSESSMENT/PLAN:








ASSESSMENT AND PLAN:


Acute Hypoxic Respiratory Failure


Altered Mental Status


Metabolic Encephalopathy


Pneumonia


Multiple Myeloma


Acute on Chronic Renal Failure


Metabolic Acidosis


LV Diastolic Dysfunction


Atrial Fibrillation


CAD


+Troponins likely Demand Ischemia


PAD


Hyperlipidemia


HTN








-  on ventilator support, off sedation, keep spo2> 90 


-  antibiotics as per id flagyl and levofloxacin 


-  monitor urine output, creatinine


-  rate control with metoprolol 


-  spontaneous breathing trials as tolerated but would not extubate unless 

mental status improved


-  on pantoprazole for gi frophylaxis


-  on heparin drip for afib 


-  palliative care input appreciated


-  continue ICU monitoring


-  continue tube feed 


- stool for c diff  and wbc ordered 


- stop lactulose and order ammonia 


- Stop IV fluid 


- ID, cardiology, hematology, nephrology, neurology on case 

















Visit type





- Emergency Visit


Emergency Visit: Yes


ED Registration Date: 10/30/18


Care time: The patient presented to the Emergency Department on the above date 

and was hospitalized for further evaluation of their emergent condition.





- New Patient


This patient is new to me today: No





- Critical Care


Critical Care patient: Yes


Total Critical Care Time (in minutes): 45


Critical Care Statement: The care of this patient involved high complexity 

decision making to prevent further life threatening deterioration of the patient

's condition and/or to evaluate & treat vital organ system(s) failure or risk 

of failure.

## 2018-11-24 NOTE — PN
Progress Note (short form)





- Note


Progress Note: 





Renal follow up for Hypercalcemia/HAM





on vent via ET Tube 


no overnight events


on NGT feeds with free water


no pressers


making urine 





 Vital Signs











Temperature  98.4 F   11/24/18 06:00


 


Pulse Rate  110 H  11/24/18 08:00


 


Respiratory Rate  23 H  11/24/18 08:00


 


Blood Pressure  90/51 L  11/24/18 08:00


 


O2 Sat by Pulse Oximetry (%)  98   11/24/18 07:35








 Intake & Output











 11/21/18 11/22/18 11/23/18 11/24/18





 23:59 23:59 23:59 23:59


 


Intake Total 2260 2050 3267 2352


 


Output Total 700 1700 1350 500


 


Balance 8440 606 5038 1852


 


Weight 84.051 kg 84.051 kg  





NAD


trace edema in b/l UE


no LE edema 


 CBC, BMP





 11/24/18 05:15 





 11/24/18 05:15 





 Current Medications





Acetaminophen (Tylenol -)  650 mg PO Q6H PRN


   PRN Reason: PAIN LEVEL 1 - 3


   Last Admin: 11/23/18 03:13 Dose:  650 mg


Allopurinol (Zyloprim -)  100 mg PO BID [facility]


   Last Admin: 11/23/18 21:21 Dose:  100 mg


Chlorhexidine Gluconate (Peridex -)  15 ml MM BID [facility]


   Last Admin: 11/23/18 21:22 Dose:  15 ml


Cholecalciferol (Vitamin D3 -)  1,000 unit PO DAILY [facility]


   Last Admin: 11/23/18 10:01 Dose:  1,000 unit


Diphenhydramine HCl (Benadryl Injection -)  25 mg IVPB ONCE PRN


   PRN Reason: DURING PLASMAPHERESIS


Fentanyl (Sublimaze Injection -)  25 mcg IVPUSH Q2H PRN


   PRN Reason: PAIN LEVEL 7 - 10


   Stop: 11/24/18 18:14


   Last Admin: 11/24/18 04:55 Dose:  25 mcg


Heparin Sodium (Porcine) (Heparin -)  1,000 unit IVPUSH PRN PRN


   PRN Reason: Heparin


   Last Admin: 11/22/18 08:03 Dose:  1,000 unit


Heparin Sodium (Porcine) (Heparin -)  5,000 unit IVPUSH PRN PRN


   PRN Reason: Heparin


Metronidazole (Flagyl 500mg Premixed Ivpb -)  500 mg in 100 mls @ 100 mls/hr 

IVPB BID [facility]


   Last Admin: 11/23/18 21:21 Dose:  100 mls/hr


Levofloxacin (Levaquin 250 Mg Premixed Ivpb -)  250 mg in 50 mls @ 50 mls/hr 

IVPB DAILY [facility]; Protocol


   Last Admin: 11/23/18 10:01 Dose:  50 mls/hr


HEPARIN SOD,PORK IN 0.45% NACL (Heparin-1/2ns 25,000 Units/500)  25,000 units 

in 500 mls @ 20 mls/hr IVPB TITR [facility]; Protocol


   Last Titration: 11/24/18 06:34 Dose:  900 units/hr, 18 mls/hr


Dextrose/Sodium Chloride (D5-1/3ns -)  500 mls @ 83 mls/hr IV ASDIR [facility]


   Last Admin: 11/23/18 10:00 Dose:  83 mls/hr


Lactulose (Cephulac (Oral Use))  20 gm PO TID [facility]


   Last Admin: 11/24/18 05:19 Dose:  20 gm


Metoprolol Tartrate (Lopressor Injection -)  5 mg IVPUSH Q8H PRN


   PRN Reason: TACHYCARDIA


   Last Admin: 11/20/18 19:38 Dose:  5 mg


Metoprolol Tartrate (Lopressor -)  75 mg PO BID [facility]


   Last Admin: 11/23/18 21:22 Dose:  75 mg


Pantoprazole Sodium (Protonix Iv)  40 mg IVPUSH DAILY [facility]


   Last Admin: 11/23/18 10:02 Dose:  40 mg


Rifaximin (Xifaxan -)  550 mg PO BID [facility]


   Last Admin: 11/23/18 21:21 Dose:  550 mg


Thiamine HCl (Vitamin B1 -)  100 mg PO DAILY [facility]


   Last Admin: 11/23/18 10:01 Dose:  100 mg











78 year old gentleman with hx of CKD, Afib on A/C, CAD, CKD, Hypertension, 

Hyperlipidemia, PVD who presented with AMS/Confusion and found to have HAM.





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 

showed no stones or obstruction, UA w/o protein but UPCR ~0.5 indicating non-

albumin proteinuria)


#AMS 


#Newly diagnosed multiple myloma 


#Anemia 


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


#Hyperdense lesion on US of the kidney 


#Normal anion gap metabolic acidosis 


#Hyperkalemia (r/o tumor lysis)








Renal function with gradual improvement thus far, remians non-oliguric


continue tube feeds with free water


no IVF needed at this time


continue vent support


goals of care discussions with family on-going by ICU staff 


prognosis is poor








Ricky Chang DO

## 2018-11-24 NOTE — PN
Progress Note (short form)





- Note


Progress Note: 


Chief Complaint: Events noted, notes reviewed, remains intubated no change in 

neurological status, remains in atrial fibrillation on A/C with Heparin





History of Present Illness: 


Seen and examined in the ICU. Events noted, notes reviewed, remains intubated 

no change in neurological status, remains in atrial fibrillation on A/C with 

Heparin





[facility] coronary angiography performed 1/11/2017 revealed non-obstructive CAD, 

severe LV apical hypertrophic cardiomyopathy, mildly elevated right sided 

pressures/study is consistent with apical hypertrophy (spade-like) variant of 

hypertrophic cardiomyopathy





Echocardiography dated 07/11/2018 Mod cLVH, severe DYANA, moderate TR RVSP 50-60 

mmHg, moderate-severe MR





Echocardiography dated 10/16/2018 Normal LV size with hyperdynamic LVEF 75%, 

mild BSH, normal RV size and function, severe LAE, moderate-severe MR, mod TR


 





- Current Medication List


 


 Current Medications





Acetaminophen (Tylenol -)  650 mg PO Q6H PRN


   PRN Reason: PAIN LEVEL 1 - 3


   Last Admin: 11/23/18 03:13 Dose:  650 mg


Allopurinol (Zyloprim -)  100 mg PO BID [facility]


   Last Admin: 11/23/18 21:21 Dose:  100 mg


Chlorhexidine Gluconate (Peridex -)  15 ml MM BID [facility]


   Last Admin: 11/23/18 21:22 Dose:  15 ml


Cholecalciferol (Vitamin D3 -)  1,000 unit PO DAILY [facility]


   Last Admin: 11/23/18 10:01 Dose:  1,000 unit


Diphenhydramine HCl (Benadryl Injection -)  25 mg IVPB ONCE PRN


   PRN Reason: DURING PLASMAPHERESIS


Fentanyl (Sublimaze Injection -)  25 mcg IVPUSH Q2H PRN


   PRN Reason: PAIN LEVEL 7 - 10


   Stop: 11/24/18 18:14


   Last Admin: 11/24/18 04:55 Dose:  25 mcg


Heparin Sodium (Porcine) (Heparin -)  1,000 unit IVPUSH PRN PRN


   PRN Reason: Heparin


   Last Admin: 11/22/18 08:03 Dose:  1,000 unit


Heparin Sodium (Porcine) (Heparin -)  5,000 unit IVPUSH PRN PRN


   PRN Reason: Heparin


Metronidazole (Flagyl 500mg Premixed Ivpb -)  500 mg in 100 mls @ 100 mls/hr 

IVPB BID [facility]


   Last Admin: 11/23/18 21:21 Dose:  100 mls/hr


Levofloxacin (Levaquin 250 Mg Premixed Ivpb -)  250 mg in 50 mls @ 50 mls/hr 

IVPB DAILY [facility]; Protocol


   Last Admin: 11/23/18 10:01 Dose:  50 mls/hr


HEPARIN SOD,PORK IN 0.45% NACL (Heparin-1/2ns 25,000 Units/500)  25,000 units 

in 500 mls @ 20 mls/hr IVPB TITR [facility]; Protocol


   Last Titration: 11/24/18 06:34 Dose:  900 units/hr, 18 mls/hr


Metoprolol Tartrate (Lopressor Injection -)  5 mg IVPUSH Q8H PRN


   PRN Reason: TACHYCARDIA


   Last Admin: 11/20/18 19:38 Dose:  5 mg


Metoprolol Tartrate (Lopressor -)  75 mg PO BID [facility]


   Last Admin: 11/23/18 21:22 Dose:  75 mg


Pantoprazole Sodium (Protonix Iv)  40 mg IVPUSH DAILY [facility]


   Last Admin: 11/23/18 10:02 Dose:  40 mg


Rifaximin (Xifaxan -)  550 mg PO BID [facility]


   Last Admin: 11/23/18 21:21 Dose:  550 mg


Thiamine HCl (Vitamin B1 -)  100 mg PO DAILY [facility]


   Last Admin: 11/23/18 10:01 Dose:  100 mg








Review of Systems





Unable to obtain





- Objective


Vital Signs: 


 


 Last Vital Signs











Temp Pulse Resp BP Pulse Ox


 


 98.4 F   110 H  23 H  90/51 L  98 


 


 11/24/18 06:00  11/24/18 08:00  11/24/18 08:00  11/24/18 08:00  11/24/18 07:35








 Intake & Output











 11/21/18 11/22/18 11/23/18 11/24/18





 23:59 23:59 23:59 23:59


 


Intake Total 2260 2050 3267 2352


 


Output Total 700 1700 1350 500


 


Balance 7459 560 8758 1852


 


Weight 185 lb 4.8 oz 185 lb 4.8 oz  











Neck: Supple Negative JVD No Bruit


Cardiovascular: S1 S2 Irregularly Irregular Grade 2/6 Systolic Murmur Apical


Chest: Scattered Rhonchi Bilaterally


Gastrointestinal: Soft Benign Normal Bowel Sounds


Ext: No Edema 





Labs: 


 


 CBC, BMP





 11/24/18 05:15 





 11/24/18 05:15 





 Hepatic Panel











Total Bilirubin  0.3 mg/dL (0.2-1)   11/24/18  05:15    


 


AST  71 U/L (15-37)  H  11/24/18  05:15    


 


ALT  71 U/L (13-61)  H  11/24/18  05:15    


 


Alkaline Phosphatase  126 U/L ()  H  11/24/18  05:15    


 


Albumin  1.0 g/dl (3.4-5.0)  L  11/24/18  05:15    











Assessment/Plan





ASSESSMENT:





1. Acute hypoxic respiratory failure, suspected aspiration pneumonia with 

sepsis syndrome 


2. Toxic metabolic encephelopathy, rule out CVA 


3. 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


4. Paraproteinemia confirmed multiple myeloma


5. 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 

disease related to persistent atrial fibrillation with [id number] score of 6


6. CAD with evidence of demand ischemic injury, non obstructive CAD on [facility] 

coronary angiogrpahy angina pectoris


7. 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 

chronic class I-II NYHA classification LV failure, compensated/euvolemic


8. Persistent atrial fibrillation [id number] score of 6 currently on Heparin 

therapy


9. HTN


10. Anemia/thrombocytopenia


11. Acalculous Cholecystitis


12. Ischemic hepatitis


13. Hypernatremia, resolved





PLAN:





1. Continue Heparin with caution, transfuse to maintain Hg equal or > 8.0, as 

outlined in prior notes ideally A/C therapy to be continued indefinitely unless 

it is absolutely contraindicated considering his [id number] score of 6 


2. Continue Lopressor, hemodynamics permitting 


3. Antibiotics as per the primary team


4. Ventilator management as per the ICU team 


5. As outlined in prior notes overall poor prognosis, family to decide 

regarding compassionate extubation versus tracheostomy














Armand Mckenzie MD

## 2018-11-25 LAB
ALBUMIN SERPL-MCNC: 1.1 G/DL (ref 3.4–5)
ALP SERPL-CCNC: 132 U/L (ref 45–117)
ALT SERPL-CCNC: 63 U/L (ref 13–61)
ANION GAP SERPL CALC-SCNC: 6 MMOL/L (ref 8–16)
AST SERPL-CCNC: 55 U/L (ref 15–37)
BILIRUB SERPL-MCNC: 0.3 MG/DL (ref 0.2–1)
BUN SERPL-MCNC: 50 MG/DL (ref 7–18)
CALCIUM SERPL-MCNC: 6.7 MG/DL (ref 8.5–10.1)
CHLORIDE SERPL-SCNC: 118 MMOL/L (ref 98–107)
CO2 SERPL-SCNC: 19 MMOL/L (ref 21–32)
CREAT SERPL-MCNC: 1.7 MG/DL (ref 0.55–1.3)
DEPRECATED RDW RBC AUTO: 16.3 % (ref 11.9–15.9)
GLUCOSE SERPL-MCNC: 101 MG/DL (ref 74–106)
HCT VFR BLD CALC: 24.9 % (ref 35.4–49)
HGB BLD-MCNC: 8.3 GM/DL (ref 11.7–16.9)
MAGNESIUM SERPL-MCNC: 1.9 MG/DL (ref 1.8–2.4)
MCH RBC QN AUTO: 30.2 PG (ref 25.7–33.7)
MCHC RBC AUTO-ENTMCNC: 33.2 G/DL (ref 32–35.9)
MCV RBC: 91.1 FL (ref 80–96)
PHOSPHATE SERPL-MCNC: 3.1 MG/DL (ref 2.5–4.9)
PLATELET # BLD AUTO: 114 K/MM3 (ref 134–434)
PMV BLD: 9.8 FL (ref 7.5–11.1)
POTASSIUM SERPLBLD-SCNC: 4.5 MMOL/L (ref 3.5–5.1)
PROT SERPL-MCNC: 8.9 G/DL (ref 6.4–8.2)
RBC # BLD AUTO: 2.73 M/MM3 (ref 4–5.6)
SODIUM SERPL-SCNC: 143 MMOL/L (ref 136–145)
WBC # BLD AUTO: 4.5 K/MM3 (ref 4–10)

## 2018-11-25 RX ADMIN — PANTOPRAZOLE SODIUM SCH MG: 40 INJECTION, POWDER, FOR SOLUTION INTRAVENOUS at 09:17

## 2018-11-25 RX ADMIN — RIFAXIMIN SCH MG: 550 TABLET ORAL at 22:36

## 2018-11-25 RX ADMIN — Medication SCH TAB: at 09:17

## 2018-11-25 RX ADMIN — ALLOPURINOL SCH MG: 100 TABLET ORAL at 09:17

## 2018-11-25 RX ADMIN — ALLOPURINOL SCH MG: 100 TABLET ORAL at 22:36

## 2018-11-25 RX ADMIN — HEPARIN SODIUM SCH: 5000 INJECTION, SOLUTION INTRAVENOUS at 22:27

## 2018-11-25 RX ADMIN — DEXTROSE MONOHYDRATE SCH MLS/HR: 50 INJECTION, SOLUTION INTRAVENOUS at 19:00

## 2018-11-25 RX ADMIN — METOPROLOL TARTRATE SCH MG: 50 TABLET, FILM COATED ORAL at 22:35

## 2018-11-25 RX ADMIN — CHLORHEXIDINE GLUCONATE 0.12% ORAL RINSE SCH ML: 1.2 LIQUID ORAL at 09:18

## 2018-11-25 RX ADMIN — Medication SCH MG: at 09:17

## 2018-11-25 RX ADMIN — VITAMIN D, TAB 1000IU (100/BT) SCH UNIT: 25 TAB at 09:17

## 2018-11-25 RX ADMIN — METOPROLOL TARTRATE SCH MG: 50 TABLET, FILM COATED ORAL at 09:17

## 2018-11-25 RX ADMIN — CHLORHEXIDINE GLUCONATE 0.12% ORAL RINSE SCH ML: 1.2 LIQUID ORAL at 22:35

## 2018-11-25 RX ADMIN — RIFAXIMIN SCH MG: 550 TABLET ORAL at 09:17

## 2018-11-25 NOTE — PN
Progress Note (short form)





- Note


Progress Note: 





Renal follow up for Hypercalcemia/HAM





Pt seen and examined in the ICU


on Vent, FiO2 40%


not responsive


no overnight events


making urine


on tube feeds





 Vital Signs











Temperature  97.8 F   11/25/18 02:00


 


Pulse Rate  101 H  11/25/18 08:00


 


Respiratory Rate  23 H  11/25/18 08:00


 


Blood Pressure  119/80   11/25/18 08:00


 


O2 Sat by Pulse Oximetry (%)  99   11/25/18 00:44








 Intake & Output











 11/22/18 11/23/18 11/24/18 11/25/18





 23:59 23:59 23:59 23:59


 


Intake Total 2050 3267 4075 1416


 


Output Total 1700 1350 1400 1000


 


Balance 350 1917 2675 416


 


Weight 84.051 kg   89.3 kg





NAD on vent


RRR, no M/R


CTA


soft NT/ND


no LE edema 


 CBC, BMP





 11/25/18 05:30 





 11/25/18 05:30 


 Laboratory Tests











  11/22/18 11/24/18 11/25/18





  05:30 05:15 05:30


 


Creat Clearance w eGFR    39.07


 


Calcium  5.9 L*  6.5 L*  6.7 L*


 


Phosphorus  3.3  3.0  3.1


 


Magnesium  2.1  2.0  1.9


 


AST    55 H


 


ALT    63 H


 


Alkaline Phosphatase    132 H


 


Albumin  1.1 L  1.0 L  1.1 L











 Current Medications





Acetaminophen (Tylenol -)  650 mg PO Q6H PRN


   PRN Reason: PAIN LEVEL 1 - 3


   Last Admin: 11/23/18 03:13 Dose:  650 mg


Allopurinol (Zyloprim -)  [facility]ID [facility]


   Last Admin: 11/24/18 23:06 Dose:  100 mg


Chlorhexidine Gluconate (Peridex -)  15 ml MM BID [facility]


   Last Admin: 11/24/18 23:06 Dose:  15 ml


Cholecalciferol (Vitamin D3 -)  1,000 unit PO DAILY [facility]


   Last Admin: 11/24/18 09:14 Dose:  1,000 unit


Diphenhydramine HCl (Benadryl Injection -)  25 mg IVPB ONCE PRN


   PRN Reason: DURING PLASMAPHERESIS


Heparin Sodium (Porcine) (Heparin -)  1,000 unit IVPUSH PRN PRN


   PRN Reason: Heparin


   Last Admin: 11/22/18 08:03 Dose:  1,000 unit


Heparin Sodium (Porcine) (Heparin -)  5,000 unit IVPUSH PRN PRN


   PRN Reason: Heparin


Metronidazole (Flagyl 500mg Premixed Ivpb -)  500 mg in 100 mls @ 100 mls/hr 

IVPB [facility]ID [facility]


   Last Admin: 11/24/18 23:06 Dose:  100 mls/hr


Levofloxacin (Levaquin 250 Mg Premixed Ivpb -)  250 mg in 50 mls @ 50 mls/hr 

IVPB [facility]ILY [facility]; Protocol


   Last Admin: 11/24/18 09:14 Dose:  50 mls/hr


HEPARIN SOD,PORK IN 0.45% NACL (Heparin-1/2ns 25,000 Units/500)  25,000 units 

in 500 mls @ 20 mls/hr IVPB [facility]ITR [facility]; Protocol


   Last Admin: 11/24/18 18:36 Dose:  900 units/hr, 18 mls/hr


Lactobacillus Acidophilus (Bacid -)  1 tab PO DAILY [facility]


Metoprolol Tartrate (Lopressor Injection -)  5 mg IVPUSH Q8H PRN


   PRN Reason: TACHYCARDIA


   Last Admin: 11/24/18 18:44 Dose:  5 mg


Metoprolol Tartrate (Lopressor -)  75 mg PO BID [facility]


   Last Admin: 11/24/18 23:06 Dose:  75 mg


Pantoprazole Sodium (Protonix Iv)  40 mg IVPUSH DAILY [facility]


   Last Admin: 11/24/18 09:14 Dose:  40 mg


Rifaximin (Xifaxan -)  550 mg PO BID [facility]


   Last Admin: 11/24/18 23:06 Dose:  550 mg


Thiamine HCl (Vitamin B1 -)  100 mg PO DAILY [facility]


   Last Admin: 11/24/18 09:15 Dose:  100 mg











78 year old gentleman with hx of CKD, Afib on A/C, CAD, CKD, Hypertension, 

Hyperlipidemia, PVD who presented with AMS/Confusion and found to have HAM.





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 

showed no stones or obstruction, UA w/o protein but UPCR ~0.5 indicating non-

albumin proteinuria)


#AMS 


#Newly diagnosed multiple myloma 


#Anemia 


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


#Hyperdense lesion on US of the kidney 


#Normal anion gap metabolic acidosis 


#Hyperkalemia (r/o tumor lysis)





Renal function stable at this time 


BP stable, not on pressers


tolerating tube feeds


trend renal function and electrolytes


corrected Ca is now WNL


Oncology follow up


supportive care


prognosis is poor 








Ricky Chang DO

## 2018-11-25 NOTE — PN
Physical Exam: 


SUBJECTIVE: Patient seen and examined in the ICU.  Remains intubated, poorly 

responsive off sedation but opens eyes to voice. No pressors. No gross change 

in overall mental status. GOC/family meeting initiated and ongoing.








OBJECTIVE:





 Vital Signs











 Period  Temp  Pulse  Resp  BP Sys/Varghese  Pulse Ox


 


 Last 24 Hr  97.4 F-97.9 F    14-40  100-136/62-92  98-99











GENERAL: Remains intubated, poorly responsive off sedation but opens eyes to 

voice.


HEAD: NCAT


EYES:  sclera anicteric. 


ENT: Ears normal, nares patent, dry mucous membranes


NECK: Trachea midline. 


LUNGS: bilateral diffuse rhonchi


HEART: Tachycardic rate and irregular rhythm. 


ABDOMEN: Soft, nondistended NT


EXTREMITIES: 2+ pulses, warm, well-perfused, no edema, scattered ulcers. 


NEUROLOGICAL: Cannot assess secondary to clinical condition. 


SKIN: Warm, dry, normal turgor, scattered ulcers on the legs














 Laboratory Results - last 24 hr











  11/23/18 11/24/18 11/25/18





  16:10 07:45 05:30


 


WBC    4.5


 


RBC    2.73 L


 


Hgb    8.3 L


 


Hct    24.9 L


 


MCV    91.1


 


MCH    30.2


 


MCHC    33.2


 


RDW    16.3 H


 


Plt Count    114 L


 


MPV    9.8


 


PTT (Actin FS)   


 


Sodium   


 


Potassium   


 


Chloride   


 


Carbon Dioxide   


 


Anion Gap   


 


BUN   


 


Creatinine   


 


Creat Clearance w eGFR   


 


Random Glucose   


 


Calcium   


 


Phosphorus   


 


Magnesium   


 


Total Bilirubin   


 


AST   


 


ALT   


 


Alkaline Phosphatase   


 


Total Protein   


 


Albumin   


 


Stool Occult Blood  Negative  Negative 














  11/25/18 11/25/18





  05:30 05:30


 


WBC  


 


RBC  


 


Hgb  


 


Hct  


 


MCV  


 


MCH  


 


MCHC  


 


RDW  


 


Plt Count  


 


MPV  


 


PTT (Actin FS)  81.1 H 


 


Sodium   143


 


Potassium   4.5


 


Chloride   118 H


 


Carbon Dioxide   19 L


 


Anion Gap   6 L


 


BUN   50 H


 


Creatinine   1.7 H


 


Creat Clearance w eGFR   39.07


 


Random Glucose   101


 


Calcium   6.7 L*


 


Phosphorus   3.1


 


Magnesium   1.9


 


Total Bilirubin   0.3


 


AST   55 H


 


ALT   63 H


 


Alkaline Phosphatase   132 H


 


Total Protein   8.9 H


 


Albumin   1.1 L


 


Stool Occult Blood  








Active Medications











Generic Name Dose Route Start Last Admin





  Trade Name Freq  PRN Reason Stop Dose Admin


 


Acetaminophen  650 mg  11/10/18 14:09  11/23/18 03:13





  Tylenol -  PO   650 mg





  Q6H PRN   Administration





  PAIN LEVEL 1 - 3   





     





     





     


 


Allopurinol  100 mg  11/09/18 10:00  11/25/18 09:17





  Zyloprim -  PO   100 mg





  BID [facility]   Administration





     





     





     





     


 


Chlorhexidine Gluconate  15 ml  11/10/18 23:45  11/25/18 09:18





  Peridex -  MM   15 ml





  BID [facility]   Administration





     





     





     





     


 


Cholecalciferol  1,000 unit  11/18/18 10:00  11/25/18 09:17





  Vitamin D3 -  PO   1,000 unit





  DAILY [facility]   Administration





     





     





     





     


 


Diphenhydramine HCl  25 mg  11/09/18 12:00  





  Benadryl Injection -  IVPB   





  ONCE PRN   





  DURING PLASMAPHERESIS   





     





     





     


 


Heparin Sodium (Porcine)  1,000 unit  11/21/18 12:23  11/22/18 08:03





  Heparin -  IVPUSH   1,000 unit





  PRN PRN   Administration





  Heparin   





     





     





     


 


Heparin Sodium (Porcine)  5,000 unit  11/21/18 12:23  





  Heparin -  IVPUSH   





  PRN PRN   





  Heparin   





     





     





     


 


Metronidazole  500 mg in 100 mls @ 100 mls/hr  11/17/18 14:30  11/25/18 09:17





  Flagyl 500mg Premixed Ivpb -  IVPB   100 mls/hr





  BID [facility]   Administration





     





     





     





     


 


Levofloxacin  250 mg in 50 mls @ 50 mls/hr  11/19/18 12:45  11/25/18 09:17





  Levaquin 250 Mg Premixed Ivpb -  IVPB   50 mls/hr





  DAILY [facility]   Administration





     





     





  Protocol   





     


 


HEPARIN SOD,PORK IN 0.45% NACL  25,000 units in 500 mls @ 20 mls/hr  11/21/18 13

:00  11/25/18 10:57





  Heparin-1/2ns 25,000 Units/500  IVPB   850 units/hr





  TITR [facility]   17 mls/hr





     Titration





     





  Protocol   





  1,000 UNITS/HR   


 


Lactobacillus Acidophilus  1 tab  11/25/18 10:00  11/25/18 09:17





  Bacid -  PO   1 tab





  DAILY [facility]   Administration





     





     





     





     


 


Metoprolol Tartrate  5 mg  11/09/18 13:04  11/24/18 18:44





  Lopressor Injection -  IVPUSH   5 mg





  Q8H PRN   Administration





  TACHYCARDIA   





     





     





     


 


Metoprolol Tartrate  75 mg  11/16/18 08:30  11/25/18 09:17





  Lopressor -  PO   75 mg





  BID [facility]   Administration





     





     





     





     


 


Pantoprazole Sodium  40 mg  11/07/18 12:00  11/25/18 09:17





  Protonix Iv  IVPUSH   40 mg





  DAILY [facility]   Administration





     





     





     





     


 


Rifaximin  550 mg  11/15/18 22:00  11/25/18 09:17





  Xifaxan -  PO   550 mg





  BID [facility]   Administration





     





     





     





     


 


Thiamine HCl  100 mg  10/30/18 22:12  11/25/18 09:17





  Vitamin B1 -  PO   100 mg





  DAILY [facility]   Administration





     





     





     





     











ASSESSMENT/PLAN:


79 yo m PMH of A-Fib, Acute on chronic kidney injury, CAD w stents, 

hypercalcemia, medication non-adherence, paraproteinemia, thromboembolic disease

, diastolic heart dysfunction without failure, HTN, HLD, hypertrophic 

cardiomyopathy is here after a rapid response. He was on the floors when it was 

noticed that he has respiratory insufficiency and was desaturating, requiring 

ICU monitoring. GOC/family meeting initiated and ongoing. 








GI: 


metabolic encephalopathy 


-ammonia stable in 60s w/ rifaximin, lactulose dcd 


-LFTs elevated but downtrending. transamintitis most likely secondary to 

ischemic hepatits which is multifactorial including sepsis, episodes of 

hypotension and hyperviscosity syndrome. 





acalculous cholecystitis?


US shows thickened gallbladder wall, pericholecystic fluid, suspicious for 

acalculous cholecystitis. There was also mild dilatation of the CBD but that 

might be normal for age. 


-Spoke w/ IR, who feel image does not represent  acalculous cholecystitis and 

does not require any IR drainage 





ID: 


No fevers recorded. 


- Rhonchi heard on Lung auscultation


-RLL pneumonia


bcx 11/19/18 neg 


off of ceftazidime


Vancomycin Redosed 11/19/18 


C diff neg 11/25/18


- c/w levaquin/flagyl. 


- ID on board


- c/w rifaximin for elevated ammonia level





Cardio: 


Afib -[id number] score of 6 


- Lopressor 75 mg BID PO


-IV metoprolol 5 mg PRN


- diastolic dysfunction + hypertrophic cardiomyopathy


- CAD with multiple stents


- Cardio consulted and on board.


off Xarelto 15 qd (renal dosing) while on heparin gtt. transfuse to maintain Hgb

>8.0 


s/p 1 u prbc 11/20/18, Heparin with caution considering the dropping Hg, 

transfuse to maintain Hg equal or > 8.0, as outlined in prior notes ideally A/C 

therapy to be continued indefinitely unless it is absolutely contraindicated 

considering his [id number] score of 6 


- Sporadically goes in and out of V-Tach - Can give amio if v-tach is sustained 

and persistent. 





Pulm: 


- Intubated


- Patient has b/l pleural effusions.


- No pneumothorax


- b/l diffuse rhonchi


- infiltrate on CXR: Abx- Substituting levaquin for ceftazidime


Vancomycin Redosed 11/19/18 


- c/w levaquin/flagyl. 





Renal: 


- Acute on Chronic kidney injury


HAM ATN vs. Cast nephropathy  (FeNa was 2.1% indicating tubular injury, US 

showed no stones or obstruction, UA w/o protein but UPCR ~0.5 indicating non-

albumin proteinuria)


- Renal consulted and on board. 


Hyperkalemia (r/o tumor lysis)


serum K is improved, s/p bicarb gtt





Neuro: 


- Patient is not alert or oriented.


Remains intubated, poorly responsive off sedation  but opens eyes to voice.


- Head CT showed no acute pathology


- Neuro consulted and on board. (Dr. Youngblood + Dr. phan) 


-ammonia stable in 60s w/ rifaximin, lactulose dcd 





Heme/Onc: 


- monitor H/H


- Will transfuse to keep hb > 8 


- Patient not receiving plasmapharesis today. 


- Paraproteinemia


- Patient fulfills new criteria  for multiple myeloma with free kappa/ free 

lambda light chain  ratio of 432 (>100 is diagnostic of myeloma) 


- Kappa/Lambda ratio > 100 consistent with Multiple myeloma


- M spike elevated elevated at 6.8 previously 4.7 


-steroids being held at this time 


s/p 1 u prbc 11/20/18, Heparin with caution considering the dropping Hg, 

transfuse to maintain Hg equal or > 8.0, as outlined in prior notes ideally A/C 

therapy to be continued indefinitely unless it is absolutely contraindicated 

considering his [id number] score of 6 


-s/p 1 u prbc 11/22/18...Hgb 8.8....8.3 today 





Endo: 


- Parathyroid hormone WNL


- PTH-borderline low appropriate given hypercalcemia, PTHrP low





Prophylaxis: 


- Heparin with caution considering the dropping Hg, transfuse to maintain Hg 

equal or > 8.0, as outlined in prior notes ideally A/C therapy to be continued 

indefinitely unless it is absolutely contraindicated considering his 

[id number] score of 6


- Protonix





F/E/N: 


F:  no IVF at this time


E: Will replete lytes PRN


N: tube feeds (low potassium feeds). with free water 





Code Status: Full Code 


- GOC/family meeting initiated and ongoing. if unable to reach family will need 

tracheostomy as he is approaching 3 weeks intubated


- Compassionate extubation versus Tracheostomy





Dispo: Patient will continue to receive ICU level care





Visit type





- Emergency Visit


Emergency Visit: Yes


ED Registration Date: 10/30/18


Care time: The patient presented to the Emergency Department on the above date 

and was hospitalized for further evaluation of their emergent condition.





- New Patient


This patient is new to me today: Yes


Date on this admission: 11/25/18





- Critical Care


Critical Care patient: Yes


Total Critical Care Time (in minutes): 38


Critical Care Statement: The care of this patient involved high complexity 

decision making to prevent further life threatening deterioration of the patient

's condition and/or to evaluate & treat vital organ system(s) failure or risk 

of failure.

## 2018-11-25 NOTE — PN
Progress Note (short form)





- Note


Progress Note: 


Chief Complaint: Events noted, notes reviewed, remains intubated with no change 

in neurological status, remains in atrial fibrillation on A/C with Heparin





History of Present Illness: 


Seen and examined in the ICU. Events noted, notes reviewed, remains intubated 

with no change in neurological status, remains in atrial fibrillation on A/C 

with Heparin





[facility] coronary angiography performed 1/11/2017 revealed non-obstructive CAD, 

severe LV apical hypertrophic cardiomyopathy, mildly elevated right sided 

pressures/study is consistent with apical hypertrophy (spade-like) variant of 

hypertrophic cardiomyopathy





Echocardiography dated 07/11/2018 Mod cLVH, severe DYANA, moderate TR RVSP 50-60 

mmHg, moderate-severe MR





Echocardiography dated 10/16/2018 Normal LV size with hyperdynamic LVEF 75%, 

mild BSH, normal RV size and function, severe LAE, moderate-severe MR, mod TR


 





- Current Medication List


 


 Current Medications





Acetaminophen (Tylenol -)  650 mg PO Q6H PRN


   PRN Reason: PAIN LEVEL 1 - 3


   Last Admin: 11/23/18 03:13 Dose:  650 mg


Allopurinol (Zyloprim -)  100 mg PO BID [facility]


   Last Admin: 11/24/18 23:06 Dose:  100 mg


Chlorhexidine Gluconate (Peridex -)  15 ml MM BID [facility]


   Last Admin: 11/24/18 23:06 Dose:  15 ml


Cholecalciferol (Vitamin D3 -)  1,000 unit PO DAILY [facility]


   Last Admin: 11/24/18 09:14 Dose:  1,000 unit


Diphenhydramine HCl (Benadryl Injection -)  25 mg IVPB ONCE PRN


   PRN Reason: DURING PLASMAPHERESIS


Heparin Sodium (Porcine) (Heparin -)  1,000 unit IVPUSH PRN PRN


   PRN Reason: Heparin


   Last Admin: 11/22/18 08:03 Dose:  1,000 unit


Heparin Sodium (Porcine) (Heparin -)  5,000 unit IVPUSH PRN PRN


   PRN Reason: Heparin


Metronidazole (Flagyl 500mg Premixed Ivpb -)  500 mg in 100 mls @ 100 mls/hr 

IVPB BID [facility]


   Last Admin: 11/24/18 23:06 Dose:  100 mls/hr


Levofloxacin (Levaquin 250 Mg Premixed Ivpb -)  250 mg in 50 mls @ 50 mls/hr 

IVPB DAILY [facility]; Protocol


   Last Admin: 11/24/18 09:14 Dose:  50 mls/hr


HEPARIN SOD,PORK IN 0.45% NACL (Heparin-1/2ns 25,000 Units/500)  25,000 units 

in 500 mls @ 20 mls/hr IVPB TITR [facility]; Protocol


   Last Admin: 11/24/18 18:36 Dose:  900 units/hr, 18 mls/hr


Lactobacillus Acidophilus (Bacid -)  1 tab PO DAILY [facility]


Metoprolol Tartrate (Lopressor Injection -)  5 mg IVPUSH Q8H PRN


   PRN Reason: TACHYCARDIA


   Last Admin: 11/24/18 18:44 Dose:  5 mg


Metoprolol Tartrate (Lopressor -)  75 mg PO BID [facility]


   Last Admin: 11/24/18 23:06 Dose:  75 mg


Pantoprazole Sodium (Protonix Iv)  40 mg IVPUSH DAILY [facility]


   Last Admin: 11/24/18 09:14 Dose:  40 mg


Rifaximin (Xifaxan -)  550 mg PO BID [facility]


   Last Admin: 11/24/18 23:06 Dose:  550 mg


Thiamine HCl (Vitamin B1 -)  100 mg PO DAILY [facility]


   Last Admin: 11/24/18 09:15 Dose:  100 mg








Review of Systems





Unable to obtain





- Objective


Vital Signs: 


 


 Last Vital Signs











Temp Pulse Resp BP Pulse Ox


 


 97.8 F   88   19   100/62   99 


 


 11/25/18 02:00  11/25/18 06:00  11/25/18 06:45  11/25/18 06:00  11/25/18 00:44








 Intake & Output











 11/22/18 11/23/18 11/24/18 11/25/18





 23:59 23:59 23:59 23:59


 


Intake Total 2050 3267 4075 1416


 


Output Total 1700 1350 1400 1000


 


Balance 350 1917 2675 416


 


Weight 185 lb 4.8 oz   196 lb 13.965 oz











Neck: Supple Negative JVD No Bruit


Cardiovascular: S1 S2 Irregularly Irregular Grade 2/6 Systolic Murmur Apical


Chest: Scattered Rhonchi Bilaterally


Gastrointestinal: Soft Benign Normal Bowel Sounds


Ext: No Edema 





Labs: 


 


 CBC, BMP





 11/25/18 05:30 





 11/24/18 05:15 








Assessment/Plan





ASSESSMENT:





1. Acute hypoxic respiratory failure, suspected aspiration pneumonia with 

sepsis syndrome, remains intubated 


2. Toxic metabolic encephelopathy, rule out CVA, no change in status 


3. 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


4. Paraproteinemia confirmed multiple myeloma


5. 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 

disease related to persistent atrial fibrillation with [id number] score of 6


6. CAD with evidence of demand ischemic injury, non obstructive CAD on R&c 

coronary angiogrpahy angina pectoris


7. 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 

chronic class I-II NYHA classification LV failure, compensated/euvolemic


8. Persistent atrial fibrillation [id number] score of 6 currently on Heparin 

therapy


9. HTN


10. Anemia/thrombocytopenia


11. Acalculous Cholecystitis


12. Ischemic hepatitis


13. Hypernatremia, resolved





PLAN:





1. Continue Heparin with caution, transfuse to maintain Hg equal or > 8.0, as 

outlined in prior notes ideally A/C therapy to be continued indefinitely unless 

it is absolutely contraindicated considering his [id number] score of 6 


2. Continue Lopressor, hemodynamics permitting 


3. Antibiotics as per the primary team


4. Ventilator management as per the ICU team 


5. As outlined in prior notes overall poor prognosis (no improvement), family 

to decide regarding compassionate extubation versus tracheostomy














Armand Mckenzie MD

## 2018-11-25 NOTE — PN
Teaching Attending Note


Name of Resident: Ganga Mccormick





ATTENDING PHYSICIAN STATEMENT





I saw and evaluated the patient.


I reviewed the resident's note and discussed the case with the resident.


I agree with the resident's findings and plan as documented.








SUBJECTIVE:





Pt seen and examined in the ICU. Remains intubated, opens eyes to voice.





OBJECTIVE:





 Vital Signs











 Period  Temp  Pulse  Resp  BP Sys/Varghese  Pulse Ox


 


 Last 24 Hr  97.4 F-97.9 F    14-40  100-136/62-92  98-99








 Intake & Output











 11/22/18 11/23/18 11/24/18 11/25/18





 23:59 23:59 23:59 23:59


 


Intake Total 2050 3267 4075 1416


 


Output Total 1700 1350 1400 1000


 


Balance 350 1917 2675 416


 


Weight 84.051 kg   89.3 kg








Gen:  intubated, poorly responsive


Heart: RRR


Lung: scattered rhonchi


Abd: soft, nontender


Ext: + UE edema





 CBC, BMP





 11/25/18 05:30 





 11/25/18 05:30 





Active Medications





Acetaminophen (Tylenol -)  650 mg PO Q6H PRN


   PRN Reason: PAIN LEVEL 1 - 3


   Last Admin: 11/23/18 03:13 Dose:  650 mg


Allopurinol (Zyloprim -)  100 mg PO BID [facility]


   Last Admin: 11/25/18 09:17 Dose:  100 mg


Chlorhexidine Gluconate (Peridex -)  15 ml MM BID [facility]


   Last Admin: 11/25/18 09:18 Dose:  15 ml


Cholecalciferol (Vitamin D3 -)  1,000 unit PO DAILY [facility]


   Last Admin: 11/25/18 09:17 Dose:  1,000 unit


Diphenhydramine HCl (Benadryl Injection -)  25 mg IVPB ONCE PRN


   PRN Reason: DURING PLASMAPHERESIS


Heparin Sodium (Porcine) (Heparin -)  1,000 unit IVPUSH PRN PRN


   PRN Reason: Heparin


   Last Admin: 11/22/18 08:03 Dose:  1,000 unit


Heparin Sodium (Porcine) (Heparin -)  5,000 unit IVPUSH PRN PRN


   PRN Reason: Heparin


Metronidazole (Flagyl 500mg Premixed Ivpb -)  500 mg in 100 mls @ 100 mls/hr 

IVPB BID [facility]


   Last Admin: 11/25/18 09:17 Dose:  100 mls/hr


Levofloxacin (Levaquin 250 Mg Premixed Ivpb -)  250 mg in 50 mls @ 50 mls/hr 

IVPB DAILY [facility]; Protocol


   Last Admin: 11/25/18 09:17 Dose:  50 mls/hr


HEPARIN SOD,PORK IN 0.45% NACL (Heparin-1/2ns 25,000 Units/500)  25,000 units 

in 500 mls @ 20 mls/hr IVPB TITR [facility]; Protocol


   Last Titration: 11/25/18 10:57 Dose:  850 units/hr, 17 mls/hr


Lactobacillus Acidophilus (Bacid -)  1 tab PO DAILY [facility]


   Last Admin: 11/25/18 09:17 Dose:  1 tab


Metoprolol Tartrate (Lopressor Injection -)  5 mg IVPUSH Q8H PRN


   PRN Reason: TACHYCARDIA


   Last Admin: 11/24/18 18:44 Dose:  5 mg


Metoprolol Tartrate (Lopressor -)  75 mg PO BID [facility]


   Last Admin: 11/25/18 09:17 Dose:  75 mg


Pantoprazole Sodium (Protonix Iv)  40 mg IVPUSH DAILY [facility]


   Last Admin: 11/25/18 09:17 Dose:  40 mg


Rifaximin (Xifaxan -)  550 mg PO BID [facility]


   Last Admin: 11/25/18 09:17 Dose:  550 mg


Thiamine HCl (Vitamin B1 -)  100 mg PO DAILY [facility]


   Last Admin: 11/25/18 09:17 Dose:  100 mg








ASSESSMENT AND PLAN:


Acute Hypoxic Respiratory Failure


Altered Mental Status


Metabolic Encephalopathy


Pneumonia


Acalculous Cholecystitis


Multiple Myeloma


Acute on Chronic Renal Failure


Metabolic Acidosis


LV Diastolic Dysfunction


Atrial Fibrillation


CAD


+Troponins likely Demand Ischemia


PAD


Hyperlipidemia


HTN





-  continue antibiotics


-  monitor urine output, creatinine


-  rate control


-  minimize sedation to assess mental status 


-  spontaneous breathing trials as tolerated but would not extubate unless 

mental status improved


-  DVT/GI prophylaxis


-  continue discussions regarding goals of care, if unable to reach family will 

need tracheostomy as he is approaching 3 weeks intubated


-  palliative care input appreciated


-  continue ICU monitoring


-  poor overall prognosis for meaningful recovery








critical care time spent in reviewing chart, evaluating patient and formulating 

plan 35 min

## 2018-11-25 NOTE — PN
Progress Note, Physician





- Current Medication List


Current Medications: 


Active Medications





Acetaminophen (Tylenol -)  650 mg PO Q6H PRN


   PRN Reason: PAIN LEVEL 1 - 3


   Last Admin: 11/23/18 03:13 Dose:  650 mg


Allopurinol (Zyloprim -)  100 mg PO BID [facility]


   Last Admin: 11/25/18 09:17 Dose:  100 mg


Chlorhexidine Gluconate (Peridex -)  15 ml MM BID [facility]


   Last Admin: 11/25/18 09:18 Dose:  15 ml


Cholecalciferol (Vitamin D3 -)  1,000 unit PO DAILY [facility]


   Last Admin: 11/25/18 09:17 Dose:  1,000 unit


Diphenhydramine HCl (Benadryl Injection -)  25 mg IVPB ONCE PRN


   PRN Reason: DURING PLASMAPHERESIS


Heparin Sodium (Porcine) (Heparin -)  1,000 unit IVPUSH PRN PRN


   PRN Reason: Heparin


   Last Admin: 11/22/18 08:03 Dose:  1,000 unit


Heparin Sodium (Porcine) (Heparin -)  5,000 unit IVPUSH PRN PRN


   PRN Reason: Heparin


Metronidazole (Flagyl 500mg Premixed Ivpb -)  500 mg in 100 mls @ 100 mls/hr 

IVPB BID [facility]


   Last Admin: 11/25/18 09:17 Dose:  100 mls/hr


Levofloxacin (Levaquin 250 Mg Premixed Ivpb -)  250 mg in 50 mls @ 50 mls/hr 

IVPB DAILY [facility]; Protocol


   Last Admin: 11/25/18 09:17 Dose:  50 mls/hr


HEPARIN SOD,PORK IN 0.45% NACL (Heparin-1/2ns 25,000 Units/500)  25,000 units 

in 500 mls @ 20 mls/hr IVPB TITR [facility]; Protocol


   Last Admin: 11/24/18 18:36 Dose:  900 units/hr, 18 mls/hr


Lactobacillus Acidophilus (Bacid -)  1 tab PO DAILY [facility]


   Last Admin: 11/25/18 09:17 Dose:  1 tab


Metoprolol Tartrate (Lopressor Injection -)  5 mg IVPUSH Q8H PRN


   PRN Reason: TACHYCARDIA


   Last Admin: 11/24/18 18:44 Dose:  5 mg


Metoprolol Tartrate (Lopressor -)  75 mg PO BID [facility]


   Last Admin: 11/25/18 09:17 Dose:  75 mg


Pantoprazole Sodium (Protonix Iv)  40 mg IVPUSH DAILY [facility]


   Last Admin: 11/25/18 09:17 Dose:  40 mg


Rifaximin (Xifaxan -)  550 mg PO BID [facility]


   Last Admin: 11/25/18 09:17 Dose:  550 mg


Thiamine HCl (Vitamin B1 -)  100 mg PO DAILY [facility]


   Last Admin: 11/25/18 09:17 Dose:  100 mg











- Objective


Vital Signs: 


 Vital Signs











Temperature  97.8 F   11/25/18 02:00


 


Pulse Rate  101 H  11/25/18 08:00


 


Respiratory Rate  14   11/25/18 09:00


 


Blood Pressure  119/80   11/25/18 08:00


 


O2 Sat by Pulse Oximetry (%)  99   11/25/18 00:44











Cardiovascular: Yes: S1, S2


Respiratory: Yes: Mechanically Ventilated


Labs: 


 CBC, BMP





 11/25/18 05:30 





 11/25/18 05:30 





 INR, PTT











INR  1.28  (0.83-1.09)  H  11/15/18  05:30    


 


Fibrinogen  290.0 mg/dL (238-498)   11/11/18  06:00    














Problem List





- Problems


(1) Toxic metabolic encephalopathy


Code(s): G92 - TOXIC ENCEPHALOPATHY   





(2) Cellulitis


Code(s): L03.90 - CELLULITIS, UNSPECIFIED   


Qualifiers: 


   Site of cellulitis: extremity   Site of cellulitis of extremity: lower 

extremity   Laterality: right   Qualified Code(s): L03.115 - Cellulitis of 

right lower limb   





(3) Sepsis


Code(s): A41.9 - SEPSIS, UNSPECIFIED ORGANISM   





(4) Artery occlusion


Code(s): I70.90 - UNSPECIFIED ATHEROSCLEROSIS   





(5) Hypercalcemia


Code(s): E83.52 - HYPERCALCEMIA   





(6) Paroxysmal atrial fibrillation


Code(s): I48.0 - PAROXYSMAL ATRIAL FIBRILLATION   





(7) HAM (acute kidney injury)


Code(s): N17.9 - ACUTE KIDNEY FAILURE, UNSPECIFIED   





Assessment/Plan





- Problems


(1) Elevated LFTs


Assessment/Plan: 


ultrasound of liver noted suggestive of acalculous cholecystitis, will get GI 

consult- noted 


lft trending down





Code(s): R94.5 - ABNORMAL RESULTS OF LIVER FUNCTION STUDIES   





(2) HAM (acute kidney injury)


Assessment/Plan: 


HAM vs ATN- 


iv calcium- repleted


potassium now normal range- hyperkalemia improved


ultrasound hyperdense lesion noted in left renal cortex


Code(s): N17.9 - ACUTE KIDNEY FAILURE, UNSPECIFIED   





(3) Atrial fibrillation with RVR


Assessment/Plan: 


heparin drip 


cardiac monitor


rate control with metoprolol








(4) Respiratory failure


Assessment/Plan: 


intubated


propofol/fentanyl


aviating family decision regarding goals of care possible compassionate weaning 

vs tracheostomy


Code(s): J96.90 - RESPIRATORY FAILURE, UNSP, UNSP W HYPOXIA OR HYPERCAPNIA   





(5) Toxic metabolic encephalopathy


Assessment/Plan: 


Microbiology





11/14/18 11:20   Urine - Urine Garces   Urine Culture - Final


                              NO GROWTH OBTAINED


11/06/18 14:30   Urine - Urine Garces   Legionella Antigen - Final


11/06/18 14:30   Urine - Urine Garces   Streptococcus pneumoniae Antigen (M - 

Final


11/06/18 14:30   Sputum - Endotrachea Suction/Ventilator   Gram Stain - Final


11/06/18 14:30   Sputum - Endotrachea Suction/Ventilator   Sputum Culture - 

Final


                              Stenotrophomon.(X.)Maltophilia


11/14/18 09:58   Blood - Peripheral Venous   Blood Culture - Preliminary


                              NO GROWTH OBTAINED AFTER 24 HOURS, INCUBATION TO 

CONTINUE


                              FOR 4 DAYS.


11/14/18 09:50   Blood - Peripheral Venous   Blood Culture - Preliminary


                              NO GROWTH OBTAINED AFTER 24 HOURS, INCUBATION TO 

CONTINUE


                              FOR 4 DAYS.


11/10/18 15:00   Blood - Peripheral Venous   Blood Culture - Preliminary


                              NO GROWTH OBTAINED AFTER 96 HOURS, INCUBATION TO 

CONTINUE


                              FOR 1 DAYS.


11/10/18 14:00   Blood - Central Line   Blood Culture - Preliminary


                              NO GROWTH OBTAINED AFTER 96 HOURS, INCUBATION TO 

CONTINUE


                              FOR 1 DAYS.





RLL PNA


levaquin/flagyl


Code(s): G92 - TOXIC ENCEPHALOPATHY   





(6) Altered mental status


Assessment/Plan: 


maybe secondary to toxic metabolic encephalopathy- 


neurology consult appreciated


on lactulose TID still persistently elevated Nh3 level


s/p dexamethasone - no improvement in mental status


s/p plasmapheresis


dvt ppx lovenox


newly diagnosed Multiple Myeloma- anemia paraproteinemia, 


Code(s): R41.82 - ALTERED MENTAL STATUS, UNSPECIFIED

## 2018-11-26 LAB
ALBUMIN SERPL-MCNC: 1.2 G/DL (ref 3.4–5)
ALP SERPL-CCNC: 131 U/L (ref 45–117)
ALT SERPL-CCNC: 54 U/L (ref 13–61)
ANION GAP SERPL CALC-SCNC: 4 MMOL/L (ref 8–16)
ANISOCYTOSIS BLD QL: 0
ARTERIAL BLOOD GAS PCO2: 34.6 MMHG (ref 35–45)
ARTERIAL PATENCY WRIST A: POSITIVE
AST SERPL-CCNC: 59 U/L (ref 15–37)
BASE EXCESS BLDA CALC-SCNC: -4.4 MEQ/L (ref -2–2)
BASOPHILS # BLD: 0.6 % (ref 0–2)
BILIRUB SERPL-MCNC: 0.4 MG/DL (ref 0.2–1)
BUN SERPL-MCNC: 55 MG/DL (ref 7–18)
CALCIUM SERPL-MCNC: 6.9 MG/DL (ref 8.5–10.1)
CHLORIDE SERPL-SCNC: 115 MMOL/L (ref 98–107)
CO2 SERPL-SCNC: 21 MMOL/L (ref 21–32)
CREAT SERPL-MCNC: 1.8 MG/DL (ref 0.55–1.3)
DEPRECATED RDW RBC AUTO: 15.9 % (ref 11.9–15.9)
EOSINOPHIL # BLD: 1 % (ref 0–4.5)
GLUCOSE SERPL-MCNC: 121 MG/DL (ref 74–106)
HCT VFR BLD CALC: 27.7 % (ref 35.4–49)
HGB BLD-MCNC: 9.1 GM/DL (ref 11.7–16.9)
LYMPHOCYTES # BLD: 22.9 % (ref 8–40)
MACROCYTES BLD QL: 0
MAGNESIUM SERPL-MCNC: 1.9 MG/DL (ref 1.8–2.4)
MCH RBC QN AUTO: 30 PG (ref 25.7–33.7)
MCHC RBC AUTO-ENTMCNC: 32.9 G/DL (ref 32–35.9)
MCV RBC: 91.2 FL (ref 80–96)
MONOCYTES # BLD AUTO: 5.2 % (ref 3.8–10.2)
NEUTROPHILS # BLD: 70.3 % (ref 42.8–82.8)
PHOSPHATE SERPL-MCNC: 3.6 MG/DL (ref 2.5–4.9)
PLATELET # BLD AUTO: 117 K/MM3 (ref 134–434)
PLATELET BLD QL SMEAR: (no result)
PMV BLD: 9.7 FL (ref 7.5–11.1)
PO2 BLDA: 115 MMHG (ref 70–100)
POTASSIUM SERPLBLD-SCNC: 4.9 MMOL/L (ref 3.5–5.1)
PROT SERPL-MCNC: 9.8 G/DL (ref 6.4–8.2)
RBC # BLD AUTO: 3.04 M/MM3 (ref 4–5.6)
ROULEAUX BLD QL SMEAR: (no result)
SAO2 % BLDA: 98 % (ref 90–98.9)
SODIUM SERPL-SCNC: 140 MMOL/L (ref 136–145)
WBC # BLD AUTO: 6.1 K/MM3 (ref 4–10)

## 2018-11-26 RX ADMIN — PANTOPRAZOLE SODIUM SCH MG: 40 INJECTION, POWDER, FOR SOLUTION INTRAVENOUS at 09:01

## 2018-11-26 RX ADMIN — DEXTROSE MONOHYDRATE SCH MLS/HR: 50 INJECTION, SOLUTION INTRAVENOUS at 22:04

## 2018-11-26 RX ADMIN — RIFAXIMIN SCH MG: 550 TABLET ORAL at 22:07

## 2018-11-26 RX ADMIN — VITAMIN D, TAB 1000IU (100/BT) SCH UNIT: 25 TAB at 09:00

## 2018-11-26 RX ADMIN — HEPARIN SODIUM SCH: 5000 INJECTION, SOLUTION INTRAVENOUS at 22:03

## 2018-11-26 RX ADMIN — ALLOPURINOL SCH MG: 100 TABLET ORAL at 22:07

## 2018-11-26 RX ADMIN — METOPROLOL TARTRATE SCH MG: 50 TABLET, FILM COATED ORAL at 09:01

## 2018-11-26 RX ADMIN — ALLOPURINOL SCH MG: 100 TABLET ORAL at 09:01

## 2018-11-26 RX ADMIN — Medication SCH TAB: at 09:00

## 2018-11-26 RX ADMIN — CHLORHEXIDINE GLUCONATE 0.12% ORAL RINSE SCH ML: 1.2 LIQUID ORAL at 22:07

## 2018-11-26 RX ADMIN — METOPROLOL TARTRATE SCH MG: 50 TABLET, FILM COATED ORAL at 22:07

## 2018-11-26 RX ADMIN — Medication SCH MG: at 09:01

## 2018-11-26 RX ADMIN — RIFAXIMIN SCH MG: 550 TABLET ORAL at 09:00

## 2018-11-26 RX ADMIN — CHLORHEXIDINE GLUCONATE 0.12% ORAL RINSE SCH ML: 1.2 LIQUID ORAL at 09:02

## 2018-11-26 NOTE — PN
Teaching Attending Note


Name of Resident: Ganga Mccormick





ATTENDING PHYSICIAN STATEMENT





I saw and evaluated the patient.


I reviewed the resident's note and discussed the case with the resident.


I agree with the resident's findings and plan as documented.








SUBJECTIVE:





Pt seen and examined in the ICU. Remains intubated, poorly responsive but now 

on fentanyl gtt.





OBJECTIVE:





 Vital Signs











 Period  Temp  Pulse  Resp  BP Sys/Varghese  Pulse Ox


 


 Last 24 Hr  97.5 F-99.8 F    18-22  118-152/  99-99








 Intake & Output











 11/23/18 11/24/18 11/25/18 11/26/18





 23:59 23:59 23:59 23:59


 


Intake Total 3267 4075 3310 1404


 


Output Total 1350 1400 2300 600


 


Balance 1917 2675 1010 804


 


Weight   89.3 kg 89.3 kg








Gen:  intubated, sedated, tachypneic


Heart: irregular


Lung: scattered rhonchi


Abd: soft, nontender


Ext:+ UE edema





 CBC, BMP





 11/26/18 05:30 





 11/26/18 05:30 





Active Medications





Acetaminophen (Tylenol -)  650 mg PO Q6H PRN


   PRN Reason: PAIN LEVEL 1 - 3


   Last Admin: 11/23/18 03:13 Dose:  650 mg


Allopurinol (Zyloprim -)  100 mg PO BID [facility]


   Last Admin: 11/26/18 09:01 Dose:  100 mg


Chlorhexidine Gluconate (Peridex -)  15 ml MM BID [facility]


   Last Admin: 11/26/18 09:02 Dose:  15 ml


Cholecalciferol (Vitamin D3 -)  1,000 unit PO DAILY [facility]


   Last Admin: 11/26/18 09:00 Dose:  1,000 unit


Diphenhydramine HCl (Benadryl Injection -)  25 mg IVPB ONCE PRN


   PRN Reason: DURING PLASMAPHERESIS


Fentanyl (Sublimaze Injection -)  50 mcg IVPUSH Q1H PRN


   PRN Reason: PAIN


   Stop: 11/26/18 20:44


Heparin Sodium (Porcine) (Heparin -)  1,000 unit IVPUSH PRN PRN


   PRN Reason: Heparin


   Last Admin: 11/22/18 08:03 Dose:  1,000 unit


Heparin Sodium (Porcine) (Heparin -)  5,000 unit IVPUSH PRN PRN


   PRN Reason: Heparin


Metronidazole (Flagyl 500mg Premixed Ivpb -)  500 mg in 100 mls @ 100 mls/hr 

IVPB BID [facility]


   Last Admin: 11/26/18 09:01 Dose:  100 mls/hr


Levofloxacin (Levaquin 250 Mg Premixed Ivpb -)  250 mg in 50 mls @ 50 mls/hr 

IVPB DAILY [facility]; Protocol


   Last Admin: 11/26/18 09:01 Dose:  50 mls/hr


HEPARIN SOD,PORK IN 0.45% NACL (Heparin-1/2ns 25,000 Units/500)  25,000 units 

in 500 mls @ 20 mls/hr IVPB TITR [facility]; Protocol


   Last Titration: 11/26/18 07:07 Dose:  850 units/hr, 17 mls/hr


Fentanyl 500 mcg/ Dextrose  100 mls @ 5 mls/hr IVPB TITR [facility]; Protocol


   Last Admin: 11/25/18 19:00 Dose:  25 mcg/hr, 5 mls/hr


Lactobacillus Acidophilus (Bacid -)  1 tab PO DAILY [facility]


   Last Admin: 11/26/18 09:00 Dose:  1 tab


Metoprolol Tartrate (Lopressor Injection -)  5 mg IVPUSH Q8H PRN


   PRN Reason: TACHYCARDIA


   Last Admin: 11/24/18 18:44 Dose:  5 mg


Metoprolol Tartrate (Lopressor -)  75 mg PO BID [facility]


   Last Admin: 11/26/18 09:01 Dose:  75 mg


Pantoprazole Sodium (Protonix Iv)  40 mg IVPUSH DAILY [facility]


   Last Admin: 11/26/18 09:01 Dose:  40 mg


Rifaximin (Xifaxan -)  550 mg PO BID [facility]


   Last Admin: 11/26/18 09:00 Dose:  550 mg


Thiamine HCl (Vitamin B1 -)  100 mg PO DAILY [facility]


   Last Admin: 11/26/18 09:01 Dose:  100 mg








ASSESSMENT AND PLAN:


Acute Hypoxic Respiratory Failure


Altered Mental Status


Metabolic Encephalopathy


Pneumonia


Acalculous Cholecystitis


Multiple Myeloma


Acute on Chronic Renal Failure


Metabolic Acidosis


LV Diastolic Dysfunction


Atrial Fibrillation


CAD


+Troponins likely Demand Ischemia


PAD


Hyperlipidemia


HTN





-  continue antibiotics


-  monitor urine output, creatinine


-  rate control


-  minimize sedation to assess mental status 


-  spontaneous breathing trials as tolerated but would not extubate unless 

mental status improved


-  DVT/GI prophylaxis


-  continue discussions regarding goals of care, if unable to reach family will 

need tracheostomy as he has been intubated for 3 weeks now


-  palliative care input appreciated


-  continue ICU monitoring


-  poor overall prognosis for meaningful recovery








critical care time spent in reviewing chart, evaluating patient and formulating 

plan 35 min

## 2018-11-26 NOTE — PN
Progress Note (short form)





- Note


Progress Note: 





Renal follow up for Hypercalcemia/HAM





Pt seen and examined in the ICU


unresponsive on vent


FiO2 stable


Making urine via fenton 








 Vital Signs











Temperature  99 F   11/26/18 06:00


 


Pulse Rate  117 H  11/26/18 08:13


 


Respiratory Rate  22 H  11/26/18 08:10


 


Blood Pressure  131/74   11/26/18 08:00


 


O2 Sat by Pulse Oximetry (%)  99   11/26/18 08:13








 Intake & Output











 11/23/18 11/24/18 11/25/18 11/26/18





 23:59 23:59 23:59 23:59


 


Intake Total 3267 4075 3310 1404


 


Output Total 1350 1400 2300 600


 


Balance 1917 2675 1010 804


 


Weight   89.3 kg 89.3 kg





NAD 


on vent via ET tube


no LE edema


+ edema in upper extremities 





 CBC, BMP





 11/26/18 05:30 





 11/26/18 05:30 





 Current Medications





Acetaminophen (Tylenol -)  650 mg PO Q6H PRN


   PRN Reason: PAIN LEVEL 1 - 3


   Last Admin: 11/23/18 03:13 Dose:  650 mg


Allopurinol (Zyloprim -)  100 mg PO BID [facility]


   Last Admin: 11/26/18 09:01 Dose:  100 mg


Chlorhexidine Gluconate (Peridex -)  15 ml MM BID [facility]


   Last Admin: 11/26/18 09:02 Dose:  15 ml


Cholecalciferol (Vitamin D3 -)  1,000 unit PO DAILY [facility]


   Last Admin: 11/26/18 09:00 Dose:  1,000 unit


Diphenhydramine HCl (Benadryl Injection -)  25 mg IVPB ONCE PRN


   PRN Reason: DURING PLASMAPHERESIS


Fentanyl (Sublimaze Injection -)  50 mcg IVPUSH Q1H PRN


   PRN Reason: PAIN


   Stop: 11/26/18 20:44


Heparin Sodium (Porcine) (Heparin -)  1,000 unit IVPUSH PRN PRN


   PRN Reason: Heparin


   Last Admin: 11/22/18 08:03 Dose:  1,000 unit


Heparin Sodium (Porcine) (Heparin -)  5,000 unit IVPUSH PRN PRN


   PRN Reason: Heparin


Metronidazole (Flagyl 500mg Premixed Ivpb -)  500 mg in 100 mls @ 100 mls/hr 

IVPB BID [facility]


   Last Admin: 11/26/18 09:01 Dose:  100 mls/hr


Levofloxacin (Levaquin 250 Mg Premixed Ivpb -)  250 mg in 50 mls @ 50 mls/hr 

IVPB DAILY [facility]; Protocol


   Last Admin: 11/26/18 09:01 Dose:  50 mls/hr


HEPARIN SOD,PORK IN 0.45% NACL (Heparin-1/2ns 25,000 Units/500)  25,000 units 

in 500 mls @ 20 mls/hr IVPB TITR [facility]; Protocol


   Last Titration: 11/26/18 07:07 Dose:  850 units/hr, 17 mls/hr


Fentanyl 500 mcg/ Dextrose  100 mls @ 5 mls/hr IVPB TITR [facility]; Protocol


   Last Admin: 11/25/18 19:00 Dose:  25 mcg/hr, 5 mls/hr


Lactobacillus Acidophilus (Bacid -)  1 tab PO DAILY [facility]


   Last Admin: 11/26/18 09:00 Dose:  1 tab


Metoprolol Tartrate (Lopressor Injection -)  5 mg IVPUSH Q8H PRN


   PRN Reason: TACHYCARDIA


   Last Admin: 11/24/18 18:44 Dose:  5 mg


Metoprolol Tartrate (Lopressor -)  75 mg PO BID [facility]


   Last Admin: 11/26/18 09:01 Dose:  75 mg


Pantoprazole Sodium (Protonix Iv)  40 mg IVPUSH DAILY [facility]


   Last Admin: 11/26/18 09:01 Dose:  40 mg


Rifaximin (Xifaxan -)  550 mg PO BID [facility]


   Last Admin: 11/26/18 09:00 Dose:  550 mg


Thiamine HCl (Vitamin B1 -)  100 mg PO DAILY [facility]


   Last Admin: 11/26/18 09:01 Dose:  100 mg











78 year old gentleman with hx of CKD, Afib on A/C, CAD, CKD, Hypertension, 

Hyperlipidemia, PVD who presented with AMS/Confusion and found to have HAM.





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 

showed no stones or obstruction, UA w/o protein but UPCR ~0.5 indicating non-

albumin proteinuria)


#AMS 


#Newly diagnosed multiple myloma 


#Anemia 


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


#Hyperdense lesion on US of the kidney 


#Normal anion gap metabolic acidosis 


#Hyperkalemia (now resolved)





Renal function stable at this time and pt is non-oliguric 


continue supportive care, tube feeds with free water 


Trend renal function and electrolytes


supportive care


prognosis is poor


Goals of care discussions with family on-going by ICU team











Ricky Chang DO

## 2018-11-26 NOTE — PN
Progress Note, Physician


History of Present Illness: 


 Unresponsive on ventilator


  Afebrile


  C difficile negative


  


 





 


 


 





- Current Medication List


Current Medications: 


Active Medications





Acetaminophen (Tylenol -)  650 mg PO Q6H PRN


   PRN Reason: PAIN LEVEL 1 - 3


   Last Admin: 11/23/18 03:13 Dose:  650 mg


Allopurinol (Zyloprim -)  100 mg PO BID [facility]


   Last Admin: 11/26/18 09:01 Dose:  100 mg


Chlorhexidine Gluconate (Peridex -)  15 ml MM BID [facility]


   Last Admin: 11/26/18 09:02 Dose:  15 ml


Cholecalciferol (Vitamin D3 -)  1,000 unit PO DAILY [facility]


   Last Admin: 11/26/18 09:00 Dose:  1,000 unit


Diphenhydramine HCl (Benadryl Injection -)  25 mg IVPB ONCE PRN


   PRN Reason: DURING PLASMAPHERESIS


Fentanyl (Sublimaze Injection -)  50 mcg IVPUSH Q1H PRN


   PRN Reason: PAIN


   Stop: 11/26/18 20:44


Heparin Sodium (Porcine) (Heparin -)  1,000 unit IVPUSH PRN PRN


   PRN Reason: Heparin


   Last Admin: 11/22/18 08:03 Dose:  1,000 unit


Heparin Sodium (Porcine) (Heparin -)  5,000 unit IVPUSH PRN PRN


   PRN Reason: Heparin


Metronidazole (Flagyl 500mg Premixed Ivpb -)  500 mg in 100 mls @ 100 mls/hr 

IVPB BID [facility]


   Last Admin: 11/26/18 09:01 Dose:  100 mls/hr


Levofloxacin (Levaquin 250 Mg Premixed Ivpb -)  250 mg in 50 mls @ 50 mls/hr 

IVPB DAILY [facility]; Protocol


   Last Admin: 11/26/18 09:01 Dose:  50 mls/hr


HEPARIN SOD,PORK IN 0.45% NACL (Heparin-1/2ns 25,000 Units/500)  25,000 units 

in 500 mls @ 20 mls/hr IVPB TITR [facility]; Protocol


   Last Titration: 11/26/18 07:07 Dose:  850 units/hr, 17 mls/hr


Fentanyl 500 mcg/ Dextrose  100 mls @ 5 mls/hr IVPB TITR [facility]; Protocol


   Last Admin: 11/25/18 19:00 Dose:  25 mcg/hr, 5 mls/hr


Lactobacillus Acidophilus (Bacid -)  1 tab PO DAILY [facility]


   Last Admin: 11/26/18 09:00 Dose:  1 tab


Metoprolol Tartrate (Lopressor Injection -)  5 mg IVPUSH Q8H PRN


   PRN Reason: TACHYCARDIA


   Last Admin: 11/24/18 18:44 Dose:  5 mg


Metoprolol Tartrate (Lopressor -)  75 mg PO BID [facility]


   Last Admin: 11/26/18 09:01 Dose:  75 mg


Pantoprazole Sodium (Protonix Iv)  40 mg IVPUSH DAILY [facility]


   Last Admin: 11/26/18 09:01 Dose:  40 mg


Rifaximin (Xifaxan -)  550 mg PO BID [facility]


   Last Admin: 11/26/18 09:00 Dose:  550 mg


Thiamine HCl (Vitamin B1 -)  100 mg PO DAILY [facility]


   Last Admin: 11/26/18 09:01 Dose:  100 mg











- Objective


Vital Signs: 


 Vital Signs











Temperature  99 F   11/26/18 06:00


 


Pulse Rate  86   11/26/18 12:00


 


Respiratory Rate  17   11/26/18 13:56


 


Blood Pressure  114/78   11/26/18 12:00


 


O2 Sat by Pulse Oximetry (%)  99   11/26/18 09:00











Constitutional: Yes: No Distress


Cardiovascular: Yes: Regular Rate and Rhythm, S1, S2


Respiratory: Yes: Mechanically Ventilated


Gastrointestinal: Yes: Normal Bowel Sounds, Soft.  No: Tenderness


Edema: Yes


Labs: 


 CBC, BMP





 11/26/18 05:30 





 11/26/18 05:30 





 INR, PTT











INR  1.28  (0.83-1.09)  H  11/15/18  05:30    


 


Fibrinogen  290.0 mg/dL (238-498)   11/11/18  06:00    














Assessment/Plan


Respiratory failure


 RLL pneumonia


 Acalculus  Cholecystitis


 Toxic metabolic encephalopathy


 Renal failure


 Myeloma


 Hyperviscosity syndrome


 Diarrhea


 


  


 


  Continue levaquin / flagyl


  Ventilatory support


  Prognosis poor

## 2018-11-26 NOTE — PN
Progress Note, Physician


History of Present Illness: 


Poorly responsive on vent, persistent afib on lopressor and heparin gtt.





- Current Medication List


Current Medications: 


Active Medications





Acetaminophen (Tylenol -)  650 mg PO Q6H PRN


   PRN Reason: PAIN LEVEL 1 - 3


   Last Admin: 11/23/18 03:13 Dose:  650 mg


Allopurinol (Zyloprim -)  100 mg PO BID [facility]


   Last Admin: 11/26/18 09:01 Dose:  100 mg


Chlorhexidine Gluconate (Peridex -)  15 ml MM BID [facility]


   Last Admin: 11/26/18 09:02 Dose:  15 ml


Cholecalciferol (Vitamin D3 -)  1,000 unit PO DAILY [facility]


   Last Admin: 11/26/18 09:00 Dose:  1,000 unit


Diphenhydramine HCl (Benadryl Injection -)  25 mg IVPB ONCE PRN


   PRN Reason: DURING PLASMAPHERESIS


Fentanyl (Sublimaze Injection -)  50 mcg IVPUSH Q1H PRN


   PRN Reason: PAIN


   Stop: 11/26/18 20:44


Heparin Sodium (Porcine) (Heparin -)  1,000 unit IVPUSH PRN PRN


   PRN Reason: Heparin


   Last Admin: 11/22/18 08:03 Dose:  1,000 unit


Heparin Sodium (Porcine) (Heparin -)  5,000 unit IVPUSH PRN PRN


   PRN Reason: Heparin


Metronidazole (Flagyl 500mg Premixed Ivpb -)  500 mg in 100 mls @ 100 mls/hr 

IVPB BID [facility]


   Last Admin: 11/26/18 09:01 Dose:  100 mls/hr


Levofloxacin (Levaquin 250 Mg Premixed Ivpb -)  250 mg in 50 mls @ 50 mls/hr 

IVPB DAILY [facility]; Protocol


   Last Admin: 11/26/18 09:01 Dose:  50 mls/hr


HEPARIN SOD,PORK IN 0.45% NACL (Heparin-1/2ns 25,000 Units/500)  25,000 units 

in 500 mls @ 20 mls/hr IVPB TITR [facility]; Protocol


   Last Titration: 11/26/18 07:07 Dose:  850 units/hr, 17 mls/hr


Fentanyl 500 mcg/ Dextrose  100 mls @ 5 mls/hr IVPB TITR [facility]; Protocol


   Last Admin: 11/25/18 19:00 Dose:  25 mcg/hr, 5 mls/hr


Lactobacillus Acidophilus (Bacid -)  1 tab PO DAILY [facility]


   Last Admin: 11/26/18 09:00 Dose:  1 tab


Metoprolol Tartrate (Lopressor Injection -)  5 mg IVPUSH Q8H PRN


   PRN Reason: TACHYCARDIA


   Last Admin: 11/24/18 18:44 Dose:  5 mg


Metoprolol Tartrate (Lopressor -)  75 mg PO BID [facility]


   Last Admin: 11/26/18 09:01 Dose:  75 mg


Pantoprazole Sodium (Protonix Iv)  40 mg IVPUSH DAILY [facility]


   Last Admin: 11/26/18 09:01 Dose:  40 mg


Rifaximin (Xifaxan -)  550 mg PO BID [facility]


   Last Admin: 11/26/18 09:00 Dose:  550 mg


Thiamine HCl (Vitamin B1 -)  100 mg PO DAILY [facility]


   Last Admin: 11/26/18 09:01 Dose:  100 mg











- Objective


Vital Signs: 


 Vital Signs











Temperature  99 F   11/26/18 06:00


 


Pulse Rate  86   11/26/18 12:00


 


Respiratory Rate  17   11/26/18 13:56


 


Blood Pressure  114/78   11/26/18 12:00


 


O2 Sat by Pulse Oximetry (%)  99   11/26/18 09:00











Constitutional: Yes: No Distress, Calm


Neck: Yes: Supple


Cardiovascular: Yes: Pulse Irregular


Respiratory: Yes: Intubated, Mechanically Ventilated, Rhonchi


Gastrointestinal: Yes: Normal Bowel Sounds, Soft


Edema: No


Labs: 


 CBC, BMP





 11/26/18 05:30 





 11/26/18 05:30 





 INR, PTT











INR  1.28  (0.83-1.09)  H  11/15/18  05:30    


 


Fibrinogen  290.0 mg/dL (238-498)   11/11/18  06:00    














- ....Imaging


Chest X-ray: Report Reviewed (Stable w/o changes)





Problem List





- Problems


(1) Atrial fibrillation with RVR


Code(s): I48.91 - UNSPECIFIED ATRIAL FIBRILLATION   





(2) Acute-on-chronic kidney injury


Code(s): N17.9 - ACUTE KIDNEY FAILURE, UNSPECIFIED; N18.9 - CHRONIC KIDNEY 

DISEASE, UNSPECIFIED   


Qualifiers: 


   Acute renal failure type: unspecified   Chronic kidney disease stage: 

unspecified stage   Qualified Code(s): N17.9 - Acute kidney failure, unspecified

; N18.9 - Chronic kidney disease, unspecified   





(3) Demand ischemia


Code(s): I24.8 - OTHER FORMS OF ACUTE ISCHEMIC HEART DISEASE   





(4) Hypercalcemia


Code(s): E83.52 - HYPERCALCEMIA   





(5) Nonadherence to medication


Code(s): Z91.14 - PATIENT'S OTHER NONCOMPLIANCE WITH MEDICATION REGIMEN   





(6) Paraproteinemia


Code(s): D89.2 - HYPERGAMMAGLOBULINEMIA, UNSPECIFIED   





(7) Anticoagulant long-term use


Code(s): Z79.01 - LONG TERM (CURRENT) USE OF ANTICOAGULANTS   





(8) Chronic thromboembolic disease


Code(s): I74.9 - EMBOLISM AND THROMBOSIS OF UNSPECIFIED ARTERY   





(9) Coronary artery disease


Code(s): I25.10 - ATHSCL HEART DISEASE OF NATIVE CORONARY ARTERY W/O ANG PCTRS 

  


Qualifiers: 


   Coronary Disease-Associated Artery/Lesion type: native artery   Native vs. 

transplanted heart: native heart   Associated angina: without angina   

Qualified Code(s): I25.10 - Atherosclerotic heart disease of native coronary 

artery without angina pectoris   





(10) Diastolic dysfunction without heart failure


Code(s): I51.9 - HEART DISEASE, UNSPECIFIED   





(11) HTN (hypertension)


Code(s): I10 - ESSENTIAL (PRIMARY) HYPERTENSION   


Qualifiers: 


   Hypertension type: essential hypertension   Qualified Code(s): I10 - 

Essential (primary) hypertension   





(12) Hypertrophic cardiomyopathy


Code(s): I42.2 - OTHER HYPERTROPHIC CARDIOMYOPATHY   





(13) Hyperlipidemia


Code(s): E78.5 - HYPERLIPIDEMIA, UNSPECIFIED   


Qualifiers: 


   Hyperlipidemia type: pure hypercholesterolemia   Qualified Code(s): E78.00 - 

Pure hypercholesterolemia, unspecified; E78.0 - Pure hypercholesterolemia   





(14) Multiple myeloma


Code(s): C90.00 - MULTIPLE MYELOMA NOT HAVING ACHIEVED REMISSION   


Qualifiers: 


   Multiple myeloma remission status: not in remission   Qualified Code(s): 

C90.00 - Multiple myeloma not having achieved remission   





(15) Acalculous cholecystitis


Code(s): K81.9 - CHOLECYSTITIS, UNSPECIFIED   





Assessment/Plan


R&LHc at [facility] 1/11/2017 showing nonobstructive CAD, severe LV apical 

hypertrophic cardiomyopathy, mildly elevated right sided pressures, Mynx 

deployed right CFA access site. Study is consistent with apical hypertrophy (

spade-like) variant of hypertrophic cardiomyopathy, planned for optimal medical 

therapy. 


Echocardiogram: 07/11/2018 Mod cLVH, severe DYANA, mod TR RVSP 50-60 mmHg, mod-

severe MR


Echocardiogram: 10/16/2018 Normal LV size with hyperdynamic LVEF 75%, mild BSH, 

normal RV size and fxn, severe LAE, mod-severe MR, mod TR





1. Acute hypoxic respiratory failure, suspected aspiration pneumonia with 

sepsis syndrome, remains intubated 


2. Toxic metabolic encephelopathy, rule out CVA, no change in status 


3. 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


4. Paraproteinemia confirmed multiple myeloma


5. 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 

disease related to persistent atrial fibrillation with [id number] score of 6


6. CAD with evidence of demand ischemic injury, non obstructive CAD on R&c 

coronary angiogrpahy angina pectoris


7. 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 

chronic class I-II NYHA classification LV failure, compensated/euvolemic


8. Persistent atrial fibrillation [id number] score of 6 currently on Heparin 

therapy


9. HTN


10. Anemia/thrombocytopenia


11. Acalculous Cholecystitis


12. Ischemic hepatitis


13. Hypernatremia, resolved





PLAN:





1. Continue Heparin with caution, transfuse to maintain Hg equal or > 8.0, as 

outlined in prior notes ideally A/C therapy to be continued indefinitely unless 

it is absolutely contraindicated considering his [id number] score of 6 


2. Continue Lopressor 75 bid, hemodynamics permitting 


3. Continue antibiotics as per the primary team


4. Ventilator management as per the ICU team 


5. As outlined in prior notes overall poor prognosis (no improvement), family 

to decide regarding compassionate extubation versus tracheostomy

## 2018-11-26 NOTE — PN
Progress Note, Physician


Chief Complaint: 





STILL INTUBATED


AWAITING TRACHEOSTOMY TODAY


D/W RESIDENT AWAITING FAMILY


CALL BACK FOR CONSENT





- Current Medication List


Current Medications: 


Active Medications





Acetaminophen (Tylenol -)  650 mg PO Q6H PRN


   PRN Reason: PAIN LEVEL 1 - 3


   Last Admin: 11/23/18 03:13 Dose:  650 mg


Allopurinol (Zyloprim -)  100 mg PO BID [facility]


   Last Admin: 11/26/18 09:01 Dose:  100 mg


Chlorhexidine Gluconate (Peridex -)  15 ml MM BID [facility]


   Last Admin: 11/26/18 09:02 Dose:  15 ml


Cholecalciferol (Vitamin D3 -)  1,000 unit PO DAILY [facility]


   Last Admin: 11/26/18 09:00 Dose:  1,000 unit


Diphenhydramine HCl (Benadryl Injection -)  25 mg IVPB ONCE PRN


   PRN Reason: DURING PLASMAPHERESIS


Fentanyl (Sublimaze Injection -)  50 mcg IVPUSH Q1H PRN


   PRN Reason: PAIN


   Stop: 11/26/18 20:44


Heparin Sodium (Porcine) (Heparin -)  1,000 unit IVPUSH PRN PRN


   PRN Reason: Heparin


   Last Admin: 11/22/18 08:03 Dose:  1,000 unit


Heparin Sodium (Porcine) (Heparin -)  5,000 unit IVPUSH PRN PRN


   PRN Reason: Heparin


Metronidazole (Flagyl 500mg Premixed Ivpb -)  500 mg in 100 mls @ 100 mls/hr 

IVPB BID [facility]


   Last Admin: 11/26/18 09:01 Dose:  100 mls/hr


Levofloxacin (Levaquin 250 Mg Premixed Ivpb -)  250 mg in 50 mls @ 50 mls/hr 

IVPB DAILY [facility]; Protocol


   Last Admin: 11/26/18 09:01 Dose:  50 mls/hr


HEPARIN SOD,PORK IN 0.45% NACL (Heparin-1/2ns 25,000 Units/500)  25,000 units 

in 500 mls @ 20 mls/hr IVPB TITR [facility]; Protocol


   Last Titration: 11/26/18 07:07 Dose:  850 units/hr, 17 mls/hr


Fentanyl 500 mcg/ Dextrose  100 mls @ 5 mls/hr IVPB TITR [facility]; Protocol


   Last Admin: 11/25/18 19:00 Dose:  25 mcg/hr, 5 mls/hr


Lactobacillus Acidophilus (Bacid -)  1 tab PO DAILY [facility]


   Last Admin: 11/26/18 09:00 Dose:  1 tab


Metoprolol Tartrate (Lopressor Injection -)  5 mg IVPUSH Q8H PRN


   PRN Reason: TACHYCARDIA


   Last Admin: 11/24/18 18:44 Dose:  5 mg


Metoprolol Tartrate (Lopressor -)  75 mg PO BID [facility]


   Last Admin: 11/26/18 09:01 Dose:  75 mg


Pantoprazole Sodium (Protonix Iv)  40 mg IVPUSH DAILY [facility]


   Last Admin: 11/26/18 09:01 Dose:  40 mg


Rifaximin (Xifaxan -)  550 mg PO BID [facility]


   Last Admin: 11/26/18 09:00 Dose:  550 mg


Thiamine HCl (Vitamin B1 -)  100 mg PO DAILY [facility]


   Last Admin: 11/26/18 09:01 Dose:  100 mg











- Objective


Vital Signs: 


 Vital Signs











Temperature  99 F   11/26/18 06:00


 


Pulse Rate  117 H  11/26/18 08:13


 


Respiratory Rate  22 H  11/26/18 08:10


 


Blood Pressure  131/74   11/26/18 08:00


 


O2 Sat by Pulse Oximetry (%)  99   11/26/18 08:13











Constitutional: Yes: Other


HENT: Yes: Other


Cardiovascular: Yes: Pulse Irregular


Respiratory: Yes: Diminished, Mechanically Ventilated


Gastrointestinal: Yes: Distention


Genitourinary: Yes: Garces Present, Incontinence


Musculoskeletal: Yes: Muscle Weakness


Edema: Yes


Neurological: Yes: Unresponsive


Labs: 


 CBC, BMP





 11/26/18 05:30 





 11/26/18 05:30 





 INR, PTT











INR  1.28  (0.83-1.09)  H  11/15/18  05:30    


 


Fibrinogen  290.0 mg/dL (238-498)   11/11/18  06:00    














Problem List





- Problems


(1) HAM (acute kidney injury)


Code(s): N17.9 - ACUTE KIDNEY FAILURE, UNSPECIFIED   





(2) Atrial fibrillation with RVR


Code(s): I48.91 - UNSPECIFIED ATRIAL FIBRILLATION   





(3) Hyperlipidemia


Code(s): E78.5 - HYPERLIPIDEMIA, UNSPECIFIED   


Qualifiers: 


   Hyperlipidemia type: pure hypercholesterolemia   Qualified Code(s): E78.00 - 

Pure hypercholesterolemia, unspecified; E78.0 - Pure hypercholesterolemia   





(4) Hypothermia


Code(s): T68.XXXA - HYPOTHERMIA, INITIAL ENCOUNTER   


Qualifiers: 


   Encounter type: initial encounter   Qualified Code(s): T68.XXXA - Hypothermia

, initial encounter   





(5) Acute-on-chronic kidney injury


Code(s): N17.9 - ACUTE KIDNEY FAILURE, UNSPECIFIED; N18.9 - CHRONIC KIDNEY 

DISEASE, UNSPECIFIED   


Qualifiers: 


   Acute renal failure type: unspecified   Chronic kidney disease stage: 

unspecified stage   Qualified Code(s): N17.9 - Acute kidney failure, unspecified

; N18.9 - Chronic kidney disease, unspecified   





(6) Generalized weakness


Code(s): R53.1 - WEAKNESS   





(7) History of ETOH abuse


Code(s): Z87.898 - PERSONAL HISTORY OF OTHER SPECIFIED CONDITIONS   





(8) Hypercalcemia


Code(s): E83.52 - HYPERCALCEMIA   





(9) Hypertrophic cardiomyopathy


Code(s): I42.2 - OTHER HYPERTROPHIC CARDIOMYOPATHY   





(10) Toxic metabolic encephalopathy


Code(s): G92 - TOXIC ENCEPHALOPATHY   





(11) Sepsis


Code(s): A41.9 - SEPSIS, UNSPECIFIED ORGANISM   





(12) Respiratory failure


Code(s): J96.90 - RESPIRATORY FAILURE, UNSP, UNSP W HYPOXIA OR HYPERCAPNIA   





Assessment/Plan





NOW VENT DEPENDENT INTUBATED FOR MANY DAYS


WILL NEED TRACHEOSTOMY. AWAITING FAMILY CONSENT


IV ABX CONTINUE PER ID.


02 SUPPORT 40% O2


UNABLE TO WEAN OFF VENT.


CARDIO F/U ON HEPARIN IV FOR AC.


MONITOR LABS, FREQUENT TURNING AND OFF LOADING


TO PREVENT SKIN BREAK DOWN


NGT FEEDS, AMINO ACIDS FOR PROTEIN 


AND PREVENT SKIN ULCERS.


ADVANCED DIRECTIVES UNCLEAR, AWAIT FAMILY DECISION


AT THIS PROGNOSIS IS POOR FOR A FULL MEANINGFUL RECOVERY


LABS DAILY MONITOR ELECTROLYTES.


NEURO EVAL


MVI/THIAMINE CONTINUE


AGREE WITH PULMONARY TO DECREASE SEDATION TO EVALUATE MENTAL STATUS

## 2018-11-26 NOTE — PN
Physical Exam: 


SUBJECTIVE: Patient seen and examined in the ICU.  Remains intubated, poorly 

responsive but now on fentanyl gtt. opens eyes to voice. No pressors. No gross 

change in overall mental status. GOC/family meeting initiated and ongoing.








OBJECTIVE:





 Vital Signs











 Period  Temp  Pulse  Resp  BP Sys/Varghese  Pulse Ox


 


 Last 24 Hr  97.4 F-99.8 F    14-23  102-152/  99-99











GENERAL: Remains intubated, poorly responsive off sedation but opens eyes to 

voice.


HEAD: NCAT


EYES:  sclera anicteric. 


ENT: Ears normal, nares patent, dry mucous membranes


NECK: Trachea midline. 


LUNGS: bilateral diffuse rhonchi


HEART: reg rate and irregular rhythm. 


ABDOMEN: Soft, nondistended NT


EXTREMITIES: 2+ pulses, warm, well-perfused, no edema, scattered ulcers. 


NEUROLOGICAL: difficult to assess secondary to clinical condition. gag reflex 

does not appear to be present. opens eyes to voice. corneal reflex+


SKIN: Warm, dry, normal turgor, scattered ulcers on the legs














 Laboratory Results - last 24 hr











  11/22/18 11/25/18 11/25/18





  09:40 05:30 05:30


 


WBC   


 


RBC   


 


Hgb   


 


Hct   


 


MCV   


 


MCH   


 


MCHC   


 


RDW   


 


Plt Count   


 


MPV   


 


Absolute Neuts (auto)   


 


Neutrophils %   


 


Lymphocytes %   


 


Monocytes %   


 


Eosinophils %   


 


Basophils %   


 


Nucleated RBC %   


 


PTT (Actin FS)   81.1 H 


 


Anticoagulation Therapy   


 


Puncture Site   


 


ABG pH   


 


ABG pCO2 at Pt Temp   


 


ABG pO2 at Pt Temp   


 


ABG HCO3   


 


ABG O2 Sat (Measured)   


 


ABG O2 Content   


 


ABG Base Excess   


 


Chacho Test   


 


O2 Delivery Device   


 


Oxygen Flow Rate   


 


Vent Mode   


 


Vent Rate   


 


Mechanical Rate   


 


PEEP   


 


Pressure Support Vent   


 


Sodium    143


 


Potassium    4.5


 


Chloride    118 H


 


Carbon Dioxide    19 L


 


Anion Gap    6 L


 


BUN    50 H


 


Creatinine    1.7 H


 


Creat Clearance w eGFR    39.07


 


Random Glucose    101


 


Calcium    6.7 L*


 


Phosphorus    3.1


 


Magnesium    1.9


 


Total Bilirubin    0.3


 


AST    55 H


 


ALT    63 H


 


Alkaline Phosphatase    132 H


 


Total Protein    8.9 H


 


Albumin    1.1 L


 


Blood Type  B POSITIVE  


 


Antibody Screen  Negative  


 


Crossmatch  See Detail  














  11/26/18 11/26/18 11/26/18





  05:30 05:30 05:30


 


WBC   6.1 


 


RBC   3.04 L 


 


Hgb   9.1 L 


 


Hct   27.7 L 


 


MCV   91.2 


 


MCH   30.0 


 


MCHC   32.9 


 


RDW   15.9 


 


Plt Count   117 L 


 


MPV   9.7 


 


Absolute Neuts (auto)   4.3 


 


Neutrophils %   70.3 


 


Lymphocytes %   22.9  D 


 


Monocytes %   5.2 


 


Eosinophils %   1.0 


 


Basophils %   0.6 


 


Nucleated RBC %   4 H 


 


PTT (Actin FS)  66.2 H  


 


Anticoagulation Therapy   


 


Puncture Site   


 


ABG pH   


 


ABG pCO2 at Pt Temp   


 


ABG pO2 at Pt Temp   


 


ABG HCO3   


 


ABG O2 Sat (Measured)   


 


ABG O2 Content   


 


ABG Base Excess   


 


Chacho Test   


 


O2 Delivery Device   


 


Oxygen Flow Rate   


 


Vent Mode   


 


Vent Rate   


 


Mechanical Rate   


 


PEEP   


 


Pressure Support Vent   


 


Sodium    140


 


Potassium    4.9


 


Chloride    115 H


 


Carbon Dioxide    21


 


Anion Gap    4 L


 


BUN    55 H


 


Creatinine    1.8 H


 


Creat Clearance w eGFR    36.58


 


Random Glucose    121 H


 


Calcium    6.9 L*


 


Phosphorus    3.6


 


Magnesium    1.9


 


Total Bilirubin    0.4


 


AST    59 H


 


ALT    54


 


Alkaline Phosphatase    131 H


 


Total Protein    9.8 H


 


Albumin    1.2 L


 


Blood Type   


 


Antibody Screen   


 


Crossmatch   














  11/26/18





  06:35


 


WBC 


 


RBC 


 


Hgb 


 


Hct 


 


MCV 


 


MCH 


 


MCHC 


 


RDW 


 


Plt Count 


 


MPV 


 


Absolute Neuts (auto) 


 


Neutrophils % 


 


Lymphocytes % 


 


Monocytes % 


 


Eosinophils % 


 


Basophils % 


 


Nucleated RBC % 


 


PTT (Actin FS) 


 


Anticoagulation Therapy  No Result Required.


 


Puncture Site  Right radial


 


ABG pH  7.37


 


ABG pCO2 at Pt Temp  34.6 L


 


ABG pO2 at Pt Temp  115.0 H D


 


ABG HCO3  19.7 L


 


ABG O2 Sat (Measured)  98.0


 


ABG O2 Content  13.8 L


 


ABG Base Excess  -4.4 L


 


Chacho Test  Positive


 


O2 Delivery Device  Oxy-vent


 


Oxygen Flow Rate  40%


 


Vent Mode  Ac


 


Vent Rate  No Result Required.


 


Mechanical Rate  No Result Required.


 


PEEP  5.0


 


Pressure Support Vent  No Result Required.


 


Sodium 


 


Potassium 


 


Chloride 


 


Carbon Dioxide 


 


Anion Gap 


 


BUN 


 


Creatinine 


 


Creat Clearance w eGFR 


 


Random Glucose 


 


Calcium 


 


Phosphorus 


 


Magnesium 


 


Total Bilirubin 


 


AST 


 


ALT 


 


Alkaline Phosphatase 


 


Total Protein 


 


Albumin 


 


Blood Type 


 


Antibody Screen 


 


Crossmatch 








Active Medications











Generic Name Dose Route Start Last Admin





  Trade Name Freq  PRN Reason Stop Dose Admin


 


Acetaminophen  650 mg  11/10/18 14:09  11/23/18 03:13





  Tylenol -  PO   650 mg





  Q6H PRN   Administration





  PAIN LEVEL 1 - 3   





     





     





     


 


Allopurinol  100 mg  11/09/18 10:00  11/25/18 22:36





  Zyloprim -  PO   100 mg





  BID AVA   Administration





     





     





     





     


 


Chlorhexidine Gluconate  15 ml  11/10/18 23:45  11/25/18 22:35





  Peridex -  MM   15 ml





  BID AVA   Administration





     





     





     





     


 


Cholecalciferol  1,000 unit  11/18/18 10:00  11/25/18 09:17





  Vitamin D3 -  PO   1,000 unit





  DAILY AVA   Administration





     





     





     





     


 


Diphenhydramine HCl  25 mg  11/09/18 12:00  





  Benadryl Injection -  IVPB   





  ONCE PRN   





  DURING PLASMAPHERESIS   





     





     





     


 


Fentanyl  50 mcg  11/25/18 20:44  





  Sublimaze Injection -  IVPUSH  11/26/18 20:44  





  Q1H PRN   





  PAIN   





     





     





     


 


Heparin Sodium (Porcine)  1,000 unit  11/21/18 12:23  11/22/18 08:03





  Heparin -  IVPUSH   1,000 unit





  PRN PRN   Administration





  Heparin   





     





     





     


 


Heparin Sodium (Porcine)  5,000 unit  11/21/18 12:23  





  Heparin -  IVPUSH   





  PRN PRN   





  Heparin   





     





     





     


 


Metronidazole  500 mg in 100 mls @ 100 mls/hr  11/17/18 14:30  11/25/18 22:27





  Flagyl 500mg Premixed Ivpb -  IVPB   100 mls/hr





  BID AVA   Administration





     





     





     





     


 


Levofloxacin  250 mg in 50 mls @ 50 mls/hr  11/19/18 12:45  11/25/18 09:17





  Levaquin 250 Mg Premixed Ivpb -  IVPB   50 mls/hr





  DAILY AVA   Administration





     





     





  Protocol   





     


 


HEPARIN SOD,PORK IN 0.45% NACL  25,000 units in 500 mls @ 20 mls/hr  11/21/18 13

:00  11/26/18 07:07





  Heparin-1/2ns 25,000 Units/500  IVPB   850 units/hr





  TITR AVA   17 mls/hr





     Titration





     





  Protocol   





  1,000 UNITS/HR   


 


Fentanyl 500 mcg/ Dextrose  100 mls @ 5 mls/hr  11/25/18 19:00  11/25/18 19:00





  IVPB   25 mcg/hr





  TITR AVA   5 mls/hr





     Administration





     





  Protocol   





  25 MCG/HR   


 


Lactobacillus Acidophilus  1 tab  11/25/18 10:00  11/25/18 09:17





  Bacid -  PO   1 tab





  DAILY AVA   Administration





     





     





     





     


 


Metoprolol Tartrate  5 mg  11/09/18 13:04  11/24/18 18:44





  Lopressor Injection -  IVPUSH   5 mg





  Q8H PRN   Administration





  TACHYCARDIA   





     





     





     


 


Metoprolol Tartrate  75 mg  11/16/18 08:30  11/25/18 22:35





  Lopressor -  PO   75 mg





  BID AVA   Administration





     





     





     





     


 


Pantoprazole Sodium  40 mg  11/07/18 12:00  11/25/18 09:17





  Protonix Iv  IVPUSH   40 mg





  DAILY AVA   Administration





     





     





     





     


 


Rifaximin  550 mg  11/15/18 22:00  11/25/18 22:36





  Xifaxan -  PO   550 mg





  BID AVA   Administration





     





     





     





     


 


Thiamine HCl  100 mg  10/30/18 22:12  11/25/18 09:17





  Vitamin B1 -  PO   100 mg





  DAILY AVA   Administration





     





     





     





     











ASSESSMENT/PLAN:


79 yo m PMH of A-Fib, Acute on chronic kidney injury, CAD w stents, 

hypercalcemia, medication non-adherence, paraproteinemia, thromboembolic disease

, diastolic heart dysfunction without failure, HTN, HLD, hypertrophic 

cardiomyopathy is here after a rapid response. He was on the floors when it was 

noticed that he has respiratory insufficiency and was desaturating, requiring 

ICU monitoring. GOC/family meeting initiated and ongoing. Plan to have phone 

conference [place]. 








GI: 


metabolic encephalopathy 


-ammonia stable in 60s w/ rifaximin, 


s/p lactulose 


-LFTs elevated but downtrending. transamintitis most likely secondary to 

ischemic hepatits which is multifactorial including sepsis, episodes of 

hypotension and hyperviscosity syndrome. 





acalculous cholecystitis?


US shows thickened gallbladder wall, pericholecystic fluid, suspicious for 

acalculous cholecystitis. There was also mild dilatation of the CBD but that 

might be normal for age. 


-Spoke w/ IR, who feel image does not represent  acalculous cholecystitis and 

does not require any IR drainage 





ID: 


No fevers recorded. 


- Rhonchi heard on Lung auscultation


-RLL pneumonia


bcx 11/19/18 neg 


off of ceftazidime


Vancomycin Redosed 11/19/18 


C diff neg 11/25/18


- c/w levaquin/flagyl. 


- ID on board


- c/w rifaximin for elevated ammonia level





Cardio: 


Afib -[id number] score of 6 


- Lopressor 75 mg BID PO


-IV metoprolol 5 mg PRN


- diastolic dysfunction + hypertrophic cardiomyopathy


- CAD with multiple stents


- Cardio consulted and on board.


off Xarelto 15 qd (renal dosing) while on heparin gtt. transfuse to maintain Hgb

>8.0 


s/p 1 u prbc 11/20/18, Heparin with caution considering the dropping Hg, 

transfuse to maintain Hg equal or > 8.0, as outlined in prior notes ideally A/C 

therapy to be continued indefinitely unless it is absolutely contraindicated 

considering his [id number] score of 6 


- Sporadically goes in and out of V-Tach - Can give amio if v-tach is sustained 

and persistent. 





Pulm: 


- Intubated


- Patient has b/l pleural effusions.


- No pneumothorax


- b/l diffuse rhonchi


- infiltrate on CXR: Abx- Substituting levaquin for ceftazidime


Vancomycin Redosed 11/19/18 


- c/w levaquin/flagyl. 





Renal: 


- Acute on Chronic kidney injury


HAM ATN vs. Cast nephropathy  (FeNa was 2.1% indicating tubular injury, US 

showed no stones or obstruction, UA w/o protein but UPCR ~0.5 indicating non-

albumin proteinuria)


- Renal consulted and on board. 


Hyperkalemia (r/o tumor lysis)


serum K is improved, s/p bicarb gtt





Neuro: 


- Patient is not alert or oriented.


Remains intubated, poorly responsive but now on fentanyl gtt. opens eyes to 

voice


- Head CT showed no acute pathology


- Neuro consulted and on board. (Dr. Youngblood + Dr. phan) 


-ammonia stable in 60s w/ rifaximin, lactulose dcd 





Heme/Onc: 


- monitor H/H


- Will transfuse to keep hb > 8 


- Patient not receiving plasmapharesis today. 


- Paraproteinemia


- Patient fulfills new criteria  for multiple myeloma with free kappa/ free 

lambda light chain  ratio of 432 (>100 is diagnostic of myeloma) 


- Kappa/Lambda ratio > 100 consistent with Multiple myeloma


- M spike elevated elevated at 6.8 previously 4.7 


-steroids being held at this time 


s/p 1 u prbc 11/20/18, Heparin with caution considering the dropping Hg, 

transfuse to maintain Hg equal or > 8.0, as outlined in prior notes ideally A/C 

therapy to be continued indefinitely unless it is absolutely contraindicated 

considering his [id number] score of 6 


-s/p 1 u prbc 11/22/18...Hgb 8.8....8.3...9.1 today 





Endo: 


- Parathyroid hormone WNL


- PTH-borderline low appropriate given hypercalcemia, PTHrP low





Prophylaxis: 


- Heparin with caution considering the dropping Hg, transfuse to maintain Hg 

equal or > 8.0, as outlined in prior notes ideally A/C therapy to be continued 

indefinitely unless it is absolutely contraindicated considering his 

[id number] score of 6


- Protonix





F/E/N: 


F:  no IVF at this time


E: Will replete lytes PRN


N: tube feeds (low potassium feeds). with free water 





Code Status: Full Code 


- GOC/family meeting initiated and ongoing.Plan to have phone conference [place]. 

if unable to reach family will need tracheostomy as he is approaching 3 weeks 

intubated


- Compassionate extubation versus Tracheostomy





Dispo: Patient will continue to receive ICU level care








Visit type





- Emergency Visit


Emergency Visit: Yes


ED Registration Date: 10/30/18


Care time: The patient presented to the Emergency Department on the above date 

and was hospitalized for further evaluation of their emergent condition.





- New Patient


This patient is new to me today: Yes


Date on this admission: 11/26/18





- Critical Care


Critical Care patient: Yes


Total Critical Care Time (in minutes): 38


Critical Care Statement: The care of this patient involved high complexity 

decision making to prevent further life threatening deterioration of the patient

's condition and/or to evaluate & treat vital organ system(s) failure or risk 

of failure.

## 2018-11-27 LAB
ALBUMIN SERPL-MCNC: 1.1 G/DL (ref 3.4–5)
ALP SERPL-CCNC: 109 U/L (ref 45–117)
ALT SERPL-CCNC: 40 U/L (ref 13–61)
ANION GAP SERPL CALC-SCNC: 2 MMOL/L (ref 8–16)
ARTERIAL BLOOD GAS PCO2: 46.8 MMHG (ref 35–45)
ARTERIAL PATENCY WRIST A: POSITIVE
AST SERPL-CCNC: 40 U/L (ref 15–37)
BASE EXCESS BLDA CALC-SCNC: -5 MEQ/L (ref -2–2)
BASOPHILS # BLD: 0.8 % (ref 0–2)
BILIRUB SERPL-MCNC: 0.3 MG/DL (ref 0.2–1)
BUN SERPL-MCNC: 58 MG/DL (ref 7–18)
CALCIUM SERPL-MCNC: 7 MG/DL (ref 8.5–10.1)
CHLORIDE SERPL-SCNC: 115 MMOL/L (ref 98–107)
CO2 SERPL-SCNC: 23 MMOL/L (ref 21–32)
CREAT SERPL-MCNC: 1.8 MG/DL (ref 0.55–1.3)
DEPRECATED RDW RBC AUTO: 16.4 % (ref 11.9–15.9)
EOSINOPHIL # BLD: 3.6 % (ref 0–4.5)
GLUCOSE SERPL-MCNC: 106 MG/DL (ref 74–106)
HCT VFR BLD CALC: 24.4 % (ref 35.4–49)
HGB BLD-MCNC: 8 GM/DL (ref 11.7–16.9)
LYMPHOCYTES # BLD: 18.9 % (ref 8–40)
MAGNESIUM SERPL-MCNC: 1.7 MG/DL (ref 1.8–2.4)
MCH RBC QN AUTO: 30.2 PG (ref 25.7–33.7)
MCHC RBC AUTO-ENTMCNC: 32.7 G/DL (ref 32–35.9)
MCV RBC: 92.4 FL (ref 80–96)
MONOCYTES # BLD AUTO: 5.1 % (ref 3.8–10.2)
NEUTROPHILS # BLD: 71.6 % (ref 42.8–82.8)
PHOSPHATE SERPL-MCNC: 4.4 MG/DL (ref 2.5–4.9)
PLATELET # BLD AUTO: 95 K/MM3 (ref 134–434)
PMV BLD: 10 FL (ref 7.5–11.1)
PO2 BLDA: 90.1 MMHG (ref 70–100)
POTASSIUM SERPLBLD-SCNC: 5 MMOL/L (ref 3.5–5.1)
PROT SERPL-MCNC: 9.2 G/DL (ref 6.4–8.2)
RBC # BLD AUTO: 2.64 M/MM3 (ref 4–5.6)
SAO2 % BLDA: 96.3 % (ref 90–98.9)
SODIUM SERPL-SCNC: 140 MMOL/L (ref 136–145)
WBC # BLD AUTO: 4.2 K/MM3 (ref 4–10)

## 2018-11-27 RX ADMIN — HEPARIN SODIUM SCH MLS/HR: 5000 INJECTION, SOLUTION INTRAVENOUS at 06:42

## 2018-11-27 RX ADMIN — RIFAXIMIN SCH MG: 550 TABLET ORAL at 10:00

## 2018-11-27 RX ADMIN — CHLORHEXIDINE GLUCONATE 0.12% ORAL RINSE SCH: 1.2 LIQUID ORAL at 21:26

## 2018-11-27 RX ADMIN — Medication SCH MG: at 10:00

## 2018-11-27 RX ADMIN — ALLOPURINOL SCH MG: 100 TABLET ORAL at 21:25

## 2018-11-27 RX ADMIN — RIFAXIMIN SCH MG: 550 TABLET ORAL at 21:25

## 2018-11-27 RX ADMIN — SODIUM CHLORIDE SCH MLS/HR: 9 INJECTION, SOLUTION INTRAVENOUS at 14:00

## 2018-11-27 RX ADMIN — HEPARIN SODIUM SCH MLS/HR: 5000 INJECTION, SOLUTION INTRAVENOUS at 18:42

## 2018-11-27 RX ADMIN — ALLOPURINOL SCH MG: 100 TABLET ORAL at 10:02

## 2018-11-27 RX ADMIN — PANTOPRAZOLE SODIUM SCH MG: 40 INJECTION, POWDER, FOR SOLUTION INTRAVENOUS at 10:00

## 2018-11-27 RX ADMIN — VITAMIN D, TAB 1000IU (100/BT) SCH UNIT: 25 TAB at 10:00

## 2018-11-27 RX ADMIN — METOPROLOL TARTRATE SCH MG: 50 TABLET, FILM COATED ORAL at 10:00

## 2018-11-27 RX ADMIN — CHLORHEXIDINE GLUCONATE 0.12% ORAL RINSE SCH ML: 1.2 LIQUID ORAL at 10:02

## 2018-11-27 RX ADMIN — METOPROLOL TARTRATE SCH: 50 TABLET, FILM COATED ORAL at 21:26

## 2018-11-27 RX ADMIN — DEXTROSE MONOHYDRATE SCH MLS/HR: 50 INJECTION, SOLUTION INTRAVENOUS at 21:26

## 2018-11-27 RX ADMIN — Medication SCH TAB: at 10:00

## 2018-11-27 NOTE — PN
Teaching Attending Note


Name of Resident: Ganga Mccormick





ATTENDING PHYSICIAN STATEMENT





I saw and evaluated the patient.


I reviewed the resident's note and discussed the case with the resident.


I agree with the resident's findings and plan as documented.








SUBJECTIVE:





Pt seen and examined in the ICU. Remains intubated, sedated. Appears more 

comfortable on sedation.





OBJECTIVE:





 Vital Signs











 Period  Temp  Pulse  Resp  BP Sys/Varghese  Pulse Ox


 


 Last 24 Hr  99 F-99.9 F    14-21  /52-89  99-99








 Intake & Output











 11/24/18 11/25/18 11/26/18 11/27/18





 23:59 23:59 23:59 23:59


 


Intake Total 4075 3310 3308 1564


 


Output Total 1400 2300 800 800


 


Balance 2675 1010 2508 764


 


Weight  89.3 kg 89.3 kg 90.038 kg








Gen:  intubated, sedated


Heart: RRR


Lung: decreased breath sounds at the bases


Abd: soft, nontender


Ext: UE edema





 CBC, BMP





 11/27/18 05:30 





 11/27/18 05:30 





Active Medications





Acetaminophen (Tylenol -)  650 mg PO Q6H PRN


   PRN Reason: PAIN LEVEL 1 - 3


   Last Admin: 11/23/18 03:13 Dose:  650 mg


Allopurinol (Zyloprim -)  100 mg PO BID [facility]


   Last Admin: 11/27/18 10:02 Dose:  100 mg


Chlorhexidine Gluconate (Peridex -)  15 ml MM BID [facility]


   Last Admin: 11/27/18 10:02 Dose:  15 ml


Cholecalciferol (Vitamin D3 -)  1,000 unit PO DAILY [facility]


   Last Admin: 11/27/18 10:00 Dose:  1,000 unit


Diphenhydramine HCl (Benadryl Injection -)  25 mg IVPB ONCE PRN


   PRN Reason: DURING PLASMAPHERESIS


Heparin Sodium (Porcine) (Heparin -)  1,000 unit IVPUSH PRN PRN


   PRN Reason: Heparin


   Last Admin: 11/22/18 08:03 Dose:  1,000 unit


Heparin Sodium (Porcine) (Heparin -)  5,000 unit IVPUSH PRN PRN


   PRN Reason: Heparin


Metronidazole (Flagyl 500mg Premixed Ivpb -)  500 mg in 100 mls @ 100 mls/hr 

IVPB BID [facility]


   Last Admin: 11/27/18 09:59 Dose:  100 mls/hr


Levofloxacin (Levaquin 250 Mg Premixed Ivpb -)  250 mg in 50 mls @ 50 mls/hr 

IVPB DAILY [facility]; Protocol


   Last Admin: 11/27/18 09:59 Dose:  50 mls/hr


HEPARIN SOD,PORK IN 0.45% NACL (Heparin-1/2ns 25,000 Units/500)  25,000 units 

in 500 mls @ 20 mls/hr IVPB TITR [facility]; Protocol


   Last Titration: 11/27/18 10:05 Dose:  750 units/hr, 15 mls/hr


Fentanyl 500 mcg/ Dextrose  100 mls @ 5 mls/hr IVPB TITR AVA; Protocol


   Last Titration: 11/27/18 06:44 Dose:  50 mcg/hr, 10 mls/hr


Sodium Chloride (Normal Saline -)  1,000 mls @ 1,000 mls/hr IV ASDIR STA


   Stop: 11/27/18 12:54


Sodium Chloride (Normal Saline -)  1,000 mls @ 125 mls/hr IV ASDIR [facility]


Lactobacillus Acidophilus (Bacid -)  1 tab PO DAILY [facility]


   Last Admin: 11/27/18 10:00 Dose:  1 tab


Metoprolol Tartrate (Lopressor Injection -)  5 mg IVPUSH Q8H PRN


   PRN Reason: TACHYCARDIA


   Last Admin: 11/24/18 18:44 Dose:  5 mg


Metoprolol Tartrate (Lopressor -)  75 mg PO BID [facility]


   Last Admin: 11/27/18 10:00 Dose:  75 mg


Pantoprazole Sodium (Protonix Iv)  40 mg IVPUSH DAILY [facility]


   Last Admin: 11/27/18 10:00 Dose:  40 mg


Rifaximin (Xifaxan -)  550 mg PO BID [facility]


   Last Admin: 11/27/18 10:00 Dose:  550 mg


Thiamine HCl (Vitamin B1 -)  100 mg PO DAILY [facility]


   Last Admin: 11/27/18 10:00 Dose:  100 mg








ASSESSMENT AND PLAN:


Acute Hypoxic Respiratory Failure


Altered Mental Status


Metabolic Encephalopathy


Pneumonia


Acalculous Cholecystitis


Multiple Myeloma


Acute on Chronic Renal Failure


Metabolic Acidosis


LV Diastolic Dysfunction


Atrial Fibrillation


CAD


+Troponins likely Demand Ischemia


PAD


Hyperlipidemia


HTN





-  palliative care input appreciated, family meeting today


-  continue antibiotics


-  monitor urine output, creatinine


-  rate control


-  minimize sedation to assess mental status 


-  spontaneous breathing trials as tolerated but would not extubate unless 

mental status improved


-  DVT/GI prophylaxis


-  continue discussions regarding goals of care, if unable to reach decision 

will need tracheostomy as he has been intubated for 3 weeks now


-  continue ICU monitoring


-  poor overall prognosis for meaningful recovery








critical care time spent in reviewing chart, evaluating patient and formulating 

plan 35 min

## 2018-11-27 NOTE — PN
Physical Exam: 


SUBJECTIVE: Patient seen and examined in the ICU.  Remains intubated, poorly 

responsive but now on fentanyl gtt. opens eyes to voice. No pressors, although 

mild hypotensive this morning. No gross change in overall mental status. GOC/

family meeting initiated and ongoing. meeting today 








OBJECTIVE:





 Vital Signs











 Period  Temp  Pulse  Resp  BP Sys/Varghese  Pulse Ox


 


 Last 24 Hr  98.5 F-99.9 F    14-21  /52-89  99-99











GENERAL: Remains intubated, poorly responsive off sedation but opens eyes to 

voice.


HEAD: NCAT


EYES:  sclera anicteric. 


ENT: Ears normal, nares patent, dry mucous membranes


NECK: Trachea midline. 


LUNGS: bilateral diffuse rhonchi


HEART: reg rate and irregular rhythm. 


ABDOMEN: Soft, nondistended NT


EXTREMITIES: 2+ pulses, warm, well-perfused, no edema, scattered ulcers. 


NEUROLOGICAL: difficult to assess secondary to clinical condition. gag reflex 

does not appear to be present. opens eyes to voice. corneal reflex+


SKIN: Warm, dry, normal turgor, scattered ulcers on the legs














 Laboratory Results - last 24 hr











  11/27/18 11/27/18 11/27/18





  05:30 05:30 05:30


 


WBC   4.2 


 


RBC   2.64 L 


 


Hgb   8.0 L 


 


Hct   24.4 L 


 


MCV   92.4 


 


MCH   30.2 


 


MCHC   32.7 


 


RDW   16.4 H 


 


Plt Count   95 L 


 


MPV   10.0 


 


Absolute Neuts (auto)   3.0 


 


Neutrophils %   71.6 


 


Lymphocytes %   18.9 


 


Monocytes %   5.1 


 


Eosinophils %   3.6  D 


 


Basophils %   0.8 


 


Nucleated RBC %   4 H 


 


PTT (Actin FS)  86.8 H  


 


Puncture Site   


 


ABG pH   


 


ABG pCO2 at Pt Temp   


 


ABG pO2 at Pt Temp   


 


ABG HCO3   


 


ABG O2 Sat (Measured)   


 


ABG O2 Content   


 


ABG Base Excess   


 


Chacho Test   


 


O2 Delivery Device   


 


Oxygen Flow Rate   


 


Vent Mode   


 


Vent Rate   


 


PEEP   


 


Pressure Support Vent   


 


Sodium    140


 


Potassium    5.0


 


Chloride    115 H


 


Carbon Dioxide    23


 


Anion Gap    2 L


 


BUN    58 H


 


Creatinine    1.8 H


 


Creat Clearance w eGFR    36.58


 


Random Glucose    106


 


Calcium    7.0 L


 


Phosphorus    4.4


 


Magnesium    1.7 L


 


Total Bilirubin    0.3


 


AST    40 H


 


ALT    40


 


Alkaline Phosphatase    109


 


Total Protein    9.2 H


 


Albumin    1.1 L














  11/27/18





  06:00


 


WBC 


 


RBC 


 


Hgb 


 


Hct 


 


MCV 


 


MCH 


 


MCHC 


 


RDW 


 


Plt Count 


 


MPV 


 


Absolute Neuts (auto) 


 


Neutrophils % 


 


Lymphocytes % 


 


Monocytes % 


 


Eosinophils % 


 


Basophils % 


 


Nucleated RBC % 


 


PTT (Actin FS) 


 


Puncture Site  Right radial


 


ABG pH  7.27 L


 


ABG pCO2 at Pt Temp  46.8 H D


 


ABG pO2 at Pt Temp  90.1  D


 


ABG HCO3  20.9 L


 


ABG O2 Sat (Measured)  96.3


 


ABG O2 Content  10.4 L


 


ABG Base Excess  -5.0 L


 


Chacho Test  Positive


 


O2 Delivery Device  Mech vent


 


Oxygen Flow Rate  40


 


Vent Mode  Prvc


 


Vent Rate  14


 


PEEP  5.0


 


Pressure Support Vent  500


 


Sodium 


 


Potassium 


 


Chloride 


 


Carbon Dioxide 


 


Anion Gap 


 


BUN 


 


Creatinine 


 


Creat Clearance w eGFR 


 


Random Glucose 


 


Calcium 


 


Phosphorus 


 


Magnesium 


 


Total Bilirubin 


 


AST 


 


ALT 


 


Alkaline Phosphatase 


 


Total Protein 


 


Albumin 








Active Medications











Generic Name Dose Route Start Last Admin





  Trade Name Freq  PRN Reason Stop Dose Admin


 


Acetaminophen  650 mg  11/10/18 14:09  11/23/18 03:13





  Tylenol -  PO   650 mg





  Q6H PRN   Administration





  PAIN LEVEL 1 - 3   





     





     





     


 


Allopurinol  100 mg  11/09/18 10:00  11/27/18 10:02





  Zyloprim -  PO   100 mg





  BID AVA   Administration





     





     





     





     


 


Chlorhexidine Gluconate  15 ml  11/10/18 23:45  11/27/18 10:02





  Peridex -  MM   15 ml





  BID AVA   Administration





     





     





     





     


 


Cholecalciferol  1,000 unit  11/18/18 10:00  11/27/18 10:00





  Vitamin D3 -  PO   1,000 unit





  DAILY AVA   Administration





     





     





     





     


 


Diphenhydramine HCl  25 mg  11/09/18 12:00  





  Benadryl Injection -  IVPB   





  ONCE PRN   





  DURING PLASMAPHERESIS   





     





     





     


 


Heparin Sodium (Porcine)  1,000 unit  11/21/18 12:23  11/22/18 08:03





  Heparin -  IVPUSH   1,000 unit





  PRN PRN   Administration





  Heparin   





     





     





     


 


Heparin Sodium (Porcine)  5,000 unit  11/21/18 12:23  





  Heparin -  IVPUSH   





  PRN PRN   





  Heparin   





     





     





     


 


Metronidazole  500 mg in 100 mls @ 100 mls/hr  11/17/18 14:30  11/27/18 09:59





  Flagyl 500mg Premixed Ivpb -  IVPB   100 mls/hr





  BID AVA   Administration





     





     





     





     


 


Levofloxacin  250 mg in 50 mls @ 50 mls/hr  11/19/18 12:45  11/27/18 09:59





  Levaquin 250 Mg Premixed Ivpb -  IVPB   50 mls/hr





  DAILY [facility]   Administration





     





     





  Protocol   





     


 


HEPARIN SOD,PORK IN 0.45% NACL  25,000 units in 500 mls @ 20 mls/hr  11/21/18 13

:00  11/27/18 10:05





  Heparin-1/2ns 25,000 Units/500  IVPB   750 units/hr





  TITR AVA   15 mls/hr





     Titration





     





  Protocol   





  1,000 UNITS/HR   


 


Fentanyl 500 mcg/ Dextrose  100 mls @ 5 mls/hr  11/25/18 19:00  11/27/18 06:44





  IVPB   50 mcg/hr





  TITR AVA   10 mls/hr





     Titration





     





  Protocol   





  25 MCG/HR   


 


Sodium Chloride  1,000 mls @ 125 mls/hr  11/27/18 12:00  





  Normal Saline -  IV   





  ASDIR AVA   





     





     





     





     


 


Lactobacillus Acidophilus  1 tab  11/25/18 10:00  11/27/18 10:00





  Bacid -  PO   1 tab





  DAILY AVA   Administration





     





     





     





     


 


Metoprolol Tartrate  5 mg  11/09/18 13:04  11/24/18 18:44





  Lopressor Injection -  IVPUSH   5 mg





  Q8H PRN   Administration





  TACHYCARDIA   





     





     





     


 


Metoprolol Tartrate  75 mg  11/16/18 08:30  11/27/18 10:00





  Lopressor -  PO   75 mg





  BID AVA   Administration





     





     





     





     


 


Pantoprazole Sodium  40 mg  11/07/18 12:00  11/27/18 10:00





  Protonix Iv  IVPUSH   40 mg





  DAILY AVA   Administration





     





     





     





     


 


Rifaximin  550 mg  11/15/18 22:00  11/27/18 10:00





  Xifaxan -  PO   550 mg





  BID AVA   Administration





     





     





     





     


 


Thiamine HCl  100 mg  10/30/18 22:12  11/27/18 10:00





  Vitamin B1 -  PO   100 mg





  DAILY AVA   Administration





     





     





     





     











ASSESSMENT/PLAN:


79 yo m PMH of A-Fib, Acute on chronic kidney injury, CAD w stents, 

hypercalcemia, medication non-adherence, paraproteinemia, thromboembolic disease

, diastolic heart dysfunction without failure, HTN, HLD, hypertrophic 

cardiomyopathy is here after a rapid response. He was on the floors when it was 

noticed that he has respiratory insufficiency and was desaturating, requiring 

ICU monitoring. GOC/family meeting initiated and ongoing. Plan to have phone 

conference today. 








GI: 


metabolic encephalopathy 


-ammonia stable in 60s w/ rifaximin, 


s/p lactulose 


-LFTs elevated but downtrending. transamintitis most likely secondary to 

ischemic hepatits which is multifactorial including sepsis, episodes of 

hypotension and hyperviscosity syndrome. 





acalculous cholecystitis?


US shows thickened gallbladder wall, pericholecystic fluid, suspicious for 

acalculous cholecystitis. There was also mild dilatation of the CBD but that 

might be normal for age. 


-Spoke w/ IR, who feel image does not represent  acalculous cholecystitis and 

does not require any IR drainage 





ID: 


No fevers recorded. 


- Rhonchi heard on Lung auscultation


-RLL pneumonia


bcx 11/19/18 neg 


off of ceftazidime


Vancomycin Redosed 11/19/18 


C diff neg 11/25/18


- c/w levaquin/flagyl. 


- ID on board


- c/w rifaximin for elevated ammonia level





Cardio: 


Afib -[id number] score of 6 


- Lopressor 75 mg BID PO


-IV metoprolol 5 mg PRN


- diastolic dysfunction + hypertrophic cardiomyopathy


- CAD with multiple stents


- Cardio consulted and on board.


off Xarelto 15 qd (renal dosing) while on heparin gtt. transfuse to maintain Hgb

>8.0 


s/p 1 u prbc 11/20/18, Heparin with caution considering the dropping Hg, 

transfuse to maintain Hg equal or > 8.0, as outlined in prior notes ideally A/C 

therapy to be continued indefinitely unless it is absolutely contraindicated 

considering his [id number] score of 6 


- Sporadically goes in and out of V-Tach - Can give amio if v-tach is sustained 

and persistent. 








Pulm: 


- Intubated


- Patient has b/l pleural effusions.


- No pneumothorax


- b/l diffuse rhonchi


- infiltrate on CXR: Abx- Substituting levaquin for ceftazidime


Vancomycin Redosed 11/19/18 


- c/w levaquin/flagyl. 





Renal: 


- Acute on Chronic kidney injury


HAM ATN vs. Cast nephropathy  (FeNa was 2.1% indicating tubular injury, US 

showed no stones or obstruction, UA w/o protein but UPCR ~0.5 indicating non-

albumin proteinuria)


- Renal consulted and on board. 


Hyperkalemia (r/o tumor lysis)


serum K is improved, s/p bicarb gtt





Neuro: 


- Patient is not alert or oriented.


Remains intubated, poorly responsive but now on fentanyl gtt. opens eyes to 

voice


- Head CT showed no acute pathology


- Neuro consulted and on board. (Dr. Youngblood + Dr. phan) 


-ammonia stable in 60s w/ rifaximin, lactulose dcd 





Heme/Onc: 


- monitor H/H


- Will transfuse to keep hb > 8 


- Patient not receiving plasmapharesis today. 


- Paraproteinemia


- Patient fulfills new criteria  for multiple myeloma with free kappa/ free 

lambda light chain  ratio of 432 (>100 is diagnostic of myeloma) 


- Kappa/Lambda ratio > 100 consistent with Multiple myeloma


- M spike elevated elevated at 6.8 previously 4.7 


-steroids being held at this time 


s/p 1 u prbc 11/20/18 and 1 u prbc 11/22/18, Heparin with caution considering 

the dropping Hg, transfuse to maintain Hg equal or > 8.0, as outlined in prior 

notes ideally A/C therapy to be continued indefinitely unless it is absolutely 

contraindicated considering his [id number] score of 6 





Endo: 


- Parathyroid hormone WNL


- PTH-borderline low appropriate given hypercalcemia, PTHrP low





Prophylaxis: 


- Heparin with caution considering the dropping Hg, transfuse to maintain Hg 

equal or > 8.0, as outlined in prior notes ideally A/C therapy to be continued 

indefinitely unless it is absolutely contraindicated considering his 

[id number] score of 6


- Protonix





F/E/N: 


F:  mild hypotensive this morning, gave 250cc boluses, start NS 125cc/hr


E: Will replete lytes PRN


N: tube feeds (low potassium feeds). with free water 





Code Status: Full Code 


- GOC/family meeting initiated and ongoing. Plan to have phone conference 

today. if unable to reach family will need tracheostomy as he is approaching 3 

weeks intubated


- Compassionate extubation versus Tracheostomy





Dispo: Patient will continue to receive ICU level care





Visit type





- Emergency Visit


Emergency Visit: Yes


ED Registration Date: 10/30/18


Care time: The patient presented to the Emergency Department on the above date 

and was hospitalized for further evaluation of their emergent condition.





- New Patient


This patient is new to me today: Yes


Date on this admission: 11/27/18





- Critical Care


Critical Care patient: Yes


Total Critical Care Time (in minutes): 38


Critical Care Statement: The care of this patient involved high complexity 

decision making to prevent further life threatening deterioration of the patient

's condition and/or to evaluate & treat vital organ system(s) failure or risk 

of failure.

## 2018-11-27 NOTE — PN
Progress Note (short form)





- Note


Progress Note: 





Renal follow up for Hypercalcemia/HAM





Pt seen and examined in the ICU


on vent, noted to be hpotensive this am


making urine via fenton


on vent, FiO2 is 40%


pt sedated 





 Vital Signs











Temperature  99.9 F H  11/27/18 06:00


 


Pulse Rate  86   11/27/18 08:00


 


Respiratory Rate  14   11/27/18 09:00


 


Blood Pressure  95/68   11/27/18 08:00


 


O2 Sat by Pulse Oximetry (%)  99   11/26/18 20:48








 Intake & Output











 11/24/18 11/25/18 11/26/18 11/27/18





 23:59 23:59 23:59 23:59


 


Intake Total 4075 3310 3308 1564


 


Output Total 1400 2300 800 800


 


Balance 2675 1010 2508 764


 


Weight  89.3 kg 89.3 kg 90.038 kg





NAD


ET tube in place


RRR


Dec BS, no rales


soft NT/ND


no Le edema


 CBC, BMP





 11/27/18 05:30 





 11/27/18 05:30 





 Current Medications





Acetaminophen (Tylenol -)  650 mg PO Q6H PRN


   PRN Reason: PAIN LEVEL 1 - 3


   Last Admin: 11/23/18 03:13 Dose:  650 mg


Allopurinol (Zyloprim -)  100 mg PO BID [facility]


   Last Admin: 11/27/18 10:02 Dose:  100 mg


Chlorhexidine Gluconate (Peridex -)  15 ml MM BID [facility]


   Last Admin: 11/27/18 10:02 Dose:  15 ml


Cholecalciferol (Vitamin D3 -)  1,000 unit PO DAILY [facility]


   Last Admin: 11/27/18 10:00 Dose:  1,000 unit


Diphenhydramine HCl (Benadryl Injection -)  25 mg IVPB ONCE PRN


   PRN Reason: DURING PLASMAPHERESIS


Heparin Sodium (Porcine) (Heparin -)  1,000 unit IVPUSH PRN PRN


   PRN Reason: Heparin


   Last Admin: 11/22/18 08:03 Dose:  1,000 unit


Heparin Sodium (Porcine) (Heparin -)  5,000 unit IVPUSH PRN PRN


   PRN Reason: Heparin


Metronidazole (Flagyl 500mg Premixed Ivpb -)  500 mg in 100 mls @ 100 mls/hr 

IVPB BID [facility]


   Last Admin: 11/27/18 09:59 Dose:  100 mls/hr


Levofloxacin (Levaquin 250 Mg Premixed Ivpb -)  250 mg in 50 mls @ 50 mls/hr 

IVPB DAILY [facility]; Protocol


   Last Admin: 11/27/18 09:59 Dose:  50 mls/hr


HEPARIN SOD,PORK IN 0.45% NACL (Heparin-1/2ns 25,000 Units/500)  25,000 units 

in 500 mls @ 20 mls/hr IVPB TITR [facility]; Protocol


   Last Titration: 11/27/18 10:05 Dose:  750 units/hr, 15 mls/hr


Fentanyl 500 mcg/ Dextrose  100 mls @ 5 mls/hr IVPB TITR [facility]; Protocol


   Last Titration: 11/27/18 06:44 Dose:  50 mcg/hr, 10 mls/hr


Lactobacillus Acidophilus (Bacid -)  1 tab PO DAILY [facility]


   Last Admin: 11/27/18 10:00 Dose:  1 tab


Metoprolol Tartrate (Lopressor Injection -)  5 mg IVPUSH Q8H PRN


   PRN Reason: TACHYCARDIA


   Last Admin: 11/24/18 18:44 Dose:  5 mg


Metoprolol Tartrate (Lopressor -)  75 mg PO BID [facility]


   Last Admin: 11/27/18 10:00 Dose:  75 mg


Pantoprazole Sodium (Protonix Iv)  40 mg IVPUSH DAILY [facility]


   Last Admin: 11/27/18 10:00 Dose:  40 mg


Rifaximin (Xifaxan -)  550 mg PO BID [facility]


   Last Admin: 11/27/18 10:00 Dose:  550 mg


Thiamine HCl (Vitamin B1 -)  100 mg PO DAILY [facility]


   Last Admin: 11/27/18 10:00 Dose:  100 mg








78 year old gentleman with hx of CKD, Afib on A/C, CAD, CKD, Hypertension, 

Hyperlipidemia, PVD who presented with AMS/Confusion and found to have HAM.





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 

showed no stones or obstruction, UA w/o protein but UPCR ~0.5 indicating non-

albumin proteinuria)


#AMS 


#Newly diagnosed multiple myloma 


#Anemia 


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


#Hyperdense lesion on US of the kidney 


#Normal anion gap metabolic acidosis 


#Hyperkalemia (now resolved)





Renal function stable and pt is non-oliguric


IVF resuscitation in setting of hypotension 


start standing fluids once MAP improved 


continue supportive care


goals of care being discussed with family by ICU


prognosis is poor 


corrected Ca is now WNTIA Chang DO

## 2018-11-27 NOTE — PN
Progress Note, Physician





- Current Medication List


Current Medications: 


Active Medications





Acetaminophen (Tylenol -)  650 mg PO Q6H PRN


   PRN Reason: PAIN LEVEL 1 - 3


   Last Admin: 11/23/18 03:13 Dose:  650 mg


Allopurinol (Zyloprim -)  100 mg PO BID [facility]


   Last Admin: 11/26/18 22:07 Dose:  100 mg


Chlorhexidine Gluconate (Peridex -)  15 ml MM BID [facility]


   Last Admin: 11/26/18 22:07 Dose:  15 ml


Cholecalciferol (Vitamin D3 -)  1,000 unit PO DAILY [facility]


   Last Admin: 11/26/18 09:00 Dose:  1,000 unit


Diphenhydramine HCl (Benadryl Injection -)  25 mg IVPB ONCE PRN


   PRN Reason: DURING PLASMAPHERESIS


Heparin Sodium (Porcine) (Heparin -)  1,000 unit IVPUSH PRN PRN


   PRN Reason: Heparin


   Last Admin: 11/22/18 08:03 Dose:  1,000 unit


Heparin Sodium (Porcine) (Heparin -)  5,000 unit IVPUSH PRN PRN


   PRN Reason: Heparin


Metronidazole (Flagyl 500mg Premixed Ivpb -)  500 mg in 100 mls @ 100 mls/hr 

IVPB BID [facility]


   Last Admin: 11/26/18 22:07 Dose:  100 mls/hr


Levofloxacin (Levaquin 250 Mg Premixed Ivpb -)  250 mg in 50 mls @ 50 mls/hr 

IVPB DAILY [facility]; Protocol


   Last Admin: 11/26/18 09:01 Dose:  50 mls/hr


HEPARIN SOD,PORK IN 0.45% NACL (Heparin-1/2ns 25,000 Units/500)  25,000 units 

in 500 mls @ 20 mls/hr IVPB TITR [facility]; Protocol


   Last Admin: 11/27/18 06:42 Dose:  850 units/hr, 17 mls/hr


Fentanyl 500 mcg/ Dextrose  100 mls @ 5 mls/hr IVPB TITR [facility]; Protocol


   Last Titration: 11/27/18 06:44 Dose:  50 mcg/hr, 10 mls/hr


Lactobacillus Acidophilus (Bacid -)  1 tab PO DAILY [facility]


   Last Admin: 11/26/18 09:00 Dose:  1 tab


Metoprolol Tartrate (Lopressor Injection -)  5 mg IVPUSH Q8H PRN


   PRN Reason: TACHYCARDIA


   Last Admin: 11/24/18 18:44 Dose:  5 mg


Metoprolol Tartrate (Lopressor -)  75 mg PO BID [facility]


   Last Admin: 11/26/18 22:07 Dose:  75 mg


Pantoprazole Sodium (Protonix Iv)  40 mg IVPUSH DAILY [facility]


   Last Admin: 11/26/18 09:01 Dose:  40 mg


Rifaximin (Xifaxan -)  550 mg PO BID [facility]


   Last Admin: 11/26/18 22:07 Dose:  550 mg


Thiamine HCl (Vitamin B1 -)  100 mg PO DAILY [facility]


   Last Admin: 11/26/18 09:01 Dose:  100 mg











- Objective


Vital Signs: 


 Vital Signs











Temperature  99.9 F H  11/27/18 06:00


 


Pulse Rate  84   11/27/18 06:00


 


Respiratory Rate  14   11/27/18 06:42


 


Blood Pressure  96/73   11/27/18 06:00


 


O2 Sat by Pulse Oximetry (%)  99   11/26/18 20:48











Cardiovascular: Yes: S1, S2


Respiratory: Yes: Mechanically Ventilated


Gastrointestinal: Yes: Normal Bowel Sounds, Soft


Labs: 


 CBC, BMP





 11/27/18 05:30 





 11/27/18 05:30 





 INR, PTT











INR  1.28  (0.83-1.09)  H  11/15/18  05:30    


 


Fibrinogen  290.0 mg/dL (238-498)   11/11/18  06:00    














Problem List





- Problems


(1) Toxic metabolic encephalopathy


Code(s): G92 - TOXIC ENCEPHALOPATHY   





(2) Cellulitis


Code(s): L03.90 - CELLULITIS, UNSPECIFIED   


Qualifiers: 


   Site of cellulitis: extremity   Site of cellulitis of extremity: lower 

extremity   Laterality: right   Qualified Code(s): L03.115 - Cellulitis of 

right lower limb   





(3) Sepsis


Code(s): A41.9 - SEPSIS, UNSPECIFIED ORGANISM   





(4) Artery occlusion


Code(s): I70.90 - UNSPECIFIED ATHEROSCLEROSIS   





(5) Hypercalcemia


Code(s): E83.52 - HYPERCALCEMIA   





(6) Paroxysmal atrial fibrillation


Code(s): I48.0 - PAROXYSMAL ATRIAL FIBRILLATION   





(7) HAM (acute kidney injury)


Code(s): N17.9 - ACUTE KIDNEY FAILURE, UNSPECIFIED   





Assessment/Plan





- Problems


(1) Elevated LFTs


Assessment/Plan: 


ultrasound of liver noted suggestive of acalculous cholecystitis, will get GI 

consult- noted 


lft trending down





Code(s): R94.5 - ABNORMAL RESULTS OF LIVER FUNCTION STUDIES   





(2) HAM (acute kidney injury)


Assessment/Plan: 


HAM vs ATN- 


iv calcium- repleted


potassium now normal range- hyperkalemia improved


ultrasound hyperdense lesion noted in left renal cortex


Code(s): N17.9 - ACUTE KIDNEY FAILURE, UNSPECIFIED   





(3) Atrial fibrillation with RVR


Assessment/Plan: 


heparin drip 


cardiac monitor


rate control with metoprolol








(4) Respiratory failure


Assessment/Plan: 


intubated


propofol/fentanyl


aviating family decision regarding goals of care possible compassionate weaning 

vs tracheostomy


Code(s): J96.90 - RESPIRATORY FAILURE, UNSP, UNSP W HYPOXIA OR HYPERCAPNIA   





(5) Toxic metabolic encephalopathy


Assessment/Plan: 


Microbiology





11/14/18 11:20   Urine - Urine Garces   Urine Culture - Final


                              NO GROWTH OBTAINED


11/06/18 14:30   Urine - Urine Garces   Legionella Antigen - Final


11/06/18 14:30   Urine - Urine Garces   Streptococcus pneumoniae Antigen (M - 

Final


11/06/18 14:30   Sputum - Endotrachea Suction/Ventilator   Gram Stain - Final


11/06/18 14:30   Sputum - Endotrachea Suction/Ventilator   Sputum Culture - 

Final


                              Stenotrophomon.(X.)Maltophilia


11/14/18 09:58   Blood - Peripheral Venous   Blood Culture - Preliminary


                              NO GROWTH OBTAINED AFTER 24 HOURS, INCUBATION TO 

CONTINUE


                              FOR 4 DAYS.


11/14/18 09:50   Blood - Peripheral Venous   Blood Culture - Preliminary


                              NO GROWTH OBTAINED AFTER 24 HOURS, INCUBATION TO 

CONTINUE


                              FOR 4 DAYS.


11/10/18 15:00   Blood - Peripheral Venous   Blood Culture - Preliminary


                              NO GROWTH OBTAINED AFTER 96 HOURS, INCUBATION TO 

CONTINUE


                              FOR 1 DAYS.


11/10/18 14:00   Blood - Central Line   Blood Culture - Preliminary


                              NO GROWTH OBTAINED AFTER 96 HOURS, INCUBATION TO 

CONTINUE


                              FOR 1 DAYS.





RLL PNA


levaquin/flagyl


Code(s): G92 - TOXIC ENCEPHALOPATHY   





(6) Altered mental status


Assessment/Plan: 


maybe secondary to toxic metabolic encephalopathy- 


neurology consult appreciated


on lactulose TID still persistently elevated Nh3 level


s/p dexamethasone - no improvement in mental status


s/p plasmapheresis


dvt ppx lovenox


newly diagnosed Multiple Myeloma- anemia paraproteinemia, 


Code(s): R41.82 - ALTERED MENTAL STATUS, UNSPECIFIED

## 2018-11-27 NOTE — PN
Progress Note (short form)





- Note


Progress Note: 


Chief Complaint: Events noted, notes reviewed, remains intubated with overall 

no change in neurological status, remains in atrial fibrillation on A/C with 

Heparin





History of Present Illness: 


Seen and examined in the ICU. Events noted, notes reviewed, remains intubated 

with overall no change in neurological status, remains in atrial fibrillation 

on A/C with Heparin





[facility] coronary angiography performed 1/11/2017 revealed non-obstructive CAD, 

severe LV apical hypertrophic cardiomyopathy, mildly elevated right sided 

pressures/study is consistent with apical hypertrophy (spade-like) variant of 

hypertrophic cardiomyopathy





Echocardiography dated 07/11/2018 Mod cLVH, severe DYANA, moderate TR RVSP 50-60 

mmHg, moderate-severe MR





Echocardiography dated 10/16/2018 Normal LV size with hyperdynamic LVEF 75%, 

mild BSH, normal RV size and function, severe LAE, moderate-severe MR, mod TR


 





- Current Medication List


 


 Current Medications





Acetaminophen (Tylenol -)  650 mg PO Q6H PRN


   PRN Reason: PAIN LEVEL 1 - 3


   Last Admin: 11/23/18 03:13 Dose:  650 mg


Allopurinol (Zyloprim -)  100 mg PO BID [facility]


   Last Admin: 11/26/18 22:07 Dose:  100 mg


Chlorhexidine Gluconate (Peridex -)  15 ml MM BID [facility]


   Last Admin: 11/26/18 22:07 Dose:  15 ml


Cholecalciferol (Vitamin D3 -)  1,000 unit PO DAILY [facility]


   Last Admin: 11/26/18 09:00 Dose:  1,000 unit


Diphenhydramine HCl (Benadryl Injection -)  25 mg IVPB ONCE PRN


   PRN Reason: DURING PLASMAPHERESIS


Heparin Sodium (Porcine) (Heparin -)  1,000 unit IVPUSH PRN PRN


   PRN Reason: Heparin


   Last Admin: 11/22/18 08:03 Dose:  1,000 unit


Heparin Sodium (Porcine) (Heparin -)  5,000 unit IVPUSH PRN PRN


   PRN Reason: Heparin


Metronidazole (Flagyl 500mg Premixed Ivpb -)  500 mg in 100 mls @ 100 mls/hr 

IVPB BID [facility]


   Last Admin: 11/26/18 22:07 Dose:  100 mls/hr


Levofloxacin (Levaquin 250 Mg Premixed Ivpb -)  250 mg in 50 mls @ 50 mls/hr 

IVPB DAILY [facility]; Protocol


   Last Admin: 11/26/18 09:01 Dose:  50 mls/hr


HEPARIN SOD,PORK IN 0.45% NACL (Heparin-1/2ns 25,000 Units/500)  25,000 units 

in 500 mls @ 20 mls/hr IVPB TITR [facility]; Protocol


   Last Admin: 11/27/18 06:42 Dose:  850 units/hr, 17 mls/hr


Fentanyl 500 mcg/ Dextrose  100 mls @ 5 mls/hr IVPB TITR [facility]; Protocol


   Last Titration: 11/27/18 06:44 Dose:  50 mcg/hr, 10 mls/hr


Lactobacillus Acidophilus (Bacid -)  1 tab PO DAILY [facility]


   Last Admin: 11/26/18 09:00 Dose:  1 tab


Metoprolol Tartrate (Lopressor Injection -)  5 mg IVPUSH Q8H PRN


   PRN Reason: TACHYCARDIA


   Last Admin: 11/24/18 18:44 Dose:  5 mg


Metoprolol Tartrate (Lopressor -)  75 mg PO BID [facility]


   Last Admin: 11/26/18 22:07 Dose:  75 mg


Pantoprazole Sodium (Protonix Iv)  40 mg IVPUSH DAILY [facility]


   Last Admin: 11/26/18 09:01 Dose:  40 mg


Rifaximin (Xifaxan -)  550 mg PO BID [facility]


   Last Admin: 11/26/18 22:07 Dose:  550 mg


Thiamine HCl (Vitamin B1 -)  100 mg PO DAILY [facility]


   Last Admin: 11/26/18 09:01 Dose:  100 mg








Review of Systems





Unable to obtain





- Objective


Vital Signs: 


 


 Last Vital Signs











Temp Pulse Resp BP Pulse Ox


 


 99.9 F H  84   14   96/73   99 


 


 11/27/18 06:00  11/27/18 06:00  11/27/18 06:42  11/27/18 06:00  11/26/18 20:48








 Intake & Output











 11/24/18 11/25/18 11/26/18 11/27/18





 23:59 23:59 23:59 23:59


 


Intake Total 4075 3310 3308 1564


 


Output Total 1400 2300 800 800


 


Balance 2675 1010 2508 764


 


Weight  196 lb 13.965 oz 196 lb 13.965 oz 198 lb 8 oz











Neck: Supple Negative JVD No Bruit


Cardiovascular: S1 S2 Irregularly Irregular Grade 2/6 Systolic Murmur Apical


Chest: Scattered Rhonchi Bilaterally


Gastrointestinal: Soft Benign Normal Bowel Sounds


Ext: No Edema 





Labs: 


 


 ABG Results











ABG pH  7.27  (7.35-7.45)  L  11/27/18  06:00    


 


ABG pCO2 at Pt Temp  46.8 mmHg (35-45)  H D 11/27/18  06:00    


 


ABG pO2 at Pt Temp  90.1 mmHg ()  D 11/27/18  06:00    


 


ABG HCO3  20.9 meq/L (22-26)  L  11/27/18  06:00    


 


ABG O2 Sat (Measured)  96.3 % (90-98.9)   11/27/18  06:00    


 


ABG O2 Content  10.4 % vol (15-22)  L  11/27/18  06:00    


 


ABG Base Excess  -5.0 meq/l (-2-2)  L  11/27/18  06:00    








 CBC, BMP





 11/27/18 05:30 





 11/27/18 05:30 








Assessment/Plan





ASSESSMENT:





1. Acute hypoxic respiratory failure, suspected aspiration pneumonia with 

sepsis syndrome, remains intubated/acidosis this AM/combined 


2. Toxic metabolic encephelopathy, rule out CVA, no change in status 


3. 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


4. Paraproteinemia confirmed multiple myeloma


5. 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 

disease related to persistent atrial fibrillation with [id number] score of 6


6. CAD with evidence of demand ischemic injury, non obstructive CAD on [facility] 

coronary angiogrpahy angina pectoris


7. 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 

chronic class I-II NYHA classification LV failure, compensated/euvolemic


8. Persistent atrial fibrillation [id number] score of 6 currently on Heparin 

therapy


9. HTN


10. Anemia/thrombocytopenia


11. Acalculous Cholecystitis


12. Ischemic hepatitis


13. Resolved Hypernatremia





PLAN:





1. Evaluation and correction of acidosis


2. Continue Heparin with caution, transfuse to maintain Hg equal or > 8.0, as 

outlined in prior notes ideally A/C therapy to be continued indefinitely unless 

it is absolutely contraindicated considering his [id number] score of 6 


3. Continue Lopressor, hemodynamics permitting 


4. Antibiotics as per the primary team


5. Ventilator management as per the ICU team 


6. As outlined in prior notes overall poor prognosis (no improvement), family 

to decide regarding compassionate extubation versus tracheostomy, consider 

ethics consult














Armand Mckenzie MD

## 2018-11-28 LAB
ALBUMIN SERPL-MCNC: 0.9 G/DL (ref 3.4–5)
ALP SERPL-CCNC: 128 U/L (ref 45–117)
ALT SERPL-CCNC: 33 U/L (ref 13–61)
ANION GAP SERPL CALC-SCNC: 4 MMOL/L (ref 8–16)
ANISOCYTOSIS BLD QL: (no result)
ANISOCYTOSIS BLD QL: (no result)
APTT BLD: 41.8 SECONDS (ref 25.2–36.5)
ARTERIAL BLOOD GAS PCO2: 35.8 MMHG (ref 35–45)
AST SERPL-CCNC: 46 U/L (ref 15–37)
BASE EXCESS BLDA CALC-SCNC: -6 MEQ/L (ref -2–2)
BASOPHILS # BLD: 0.4 % (ref 0–2)
BASOPHILS # BLD: 0.5 % (ref 0–2)
BILIRUB SERPL-MCNC: 0.3 MG/DL (ref 0.2–1)
BUN SERPL-MCNC: 63 MG/DL (ref 7–18)
CALCIUM SERPL-MCNC: 6.4 MG/DL (ref 8.5–10.1)
CHLORIDE SERPL-SCNC: 115 MMOL/L (ref 98–107)
CO2 SERPL-SCNC: 21 MMOL/L (ref 21–32)
CREAT SERPL-MCNC: 1.7 MG/DL (ref 0.55–1.3)
DACRYOCYTES BLD QL SMEAR: (no result)
DEPRECATED RDW RBC AUTO: 15.5 % (ref 11.9–15.9)
DEPRECATED RDW RBC AUTO: 16.3 % (ref 11.9–15.9)
EOSINOPHIL # BLD: 2.3 % (ref 0–4.5)
EOSINOPHIL # BLD: 2.8 % (ref 0–4.5)
GLUCOSE SERPL-MCNC: 99 MG/DL (ref 74–106)
HCT VFR BLD CALC: 20.4 % (ref 35.4–49)
HCT VFR BLD CALC: 22.1 % (ref 35.4–49)
HGB BLD-MCNC: 6.7 GM/DL (ref 11.7–16.9)
HGB BLD-MCNC: 7.8 GM/DL (ref 11.7–16.9)
INR BLD: 1.49 (ref 0.83–1.09)
LYMPHOCYTES # BLD: 16.7 % (ref 8–40)
LYMPHOCYTES # BLD: 20.8 % (ref 8–40)
MACROCYTES BLD QL: (no result)
MAGNESIUM SERPL-MCNC: 1.9 MG/DL (ref 1.8–2.4)
MCH RBC QN AUTO: 30.2 PG (ref 25.7–33.7)
MCH RBC QN AUTO: 30.9 PG (ref 25.7–33.7)
MCHC RBC AUTO-ENTMCNC: 32.9 G/DL (ref 32–35.9)
MCHC RBC AUTO-ENTMCNC: 35.1 G/DL (ref 32–35.9)
MCV RBC: 88.2 FL (ref 80–96)
MCV RBC: 91.7 FL (ref 80–96)
MONOCYTES # BLD AUTO: 3.3 % (ref 3.8–10.2)
MONOCYTES # BLD AUTO: 4.1 % (ref 3.8–10.2)
NEUTROPHILS # BLD: 72.7 % (ref 42.8–82.8)
NEUTROPHILS # BLD: 76.4 % (ref 42.8–82.8)
PHOSPHATE SERPL-MCNC: 4.2 MG/DL (ref 2.5–4.9)
PLATELET # BLD AUTO: 80 K/MM3 (ref 134–434)
PLATELET # BLD AUTO: 84 K/MM3 (ref 134–434)
PLATELET BLD QL SMEAR: (no result)
PLATELET BLD QL SMEAR: (no result)
PMV BLD: 10.3 FL (ref 7.5–11.1)
PMV BLD: 9.8 FL (ref 7.5–11.1)
PO2 BLDA: 119 MMHG (ref 70–100)
POTASSIUM SERPLBLD-SCNC: 4.7 MMOL/L (ref 3.5–5.1)
PROT SERPL-MCNC: 7.9 G/DL (ref 6.4–8.2)
PT PNL PPP: 17.7 SEC (ref 9.7–13)
RBC # BLD AUTO: 2.23 M/MM3 (ref 4–5.6)
RBC # BLD AUTO: 2.5 M/MM3 (ref 4–5.6)
ROULEAUX BLD QL SMEAR: (no result)
SAO2 % BLDA: 98.3 % (ref 90–98.9)
SODIUM SERPL-SCNC: 140 MMOL/L (ref 136–145)
WBC # BLD AUTO: 4 K/MM3 (ref 4–10)
WBC # BLD AUTO: 4.1 K/MM3 (ref 4–10)

## 2018-11-28 RX ADMIN — DEXTROSE MONOHYDRATE SCH MLS/HR: 50 INJECTION, SOLUTION INTRAVENOUS at 18:34

## 2018-11-28 RX ADMIN — RIFAXIMIN SCH MG: 550 TABLET ORAL at 10:00

## 2018-11-28 RX ADMIN — METOPROLOL TARTRATE SCH: 50 TABLET, FILM COATED ORAL at 21:23

## 2018-11-28 RX ADMIN — SODIUM CHLORIDE SCH MLS/HR: 9 INJECTION, SOLUTION INTRAVENOUS at 12:57

## 2018-11-28 RX ADMIN — ALLOPURINOL SCH MG: 100 TABLET ORAL at 10:00

## 2018-11-28 RX ADMIN — CHLORHEXIDINE GLUCONATE 0.12% ORAL RINSE SCH ML: 1.2 LIQUID ORAL at 21:23

## 2018-11-28 RX ADMIN — METOPROLOL TARTRATE SCH: 50 TABLET, FILM COATED ORAL at 10:19

## 2018-11-28 RX ADMIN — PANTOPRAZOLE SODIUM SCH MG: 40 INJECTION, POWDER, FOR SOLUTION INTRAVENOUS at 10:01

## 2018-11-28 RX ADMIN — Medication SCH MG: at 10:00

## 2018-11-28 RX ADMIN — CHLORHEXIDINE GLUCONATE 0.12% ORAL RINSE SCH ML: 1.2 LIQUID ORAL at 10:02

## 2018-11-28 RX ADMIN — ALLOPURINOL SCH MG: 100 TABLET ORAL at 21:23

## 2018-11-28 RX ADMIN — RIFAXIMIN SCH MG: 550 TABLET ORAL at 21:23

## 2018-11-28 RX ADMIN — Medication SCH TAB: at 10:00

## 2018-11-28 RX ADMIN — VITAMIN D, TAB 1000IU (100/BT) SCH UNIT: 25 TAB at 10:00

## 2018-11-28 NOTE — PN
Progress Note, Physician


History of Present Illness: 


 Unresponsive on ventilator


  Afebrile


  wbc wnl


  Thrombocytopenic


  C difficile negative


  


 





 


 


 





- Current Medication List


Current Medications: 


Active Medications





Acetaminophen (Tylenol -)  650 mg PO Q6H PRN


   PRN Reason: PAIN LEVEL 1 - 3


   Last Admin: 11/23/18 03:13 Dose:  650 mg


Allopurinol (Zyloprim -)  100 mg PO BID [facility]


   Last Admin: 11/28/18 10:00 Dose:  100 mg


Chlorhexidine Gluconate (Peridex -)  15 ml MM BID [facility]


   Last Admin: 11/28/18 10:02 Dose:  15 ml


Cholecalciferol (Vitamin D3 -)  1,000 unit PO DAILY [facility]


   Last Admin: 11/28/18 10:00 Dose:  1,000 unit


Diphenhydramine HCl (Benadryl Injection -)  25 mg IVPB ONCE PRN


   PRN Reason: DURING PLASMAPHERESIS


Metronidazole (Flagyl 500mg Premixed Ivpb -)  500 mg in 100 mls @ 100 mls/hr 

IVPB BID [facility]


   Last Admin: 11/27/18 21:27 Dose:  100 mls/hr


Levofloxacin (Levaquin 250 Mg Premixed Ivpb -)  250 mg in 50 mls @ 50 mls/hr 

IVPB DAILY [facility]; Protocol


   Last Admin: 11/28/18 10:01 Dose:  50 mls/hr


Fentanyl 500 mcg/ Dextrose  100 mls @ 5 mls/hr IVPB TITR [facility]; Protocol


   Last Titration: 11/28/18 06:00 Dose:  50 mcg/hr, 10 mls/hr


Sodium Chloride (Normal Saline -)  1,000 mls @ 125 mls/hr IV ASDIR [facility]


   Last Admin: 11/27/18 14:00 Dose:  125 mls/hr


Lactobacillus Acidophilus (Bacid -)  1 tab PO DAILY [facility]


   Last Admin: 11/28/18 10:00 Dose:  1 tab


Metoprolol Tartrate (Lopressor Injection -)  5 mg IVPUSH Q8H PRN


   PRN Reason: TACHYCARDIA


   Last Admin: 11/24/18 18:44 Dose:  5 mg


Metoprolol Tartrate (Lopressor -)  75 mg PO BID [facility]


   Last Admin: 11/28/18 10:19 Dose:  Not Given


Pantoprazole Sodium (Protonix Iv)  40 mg IVPUSH DAILY [facility]


   Last Admin: 11/28/18 10:01 Dose:  40 mg


Rifaximin (Xifaxan -)  550 mg PO BID [facility]


   Last Admin: 11/28/18 10:00 Dose:  550 mg


Thiamine HCl (Vitamin B1 -)  100 mg PO DAILY [facility]


   Last Admin: 11/28/18 10:00 Dose:  100 mg











- Objective


Vital Signs: 


 Vital Signs











Temperature  99.2 F   11/28/18 06:00


 


Pulse Rate  90   11/28/18 06:00


 


Respiratory Rate  18   11/28/18 06:40


 


Blood Pressure  89/64 L  11/28/18 06:00


 


O2 Sat by Pulse Oximetry (%)  100   11/28/18 09:00











Constitutional: Yes: No Distress


Eyes: Yes: Conjunctiva Clear


Cardiovascular: Yes: Regular Rate and Rhythm, S1, S2


Respiratory: Yes: Mechanically Ventilated


Gastrointestinal: Yes: Normal Bowel Sounds, Soft.  No: Tenderness


Edema: Yes


Labs: 


 CBC, BMP





 11/28/18 05:30 





 11/28/18 05:30 





 INR, PTT











INR  1.28  (0.83-1.09)  H  11/15/18  05:30    


 


Fibrinogen  290.0 mg/dL (238-498)   11/11/18  06:00    














Assessment/Plan


Respiratory failure


 RLL pneumonia


 Acalculus  Cholecystitis


 Toxic metabolic encephalopathy


 Renal failure


 Myeloma


 Hyperviscosity syndrome


 Diarrhea


 


  


 


  Continue levaquin / flagyl


  Ventilatory support


  Prognosis poor

## 2018-11-28 NOTE — PN
Progress Note, Physician


History of Present Illness: 


Poorly responsive on vent, persistent afib off lopressor and heparin gtt given 

hypotension and decrease in Hgb.





- Current Medication List


Current Medications: 


Active Medications





Acetaminophen (Tylenol -)  650 mg PO Q6H PRN


   PRN Reason: PAIN LEVEL 1 - 3


   Last Admin: 11/23/18 03:13 Dose:  650 mg


Allopurinol (Zyloprim -)  100 mg PO BID [facility]


   Last Admin: 11/28/18 10:00 Dose:  100 mg


Chlorhexidine Gluconate (Peridex -)  15 ml MM BID [facility]


   Last Admin: 11/28/18 10:02 Dose:  15 ml


Cholecalciferol (Vitamin D3 -)  1,000 unit PO DAILY [facility]


   Last Admin: 11/28/18 10:00 Dose:  1,000 unit


Diphenhydramine HCl (Benadryl Injection -)  25 mg IVPB ONCE PRN


   PRN Reason: DURING PLASMAPHERESIS


Metronidazole (Flagyl 500mg Premixed Ivpb -)  500 mg in 100 mls @ 100 mls/hr 

IVPB BID [facility]


   Last Admin: 11/28/18 10:45 Dose:  100 mls/hr


Levofloxacin (Levaquin 250 Mg Premixed Ivpb -)  250 mg in 50 mls @ 50 mls/hr 

IVPB DAILY [facility]; Protocol


   Last Admin: 11/28/18 10:01 Dose:  50 mls/hr


Fentanyl 500 mcg/ Dextrose  100 mls @ 5 mls/hr IVPB TITR [facility]; Protocol


   Last Titration: 11/28/18 06:00 Dose:  50 mcg/hr, 10 mls/hr


Sodium Chloride (Normal Saline -)  1,000 mls @ 125 mls/hr IV ASDIR [facility]


   Last Admin: 11/28/18 12:57 Dose:  125 mls/hr


Lactobacillus Acidophilus (Bacid -)  1 tab PO DAILY [facility]


   Last Admin: 11/28/18 10:00 Dose:  1 tab


Metoprolol Tartrate (Lopressor Injection -)  5 mg IVPUSH Q8H PRN


   PRN Reason: TACHYCARDIA


   Last Admin: 11/24/18 18:44 Dose:  5 mg


Metoprolol Tartrate (Lopressor -)  75 mg PO BID [facility]


   Last Admin: 11/28/18 10:19 Dose:  Not Given


Pantoprazole Sodium (Protonix Iv)  40 mg IVPUSH DAILY [facility]


   Last Admin: 11/28/18 10:01 Dose:  40 mg


Rifaximin (Xifaxan -)  550 mg PO BID [facility]


   Last Admin: 11/28/18 10:00 Dose:  550 mg


Thiamine HCl (Vitamin B1 -)  100 mg PO DAILY [facility]


   Last Admin: 11/28/18 10:00 Dose:  100 mg











- Objective


Vital Signs: 


 Vital Signs











Temperature  97.2 F L  11/28/18 12:00


 


Pulse Rate  90   11/28/18 12:00


 


Respiratory Rate  18   11/28/18 13:25


 


Blood Pressure  87/56 L  11/28/18 12:00


 


O2 Sat by Pulse Oximetry (%)  100   11/28/18 09:00











Constitutional: Yes: No Distress, Calm, Thin


Neck: Yes: Supple


Cardiovascular: Yes: Pulse Irregular


Respiratory: Yes: Intubated, Mechanically Ventilated, Rhonchi


Gastrointestinal: Yes: Soft, Hypoactive Bowel Sounds


Edema: No


Labs: 


 CBC, BMP





 11/28/18 05:30 





 11/28/18 05:30 





 INR, PTT











INR  1.49  (0.83-1.09)  H  11/28/18  12:50    


 


Fibrinogen  323.0 mg/dL (238-498)   11/28/18  12:50    














- ....Imaging


EKG: Report Reviewed (Tele: Rate-controlled afob)





Problem List





- Problems


(1) Atrial fibrillation with RVR


Code(s): I48.91 - UNSPECIFIED ATRIAL FIBRILLATION   





(2) Acute-on-chronic kidney injury


Code(s): N17.9 - ACUTE KIDNEY FAILURE, UNSPECIFIED; N18.9 - CHRONIC KIDNEY 

DISEASE, UNSPECIFIED   


Qualifiers: 


   Acute renal failure type: unspecified   Chronic kidney disease stage: 

unspecified stage   Qualified Code(s): N17.9 - Acute kidney failure, unspecified

; N18.9 - Chronic kidney disease, unspecified   





(3) Demand ischemia


Code(s): I24.8 - OTHER FORMS OF ACUTE ISCHEMIC HEART DISEASE   





(4) Hypercalcemia


Code(s): E83.52 - HYPERCALCEMIA   





(5) Nonadherence to medication


Code(s): Z91.14 - PATIENT'S OTHER NONCOMPLIANCE WITH MEDICATION REGIMEN   





(6) Paraproteinemia


Code(s): D89.2 - HYPERGAMMAGLOBULINEMIA, UNSPECIFIED   





(7) Anticoagulant long-term use


Code(s): Z79.01 - LONG TERM (CURRENT) USE OF ANTICOAGULANTS   





(8) Chronic thromboembolic disease


Code(s): I74.9 - EMBOLISM AND THROMBOSIS OF UNSPECIFIED ARTERY   





(9) Coronary artery disease


Code(s): I25.10 - ATHSCL HEART DISEASE OF NATIVE CORONARY ARTERY W/O ANG PCTRS 

  


Qualifiers: 


   Coronary Disease-Associated Artery/Lesion type: native artery   Native vs. 

transplanted heart: native heart   Associated angina: without angina   

Qualified Code(s): I25.10 - Atherosclerotic heart disease of native coronary 

artery without angina pectoris   





(10) Diastolic dysfunction without heart failure


Code(s): I51.9 - HEART DISEASE, UNSPECIFIED   





(11) HTN (hypertension)


Code(s): I10 - ESSENTIAL (PRIMARY) HYPERTENSION   


Qualifiers: 


   Hypertension type: essential hypertension   Qualified Code(s): I10 - 

Essential (primary) hypertension   





(12) Hypertrophic cardiomyopathy


Code(s): I42.2 - OTHER HYPERTROPHIC CARDIOMYOPATHY   





(13) Hyperlipidemia


Code(s): E78.5 - HYPERLIPIDEMIA, UNSPECIFIED   


Qualifiers: 


   Hyperlipidemia type: pure hypercholesterolemia   Qualified Code(s): E78.00 - 

Pure hypercholesterolemia, unspecified; E78.0 - Pure hypercholesterolemia   





(14) Multiple myeloma


Code(s): C90.00 - MULTIPLE MYELOMA NOT HAVING ACHIEVED REMISSION   


Qualifiers: 


   Multiple myeloma remission status: not in remission   Qualified Code(s): 

C90.00 - Multiple myeloma not having achieved remission   





(15) Acalculous cholecystitis


Code(s): K81.9 - CHOLECYSTITIS, UNSPECIFIED   





Assessment/Plan


R&LHc at [facility] 1/11/2017 showing nonobstructive CAD, severe LV apical 

hypertrophic cardiomyopathy, mildly elevated right sided pressures, Mynx 

deployed right CFA access site. Study is consistent with apical hypertrophy (

spade-like) variant of hypertrophic cardiomyopathy, planned for optimal medical 

therapy. 


Echocardiogram: 07/11/2018 Mod cLVH, severe DYANA, mod TR RVSP 50-60 mmHg, mod-

severe MR


Echocardiogram: 10/16/2018 Normal LV size with hyperdynamic LVEF 75%, mild BSH, 

normal RV size and fxn, severe LAE, mod-severe MR, mod TR





1. Acute hypoxic respiratory failure, suspected aspiration pneumonia with 

sepsis syndrome, remains intubated/acidosis this AM/combined 


2. Toxic metabolic encephelopathy, rule out CVA, no change in status 


3. 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


4. Paraproteinemia confirmed multiple myeloma


5. 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 

disease related to persistent atrial fibrillation with [id number] score of 6


6. CAD with evidence of demand ischemic injury, non obstructive CAD on [facility] 

coronary angiogrpahy angina pectoris


7. 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 

chronic class I-II NYHA classification LV failure, compensated/euvolemic


8. Persistent atrial fibrillation [id number] score of 6 currently on Heparin 

therapy


9. HTN


10. Anemia/thrombocytopenia


11. Acalculous Cholecystitis


12. Ischemic hepatitis


13. Resolved Hypernatremia





PLAN:





1. Evaluation and correction of acidosis


2. Heparin d/bolivar pending hemostasis, transfuse to maintain Hg equal or > 8.0, 

as outlined in prior notes ideally A/C therapy to be continued indefinitely 

unless it is absolutely contraindicated considering his [id number] score of 6 


3. Resume Lopressor as hemodynamics permit 


4. Antibiotics as per the primary team


5. Ventilator management as per the ICU team 


6. As outlined in prior notes overall poor prognosis (no improvement), family 

to decide regarding compassionate extubation versus tracheostomy, consider 

ethics consult

## 2018-11-28 NOTE — PN
Teaching Attending Note


Name of Resident: Ganga Mccormick





ATTENDING PHYSICIAN STATEMENT





I saw and evaluated the patient.


I reviewed the resident's note and discussed the case with the resident.


I agree with the resident's findings and plan as documented.








SUBJECTIVE:





Pt seen and examined in the ICU. Remains intubated, poorly responsive. 

Hypotensive this AM and H/H low without obvious source of bleeding. Family 

meeting yesterday without consensus but favoring tracheostomy.





OBJECTIVE:





 Vital Signs











 Period  Temp  Pulse  Resp  BP Sys/Varghese  Pulse Ox


 


 Last 24 Hr  98.1 F-99.2 F  59-95  14-18  76-96/51-67  








 Intake & Output











 11/25/18 11/26/18 11/27/18 11/28/18





 23:59 23:59 23:59 23:59


 


Intake Total 3310 3308 3894 2908


 


Output Total 2300 800 1700 400


 


Balance 1010 2508 2194 2508


 


Weight 89.3 kg 89.3 kg 90.038 kg 92.986 kg








Gen:  intubated, poorly responsive


Heart: RRR


Lung: scattered rhonchi


Abd: soft, nontender


Ext: + edema





 CBC, BMP





 11/28/18 05:30 





 11/28/18 05:30 





Active Medications





Acetaminophen (Tylenol -)  650 mg PO Q6H PRN


   PRN Reason: PAIN LEVEL 1 - 3


   Last Admin: 11/23/18 03:13 Dose:  650 mg


Allopurinol (Zyloprim -)  100 mg PO BID [facility]


   Last Admin: 11/28/18 10:00 Dose:  100 mg


Chlorhexidine Gluconate (Peridex -)  15 ml MM BID [facility]


   Last Admin: 11/28/18 10:02 Dose:  15 ml


Cholecalciferol (Vitamin D3 -)  1,000 unit PO DAILY [facility]


   Last Admin: 11/28/18 10:00 Dose:  1,000 unit


Diphenhydramine HCl (Benadryl Injection -)  25 mg IVPB ONCE PRN


   PRN Reason: DURING PLASMAPHERESIS


Metronidazole (Flagyl 500mg Premixed Ivpb -)  500 mg in 100 mls @ 100 mls/hr 

IVPB BID [facility]


   Last Admin: 11/28/18 10:45 Dose:  100 mls/hr


Levofloxacin (Levaquin 250 Mg Premixed Ivpb -)  250 mg in 50 mls @ 50 mls/hr 

IVPB DAILY [facility]; Protocol


   Last Admin: 11/28/18 10:01 Dose:  50 mls/hr


Fentanyl 500 mcg/ Dextrose  100 mls @ 5 mls/hr IVPB TITR [facility]; Protocol


   Last Titration: 11/28/18 06:00 Dose:  50 mcg/hr, 10 mls/hr


Sodium Chloride (Normal Saline -)  1,000 mls @ 125 mls/hr IV ASDIR [facility]


   Last Admin: 11/27/18 14:00 Dose:  125 mls/hr


Lactobacillus Acidophilus (Bacid -)  1 tab PO DAILY [facility]


   Last Admin: 11/28/18 10:00 Dose:  1 tab


Metoprolol Tartrate (Lopressor Injection -)  5 mg IVPUSH Q8H PRN


   PRN Reason: TACHYCARDIA


   Last Admin: 11/24/18 18:44 Dose:  5 mg


Metoprolol Tartrate (Lopressor -)  75 mg PO BID [facility]


   Last Admin: 11/28/18 10:19 Dose:  Not Given


Pantoprazole Sodium (Protonix Iv)  40 mg IVPUSH DAILY [facility]


   Last Admin: 11/28/18 10:01 Dose:  40 mg


Rifaximin (Xifaxan -)  550 mg PO BID [facility]


   Last Admin: 11/28/18 10:00 Dose:  550 mg


Thiamine HCl (Vitamin B1 -)  100 mg PO DAILY [facility]


   Last Admin: 11/28/18 10:00 Dose:  100 mg








ASSESSMENT AND PLAN:


Acute Hypoxic Respiratory Failure


Altered Mental Status


Metabolic Encephalopathy


Pneumonia


Acalculous Cholecystitis


Multiple Myeloma


Acute on Chronic Renal Failure


Metabolic Acidosis


LV Diastolic Dysfunction


Atrial Fibrillation


CAD


+Troponins likely Demand Ischemia


PAD


Hyperlipidemia


HTN


Anemia





-  transfuse PRBC


-  monitor H/H


-  if H/H continues to drop, will need CT A/P 


-  continue antibiotics


-  monitor urine output, creatinine


-  rate control


-  minimize sedation to assess mental status 


-  DVT/GI prophylaxis


-  continue discussions regarding goals of care, if unable to reach decision 

will need tracheostomy as he has been intubated for over 3 weeks


-  continue ICU monitoring


-  poor overall prognosis for meaningful recovery








critical care time spent in reviewing chart, evaluating patient and formulating 

plan 35 min

## 2018-11-28 NOTE — PN
Progress Note (short form)





- Note


Progress Note: 





Renal follow up for Hypercalcemia/HAM





Pt seen and examined in the ICU


on vent, not responsive


no overnight events 





 Vital Signs











Temperature  98.2 F   11/28/18 10:00


 


Pulse Rate  95 H  11/28/18 10:00


 


Respiratory Rate  18   11/28/18 10:00


 


Blood Pressure  96/67   11/28/18 10:00


 


O2 Sat by Pulse Oximetry (%)  100   11/28/18 09:00








 Intake & Output











 11/25/18 11/26/18 11/27/18 11/28/18





 23:59 23:59 23:59 23:59


 


Intake Total 3310 3308 3894 2908


 


Output Total 2300 800 1700 400


 


Balance 1010 2508 2194 2508


 


Weight 89.3 kg 89.3 kg 90.038 kg 92.986 kg





NAD


no LE edema


Making uriine via fenton 


 CBC, BMP





 11/28/18 05:30 





 11/28/18 05:30 


 Laboratory Tests











  11/26/18 11/27/18 11/28/18





  05:30 05:30 05:30


 


Calcium  6.9 L*   6.4 L*


 


Phosphorus  3.6  4.4  4.2


 


Magnesium  1.9  1.7 L  1.9


 


Albumin  1.2 L  1.1 L  0.9 L











 Current Medications





Acetaminophen (Tylenol -)  650 mg PO Q6H PRN


   PRN Reason: PAIN LEVEL 1 - 3


   Last Admin: 11/23/18 03:13 Dose:  650 mg


Allopurinol (Zyloprim -)  100 mg PO BID [facility]


   Last Admin: 11/28/18 10:00 Dose:  100 mg


Chlorhexidine Gluconate (Peridex -)  15 ml MM BID [facility]


   Last Admin: 11/28/18 10:02 Dose:  15 ml


Cholecalciferol (Vitamin D3 -)  1,000 unit PO DAILY [facility]


   Last Admin: 11/28/18 10:00 Dose:  1,000 unit


Diphenhydramine HCl (Benadryl Injection -)  25 mg IVPB ONCE PRN


   PRN Reason: DURING PLASMAPHERESIS


Metronidazole (Flagyl 500mg Premixed Ivpb -)  500 mg in 100 mls @ 100 mls/hr 

IVPB BID [facility]


   Last Admin: 11/28/18 10:45 Dose:  100 mls/hr


Levofloxacin (Levaquin 250 Mg Premixed Ivpb -)  250 mg in 50 mls @ 50 mls/hr 

IVPB DAILY [facility]; Protocol


   Last Admin: 11/28/18 10:01 Dose:  50 mls/hr


Fentanyl 500 mcg/ Dextrose  100 mls @ 5 mls/hr IVPB TITR [facility]; Protocol


   Last Titration: 11/28/18 06:00 Dose:  50 mcg/hr, 10 mls/hr


Sodium Chloride (Normal Saline -)  1,000 mls @ 125 mls/hr IV ASDIR [facility]


   Last Admin: 11/27/18 14:00 Dose:  125 mls/hr


Lactobacillus Acidophilus (Bacid -)  1 tab PO DAILY [facility]


   Last Admin: 11/28/18 10:00 Dose:  1 tab


Metoprolol Tartrate (Lopressor Injection -)  5 mg IVPUSH Q8H PRN


   PRN Reason: TACHYCARDIA


   Last Admin: 11/24/18 18:44 Dose:  5 mg


Metoprolol Tartrate (Lopressor -)  75 mg PO BID [facility]


   Last Admin: 11/28/18 10:19 Dose:  Not Given


Pantoprazole Sodium (Protonix Iv)  40 mg IVPUSH DAILY [facility]


   Last Admin: 11/28/18 10:01 Dose:  40 mg


Rifaximin (Xifaxan -)  550 mg PO BID [facility]


   Last Admin: 11/28/18 10:00 Dose:  550 mg


Thiamine HCl (Vitamin B1 -)  100 mg PO DAILY [facility]


   Last Admin: 11/28/18 10:00 Dose:  100 mg











78 year old gentleman with hx of CKD, Afib on A/C, CAD, CKD, Hypertension, 

Hyperlipidemia, PVD who presented with AMS/Confusion and found to have HAM.





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 

showed no stones or obstruction, UA w/o protein but UPCR ~0.5 indicating non-

albumin proteinuria)


#AMS 


#Newly diagnosed multiple myloma 


#Anemia 


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


#Hyperdense lesion on US of the kidney 


#Normal anion gap metabolic acidosis 


#Hyperkalemia (now resolved)





Renal function stable at this time 


continue supportive care


continue tube feeds with free water


Trend renal function and electrolytes daily





Ricky Coronado DO

## 2018-11-28 NOTE — PN
Progress Note, Physician





- Current Medication List


Current Medications: 


Active Medications





Acetaminophen (Tylenol -)  650 mg PO Q6H PRN


   PRN Reason: PAIN LEVEL 1 - 3


   Last Admin: 11/23/18 03:13 Dose:  650 mg


Allopurinol (Zyloprim -)  100 mg PO BID [facility]


   Last Admin: 11/27/18 21:25 Dose:  100 mg


Chlorhexidine Gluconate (Peridex -)  15 ml MM BID [facility]


   Last Admin: 11/27/18 21:26 Dose:  Not Given


Cholecalciferol (Vitamin D3 -)  1,000 unit PO DAILY [facility]


   Last Admin: 11/27/18 10:00 Dose:  1,000 unit


Diphenhydramine HCl (Benadryl Injection -)  25 mg IVPB ONCE PRN


   PRN Reason: DURING PLASMAPHERESIS


Metronidazole (Flagyl 500mg Premixed Ivpb -)  500 mg in 100 mls @ 100 mls/hr 

IVPB BID [facility]


   Last Admin: 11/27/18 21:27 Dose:  100 mls/hr


Levofloxacin (Levaquin 250 Mg Premixed Ivpb -)  250 mg in 50 mls @ 50 mls/hr 

IVPB DAILY [facility]; Protocol


   Last Admin: 11/27/18 09:59 Dose:  50 mls/hr


Fentanyl 500 mcg/ Dextrose  100 mls @ 5 mls/hr IVPB TITR [facility]; Protocol


   Last Titration: 11/28/18 06:00 Dose:  50 mcg/hr, 10 mls/hr


Sodium Chloride (Normal Saline -)  1,000 mls @ 125 mls/hr IV ASDIR [facility]


   Last Admin: 11/27/18 14:00 Dose:  125 mls/hr


Lactobacillus Acidophilus (Bacid -)  1 tab PO DAILY [facility]


   Last Admin: 11/27/18 10:00 Dose:  1 tab


Metoprolol Tartrate (Lopressor Injection -)  5 mg IVPUSH Q8H PRN


   PRN Reason: TACHYCARDIA


   Last Admin: 11/24/18 18:44 Dose:  5 mg


Metoprolol Tartrate (Lopressor -)  75 mg PO BID [facility]


   Last Admin: 11/27/18 21:26 Dose:  Not Given


Pantoprazole Sodium (Protonix Iv)  40 mg IVPUSH DAILY [facility]


   Last Admin: 11/27/18 10:00 Dose:  40 mg


Rifaximin (Xifaxan -)  550 mg PO BID [facility]


   Last Admin: 11/27/18 21:25 Dose:  550 mg


Thiamine HCl (Vitamin B1 -)  100 mg PO DAILY [facility]


   Last Admin: 11/27/18 10:00 Dose:  100 mg











- Objective


Vital Signs: 


 Vital Signs











Temperature  99.2 F   11/28/18 06:00


 


Pulse Rate  90   11/28/18 06:00


 


Respiratory Rate  18   11/28/18 06:40


 


Blood Pressure  89/64 L  11/28/18 06:00


 


O2 Sat by Pulse Oximetry (%)  100   11/27/18 20:05











Cardiovascular: Yes: S1, S2


Respiratory: Yes: Mechanically Ventilated


Gastrointestinal: Yes: Normal Bowel Sounds, Soft


Labs: 


 CBC, BMP





 11/28/18 05:30 





 11/28/18 05:30 





 INR, PTT











INR  1.28  (0.83-1.09)  H  11/15/18  05:30    


 


Fibrinogen  290.0 mg/dL (238-498)   11/11/18  06:00    














Problem List





- Problems


(1) Toxic metabolic encephalopathy


Code(s): G92 - TOXIC ENCEPHALOPATHY   





(2) Cellulitis


Code(s): L03.90 - CELLULITIS, UNSPECIFIED   


Qualifiers: 


   Site of cellulitis: extremity   Site of cellulitis of extremity: lower 

extremity   Laterality: right   Qualified Code(s): L03.115 - Cellulitis of 

right lower limb   





(3) Sepsis


Code(s): A41.9 - SEPSIS, UNSPECIFIED ORGANISM   





(4) Artery occlusion


Code(s): I70.90 - UNSPECIFIED ATHEROSCLEROSIS   





(5) Hypercalcemia


Code(s): E83.52 - HYPERCALCEMIA   





(6) Paroxysmal atrial fibrillation


Code(s): I48.0 - PAROXYSMAL ATRIAL FIBRILLATION   





(7) HAM (acute kidney injury)


Code(s): N17.9 - ACUTE KIDNEY FAILURE, UNSPECIFIED   





Assessment/Plan





- Problems


(1) Elevated LFTs


Assessment/Plan: 


ultrasound of liver noted suggestive of acalculous cholecystitis, will get GI 

consult- noted 


lft trending down





Code(s): R94.5 - ABNORMAL RESULTS OF LIVER FUNCTION STUDIES   





(2) HAM (acute kidney injury)


Assessment/Plan: 


HAM vs ATN- 


iv calcium- repleted


potassium now normal range- hyperkalemia improved


ultrasound hyperdense lesion noted in left renal cortex


Code(s): N17.9 - ACUTE KIDNEY FAILURE, UNSPECIFIED   





(3) Atrial fibrillation with RVR


Assessment/Plan: 


heparin drip --dc due to anemia


cardiac monitor


rate control with metoprolol








(4) Respiratory failure


Assessment/Plan: 


intubated


propofol/fentanyl


aviating family decision regarding goals of care possible compassionate weaning 

vs tracheostomy


Code(s): J96.90 - RESPIRATORY FAILURE, UNSP, UNSP W HYPOXIA OR HYPERCAPNIA   





(5) Toxic metabolic encephalopathy


Assessment/Plan: 


Microbiology





11/14/18 11:20   Urine - Urine Garces   Urine Culture - Final


                              NO GROWTH OBTAINED


11/06/18 14:30   Urine - Urine Garces   Legionella Antigen - Final


11/06/18 14:30   Urine - Urine Garces   Streptococcus pneumoniae Antigen (M - 

Final


11/06/18 14:30   Sputum - Endotrachea Suction/Ventilator   Gram Stain - Final


11/06/18 14:30   Sputum - Endotrachea Suction/Ventilator   Sputum Culture - 

Final


                              Stenotrophomon.(X.)Maltophilia


11/14/18 09:58   Blood - Peripheral Venous   Blood Culture - Preliminary


                              NO GROWTH OBTAINED AFTER 24 HOURS, INCUBATION TO 

CONTINUE


                              FOR 4 DAYS.


11/14/18 09:50   Blood - Peripheral Venous   Blood Culture - Preliminary


                              NO GROWTH OBTAINED AFTER 24 HOURS, INCUBATION TO 

CONTINUE


                              FOR 4 DAYS.


11/10/18 15:00   Blood - Peripheral Venous   Blood Culture - Preliminary


                              NO GROWTH OBTAINED AFTER 96 HOURS, INCUBATION TO 

CONTINUE


                              FOR 1 DAYS.


11/10/18 14:00   Blood - Central Line   Blood Culture - Preliminary


                              NO GROWTH OBTAINED AFTER 96 HOURS, INCUBATION TO 

CONTINUE


                              FOR 1 DAYS.





RLL PNA


levaquin/flagyl


Code(s): G92 - TOXIC ENCEPHALOPATHY   





(6) Anemia


Assessment/Plan: 





-Hold Heparin


Trnsfuse

## 2018-11-28 NOTE — PN
Physical Exam: 


SUBJECTIVE: Patient seen and examined in the ICU.  Remains intubated, poorly 

responsive but now on fentanyl gtt. opens eyes to voice. No gross change in 

overall mental status. No pressors. Hypotensive this AM and H/H low without 

obvious source of bleeding. Family meeting yesterday without consensus but 

favoring tracheostomy.   











OBJECTIVE:





 Vital Signs











 Period  Temp  Pulse  Resp  BP Sys/Varghese  Pulse Ox


 


 Last 24 Hr  98.1 F-99.2 F  59-95  14-18  76-96/51-67  











GENERAL: Remains intubated, poorly responsive off sedation but opens eyes to 

voice.


HEAD: 


EYES:  sclera anicteric. NCAT PERRL


ENT: Ears normal, nares patent, dry mucous membranes


NECK: Trachea midline. 


LUNGS: bilateral diffuse rhonchi


HEART: reg rate and irregular rhythm. 


ABDOMEN: Soft, nondistended NT


EXTREMITIES: 2+ pulses, warm, well-perfused, no edema, scattered ulcers. 


NEUROLOGICAL: difficult to assess secondary to clinical condition. gag reflex 

does not appear to be present. opens eyes to voice. corneal reflex+, +gag 

reflex 


SKIN: Warm, dry, normal turgor, scattered ulcers on the legs














 Laboratory Results - last 24 hr











  11/27/18 11/28/18 11/28/18





  18:50 05:30 05:30


 


WBC   4.1 


 


RBC   2.23 L 


 


Hgb   6.7 L* 


 


Hct   20.4 L D 


 


MCV   91.7 


 


MCH   30.2 


 


MCHC   32.9 


 


RDW   15.5 


 


Plt Count   84 L 


 


MPV   10.3 


 


Absolute Neuts (auto)   3.0 


 


Neutrophils %   72.7 


 


Lymphocytes %   20.8 


 


Monocytes %   3.3 L 


 


Eosinophils %   2.8 


 


Basophils %   0.4 


 


Nucleated RBC %   4 H 


 


PTT (Actin FS)  84.4 H  


 


Anticoagulation Therapy   


 


Puncture Site   


 


ABG pH   


 


ABG pCO2 at Pt Temp   


 


ABG pO2 at Pt Temp   


 


ABG HCO3   


 


ABG O2 Sat (Measured)   


 


ABG O2 Content   


 


ABG Base Excess   


 


Chacho Test   


 


O2 Delivery Device   


 


Oxygen Flow Rate   


 


Vent Mode   


 


Vent Rate   


 


Mechanical Rate   


 


PEEP   


 


Pressure Support Vent   


 


Sodium    140


 


Potassium    4.7


 


Chloride    115 H


 


Carbon Dioxide    21


 


Anion Gap    4 L


 


BUN    63 H


 


Creatinine    1.7 H


 


Creat Clearance w eGFR    39.07


 


Random Glucose    99


 


Calcium    6.4 L*


 


Phosphorus    4.2


 


Magnesium    1.9


 


Total Bilirubin    0.3


 


AST    46 H


 


ALT    33


 


Alkaline Phosphatase    128 H


 


Total Protein    7.9


 


Albumin    0.9 L


 


Blood Type   


 


Antibody Screen   


 


Crossmatch   














  11/28/18 11/28/18 11/28/18





  05:30 06:20 08:30


 


WBC   


 


RBC   


 


Hgb   


 


Hct   


 


MCV   


 


MCH   


 


MCHC   


 


RDW   


 


Plt Count   


 


MPV   


 


Absolute Neuts (auto)   


 


Neutrophils %   


 


Lymphocytes %   


 


Monocytes %   


 


Eosinophils %   


 


Basophils %   


 


Nucleated RBC %   


 


PTT (Actin FS)  74.7 H  


 


Anticoagulation Therapy   No Result Required. 


 


Puncture Site   Right radial 


 


ABG pH   7.34 L 


 


ABG pCO2 at Pt Temp   35.8  D 


 


ABG pO2 at Pt Temp   119.0 H D 


 


ABG HCO3   18.7 L 


 


ABG O2 Sat (Measured)   98.3 


 


ABG O2 Content   11.0 L 


 


ABG Base Excess   -6.0 L 


 


Chacho Test   No Result Required. 


 


O2 Delivery Device   Mech vent 


 


Oxygen Flow Rate   40 


 


Vent Mode   A/c 


 


Vent Rate   18 


 


Mechanical Rate   No Result Required. 


 


PEEP   5.0 


 


Pressure Support Vent   500 


 


Sodium   


 


Potassium   


 


Chloride   


 


Carbon Dioxide   


 


Anion Gap   


 


BUN   


 


Creatinine   


 


Creat Clearance w eGFR   


 


Random Glucose   


 


Calcium   


 


Phosphorus   


 


Magnesium   


 


Total Bilirubin   


 


AST   


 


ALT   


 


Alkaline Phosphatase   


 


Total Protein   


 


Albumin   


 


Blood Type    B POSITIVE


 


Antibody Screen    Negative


 


Crossmatch    See Detail








Active Medications











Generic Name Dose Route Start Last Admin





  Trade Name Freq  PRN Reason Stop Dose Admin


 


Acetaminophen  650 mg  11/10/18 14:09  11/23/18 03:13





  Tylenol -  PO   650 mg





  Q6H PRN   Administration





  PAIN LEVEL 1 - 3   





     





     





     


 


Allopurinol  100 mg  11/09/18 10:00  11/28/18 10:00





  Zyloprim -  PO   100 mg





  BID [facility]   Administration





     





     





     





     


 


Chlorhexidine Gluconate  15 ml  11/10/18 23:45  11/28/18 10:02





  Peridex -  MM   15 ml





  BID [facility]   Administration





     





     





     





     


 


Cholecalciferol  1,000 unit  11/18/18 10:00  11/28/18 10:00





  Vitamin D3 -  PO   1,000 unit





  DAILY [facility]   Administration





     





     





     





     


 


Diphenhydramine HCl  25 mg  11/09/18 12:00  





  Benadryl Injection -  IVPB   





  ONCE PRN   





  DURING PLASMAPHERESIS   





     





     





     


 


Metronidazole  500 mg in 100 mls @ 100 mls/hr  11/17/18 14:30  11/28/18 10:45





  Flagyl 500mg Premixed Ivpb -  IVPB   100 mls/hr





  BID [facility]   Administration





     





     





     





     


 


Levofloxacin  250 mg in 50 mls @ 50 mls/hr  11/19/18 12:45  11/28/18 10:01





  Levaquin 250 Mg Premixed Ivpb -  IVPB   50 mls/hr





  DAILY [facility]   Administration





     





     





  Protocol   





     


 


Fentanyl 500 mcg/ Dextrose  100 mls @ 5 mls/hr  11/25/18 19:00  11/28/18 06:00





  IVPB   50 mcg/hr





  TITR [facility]   10 mls/hr





     Titration





     





  Protocol   





  25 MCG/HR   


 


Sodium Chloride  1,000 mls @ 125 mls/hr  11/27/18 12:00  11/27/18 14:00





  Normal Saline -  IV   125 mls/hr





  ASDIR [facility]   Administration





     





     





     





     


 


Lactobacillus Acidophilus  1 tab  11/25/18 10:00  11/28/18 10:00





  Bacid -  PO   1 tab





  DAILY [facility]   Administration





     





     





     





     


 


Metoprolol Tartrate  5 mg  11/09/18 13:04  11/24/18 18:44





  Lopressor Injection -  IVPUSH   5 mg





  Q8H PRN   Administration





  TACHYCARDIA   





     





     





     


 


Metoprolol Tartrate  75 mg  11/16/18 08:30  11/28/18 10:19





  Lopressor -  PO   Not Given





  BID [facility]   





     





     





     





     


 


Pantoprazole Sodium  40 mg  11/07/18 12:00  11/28/18 10:01





  Protonix Iv  IVPUSH   40 mg





  DAILY [facility]   Administration





     





     





     





     


 


Rifaximin  550 mg  11/15/18 22:00  11/28/18 10:00





  Xifaxan -  PO   550 mg





  BID [facility]   Administration





     





     





     





     


 


Thiamine HCl  100 mg  10/30/18 22:12  11/28/18 10:00





  Vitamin B1 -  PO   100 mg





  DAILY [facility]   Administration





     





     





     





     











ASSESSMENT/PLAN:


77 yo m PMH of A-Fib, Acute on chronic kidney injury, CAD w stents, 

hypercalcemia, medication non-adherence, paraproteinemia, thromboembolic disease

, diastolic heart dysfunction without failure, HTN, HLD, hypertrophic 

cardiomyopathy is here after a rapid response. He was on the floors when it was 

noticed that he has respiratory insufficiency and was desaturating, requiring 

ICU monitoring. GOC/family meeting initiated and ongoing. Plan to have phone 

conference today. 





GI: 


metabolic encephalopathy 


-ammonia stable in 60s w/ rifaximin, 


s/p lactulose 


-LFTs elevated but downtrending. transamintitis most likely secondary to 

ischemic hepatits which is multifactorial including sepsis, episodes of 

hypotension and hyperviscosity syndrome. 





acalculous cholecystitis?


US shows thickened gallbladder wall, pericholecystic fluid, suspicious for 

acalculous cholecystitis. There was also mild dilatation of the CBD but that 

might be normal for age. 


-Spoke w/ IR, who feel image does not represent  acalculous cholecystitis and 

does not require any IR drainage 





ID: 


No fevers recorded. 


- Rhonchi heard on Lung auscultation


-RLL pneumonia


bcx 11/19/18 neg 


off of ceftazidime


Vancomycin Redosed 11/19/18 


C diff neg 11/25/18


- c/w levaquin/flagyl. 


- ID on board


- c/w rifaximin for elevated ammonia level





Cardio: 


Afib -[id number] score of 6 


- Lopressor 75 mg BID PO


-IV metoprolol 5 mg PRN


- diastolic dysfunction + hypertrophic cardiomyopathy


- CAD with multiple stents


- Cardio consulted and on board.


off Xarelto 15 qd (renal dosing) while on heparin gtt. transfuse to maintain Hgb

>8.0 


s/p 1 u prbc 11/20/18, Heparin with caution considering the dropping Hg, 

transfuse to maintain Hg equal or > 8.0, as outlined in prior notes ideally A/C 

therapy to be continued indefinitely unless it is absolutely contraindicated 

considering his [id number] score of 6 


- Sporadically goes in and out of V-Tach - Can give amio if v-tach is sustained 

and persistent. 


-Hypotensive this AM and H/H low without obvious source of bleeding. will give 

2 U prbcs 





Pulm: 


- Intubated


- Patient has b/l pleural effusions.


- No pneumothorax


- b/l diffuse rhonchi


- infiltrate on CXR: Abx- Substituting levaquin for ceftazidime


Vancomycin Redosed 11/19/18 


- c/w levaquin/flagyl. 





Renal: 


- Acute on Chronic kidney injury


HAM ATN vs. Cast nephropathy  (FeNa was 2.1% indicating tubular injury, US 

showed no stones or obstruction, UA w/o protein but UPCR ~0.5 indicating non-

albumin proteinuria)


- Renal consulted and on board. 


Hyperkalemia (r/o tumor lysis)


serum K is improved, s/p bicarb gtt





Neuro: 


- Patient is not alert or oriented.


Remains intubated, poorly responsive but now on fentanyl gtt. opens eyes to 

voice


- Head CT showed no acute pathology


- Neuro consulted and on board. (Dr. Youngblood + Dr. phan) 


-ammonia stable in 60s w/ rifaximin, lactulose dcd 





Heme/Onc: 


- monitor H/H


- Will transfuse to keep hb > 8 


- Patient not receiving plasmapharesis today. 


- Paraproteinemia


- Patient fulfills new criteria  for multiple myeloma with free kappa/ free 

lambda light chain  ratio of 432 (>100 is diagnostic of myeloma) 


- Kappa/Lambda ratio > 100 consistent with Multiple myeloma


- M spike elevated elevated at 6.8 previously 4.7 


-steroids being held at this time 


s/p 1 u prbc 11/20/18 and 1 u prbc 11/22/18, Heparin with caution considering 

the dropping Hg, transfuse to maintain Hg equal or > 8.0, as outlined in prior 

notes ideally A/C therapy to be continued indefinitely unless it is absolutely 

contraindicated considering his [id number] score of 6 


-Hypotensive this AM and H/H low (6.7) without obvious source of bleeding. will 

give 2 U prbcs and dc heparin


-platelets are low and downtrending. will check coags, fibrinogen, and HIT AB


- if H/H continues to drop, will need CT A/P 





Endo: 


- Parathyroid hormone WNL


- PTH-borderline low appropriate given hypercalcemia, PTHrP low





Prophylaxis: 


- Hypotensive this AM and H/H low (6.7) without obvious source of bleeding. 

will give 2 U prbcs and dc heparin


-platelets are low and downtrending. will check coags, fibrinogen, and HIT AB


transfuse to maintain Hg equal or > 8.0, as outlined in prior notes ideally A/C 

therapy to be continued indefinitely unless it is absolutely contraindicated 

considering his [id number] score of 6


- Protonix





F/E/N: 


F: c/w NS 125cc/hr. H/H low, dc heparin, 2 U prbc


E: Will replete lytes PRN


N: tube feeds (low potassium feeds). with free water 





Code Status: Full Code 


- GOC/family meeting initiated and ongoing. Family meeting yesterday without 

consensus but favoring tracheostomy. if unable to reach decision will need 

tracheostomy as he is approaching 3 weeks intubated. See palliative care notes 

for further explanation. 


- Compassionate extubation versus Tracheostomy





Dispo: 


Patient will continue to receive ICU level care


poor overall prognosis for meaningful recovery

















Visit type





- Emergency Visit


Emergency Visit: Yes


ED Registration Date: 10/30/18


Care time: The patient presented to the Emergency Department on the above date 

and was hospitalized for further evaluation of their emergent condition.





- New Patient


This patient is new to me today: Yes


Date on this admission: 11/28/18





- Critical Care


Critical Care patient: Yes


Total Critical Care Time (in minutes): 38


Critical Care Statement: The care of this patient involved high complexity 

decision making to prevent further life threatening deterioration of the patient

's condition and/or to evaluate & treat vital organ system(s) failure or risk 

of failure.

## 2018-11-29 LAB
ALBUMIN SERPL-MCNC: 1 G/DL (ref 3.4–5)
ALP SERPL-CCNC: 136 U/L (ref 45–117)
ALT SERPL-CCNC: 31 U/L (ref 13–61)
ANION GAP SERPL CALC-SCNC: 5 MMOL/L (ref 8–16)
ANISOCYTOSIS BLD QL: (no result)
ARTERIAL BLOOD GAS PCO2: 34.5 MMHG (ref 35–45)
ARTERIAL PATENCY WRIST A: POSITIVE
AST SERPL-CCNC: 44 U/L (ref 15–37)
BASE EXCESS BLDA CALC-SCNC: -7.2 MEQ/L (ref -2–2)
BASOPHILS # BLD: 0.4 % (ref 0–2)
BASOPHILS # BLD: 0.9 % (ref 0–2)
BILIRUB SERPL-MCNC: 0.3 MG/DL (ref 0.2–1)
BUN SERPL-MCNC: 57 MG/DL (ref 7–18)
CALCIUM SERPL-MCNC: 6.4 MG/DL (ref 8.5–10.1)
CHLORIDE SERPL-SCNC: 116 MMOL/L (ref 98–107)
CO2 SERPL-SCNC: 19 MMOL/L (ref 21–32)
CREAT SERPL-MCNC: 1.4 MG/DL (ref 0.55–1.3)
DEPRECATED RDW RBC AUTO: 16.5 % (ref 11.9–15.9)
DEPRECATED RDW RBC AUTO: 16.6 % (ref 11.9–15.9)
EOSINOPHIL # BLD: 2 % (ref 0–4.5)
EOSINOPHIL # BLD: 2.4 % (ref 0–4.5)
GLUCOSE SERPL-MCNC: 98 MG/DL (ref 74–106)
HCT VFR BLD CALC: 25.1 % (ref 35.4–49)
HCT VFR BLD CALC: 25.4 % (ref 35.4–49)
HGB BLD-MCNC: 8.4 GM/DL (ref 11.7–16.9)
HGB BLD-MCNC: 8.8 GM/DL (ref 11.7–16.9)
LYMPHOCYTES # BLD: 16.3 % (ref 8–40)
LYMPHOCYTES # BLD: 19.6 % (ref 8–40)
MAGNESIUM SERPL-MCNC: 1.6 MG/DL (ref 1.8–2.4)
MCH RBC QN AUTO: 29.5 PG (ref 25.7–33.7)
MCH RBC QN AUTO: 31 PG (ref 25.7–33.7)
MCHC RBC AUTO-ENTMCNC: 33.2 G/DL (ref 32–35.9)
MCHC RBC AUTO-ENTMCNC: 35.2 G/DL (ref 32–35.9)
MCV RBC: 88.1 FL (ref 80–96)
MCV RBC: 88.7 FL (ref 80–96)
MONOCYTES # BLD AUTO: 3.2 % (ref 3.8–10.2)
MONOCYTES # BLD AUTO: 3.4 % (ref 3.8–10.2)
NEUTROPHILS # BLD: 74.1 % (ref 42.8–82.8)
NEUTROPHILS # BLD: 77.7 % (ref 42.8–82.8)
PHOSPHATE SERPL-MCNC: 3.6 MG/DL (ref 2.5–4.9)
PLATELET # BLD AUTO: 100 K/MM3 (ref 134–434)
PLATELET # BLD AUTO: 76 K/MM3 (ref 134–434)
PLATELET BLD QL SMEAR: (no result)
PMV BLD: 10.4 FL (ref 7.5–11.1)
PMV BLD: 10.4 FL (ref 7.5–11.1)
PO2 BLDA: 111 MMHG (ref 70–100)
POTASSIUM SERPLBLD-SCNC: 4.5 MMOL/L (ref 3.5–5.1)
PROT SERPL-MCNC: 7.6 G/DL (ref 6.4–8.2)
RBC # BLD AUTO: 2.85 M/MM3 (ref 4–5.6)
RBC # BLD AUTO: 2.86 M/MM3 (ref 4–5.6)
SAO2 % BLDA: 97.6 % (ref 90–98.9)
SODIUM SERPL-SCNC: 140 MMOL/L (ref 136–145)
WBC # BLD AUTO: 3.6 K/MM3 (ref 4–10)
WBC # BLD AUTO: 4.2 K/MM3 (ref 4–10)

## 2018-11-29 RX ADMIN — RIFAXIMIN SCH MG: 550 TABLET ORAL at 09:55

## 2018-11-29 RX ADMIN — Medication SCH MG: at 09:55

## 2018-11-29 RX ADMIN — DEXTROSE MONOHYDRATE SCH MLS/HR: 50 INJECTION, SOLUTION INTRAVENOUS at 16:05

## 2018-11-29 RX ADMIN — VITAMIN D, TAB 1000IU (100/BT) SCH UNIT: 25 TAB at 09:55

## 2018-11-29 RX ADMIN — ALLOPURINOL SCH MG: 100 TABLET ORAL at 09:55

## 2018-11-29 RX ADMIN — METOPROLOL TARTRATE SCH MG: 25 TABLET, FILM COATED ORAL at 22:36

## 2018-11-29 RX ADMIN — SODIUM CHLORIDE, POTASSIUM CHLORIDE, SODIUM LACTATE AND CALCIUM CHLORIDE SCH MLS/HR: 600; 310; 30; 20 INJECTION, SOLUTION INTRAVENOUS at 08:48

## 2018-11-29 RX ADMIN — PANTOPRAZOLE SODIUM SCH MG: 40 INJECTION, POWDER, FOR SOLUTION INTRAVENOUS at 22:36

## 2018-11-29 RX ADMIN — METOPROLOL TARTRATE PRN MG: 5 INJECTION, SOLUTION INTRAVENOUS at 16:04

## 2018-11-29 RX ADMIN — Medication SCH TAB: at 09:55

## 2018-11-29 RX ADMIN — METOPROLOL TARTRATE SCH: 50 TABLET, FILM COATED ORAL at 09:56

## 2018-11-29 RX ADMIN — CHLORHEXIDINE GLUCONATE 0.12% ORAL RINSE SCH ML: 1.2 LIQUID ORAL at 09:57

## 2018-11-29 RX ADMIN — PANTOPRAZOLE SODIUM SCH MG: 40 INJECTION, POWDER, FOR SOLUTION INTRAVENOUS at 09:56

## 2018-11-29 RX ADMIN — CHLORHEXIDINE GLUCONATE 0.12% ORAL RINSE SCH ML: 1.2 LIQUID ORAL at 22:36

## 2018-11-29 RX ADMIN — RIFAXIMIN SCH MG: 550 TABLET ORAL at 22:36

## 2018-11-29 RX ADMIN — ALLOPURINOL SCH MG: 100 TABLET ORAL at 22:36

## 2018-11-29 NOTE — PN
Progress Note, Physician


Chief Complaint: 





AWAITING TRACHEOSTOMY


ICU TEAM HAS BEEN CALLING FAMILY FOR CONSENT


3/4 NEXT OF KINS AGREE ON HAVING TRACHEOSTOMY.





- Current Medication List


Current Medications: 


Active Medications





Acetaminophen (Tylenol -)  650 mg PO Q6H PRN


   PRN Reason: PAIN LEVEL 1 - 3


   Last Admin: 11/23/18 03:13 Dose:  650 mg


Allopurinol (Zyloprim -)  100 mg PO BID [facility]


   Last Admin: 11/29/18 09:55 Dose:  100 mg


Chlorhexidine Gluconate (Peridex -)  15 ml MM BID [facility]


   Last Admin: 11/29/18 09:57 Dose:  15 ml


Cholecalciferol (Vitamin D3 -)  1,000 unit PO DAILY [facility]


   Last Admin: 11/29/18 09:55 Dose:  1,000 unit


Diphenhydramine HCl (Benadryl Injection -)  25 mg IVPB ONCE PRN


   PRN Reason: DURING PLASMAPHERESIS


Metronidazole (Flagyl 500mg Premixed Ivpb -)  500 mg in 100 mls @ 100 mls/hr 

IVPB BID [facility]


   Last Admin: 11/29/18 09:55 Dose:  100 mls/hr


Levofloxacin (Levaquin 250 Mg Premixed Ivpb -)  250 mg in 50 mls @ 50 mls/hr 

IVPB DAILY [facility]; Protocol


   Last Admin: 11/29/18 09:55 Dose:  50 mls/hr


Fentanyl 500 mcg/ Dextrose  100 mls @ 5 mls/hr IVPB TITR [facility]; Protocol


   Last Admin: 11/28/18 18:34 Dose:  50 mcg/hr, 10 mls/hr


Lactated Ringer's (Lactated Ringers Solution)  1,000 ml in 1,000 mls @ 125 mls/

hr IV ASDIR [facility]


   Last Admin: 11/29/18 08:48 Dose:  125 mls/hr


Lactobacillus Acidophilus (Bacid -)  1 tab PO DAILY [facility]


   Last Admin: 11/29/18 09:55 Dose:  1 tab


Metoprolol Tartrate (Lopressor Injection -)  5 mg IVPUSH Q8H PRN


   PRN Reason: TACHYCARDIA


   Last Admin: 11/24/18 18:44 Dose:  5 mg


Metoprolol Tartrate (Lopressor -)  25 mg NGT BID [facility]


Pantoprazole Sodium (Protonix Iv)  40 mg IVPUSH BID [facility]


Rifaximin (Xifaxan -)  550 mg PO BID [facility]


   Last Admin: 11/29/18 09:55 Dose:  550 mg


Thiamine HCl (Vitamin B1 -)  100 mg PO DAILY [facility]


   Last Admin: 11/29/18 09:55 Dose:  100 mg











- Objective


Vital Signs: 


 Vital Signs











Temperature  98.3 F   11/29/18 06:00


 


Pulse Rate  98 H  11/29/18 10:00


 


Respiratory Rate  18   11/29/18 10:45


 


Blood Pressure  96/64   11/29/18 10:00


 


O2 Sat by Pulse Oximetry (%)  100   11/29/18 10:00











Constitutional: Yes: Other


Cardiovascular: Yes: Pulse Irregular


Respiratory: Yes: Diminished, Mechanically Ventilated


Gastrointestinal: Yes: Soft


...Rectal Exam: Yes: Other (RECTAL TUBE)


Genitourinary: Yes: Garces Present


Musculoskeletal: Yes: Muscle Weakness


Extremities: Yes: Other


Edema: Yes


Neurological: Yes: Unresponsive


Labs: 


 CBC, BMP





 11/29/18 05:30 





 11/29/18 05:30 





 INR, PTT











INR  1.49  (0.83-1.09)  H  11/28/18  12:50    


 


Fibrinogen  323.0 mg/dL (238-498)   11/28/18  12:50    














Problem List





- Problems


(1) HAM (acute kidney injury)


Code(s): N17.9 - ACUTE KIDNEY FAILURE, UNSPECIFIED   





(2) Atrial fibrillation with RVR


Code(s): I48.91 - UNSPECIFIED ATRIAL FIBRILLATION   





(3) Hyperlipidemia


Code(s): E78.5 - HYPERLIPIDEMIA, UNSPECIFIED   


Qualifiers: 


   Hyperlipidemia type: pure hypercholesterolemia   Qualified Code(s): E78.00 - 

Pure hypercholesterolemia, unspecified; E78.0 - Pure hypercholesterolemia   





(4) Hypothermia


Code(s): T68.XXXA - HYPOTHERMIA, INITIAL ENCOUNTER   


Qualifiers: 


   Encounter type: initial encounter   Qualified Code(s): T68.XXXA - Hypothermia

, initial encounter   





(5) Acute-on-chronic kidney injury


Code(s): N17.9 - ACUTE KIDNEY FAILURE, UNSPECIFIED; N18.9 - CHRONIC KIDNEY 

DISEASE, UNSPECIFIED   


Qualifiers: 


   Acute renal failure type: unspecified   Chronic kidney disease stage: 

unspecified stage   Qualified Code(s): N17.9 - Acute kidney failure, unspecified

; N18.9 - Chronic kidney disease, unspecified   





(6) Generalized weakness


Code(s): R53.1 - WEAKNESS   





(7) History of ETOH abuse


Code(s): Z87.898 - PERSONAL HISTORY OF OTHER SPECIFIED CONDITIONS   





(8) Hypercalcemia


Code(s): E83.52 - HYPERCALCEMIA   





(9) Hypertrophic cardiomyopathy


Code(s): I42.2 - OTHER HYPERTROPHIC CARDIOMYOPATHY   





(10) Toxic metabolic encephalopathy


Code(s): G92 - TOXIC ENCEPHALOPATHY   





(11) Sepsis


Code(s): A41.9 - SEPSIS, UNSPECIFIED ORGANISM   





(12) Respiratory failure


Code(s): J96.90 - RESPIRATORY FAILURE, UNSP, UNSP W HYPOXIA OR HYPERCAPNIA   





Assessment/Plan





NOW VENT DEPENDENT INTUBATED FOR MANY DAYS


WILL NEED TRACHEOSTOMY. AWAITING FAMILY CONSENT


IV ABX CONTINUE PER ID.


02 SUPPORT 40% O2


UNABLE TO WEAN OFF VENT.


CARDIO F/U ON HEPARIN IV FOR AC.


MONITOR LABS, FREQUENT TURNING AND OFF LOADING


TO PREVENT SKIN BREAK DOWN


NGT FEEDS, AMINO ACIDS FOR PROTEIN 


AND PREVENT SKIN ULCERS.


ADVANCED DIRECTIVES UNCLEAR, AWAIT FAMILY DECISION


AT THIS PROGNOSIS IS POOR FOR A FULL MEANINGFUL RECOVERY


LABS DAILY MONITOR ELECTROLYTES.


NEURO EVAL


MVI/THIAMINE CONTINUE


AGREE WITH PULMONARY TO DECREASE SEDATION TO EVALUATE MENTAL STATUS

## 2018-11-29 NOTE — PN
Progress Note, Physician


History of Present Illness: 


Poorly responsive on vent, persistent afib off lopressor and heparin gtt given 

hypotension and decrease in Hgb, received 2 U pRBC.





- Current Medication List


Current Medications: 


Active Medications





Acetaminophen (Tylenol -)  650 mg PO Q6H PRN


   PRN Reason: PAIN LEVEL 1 - 3


   Last Admin: 11/23/18 03:13 Dose:  650 mg


Allopurinol (Zyloprim -)  100 mg PO BID [facility]


   Last Admin: 11/29/18 09:55 Dose:  100 mg


Chlorhexidine Gluconate (Peridex -)  15 ml MM BID [facility]


   Last Admin: 11/29/18 09:57 Dose:  15 ml


Cholecalciferol (Vitamin D3 -)  1,000 unit PO DAILY [facility]


   Last Admin: 11/29/18 09:55 Dose:  1,000 unit


Diphenhydramine HCl (Benadryl Injection -)  25 mg IVPB ONCE PRN


   PRN Reason: DURING PLASMAPHERESIS


Metronidazole (Flagyl 500mg Premixed Ivpb -)  500 mg in 100 mls @ 100 mls/hr 

IVPB BID [facility]


   Last Admin: 11/29/18 09:55 Dose:  100 mls/hr


Levofloxacin (Levaquin 250 Mg Premixed Ivpb -)  250 mg in 50 mls @ 50 mls/hr 

IVPB DAILY [facility]; Protocol


   Last Admin: 11/29/18 09:55 Dose:  50 mls/hr


Fentanyl 500 mcg/ Dextrose  100 mls @ 5 mls/hr IVPB TITR [facility]; Protocol


   Last Admin: 11/28/18 18:34 Dose:  50 mcg/hr, 10 mls/hr


Lactated Ringer's (Lactated Ringers Solution)  1,000 ml in 1,000 mls @ 125 mls/

hr IV ASDIR [facility]


   Last Admin: 11/29/18 08:48 Dose:  125 mls/hr


Lactobacillus Acidophilus (Bacid -)  1 tab PO DAILY [facility]


   Last Admin: 11/29/18 09:55 Dose:  1 tab


Metoprolol Tartrate (Lopressor Injection -)  5 mg IVPUSH Q8H PRN


   PRN Reason: TACHYCARDIA


   Last Admin: 11/24/18 18:44 Dose:  5 mg


Metoprolol Tartrate (Lopressor -)  75 mg PO BID [facility]


   Last Admin: 11/29/18 09:56 Dose:  Not Given


Pantoprazole Sodium (Protonix Iv)  40 mg IVPUSH DAILY [facility]


   Last Admin: 11/29/18 09:56 Dose:  40 mg


Rifaximin (Xifaxan -)  550 mg PO BID [facility]


   Last Admin: 11/29/18 09:55 Dose:  550 mg


Thiamine HCl (Vitamin B1 -)  100 mg PO DAILY [facility]


   Last Admin: 11/29/18 09:55 Dose:  100 mg











- Objective


Vital Signs: 


 Vital Signs











Temperature  98.3 F   11/29/18 06:00


 


Pulse Rate  98 H  11/29/18 10:00


 


Respiratory Rate  18   11/29/18 10:45


 


Blood Pressure  96/64   11/29/18 10:00


 


O2 Sat by Pulse Oximetry (%)  100   11/29/18 10:00











Constitutional: Yes: Thin


Neck: Yes: Supple


Cardiovascular: Yes: Pulse Irregular


Respiratory: Yes: Intubated, Mechanically Ventilated, Rhonchi


Gastrointestinal: Yes: Soft, Hypoactive Bowel Sounds


Edema: No


Labs: 


 CBC, BMP





 11/29/18 05:30 





 11/29/18 05:30 





 INR, PTT











INR  1.49  (0.83-1.09)  H  11/28/18  12:50    


 


Fibrinogen  323.0 mg/dL (238-498)   11/28/18  12:50    














- ....Imaging


Chest X-ray: Report Reviewed (CHF and bilateral effusions)





Problem List





- Problems


(1) Atrial fibrillation with RVR


Code(s): I48.91 - UNSPECIFIED ATRIAL FIBRILLATION   





(2) Acute-on-chronic kidney injury


Code(s): N17.9 - ACUTE KIDNEY FAILURE, UNSPECIFIED; N18.9 - CHRONIC KIDNEY 

DISEASE, UNSPECIFIED   


Qualifiers: 


   Acute renal failure type: unspecified   Chronic kidney disease stage: 

unspecified stage   Qualified Code(s): N17.9 - Acute kidney failure, unspecified

; N18.9 - Chronic kidney disease, unspecified   





(3) Demand ischemia


Code(s): I24.8 - OTHER FORMS OF ACUTE ISCHEMIC HEART DISEASE   





(4) Hypercalcemia


Code(s): E83.52 - HYPERCALCEMIA   





(5) Nonadherence to medication


Code(s): Z91.14 - PATIENT'S OTHER NONCOMPLIANCE WITH MEDICATION REGIMEN   





(6) Paraproteinemia


Code(s): D89.2 - HYPERGAMMAGLOBULINEMIA, UNSPECIFIED   





(7) Anticoagulant long-term use


Code(s): Z79.01 - LONG TERM (CURRENT) USE OF ANTICOAGULANTS   





(8) Chronic thromboembolic disease


Code(s): I74.9 - EMBOLISM AND THROMBOSIS OF UNSPECIFIED ARTERY   





(9) Coronary artery disease


Code(s): I25.10 - ATHSCL HEART DISEASE OF NATIVE CORONARY ARTERY W/O ANG PCTRS 

  


Qualifiers: 


   Coronary Disease-Associated Artery/Lesion type: native artery   Native vs. 

transplanted heart: native heart   Associated angina: without angina   

Qualified Code(s): I25.10 - Atherosclerotic heart disease of native coronary 

artery without angina pectoris   





(10) Diastolic dysfunction without heart failure


Code(s): I51.9 - HEART DISEASE, UNSPECIFIED   





(11) HTN (hypertension)


Code(s): I10 - ESSENTIAL (PRIMARY) HYPERTENSION   


Qualifiers: 


   Hypertension type: essential hypertension   Qualified Code(s): I10 - 

Essential (primary) hypertension   





(12) Hypertrophic cardiomyopathy


Code(s): I42.2 - OTHER HYPERTROPHIC CARDIOMYOPATHY   





(13) Hyperlipidemia


Code(s): E78.5 - HYPERLIPIDEMIA, UNSPECIFIED   


Qualifiers: 


   Hyperlipidemia type: pure hypercholesterolemia   Qualified Code(s): E78.00 - 

Pure hypercholesterolemia, unspecified; E78.0 - Pure hypercholesterolemia   





(14) Multiple myeloma


Code(s): C90.00 - MULTIPLE MYELOMA NOT HAVING ACHIEVED REMISSION   


Qualifiers: 


   Multiple myeloma remission status: not in remission   Qualified Code(s): 

C90.00 - Multiple myeloma not having achieved remission   





(15) Acalculous cholecystitis


Code(s): K81.9 - CHOLECYSTITIS, UNSPECIFIED   





Assessment/Plan


R&LHc at [facility] 1/11/2017 showing nonobstructive CAD, severe LV apical 

hypertrophic cardiomyopathy, mildly elevated right sided pressures, Mynx 

deployed right CFA access site. Study is consistent with apical hypertrophy (

spade-like) variant of hypertrophic cardiomyopathy, planned for optimal medical 

therapy. 


Echocardiogram: 07/11/2018 Mod cLVH, severe DYANA, mod TR RVSP 50-60 mmHg, mod-

severe MR


Echocardiogram: 10/16/2018 Normal LV size with hyperdynamic LVEF 75%, mild BSH, 

normal RV size and fxn, severe LAE, mod-severe MR, mod TR





1. Acute hypoxic respiratory failure, suspected aspiration pneumonia with 

sepsis syndrome, remains intubated


2. Toxic metabolic encephelopathy, rule out CVA, no change in status 


3. 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


4. Paraproteinemia confirmed multiple myeloma


5. 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 

disease related to persistent atrial fibrillation with [id number] score of 6


6. CAD with evidence of demand ischemic injury, non obstructive CAD on [facility] 

coronary angiogrpahy angina pectoris


7. 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 

chronic class I-II NYHA classification LV failure, compensated/euvolemic


8. Persistent atrial fibrillation [id number] score of 6 currently on Heparin 

therapy


9. HTN


10. Anemia/thrombocytopenia


11. Acalculous Cholecystitis


12. Ischemic hepatitis


13. Resolved Hypernatremia





PLAN:





1. Resume Heparin gtt once Hgb stable pending tracheostomy, transfuse to 

maintain Hg equal or > 8.0, as outlined in prior notes ideally A/C therapy to 

be continued indefinitely unless it is absolutely contraindicated considering 

his [id number] score of 6 


2. Resume Lopressor 25 bid as hemodynamics permit 


3. Antibiotics as per the primary team


4. Ventilator management as per the ICU team 


5. As outlined in prior notes overall poor prognosis (no improvement), family 

to decide regarding compassionate extubation versus tracheostomy, consider 

ethics consult

## 2018-11-29 NOTE — PN
Progress Note (short form)





- Note


Progress Note: 





Renal follow up for Hypercalcemia/HAM





Pt seen and examined in the ICU


on vent, no change in clinical status


on tube feeds


FIO2 40%





 Vital Signs








Temperature  98.3 F   11/29/18 12:00


 


Pulse Rate  96 H  11/29/18 14:00


 


Respiratory Rate  18   11/29/18 13:05


 


Blood Pressure  98/61   11/29/18 14:00


 


O2 Sat by Pulse Oximetry (%)  100   11/29/18 10:00








 Intake & Output











 11/26/18 11/27/18 11/28/18 11/29/18





 23:59 23:59 23:59 23:59


 


Intake Total 3308 3894 5737 1720


 


Output Total 800 1700 1350 1100


 


Balance 2508 2194 4387 620


 


Weight 89.3 kg 90.038 kg 92.986 kg 97.069 kg








NAD on vent via ET Tube


trace edema in sacrum 





 CBC, BMP





 11/29/18 05:30 





 11/29/18 05:30 





 Current Medications





Acetaminophen (Tylenol -)  650 mg PO Q6H PRN


   PRN Reason: PAIN LEVEL 1 - 3


   Last Admin: 11/23/18 03:13 Dose:  650 mg


Allopurinol (Zyloprim -)  100 mg PO BID [facility]


   Last Admin: 11/29/18 09:55 Dose:  100 mg


Chlorhexidine Gluconate (Peridex -)  15 ml MM BID [facility]


   Last Admin: 11/29/18 09:57 Dose:  15 ml


Cholecalciferol (Vitamin D3 -)  1,000 unit PO DAILY [facility]


   Last Admin: 11/29/18 09:55 Dose:  1,000 unit


Diphenhydramine HCl (Benadryl Injection -)  25 mg IVPB ONCE PRN


   PRN Reason: DURING PLASMAPHERESIS


Metronidazole (Flagyl 500mg Premixed Ivpb -)  500 mg in 100 mls @ 100 mls/hr 

IVPB BID [facility]


   Last Admin: 11/29/18 09:55 Dose:  100 mls/hr


Levofloxacin (Levaquin 250 Mg Premixed Ivpb -)  250 mg in 50 mls @ 50 mls/hr 

IVPB DAILY [facility]; Protocol


   Last Admin: 11/29/18 09:55 Dose:  50 mls/hr


Fentanyl 500 mcg/ Dextrose  100 mls @ 5 mls/hr IVPB TITR [facility]; Protocol


   Last Admin: 11/28/18 18:34 Dose:  50 mcg/hr, 10 mls/hr


Lactated Ringer's (Lactated Ringers Solution)  1,000 ml in 1,000 mls @ 125 mls/

hr IV ASDIR [facility]


   Last Admin: 11/29/18 08:48 Dose:  125 mls/hr


Lactobacillus Acidophilus (Bacid -)  1 tab PO DAILY [facility]


   Last Admin: 11/29/18 09:55 Dose:  1 tab


Metoprolol Tartrate (Lopressor Injection -)  5 mg IVPUSH Q8H PRN


   PRN Reason: TACHYCARDIA


   Last Admin: 11/24/18 18:44 Dose:  5 mg


Metoprolol Tartrate (Lopressor -)  25 mg NGT BID [facility]


Pantoprazole Sodium (Protonix Iv)  40 mg IVPUSH BID [facility]


Rifaximin (Xifaxan -)  550 mg PO BID [facility]


   Last Admin: 11/29/18 09:55 Dose:  550 mg


Thiamine HCl (Vitamin B1 -)  100 mg PO DAILY [facility]


   Last Admin: 11/29/18 09:55 Dose:  100 mg











78 year old gentleman with hx of CKD, Afib on A/C, CAD, CKD, Hypertension, 

Hyperlipidemia, PVD who presented with AMS/Confusion and found to have HAM.





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 

showed no stones or obstruction, UA w/o protein but UPCR ~0.5 indicating non-

albumin proteinuria)


#AMS 


#Newly diagnosed multiple myloma 


#Anemia 


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


#Hyperdense lesion on US of the kidney 


#Normal anion gap metabolic acidosis 


#Hyperkalemia (now resolved)





Renal function stable 


no indication for RRT


continue supportive care


continue tube feeds 


monitor for edema





Ricky Chang DO

## 2018-11-29 NOTE — PN
Teaching Attending Note


Name of Resident: Ganga Mccormick





ATTENDING PHYSICIAN STATEMENT





I saw and evaluated the patient.


I reviewed the resident's note and discussed the case with the resident.


I agree with the resident's findings and plan as documented.








SUBJECTIVE:





Pt seen and examined in the ICU. Remains intubated, sedated, poorly responsive. 

Family decision to proceed with tracheostomy, daughter Mayuri not answering or 

returning calls. Transfused 2 units PRBC with appropriate response.





OBJECTIVE:





 Vital Signs











 Period  Temp  Pulse  Resp  BP Sys/Varghese  Pulse Ox


 


 Last 24 Hr  97.4 F-98.8 F    18-20  /60-95  100-100








 Intake & Output











 11/26/18 11/27/18 11/28/18 11/29/18





 23:59 23:59 23:59 23:59


 


Intake Total 3308 3894 5737 1720


 


Output Total 800 1700 1350 500


 


Balance 2508 2194 4387 1220


 


Weight 89.3 kg 90.038 kg 92.986 kg 97.069 kg








Gen:  intubated, poorly responsive


Heart: RRR


Lung: scattered rhonchi


Abd: soft, nontender


Ext: + edema





 CBC, BMP





 11/29/18 05:30 





 11/29/18 05:30 





Active Medications





Acetaminophen (Tylenol -)  650 mg PO Q6H PRN


   PRN Reason: PAIN LEVEL 1 - 3


   Last Admin: 11/23/18 03:13 Dose:  650 mg


Allopurinol (Zyloprim -)  100 mg PO BID [facility]


   Last Admin: 11/29/18 09:55 Dose:  100 mg


Chlorhexidine Gluconate (Peridex -)  15 ml MM BID [facility]


   Last Admin: 11/29/18 09:57 Dose:  15 ml


Cholecalciferol (Vitamin D3 -)  1,000 unit PO DAILY [facility]


   Last Admin: 11/29/18 09:55 Dose:  1,000 unit


Diphenhydramine HCl (Benadryl Injection -)  25 mg IVPB ONCE PRN


   PRN Reason: DURING PLASMAPHERESIS


Metronidazole (Flagyl 500mg Premixed Ivpb -)  500 mg in 100 mls @ 100 mls/hr 

IVPB BID [facility]


   Last Admin: 11/29/18 09:55 Dose:  100 mls/hr


Levofloxacin (Levaquin 250 Mg Premixed Ivpb -)  250 mg in 50 mls @ 50 mls/hr 

IVPB DAILY [facility]; Protocol


   Last Admin: 11/29/18 09:55 Dose:  50 mls/hr


Fentanyl 500 mcg/ Dextrose  100 mls @ 5 mls/hr IVPB TITR [facility]; Protocol


   Last Admin: 11/28/18 18:34 Dose:  50 mcg/hr, 10 mls/hr


Lactated Ringer's (Lactated Ringers Solution)  1,000 ml in 1,000 mls @ 125 mls/

hr IV ASDIR [facility]


   Last Admin: 11/29/18 08:48 Dose:  125 mls/hr


Lactobacillus Acidophilus (Bacid -)  1 tab PO DAILY [facility]


   Last Admin: 11/29/18 09:55 Dose:  1 tab


Metoprolol Tartrate (Lopressor Injection -)  5 mg IVPUSH Q8H PRN


   PRN Reason: TACHYCARDIA


   Last Admin: 11/24/18 18:44 Dose:  5 mg


Metoprolol Tartrate (Lopressor -)  25 mg NGT BID [facility]


Pantoprazole Sodium (Protonix Iv)  40 mg IVPUSH BID [facility]


Rifaximin (Xifaxan -)  550 mg PO BID [facility]


   Last Admin: 11/29/18 09:55 Dose:  550 mg


Thiamine HCl (Vitamin B1 -)  100 mg PO DAILY [facility]


   Last Admin: 11/29/18 09:55 Dose:  100 mg








ASSESSMENT AND PLAN:


Acute Hypoxic Respiratory Failure


Altered Mental Status


Metabolic Encephalopathy


Pneumonia


Acalculous Cholecystitis


Multiple Myeloma


Acute on Chronic Renal Failure


Metabolic Acidosis


LV Diastolic Dysfunction


Atrial Fibrillation


CAD


+Troponins likely Demand Ischemia


PAD


Hyperlipidemia


HTN


Anemia





-  monitor H/H


-  if H/H continues to drop, will need CT A/P 


-  send stool occult blood


-  continue antibiotics


-  monitor urine output, creatinine


-  rate control


-  minimize sedation to assess mental status 


-  DVT/GI prophylaxis


-  continue discussions regarding goals of care


-  consult surgery for tracheostomy


-  continue ICU monitoring


-  poor overall prognosis for meaningful recovery








critical care time spent in reviewing chart, evaluating patient and formulating 

plan 35 min

## 2018-11-29 NOTE — PN
Physical Exam: 


SUBJECTIVE: Patient seen and examined in the ICU.  Remains intubated, poorly 

responsive but now on fentanyl gtt. opens eyes to voice. No gross change in 

overall mental status. No pressors. Off heparin gtt, s/p 2 U prbcs. blood noted 

in rectal tube. GOC discussion ongoing, favoring tracheostomy, but ideally 

would like to hear back from Mayuri (daughter) who has not been returning our 

phone calls. The other 3 children are in favor of trache. 








OBJECTIVE:





 Vital Signs











 Period  Temp  Pulse  Resp  BP Sys/Varghese  Pulse Ox


 


 Last 24 Hr  97.2 F-98.8 F    18-20  /56-95  100-100











GENERAL: Remains intubated, poorly responsive off sedation but opens eyes to 

voice.


HEAD: 


EYES:  sclera anicteric. NCAT PERRL


ENT: Ears normal, nares patent, dry mucous membranes


NECK: Trachea midline. 


LUNGS: bilateral diffuse rhonchi


HEART: reg rate and irregular rhythm. 


ABDOMEN: Soft, nondistended NT


EXTREMITIES: 2+ pulses, warm, well-perfused, no edema, scattered ulcers. 


NEUROLOGICAL: difficult to assess secondary to clinical condition. gag reflex 

does not appear to be present. opens eyes to voice. corneal reflex+, +gag 

reflex 


SKIN: Warm, dry, normal turgor, scattered ulcers on the legs














 Laboratory Results - last 24 hr











  11/28/18 11/28/18 11/28/18





  05:30 05:30 08:30


 


WBC   


 


RBC   


 


Hgb   


 


Hct   


 


MCV   


 


MCH   


 


MCHC   


 


RDW   


 


Plt Count   


 


MPV   


 


Absolute Neuts (auto)   


 


Total Counted   


 


Neutrophils %   


 


Neutrophils % (Manual)  72.3  


 


Band Neutrophils %  2.0  


 


Lymphocytes %   


 


Lymphocytes % (Manual)  15.8  


 


Monocytes %   


 


Monocytes % (Manual)  2 L  


 


Eosinophils %   


 


Eosinophils % (Manual)  4.9 H D  


 


Basophils %   


 


Basophils % (Manual)  0.0  


 


Myelocytes % (Man)  0  


 


Promyelocytes % (Man)  0  


 


Blast Cells % (Manual)  0  D  


 


Nucleated RBC %   


 


Metamyelocytes  0  


 


Hypochromia  0  


 


Platelet Estimate  Decreased  


 


Platelet Comment  Present  


 


Polychromasia  2+  


 


Poikilocytosis  0  


 


Anisocytosis  2+  


 


Microcytosis  2+  


 


Macrocytosis  1+  


 


Tear Drop Cells  1+  


 


Rouleaux  1+  


 


PT with INR   


 


INR   


 


PTT (Actin FS)   


 


Fibrinogen   


 


Puncture Site   


 


ABG pH   


 


ABG pCO2 at Pt Temp   


 


ABG pO2 at Pt Temp   


 


ABG HCO3   


 


ABG O2 Sat (Measured)   


 


ABG O2 Content   


 


ABG Base Excess   


 


Chacho Test   


 


O2 Delivery Device   


 


Oxygen Flow Rate   


 


Vent Mode   


 


Vent Rate   


 


Mechanical Rate   


 


PEEP   


 


Pressure Support Vent   


 


Sodium   


 


Potassium   


 


Chloride   


 


Carbon Dioxide   


 


Anion Gap   


 


BUN   


 


Creatinine   


 


Creat Clearance w eGFR   


 


Random Glucose   


 


Calcium   6.4 L* 


 


Phosphorus   


 


Magnesium   


 


Total Bilirubin   


 


AST   


 


ALT   


 


Alkaline Phosphatase   


 


Ammonia   


 


Total Protein   


 


Albumin   


 


Stool Occult Blood   


 


Blood Type    B POSITIVE


 


Antibody Screen    Negative


 


Crossmatch    See Detail














  11/28/18 11/28/18 11/28/18





  12:50 12:50 18:00


 


WBC   


 


RBC   


 


Hgb   


 


Hct   


 


MCV   


 


MCH   


 


MCHC   


 


RDW   


 


Plt Count   


 


MPV   


 


Absolute Neuts (auto)   


 


Total Counted   


 


Neutrophils %   


 


Neutrophils % (Manual)   


 


Band Neutrophils %   


 


Lymphocytes %   


 


Lymphocytes % (Manual)   


 


Monocytes %   


 


Monocytes % (Manual)   


 


Eosinophils %   


 


Eosinophils % (Manual)   


 


Basophils %   


 


Basophils % (Manual)   


 


Myelocytes % (Man)   


 


Promyelocytes % (Man)   


 


Blast Cells % (Manual)   


 


Nucleated RBC %   


 


Metamyelocytes   


 


Hypochromia   


 


Platelet Estimate   


 


Platelet Comment   


 


Polychromasia   


 


Poikilocytosis   


 


Anisocytosis   


 


Microcytosis   


 


Macrocytosis   


 


Tear Drop Cells   


 


Rouleaux   


 


PT with INR  17.70 H  


 


INR  1.49 H  


 


PTT (Actin FS)  41.8 H  


 


Fibrinogen   323.0 


 


Puncture Site   


 


ABG pH   


 


ABG pCO2 at Pt Temp   


 


ABG pO2 at Pt Temp   


 


ABG HCO3   


 


ABG O2 Sat (Measured)   


 


ABG O2 Content   


 


ABG Base Excess   


 


Chacho Test   


 


O2 Delivery Device   


 


Oxygen Flow Rate   


 


Vent Mode   


 


Vent Rate   


 


Mechanical Rate   


 


PEEP   


 


Pressure Support Vent   


 


Sodium   


 


Potassium   


 


Chloride   


 


Carbon Dioxide   


 


Anion Gap   


 


BUN   


 


Creatinine   


 


Creat Clearance w eGFR   


 


Random Glucose   


 


Calcium   


 


Phosphorus   


 


Magnesium   


 


Total Bilirubin   


 


AST   


 


ALT   


 


Alkaline Phosphatase   


 


Ammonia   


 


Total Protein   


 


Albumin   


 


Stool Occult Blood    Negative


 


Blood Type   


 


Antibody Screen   


 


Crossmatch   














  11/28/18 11/29/18 11/29/18





  21:00 05:30 05:30


 


WBC  4.0  3.6 L 


 


RBC  2.50 L  2.86 L 


 


Hgb  7.8 L  8.4 L 


 


Hct  22.1 L  25.4 L 


 


MCV  88.2  88.7 


 


MCH  30.9  29.5 


 


MCHC  35.1  33.2 


 


RDW  16.3 H  16.6 H 


 


Plt Count  80 L  76 L 


 


MPV  9.8  10.4 


 


Absolute Neuts (auto)  3.1  2.7 


 


Total Counted  100  


 


Neutrophils %  76.4  74.1 


 


Neutrophils % (Manual)  67.0  


 


Band Neutrophils %  5.0  


 


Lymphocytes %  16.7  19.6 


 


Lymphocytes % (Manual)  16.0  


 


Monocytes %  4.1  3.4 L 


 


Monocytes % (Manual)  10  D  


 


Eosinophils %  2.3  2.0 


 


Eosinophils % (Manual)  2.0  


 


Basophils %  0.5  0.9 


 


Basophils % (Manual)   


 


Myelocytes % (Man)   


 


Promyelocytes % (Man)   


 


Blast Cells % (Manual)   


 


Nucleated RBC %  5 H  4 H 


 


Metamyelocytes   


 


Hypochromia  1+  


 


Platelet Estimate  Decreased  


 


Platelet Comment  No clumping noted  


 


Polychromasia  1+  


 


Poikilocytosis   


 


Anisocytosis  1+  


 


Microcytosis  1+  


 


Macrocytosis   


 


Tear Drop Cells   


 


Rouleaux   


 


PT with INR   


 


INR   


 


PTT (Actin FS)   


 


Fibrinogen   


 


Puncture Site   


 


ABG pH   


 


ABG pCO2 at Pt Temp   


 


ABG pO2 at Pt Temp   


 


ABG HCO3   


 


ABG O2 Sat (Measured)   


 


ABG O2 Content   


 


ABG Base Excess   


 


Chacho Test   


 


O2 Delivery Device   


 


Oxygen Flow Rate   


 


Vent Mode   


 


Vent Rate   


 


Mechanical Rate   


 


PEEP   


 


Pressure Support Vent   


 


Sodium    140


 


Potassium    4.5


 


Chloride    116 H


 


Carbon Dioxide    19 L


 


Anion Gap    5 L


 


BUN    57 H


 


Creatinine    1.4 H


 


Creat Clearance w eGFR    48.89


 


Random Glucose    98


 


Calcium    6.4 L*


 


Phosphorus    3.6


 


Magnesium    1.6 L


 


Total Bilirubin    0.3


 


AST    44 H


 


ALT    31


 


Alkaline Phosphatase    136 H


 


Ammonia   


 


Total Protein    7.6


 


Albumin    1.0 L


 


Stool Occult Blood   


 


Blood Type   


 


Antibody Screen   


 


Crossmatch   














  11/29/18 11/29/18





  05:30 06:00


 


WBC  


 


RBC  


 


Hgb  


 


Hct  


 


MCV  


 


MCH  


 


MCHC  


 


RDW  


 


Plt Count  


 


MPV  


 


Absolute Neuts (auto)  


 


Total Counted  


 


Neutrophils %  


 


Neutrophils % (Manual)  


 


Band Neutrophils %  


 


Lymphocytes %  


 


Lymphocytes % (Manual)  


 


Monocytes %  


 


Monocytes % (Manual)  


 


Eosinophils %  


 


Eosinophils % (Manual)  


 


Basophils %  


 


Basophils % (Manual)  


 


Myelocytes % (Man)  


 


Promyelocytes % (Man)  


 


Blast Cells % (Manual)  


 


Nucleated RBC %  


 


Metamyelocytes  


 


Hypochromia  


 


Platelet Estimate  


 


Platelet Comment  


 


Polychromasia  


 


Poikilocytosis  


 


Anisocytosis  


 


Microcytosis  


 


Macrocytosis  


 


Tear Drop Cells  


 


Rouleaux  


 


PT with INR  


 


INR  


 


PTT (Actin FS)  


 


Fibrinogen  


 


Puncture Site   Right radial


 


ABG pH   7.33 L


 


ABG pCO2 at Pt Temp   34.5 L


 


ABG pO2 at Pt Temp   111.0 H


 


ABG HCO3   17.6 L


 


ABG O2 Sat (Measured)   97.6


 


ABG O2 Content   11.9 L


 


ABG Base Excess   -7.2 L


 


Chacho Test   Positive


 


O2 Delivery Device   Mech vent


 


Oxygen Flow Rate   40%


 


Vent Mode   Ac/ prvc


 


Vent Rate   18


 


Mechanical Rate   Yes


 


PEEP   5.0


 


Pressure Support Vent   500


 


Sodium  


 


Potassium  


 


Chloride  


 


Carbon Dioxide  


 


Anion Gap  


 


BUN  


 


Creatinine  


 


Creat Clearance w eGFR  


 


Random Glucose  


 


Calcium  


 


Phosphorus  


 


Magnesium  


 


Total Bilirubin  


 


AST  


 


ALT  


 


Alkaline Phosphatase  


 


Ammonia  49.00 H 


 


Total Protein  


 


Albumin  


 


Stool Occult Blood  


 


Blood Type  


 


Antibody Screen  


 


Crossmatch  








Active Medications











Generic Name Dose Route Start Last Admin





  Trade Name Freq  PRN Reason Stop Dose Admin


 


Acetaminophen  650 mg  11/10/18 14:09  11/23/18 03:13





  Tylenol -  PO   650 mg





  Q6H PRN   Administration





  PAIN LEVEL 1 - 3   





     





     





     


 


Allopurinol  100 mg  11/09/18 10:00  11/29/18 09:55





  Zyloprim -  PO   100 mg





  BID AVA   Administration





     





     





     





     


 


Chlorhexidine Gluconate  15 ml  11/10/18 23:45  11/29/18 09:57





  Peridex -  MM   15 ml





  BID AVA   Administration





     





     





     





     


 


Cholecalciferol  1,000 unit  11/18/18 10:00  11/29/18 09:55





  Vitamin D3 -  PO   1,000 unit





  DAILY AVA   Administration





     





     





     





     


 


Diphenhydramine HCl  25 mg  11/09/18 12:00  





  Benadryl Injection -  IVPB   





  ONCE PRN   





  DURING PLASMAPHERESIS   





     





     





     


 


Metronidazole  500 mg in 100 mls @ 100 mls/hr  11/17/18 14:30  11/29/18 09:55





  Flagyl 500mg Premixed Ivpb -  IVPB   100 mls/hr





  BID AVA   Administration





     





     





     





     


 


Levofloxacin  250 mg in 50 mls @ 50 mls/hr  11/19/18 12:45  11/29/18 09:55





  Levaquin 250 Mg Premixed Ivpb -  IVPB   50 mls/hr





  DAILY AVA   Administration





     





     





  Protocol   





     


 


Fentanyl 500 mcg/ Dextrose  100 mls @ 5 mls/hr  11/25/18 19:00  11/28/18 18:34





  IVPB   50 mcg/hr





  TITR AVA   10 mls/hr





     Administration





     





  Protocol   





  25 MCG/HR   


 


Lactated Ringer's  1,000 ml in 1,000 mls @ 125 mls/hr  11/29/18 08:30  11/29/18 

08:48





  Lactated Ringers Solution  IV   125 mls/hr





  ASDIR AVA   Administration





     





     





     





     


 


Lactobacillus Acidophilus  1 tab  11/25/18 10:00  11/29/18 09:55





  Bacid -  PO   1 tab





  DAILY AVA   Administration





     





     





     





     


 


Metoprolol Tartrate  5 mg  11/09/18 13:04  11/24/18 18:44





  Lopressor Injection -  IVPUSH   5 mg





  Q8H PRN   Administration





  TACHYCARDIA   





     





     





     


 


Metoprolol Tartrate  75 mg  11/16/18 08:30  11/29/18 09:56





  Lopressor -  PO   Not Given





  BID AVA   





     





     





     





     


 


Pantoprazole Sodium  40 mg  11/07/18 12:00  11/29/18 09:56





  Protonix Iv  IVPUSH   40 mg





  DAILY AVA   Administration





     





     





     





     


 


Rifaximin  550 mg  11/15/18 22:00  11/29/18 09:55





  Xifaxan -  PO   550 mg





  BID AVA   Administration





     





     





     





     


 


Thiamine HCl  100 mg  10/30/18 22:12  11/29/18 09:55





  Vitamin B1 -  PO   100 mg





  DAILY AVA   Administration





     





     





     





     











ASSESSMENT/PLAN:


77 yo m PMH of A-Fib, Acute on chronic kidney injury, CAD w stents, 

hypercalcemia, medication non-adherence, paraproteinemia, thromboembolic disease

, diastolic heart dysfunction without failure, HTN, HLD, hypertrophic 

cardiomyopathy is here after a rapid response. He was on the floors when it was 

noticed that he has respiratory insufficiency and was desaturating, requiring 

ICU monitoring. GOC discussion ongoing, favoring tracheostomy, but ideally 

would like to hear back from Mayuri (daughter) who has not been returning our 

phone calls. The other 3 children are in favor of trache. 





GI: 


GIB?


Off heparin gtt, s/p 2 U prbcs. blood noted in rectal tube.


GI consult 





metabolic encephalopathy 


-ammonia downtrending w/ rifaximin, 


s/p lactulose 


-LFTs elevated but downtrending. transamintitis most likely secondary to 

ischemic hepatits which is multifactorial including sepsis, episodes of 

hypotension and hyperviscosity syndrome. 





acalculous cholecystitis?


US shows thickened gallbladder wall, pericholecystic fluid, suspicious for 

acalculous cholecystitis. There was also mild dilatation of the CBD but that 

might be normal for age. 


-Spoke w/ IR, who feel image does not represent  acalculous cholecystitis and 

does not require any IR drainage 





ID: 


No fevers recorded. 


- Rhonchi heard on Lung auscultation


-RLL pneumonia


bcx 11/19/18 neg 


off of ceftazidime


Vancomycin Redosed 11/19/18 


C diff neg 11/25/18


- c/w levaquin/flagyl. 


- ID on board


- c/w rifaximin for elevated ammonia level





Cardio: 


Afib -[id number] score of 6 


- Lopressor 75 mg BID PO


-IV metoprolol 5 mg PRN


- diastolic dysfunction + hypertrophic cardiomyopathy


- CAD with multiple stents


- Cardio consulted and on board.


off Xarelto 15 qd (renal dosing) while on heparin gtt. transfuse to maintain Hgb

>8.0 


s/p 1 u prbc 11/20/18 and 1 u prbc 11/22/18, Heparin with caution considering 

the dropping Hg, transfuse to maintain Hg equal or > 8.0, as outlined in prior 

notes ideally A/C therapy to be continued indefinitely unless it is absolutely 

contraindicated considering his [id number] score of 6 


- Sporadically goes in and out of V-Tach - Can give amio if v-tach is sustained 

and persistent. 


-Off heparin gtt, s/p 2 U prbcs w/ appropriate response. blood noted in rectal 

tube. send FOBT





Pulm: 


- Intubated


-surgery consult for trache 


- Patient has b/l pleural effusions.


- No pneumothorax


- b/l diffuse rhonchi


- infiltrate on CXR: Abx- Substituting levaquin for ceftazidime


Vancomycin Redosed 11/19/18 


- c/w levaquin/flagyl. 





Renal: 


- Acute on Chronic kidney injury


HAM ATN vs. Cast nephropathy  (FeNa was 2.1% indicating tubular injury, US 

showed no stones or obstruction, UA w/o protein but UPCR ~0.5 indicating non-

albumin proteinuria)


- Renal consulted and on board. 


Hyperkalemia (r/o tumor lysis)


serum K is improved, s/p bicarb gtt





Neuro: 


- Patient is not alert or oriented.


Remains intubated, poorly responsive but now on fentanyl gtt. opens eyes to 

voice


- Head CT showed no acute pathology


- Neuro consulted and on board. (Dr. Youngblood + Dr. phan) 


-ammonia stable in 60s w/ rifaximin, lactulose dcd 





Heme/Onc: 


- monitor H/H


- Will transfuse to keep hb > 8 


- Patient not receiving plasmapharesis today. 


- Paraproteinemia


- Patient fulfills new criteria  for multiple myeloma with free kappa/ free 

lambda light chain  ratio of 432 (>100 is diagnostic of myeloma) 


- Kappa/Lambda ratio > 100 consistent with Multiple myeloma


- M spike elevated elevated at 6.8 previously 4.7 


-steroids being held at this time 


s/p 1 u prbc 11/20/18 and 1 u prbc 11/22/18, Heparin with caution considering 

the dropping Hg, transfuse to maintain Hg equal or > 8.0, as outlined in prior 

notes ideally A/C therapy to be continued indefinitely unless it is absolutely 

contraindicated considering his [id number] score of 6 


-Hypotensive this AM and H/H low (6.7) without obvious source of bleeding. will 

give 2 U prbcs and dc heparin


-platelets are low and downtrending. 


-fibrinogen nl


-f/u HIT AB


-Off heparin gtt, s/p 2 U prbcs w/ appropriate response. blood noted in rectal 

tube. send FOBT





Endo: 


- Parathyroid hormone WNL


- PTH-borderline low appropriate given hypercalcemia, PTHrP low





Prophylaxis: 


-Off heparin gtt, s/p 2 U prbcs w/ appropriate response. blood noted in rectal 

tube. send FOBT


transfuse to maintain Hg equal or > 8.0, as outlined in prior notes ideally A/C 

therapy to be continued indefinitely unless it is absolutely contraindicated 

considering his [id number] score of 6


- Protonix





F/E/N: 


F: LR 125cc


E: Will replete lytes PRN


N: tube feeds (low potassium feeds). with free water 





Code Status: Full Code 


-GOC discussion ongoing, favoring tracheostomy, but ideally would like to hear 

back from Mayuri (daughter) who has not been returning our phone calls. The 

other 3 children are in favor of trache.  if unable to reach decision will need 

tracheostomy as he is approaching 3 weeks intubated. See palliative care notes 

for further explanation. 


- Compassionate extubation versus Tracheostomy





Dispo: 


Patient will continue to receive ICU level care


poor overall prognosis for meaningful recovery





Visit type





- Emergency Visit


Emergency Visit: Yes


ED Registration Date: 10/30/18


Care time: The patient presented to the Emergency Department on the above date 

and was hospitalized for further evaluation of their emergent condition.





- New Patient


This patient is new to me today: Yes


Date on this admission: 11/29/18





- Critical Care


Critical Care patient: Yes


Total Critical Care Time (in minutes): 38


Critical Care Statement: The care of this patient involved high complexity 

decision making to prevent further life threatening deterioration of the patient

's condition and/or to evaluate & treat vital organ system(s) failure or risk 

of failure.

## 2018-11-30 LAB
ALBUMIN SERPL-MCNC: 1 G/DL (ref 3.4–5)
ALP SERPL-CCNC: 152 U/L (ref 45–117)
ALT SERPL-CCNC: 32 U/L (ref 13–61)
ANION GAP SERPL CALC-SCNC: 5 MMOL/L (ref 8–16)
ANISOCYTOSIS BLD QL: (no result)
ARTERIAL BLOOD GAS PCO2: 35.6 MMHG (ref 35–45)
ARTERIAL PATENCY WRIST A: POSITIVE
AST SERPL-CCNC: 45 U/L (ref 15–37)
BASE EXCESS BLDA CALC-SCNC: -5.5 MEQ/L (ref -2–2)
BASOPHILS # BLD: 0.5 % (ref 0–2)
BILIRUB SERPL-MCNC: 0.5 MG/DL (ref 0.2–1)
BUN SERPL-MCNC: 48 MG/DL (ref 7–18)
CALCIUM SERPL-MCNC: 6.6 MG/DL (ref 8.5–10.1)
CHLORIDE SERPL-SCNC: 116 MMOL/L (ref 98–107)
CO2 SERPL-SCNC: 20 MMOL/L (ref 21–32)
CREAT SERPL-MCNC: 1.3 MG/DL (ref 0.55–1.3)
DEPRECATED RDW RBC AUTO: 16.1 % (ref 11.9–15.9)
EOSINOPHIL # BLD: 1.8 % (ref 0–4.5)
GLUCOSE SERPL-MCNC: 73 MG/DL (ref 74–106)
HCT VFR BLD CALC: 25.7 % (ref 35.4–49)
HGB BLD-MCNC: 8.4 GM/DL (ref 11.7–16.9)
LYMPHOCYTES # BLD: 16.3 % (ref 8–40)
MACROCYTES BLD QL: 0
MAGNESIUM SERPL-MCNC: 1.7 MG/DL (ref 1.8–2.4)
MCH RBC QN AUTO: 29.2 PG (ref 25.7–33.7)
MCHC RBC AUTO-ENTMCNC: 32.6 G/DL (ref 32–35.9)
MCV RBC: 89.6 FL (ref 80–96)
MONOCYTES # BLD AUTO: 3.9 % (ref 3.8–10.2)
NEUTROPHILS # BLD: 77.5 % (ref 42.8–82.8)
PHOSPHATE SERPL-MCNC: 3.6 MG/DL (ref 2.5–4.9)
PLATELET # BLD AUTO: 91 K/MM3 (ref 134–434)
PLATELET BLD QL SMEAR: (no result)
PMV BLD: 10.3 FL (ref 7.5–11.1)
PO2 BLDA: 86.9 MMHG (ref 70–100)
POTASSIUM SERPLBLD-SCNC: 4.5 MMOL/L (ref 3.5–5.1)
PROT SERPL-MCNC: 8.2 G/DL (ref 6.4–8.2)
RBC # BLD AUTO: 2.87 M/MM3 (ref 4–5.6)
ROULEAUX BLD QL SMEAR: (no result)
SAO2 % BLDA: 96.1 % (ref 90–98.9)
SODIUM SERPL-SCNC: 141 MMOL/L (ref 136–145)
WBC # BLD AUTO: 3.6 K/MM3 (ref 4–10)

## 2018-11-30 RX ADMIN — DEXTROSE MONOHYDRATE SCH MLS/HR: 50 INJECTION, SOLUTION INTRAVENOUS at 06:23

## 2018-11-30 RX ADMIN — METOPROLOL TARTRATE SCH: 25 TABLET, FILM COATED ORAL at 21:26

## 2018-11-30 RX ADMIN — DEXTROSE MONOHYDRATE SCH MLS/HR: 50 INJECTION, SOLUTION INTRAVENOUS at 16:00

## 2018-11-30 RX ADMIN — ALLOPURINOL SCH: 100 TABLET ORAL at 11:52

## 2018-11-30 RX ADMIN — RIFAXIMIN SCH MG: 550 TABLET ORAL at 21:27

## 2018-11-30 RX ADMIN — ALLOPURINOL SCH MG: 100 TABLET ORAL at 21:34

## 2018-11-30 RX ADMIN — METOPROLOL TARTRATE PRN MG: 5 INJECTION, SOLUTION INTRAVENOUS at 04:19

## 2018-11-30 RX ADMIN — DEXTROSE MONOHYDRATE SCH: 50 INJECTION, SOLUTION INTRAVENOUS at 21:24

## 2018-11-30 RX ADMIN — RIFAXIMIN SCH: 550 TABLET ORAL at 11:51

## 2018-11-30 RX ADMIN — PANTOPRAZOLE SODIUM SCH MG: 40 INJECTION, POWDER, FOR SOLUTION INTRAVENOUS at 21:27

## 2018-11-30 RX ADMIN — Medication SCH MG: at 17:18

## 2018-11-30 RX ADMIN — VITAMIN D, TAB 1000IU (100/BT) SCH: 25 TAB at 11:51

## 2018-11-30 RX ADMIN — CHLORHEXIDINE GLUCONATE 0.12% ORAL RINSE SCH ML: 1.2 LIQUID ORAL at 21:26

## 2018-11-30 RX ADMIN — PANTOPRAZOLE SODIUM SCH MG: 40 INJECTION, POWDER, FOR SOLUTION INTRAVENOUS at 11:51

## 2018-11-30 RX ADMIN — Medication SCH: at 11:49

## 2018-11-30 RX ADMIN — METOPROLOL TARTRATE SCH: 25 TABLET, FILM COATED ORAL at 11:49

## 2018-11-30 RX ADMIN — VITAMIN D, TAB 1000IU (100/BT) SCH UNIT: 25 TAB at 17:18

## 2018-11-30 RX ADMIN — CHLORHEXIDINE GLUCONATE 0.12% ORAL RINSE SCH ML: 1.2 LIQUID ORAL at 11:49

## 2018-11-30 RX ADMIN — Medication SCH TAB: at 17:18

## 2018-11-30 RX ADMIN — SODIUM CHLORIDE, POTASSIUM CHLORIDE, SODIUM LACTATE AND CALCIUM CHLORIDE SCH: 600; 310; 30; 20 INJECTION, SOLUTION INTRAVENOUS at 11:49

## 2018-11-30 RX ADMIN — Medication SCH: at 11:51

## 2018-11-30 NOTE — PN
Progress Note, Physician


History of Present Illness: 


Poorly responsive on vent, persistent afib off lopressor and heparin gtt given 

hypotension and decrease in Hgb, received 2 U pRBC Hgb stable.





- Current Medication List


Current Medications: 


Active Medications





Acetaminophen (Tylenol -)  650 mg PO Q6H PRN


   PRN Reason: PAIN LEVEL 1 - 3


   Last Admin: 11/23/18 03:13 Dose:  650 mg


Allopurinol (Zyloprim -)  100 mg PO BID [facility]


   Last Admin: 11/29/18 22:36 Dose:  100 mg


Chlorhexidine Gluconate (Peridex -)  15 ml MM BID [facility]


   Last Admin: 11/29/18 22:36 Dose:  15 ml


Cholecalciferol (Vitamin D3 -)  1,000 unit PO DAILY [facility]


   Last Admin: 11/29/18 09:55 Dose:  1,000 unit


Diphenhydramine HCl (Benadryl Injection -)  25 mg IVPB ONCE PRN


   PRN Reason: DURING PLASMAPHERESIS


Metronidazole (Flagyl 500mg Premixed Ivpb -)  500 mg in 100 mls @ 100 mls/hr 

IVPB BID [facility]


   Last Admin: 11/29/18 22:35 Dose:  100 mls/hr


Levofloxacin (Levaquin 250 Mg Premixed Ivpb -)  250 mg in 50 mls @ 50 mls/hr 

IVPB DAILY [facility]; Protocol


   Last Admin: 11/29/18 09:55 Dose:  50 mls/hr


Fentanyl 500 mcg/ Dextrose  100 mls @ 5 mls/hr IVPB TITR [facility]; Protocol


   Last Admin: 11/30/18 06:23 Dose:  50 mcg/hr, 10 mls/hr


Lactated Ringer's (Lactated Ringers Solution)  1,000 ml in 1,000 mls @ 125 mls/

hr IV ASDIR [facility]


   Last Admin: 11/29/18 08:48 Dose:  125 mls/hr


Lactobacillus Acidophilus (Bacid -)  1 tab PO DAILY [facility]


   Last Admin: 11/29/18 09:55 Dose:  1 tab


Metoprolol Tartrate (Lopressor Injection -)  5 mg IVPUSH Q8H PRN


   PRN Reason: TACHYCARDIA


   Last Admin: 11/30/18 04:19 Dose:  5 mg


Metoprolol Tartrate (Lopressor -)  25 mg NGT BID [facility]


   Last Admin: 11/29/18 22:36 Dose:  25 mg


Pantoprazole Sodium (Protonix Iv)  40 mg IVPUSH BID [facility]


   Last Admin: 11/29/18 22:36 Dose:  40 mg


Rifaximin (Xifaxan -)  550 mg PO BID [facility]


   Last Admin: 11/29/18 22:36 Dose:  550 mg


Thiamine HCl (Vitamin B1 -)  100 mg PO DAILY [facility]


   Last Admin: 11/29/18 09:55 Dose:  100 mg











- Objective


Vital Signs: 


 Vital Signs











Temperature  97.8 F   11/30/18 06:00


 


Pulse Rate  102 H  11/30/18 10:00


 


Respiratory Rate  18   11/30/18 10:00


 


Blood Pressure  98/62   11/30/18 10:00


 


O2 Sat by Pulse Oximetry (%)  100   11/30/18 08:16











Constitutional: Yes: No Distress, Calm, Thin


Neck: Yes: Supple


Cardiovascular: Yes: Pulse Irregular, Murmur (2/6 SM)


Respiratory: Yes: Diminished, Intubated, Mechanically Ventilated


Gastrointestinal: Yes: Soft, Hypoactive Bowel Sounds


Edema: No


Labs: 


 CBC, BMP





 11/30/18 05:30 





 11/30/18 05:30 





 INR, PTT











INR  1.49  (0.83-1.09)  H  11/28/18  12:50    


 


Fibrinogen  323.0 mg/dL (238-498)   11/28/18  12:50    














- ....Imaging


Chest X-ray: Report Reviewed (Stable)





Problem List





- Problems


(1) Atrial fibrillation with RVR


Code(s): I48.91 - UNSPECIFIED ATRIAL FIBRILLATION   





(2) Acute-on-chronic kidney injury


Code(s): N17.9 - ACUTE KIDNEY FAILURE, UNSPECIFIED; N18.9 - CHRONIC KIDNEY 

DISEASE, UNSPECIFIED   


Qualifiers: 


   Acute renal failure type: unspecified   Chronic kidney disease stage: 

unspecified stage   Qualified Code(s): N17.9 - Acute kidney failure, unspecified

; N18.9 - Chronic kidney disease, unspecified   





(3) Demand ischemia


Code(s): I24.8 - OTHER FORMS OF ACUTE ISCHEMIC HEART DISEASE   





(4) Hypercalcemia


Code(s): E83.52 - HYPERCALCEMIA   





(5) Nonadherence to medication


Code(s): Z91.14 - PATIENT'S OTHER NONCOMPLIANCE WITH MEDICATION REGIMEN   





(6) Paraproteinemia


Code(s): D89.2 - HYPERGAMMAGLOBULINEMIA, UNSPECIFIED   





(7) Anticoagulant long-term use


Code(s): Z79.01 - LONG TERM (CURRENT) USE OF ANTICOAGULANTS   





(8) Chronic thromboembolic disease


Code(s): I74.9 - EMBOLISM AND THROMBOSIS OF UNSPECIFIED ARTERY   





(9) Coronary artery disease


Code(s): I25.10 - ATHSCL HEART DISEASE OF NATIVE CORONARY ARTERY W/O ANG PCTRS 

  


Qualifiers: 


   Coronary Disease-Associated Artery/Lesion type: native artery   Native vs. 

transplanted heart: native heart   Associated angina: without angina   

Qualified Code(s): I25.10 - Atherosclerotic heart disease of native coronary 

artery without angina pectoris   





(10) Diastolic dysfunction without heart failure


Code(s): I51.9 - HEART DISEASE, UNSPECIFIED   





(11) HTN (hypertension)


Code(s): I10 - ESSENTIAL (PRIMARY) HYPERTENSION   


Qualifiers: 


   Hypertension type: essential hypertension   Qualified Code(s): I10 - 

Essential (primary) hypertension   





(12) Hypertrophic cardiomyopathy


Code(s): I42.2 - OTHER HYPERTROPHIC CARDIOMYOPATHY   





(13) Hyperlipidemia


Code(s): E78.5 - HYPERLIPIDEMIA, UNSPECIFIED   


Qualifiers: 


   Hyperlipidemia type: pure hypercholesterolemia   Qualified Code(s): E78.00 - 

Pure hypercholesterolemia, unspecified; E78.0 - Pure hypercholesterolemia   





(14) Multiple myeloma


Code(s): C90.00 - MULTIPLE MYELOMA NOT HAVING ACHIEVED REMISSION   


Qualifiers: 


   Multiple myeloma remission status: not in remission   Qualified Code(s): 

C90.00 - Multiple myeloma not having achieved remission   





(15) Acalculous cholecystitis


Code(s): K81.9 - CHOLECYSTITIS, UNSPECIFIED   





Assessment/Plan


R&LHc at [facility] 1/11/2017 showing nonobstructive CAD, severe LV apical 

hypertrophic cardiomyopathy, mildly elevated right sided pressures, Mynx 

deployed right CFA access site. Study is consistent with apical hypertrophy (

spade-like) variant of hypertrophic cardiomyopathy, planned for optimal medical 

therapy. 


Echocardiogram: 07/11/2018 Mod cLVH, severe DYANA, mod TR RVSP 50-60 mmHg, mod-

severe MR


Echocardiogram: 10/16/2018 Normal LV size with hyperdynamic LVEF 75%, mild BSH, 

normal RV size and fxn, severe LAE, mod-severe MR, mod TR





1. Acute hypoxic respiratory failure, suspected aspiration pneumonia with 

sepsis syndrome, remains intubated


2. Toxic metabolic encephelopathy, rule out CVA, no change in status 


3. 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


4. Paraproteinemia confirmed multiple myeloma


5. 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 

disease related to persistent atrial fibrillation with [id number] score of 6


6. CAD with evidence of demand ischemic injury, non obstructive CAD on [facility] 

coronary angiogrpahy angina pectoris


7. 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 

chronic class I-II NYHA classification LV failure, compensated/euvolemic


8. Persistent atrial fibrillation [id number] score of 6 currently on Heparin 

therapy


9. HTN


10. Anemia/thrombocytopenia


11. Acalculous Cholecystitis


12. Ischemic hepatitis


13. Resolved Hypernatremia





PLAN:





1. Resume Heparin gtt as Hgb stable pending tracheostomy, transfuse to maintain 

Hg equal or > 8.0, as outlined in prior notes ideally A/C therapy to be 

continued indefinitely unless it is absolutely contraindicated considering his 

[id number] score of 6 


2. Resume Lopressor 25 bid as hemodynamics permit 


3. Antibiotics as per the primary team


4. Ventilator management as per the ICU team, lighten sedation


5. As outlined in prior notes overall poor prognosis (no improvement), family 

decided on tracheostomy

## 2018-11-30 NOTE — PN
Progress Note, Physician


History of Present Illness: 


 Unresponsive on ventilator


  Afebrile


  Pancytopenic


  


 





 


 


 





- Current Medication List


Current Medications: 


Active Medications





Acetaminophen (Tylenol -)  650 mg PO Q6H PRN


   PRN Reason: PAIN LEVEL 1 - 3


   Last Admin: 11/23/18 03:13 Dose:  650 mg


Allopurinol (Zyloprim -)  100 mg PO BID [facility]


   Last Admin: 11/30/18 11:52 Dose:  Not Given


Chlorhexidine Gluconate (Peridex -)  15 ml MM BID [facility]


   Last Admin: 11/30/18 11:49 Dose:  15 ml


Cholecalciferol (Vitamin D3 -)  1,000 unit PO DAILY [facility]


   Last Admin: 11/30/18 11:51 Dose:  Not Given


Diphenhydramine HCl (Benadryl Injection -)  25 mg IVPB ONCE PRN


   PRN Reason: DURING PLASMAPHERESIS


Metronidazole (Flagyl 500mg Premixed Ivpb -)  500 mg in 100 mls @ 100 mls/hr 

IVPB BID [facility]


   Last Admin: 11/30/18 11:48 Dose:  100 mls/hr


Levofloxacin (Levaquin 250 Mg Premixed Ivpb -)  250 mg in 50 mls @ 50 mls/hr 

IVPB DAILY [facility]; Protocol


   Last Admin: 11/30/18 11:48 Dose:  50 mls/hr


Fentanyl 500 mcg/ Dextrose  100 mls @ 5 mls/hr IVPB TITR [facility]; Protocol


   Last Admin: 11/30/18 06:23 Dose:  50 mcg/hr, 10 mls/hr


Lactated Ringer's (Lactated Ringers Solution)  1,000 ml in 1,000 mls @ 125 mls/

hr IV ASDIR [facility]


   Last Admin: 11/30/18 11:49 Dose:  Not Given


Lactobacillus Acidophilus (Bacid -)  1 tab PO DAILY [facility]


   Last Admin: 11/30/18 11:49 Dose:  Not Given


Metoprolol Tartrate (Lopressor Injection -)  5 mg IVPUSH Q8H PRN


   PRN Reason: TACHYCARDIA


   Last Admin: 11/30/18 04:19 Dose:  5 mg


Metoprolol Tartrate (Lopressor -)  25 mg NGT BID [facility]


   Last Admin: 11/30/18 11:49 Dose:  Not Given


Pantoprazole Sodium (Protonix Iv)  40 mg IVPUSH BID [facility]


   Last Admin: 11/30/18 11:51 Dose:  40 mg


Rifaximin (Xifaxan -)  550 mg PO BID [facility]


   Last Admin: 11/30/18 11:51 Dose:  Not Given


Thiamine HCl (Vitamin B1 -)  100 mg PO DAILY [facility]


   Last Admin: 11/30/18 11:51 Dose:  Not Given











- Objective


Vital Signs: 


 Vital Signs











Temperature  98.5 F   11/30/18 10:00


 


Pulse Rate  109 H  11/30/18 12:00


 


Respiratory Rate  18   11/30/18 12:00


 


Blood Pressure  102/71   11/30/18 12:00


 


O2 Sat by Pulse Oximetry (%)  100   11/30/18 08:16











Constitutional: Yes: No Distress


Cardiovascular: Yes: Regular Rate and Rhythm, S1, S2


Respiratory: Yes: Mechanically Ventilated


Gastrointestinal: Yes: Normal Bowel Sounds, Soft.  No: Tenderness


Edema: Yes


Labs: 


 CBC, BMP





 11/30/18 05:30 





 11/30/18 05:30 





 INR, PTT











INR  1.49  (0.83-1.09)  H  11/28/18  12:50    


 


Fibrinogen  323.0 mg/dL (238-498)   11/28/18  12:50    














Assessment/Plan


Respiratory failure


 RLL pneumonia


 Acalculus  Cholecystitis


 Toxic metabolic encephalopathy


 Renal failure


 Myeloma


 Hyperviscosity syndrome


 Diarrhea


 Pancytopenia


 


  


 


  Continue levaquin / flagyl


  For tracheostomy


  Ventilatory support


  Prognosis poor

## 2018-11-30 NOTE — PN
Physical Exam: 


SUBJECTIVE: Patient seen and examined in the ICU.  Remains intubated, poorly 

responsive but now on fentanyl gtt. opens eyes to voice. No gross change in 

overall mental status. Family decision to proceed with tracheostomy.  Seen by 

[place] this AM, and due to increasing INR, we are going to hold on this elective 

case. Je (son) has been informed














OBJECTIVE:





 Vital Signs











 Period  Temp  Pulse  Resp  BP Sys/Varghese  Pulse Ox


 


 Last 24 Hr  97.8 F-98.5 F    18-22  /61-87  











GENERAL: Remains intubated, poorly responsive off sedation but opens eyes to 

voice.


HEENT: NCAT PERRL sclera anicteric. nares patent, dry mucous membranes


NECK: Trachea midline. 


LUNGS: bilateral diffuse rhonchi


HEART: reg rate and irregular rhythm. 


ABDOMEN: Soft, nondistended NT


EXTREMITIES: 2+ pulses, warm, well-perfused, no edema, scattered ulcers. 


NEUROLOGICAL: difficult to assess secondary to clinical condition. gag reflex 

does not appear to be present. opens eyes to voice. corneal reflex+, +gag 

reflex 


SKIN: Warm, dry, normal turgor, scattered ulcers on the legs











 Laboratory Results - last 24 hr











  11/29/18 11/29/18 11/30/18





  13:00 19:00 05:30


 


WBC   4.2  3.6 L


 


RBC   2.85 L  2.87 L


 


Hgb   8.8 L  8.4 L


 


Hct   25.1 L  25.7 L


 


MCV   88.1  89.6


 


MCH   31.0  29.2


 


MCHC   35.2  32.6


 


RDW   16.5 H  16.1 H


 


Plt Count   100 L D  91 L


 


MPV   10.4  10.3


 


Absolute Neuts (auto)   3.3  2.8


 


Neutrophils %   77.7  77.5


 


Neutrophils % (Manual)   64.0  65.6


 


Band Neutrophils %   18.0  5.2


 


Lymphocytes %   16.3  16.3


 


Lymphocytes % (Manual)   12.0  D  16.7  D


 


Monocytes %   3.2 L  3.9


 


Monocytes % (Manual)   1 L  5  D


 


Eosinophils %   2.4  1.8


 


Eosinophils % (Manual)   2.0  D  4.2  D


 


Basophils %   0.4  0.5


 


Basophils % (Manual)   0.0  1.0  D


 


Myelocytes % (Man)    0


 


Promyelocytes % (Man)    0


 


Blast Cells % (Manual)    0


 


Nucleated RBC %   4 H  9 H


 


Metamyelocytes   1  D  0  D


 


Hypochromia   1+  0


 


Platelet Estimate   Decreased  Decreased


 


Platelet Comment   No clumping noted 


 


Polychromasia    3+


 


Poikilocytosis    2+


 


Anisocytosis   1+  2+


 


Microcytosis    3+


 


Macrocytosis    0


 


Rouleaux    2+


 


Anticoagulation Therapy   


 


Puncture Site   


 


ABG pH   


 


ABG pCO2 at Pt Temp   


 


ABG pO2 at Pt Temp   


 


ABG HCO3   


 


ABG O2 Sat (Measured)   


 


ABG O2 Content   


 


ABG Base Excess   


 


Chacho Test   


 


O2 Delivery Device   


 


Oxygen Flow Rate   


 


Vent Mode   


 


Vent Rate   


 


Mechanical Rate   


 


PEEP   


 


Pressure Support Vent   


 


Sodium   


 


Potassium   


 


Chloride   


 


Carbon Dioxide   


 


Anion Gap   


 


BUN   


 


Creatinine   


 


Creat Clearance w eGFR   


 


Random Glucose   


 


Calcium   


 


Phosphorus   


 


Magnesium   


 


Total Bilirubin   


 


AST   


 


ALT   


 


Alkaline Phosphatase   


 


Total Protein   


 


Albumin   


 


Stool Occult Blood  Negative  














  11/30/18 11/30/18





  05:30 06:45


 


WBC  


 


RBC  


 


Hgb  


 


Hct  


 


MCV  


 


MCH  


 


MCHC  


 


RDW  


 


Plt Count  


 


MPV  


 


Absolute Neuts (auto)  


 


Neutrophils %  


 


Neutrophils % (Manual)  


 


Band Neutrophils %  


 


Lymphocytes %  


 


Lymphocytes % (Manual)  


 


Monocytes %  


 


Monocytes % (Manual)  


 


Eosinophils %  


 


Eosinophils % (Manual)  


 


Basophils %  


 


Basophils % (Manual)  


 


Myelocytes % (Man)  


 


Promyelocytes % (Man)  


 


Blast Cells % (Manual)  


 


Nucleated RBC %  


 


Metamyelocytes  


 


Hypochromia  


 


Platelet Estimate  


 


Platelet Comment  


 


Polychromasia  


 


Poikilocytosis  


 


Anisocytosis  


 


Microcytosis  


 


Macrocytosis  


 


Rouleaux  


 


Anticoagulation Therapy   No Result Required.


 


Puncture Site   Right radial


 


ABG pH   7.35


 


ABG pCO2 at Pt Temp   35.6


 


ABG pO2 at Pt Temp   86.9  D


 


ABG HCO3   19.0 L


 


ABG O2 Sat (Measured)   96.1


 


ABG O2 Content   12.0 L


 


ABG Base Excess   -5.5 L


 


Chacho Test   Positive


 


O2 Delivery Device   Vent


 


Oxygen Flow Rate   40%


 


Vent Mode   No Result Required.


 


Vent Rate   18


 


Mechanical Rate   No Result Required.


 


PEEP   5.0


 


Pressure Support Vent   500


 


Sodium  141 


 


Potassium  4.5 


 


Chloride  116 H 


 


Carbon Dioxide  20 L 


 


Anion Gap  5 L 


 


BUN  48 H 


 


Creatinine  1.3 


 


Creat Clearance w eGFR  53.25 


 


Random Glucose  73 L 


 


Calcium  6.6 L* 


 


Phosphorus  3.6 


 


Magnesium  1.7 L 


 


Total Bilirubin  0.5 


 


AST  45 H 


 


ALT  32 


 


Alkaline Phosphatase  152 H 


 


Total Protein  8.2 


 


Albumin  1.0 L 


 


Stool Occult Blood  








Active Medications











Generic Name Dose Route Start Last Admin





  Trade Name Freq  PRN Reason Stop Dose Admin


 


Acetaminophen  650 mg  11/10/18 14:09  11/23/18 03:13





  Tylenol -  PO   650 mg





  Q6H PRN   Administration





  PAIN LEVEL 1 - 3   





     





     





     


 


Allopurinol  100 mg  11/09/18 10:00  11/30/18 11:52





  Zyloprim -  PO   Not Given





  BID [facility]   





     





     





     





     


 


Chlorhexidine Gluconate  15 ml  11/10/18 23:45  11/30/18 11:49





  Peridex -  MM   15 ml





  BID [facility]   Administration





     





     





     





     


 


Cholecalciferol  1,000 unit  11/18/18 10:00  11/30/18 11:51





  Vitamin D3 -  PO   Not Given





  DAILY [facility]   





     





     





     





     


 


Diphenhydramine HCl  25 mg  11/09/18 12:00  





  Benadryl Injection -  IVPB   





  ONCE PRN   





  DURING PLASMAPHERESIS   





     





     





     


 


Metronidazole  500 mg in 100 mls @ 100 mls/hr  11/17/18 14:30  11/30/18 11:48





  Flagyl 500mg Premixed Ivpb -  IVPB   100 mls/hr





  BID [facility]   Administration





     





     





     





     


 


Levofloxacin  250 mg in 50 mls @ 50 mls/hr  11/19/18 12:45  11/30/18 11:48





  Levaquin 250 Mg Premixed Ivpb -  IVPB   50 mls/hr





  DAILY [facility]   Administration





     





     





  Protocol   





     


 


Fentanyl 500 mcg/ Dextrose  100 mls @ 5 mls/hr  11/25/18 19:00  11/30/18 06:23





  IVPB   50 mcg/hr





  TITR [facility]   10 mls/hr





     Administration





     





  Protocol   





  25 MCG/HR   


 


Lactated Ringer's  1,000 ml in 1,000 mls @ 125 mls/hr  11/29/18 08:30  11/30/18 

11:49





  Lactated Ringers Solution  IV   Not Given





  ASDIR [facility]   





     





     





     





     


 


Lactobacillus Acidophilus  1 tab  11/25/18 10:00  11/30/18 11:49





  Bacid -  PO   Not Given





  DAILY [facility]   





     





     





     





     


 


Metoprolol Tartrate  5 mg  11/09/18 13:04  11/30/18 04:19





  Lopressor Injection -  IVPUSH   5 mg





  Q8H PRN   Administration





  TACHYCARDIA   





     





     





     


 


Metoprolol Tartrate  25 mg  11/29/18 11:54  11/30/18 11:49





  Lopressor -  NGT   Not Given





  BID [facility]   





     





     





     





     


 


Pantoprazole Sodium  40 mg  11/29/18 22:00  11/30/18 11:51





  Protonix Iv  IVPUSH   40 mg





  BID [facility]   Administration





     





     





     





     


 


Phytonadione  5 mg  11/30/18 13:29  





  [place] Injection -  SQ  11/30/18 13:30  





  ONCE ONE   





     





     





     





     


 


Rifaximin  550 mg  11/15/18 22:00  11/30/18 11:51





  Xifaxan -  PO   Not Given





  BID [facility]   





     





     





     





     


 


Thiamine HCl  100 mg  10/30/18 22:12  11/30/18 11:51





  Vitamin B1 -  PO   Not Given





  DAILY [facility]   





     





     





     





     











ASSESSMENT/PLAN:


79 yo m PMH of A-Fib, Acute on chronic kidney injury, CAD w stents, 

hypercalcemia, medication non-adherence, paraproteinemia, thromboembolic disease

, diastolic heart dysfunction without failure, HTN, HLD, hypertrophic 

cardiomyopathy is here after a rapid response. He was on the floors when it was 

noticed that he has respiratory insufficiency and was desaturating, requiring 

ICU monitoring. GOC discussion ongoing. Family decision to proceed with 

tracheostomy.  Seen by [place] this AM, and due to increasing INR, we are going to 

hold on this elective case. 





GI: 


will need PEG placement when gets trache 





metabolic encephalopathy 


-ammonia downtrending w/ rifaximin, 


s/p lactulose 


-LFTs elevated but downtrending. transamintitis most likely secondary to 

ischemic hepatits which is multifactorial including sepsis, episodes of 

hypotension and hyperviscosity syndrome. 





acalculous cholecystitis?


US shows thickened gallbladder wall, pericholecystic fluid, suspicious for 

acalculous cholecystitis. There was also mild dilatation of the CBD but that 

might be normal for age. 


-Spoke w/ IR, who feel image does not represent  acalculous cholecystitis and 

does not require any IR drainage 





ID: 


No fevers recorded. 


- Rhonchi heard on Lung auscultation


-RLL pneumonia


bcx 11/19/18 neg 


off of ceftazidime


Vancomycin Redosed 11/19/18 


C diff neg 11/25/18


- c/w levaquin/flagyl. 


- ID on board


- c/w rifaximin for elevated ammonia level





Cardio: 


Afib -[id number] score of 6 


- Lopressor 75 mg BID PO


-IV metoprolol 5 mg PRN


- diastolic dysfunction + hypertrophic cardiomyopathy


- CAD with multiple stents


- Cardio consulted and on board.


off Xarelto 15 qd (renal dosing) while on heparin gtt. transfuse to maintain Hgb

>8.0 


s/p 1 u prbc 11/20/18 and 1 u prbc 11/22/18, Heparin with caution considering 

the dropping Hg, transfuse to maintain Hg equal or > 8.0, as outlined in prior 

notes ideally A/C therapy to be continued indefinitely unless it is absolutely 

contraindicated considering his [id number] score of 6 


- Sporadically goes in and out of V-Tach - Can give amio if v-tach is sustained 

and persistent. 


-Off heparin gtt, s/p 2 U prbcs w/ appropriate response. FOBT neg





Pulm: 


- Intubated


-surgery consult for trache 


- Seen by [place] this AM, and due to increasing INR, we are going to hold on this 

elective case. 


-For Trach on Monday if stable 


- Patient has b/l pleural effusions.


- No pneumothorax


- b/l diffuse rhonchi


- infiltrate on CXR: Abx- Substituting levaquin for ceftazidime


Vancomycin Redosed 11/19/18 


- c/w levaquin/flagyl. 





Renal: 


- Acute on Chronic kidney injury


HAM ATN vs. Cast nephropathy  (FeNa was 2.1% indicating tubular injury, US 

showed no stones or obstruction, UA w/o protein but UPCR ~0.5 indicating non-

albumin proteinuria)


- Renal consulted and on board. 


Hyperkalemia (r/o tumor lysis)


serum K is improved, s/p bicarb gtt





Neuro: 


- Patient is not alert or oriented.


Remains intubated, poorly responsive but now on fentanyl gtt. opens eyes to 

voice


- Head CT showed no acute pathology


- Neuro consulted and on board. (Dr. Youngblood + Dr. phan) 


-ammonia stable in 60s w/ rifaximin, lactulose dcd 





Heme/Onc: 


- monitor H/H


- Will transfuse to keep hb > 8 


- Patient not receiving plasmapharesis today. 


- Paraproteinemia


- Patient fulfills new criteria  for multiple myeloma with free kappa/ free 

lambda light chain  ratio of 432 (>100 is diagnostic of myeloma) 


- Kappa/Lambda ratio > 100 consistent with Multiple myeloma


- M spike elevated elevated at 6.8 previously 4.7 


-steroids being held at this time 


s/p 1 u prbc 11/20/18 and 1 u prbc 11/22/18, Heparin with caution considering 

the dropping Hg, transfuse to maintain Hg equal or > 8.0, as outlined in prior 

notes ideally A/C therapy to be continued indefinitely unless it is absolutely 

contraindicated considering his [id number] score of 6 


-Hypotensive this AM and H/H low (6.7) without obvious source of bleeding. will 

give 2 U prbcs and dc heparin


-platelets are low and downtrending. 


-fibrinogen nl


-f/u HIT AB


-Off heparin gtt, s/p 2 U prbcs w/ appropriate response. FOBT neg 





-Seen by [place] this AM, and due to increasing INR, we are going to hold on this 

elective case. 


-Optimize platelets and coagulation factors prior to trach. Trach on Monday if 

stable 


-Vit K


-Would like platelets at 100K and PT< 1.2 


-monitor and would transfuse platelets and FFP prior to procedure to 

optimization. 





Endo: 


- Parathyroid hormone WNL


- PTH-borderline low appropriate given hypercalcemia, PTHrP low





Prophylaxis: 


-Off heparin gtt, s/p 2 U prbcs w/ appropriate response. FOBT neg


transfuse to maintain Hg equal or > 8.0, as outlined in prior notes ideally A/C 

therapy to be continued indefinitely unless it is absolutely contraindicated 

considering his [id number] score of 6


- Protonix





F/E/N: 


F: LR 125cc


E: Will replete lytes PRN


N: tube feeds (low potassium feeds). with free water 





Code Status: Full Code 


-GOC discussion ongoing


-Family decision to proceed with tracheostomy. See palliative care notes for 

further explanation. 


-have recommended to family for compassionate extubation but they have deciding 

on Trach/PEG 





Dispo: 


Patient will continue to receive ICU level care


poor overall prognosis for meaningful recovery


Family decision to proceed with tracheostomy.  Seen by [place] this AM, and due to 

increasing INR, we are going to hold on this elective case. Je (son) has 

been informed





Visit type





- Emergency Visit


Emergency Visit: Yes


ED Registration Date: 10/30/18


Care time: The patient presented to the Emergency Department on the above date 

and was hospitalized for further evaluation of their emergent condition.





- New Patient


This patient is new to me today: Yes


Date on this admission: 11/30/18





- Critical Care


Critical Care patient: Yes


Total Critical Care Time (in minutes): 38


Critical Care Statement: The care of this patient involved high complexity 

decision making to prevent further life threatening deterioration of the patient

's condition and/or to evaluate & treat vital organ system(s) failure or risk 

of failure.

## 2018-11-30 NOTE — PN
Progress Note (short form)





- Note


Progress Note: 





Thoracic Surgery:





Full consult to follow.


Possible tracheostomy on Monday if labs OK.

## 2018-11-30 NOTE — PN
Progress Note (short form)





- Note


Progress Note: 





Renal follow up for Hypercalcemia/HAM





Pt seen and examined in the ICU


on vent, no change in clinical status


on tube feeds


FIO2 40%





 Vital Signs








Temperature  98.3 F   11/29/18 12:00


 


Pulse Rate  96 H  11/29/18 14:00


 


Respiratory Rate  18   11/29/18 13:05


 


Blood Pressure  98/61   11/29/18 14:00


 


O2 Sat by Pulse Oximetry (%)  100   11/29/18 10:00








 Intake & Output











 11/26/18 11/27/18 11/28/18 11/29/18





 23:59 23:59 23:59 23:59


 


Intake Total 3308 3894 5737 1720


 


Output Total 800 1700 1350 1100


 


Balance 2508 2194 4387 620


 


Weight 89.3 kg 90.038 kg 92.986 kg 97.069 kg








NAD


trace sacral edema 





 CBC, BMP





 11/29/18 05:30 





 11/29/18 05:30 





 Current Medications





Acetaminophen (Tylenol -)  650 mg PO Q6H PRN


   PRN Reason: PAIN LEVEL 1 - 3


   Last Admin: 11/23/18 03:13 Dose:  650 mg


Allopurinol (Zyloprim -)  [facility]


   Last Admin: 11/29/18 09:55 Dose:  100 mg


Chlorhexidine Gluconate (Peridex -)  15 ml MM BID [facility]


   Last Admin: 11/29/18 09:57 Dose:  15 ml


Cholecalciferol (Vitamin D3 -)  1,000 unit PO DAILY [facility]


   Last Admin: 11/29/18 09:55 Dose:  1,000 unit


Diphenhydramine HCl (Benadryl Injection -)  25 mg IVPB ONCE PRN


   PRN Reason: DURING PLASMAPHERESIS


Metronidazole (Flagyl 500mg Premixed Ivpb -)  500 mg in 100 mls @ 100 mls/hr 

IVPB BID [facility]


   Last Admin: 11/29/18 09:55 Dose:  100 mls/hr


Levofloxacin (Levaquin 250 Mg Premixed Ivpb -)  250 mg in 50 mls @ 50 mls/hr 

IVPB DAILY [facility]; Protocol


   Last Admin: 11/29/18 09:55 Dose:  50 mls/hr


Fentanyl 500 mcg/ Dextrose  100 mls @ 5 mls/hr IVPB TITR [facility]; Protocol


   Last Admin: 11/28/18 18:34 Dose:  50 mcg/hr, 10 mls/hr


Lactated Ringer's (Lactated Ringers Solution)  1,000 ml in 1,000 mls @ 125 mls/

hr IV ASDIR [facility]


   Last Admin: 11/29/18 08:48 Dose:  125 mls/hr


Lactobacillus Acidophilus (Bacid -)  1 tab PO DAILY [facility]


   Last Admin: 11/29/18 09:55 Dose:  1 tab


Metoprolol Tartrate (Lopressor Injection -)  5 mg IVPUSH Q8H PRN


   PRN Reason: TACHYCARDIA


   Last Admin: 11/24/18 18:44 Dose:  5 mg


Metoprolol Tartrate (Lopressor -)  25 mg NGT BID [facility]


Pantoprazole Sodium (Protonix Iv)  40 mg IVPUSH BID [facility]


Rifaximin (Xifaxan -)  550 mg PO BID [facility]


   Last Admin: 11/29/18 09:55 Dose:  550 mg


Thiamine HCl (Vitamin B1 -)  100 mg PO DAILY [facility]


   Last Admin: 11/29/18 09:55 Dose:  100 mg











78 year old gentleman with hx of CKD, Afib on A/C, CAD, CKD, Hypertension, 

Hyperlipidemia, PVD who presented with AMS/Confusion and found to have HAM.





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 

showed no stones or obstruction, UA w/o protein but UPCR ~0.5 indicating non-

albumin proteinuria)


#AMS 


#Newly diagnosed multiple myloma 


#Anemia 


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


#Hyperdense lesion on US of the kidney 


#Normal anion gap metabolic acidosis 


#Hyperkalemia (now resolved)





Renal function stable at this time


continue supportive care


tube feeds with free water


start sodium bicarb daily


trend renal function ane electrolytes 





Ricky Chang DO

## 2018-11-30 NOTE — PN
Teaching Attending Note


Name of Resident: Henry Mccormick





ATTENDING PHYSICIAN STATEMENT





I saw and evaluated the patient.


I reviewed the resident's note and discussed the case with the resident.


I agree with the resident's findings and plan as documented.








SUBJECTIVE:


Patient seen and examined in the ICU.  Remains intubated, poorly responsive. 

Family decision to proceed with tracheostomy.  Seen by [place] this AM, and due to 

increasing INR, we are going to hold on this elective case. 








OBJECTIVE:





  


 Intake & Output











 11/27/18 11/28/18 11/29/18 11/30/18





 23:59 23:59 23:59 23:59


 


Intake Total 3894 5737 3933 1720


 


Output Total 1700 1350 1400 800


 


Balance 2194 4387 2533 920


 


Weight 198 lb 8 oz 205 lb 214 lb 219 lb 5.759 oz








 Last Vital Signs











Temp Pulse Resp BP Pulse Ox


 


 98.5 F   109 H  18   102/71   100 


 


 11/30/18 10:00  11/30/18 12:00  11/30/18 12:00  11/30/18 12:00  11/30/18 08:16








Active Medications





Acetaminophen (Tylenol -)  650 mg PO Q6H PRN


   PRN Reason: PAIN LEVEL 1 - 3


   Last Admin: 11/23/18 03:13 Dose:  650 mg


Allopurinol (Zyloprim -)  100 mg PO BID [facility]


   Last Admin: 11/30/18 11:52 Dose:  Not Given


Chlorhexidine Gluconate (Peridex -)  15 ml MM BID [facility]


   Last Admin: 11/30/18 11:49 Dose:  15 ml


Cholecalciferol (Vitamin D3 -)  1,000 unit PO DAILY [facility]


   Last Admin: 11/30/18 11:51 Dose:  Not Given


Diphenhydramine HCl (Benadryl Injection -)  25 mg IVPB ONCE PRN


   PRN Reason: DURING PLASMAPHERESIS


Metronidazole (Flagyl 500mg Premixed Ivpb -)  500 mg in 100 mls @ 100 mls/hr 

IVPB BID [facility]


   Last Admin: 11/30/18 11:48 Dose:  100 mls/hr


Levofloxacin (Levaquin 250 Mg Premixed Ivpb -)  250 mg in 50 mls @ 50 mls/hr 

IVPB DAILY [facility]; Protocol


   Last Admin: 11/30/18 11:48 Dose:  50 mls/hr


Fentanyl 500 mcg/ Dextrose  100 mls @ 5 mls/hr IVPB TITR [facility]; Protocol


   Last Admin: 11/30/18 06:23 Dose:  50 mcg/hr, 10 mls/hr


Lactated Ringer's (Lactated Ringers Solution)  1,000 ml in 1,000 mls @ 125 mls/

hr IV ASDIR [facility]


   Last Admin: 11/30/18 11:49 Dose:  Not Given


Lactobacillus Acidophilus (Bacid -)  1 tab PO DAILY [facility]


   Last Admin: 11/30/18 11:49 Dose:  Not Given


Metoprolol Tartrate (Lopressor Injection -)  5 mg IVPUSH Q8H PRN


   PRN Reason: TACHYCARDIA


   Last Admin: 11/30/18 04:19 Dose:  5 mg


Metoprolol Tartrate (Lopressor -)  25 mg NGT BID [facility]


   Last Admin: 11/30/18 11:49 Dose:  Not Given


Pantoprazole Sodium (Protonix Iv)  40 mg IVPUSH BID [facility]


   Last Admin: 11/30/18 11:51 Dose:  40 mg


Rifaximin (Xifaxan -)  550 mg PO BID [facility]


   Last Admin: 11/30/18 11:51 Dose:  Not Given


Thiamine HCl (Vitamin B1 -)  100 mg PO DAILY [facility]


   Last Admin: 11/30/18 11:51 Dose:  Not Given








Gen:  intubated, poorly responsive


Heart: RRR


Lung: scattered rhonchi


Abd: soft, nontender


Ext: + edema


 Laboratory Results - last 24 hr











  11/29/18 11/29/18 11/30/18





  13:00 19:00 05:30


 


WBC   4.2  3.6 L


 


RBC   2.85 L  2.87 L


 


Hgb   8.8 L  8.4 L


 


Hct   25.1 L  25.7 L


 


MCV   88.1  89.6


 


MCH   31.0  29.2


 


MCHC   35.2  32.6


 


RDW   16.5 H  16.1 H


 


Plt Count   100 L D  91 L


 


MPV   10.4  10.3


 


Absolute Neuts (auto)   3.3  2.8


 


Neutrophils %   77.7  77.5


 


Neutrophils % (Manual)   64.0  65.6


 


Band Neutrophils %   18.0  5.2


 


Lymphocytes %   16.3  16.3


 


Lymphocytes % (Manual)   12.0  D  16.7  D


 


Monocytes %   3.2 L  3.9


 


Monocytes % (Manual)   1 L  5  D


 


Eosinophils %   2.4  1.8


 


Eosinophils % (Manual)   2.0  D  4.2  D


 


Basophils %   0.4  0.5


 


Basophils % (Manual)   0.0  1.0  D


 


Myelocytes % (Man)    0


 


Promyelocytes % (Man)    0


 


Blast Cells % (Manual)    0


 


Nucleated RBC %   4 H  9 H


 


Metamyelocytes   1  D  0  D


 


Hypochromia   1+  0


 


Platelet Estimate   Decreased  Decreased


 


Platelet Comment   No clumping noted 


 


Polychromasia    3+


 


Poikilocytosis    2+


 


Anisocytosis   1+  2+


 


Microcytosis    3+


 


Macrocytosis    0


 


Rouleaux    2+


 


Anticoagulation Therapy   


 


Puncture Site   


 


ABG pH   


 


ABG pCO2 at Pt Temp   


 


ABG pO2 at Pt Temp   


 


ABG HCO3   


 


ABG O2 Sat (Measured)   


 


ABG O2 Content   


 


ABG Base Excess   


 


Chacho Test   


 


O2 Delivery Device   


 


Oxygen Flow Rate   


 


Vent Mode   


 


Vent Rate   


 


Mechanical Rate   


 


PEEP   


 


Pressure Support Vent   


 


Sodium   


 


Potassium   


 


Chloride   


 


Carbon Dioxide   


 


Anion Gap   


 


BUN   


 


Creatinine   


 


Creat Clearance w eGFR   


 


Random Glucose   


 


Calcium   


 


Phosphorus   


 


Magnesium   


 


Total Bilirubin   


 


AST   


 


ALT   


 


Alkaline Phosphatase   


 


Total Protein   


 


Albumin   


 


Stool Occult Blood  Negative  














  11/30/18 11/30/18





  05:30 06:45


 


WBC  


 


RBC  


 


Hgb  


 


Hct  


 


MCV  


 


MCH  


 


MCHC  


 


RDW  


 


Plt Count  


 


MPV  


 


Absolute Neuts (auto)  


 


Neutrophils %  


 


Neutrophils % (Manual)  


 


Band Neutrophils %  


 


Lymphocytes %  


 


Lymphocytes % (Manual)  


 


Monocytes %  


 


Monocytes % (Manual)  


 


Eosinophils %  


 


Eosinophils % (Manual)  


 


Basophils %  


 


Basophils % (Manual)  


 


Myelocytes % (Man)  


 


Promyelocytes % (Man)  


 


Blast Cells % (Manual)  


 


Nucleated RBC %  


 


Metamyelocytes  


 


Hypochromia  


 


Platelet Estimate  


 


Platelet Comment  


 


Polychromasia  


 


Poikilocytosis  


 


Anisocytosis  


 


Microcytosis  


 


Macrocytosis  


 


Rouleaux  


 


Anticoagulation Therapy   No Result Required.


 


Puncture Site   Right radial


 


ABG pH   7.35


 


ABG pCO2 at Pt Temp   35.6


 


ABG pO2 at Pt Temp   86.9  D


 


ABG HCO3   19.0 L


 


ABG O2 Sat (Measured)   96.1


 


ABG O2 Content   12.0 L


 


ABG Base Excess   -5.5 L


 


Chacho Test   Positive


 


O2 Delivery Device   Vent


 


Oxygen Flow Rate   40%


 


Vent Mode   No Result Required.


 


Vent Rate   18


 


Mechanical Rate   No Result Required.


 


PEEP   5.0


 


Pressure Support Vent   500


 


Sodium  141 


 


Potassium  4.5 


 


Chloride  116 H 


 


Carbon Dioxide  20 L 


 


Anion Gap  5 L 


 


BUN  48 H 


 


Creatinine  1.3 


 


Creat Clearance w eGFR  53.25 


 


Random Glucose  73 L 


 


Calcium  6.6 L* 


 


Phosphorus  3.6 


 


Magnesium  1.7 L 


 


Total Bilirubin  0.5 


 


AST  45 H 


 


ALT  32 


 


Alkaline Phosphatase  152 H 


 


Total Protein  8.2 


 


Albumin  1.0 L 


 


Stool Occult Blood  

















ASSESSMENT AND PLAN:


Acute Hypoxic Respiratory Failure


Altered Mental Status


Metabolic Encephalopathy


Pneumonia


Acalculous Cholecystitis


Multiple Myeloma


Acute on Chronic Renal Failure


Metabolic Acidosis


LV Diastolic Dysfunction


Atrial Fibrillation


CAD


+Troponins likely Demand Ischemia


PAD


Hyperlipidemia


HTN


Anemia





-  Correct coagulopathy 


-  monitor H/H


-  monitor urine output, creatinine


-  rate control


-  minimize sedation to assess mental status 


-  DVT/GI prophylaxis


-  continue discussions regarding goals of care


-  For Trach on Monday if stable 


-  continue ICU monitoring


-  poor overall prognosis for meaningful recovery: have recommended to family 

for compassionate extubation but they have deciding on Trach/PEG 








Dr Kwok 


Critical care time spent in reviewing chart, evaluating patient and formulating 

plan 35 min

## 2018-11-30 NOTE — PN
Progress Note, Physician


Chief Complaint: 





INTUBATED SCHEDULED FOR TRACHEOSTOMY TOMORROW


INR IS TO HIGH





- Current Medication List


Current Medications: 


Active Medications





Acetaminophen (Tylenol -)  650 mg PO Q6H PRN


   PRN Reason: PAIN LEVEL 1 - 3


   Last Admin: 11/23/18 03:13 Dose:  650 mg


Allopurinol (Zyloprim -)  100 mg PO BID [facility]


   Last Admin: 11/30/18 11:52 Dose:  Not Given


Chlorhexidine Gluconate (Peridex -)  15 ml MM BID [facility]


   Last Admin: 11/30/18 11:49 Dose:  15 ml


Cholecalciferol (Vitamin D3 -)  1,000 unit PO DAILY [facility]


   Last Admin: 11/30/18 11:51 Dose:  Not Given


Diphenhydramine HCl (Benadryl Injection -)  25 mg IVPB ONCE PRN


   PRN Reason: DURING PLASMAPHERESIS


Metronidazole (Flagyl 500mg Premixed Ivpb -)  500 mg in 100 mls @ 100 mls/hr 

IVPB BID [facility]


   Last Admin: 11/30/18 11:48 Dose:  100 mls/hr


Levofloxacin (Levaquin 250 Mg Premixed Ivpb -)  250 mg in 50 mls @ 50 mls/hr 

IVPB DAILY [facility]; Protocol


   Last Admin: 11/30/18 11:48 Dose:  50 mls/hr


Fentanyl 500 mcg/ Dextrose  100 mls @ 5 mls/hr IVPB TITR [facility]; Protocol


   Last Admin: 11/30/18 06:23 Dose:  50 mcg/hr, 10 mls/hr


Lactated Ringer's (Lactated Ringers Solution)  1,000 ml in 1,000 mls @ 125 mls/

hr IV ASDIR [facility]


   Last Admin: 11/30/18 11:49 Dose:  Not Given


Lactobacillus Acidophilus (Bacid -)  1 tab PO DAILY [facility]


   Last Admin: 11/30/18 11:49 Dose:  Not Given


Metoprolol Tartrate (Lopressor Injection -)  5 mg IVPUSH Q8H PRN


   PRN Reason: TACHYCARDIA


   Last Admin: 11/30/18 04:19 Dose:  5 mg


Metoprolol Tartrate (Lopressor -)  25 mg NGT BID [facility]


   Last Admin: 11/30/18 11:49 Dose:  Not Given


Pantoprazole Sodium (Protonix Iv)  40 mg IVPUSH BID [facility]


   Last Admin: 11/30/18 11:51 Dose:  40 mg


Phytonadione ([place] Injection -)  5 mg SQ ONCE ONE


   Stop: 11/30/18 13:30


Rifaximin (Xifaxan -)  550 mg PO BID [facility]


   Last Admin: 11/30/18 11:51 Dose:  Not Given


Thiamine HCl (Vitamin B1 -)  100 mg PO DAILY [facility]


   Last Admin: 11/30/18 11:51 Dose:  Not Given











- Objective


Vital Signs: 


 Vital Signs











Temperature  98.5 F   11/30/18 10:00


 


Pulse Rate  109 H  11/30/18 12:00


 


Respiratory Rate  18   11/30/18 12:00


 


Blood Pressure  102/71   11/30/18 12:00


 


O2 Sat by Pulse Oximetry (%)  100   11/30/18 08:16











Constitutional: Yes: Other


Cardiovascular: Yes: Pulse Irregular


Respiratory: Yes: Diminished, Mechanically Ventilated


Gastrointestinal: Yes: Soft


Genitourinary: Yes: Garces Present


Musculoskeletal: Yes: Muscle Weakness


Labs: 


 CBC, BMP





 11/30/18 05:30 





 11/30/18 05:30 





 INR, PTT











INR  1.49  (0.83-1.09)  H  11/28/18  12:50    


 


Fibrinogen  323.0 mg/dL (238-498)   11/28/18  12:50    














Problem List





- Problems


(1) HAM (acute kidney injury)


Code(s): N17.9 - ACUTE KIDNEY FAILURE, UNSPECIFIED   





(2) Atrial fibrillation with RVR


Code(s): I48.91 - UNSPECIFIED ATRIAL FIBRILLATION   





(3) Hyperlipidemia


Code(s): E78.5 - HYPERLIPIDEMIA, UNSPECIFIED   


Qualifiers: 


   Hyperlipidemia type: pure hypercholesterolemia   Qualified Code(s): E78.00 - 

Pure hypercholesterolemia, unspecified; E78.0 - Pure hypercholesterolemia   





(4) Hypothermia


Code(s): T68.XXXA - HYPOTHERMIA, INITIAL ENCOUNTER   


Qualifiers: 


   Encounter type: initial encounter   Qualified Code(s): T68.XXXA - Hypothermia

, initial encounter   





(5) Acute-on-chronic kidney injury


Code(s): N17.9 - ACUTE KIDNEY FAILURE, UNSPECIFIED; N18.9 - CHRONIC KIDNEY 

DISEASE, UNSPECIFIED   


Qualifiers: 


   Acute renal failure type: unspecified   Chronic kidney disease stage: 

unspecified stage   Qualified Code(s): N17.9 - Acute kidney failure, unspecified

; N18.9 - Chronic kidney disease, unspecified   





(6) Generalized weakness


Code(s): R53.1 - WEAKNESS   





(7) History of ETOH abuse


Code(s): Z87.898 - PERSONAL HISTORY OF OTHER SPECIFIED CONDITIONS   





(8) Hypercalcemia


Code(s): E83.52 - HYPERCALCEMIA   





(9) Hypertrophic cardiomyopathy


Code(s): I42.2 - OTHER HYPERTROPHIC CARDIOMYOPATHY   





(10) Toxic metabolic encephalopathy


Code(s): G92 - TOXIC ENCEPHALOPATHY   





(11) Sepsis


Code(s): A41.9 - SEPSIS, UNSPECIFIED ORGANISM   





(12) Respiratory failure


Code(s): J96.90 - RESPIRATORY FAILURE, UNSP, UNSP W HYPOXIA OR HYPERCAPNIA   





Assessment/Plan





NOW VENT DEPENDENT INTUBATED FOR MANY DAYS


WILL NEED TRACHEOSTOMY. AWAITING FAMILY CONSENT


INR TO BE ADJUSTED WIT HEMATOLGY


IV ABX CONTINUE PER ID.


02 SUPPORT 40% O2


UNABLE TO WEAN OFF VENT.


CARDIO F/U ON HEPARIN IV FOR AC.


MONITOR LABS, FREQUENT TURNING AND OFF LOADING


TO PREVENT SKIN BREAK DOWN


NGT FEEDS, AMINO ACIDS FOR PROTEIN 


AND PREVENT SKIN ULCERS.


ADVANCED DIRECTIVES UNCLEAR, AWAIT FAMILY DECISION


AT THIS PROGNOSIS IS POOR FOR A FULL MEANINGFUL RECOVERY


LABS DAILY MONITOR ELECTROLYTES.


NEURO EVAL


MVI/THIAMINE CONTINUE


AGREE WITH PULMONARY TO DECREASE SEDATION TO EVALUATE MENTAL STATUS

## 2018-11-30 NOTE — PN
Progress Note (short form)





- Note


Progress Note: 





Patient seen and examined 


Remains intubated 


Poorly responsive even to deep pains


 Last Vital Signs











Temp Pulse Resp BP Pulse Ox


 


 97.8 F   102 H  18   98/62   100 


 


 11/30/18 06:00  11/30/18 10:00  11/30/18 11:37  11/30/18 10:00  11/30/18 08:16








Intubated 


Diminished breath sounds 


Cor- RSR


Abd- distended 


Rectal tube 


Garces catheter 


 CBC, BMP





 11/30/18 05:30 





 11/30/18 05:30 





 INR, PTT











INR  1.49  (0.83-1.09)  H  11/28/18  12:50    


 


Fibrinogen  323.0 mg/dL (238-498)   11/28/18  12:50    








 Current Medications











Generic Name Dose Route Start Last Admin





  Trade Name Freq  PRN Reason Stop Dose Admin


 


Acetaminophen  650 mg  11/10/18 14:09  11/23/18 03:13





  Tylenol -  PO   650 mg





  Q6H PRN   Administration





  PAIN LEVEL 1 - 3   





     





     





     


 


Allopurinol  100 mg  11/09/18 10:00  11/30/18 11:52





  Zyloprim -  PO   Not Given





  BID [facility]   





     





     





     





     


 


Chlorhexidine Gluconate  15 ml  11/10/18 23:45  11/30/18 11:49





  Peridex -  MM   15 ml





  BID [facility]   Administration





     





     





     





     


 


Cholecalciferol  1,000 unit  11/18/18 10:00  11/30/18 11:51





  Vitamin D3 -  PO   Not Given





  DAILY [facility]   





     





     





     





     


 


Diphenhydramine HCl  25 mg  11/09/18 12:00  





  Benadryl Injection -  IVPB   





  ONCE PRN   





  DURING PLASMAPHERESIS   





     





     





     


 


Metronidazole  500 mg in 100 mls @ 100 mls/hr  11/17/18 14:30  11/30/18 11:48





  Flagyl 500mg Premixed Ivpb -  IVPB   100 mls/hr





  BID [facility]   Administration





     





     





     





     


 


Levofloxacin  250 mg in 50 mls @ 50 mls/hr  11/19/18 12:45  11/30/18 11:48





  Levaquin 250 Mg Premixed Ivpb -  IVPB   50 mls/hr





  DAILY [facility]   Administration





     





     





  Protocol   





     


 


Fentanyl 500 mcg/ Dextrose  100 mls @ 5 mls/hr  11/25/18 19:00  11/30/18 06:23





  IVPB   50 mcg/hr





  TITR [facility]   10 mls/hr





     Administration





     





  Protocol   





  25 MCG/HR   


 


Lactated Ringer's  1,000 ml in 1,000 mls @ 125 mls/hr  11/29/18 08:30  11/30/18 

11:49





  Lactated Ringers Solution  IV   Not Given





  ASDIR [facility]   





     





     





     





     


 


Lactobacillus Acidophilus  1 tab  11/25/18 10:00  11/30/18 11:49





  Bacid -  PO   Not Given





  DAILY [facility]   





     





     





     





     


 


Metoprolol Tartrate  5 mg  11/09/18 13:04  11/30/18 04:19





  Lopressor Injection -  IVPUSH   5 mg





  Q8H PRN   Administration





  TACHYCARDIA   





     





     





     


 


Metoprolol Tartrate  25 mg  11/29/18 11:54  11/30/18 11:49





  Lopressor -  NGT   Not Given





  BID [facility]   





     





     





     





     


 


Pantoprazole Sodium  40 mg  11/29/18 22:00  11/30/18 11:51





  Protonix Iv  IVPUSH   40 mg





  BID [facility]   Administration





     





     





     





     


 


Rifaximin  550 mg  11/15/18 22:00  11/30/18 11:51





  Xifaxan -  PO   Not Given





  BID [facility]   





     





     





     





     


 


Thiamine HCl  100 mg  10/30/18 22:12  11/30/18 11:51





  Vitamin B1 -  PO   Not Given





  DAILY [facility]   





     





     





     





     








Impression:





Multiple myeloma


Respiratory Failure 


Intubation 


Sepsis


Anemia


Coagulopathy 


Thrombocytopenia








Plan: 


Hold on trach





Optimize platelets and coagulation factors prior to trach 


Would like platelets at 100K 


and PT< 1.2 


Will monitor and would transfuse platelets and FFP prior to procedure to 

optimization.

## 2018-12-01 LAB
ALBUMIN SERPL-MCNC: 0.9 G/DL (ref 3.4–5)
ALP SERPL-CCNC: 129 U/L (ref 45–117)
ALT SERPL-CCNC: 25 U/L (ref 13–61)
ANION GAP SERPL CALC-SCNC: 4 MMOL/L (ref 8–16)
ANISOCYTOSIS BLD QL: (no result)
APTT BLD: 32.7 SECONDS (ref 25.2–36.5)
ARTERIAL BLOOD GAS PCO2: 32.6 MMHG (ref 35–45)
ARTERIAL PATENCY WRIST A: POSITIVE
AST SERPL-CCNC: 35 U/L (ref 15–37)
BASE EXCESS BLDA CALC-SCNC: -5.5 MEQ/L (ref -2–2)
BASOPHILS # BLD: 0.5 % (ref 0–2)
BILIRUB SERPL-MCNC: 0.5 MG/DL (ref 0.2–1)
BUN SERPL-MCNC: 45 MG/DL (ref 7–18)
CALCIUM SERPL-MCNC: 6.5 MG/DL (ref 8.5–10.1)
CHLORIDE SERPL-SCNC: 116 MMOL/L (ref 98–107)
CO2 SERPL-SCNC: 21 MMOL/L (ref 21–32)
CREAT SERPL-MCNC: 1.2 MG/DL (ref 0.55–1.3)
DEPRECATED RDW RBC AUTO: 16.2 % (ref 11.9–15.9)
EOSINOPHIL # BLD: 1 % (ref 0–4.5)
GLUCOSE SERPL-MCNC: 60 MG/DL (ref 74–106)
HCT VFR BLD CALC: 24.6 % (ref 35.4–49)
HGB BLD-MCNC: 8.1 GM/DL (ref 11.7–16.9)
INR BLD: 1.56 (ref 0.83–1.09)
LYMPHOCYTES # BLD: 22.5 % (ref 8–40)
MACROCYTES BLD QL: 0
MAGNESIUM SERPL-MCNC: 1.7 MG/DL (ref 1.8–2.4)
MCH RBC QN AUTO: 29.6 PG (ref 25.7–33.7)
MCHC RBC AUTO-ENTMCNC: 33 G/DL (ref 32–35.9)
MCV RBC: 89.8 FL (ref 80–96)
MONOCYTES # BLD AUTO: 4.1 % (ref 3.8–10.2)
NEUTROPHILS # BLD: 71.9 % (ref 42.8–82.8)
PHOSPHATE SERPL-MCNC: 3.7 MG/DL (ref 2.5–4.9)
PLATELET # BLD AUTO: 109 K/MM3 (ref 134–434)
PLATELET BLD QL SMEAR: (no result)
PMV BLD: 9.8 FL (ref 7.5–11.1)
PO2 BLDA: 119 MMHG (ref 70–100)
POTASSIUM SERPLBLD-SCNC: 4.9 MMOL/L (ref 3.5–5.1)
PROT SERPL-MCNC: 8 G/DL (ref 6.4–8.2)
PT PNL PPP: 18.5 SEC (ref 9.7–13)
RBC # BLD AUTO: 2.74 M/MM3 (ref 4–5.6)
ROULEAUX BLD QL SMEAR: (no result)
SAO2 % BLDA: 98.3 % (ref 90–98.9)
SODIUM SERPL-SCNC: 142 MMOL/L (ref 136–145)
WBC # BLD AUTO: 3.4 K/MM3 (ref 4–10)

## 2018-12-01 RX ADMIN — METOPROLOL TARTRATE PRN MG: 5 INJECTION, SOLUTION INTRAVENOUS at 01:03

## 2018-12-01 RX ADMIN — SODIUM CHLORIDE, POTASSIUM CHLORIDE, SODIUM LACTATE AND CALCIUM CHLORIDE SCH: 600; 310; 30; 20 INJECTION, SOLUTION INTRAVENOUS at 09:24

## 2018-12-01 RX ADMIN — Medication SCH MG: at 09:25

## 2018-12-01 RX ADMIN — ALLOPURINOL SCH MG: 100 TABLET ORAL at 21:19

## 2018-12-01 RX ADMIN — DEXTROSE MONOHYDRATE SCH MLS/HR: 50 INJECTION, SOLUTION INTRAVENOUS at 18:52

## 2018-12-01 RX ADMIN — PANTOPRAZOLE SODIUM SCH MG: 40 INJECTION, POWDER, FOR SOLUTION INTRAVENOUS at 09:27

## 2018-12-01 RX ADMIN — VITAMIN D, TAB 1000IU (100/BT) SCH UNIT: 25 TAB at 09:25

## 2018-12-01 RX ADMIN — METOPROLOL TARTRATE SCH MG: 25 TABLET, FILM COATED ORAL at 09:25

## 2018-12-01 RX ADMIN — METOPROLOL TARTRATE SCH MG: 25 TABLET, FILM COATED ORAL at 21:19

## 2018-12-01 RX ADMIN — RIFAXIMIN SCH MG: 550 TABLET ORAL at 09:25

## 2018-12-01 RX ADMIN — PANTOPRAZOLE SODIUM SCH MG: 40 INJECTION, POWDER, FOR SOLUTION INTRAVENOUS at 21:18

## 2018-12-01 RX ADMIN — CHLORHEXIDINE GLUCONATE 0.12% ORAL RINSE SCH ML: 1.2 LIQUID ORAL at 09:27

## 2018-12-01 RX ADMIN — Medication SCH TAB: at 09:25

## 2018-12-01 RX ADMIN — RIFAXIMIN SCH MG: 550 TABLET ORAL at 21:19

## 2018-12-01 RX ADMIN — ALLOPURINOL SCH MG: 100 TABLET ORAL at 09:25

## 2018-12-01 RX ADMIN — CHLORHEXIDINE GLUCONATE 0.12% ORAL RINSE SCH ML: 1.2 LIQUID ORAL at 21:19

## 2018-12-01 NOTE — PN
Progress Note, Physician


Chief Complaint: 





Events noted


Remains on mechanical ventilation


History of Present Illness: 





Patient was seen and examined in ICU. Remains on vent support. Chart was 

reviewed


AF with RVR








- Current Medication List


Current Medications: 


Active Medications





Acetaminophen (Tylenol -)  650 mg PO Q6H PRN


   PRN Reason: PAIN LEVEL 1 - 3


   Last Admin: 11/23/18 03:13 Dose:  650 mg


Allopurinol (Zyloprim -)  100 mg PO BID [facility]


   Last Admin: 12/01/18 09:25 Dose:  100 mg


Chlorhexidine Gluconate (Peridex -)  15 ml MM BID [facility]


   Last Admin: 12/01/18 09:27 Dose:  15 ml


Cholecalciferol (Vitamin D3 -)  1,000 unit PO DAILY [facility]


   Last Admin: 12/01/18 09:25 Dose:  1,000 unit


Diphenhydramine HCl (Benadryl Injection -)  25 mg IVPB ONCE PRN


   PRN Reason: DURING PLASMAPHERESIS


Metronidazole (Flagyl 500mg Premixed Ivpb -)  500 mg in 100 mls @ 100 mls/hr 

IVPB BID [facility]


   Last Admin: 12/01/18 09:26 Dose:  100 mls/hr


Levofloxacin (Levaquin 250 Mg Premixed Ivpb -)  250 mg in 50 mls @ 50 mls/hr 

IVPB DAILY [facility]; Protocol


   Last Admin: 12/01/18 09:26 Dose:  50 mls/hr


Fentanyl 500 mcg/ Dextrose  100 mls @ 5 mls/hr IVPB TITR [facility]; Protocol


   Last Admin: 11/30/18 21:24 Dose:  Not Given


Lactobacillus Acidophilus (Bacid -)  1 tab PO DAILY [facility]


   Last Admin: 12/01/18 09:25 Dose:  1 tab


Metoprolol Tartrate (Lopressor Injection -)  5 mg IVPUSH Q8H PRN


   PRN Reason: TACHYCARDIA


   Last Admin: 12/01/18 01:03 Dose:  5 mg


Metoprolol Tartrate (Lopressor -)  25 mg NGT BID [facility]


   Last Admin: 12/01/18 09:25 Dose:  25 mg


Pantoprazole Sodium (Protonix Iv)  40 mg IVPUSH BID [facility]


   Last Admin: 12/01/18 09:27 Dose:  40 mg


Rifaximin (Xifaxan -)  550 mg PO BID [facility]


   Last Admin: 12/01/18 09:25 Dose:  550 mg


Sodium Bicarbonate (Sodium Bicarbonate -)  650 mg NGT DAILY [facility]


   Last Admin: 12/01/18 09:25 Dose:  650 mg


Thiamine HCl (Vitamin B1 -)  100 mg PO DAILY [facility]


   Last Admin: 12/01/18 09:25 Dose:  100 mg











- Objective


Vital Signs: 


 Vital Signs











Temperature  98.1 F   12/01/18 06:00


 


Pulse Rate  113 H  12/01/18 08:29


 


Respiratory Rate  18   12/01/18 11:00


 


Blood Pressure  103/69   12/01/18 08:00


 


O2 Sat by Pulse Oximetry (%)  100   12/01/18 08:29











Cardiovascular: Yes: Pulse Irregular, S1, S2


Respiratory: Yes: Diminished, Mechanically Ventilated


Gastrointestinal: Yes: Normal Bowel Sounds, Soft.  No: Tenderness


Edema: No


Labs: 


 CBC, BMP





 12/01/18 05:30 





 12/01/18 05:30 





 INR, PTT











INR  1.56  (0.83-1.09)  H  12/01/18  05:30    


 


Fibrinogen  352.0 mg/dL (238-498)   11/30/18  13:33    














- ....Imaging


Chest X-ray: Report Reviewed





Problem List





- Problems


(1) Anemia


Code(s): D64.9 - ANEMIA, UNSPECIFIED   





(2) Hyperlipidemia


Code(s): E78.5 - HYPERLIPIDEMIA, UNSPECIFIED   


Qualifiers: 


   Hyperlipidemia type: pure hypercholesterolemia   Qualified Code(s): E78.00 - 

Pure hypercholesterolemia, unspecified; E78.0 - Pure hypercholesterolemia   





(3) Multiple myeloma


Code(s): C90.00 - MULTIPLE MYELOMA NOT HAVING ACHIEVED REMISSION   


Qualifiers: 


   Multiple myeloma remission status: not in remission   Qualified Code(s): 

C90.00 - Multiple myeloma not having achieved remission   





(4) Respiratory failure


Code(s): J96.90 - RESPIRATORY FAILURE, UNSP, UNSP W HYPOXIA OR HYPERCAPNIA   





(5) Sepsis


Code(s): A41.9 - SEPSIS, UNSPECIFIED ORGANISM   





(6) Toxic metabolic encephalopathy


Code(s): G92 - TOXIC ENCEPHALOPATHY   





(7) Acute-on-chronic kidney injury


Code(s): N17.9 - ACUTE KIDNEY FAILURE, UNSPECIFIED; N18.9 - CHRONIC KIDNEY 

DISEASE, UNSPECIFIED   


Qualifiers: 


   Acute renal failure type: unspecified   Chronic kidney disease stage: 

unspecified stage   Qualified Code(s): N17.9 - Acute kidney failure, unspecified

; N18.9 - Chronic kidney disease, unspecified   





(8) Demand ischemia


Code(s): I24.8 - OTHER FORMS OF ACUTE ISCHEMIC HEART DISEASE   





(9) History of ETOH abuse


Code(s): Z87.898 - PERSONAL HISTORY OF OTHER SPECIFIED CONDITIONS   





(10) Nonadherence to medication


Code(s): Z91.14 - PATIENT'S OTHER NONCOMPLIANCE WITH MEDICATION REGIMEN   





(11) Paraproteinemia


Code(s): D89.2 - HYPERGAMMAGLOBULINEMIA, UNSPECIFIED   





(12) Persistent atrial fibrillation


Code(s): I48.1 - PERSISTENT ATRIAL FIBRILLATION   





(13) Chronic thromboembolic disease


Code(s): I74.9 - EMBOLISM AND THROMBOSIS OF UNSPECIFIED ARTERY   





(14) Coronary artery disease


Code(s): I25.10 - ATHSCL HEART DISEASE OF NATIVE CORONARY ARTERY W/O ANG PCTRS 

  


Qualifiers: 


   Coronary Disease-Associated Artery/Lesion type: native artery   Native vs. 

transplanted heart: native heart   Associated angina: without angina   

Qualified Code(s): I25.10 - Atherosclerotic heart disease of native coronary 

artery without angina pectoris   





(15) Diastolic dysfunction without heart failure


Code(s): I51.9 - HEART DISEASE, UNSPECIFIED   





(16) HTN (hypertension)


Code(s): I10 - ESSENTIAL (PRIMARY) HYPERTENSION   


Qualifiers: 


   Hypertension type: essential hypertension   Qualified Code(s): I10 - 

Essential (primary) hypertension   





(17) Hypertrophic cardiomyopathy


Code(s): I42.2 - OTHER HYPERTROPHIC CARDIOMYOPATHY   





(18) Left ventricular systolic dysfunction


Code(s): I51.9 - HEART DISEASE, UNSPECIFIED   





Assessment/Plan





1. Acute hypoxic respiratory failure, suspected aspiration pneumonia with 

sepsis syndrome, remains intubated


2. Toxic metabolic encephelopathy, rule out CVA


3. Acute on CKD


4. Paraproteinemia confirmed multiple myeloma


5. 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 

disease related to persistent atrial fibrillation with [id number] score of 6


6. CAD with evidence of demand ischemic injury, non obstructive CAD on [facility] 

coronary angiogrpahy angina pectoris


7. 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 

chronic class I-II NYHA classification LV failure, compensated/euvolemic


8. Persistent atrial fibrillation [id number] score of 6


9. HTN


10. Anemia/thrombocytopenia


11. Acalculous Cholecystitis


12. Ischemic hepatitis


13. Resolved Hypernatremia





PLAN:


1. Consider A/C therapy unless it is absolutely contraindicated considering his 

[id number] score of 6 and stroke risk


2. Lopressor 25 bid as hemodynamics permit 


3. Antibiotics coverage


4. Ventilator management as per the ICU team


5. Await tracheostomy and PEG as per family





Guarded


Olvin Hobbs MD

## 2018-12-01 NOTE — PN
Progress Note (short form)





- Note


Progress Note: 


Covering dr oliver





Problems


HAM presenting as AMS/Confusion 





CKD, 


Afib on A/C, 


CAD, 


CKD, 


Hypertension, 


Hyperlipidemia, 


PVD 


#HAM ATN vs. Cast nephropathy  


(FeNa was 2.1% indicating tubular injury, US showed no stones or obstruction, 

UA w/o protein but UPCR ~0.5 indicating non-albumin proteinuria)


 multiple myloma new


Anemia 


Hypercalcemia of Malignancy


 


Hyperdense lesion on US of the kidney 


Normal anion gap metabolic acidosis 


Hyperkalemia resolved





 Current Medications





Acetaminophen (Tylenol -)  650 mg PO Q6H PRN


   PRN Reason: PAIN LEVEL 1 - 3


   Last Admin: 11/23/18 03:13 Dose:  650 mg


Allopurinol (Zyloprim -)  100 mg PO BID [facility]


   Last Admin: 12/01/18 09:25 Dose:  100 mg


Chlorhexidine Gluconate (Peridex -)  15 ml MM BID [facility]


   Last Admin: 12/01/18 09:27 Dose:  15 ml


Cholecalciferol (Vitamin D3 -)  1,000 unit PO DAILY [facility]


   Last Admin: 12/01/18 09:25 Dose:  1,000 unit


Diphenhydramine HCl (Benadryl Injection -)  25 mg IVPB ONCE PRN


   PRN Reason: DURING PLASMAPHERESIS


Metronidazole (Flagyl 500mg Premixed Ivpb -)  500 mg in 100 mls @ 100 mls/hr 

IVPB BID [facility]


   Last Admin: 12/01/18 09:26 Dose:  100 mls/hr


Levofloxacin (Levaquin 250 Mg Premixed Ivpb -)  250 mg in 50 mls @ 50 mls/hr 

IVPB DAILY [facility]; Protocol


   Last Admin: 12/01/18 09:26 Dose:  50 mls/hr


Fentanyl 500 mcg/ Dextrose  100 mls @ 5 mls/hr IVPB TITR [facility]; Protocol


   Last Admin: 11/30/18 21:24 Dose:  Not Given


Lactobacillus Acidophilus (Bacid -)  1 tab PO DAILY [facility]


   Last Admin: 12/01/18 09:25 Dose:  1 tab


Metoprolol Tartrate (Lopressor Injection -)  5 mg IVPUSH Q8H PRN


   PRN Reason: TACHYCARDIA


   Last Admin: 12/01/18 01:03 Dose:  5 mg


Metoprolol Tartrate (Lopressor -)  25 mg NGT BID [facility]


   Last Admin: 12/01/18 09:25 Dose:  25 mg


Pantoprazole Sodium (Protonix Iv)  40 mg IVPUSH BID [facility]


   Last Admin: 12/01/18 09:27 Dose:  40 mg


Rifaximin (Xifaxan -)  550 mg PO BID [facility]


   Last Admin: 12/01/18 09:25 Dose:  550 mg


Sodium Bicarbonate (Sodium Bicarbonate -)  650 mg NGT DAILY [facility]


   Last Admin: 12/01/18 09:25 Dose:  650 mg


Thiamine HCl (Vitamin B1 -)  100 mg PO DAILY [facility]


   Last Admin: 12/01/18 09:25 Dose:  100 mg








 Last Vital Signs











Temp Pulse Resp BP Pulse Ox


 


 98.1 F   113 H  18   103/69   100 


 


 12/01/18 06:00  12/01/18 08:29  12/01/18 13:50  12/01/18 08:00  12/01/18 08:29





























 CBC, BMP





 12/01/18 05:30 





 12/01/18 05:30 











IMP- resp on vent


for trach on monday


mild prerenal 








continue to monitor volume and renal function





Renal function stable at this time


continue supportive care


tube feeds with free water


start sodium bicarb daily


trend renal function ane electrolytes

## 2018-12-01 NOTE — CONSULT
Consult


Consult Specialty:: Thoracic Surgery


Referred by:: ICU team


Reason for Consultation:: Request tracheostomy





- History of Present Illness


Chief Complaint: Respiratory failure, failure to thrive


History of Present Illness: 





78M with MMP, prolonged intubation, also anticoagulated for afib and has PVD. 

ICU requesting tracheostomy. Has abnormal coagulation factors, platelets, and 

Multiple Myeloma.





- History Source


History Provided By: Medical Record, Caregiver





- Past Medical History


Cardio/Vascular: Yes: AFIB, HTN, MI


Renal/: Yes: Renal Failure, Renal Inusuff


Additional Medical History: peripheral arterial diseases/p stent in LLE





- Past Surgical History


Past Surgical History: Yes: Hernia Repair





- Alcohol/Substance Use


Hx Alcohol Use: Yes


History of Substance Use: reports: None





- Smoking History


Smoking history: Former smoker


Have you smoked in the past 12 months: No


Aproximately how many cigarettes per day: 10


If you are a former smoker, when did you quit?: 12.28.16





Home Medications





- Allergies


Allergies/Adverse Reactions: 


 Allergies











Allergy/AdvReac Type Severity Reaction Status Date / Time


 


valsartan Allergy Severe  Verified 10/30/18 16:47














- Home Medications


Home Medications: 


Ambulatory Orders





Atorvastatin Ca [Lipitor] 40 mg PO HS #30 tablet 07/12/18 


Rivaroxaban [Xarelto -] 20 mg PO DAILY@1800 #30 tablet 07/12/18 


Carvedilol [Coreg -] 6.25 mg PO BID #60 tablet 10/17/18 











Family Disease History





- Family Disease History


Family History: Unremarkable





Physical Exam


Vital Signs: 


 Vital Signs











Temperature  98.1 F   12/01/18 06:00


 


Pulse Rate  109 H  12/01/18 08:00


 


Respiratory Rate  18   12/01/18 08:03


 


Blood Pressure  103/69   12/01/18 08:00


 


O2 Sat by Pulse Oximetry (%)  100   12/01/18 08:08











Constitutional: Yes: Other (Intubated and sedated)


Labs: 


 CBC, BMP





 12/01/18 05:30 





 12/01/18 05:30 











Imaging





- Results


Chest X-ray: Report Reviewed, Image Reviewed





Problem List





- Problems


(1) HAM (acute kidney injury)


Code(s): N17.9 - ACUTE KIDNEY FAILURE, UNSPECIFIED   





(2) Altered mental status


Code(s): R41.82 - ALTERED MENTAL STATUS, UNSPECIFIED   





(3) Anemia


Code(s): D64.9 - ANEMIA, UNSPECIFIED   





(4) Multiple myeloma


Code(s): C90.00 - MULTIPLE MYELOMA NOT HAVING ACHIEVED REMISSION   


Qualifiers: 


   Multiple myeloma remission status: not in remission   Qualified Code(s): 

C90.00 - Multiple myeloma not having achieved remission   





(5) Respiratory failure


Code(s): J96.90 - RESPIRATORY FAILURE, UNSP, UNSP W HYPOXIA OR HYPERCAPNIA   





(6) Sepsis


Code(s): A41.9 - SEPSIS, UNSPECIFIED ORGANISM   





(7) Acute-on-chronic kidney injury


Code(s): N17.9 - ACUTE KIDNEY FAILURE, UNSPECIFIED; N18.9 - CHRONIC KIDNEY 

DISEASE, UNSPECIFIED   


Qualifiers: 


   Acute renal failure type: unspecified   Chronic kidney disease stage: 

unspecified stage   Qualified Code(s): N17.9 - Acute kidney failure, unspecified

; N18.9 - Chronic kidney disease, unspecified   





(8) Persistent atrial fibrillation


Code(s): I48.1 - PERSISTENT ATRIAL FIBRILLATION   





(9) Chronic thromboembolic disease


Code(s): I74.9 - EMBOLISM AND THROMBOSIS OF UNSPECIFIED ARTERY   





(10) Coronary artery disease


Code(s): I25.10 - ATHSCL HEART DISEASE OF NATIVE CORONARY ARTERY W/O ANG PCTRS 

  


Qualifiers: 


   Coronary Disease-Associated Artery/Lesion type: native artery   Native vs. 

transplanted heart: native heart   Associated angina: without angina   

Qualified Code(s): I25.10 - Atherosclerotic heart disease of native coronary 

artery without angina pectoris   





Assessment/Plan





For tracheostomy on Monday if coagulation factors, platelets, OK and family 

still wishes to have it done.

## 2018-12-01 NOTE — PN
Progress Note, Physician


Chief Complaint: 





FAMILY BEDSIDE


NO CHANGES


AWAITING TRACHEOSTOMY MONDAY





- Current Medication List


Current Medications: 


Active Medications





Acetaminophen (Tylenol -)  650 mg PO Q6H PRN


   PRN Reason: PAIN LEVEL 1 - 3


   Last Admin: 11/23/18 03:13 Dose:  650 mg


Allopurinol (Zyloprim -)  100 mg PO BID [facility]


   Last Admin: 11/30/18 21:34 Dose:  100 mg


Chlorhexidine Gluconate (Peridex -)  15 ml MM BID [facility]


   Last Admin: 11/30/18 21:26 Dose:  15 ml


Cholecalciferol (Vitamin D3 -)  1,000 unit PO DAILY [facility]


   Last Admin: 11/30/18 17:18 Dose:  1,000 unit


Diphenhydramine HCl (Benadryl Injection -)  25 mg IVPB ONCE PRN


   PRN Reason: DURING PLASMAPHERESIS


Metronidazole (Flagyl 500mg Premixed Ivpb -)  500 mg in 100 mls @ 100 mls/hr 

IVPB BID [facility]


   Last Admin: 11/30/18 21:25 Dose:  100 mls/hr


Levofloxacin (Levaquin 250 Mg Premixed Ivpb -)  250 mg in 50 mls @ 50 mls/hr 

IVPB DAILY [facility]; Protocol


   Last Admin: 11/30/18 11:48 Dose:  50 mls/hr


Fentanyl 500 mcg/ Dextrose  100 mls @ 5 mls/hr IVPB TITR [facility]; Protocol


   Last Admin: 11/30/18 21:24 Dose:  Not Given


Lactated Ringer's (Lactated Ringers Solution)  1,000 ml in 1,000 mls @ 125 mls/

hr IV ASDIR [facility]


   Last Admin: 11/30/18 11:49 Dose:  Not Given


Lactobacillus Acidophilus (Bacid -)  1 tab PO DAILY [facility]


   Last Admin: 11/30/18 17:18 Dose:  1 tab


Metoprolol Tartrate (Lopressor Injection -)  5 mg IVPUSH Q8H PRN


   PRN Reason: TACHYCARDIA


   Last Admin: 12/01/18 01:03 Dose:  5 mg


Metoprolol Tartrate (Lopressor -)  25 mg NGT BID [facility]


   Last Admin: 11/30/18 21:26 Dose:  Not Given


Pantoprazole Sodium (Protonix Iv)  40 mg IVPUSH BID [facility]


   Last Admin: 11/30/18 21:27 Dose:  40 mg


Rifaximin (Xifaxan -)  550 mg PO BID [facility]


   Last Admin: 11/30/18 21:27 Dose:  550 mg


Sodium Bicarbonate (Sodium Bicarbonate -)  650 mg NGT DAILY [facility]


Thiamine HCl (Vitamin B1 -)  100 mg PO DAILY [facility]


   Last Admin: 11/30/18 17:18 Dose:  100 mg











- Objective


Vital Signs: 


 Vital Signs











Temperature  98.1 F   12/01/18 06:00


 


Pulse Rate  109 H  12/01/18 08:00


 


Respiratory Rate  18   12/01/18 08:03


 


Blood Pressure  103/69   12/01/18 08:00


 


O2 Sat by Pulse Oximetry (%)  100   12/01/18 08:08











Constitutional: Yes: Other


Cardiovascular: Yes: Pulse Irregular


Respiratory: Yes: Mechanically Ventilated


Gastrointestinal: Yes: Soft


Genitourinary: Yes: Garces Present


Musculoskeletal: Yes: Muscle Weakness


Labs: 


 CBC, BMP





 12/01/18 05:30 





 12/01/18 05:30 





 INR, PTT











INR  1.56  (0.83-1.09)  H  12/01/18  05:30    


 


Fibrinogen  352.0 mg/dL (238-498)   11/30/18  13:33    














Problem List





- Problems


(1) HAM (acute kidney injury)


Code(s): N17.9 - ACUTE KIDNEY FAILURE, UNSPECIFIED   





(2) Atrial fibrillation with RVR


Code(s): I48.91 - UNSPECIFIED ATRIAL FIBRILLATION   





(3) Hyperlipidemia


Code(s): E78.5 - HYPERLIPIDEMIA, UNSPECIFIED   


Qualifiers: 


   Hyperlipidemia type: pure hypercholesterolemia   Qualified Code(s): E78.00 - 

Pure hypercholesterolemia, unspecified; E78.0 - Pure hypercholesterolemia   





(4) Hypothermia


Code(s): T68.XXXA - HYPOTHERMIA, INITIAL ENCOUNTER   


Qualifiers: 


   Encounter type: initial encounter   Qualified Code(s): T68.XXXA - Hypothermia

, initial encounter   





(5) Acute-on-chronic kidney injury


Code(s): N17.9 - ACUTE KIDNEY FAILURE, UNSPECIFIED; N18.9 - CHRONIC KIDNEY 

DISEASE, UNSPECIFIED   


Qualifiers: 


   Acute renal failure type: unspecified   Chronic kidney disease stage: 

unspecified stage   Qualified Code(s): N17.9 - Acute kidney failure, unspecified

; N18.9 - Chronic kidney disease, unspecified   





(6) Generalized weakness


Code(s): R53.1 - WEAKNESS   





(7) History of ETOH abuse


Code(s): Z87.898 - PERSONAL HISTORY OF OTHER SPECIFIED CONDITIONS   





(8) Hypercalcemia


Code(s): E83.52 - HYPERCALCEMIA   





(9) Hypertrophic cardiomyopathy


Code(s): I42.2 - OTHER HYPERTROPHIC CARDIOMYOPATHY   





(10) Toxic metabolic encephalopathy


Code(s): G92 - TOXIC ENCEPHALOPATHY   





(11) Sepsis


Code(s): A41.9 - SEPSIS, UNSPECIFIED ORGANISM   





(12) Respiratory failure


Code(s): J96.90 - RESPIRATORY FAILURE, UNSP, UNSP W HYPOXIA OR HYPERCAPNIA   





Assessment/Plan





NOW VENT DEPENDENT INTUBATED FOR MANY DAYS


WILL NEED TRACHEOSTOMY. AWAITING


INR TO BE ADJUSTED WIT HEMATOLOGY.


IV ABX CONTINUE PER ID.


02 SUPPORT 40% O2


UNABLE TO WEAN OFF VENT.


CARDIO F/U ON HEPARIN IV FOR AC.


MONITOR LABS, FREQUENT TURNING AND OFF LOADING


TO PREVENT SKIN BREAK DOWN


NGT FEEDS, AMINO ACIDS FOR PROTEIN 


AND PREVENT SKIN ULCERS.


ADVANCED DIRECTIVES UNCLEAR, AWAIT FAMILY DECISION


AT THIS PROGNOSIS IS POOR FOR A FULL MEANINGFUL RECOVERY


LABS DAILY MONITOR ELECTROLYTES.


NEURO EVAL


MVI/THIAMINE CONTINUE


AGREE WITH PULMONARY TO DECREASE SEDATION TO EVALUATE MENTAL STATUS

## 2018-12-01 NOTE — PN
Progress Note (short form)





- Note


Progress Note: 





SUBJECTIVE:


Patient seen and examined in the ICU.  


-awaiting trach, npo right now, will f/u


-INR slightly elevated, received vit K





OBJECTIVE:





 


 Vital Signs











Temp  98.1 F   12/01/18 06:00


 


Pulse  109 H  12/01/18 08:00


 


Resp  18   12/01/18 08:03


 


BP  103/69   12/01/18 08:00


 


Pulse Ox  100   12/01/18 08:08








 Intake & Output











 11/30/18 11/30/18 12/01/18





 11:59 23:59 11:59


 


Intake Total 6544 228 3231


 


Output Total 800 200 600


 


Balance 


 


Weight 99.5 kg  99.5 kg


 


Intake:   


 


  IV 1620  1620


 


    LACTATED RINGERS SOLUTION 1500  1500





    1,000 ml In 1,000 ml @   





    125 mls/hr IV ASDIR [facility]   





    Rx#:[id number]   


 


    Sublimaze Injection - 500 120  120





    Mcg In D5w - 90 ml @ 25   





    MCG/HR 5 mls/hr IVPB TITR   





    [facility] Rx#:[id number]   


 


  IVPB 100 150 100


 


  Tube Feeding  50 50


 


  Tube Irrigant  250 


 


Output:   


 


  Urine 800 200 600


 


    Garces 800 200 600


 


Other:   


 


  Voiding Method Indwelling Catheter Indwelling Catheter Indwelling Catheter


 


  Bowel Movement  Yes Yes


 


  [facility]








Active Medications





Acetaminophen (Tylenol -)  650 mg PO Q6H PRN


   PRN Reason: PAIN LEVEL 1 - 3


   Last Admin: 11/23/18 03:13 Dose:  650 mg


Allopurinol (Zyloprim -)  100 mg PO BID [facility]


   Last Admin: 11/30/18 21:34 Dose:  100 mg


Chlorhexidine Gluconate (Peridex -)  15 ml MM BID [facility]


   Last Admin: 11/30/18 21:26 Dose:  15 ml


Cholecalciferol (Vitamin D3 -)  1,000 unit PO DAILY [facility]


   Last Admin: 11/30/18 17:18 Dose:  1,000 unit


Diphenhydramine HCl (Benadryl Injection -)  25 mg IVPB ONCE PRN


   PRN Reason: DURING PLASMAPHERESIS


Metronidazole (Flagyl 500mg Premixed Ivpb -)  500 mg in 100 mls @ 100 mls/hr 

IVPB BID [facility]


   Last Admin: 11/30/18 21:25 Dose:  100 mls/hr


Levofloxacin (Levaquin 250 Mg Premixed Ivpb -)  250 mg in 50 mls @ 50 mls/hr 

IVPB DAILY [facility]; Protocol


   Last Admin: 11/30/18 11:48 Dose:  50 mls/hr


Fentanyl 500 mcg/ Dextrose  100 mls @ 5 mls/hr IVPB TITR [facility]; Protocol


   Last Admin: 11/30/18 21:24 Dose:  Not Given


Lactated Ringer's (Lactated Ringers Solution)  1,000 ml in 1,000 mls @ 125 mls/

hr IV ASDIR [facility]


   Last Admin: 11/30/18 11:49 Dose:  Not Given


Lactobacillus Acidophilus (Bacid -)  1 tab PO DAILY [facility]


   Last Admin: 11/30/18 17:18 Dose:  1 tab


Metoprolol Tartrate (Lopressor Injection -)  5 mg IVPUSH Q8H PRN


   PRN Reason: TACHYCARDIA


   Last Admin: 12/01/18 01:03 Dose:  5 mg


Metoprolol Tartrate (Lopressor -)  25 mg NGT BID [facility]


   Last Admin: 11/30/18 21:26 Dose:  Not Given


Pantoprazole Sodium (Protonix Iv)  40 mg IVPUSH BID [facility]


   Last Admin: 11/30/18 21:27 Dose:  40 mg


Rifaximin (Xifaxan -)  550 mg PO BID [facility]


   Last Admin: 11/30/18 21:27 Dose:  550 mg


Sodium Bicarbonate (Sodium Bicarbonate -)  650 mg NGT DAILY [facility]


Thiamine HCl (Vitamin B1 -)  100 mg PO DAILY [facility]


   Last Admin: 11/30/18 17:18 Dose:  100 mg











Gen:  intubated, poorly responsive


Heart: RRR, no mr/r/g appreciated


Lung: scattered rhonchi, no distress


Abd: soft, + BS


Ext: + dependent edema





 Laboratory Results - last 24 hr











  11/28/18 11/30/18 11/30/18





  12:50 05:30 13:33


 


WBC   


 


RBC   


 


Hgb   


 


Hct   


 


MCV   


 


MCH   


 


MCHC   


 


RDW   


 


Plt Count   


 


MPV   


 


Absolute Neuts (auto)   


 


Neutrophils %   


 


Neutrophils % (Manual)   65.6 


 


Band Neutrophils %   5.2 


 


Lymphocytes %   


 


Lymphocytes % (Manual)   16.7  D 


 


Monocytes %   


 


Monocytes % (Manual)   5  D 


 


Eosinophils %   


 


Eosinophils % (Manual)   4.2  D 


 


Basophils %   


 


Basophils % (Manual)   1.0  D 


 


Myelocytes % (Man)   0 


 


Promyelocytes % (Man)   0 


 


Blast Cells % (Manual)   0 


 


Nucleated RBC %   9 H 


 


Metamyelocytes   0  D 


 


Hypochromia   0 


 


Platelet Estimate   Decreased 


 


Polychromasia   3+ 


 


Poikilocytosis   2+ 


 


Anisocytosis   2+ 


 


Microcytosis   3+ 


 


Macrocytosis   0 


 


Rouleaux   2+ 


 


PT with INR   


 


INR   


 


PTT (Actin FS)   


 


Fibrinogen    352.0


 


Anticoagulation Therapy   


 


Puncture Site   


 


ABG pH   


 


ABG pCO2 at Pt Temp   


 


ABG pO2 at Pt Temp   


 


ABG HCO3   


 


ABG O2 Sat (Measured)   


 


ABG O2 Content   


 


ABG Base Excess   


 


Chacho Test   


 


O2 Delivery Device   


 


Oxygen Flow Rate   


 


Vent Mode   


 


Vent Rate   


 


Mechanical Rate   


 


PEEP   


 


Pressure Support Vent   


 


Sodium   


 


Potassium   


 


Chloride   


 


Carbon Dioxide   


 


Anion Gap   


 


BUN   


 


Creatinine   


 


Creat Clearance w eGFR   


 


Random Glucose   


 


Calcium   


 


Phosphorus   


 


Magnesium   


 


Total Bilirubin   


 


AST   


 


ALT   


 


Alkaline Phosphatase   


 


Total Protein   


 


Albumin   


 


Heparin-Ind Plt Ab Scrn  0.226  














  12/01/18 12/01/18 12/01/18





  05:30 05:30 05:30


 


WBC  3.4 L  


 


RBC  2.74 L  


 


Hgb  8.1 L  


 


Hct  24.6 L  


 


MCV  89.8  


 


MCH  29.6  


 


MCHC  33.0  


 


RDW  16.2 H  


 


Plt Count  109 L  


 


MPV  9.8  


 


Absolute Neuts (auto)  2.5  


 


Neutrophils %  71.9  


 


Neutrophils % (Manual)   


 


Band Neutrophils %   


 


Lymphocytes %  22.5  D  


 


Lymphocytes % (Manual)   


 


Monocytes %  4.1  


 


Monocytes % (Manual)   


 


Eosinophils %  1.0  


 


Eosinophils % (Manual)   


 


Basophils %  0.5  


 


Basophils % (Manual)   


 


Myelocytes % (Man)   


 


Promyelocytes % (Man)   


 


Blast Cells % (Manual)   


 


Nucleated RBC %  4 H  


 


Metamyelocytes   


 


Hypochromia   


 


Platelet Estimate   


 


Polychromasia   


 


Poikilocytosis   


 


Anisocytosis   


 


Microcytosis   


 


Macrocytosis   


 


Rouleaux   


 


PT with INR    18.50 H


 


INR    1.56 H


 


PTT (Actin FS)    32.7


 


Fibrinogen   


 


Anticoagulation Therapy   


 


Puncture Site   


 


ABG pH   


 


ABG pCO2 at Pt Temp   


 


ABG pO2 at Pt Temp   


 


ABG HCO3   


 


ABG O2 Sat (Measured)   


 


ABG O2 Content   


 


ABG Base Excess   


 


Chacho Test   


 


O2 Delivery Device   


 


Oxygen Flow Rate   


 


Vent Mode   


 


Vent Rate   


 


Mechanical Rate   


 


PEEP   


 


Pressure Support Vent   


 


Sodium   142 


 


Potassium   4.9 


 


Chloride   116 H 


 


Carbon Dioxide   21 


 


Anion Gap   4 L 


 


BUN   45 H 


 


Creatinine   1.2 


 


Creat Clearance w eGFR   58.40 


 


Random Glucose   60 L 


 


Calcium   6.5 L* 


 


Phosphorus   3.7 


 


Magnesium   1.7 L 


 


Total Bilirubin   0.5 


 


AST   35 


 


ALT   25 


 


Alkaline Phosphatase   129 H 


 


Total Protein   8.0 


 


Albumin   0.9 L 


 


Heparin-Ind Plt Ab Scrn   














  12/01/18





  06:30


 


WBC 


 


RBC 


 


Hgb 


 


Hct 


 


MCV 


 


MCH 


 


MCHC 


 


RDW 


 


Plt Count 


 


MPV 


 


Absolute Neuts (auto) 


 


Neutrophils % 


 


Neutrophils % (Manual) 


 


Band Neutrophils % 


 


Lymphocytes % 


 


Lymphocytes % (Manual) 


 


Monocytes % 


 


Monocytes % (Manual) 


 


Eosinophils % 


 


Eosinophils % (Manual) 


 


Basophils % 


 


Basophils % (Manual) 


 


Myelocytes % (Man) 


 


Promyelocytes % (Man) 


 


Blast Cells % (Manual) 


 


Nucleated RBC % 


 


Metamyelocytes 


 


Hypochromia 


 


Platelet Estimate 


 


Polychromasia 


 


Poikilocytosis 


 


Anisocytosis 


 


Microcytosis 


 


Macrocytosis 


 


Rouleaux 


 


PT with INR 


 


INR 


 


PTT (Actin FS) 


 


Fibrinogen 


 


Anticoagulation Therapy  No Result Required.


 


Puncture Site  Right radial


 


ABG pH  7.37


 


ABG pCO2 at Pt Temp  32.6 L


 


ABG pO2 at Pt Temp  119.0 H D


 


ABG HCO3  18.5 L


 


ABG O2 Sat (Measured)  98.3


 


ABG O2 Content  13.1 L


 


ABG Base Excess  -5.5 L


 


Chacho Test  Positive


 


O2 Delivery Device  Mech vent


 


Oxygen Flow Rate  40%


 


Vent Mode  No Result Required.


 


Vent Rate  18


 


Mechanical Rate  No Result Required.


 


PEEP  5.0


 


Pressure Support Vent  500


 


Sodium 


 


Potassium 


 


Chloride 


 


Carbon Dioxide 


 


Anion Gap 


 


BUN 


 


Creatinine 


 


Creat Clearance w eGFR 


 


Random Glucose 


 


Calcium 


 


Phosphorus 


 


Magnesium 


 


Total Bilirubin 


 


AST 


 


ALT 


 


Alkaline Phosphatase 


 


Total Protein 


 


Albumin 


 


Heparin-Ind Plt Ab Scrn 

















ASSESSMENT AND PLAN:


Acute Hypoxic Respiratory Failure


Altered Mental Status


Metabolic Encephalopathy


Pneumonia


Acalculous Cholecystitis


Multiple Myeloma


Acute on Chronic Renal Failure


Metabolic Acidosis


LV Diastolic Dysfunction


Atrial Fibrillation


CAD


+Troponins likely Demand Ischemia


PAD


Hyperlipidemia


HTN


Anemia





-  Correct coagulopathy 


-  monitor H/H


-  monitor urine output, creatinine


-  rate control


-  minimize sedation to assess mental status , low dose fent for comfort


-  DVT/GI prophylaxis


-  continue discussions regarding goals of care


-  For Trach on Monday if stable labs


-  continue ICU monitoring


-  poor overall prognosis for meaningful recovery: have recommended to family 

for compassionate extubation but they have deciding on Trach/PEG 








Ervin ACNP


[id number]





35min CCT

## 2018-12-02 LAB
APTT BLD: 29 SECONDS (ref 25.2–36.5)
INR BLD: 1.47 (ref 0.83–1.09)
PT PNL PPP: 17.4 SEC (ref 9.7–13)

## 2018-12-02 RX ADMIN — METOPROLOL TARTRATE SCH MG: 25 TABLET, FILM COATED ORAL at 09:14

## 2018-12-02 RX ADMIN — CHLORHEXIDINE GLUCONATE 0.12% ORAL RINSE SCH ML: 1.2 LIQUID ORAL at 09:15

## 2018-12-02 RX ADMIN — DEXTROSE MONOHYDRATE SCH MLS/HR: 50 INJECTION, SOLUTION INTRAVENOUS at 18:36

## 2018-12-02 RX ADMIN — RIFAXIMIN SCH MG: 550 TABLET ORAL at 09:13

## 2018-12-02 RX ADMIN — ALLOPURINOL SCH MG: 100 TABLET ORAL at 21:44

## 2018-12-02 RX ADMIN — PANTOPRAZOLE SODIUM SCH MG: 40 INJECTION, POWDER, FOR SOLUTION INTRAVENOUS at 09:15

## 2018-12-02 RX ADMIN — DEXTROSE MONOHYDRATE SCH MLS/HR: 50 INJECTION, SOLUTION INTRAVENOUS at 08:32

## 2018-12-02 RX ADMIN — Medication SCH MG: at 09:13

## 2018-12-02 RX ADMIN — PHYTONADIONE SCH MG: 10 INJECTION, EMULSION INTRAMUSCULAR; INTRAVENOUS; SUBCUTANEOUS at 18:19

## 2018-12-02 RX ADMIN — Medication SCH TAB: at 09:12

## 2018-12-02 RX ADMIN — METOPROLOL TARTRATE SCH MG: 25 TABLET, FILM COATED ORAL at 21:44

## 2018-12-02 RX ADMIN — VITAMIN D, TAB 1000IU (100/BT) SCH UNIT: 25 TAB at 09:13

## 2018-12-02 RX ADMIN — ALLOPURINOL SCH MG: 100 TABLET ORAL at 09:13

## 2018-12-02 RX ADMIN — DEXTROSE MONOHYDRATE SCH: 50 INJECTION, SOLUTION INTRAVENOUS at 21:43

## 2018-12-02 RX ADMIN — PANTOPRAZOLE SODIUM SCH MG: 40 INJECTION, POWDER, FOR SOLUTION INTRAVENOUS at 21:44

## 2018-12-02 RX ADMIN — CHLORHEXIDINE GLUCONATE 0.12% ORAL RINSE SCH ML: 1.2 LIQUID ORAL at 21:44

## 2018-12-02 RX ADMIN — RIFAXIMIN SCH MG: 550 TABLET ORAL at 21:44

## 2018-12-02 RX ADMIN — ACETAMINOPHEN PRN MG: 325 TABLET ORAL at 12:33

## 2018-12-02 NOTE — PN
Progress Note (short form)





- Note


Progress Note: 


Covering dr oliver





Problems


HAM presenting as AMS/Confusion 





CKD, 


Afib on A/C, 


CAD, 


CKD, 


Hypertension, 


Hyperlipidemia, 


PVD 


#HAM ATN vs. Cast nephropathy  


(FeNa was 2.1% indicating tubular injury, US showed no stones or obstruction, 

UA w/o protein but UPCR ~0.5 indicating non-albumin proteinuria)


 multiple myloma new


Anemia 


Hypercalcemia of Malignancy


 


Hyperdense lesion on US of the kidney 


Normal anion gap metabolic acidosis 


Hyperkalemia resolved








 Current Medications





Acetaminophen (Tylenol -)  650 mg PO Q6H PRN


   PRN Reason: PAIN LEVEL 1 - 3


   Last Admin: 12/02/18 12:33 Dose:  650 mg


Allopurinol (Zyloprim -)  100 mg PO BID [facility]


   Last Admin: 12/02/18 09:13 Dose:  100 mg


Chlorhexidine Gluconate (Peridex -)  15 ml MM BID [facility]


   Last Admin: 12/02/18 09:15 Dose:  15 ml


Cholecalciferol (Vitamin D3 -)  1,000 unit PO DAILY [facility]


   Last Admin: 12/02/18 09:13 Dose:  1,000 unit


Diphenhydramine HCl (Benadryl Injection -)  25 mg IVPB ONCE PRN


   PRN Reason: DURING PLASMAPHERESIS


Metronidazole (Flagyl 500mg Premixed Ivpb -)  500 mg in 100 mls @ 100 mls/hr 

IVPB BID [facility]


   Last Admin: 12/02/18 09:13 Dose:  100 mls/hr


Levofloxacin (Levaquin 250 Mg Premixed Ivpb -)  250 mg in 50 mls @ 50 mls/hr 

IVPB DAILY [facility]; Protocol


   Last Admin: 12/02/18 09:13 Dose:  50 mls/hr


Fentanyl 500 mcg/ Dextrose  100 mls @ 5 mls/hr IVPB TITR [facility]; Protocol


   Last Admin: 12/02/18 08:32 Dose:  50 mcg/hr, 10 mls/hr


Lactobacillus Acidophilus (Bacid -)  1 tab PO DAILY [facility]


   Last Admin: 12/02/18 09:12 Dose:  1 tab


Metoprolol Tartrate (Lopressor Injection -)  5 mg IVPUSH Q8H PRN


   PRN Reason: TACHYCARDIA


   Last Admin: 12/01/18 01:03 Dose:  5 mg


Metoprolol Tartrate (Lopressor -)  25 mg NGT BID [facility]


   Last Admin: 12/02/18 09:14 Dose:  25 mg


Pantoprazole Sodium (Protonix Iv)  40 mg IVPUSH BID [facility]


   Last Admin: 12/02/18 09:15 Dose:  40 mg


Phytonadione (Aqua Mephyton Injection -)  5 mg SQ DAILY [facility]


   Stop: 12/05/18 17:59


Rifaximin (Xifaxan -)  550 mg PO BID [facility]


   Last Admin: 12/02/18 09:13 Dose:  550 mg


Sodium Bicarbonate (Sodium Bicarbonate -)  650 mg NGT DAILY [facility]


   Last Admin: 12/02/18 09:13 Dose:  650 mg


Thiamine HCl (Vitamin B1 -)  100 mg PO DAILY [facility]


   Last Admin: 12/02/18 09:13 Dose:  100 mg





 Last Vital Signs











Temp Pulse Resp BP Pulse Ox


 


 99.8 F H  91 H  18   91/69   99 


 


 12/02/18 12:00  12/02/18 12:00  12/02/18 16:15  12/02/18 12:00  12/02/18 09:00














on vent


lungs vented sounds


heart reg s1s2


abd soft 


ext no edema








 CBC, BMP





 12/01/18 05:30 





 12/01/18 05:30 





IMP- resp on vent


for trach on monday


mild prerenal 








continue to monitor volume and renal function





Renal function stable at this time


continue supportive care


tube feeds with free water


start sodium bicarb daily


trend renal function ane electrolytes

## 2018-12-02 NOTE — PN
Progress Note (short form)





- Note


Progress Note: 





Patient seen and examined





Intubated


opens eyes to verbal stimuli


does not follow commands





  Last Vital Signs











Temp Pulse Resp BP Pulse Ox


 


 99.2 F   100 H  18   116/92   99 


 


 12/03/18 06:00  12/03/18 06:00  12/03/18 06:00  12/03/18 06:00  12/02/18 19:42











Cor: RSR, No murmurs, No gallops


Lungs: Clear to P&A


Abd: Soft, Normal bowel sounds, No organomegaly


Ext:No significant edema














Labs/MEds reviewed








A/P





78 year old gentleman with hx of CKD, Afib on A/C, CAD, CKD, Hypertension, 

Hyperlipidemia, PVD who presented with AMS/Confusion and found to have HAM/

hypercalcemia/anemia


SFLCA --Kappa chains--2000s/ratio 438


IgG > 7grams


IgM < 5





myeloma





renal failure





altered mental status





fevers





overall poor prognosis





for possible tracheostomy -- mild coagulopathy


will give trial of vit. K


May need FFP prior to procedure





discussed with house staff

## 2018-12-02 NOTE — PN
Progress Note, Physician


Chief Complaint: 





FAMILY BEDSIDE


NO CHANGES


AWAITING TRACHEOSTOMY MONDAY


IN ICU UNTIL TRACHEOSTOMY





- Current Medication List


Current Medications: 


Active Medications





Acetaminophen (Tylenol -)  650 mg PO Q6H PRN


   PRN Reason: PAIN LEVEL 1 - 3


   Last Admin: 11/23/18 03:13 Dose:  650 mg


Allopurinol (Zyloprim -)  100 mg PO BID [facility]


   Last Admin: 12/02/18 09:13 Dose:  100 mg


Chlorhexidine Gluconate (Peridex -)  15 ml MM BID [facility]


   Last Admin: 12/02/18 09:15 Dose:  15 ml


Cholecalciferol (Vitamin D3 -)  1,000 unit PO DAILY [facility]


   Last Admin: 12/02/18 09:13 Dose:  1,000 unit


Diphenhydramine HCl (Benadryl Injection -)  25 mg IVPB ONCE PRN


   PRN Reason: DURING PLASMAPHERESIS


Metronidazole (Flagyl 500mg Premixed Ivpb -)  500 mg in 100 mls @ 100 mls/hr 

IVPB BID [facility]


   Last Admin: 12/02/18 09:13 Dose:  100 mls/hr


Levofloxacin (Levaquin 250 Mg Premixed Ivpb -)  250 mg in 50 mls @ 50 mls/hr 

IVPB DAILY [facility]; Protocol


   Last Admin: 12/02/18 09:13 Dose:  50 mls/hr


Fentanyl 500 mcg/ Dextrose  100 mls @ 5 mls/hr IVPB TITR [facility]; Protocol


   Last Admin: 12/02/18 08:32 Dose:  50 mcg/hr, 10 mls/hr


Lactobacillus Acidophilus (Bacid -)  1 tab PO DAILY [facility]


   Last Admin: 12/02/18 09:12 Dose:  1 tab


Metoprolol Tartrate (Lopressor Injection -)  5 mg IVPUSH Q8H PRN


   PRN Reason: TACHYCARDIA


   Last Admin: 12/01/18 01:03 Dose:  5 mg


Metoprolol Tartrate (Lopressor -)  25 mg NGT BID [facility]


   Last Admin: 12/02/18 09:14 Dose:  25 mg


Pantoprazole Sodium (Protonix Iv)  40 mg IVPUSH BID [facility]


   Last Admin: 12/02/18 09:15 Dose:  40 mg


Rifaximin (Xifaxan -)  550 mg PO BID [facility]


   Last Admin: 12/02/18 09:13 Dose:  550 mg


Sodium Bicarbonate (Sodium Bicarbonate -)  650 mg NGT DAILY [facility]


   Last Admin: 12/02/18 09:13 Dose:  650 mg


Thiamine HCl (Vitamin B1 -)  100 mg PO DAILY [facility]


   Last Admin: 12/02/18 09:13 Dose:  100 mg











- Objective


Vital Signs: 


 Vital Signs











Temperature  98.2 F   12/02/18 06:00


 


Pulse Rate  102 H  12/02/18 09:35


 


Respiratory Rate  18   12/02/18 11:15


 


Blood Pressure  115/78   12/02/18 09:35


 


O2 Sat by Pulse Oximetry (%)  99   12/02/18 09:00











Constitutional: Yes: Other


Cardiovascular: Yes: Pulse Irregular


Respiratory: Yes: Intubated


Gastrointestinal: Yes: Soft


Genitourinary: Yes: Garces Present


Musculoskeletal: Yes: Muscle Weakness


Labs: 


 CBC, BMP





 12/01/18 05:30 





 12/01/18 05:30 





 INR, PTT











INR  1.47  (0.83-1.09)  H  12/02/18  05:30    


 


Fibrinogen  117.0 mg/dL (238-498)  L D 12/01/18  13:45    














Problem List





- Problems


(1) HAM (acute kidney injury)


Code(s): N17.9 - ACUTE KIDNEY FAILURE, UNSPECIFIED   





(2) Atrial fibrillation with RVR


Code(s): I48.91 - UNSPECIFIED ATRIAL FIBRILLATION   





(3) Hyperlipidemia


Code(s): E78.5 - HYPERLIPIDEMIA, UNSPECIFIED   


Qualifiers: 


   Hyperlipidemia type: pure hypercholesterolemia   Qualified Code(s): E78.00 - 

Pure hypercholesterolemia, unspecified; E78.0 - Pure hypercholesterolemia   





(4) Hypothermia


Code(s): T68.XXXA - HYPOTHERMIA, INITIAL ENCOUNTER   


Qualifiers: 


   Encounter type: initial encounter   Qualified Code(s): T68.XXXA - Hypothermia

, initial encounter   





(5) Acute-on-chronic kidney injury


Code(s): N17.9 - ACUTE KIDNEY FAILURE, UNSPECIFIED; N18.9 - CHRONIC KIDNEY 

DISEASE, UNSPECIFIED   


Qualifiers: 


   Acute renal failure type: unspecified   Chronic kidney disease stage: 

unspecified stage   Qualified Code(s): N17.9 - Acute kidney failure, unspecified

; N18.9 - Chronic kidney disease, unspecified   





(6) Generalized weakness


Code(s): R53.1 - WEAKNESS   





(7) History of ETOH abuse


Code(s): Z87.898 - PERSONAL HISTORY OF OTHER SPECIFIED CONDITIONS   





(8) Hypercalcemia


Code(s): E83.52 - HYPERCALCEMIA   





(9) Hypertrophic cardiomyopathy


Code(s): I42.2 - OTHER HYPERTROPHIC CARDIOMYOPATHY   





(10) Toxic metabolic encephalopathy


Code(s): G92 - TOXIC ENCEPHALOPATHY   





(11) Sepsis


Code(s): A41.9 - SEPSIS, UNSPECIFIED ORGANISM   





(12) Respiratory failure


Code(s): J96.90 - RESPIRATORY FAILURE, UNSP, UNSP W HYPOXIA OR HYPERCAPNIA   





Assessment/Plan





NOW VENT DEPENDENT INTUBATED FOR MANY DAYS


WILL NEED TRACHEOSTOMY. AWAITING


MEDICALLY CLEARED FOR TRACHEOSTOMY


AS BENEFIT OUTWEIGHS THE RISK


INR TO BE ADJUSTED WIT HEMATOLOGY.


IV ABX CONTINUE PER ID.


02 SUPPORT 40% O2


UNABLE TO WEAN OFF VENT.


CARDIO F/U ON HEPARIN IV FOR AC.


MONITOR LABS, FREQUENT TURNING AND OFF LOADING


TO PREVENT SKIN BREAK DOWN


NGT FEEDS, AMINO ACIDS FOR PROTEIN 


AND PREVENT SKIN ULCERS.


ADVANCED DIRECTIVES UNCLEAR, AWAIT FAMILY DECISION


AT THIS PROGNOSIS IS POOR FOR A FULL MEANINGFUL RECOVERY


LABS DAILY MONITOR ELECTROLYTES.


NEURO EVAL


MVI/THIAMINE CONTINUE


AGREE WITH PULMONARY TO DECREASE SEDATION TO EVALUATE MENTAL STATUS

## 2018-12-02 NOTE — PN
Progress Note, Physician


Chief Complaint: 





Events noted


Remains on mechanical ventilation


History of Present Illness: 





Patient was seen and examined in ICU. Remains on vent support. Chart was 

reviewed


AF with RVR








- Current Medication List


Current Medications: 


Active Medications





Acetaminophen (Tylenol -)  650 mg PO Q6H PRN


   PRN Reason: PAIN LEVEL 1 - 3


   Last Admin: 11/23/18 03:13 Dose:  650 mg


Allopurinol (Zyloprim -)  100 mg PO BID [facility]


   Last Admin: 12/02/18 09:13 Dose:  100 mg


Chlorhexidine Gluconate (Peridex -)  15 ml MM BID [facility]


   Last Admin: 12/02/18 09:15 Dose:  15 ml


Cholecalciferol (Vitamin D3 -)  1,000 unit PO DAILY [facility]


   Last Admin: 12/02/18 09:13 Dose:  1,000 unit


Diphenhydramine HCl (Benadryl Injection -)  25 mg IVPB ONCE PRN


   PRN Reason: DURING PLASMAPHERESIS


Metronidazole (Flagyl 500mg Premixed Ivpb -)  500 mg in 100 mls @ 100 mls/hr 

IVPB BID [facility]


   Last Admin: 12/02/18 09:13 Dose:  100 mls/hr


Levofloxacin (Levaquin 250 Mg Premixed Ivpb -)  250 mg in 50 mls @ 50 mls/hr 

IVPB DAILY [facility]; Protocol


   Last Admin: 12/02/18 09:13 Dose:  50 mls/hr


Fentanyl 500 mcg/ Dextrose  100 mls @ 5 mls/hr IVPB TITR [facility]; Protocol


   Last Admin: 12/02/18 08:32 Dose:  50 mcg/hr, 10 mls/hr


Lactobacillus Acidophilus (Bacid -)  1 tab PO DAILY [facility]


   Last Admin: 12/02/18 09:12 Dose:  1 tab


Metoprolol Tartrate (Lopressor Injection -)  5 mg IVPUSH Q8H PRN


   PRN Reason: TACHYCARDIA


   Last Admin: 12/01/18 01:03 Dose:  5 mg


Metoprolol Tartrate (Lopressor -)  25 mg NGT BID [facility]


   Last Admin: 12/02/18 09:14 Dose:  25 mg


Pantoprazole Sodium (Protonix Iv)  40 mg IVPUSH BID [facility]


   Last Admin: 12/02/18 09:15 Dose:  40 mg


Rifaximin (Xifaxan -)  550 mg PO BID [facility]


   Last Admin: 12/02/18 09:13 Dose:  550 mg


Sodium Bicarbonate (Sodium Bicarbonate -)  650 mg NGT DAILY [facility]


   Last Admin: 12/02/18 09:13 Dose:  650 mg


Thiamine HCl (Vitamin B1 -)  100 mg PO DAILY [facility]


   Last Admin: 12/02/18 09:13 Dose:  100 mg











- Objective


Vital Signs: 


 Vital Signs











Temperature  98.2 F   12/02/18 06:00


 


Pulse Rate  102 H  12/02/18 09:35


 


Respiratory Rate  18   12/02/18 11:15


 


Blood Pressure  115/78   12/02/18 09:35


 


O2 Sat by Pulse Oximetry (%)  99   12/02/18 09:00











Cardiovascular: Yes: Pulse Irregular, S1, S2


Respiratory: Yes: Diminished, Mechanically Ventilated


Gastrointestinal: Yes: Soft.  No: Tenderness


Edema: No


Labs: 


 CBC, BMP





 12/01/18 05:30 





 12/01/18 05:30 





 INR, PTT











INR  1.47  (0.83-1.09)  H  12/02/18  05:30    


 


Fibrinogen  117.0 mg/dL (238-498)  L D 12/01/18  13:45    














Problem List





- Problems


(1) Anemia


Code(s): D64.9 - ANEMIA, UNSPECIFIED   





(2) Hyperlipidemia


Code(s): E78.5 - HYPERLIPIDEMIA, UNSPECIFIED   


Qualifiers: 


   Hyperlipidemia type: pure hypercholesterolemia   Qualified Code(s): E78.00 - 

Pure hypercholesterolemia, unspecified; E78.0 - Pure hypercholesterolemia   





(3) Multiple myeloma


Code(s): C90.00 - MULTIPLE MYELOMA NOT HAVING ACHIEVED REMISSION   


Qualifiers: 


   Multiple myeloma remission status: not in remission   Qualified Code(s): 

C90.00 - Multiple myeloma not having achieved remission   





(4) Respiratory failure


Code(s): J96.90 - RESPIRATORY FAILURE, UNSP, UNSP W HYPOXIA OR HYPERCAPNIA   





(5) Sepsis


Code(s): A41.9 - SEPSIS, UNSPECIFIED ORGANISM   





(6) Toxic metabolic encephalopathy


Code(s): G92 - TOXIC ENCEPHALOPATHY   





(7) Acute-on-chronic kidney injury


Code(s): N17.9 - ACUTE KIDNEY FAILURE, UNSPECIFIED; N18.9 - CHRONIC KIDNEY 

DISEASE, UNSPECIFIED   


Qualifiers: 


   Acute renal failure type: unspecified   Chronic kidney disease stage: 

unspecified stage   Qualified Code(s): N17.9 - Acute kidney failure, unspecified

; N18.9 - Chronic kidney disease, unspecified   





(8) Demand ischemia


Code(s): I24.8 - OTHER FORMS OF ACUTE ISCHEMIC HEART DISEASE   





(9) History of ETOH abuse


Code(s): Z87.898 - PERSONAL HISTORY OF OTHER SPECIFIED CONDITIONS   





(10) Nonadherence to medication


Code(s): Z91.14 - PATIENT'S OTHER NONCOMPLIANCE WITH MEDICATION REGIMEN   





(11) Paraproteinemia


Code(s): D89.2 - HYPERGAMMAGLOBULINEMIA, UNSPECIFIED   





(12) Persistent atrial fibrillation


Code(s): I48.1 - PERSISTENT ATRIAL FIBRILLATION   





(13) Chronic thromboembolic disease


Code(s): I74.9 - EMBOLISM AND THROMBOSIS OF UNSPECIFIED ARTERY   





(14) Coronary artery disease


Code(s): I25.10 - ATHSCL HEART DISEASE OF NATIVE CORONARY ARTERY W/O ANG PCTRS 

  


Qualifiers: 


   Coronary Disease-Associated Artery/Lesion type: native artery   Native vs. 

transplanted heart: native heart   Associated angina: without angina   

Qualified Code(s): I25.10 - Atherosclerotic heart disease of native coronary 

artery without angina pectoris   





(15) Diastolic dysfunction without heart failure


Code(s): I51.9 - HEART DISEASE, UNSPECIFIED   





(16) HTN (hypertension)


Code(s): I10 - ESSENTIAL (PRIMARY) HYPERTENSION   


Qualifiers: 


   Hypertension type: essential hypertension   Qualified Code(s): I10 - 

Essential (primary) hypertension   





(17) Hypertrophic cardiomyopathy


Code(s): I42.2 - OTHER HYPERTROPHIC CARDIOMYOPATHY   





Assessment/Plan





1. Acute hypoxic respiratory failure, suspected aspiration pneumonia with 

sepsis syndrome, remains intubated


2. Toxic metabolic encephelopathy, rule out CVA


3. Acute on CKD


4. Paraproteinemia confirmed multiple myeloma


5. 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 

disease related to persistent atrial fibrillation with [id number] score of 6


6. CAD with evidence of demand ischemic injury, non obstructive CAD on [facility] 

coronary angiogrpahy angina pectoris


7. 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 

chronic class I-II NYHA classification LV failure, compensated/euvolemic


8. Persistent atrial fibrillation [id number] score of 6


9. HTN


10. Anemia/thrombocytopenia


11. Acalculous Cholecystitis


12. Ischemic hepatitis


13. Resolved Hypernatremia





PLAN:


1. Consider A/C therapy unless it is absolutely contraindicated considering his 

[id number] score of 6 and stroke risk


2. Lopressor 25 bid as hemodynamics permit 


3. Antibiotics coverage


4. Ventilator management as per the ICU team


5. Await tracheostomy (possibly Monday) and PEG as per family





Guarded


Olvin Hobbs MD

## 2018-12-02 NOTE — PN
Progress Note (short form)





- Note


Progress Note: 





SUBJECTIVE:


Patient seen and examined in the ICU.  


-uneventful 24hrs, 


-awaiting trach monday





OBJECTIVE:





  Vital Signs











Temp  98.2 F   12/02/18 06:00


 


Pulse  108 H  12/02/18 06:15


 


Resp  18   12/02/18 06:15


 


BP  111/68   12/02/18 06:00


 


Pulse Ox  100   12/02/18 06:15








 Intake & Output











 12/01/18 12/01/18 12/02/18





 11:59 23:59 11:59


 


Intake Total 5707 885 5907


 


Output Total 


 


Balance 1170 -250 1510


 


Weight 99.5 kg  100.879 kg


 


Intake:   


 


  IV 1620  1620


 


    LACTATED RINGERS SOLUTION 1500  1500





    1,000 ml In 1,000 ml @   





    125 mls/hr IV ASDIR [facility]   





    Rx#:[id number]   


 


    Sublimaze Injection - 500 120  120





    Mcg In D5w - 90 ml @ 25   





    MCG/HR 5 mls/hr IVPB TITR   





    [facility] Rx#:[id number]   


 


  IVPB 100  100


 


  Tube Feeding 50  540


 


  Tube Irrigant  150 300


 


Output:   


 


  Urine 


 


    [place] 


 


Other:   


 


  Voiding Method Indwelling Catheter Indwelling Catheter 


 


  Bowel Movement Yes Yes Yes


 


  Weight Measurement Method [facility]  [facility]











Active Medications





Acetaminophen (Tylenol -)  650 mg PO Q6H PRN


   PRN Reason: PAIN LEVEL 1 - 3


   Last Admin: 11/23/18 03:13 Dose:  650 mg


Allopurinol (Zyloprim -)  100 mg PO BID [facility]


   Last Admin: 12/01/18 21:19 Dose:  100 mg


Chlorhexidine Gluconate (Peridex -)  15 ml MM BID [facility]


   Last Admin: 12/01/18 21:19 Dose:  15 ml


Cholecalciferol (Vitamin D3 -)  1,000 unit PO DAILY [facility]


   Last Admin: 12/01/18 09:25 Dose:  1,000 unit


Diphenhydramine HCl (Benadryl Injection -)  25 mg IVPB ONCE PRN


   PRN Reason: DURING PLASMAPHERESIS


Metronidazole (Flagyl 500mg Premixed Ivpb -)  500 mg in 100 mls @ 100 mls/hr 

IVPB BID [facility]


   Last Admin: 12/01/18 21:18 Dose:  100 mls/hr


Levofloxacin (Levaquin 250 Mg Premixed Ivpb -)  250 mg in 50 mls @ 50 mls/hr 

IVPB DAILY [facility]; Protocol


   Last Admin: 12/01/18 09:26 Dose:  50 mls/hr


Fentanyl 500 mcg/ Dextrose  100 mls @ 5 mls/hr IVPB TITR [facility]; Protocol


   Last Admin: 12/01/18 18:52 Dose:  50 mcg/hr, 10 mls/hr


Lactobacillus Acidophilus (Bacid -)  1 tab PO DAILY [facility]


   Last Admin: 12/01/18 09:25 Dose:  1 tab


Metoprolol Tartrate (Lopressor Injection -)  5 mg IVPUSH Q8H PRN


   PRN Reason: TACHYCARDIA


   Last Admin: 12/01/18 01:03 Dose:  5 mg


Metoprolol Tartrate (Lopressor -)  25 mg NGT BID [facility]


   Last Admin: 12/01/18 21:19 Dose:  25 mg


Pantoprazole Sodium (Protonix Iv)  40 mg IVPUSH BID [facility]


   Last Admin: 12/01/18 21:18 Dose:  40 mg


Rifaximin (Xifaxan -)  550 mg PO BID [facility]


   Last Admin: 12/01/18 21:19 Dose:  550 mg


Sodium Bicarbonate (Sodium Bicarbonate -)  650 mg NGT DAILY [facility]


   Last Admin: 12/01/18 09:25 Dose:  650 mg


Thiamine HCl (Vitamin B1 -)  100 mg PO DAILY [facility]


   Last Admin: 12/01/18 09:25 Dose:  100 mg














Gen:  intubated, poorly responsive, slight grimace to noxious


Heart: RRR, no mr/r/g appreciated


Lung: scattered rhonchi, no distress


Abd: soft, + BS


Ext: + dependent edema


CBC,CMP











WBC  3.4 K/mm3 (4.0-10.0)  L  12/01/18  05:30    


 


Corrected WBC (auto)  10.42 K/mm3  11/16/18  05:30    


 


RBC  2.74 M/mm3 (4.00-5.60)  L  12/01/18  05:30    


 


Hgb  8.1 GM/dL (11.7-16.9)  L  12/01/18  05:30    


 


Hct  24.6 % (35.4-49)  L  12/01/18  05:30    


 


MCV  89.8 fl (80-96)   12/01/18  05:30    


 


MCH  29.6 pg (25.7-33.7)   12/01/18  05:30    


 


MCHC  33.0 g/dl (32.0-35.9)   12/01/18  05:30    


 


RDW  16.2 % (11.9-15.9)  H  12/01/18  05:30    


 


Plt Count  109 K/MM3 (134-434)  L  12/01/18  05:30    


 


MPV  9.8 fl (7.5-11.1)   12/01/18  05:30    


 


Absolute Neuts (auto)  2.5 K/mm3 (1.5-8.0)   12/01/18  05:30    


 


Total Counted  100   11/28/18  21:00    


 


Neutrophils %  71.9 % (42.8-82.8)   12/01/18  05:30    


 


Neutrophils % (Manual)  86.7 % (42.8-82.8)  H D 12/01/18  05:30    


 


Band Neutrophils %  0.0 %  12/01/18  05:30    


 


Lymphocytes %  22.5 % (8-40)  D 12/01/18  05:30    


 


Lymphocytes % (Manual)  10.2 % (8-40)  D 12/01/18  05:30    


 


Monocytes %  4.1 % (3.8-10.2)   12/01/18  05:30    


 


Monocytes % (Manual)  3 % (3.8-10.2)  L  12/01/18  05:30    


 


Eosinophils %  1.0 % (0-4.5)   12/01/18  05:30    


 


Eosinophils % (Manual)  0.0 % (0-4.5)  D 12/01/18  05:30    


 


Basophils %  0.5 % (0-2.0)   12/01/18  05:30    


 


Basophils % (Manual)  0.0 % (0-2.0)   12/01/18  05:30    


 


Myelocytes % (Man)  0 % (0-2)   12/01/18  05:30    


 


Promyelocytes % (Man)  0 % (0-2)   12/01/18  05:30    


 


Blast Cells % (Manual)  0 % (0-0)   12/01/18  05:30    


 


Nucleated RBC %  4 % (0-0)  H  12/01/18  05:30    


 


Metamyelocytes  0 % (0-2)   12/01/18  05:30    


 


Manual Slide Review  Cancelled   11/04/18  06:30    


 


Hypochromia  0   12/01/18  05:30    


 


Toxic Granulation  0   11/15/18  05:30    


 


Dohle Bodies  0   11/15/18  05:30    


 


Platelet Estimate  Decreased   12/01/18  05:30    


 


Platelet Comment  No clumping noted   11/29/18  19:00    


 


Polychromasia  1+   12/01/18  05:30    


 


Poikilocytosis  0   12/01/18  05:30    


 


Basophilic Stippling  1+   11/17/18  05:30    


 


Anisocytosis  1+   12/01/18  05:30    


 


Microcytosis  1+   12/01/18  05:30    


 


Macrocytosis  0   12/01/18  05:30    


 


Spherocytes  1+   11/26/18  05:30    


 


Sickle Cells  0   11/15/18  05:30    


 


Target Cells  1+   11/16/18  05:30    


 


Tear Drop Cells  1+   11/28/18  05:30    


 


Ovalocytes  0   11/15/18  05:30    


 


Stomatocytes  0   11/15/18  05:30    


 


Helmet Cells  0   11/15/18  05:30    


 


Green-Jolly Bodies  0   11/15/18  05:30    


 


Cabot Rings  0   11/15/18  05:30    


 


[place] Cells  0   11/15/18  05:30    


 


Acanthocytes (Spur)  0   11/15/18  05:30    


 


Rouleaux  1+   12/01/18  05:30    


 


Fragmented RBCs  0   11/15/18  05:30    


 


Schistocytes  0   11/15/18  05:30    


 


Haptoglobin  198 mg/dL ()   10/31/18  13:00    


 


Blood Viscosity  4.0 cp (1.6-1.9)  H  11/05/18  16:00    


 


Sodium  142 mmol/L (136-145)   12/01/18  05:30    


 


Potassium  4.9 mmol/L (3.5-5.1)   12/01/18  05:30    


 


Chloride  116 mmol/L ()  H  12/01/18  05:30    


 


Carbon Dioxide  21 mmol/L (21-32)   12/01/18  05:30    


 


Anion Gap  4 MMOL/L (8-16)  L  12/01/18  05:30    


 


BUN  45 mg/dL (7-18)  H  12/01/18  05:30    


 


Creatinine  1.2 mg/dL (0.55-1.3)   12/01/18  05:30    


 


Creat Clearance w eGFR  58.40  (>60)   12/01/18  05:30    


 


Random Glucose  60 mg/dL ()  L  12/01/18  05:30    


 


Lactic Acid  1.2 mmol/L (0.4-2.0)   11/16/18  05:30    


 


Uric Acid  8.4 mg/dL (2.6-7.2)  H  11/16/18  16:05    


 


Calcium  6.5 mg/dL (8.5-10.1)  L*  12/01/18  05:30    


 


Ionized Calcium  7.4 mg/dL (4.5-5.6)  H  10/31/18  06:00    


 


Phosphorus  3.7 mg/dL (2.5-4.9)   12/01/18  05:30    


 


Magnesium  1.7 mg/dL (1.8-2.4)  L  12/01/18  05:30    


 


Iron  50 ug/dL ()   11/05/18  12:55    


 


TIBC  173 ug/dL (250-450)  L  11/05/18  12:55    


 


Iron Saturation  29 % (15-55)   11/05/18  12:55    


 


Ferritin  610.5 ng/ml (8-388)  H  11/05/18  12:55    


 


Total Bilirubin  0.5 mg/dL (0.2-1)   12/01/18  05:30    


 


GGT  396 U/L (5-85)  H  11/15/18  12:35    


 


AST  35 U/L (15-37)   12/01/18  05:30    


 


ALT  25 U/L (13-61)   12/01/18  05:30    


 


Alkaline Phosphatase  129 U/L ()  H  12/01/18  05:30    


 


Ammonia  49.00 umol/L (11-32)  H  11/29/18  05:30    


 


LD Total  325 U/L ()  H  10/31/18  00:55    


 


Creatine Kinase  187 IU/L ()   11/16/18  16:05    


 


Creatine Kinase Index  3.9 % (0.0-5.0)   11/16/18  16:05    


 


CK-MB (CK-2)  7.3 ng/mL (0.5-3.6)  H  11/16/18  16:05    


 


Troponin I  0.71 ng/ml (0.00-0.05)  H*  11/16/18  16:05    


 


B-Natriuretic Peptide  18114.4 pg/ml (5-450)  H  11/18/18  05:30    


 


Total Protein  8.0 g/dl (6.4-8.2)   12/01/18  05:30    


 


Total Protein (PEP)  11.9 g/dL (6.0-8.5)  H  11/02/18  08:00    


 


Albumin  0.9 g/dl (3.4-5.0)  L  12/01/18  05:30    


 


Albumin (PEP)  2.3 gm/dl (2.9-4.4)  L  11/02/18  08:00    


 


Globulin  9.6 g/dL (2.2-3.9)  H  11/02/18  08:00    


 


Albumin/Globulin Ratio  0.2  (0.7-1.7)  L  11/02/18  08:00    


 


Prealbumin  11.8 mg/dl (20-40)  L  10/31/18  06:00    


 


Alpha-1-Globulins (%)  1.5 % (.)   11/05/18  10:32    


 


Alpha-2-Globulins (%)  5.5 % (.)   11/05/18  10:32    


 


Beta Globulins  1.0 gm/dL (0.7-1.3)   11/02/18  08:00    


 


Beta Globulins (%)  11.4 % (.)   11/05/18  10:32    


 


Gamma Globulins (%)  64.9 % (.)   11/05/18  10:32    


 


M-[place] %  Comment: % (Not Observed)   11/05/18  10:32    


 


Vitamin B12  431 pg/ml (193-986)   11/05/18  12:55    


 


Vit D 1,25-Dihydroxy  <5.0 pg/mL (19.9-79.3)  L  11/03/18  06:10    


 


TSH  1.94 uIU/ml (0.358-3.74)  D 11/06/18  05:30    


 


PTH Intact  15 pg/mL (15-65)   10/31/18  13:00    


 


PTH Related Protein  < 2.0 PG/ML (.)   11/03/18  06:10    




















ASSESSMENT AND PLAN:


Acute Hypoxic Respiratory Failure


Altered Mental Status


Metabolic Encephalopathy


Pneumonia


Acalculous Cholecystitis


Multiple Myeloma


Acute on Chronic Renal Failure


Metabolic Acidosis


LV Diastolic Dysfunction


Atrial Fibrillation


CAD


+Troponins likely Demand Ischemia


PAD


Hyperlipidemia


HTN


Anemia





-  Correct coagulopathy on day of surg


- remains on LVQ and metro, to discuss with Dr Munoz on monday, appears could 

come off. 


-  rate control with BB


-  minimize sedation to assess mental status , low dose fent for comfort


-  DVT/GI prophylaxis


-  For Trach on Monday if stable labs


-  continue ICU monitoring


-  poor overall prognosis for meaningful recovery--> proceeding with trach/peg 

on family request. Should work on early placement post procedure. 





Ervin ACNP


[id number]





35min CCT

## 2018-12-03 LAB
ALBUMIN SERPL-MCNC: 1 G/DL (ref 3.4–5)
ALP SERPL-CCNC: 157 U/L (ref 45–117)
ALT SERPL-CCNC: 23 U/L (ref 13–61)
ANION GAP SERPL CALC-SCNC: 6 MMOL/L (ref 8–16)
APTT BLD: 18.9 SECONDS (ref 25.2–36.5)
APTT BLD: 26.3 SECONDS (ref 25.2–36.5)
AST SERPL-CCNC: 38 U/L (ref 15–37)
BASOPHILS # BLD: 1.3 % (ref 0–2)
BILIRUB SERPL-MCNC: 0.5 MG/DL (ref 0.2–1)
BUN SERPL-MCNC: 39 MG/DL (ref 7–18)
CALCIUM SERPL-MCNC: 6.8 MG/DL (ref 8.5–10.1)
CHLORIDE SERPL-SCNC: 117 MMOL/L (ref 98–107)
CO2 SERPL-SCNC: 20 MMOL/L (ref 21–32)
CREAT SERPL-MCNC: 1.2 MG/DL (ref 0.55–1.3)
DEPRECATED RDW RBC AUTO: 15.8 % (ref 11.9–15.9)
DEPRECATED RDW RBC AUTO: 16 % (ref 11.9–15.9)
EOSINOPHIL # BLD: 2.7 % (ref 0–4.5)
GLUCOSE SERPL-MCNC: 87 MG/DL (ref 74–106)
HCT VFR BLD CALC: 20.9 % (ref 35.4–49)
HCT VFR BLD CALC: 25.3 % (ref 35.4–49)
HGB BLD-MCNC: 7.2 GM/DL (ref 11.7–16.9)
HGB BLD-MCNC: 8.2 GM/DL (ref 11.7–16.9)
INR BLD: 1.54 (ref 0.83–1.09)
INR BLD: 1.54 (ref 0.83–1.09)
LYMPHOCYTES # BLD: 24.5 % (ref 8–40)
MAGNESIUM SERPL-MCNC: 1.6 MG/DL (ref 1.8–2.4)
MCH RBC QN AUTO: 29.6 PG (ref 25.7–33.7)
MCH RBC QN AUTO: 31 PG (ref 25.7–33.7)
MCHC RBC AUTO-ENTMCNC: 32.5 G/DL (ref 32–35.9)
MCHC RBC AUTO-ENTMCNC: 34.7 G/DL (ref 32–35.9)
MCV RBC: 89.3 FL (ref 80–96)
MCV RBC: 91.1 FL (ref 80–96)
MONOCYTES # BLD AUTO: 5.1 % (ref 3.8–10.2)
NEUTROPHILS # BLD: 66.4 % (ref 42.8–82.8)
PHOSPHATE SERPL-MCNC: 3.2 MG/DL (ref 2.5–4.9)
PLATELET # BLD AUTO: 146 K/MM3 (ref 134–434)
PLATELET # BLD AUTO: 148 K/MM3 (ref 134–434)
PMV BLD: 9.4 FL (ref 7.5–11.1)
PMV BLD: 9.9 FL (ref 7.5–11.1)
POTASSIUM SERPLBLD-SCNC: 4.8 MMOL/L (ref 3.5–5.1)
PROT SERPL-MCNC: 8.6 G/DL (ref 6.4–8.2)
PT PNL PPP: 18.3 SEC (ref 9.7–13)
PT PNL PPP: 18.3 SEC (ref 9.7–13)
RBC # BLD AUTO: 2.34 M/MM3 (ref 4–5.6)
RBC # BLD AUTO: 2.78 M/MM3 (ref 4–5.6)
ROULEAUX BLD QL SMEAR: (no result)
SODIUM SERPL-SCNC: 142 MMOL/L (ref 136–145)
WBC # BLD AUTO: 2.4 K/MM3 (ref 4–10)
WBC # BLD AUTO: 4.6 K/MM3 (ref 4–10)
WBC NRBC COR # BLD: 4.07 K/MM3

## 2018-12-03 PROCEDURE — 0BJ08ZZ INSPECTION OF TRACHEOBRONCHIAL TREE, VIA NATURAL OR ARTIFICIAL OPENING ENDOSCOPIC: ICD-10-PCS | Performed by: SURGERY

## 2018-12-03 PROCEDURE — 0B113F4 BYPASS TRACHEA TO CUTANEOUS WITH TRACHEOSTOMY DEVICE, PERCUTANEOUS APPROACH: ICD-10-PCS | Performed by: SURGERY

## 2018-12-03 RX ADMIN — METOPROLOL TARTRATE SCH MG: 25 TABLET, FILM COATED ORAL at 09:39

## 2018-12-03 RX ADMIN — ALLOPURINOL SCH MG: 100 TABLET ORAL at 09:39

## 2018-12-03 RX ADMIN — METOPROLOL TARTRATE SCH MG: 25 TABLET, FILM COATED ORAL at 21:14

## 2018-12-03 RX ADMIN — DEXTROSE MONOHYDRATE SCH: 50 INJECTION, SOLUTION INTRAVENOUS at 22:49

## 2018-12-03 RX ADMIN — CHLORHEXIDINE GLUCONATE 0.12% ORAL RINSE SCH ML: 1.2 LIQUID ORAL at 21:12

## 2018-12-03 RX ADMIN — PHYTONADIONE SCH MG: 10 INJECTION, EMULSION INTRAMUSCULAR; INTRAVENOUS; SUBCUTANEOUS at 09:39

## 2018-12-03 RX ADMIN — Medication SCH MG: at 09:39

## 2018-12-03 RX ADMIN — ALLOPURINOL SCH MG: 100 TABLET ORAL at 21:13

## 2018-12-03 RX ADMIN — DEXTROSE MONOHYDRATE SCH MLS/HR: 50 INJECTION, SOLUTION INTRAVENOUS at 15:58

## 2018-12-03 RX ADMIN — PANTOPRAZOLE SODIUM SCH MG: 40 INJECTION, POWDER, FOR SOLUTION INTRAVENOUS at 21:12

## 2018-12-03 RX ADMIN — PANTOPRAZOLE SODIUM SCH MG: 40 INJECTION, POWDER, FOR SOLUTION INTRAVENOUS at 09:39

## 2018-12-03 RX ADMIN — VITAMIN D, TAB 1000IU (100/BT) SCH UNIT: 25 TAB at 09:40

## 2018-12-03 RX ADMIN — Medication SCH TAB: at 09:39

## 2018-12-03 RX ADMIN — RIFAXIMIN SCH MG: 550 TABLET ORAL at 09:40

## 2018-12-03 RX ADMIN — RIFAXIMIN SCH MG: 550 TABLET ORAL at 21:13

## 2018-12-03 RX ADMIN — CHLORHEXIDINE GLUCONATE 0.12% ORAL RINSE SCH ML: 1.2 LIQUID ORAL at 10:57

## 2018-12-03 NOTE — PN
Progress Note (short form)





- Note


Progress Note: 





PROGRESS NOTE FOR HEMATOLOGY/ONCOLOGY


Patient seen and examined by me at bedside


Patient with fevers over the weekend 


Remains intubated


Poorly responsive to painful stimuli 


Plans to trach/Peg patient but continues to have coagulopathy 








 Vital Signs











Temperature  99.2 F   12/03/18 10:00


 


Pulse Rate  102 H  12/03/18 10:00


 


Respiratory Rate  18   12/03/18 11:17


 


Blood Pressure  95/52 L  12/03/18 10:00


 


O2 Sat by Pulse Oximetry (%)  96   12/03/18 08:37














GENERAL: Intubated, poorly responsive


LUNGS: bilateral diffuse rhonchi


HEART: Tachycardic rate and irregular rhythm. 


ABDOMEN: Soft, nondistended, normoactive bowel sounds 


EXTREMITIES: (+) edema and anasarca 


NEUROLOGICAL: Cannot assess secondary to clinical condition. 


TUBES/LINES: (+) Rectal Tube, (+) fenton catheter 





 Laboratory Results 





 12/03/18 05:30 





 12/03/18 05:30 


 











  12/03/18 12/03/18 12/03/18





  05:30 05:30 05:30


 


INR  1.54 H  


 


PTT (Actin FS)  18.9 L  


 


Fibrinogen   233.0 L D 


 


Alkaline Phosphatase    157 H


 


Total Protein    8.6 H


 


Albumin    1.0 L

















ASSESSMENT AND PLAN:





Patient is a 78 year old male who presented to the hospital after being found 

unresponsive by his neighbors.  Patient had AMS, HAM, hypercalcemia and 

admitted for further monitoring and management. 





Problem List:


Altered mental status


Toxic metabolic encephalopathy


Multiple Myeloma 


Coagulopathy 


Hypercalcemia likely from malignancy


HAM on CKD


History of alcohol abuse


HTN


HLD


CAD 


Afib 


Diastolic Dysfunction


Troponinemia


Hypertrophic cardiomypoathy (apical)


sepsis secondary to RLL pneumonia


acute hypoxemic respiratory failure


Vitamin D deficiency 





Plan:


-Plans to have Trach/Peg today but INR >1.5


-Will need 2 Units of FFP and repeat labs 


-If platelets <100 give platelets


-If Fibrinogen <150, give cryo 


-If INR <1.4, give 2 FFP's

## 2018-12-03 NOTE — PN
Progress Note, Physician


History of Present Illness: 


 Awaiting tracheostomy


  Now with fever past 24hr


  Minimally resposive on ventilatory


  Blinks when calling name


  


 





 


 


 





- Current Medication List


Current Medications: 


Active Medications





Acetaminophen (Tylenol -)  650 mg PO Q6H PRN


   PRN Reason: PAIN LEVEL 1 - 3


   Last Admin: 12/02/18 12:33 Dose:  650 mg


Allopurinol (Zyloprim -)  100 mg PO BID [facility]


   Last Admin: 12/03/18 09:39 Dose:  100 mg


Chlorhexidine Gluconate (Peridex -)  15 ml MM BID [facility]


   Last Admin: 12/02/18 21:44 Dose:  15 ml


Cholecalciferol (Vitamin D3 -)  1,000 unit PO DAILY [facility]


   Last Admin: 12/03/18 09:40 Dose:  1,000 unit


Diphenhydramine HCl (Benadryl Injection -)  25 mg IVPB ONCE PRN


   PRN Reason: DURING PLASMAPHERESIS


Levofloxacin (Levaquin 250 Mg Premixed Ivpb -)  250 mg in 50 mls @ 50 mls/hr 

IVPB DAILY [facility]; Protocol


   Last Admin: 12/03/18 09:38 Dose:  50 mls/hr


Fentanyl 500 mcg/ Dextrose  100 mls @ 5 mls/hr IVPB TITR [facility]; Protocol


   Last Admin: 12/02/18 21:43 Dose:  Not Given


Lactobacillus Acidophilus (Bacid -)  1 tab PO DAILY [facility]


   Last Admin: 12/03/18 09:39 Dose:  1 tab


Metoprolol Tartrate (Lopressor Injection -)  5 mg IVPUSH Q8H PRN


   PRN Reason: TACHYCARDIA


   Last Admin: 12/01/18 01:03 Dose:  5 mg


Metoprolol Tartrate (Lopressor -)  25 mg NGT BID [facility]


   Last Admin: 12/03/18 09:39 Dose:  25 mg


Pantoprazole Sodium (Protonix Iv)  40 mg IVPUSH BID [facility]


   Last Admin: 12/03/18 09:39 Dose:  40 mg


Phytonadione (Aqua Mephy[place] Injection -)  5 mg SQ DAILY [facility]


   Stop: 12/05/18 17:59


   Last Admin: 12/03/18 09:39 Dose:  5 mg


Rifaximin (Xifaxan -)  550 mg PO BID [facility]


   Last Admin: 12/03/18 09:40 Dose:  550 mg


Sodium Bicarbonate (Sodium Bicarbonate -)  650 mg NGT DAILY [facility]


   Last Admin: 12/03/18 09:39 Dose:  650 mg


Thiamine HCl (Vitamin B1 -)  100 mg PO DAILY [facility]


   Last Admin: 12/03/18 09:39 Dose:  100 mg











- Objective


Vital Signs: 


 Vital Signs











Temperature  99.2 F   12/03/18 06:00


 


Pulse Rate  108 H  12/03/18 08:00


 


Respiratory Rate  19   12/03/18 08:20


 


Blood Pressure  106/74   12/03/18 08:00


 


O2 Sat by Pulse Oximetry (%)  96   12/03/18 08:37











Constitutional: Yes: No Distress


Eyes: Yes: Conjunctiva Clear


Cardiovascular: Yes: Regular Rate and Rhythm, S1, S2


Respiratory: Yes: Mechanically Ventilated


Gastrointestinal: Yes: Normal Bowel Sounds, Soft.  No: Tenderness


Edema: Yes


Labs: 


 CBC, BMP





 12/03/18 05:30 





 12/03/18 05:30 





 INR, PTT











INR  1.54  (0.83-1.09)  H  12/03/18  05:30    


 


Fibrinogen  233.0 mg/dL (238-498)  L D 12/03/18  05:30    














Assessment/Plan


Respiratory failure


 RLL pneumonia


 Acalculus  Cholecystitis


 Toxic metabolic encephalopathy


 Renal failure


 Myeloma


 Hyperviscosity syndrome


 Diarrhea


 


 


  


 


  Continue levaquin / flagyl


  For tracheostomy


  Repeat BC


  Ventilatory support


  Prognosis poor

## 2018-12-03 NOTE — PN
Progress Note, Physician





- Current Medication List


Current Medications: 


Active Medications





Acetaminophen (Tylenol -)  650 mg PO Q6H PRN


   PRN Reason: PAIN LEVEL 1 - 3


   Last Admin: 12/02/18 12:33 Dose:  650 mg


Allopurinol (Zyloprim -)  100 mg [facility] BID [facility]


   Last Admin: 12/02/18 21:44 Dose:  100 mg


Chlorhexidine Gluconate (Peridex -)  15 ml MM BID [facility]


   Last Admin: 12/02/18 21:44 Dose:  15 ml


Cholecalciferol (Vitamin D3 -)  1,000 unit PO DAILY [facility]


   Last Admin: 12/02/18 09:13 Dose:  1,000 unit


Diphenhydramine HCl (Benadryl Injection -)  25 mg IVPB ONCE PRN


   PRN Reason: DURING PLASMAPHERESIS


Levofloxacin (Levaquin 250 Mg Premixed Ivpb -)  250 mg in 50 mls @ 50 mls/hr 

IVPB DAILY [facility]; Protocol


   Last Admin: 12/02/18 09:13 Dose:  50 mls/hr


Fentanyl 500 mcg/ Dextrose  100 mls @ 5 mls/hr IVPB TITR [facility]; Protocol


   Last Admin: 12/02/18 21:43 Dose:  Not Given


Lactobacillus Acidophilus (Bacid -)  1 tab PO DAILY [facility]


   Last Admin: 12/02/18 09:12 Dose:  1 tab


Metoprolol Tartrate (Lopressor Injection -)  5 mg IVPUSH Q8H PRN


   PRN Reason: TACHYCARDIA


   Last Admin: 12/01/18 01:03 Dose:  5 mg


Metoprolol Tartrate (Lopressor -)  25 mg NGT BID [facility]


   Last Admin: 12/02/18 21:44 Dose:  25 mg


Pantoprazole Sodium (Protonix Iv)  40 mg IVPUSH BID [facility]


   Last Admin: 12/02/18 21:44 Dose:  40 mg


Phytonadione (Aqua Mephyton Injection -)  5 mg SQ DAILY [facility]


   Stop: 12/05/18 17:59


   Last Admin: 12/02/18 18:19 Dose:  5 mg


Rifaximin (Xifaxan -)  550 mg PO BID [facility]


   Last Admin: 12/02/18 21:44 Dose:  550 mg


Sodium Bicarbonate (Sodium Bicarbonate -)  650 mg NGT DAILY [facility]


   Last Admin: 12/02/18 09:13 Dose:  650 mg


Thiamine HCl (Vitamin B1 -)  100 mg PO DAILY [facility]


   Last Admin: 12/02/18 09:13 Dose:  100 mg











- Objective


Vital Signs: 


 Vital Signs











Temperature  99.2 F   12/03/18 06:00


 


Pulse Rate  108 H  12/03/18 08:00


 


Respiratory Rate  18   12/03/18 08:00


 


Blood Pressure  106/74   12/03/18 08:00


 


O2 Sat by Pulse Oximetry (%)  96   12/03/18 08:37











Cardiovascular: Yes: S2, S3


Respiratory: Yes: Mechanically Ventilated


Gastrointestinal: Yes: Normal Bowel Sounds, Soft


Labs: 


 CBC, BMP





 12/03/18 05:30 





 12/03/18 05:30 





 INR, PTT











INR  1.54  (0.83-1.09)  H  12/03/18  05:30    


 


Fibrinogen  323.0 mg/dL (238-498)  D 12/02/18  13:20    














Problem List





- Problems


(1) Toxic metabolic encephalopathy


Code(s): G92 - TOXIC ENCEPHALOPATHY   





(2) Cellulitis


Code(s): L03.90 - CELLULITIS, UNSPECIFIED   


Qualifiers: 


   Site of cellulitis: extremity   Site of cellulitis of extremity: lower 

extremity   Laterality: right   Qualified Code(s): L03.115 - Cellulitis of 

right lower limb   





(3) Sepsis


Code(s): A41.9 - SEPSIS, UNSPECIFIED ORGANISM   





(4) Artery occlusion


Code(s): I70.90 - UNSPECIFIED ATHEROSCLEROSIS   





(5) Hypercalcemia


Code(s): E83.52 - HYPERCALCEMIA   





(6) Paroxysmal atrial fibrillation


Code(s): I48.0 - PAROXYSMAL ATRIAL FIBRILLATION   





(7) HAM (acute kidney injury)


Code(s): N17.9 - ACUTE KIDNEY FAILURE, UNSPECIFIED   





Assessment/Plan





(1) HAM (acute kidney injury)


Assessment/Plan: 


potassium now normal range- hyperkalemia improved


ultrasound hyperdense lesion noted in left renal cortex


Code(s): N17.9 - ACUTE KIDNEY FAILURE, UNSPECIFIED   





(2) Atrial fibrillation with RVR


Assessment/Plan: 


heparin drip --dc due to anemia


cardiac monitor


rate control with metoprolol








(3) Respiratory failure


Assessment/Plan: 


intubated--for Trach today


propofol/fentanyl


family decision for  tracheostomy


Code(s): J96.90 - RESPIRATORY FAILURE, UNSP, UNSP W HYPOXIA OR HYPERCAPNIA   





(5) Toxic metabolic encephalopathy


Assessment/Plan: 


Microbiology


S/p Rx for PNA


Code(s): G92 - TOXIC ENCEPHALOPATHY   





(6) Anemia


Assessment/Plan: 





-Hold Heparin


-Transfuse

## 2018-12-03 NOTE — OPR
Patient Name: Je Avila


MR#: [id number]


Procedure Date: 12/03/2018


Inpatient procedure


Date of Admission: 10/30/2018


Preoperative Diagnosis:


1.   Hypoxic respiratory failure;


2.   Altered mental status;


3.   Pneumonia;


4.   Metabolic encephalopathy;


5.   Multiple myeloma;


6.   Acute on chronic renal failure;


7.   Atrial fibrillation;


8.   Coronary artery disease;


9.   Peripheral artery disease;


10.    Anemia.





Postoperative Diagnosis: same.


Procedure: 


1.   Flexible Bronchoscopy (performed by Dr. Blum)


2.   Percutaneous tracheostomy.


Indication: Respiratory failure;


Surgeon(s): Darrius Kurtz MD


Cosurgeon: robert


Assistant Surgeon:  robert


Anesthesia: local;


Findings: 


Bronchoscopy: secretions in airway; tracheostomy in place without significant 

bleeding; 


Specimens Sent: 


1.   None.


Complications: None.


Drains / Tubes / Catheters: #8 Shiley


Hardware / Implants: na


Blood / Fluid Losses: none.


Post-Operative Condition: hemodynamically stable.





Indications: This patient is a 78 year-old male referred from the ICU team for 

tracheostomy because of prolonged respiratory failure. An informed consent was 

obtained from the family for the procedure. All questions were addressed and 

answered.





Details of Procedure: The procedure was done at the bedside. Dr. Blum 

performed a bronchoscopy after he was given paralysis and prepared and draped. 

I then injected lidocaine and made a small incision. Blunt dissection was done 

down to the airway. Next under vision from bronchoscopy, the needle as inserted 

between the second and third ring. Using Seldinger technique the wire, dilator, 

Blue Rhino, and finally the tracheostomy were inserted. It was secured with 

sutures. A completion bronchoscopy showed good position and hemostasis. He 

tolerated the procedure well. I will follow the patient in the hospital and as 

an outpatient. 





Patient Name: Je Avila


MR#: [id number]


Procedure Date: 12/03/2018


Inpatient procedure


Date of Admission: 10/30/2018


Preoperative Diagnosis:


1.   Hypoxic respiratory failure;


2.   Altered mental status;


3.   Pneumonia;


4.   Metabolic encephalopathy;


5.   Multiple myeloma;


6.   Acute on chronic renal failure;


7.   Atrial fibrillation;


8.   Coronary artery disease;


9.   Peripheral artery disease;


10.    Anemia.





Postoperative Diagnosis: same.


Procedure: 


1.   Flexible Bronchoscopy (performed by Dr. Blum)


2.   Percutaneous tracheostomy.


Indication: Respiratory failure;


Surgeon(s): Darrius Kurtz MD


Cosurgeon: robert


Assistant Surgeon:  robert


Anesthesia: local;


Findings: 


Bronchoscopy: secretions in airway; tracheostomy in place without significant 

bleeding; 


Specimens Sent: 


1.   None.


Complications: None.


Drains / Tubes / Catheters: #8 Shiley


Hardware / Implants: na


Blood / Fluid Losses: none.


Post-Operative Condition: hemodynamically stable.





Indications: This patient is a 78 year-old male referred from the ICU team for 

tracheostomy because of prolonged respiratory failure. An informed consent was 

obtained from the family for the procedure. All questions were addressed and 

answered.





Details of Procedure: The procedure was done at the bedside. Dr. Blum 

performed a bronchoscopy after he was given paralysis and prepared and draped. 

I then injected lidocaine and made a small incision. Blunt dissection was done 

down to the airway. Next under vision from bronchoscopy, the needle as inserted 

between the second and third ring. Using Seldinger technique the wire, dilator, 

Blue Rhino, and finally the tracheostomy were inserted. It was secured with 

sutures. A completion bronchoscopy showed good position and hemostasis. He 

tolerated the procedure well. I will follow the patient in the hospital and as 

an outpatient.

## 2018-12-03 NOTE — PROC
Procedure Note


Procedure: 


Under informed consent, a bilateral Fiberoptic Bronchoscopy was performed prior 

to the placement of a Percutaneous Tracheostomy.  





The bronchoscope was inserted via the ETT.  The ETT and airways were inspected 

and noted to have thin, but copious secretions.





There were no masses or lesions noted. 





The bronchoscope was withdrawn to the distal tip of the ETT and remained there 

throughout the placement of the Percutaneous Tracheostomy. 





The bronchoscope was withdrawn from the ETT and inserted via the Tracheostomy 

to confirm excellent placement and no evidence of active bleeding. 





The patient tolerated the procedure well.  Vital signs remained stable.  





STAT CXR ordered post-procedure. 





No immediate complications noted. 





Dr Blum

## 2018-12-03 NOTE — PN
Progress Note, Physician


Chief Complaint: 





Events noted


Remains on mechanical ventilation


History of Present Illness: 





Patient was seen and examined in ICU. Remains on vent support. Chart was 

reviewed


AF with RVR








- Current Medication List


Current Medications: 


Active Medications





Acetaminophen (Tylenol -)  650 mg PO Q6H PRN


   PRN Reason: PAIN LEVEL 1 - 3


   Last Admin: 12/02/18 12:33 Dose:  650 mg


Allopurinol (Zyloprim -)  100 mg PO BID [facility]


   Last Admin: 12/03/18 09:39 Dose:  100 mg


Chlorhexidine Gluconate (Peridex -)  15 ml MM BID [facility]


   Last Admin: 12/02/18 21:44 Dose:  15 ml


Cholecalciferol (Vitamin D3 -)  1,000 unit PO DAILY [facility]


   Last Admin: 12/03/18 09:40 Dose:  1,000 unit


Diphenhydramine HCl (Benadryl Injection -)  25 mg IVPB ONCE PRN


   PRN Reason: DURING PLASMAPHERESIS


Levofloxacin (Levaquin 250 Mg Premixed Ivpb -)  250 mg in 50 mls @ 50 mls/hr 

IVPB DAILY [facility]; Protocol


   Last Admin: 12/03/18 09:38 Dose:  50 mls/hr


Fentanyl 500 mcg/ Dextrose  100 mls @ 5 mls/hr IVPB TITR [facility]; Protocol


   Last Admin: 12/02/18 21:43 Dose:  Not Given


Metronidazole (Flagyl 500mg Premixed Ivpb -)  500 mg in 100 mls @ 100 mls/hr 

IVPB Q8H-IV [facility]


Lactobacillus Acidophilus (Bacid -)  1 tab PO DAILY [facility]


   Last Admin: 12/03/18 09:39 Dose:  1 tab


Metoprolol Tartrate (Lopressor Injection -)  5 mg IVPUSH Q8H PRN


   PRN Reason: TACHYCARDIA


   Last Admin: 12/01/18 01:03 Dose:  5 mg


Metoprolol Tartrate (Lopressor -)  25 mg NGT BID [facility]


   Last Admin: 12/03/18 09:39 Dose:  25 mg


Pantoprazole Sodium (Protonix Iv)  40 mg IVPUSH BID [facility]


   Last Admin: 12/03/18 09:39 Dose:  40 mg


Phytonadione (Aqua Mephyton Injection -)  5 mg SQ DAILY [facility]


   Stop: 12/05/18 17:59


   Last Admin: 12/03/18 09:39 Dose:  5 mg


Rifaximin (Xifaxan -)  550 mg PO BID [facility]


   Last Admin: 12/03/18 09:40 Dose:  550 mg


Sodium Bicarbonate (Sodium Bicarbonate -)  650 mg NGT DAILY [facility]


   Last Admin: 12/03/18 09:39 Dose:  650 mg


Thiamine HCl (Vitamin B1 -)  100 mg PO DAILY [facility]


   Last Admin: 12/03/18 09:39 Dose:  100 mg











- Objective


Vital Signs: 


 Vital Signs











Temperature  99.2 F   12/03/18 06:00


 


Pulse Rate  108 H  12/03/18 08:00


 


Respiratory Rate  19   12/03/18 08:20


 


Blood Pressure  106/74   12/03/18 08:00


 


O2 Sat by Pulse Oximetry (%)  96   12/03/18 08:37











Neck: Yes: Supple


Cardiovascular: Yes: Pulse Irregular, S1, S2


Respiratory: Yes: Mechanically Ventilated


Gastrointestinal: Yes: Normal Bowel Sounds, Soft.  No: Tenderness


Edema: No


Labs: 


 CBC, BMP





 12/03/18 05:30 





 12/03/18 05:30 





 INR, PTT











INR  1.54  (0.83-1.09)  H  12/03/18  05:30    


 


Fibrinogen  233.0 mg/dL (238-498)  L D 12/03/18  05:30    














Problem List





- Problems


(1) Anemia


Code(s): D64.9 - ANEMIA, UNSPECIFIED   





(2) Hyperlipidemia


Code(s): E78.5 - HYPERLIPIDEMIA, UNSPECIFIED   


Qualifiers: 


   Hyperlipidemia type: pure hypercholesterolemia   Qualified Code(s): E78.00 - 

Pure hypercholesterolemia, unspecified; E78.0 - Pure hypercholesterolemia   





(3) Multiple myeloma


Code(s): C90.00 - MULTIPLE MYELOMA NOT HAVING ACHIEVED REMISSION   


Qualifiers: 


   Multiple myeloma remission status: not in remission   Qualified Code(s): 

C90.00 - Multiple myeloma not having achieved remission   





(4) Respiratory failure


Code(s): J96.90 - RESPIRATORY FAILURE, UNSP, UNSP W HYPOXIA OR HYPERCAPNIA   





(5) Sepsis


Code(s): A41.9 - SEPSIS, UNSPECIFIED ORGANISM   





(6) Toxic metabolic encephalopathy


Code(s): G92 - TOXIC ENCEPHALOPATHY   





(7) Acute-on-chronic kidney injury


Code(s): N17.9 - ACUTE KIDNEY FAILURE, UNSPECIFIED; N18.9 - CHRONIC KIDNEY 

DISEASE, UNSPECIFIED   


Qualifiers: 


   Acute renal failure type: unspecified   Chronic kidney disease stage: 

unspecified stage   Qualified Code(s): N17.9 - Acute kidney failure, unspecified

; N18.9 - Chronic kidney disease, unspecified   





(8) Demand ischemia


Code(s): I24.8 - OTHER FORMS OF ACUTE ISCHEMIC HEART DISEASE   





(9) History of ETOH abuse


Code(s): Z87.898 - PERSONAL HISTORY OF OTHER SPECIFIED CONDITIONS   





(10) Nonadherence to medication


Code(s): Z91.14 - PATIENT'S OTHER NONCOMPLIANCE WITH MEDICATION REGIMEN   





(11) Paraproteinemia


Code(s): D89.2 - HYPERGAMMAGLOBULINEMIA, UNSPECIFIED   





(12) Persistent atrial fibrillation


Code(s): I48.1 - PERSISTENT ATRIAL FIBRILLATION   





(13) Chronic thromboembolic disease


Code(s): I74.9 - EMBOLISM AND THROMBOSIS OF UNSPECIFIED ARTERY   





(14) Coronary artery disease


Code(s): I25.10 - ATHSCL HEART DISEASE OF NATIVE CORONARY ARTERY W/O ANG PCTRS 

  


Qualifiers: 


   Coronary Disease-Associated Artery/Lesion type: native artery   Native vs. 

transplanted heart: native heart   Associated angina: without angina   

Qualified Code(s): I25.10 - Atherosclerotic heart disease of native coronary 

artery without angina pectoris   





(15) Diastolic dysfunction without heart failure


Code(s): I51.9 - HEART DISEASE, UNSPECIFIED   





(16) HTN (hypertension)


Code(s): I10 - ESSENTIAL (PRIMARY) HYPERTENSION   


Qualifiers: 


   Hypertension type: essential hypertension   Qualified Code(s): I10 - 

Essential (primary) hypertension   





(17) Hypertrophic cardiomyopathy


Code(s): I42.2 - OTHER HYPERTROPHIC CARDIOMYOPATHY   





Assessment/Plan





1. Acute hypoxic respiratory failure, suspected aspiration pneumonia with 

sepsis syndrome, remains intubated


2. Toxic metabolic encephalopathy, rule out CVA


3. Acute on CKD


4. Paraproteinemia confirmed multiple myeloma


5. 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 

disease related to persistent atrial fibrillation 


6. CAD with evidence of demand ischemia, non obstructive CAD 


7. 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 

chronic class I-II NYHA classification LV failure, compensated/euvolemic


8. Persistent atrial fibrillation [id number] score of 6


9. HTN


10. Anemia/thrombocytopenia


11. Acalculous Cholecystitis


12. Ischemic hepatitis


13. Resolved Hypernatremia





PLAN:


1. Consider A/C therapy unless it is absolutely contraindicated considering his 

[id number] score of 6 and stroke risk


2. Lopressor 25 bid as hemodynamics permit 


3. Antibiotics coverage


4. Ventilator management as per the ICU team


5. Await tracheostomy and PEG as per family





Guarded


Olvin Hobbs MD

## 2018-12-03 NOTE — PN
Physical Exam: 


SUBJECTIVE: Patient seen and examined in the ICU.  Remains intubated, poorly 

responsive but now on fentanyl gtt. opens eyes to voice. No gross change in 

overall mental status. Family decision to proceed with tracheostomy.  INR 

elevated, will give 2 U FFP prior to trache today





OBJECTIVE:





 Vital Signs











 Period  Temp  Pulse  Resp  BP Sys/Varghese  Pulse Ox


 


 Last 24 Hr  99.2 F-100.2 F    18-19  /52-92  96-99











GENERAL: Remains intubated, poorly responsive off sedation but opens eyes to 

voice.


HEENT: NCAT PERRL sclera anicteric. nares patent, dry mucous membranes


NECK: Trachea midline. 


LUNGS: bilateral diffuse rhonchi


HEART: reg rate and irregular rhythm. 


ABDOMEN: Soft, nondistended NT


EXTREMITIES: 2+ pulses, warm, well-perfused, no edema, scattered ulcers. 


NEUROLOGICAL: difficult to assess secondary to clinical condition. gag reflex 

does not appear to be present. opens eyes to voice. corneal reflex+, +gag 

reflex 


SKIN: Warm, dry, normal turgor, scattered ulcers on the legs














 Laboratory Results - last 24 hr











  12/02/18 12/03/18 12/03/18





  13:20 05:30 05:30


 


WBC   


 


Corrected WBC (auto)   


 


RBC   


 


Hgb   


 


Hct   


 


MCV   


 


MCH   


 


MCHC   


 


RDW   


 


Plt Count   


 


MPV   


 


Absolute Neuts (auto)   


 


Neutrophils %   


 


Neutrophils % (Manual)   


 


Band Neutrophils %   


 


Lymphocytes %   


 


Lymphocytes % (Manual)   


 


Monocytes %   


 


Monocytes % (Manual)   


 


Eosinophils %   


 


Eosinophils % (Manual)   


 


Basophils %   


 


Basophils % (Manual)   


 


Myelocytes % (Man)   


 


Promyelocytes % (Man)   


 


Blast Cells % (Manual)   


 


Nucleated RBC %   


 


Metamyelocytes   


 


Rouleaux   


 


PT with INR   18.30 H 


 


INR   1.54 H 


 


PTT (Actin FS)   18.9 L 


 


Fibrinogen  323.0  D   233.0 L D


 


Sodium   


 


Potassium   


 


Chloride   


 


Carbon Dioxide   


 


Anion Gap   


 


BUN   


 


Creatinine   


 


Creat Clearance w eGFR   


 


Random Glucose   


 


Calcium   


 


Phosphorus   


 


Magnesium   


 


Total Bilirubin   


 


AST   


 


ALT   


 


Alkaline Phosphatase   


 


Total Protein   


 


Albumin   


 


Blood Type   


 


Antibody Screen   














  12/03/18 12/03/18 12/03/18





  05:30 05:30 05:30


 


WBC   4.6 


 


Corrected WBC (auto)   4.07 


 


RBC   2.78 L 


 


Hgb   8.2 L 


 


Hct   25.3 L 


 


MCV   91.1 


 


MCH   29.6 


 


MCHC   32.5 


 


RDW   16.0 H 


 


Plt Count   146  D 


 


MPV   9.9 


 


Absolute Neuts (auto)   2.4 


 


Neutrophils %   66.4 


 


Neutrophils % (Manual)   66.3  D 


 


Band Neutrophils %   3.2 


 


Lymphocytes %   24.5 


 


Lymphocytes % (Manual)   21.0  D 


 


Monocytes %   5.1 


 


Monocytes % (Manual)   2 L 


 


Eosinophils %   2.7  D 


 


Eosinophils % (Manual)   3.2  D 


 


Basophils %   1.3 


 


Basophils % (Manual)   0.0 


 


Myelocytes % (Man)   0 


 


Promyelocytes % (Man)   0 


 


Blast Cells % (Manual)   0 


 


Nucleated RBC %   13 H* 


 


Metamyelocytes   2  D 


 


Rouleaux   2+ 


 


PT with INR   


 


INR   


 


PTT (Actin FS)   


 


Fibrinogen   


 


Sodium  142  


 


Potassium  4.8  


 


Chloride  117 H  


 


Carbon Dioxide  20 L  


 


Anion Gap  6 L  


 


BUN  39 H  


 


Creatinine  1.2  


 


Creat Clearance w eGFR  58.40  


 


Random Glucose  87  


 


Calcium  6.8 L*  


 


Phosphorus  3.2  


 


Magnesium  1.6 L  


 


Total Bilirubin  0.5  


 


AST  38 H  


 


ALT  23  


 


Alkaline Phosphatase  157 H  


 


Total Protein  8.6 H  


 


Albumin  1.0 L  


 


Blood Type    B POSITIVE


 


Antibody Screen    Negative








Active Medications











Generic Name Dose Route Start Last Admin





  Trade Name Nedq  PRN Reason Stop Dose Admin


 


Acetaminophen  650 mg  11/10/18 14:09  12/02/18 12:33





  Tylenol -  PO   650 mg





  Q6H PRN   Administration





  PAIN LEVEL 1 - 3   





     





     





     


 


Allopurinol  100 mg  11/09/18 10:00  12/03/18 09:39





  Zyloprim -  PO   100 mg





  BID AVA   Administration





     





     





     





     


 


Chlorhexidine Gluconate  15 ml  11/10/18 23:45  12/03/18 10:57





  Peridex -  MM   15 ml





  BID AVA   Administration





     





     





     





     


 


Cholecalciferol  1,000 unit  11/18/18 10:00  12/03/18 09:40





  Vitamin D3 -  PO   1,000 unit





  DAILY AV[facility]   Administration





     





     





     





     


 


Diphenhydramine HCl  25 mg  11/09/18 12:00  





  Benadryl Injection -  IVPB   





  ONCE PRN   





  DURING PLASMAPHERESIS   





     





     





     


 


Levofloxacin  250 mg in 50 mls @ 50 mls/hr  11/19/18 12:45  12/03/18 09:38





  Levaquin 250 Mg Premixed Ivpb -  IVPB   50 mls/hr





  DAILY AVA   Administration





     





     





  Protocol   





     


 


Fentanyl 500 mcg/ Dextrose  100 mls @ 5 mls/hr  11/25/18 19:00  12/02/18 21:43





  IVPB   Not Given





  TITR AVA   





     





     





  Protocol   





  25 MCG/HR   


 


Metronidazole  500 mg in 100 mls @ 100 mls/hr  12/03/18 10:15  12/03/18 10:56





  Flagyl 500mg Premixed Ivpb -  IVPB   100 mls/hr





  Q8H-IV AVA   Administration





     





     





     





     


 


Propofol  1,000,000 mcg in 100 mls @ 1.54 mls/hr  12/03/18 11:45  





  Diprivan -  IVPB   





  TITR AVA   





     





     





  Protocol   





  2.5 MCG/KG/MIN   


 


Lactobacillus Acidophilus  1 tab  11/25/18 10:00  12/03/18 09:39





  Bacid -  PO   1 tab





  DAILY AVA   Administration





     





     





     





     


 


Metoprolol Tartrate  5 mg  11/09/18 13:04  12/01/18 01:03





  Lopressor Injection -  IVPUSH   5 mg





  Q8H PRN   Administration





  TACHYCARDIA   





     





     





     


 


Metoprolol Tartrate  25 mg  11/29/18 11:54  12/03/18 09:39





  Lopressor -  NGT   25 mg





  BID AVA   Administration





     





     





     





     


 


Pantoprazole Sodium  40 mg  11/29/18 22:00  12/03/18 09:39





  Protonix Iv  IVPUSH   40 mg





  BID AVA   Administration





     





     





     





     


 


Phytonadione  5 mg  12/02/18 18:00  12/03/18 09:39





  Aqua Mephyton Injection -  SQ  12/05/18 17:59  5 mg





  DAILY AVA   Administration





     





     





     





     


 


Rifaximin  550 mg  11/15/18 22:00  12/03/18 09:40





  Xifaxan -  PO   550 mg





  BID AVA   Administration





     





     





     





     


 


Sodium Bicarbonate  650 mg  12/01/18 10:00  12/03/18 09:39





  Sodium Bicarbonate -  NGT   650 mg





  DAILY AVA   Administration





     





     





     





     


 


Thiamine HCl  100 mg  10/30/18 22:12  12/03/18 09:39





  Vitamin B1 -  PO   100 mg





  DAILY AVA   Administration





     





     





     





     











ASSESSMENT/PLAN:


77 yo m PMH of A-Fib, Acute on chronic kidney injury, CAD w stents, 

hypercalcemia, medication non-adherence, paraproteinemia, thromboembolic disease

, diastolic heart dysfunction without failure, HTN, HLD, hypertrophic 

cardiomyopathy is here after a rapid response. He was on the floors when it was 

noticed that he has respiratory insufficiency and was desaturating, requiring 

ICU monitoring. GOC discussion ongoing. Family decision to proceed with 

tracheostomy.  INR elevated, will give 2 U FFP prior to trache today








GI: 


will need PEG placement when gets trache 





metabolic encephalopathy 


-ammonia downtrending w/ rifaximin, 


s/p lactulose 


-LFTs elevated but downtrending. transamintitis most likely secondary to 

ischemic hepatits which is multifactorial including sepsis, episodes of 

hypotension and hyperviscosity syndrome. 





acalculous cholecystitis?


US shows thickened gallbladder wall, pericholecystic fluid, suspicious for 

acalculous cholecystitis. There was also mild dilatation of the CBD but that 

might be normal for age. 


-Spoke w/ IR, who feel image does not represent  acalculous cholecystitis and 

does not require any IR drainage 





ID: 


No fevers recorded. 


- Rhonchi heard on Lung auscultation


-RLL pneumonia


bcx 11/19/18 neg 


off of ceftazidime


Vancomycin Redosed 11/19/18 


C diff neg 11/25/18


- c/w levaquin/flagyl. 


- ID on board


- c/w rifaximin for elevated ammonia level





Cardio: 


Afib -[id number] score of 6 


- Lopressor 75 mg BID PO


-IV metoprolol 5 mg PRN


- diastolic dysfunction + hypertrophic cardiomyopathy


- CAD with multiple stents


- Cardio consulted and on board.


off Xarelto 15 qd (renal dosing) while on heparin gtt. transfuse to maintain Hgb

>8.0 


s/p 1 u prbc 11/20/18 and 1 u prbc 11/22/18, Heparin with caution considering 

the dropping Hg, transfuse to maintain Hg equal or > 8.0, as outlined in prior 

notes ideally A/C therapy to be continued indefinitely unless it is absolutely 

contraindicated considering his [id number] score of 6 


- Sporadically goes in and out of V-Tach - Can give amio if v-tach is sustained 

and persistent. 


-Off heparin gtt, s/p 2 U prbcs w/ appropriate response. FOBT neg





Pulm: 


- Intubated


-surgery consult for trache 


-INR elevated, will give 2 U FFP prior to trache today


- Patient has b/l pleural effusions.


- No pneumothorax


- b/l diffuse rhonchi


- infiltrate on CXR: Abx- Substituting levaquin for ceftazidime


Vancomycin Redosed 11/19/18 


- c/w levaquin/flagyl. 





Renal: 


- Acute on Chronic kidney injury


HAM ATN vs. Cast nephropathy  (FeNa was 2.1% indicating tubular injury, US 

showed no stones or obstruction, UA w/o protein but UPCR ~0.5 indicating non-

albumin proteinuria)


- Renal consulted and on board. 


Hyperkalemia (r/o tumor lysis)


serum K is improved, s/p bicarb gtt





Neuro: 


- Patient is not alert or oriented.


Remains intubated, poorly responsive but now on fentanyl gtt. opens eyes to 

voice


- Head CT showed no acute pathology


- Neuro consulted and on board. (Dr. Youngblood + Dr. phan) 


-ammonia stable in 60s w/ rifaximin, lactulose dcd 





Heme/Onc: 


- monitor H/H


- Will transfuse to keep hb > 8 


- Patient not receiving plasmapharesis today. 


- Paraproteinemia


- Patient fulfills new criteria  for multiple myeloma with free kappa/ free 

lambda light chain  ratio of 432 (>100 is diagnostic of myeloma) 


- Kappa/Lambda ratio > 100 consistent with Multiple myeloma


- M spike elevated elevated at 6.8 previously 4.7 


-steroids being held at this time 


transfuse to maintain Hg equal or > 8.0, as outlined in prior notes ideally A/C 

therapy to be continued indefinitely unless it is absolutely contraindicated 

considering his [id number] score of 6 


-H/H remains stable of heparin gtt. 


-fibrinogen nl


- HIT AB neg


-FOBT neg 





-Seen by [place] this AM, and due to increasing INR, we are going to hold on this 

elective case. 


-Optimize platelets and coagulation factors prior to trach. Trach on Monday if 

stable 


-Vit K


-Would like platelets at 100K and PT< 1.2 


-monitor and would transfuse platelets and FFP prior to procedure to 

optimization. 


Correct coagulopathy with FFP





Endo: 


- Parathyroid hormone WNL


- PTH-borderline low appropriate given hypercalcemia, PTHrP low





Prophylaxis: 


-Off heparin gtt, s/p prbcs w/ appropriate response. FOBT neg


transfuse to maintain Hg equal or > 8.0, as outlined in prior notes ideally A/C 

therapy to be continued indefinitely unless it is absolutely contraindicated 

considering his [id number] score of 6


SCDs


- Protonix





F/E/N: 


F: of IVF


E: Will replete lytes PRN


N: tube feeds (low potassium feeds). with free water. bicarb tabs





Code Status: Full Code 


-GOC discussion ongoing


-Family decision to proceed with tracheostomy. See palliative care notes for 

further explanation. 


-have recommended to family for compassionate extubation but they have deciding 

on Trach/PEG 





Dispo: 


Patient will continue to receive ICU level care


poor overall prognosis for meaningful recovery


Family decision to proceed with tracheostomy.  INR elevated, will give 2 U FFP 

prior to trache today  





Visit type





- Emergency Visit


Emergency Visit: Yes


ED Registration Date: 10/30/18


Care time: The patient presented to the Emergency Department on the above date 

and was hospitalized for further evaluation of their emergent condition.





- New Patient


This patient is new to me today: Yes


Date on this admission: 12/03/18





- Critical Care


Critical Care patient: Yes


Total Critical Care Time (in minutes): 38


Critical Care Statement: The care of this patient involved high complexity 

decision making to prevent further life threatening deterioration of the patient

's condition and/or to evaluate & treat vital organ system(s) failure or risk 

of failure. 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       PLEASE ADVICE      Fax to :  [phone]

## 2018-12-03 NOTE — PN
Progress Note (short form)





- Note


Progress Note: 





Renal follow up for Hypercalcemia/HAM





 Vital Signs











Temperature  99.2 F   12/03/18 10:00


 


Pulse Rate  102 H  12/03/18 10:00


 


Respiratory Rate  18   12/03/18 11:17


 


Blood Pressure  95/52 L  12/03/18 10:00


 


O2 Sat by Pulse Oximetry (%)  96   12/03/18 08:37








 Intake & Output











 11/30/18 12/01/18 12/02/18 12/03/18





 23:59 23:59 23:59 23:59


 


Intake Total 2170 1920 2660 570


 


Output Total 1000 1000 1650 400


 


Balance 1316 472 8061 170


 


Weight 99.5 kg 99.5 kg 100.879 kg 102.693 kg








 CBC, BMP





 12/03/18 05:30 





 12/03/18 05:30 





 Current Medications





Acetaminophen (Tylenol -)  650 mg PO Q6H PRN


   PRN Reason: PAIN LEVEL 1 - 3


   Last Admin: 12/02/18 12:33 Dose:  650 mg


Allopurinol (Zyloprim -)  100 mg PO BID [facility]


   Last Admin: 12/03/18 09:39 Dose:  100 mg


Chlorhexidine Gluconate (Peridex -)  15 ml MM BID [facility]


   Last Admin: 12/03/18 10:57 Dose:  15 ml


Cholecalciferol (Vitamin D3 -)  1,000 unit PO DAILY [facility]


   Last Admin: 12/03/18 09:40 Dose:  1,000 unit


Diphenhydramine HCl (Benadryl Injection -)  25 mg IVPB ONCE PRN


   PRN Reason: DURING PLASMAPHERESIS


Levofloxacin (Levaquin 250 Mg Premixed Ivpb -)  250 mg in 50 mls @ 50 mls/hr 

IVPB DAILY [facility]; Protocol


   Last Admin: 12/03/18 09:38 Dose:  50 mls/hr


Fentanyl 500 mcg/ Dextrose  100 mls @ 5 mls/hr IVPB TITR [facility]; Protocol


   Last Admin: 12/02/18 21:43 Dose:  Not Given


Metronidazole (Flagyl 500mg Premixed Ivpb -)  500 mg in 100 mls @ 100 mls/hr 

IVPB Q8H-IV [facility]


   Last Admin: 12/03/18 10:56 Dose:  100 mls/hr


Lactobacillus Acidophilus (Bacid -)  1 tab PO DAILY [facility]


   Last Admin: 12/03/18 09:39 Dose:  1 tab


Metoprolol Tartrate (Lopressor Injection -)  5 mg IVPUSH Q8H PRN


   PRN Reason: TACHYCARDIA


   Last Admin: 12/01/18 01:03 Dose:  5 mg


Metoprolol Tartrate (Lopressor -)  25 mg NGT BID [facility]


   Last Admin: 12/03/18 09:39 Dose:  25 mg


Pantoprazole Sodium (Protonix Iv)  40 mg IVPUSH BID [facility]


   Last Admin: 12/03/18 09:39 Dose:  40 mg


Phytonadione (Aqua Mephyton Injection -)  5 mg SQ DAILY [facility]


   Stop: 12/05/18 17:59


   Last Admin: 12/03/18 09:39 Dose:  5 mg


Rifaximin (Xifaxan -)  550 mg PO BID [facility]


   Last Admin: 12/03/18 09:40 Dose:  550 mg


Sodium Bicarbonate (Sodium Bicarbonate -)  650 mg NGT DAILY [facility]


   Last Admin: 12/03/18 09:39 Dose:  650 mg


Thiamine HCl (Vitamin B1 -)  100 mg PO DAILY [facility]


   Last Admin: 12/03/18 09:39 Dose:  100 mg














78 year old gentleman with hx of CKD, Afib on A/C, CAD, CKD, Hypertension, 

Hyperlipidemia, PVD who presented with AMS/Confusion and found to have HAM.





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 

showed no stones or obstruction, UA w/o protein but UPCR ~0.5 indicating non-

albumin proteinuria)


#AMS 


#Newly diagnosed multiple myloma 


#Anemia 


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


#Hyperdense lesion on US of the kidney 


#Normal anion gap metabolic acidosis 


#Hyperkalemia (now resolved)








Ricky Chang DO

## 2018-12-03 NOTE — PN
Teaching Attending Note


Name of Resident: Sylvia Tejada





ATTENDING PHYSICIAN STATEMENT





I saw and evaluated the patient.


I reviewed the resident's note and discussed the case with the resident.


I agree with the resident's findings and plan as documented.














ASSESSMENT AND PLAN:





79 y/o patient with newly diagnosed myeloma, 


1. Acute hypoxic respiratory failure, suspected aspiration pneumonia with 

sepsis syndrome, remains intubated---- s/p tracheostomy today


2. Toxic metabolic encephelopathy,  no change in status . opens eyes to verbal 

stimuli


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


4. Paraproteinemia confirmed multiple myeloma


5. 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 

disease related to persistent atrial fibrillation with [id number] score of 6


6. CAD with evidence of demand ischemic injury, non obstructive CAD on R&c 

coronary angiogrpahy angina pectoris


7. 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 

chronic class I-II NYHA classification LV failure, compensated/euvolemic


8. Persistent atrial fibrillation [id number] score of 6 


9. htn





INR 1.5 --coagulopathy -- ? sepsis ? liver disease ? vit. K deficiency from 

broad spectrum antibiotics


trial of vitamin K


FFP pre tracheostomy for INR > 1.4


Keep fibrinogen > 150mg/dl


platelets>100,000





monitor cbc/CMP

## 2018-12-03 NOTE — PN
Teaching Attending Note


Name of Resident: Ganga Mccormick





ATTENDING PHYSICIAN STATEMENT





I saw and evaluated the patient.


I reviewed the resident's note and discussed the case with the resident.


I agree with the resident's findings and plan as documented.








SUBJECTIVE:


Patient seen and examined in the ICU.  Remains intubated, poorly responsive. 


Family decision to proceed with tracheostomy.  








OBJECTIVE:





  


  


 Intake & Output











 11/30/18 12/01/18 12/02/18 12/03/18





 23:59 23:59 23:59 23:59


 


Intake Total 2170 1920 2660 570


 


Output Total 1000 1000 1650 400


 


Balance 6005 285 8220 170


 


Weight 219 lb 5.759 oz 219 lb 5.759 oz 222 lb 6.4 oz 226 lb 6.4 oz








 Last Vital Signs











Temp Pulse Resp BP Pulse Ox


 


 99.2 F   102 H  18   95/52 L  96 


 


 12/03/18 10:00  12/03/18 10:00  12/03/18 11:17  12/03/18 10:00  12/03/18 08:37








Active Medications





Acetaminophen (Tylenol -)  650 mg PO Q6H PRN


   PRN Reason: PAIN LEVEL 1 - 3


   Last Admin: 12/02/18 12:33 Dose:  650 mg


Allopurinol (Zyloprim -)  100 mg PO BID [facility]


   Last Admin: 12/03/18 09:39 Dose:  100 mg


Chlorhexidine Gluconate (Peridex -)  15 ml MM BID [facility]


   Last Admin: 12/03/18 10:57 Dose:  15 ml


Cholecalciferol (Vitamin D3 -)  1,000 unit PO DAILY [facility]


   Last Admin: 12/03/18 09:40 Dose:  1,000 unit


Diphenhydramine HCl (Benadryl Injection -)  25 mg IVPB ONCE PRN


   PRN Reason: DURING PLASMAPHERESIS


Levofloxacin (Levaquin 250 Mg Premixed Ivpb -)  250 mg in 50 mls @ 50 mls/hr 

IVPB DAILY [facility]; Protocol


   Last Admin: 12/03/18 09:38 Dose:  50 mls/hr


Fentanyl 500 mcg/ Dextrose  100 mls @ 5 mls/hr IVPB TITR [facility]; Protocol


   Last Admin: 12/02/18 21:43 Dose:  Not Given


Metronidazole (Flagyl 500mg Premixed Ivpb -)  500 mg in 100 mls @ 100 mls/hr 

IVPB Q8H-IV [facility]


   Last Admin: 12/03/18 10:56 Dose:  100 mls/hr


Propofol (Diprivan -)  1,000,000 mcg in 100 mls @ 1.54 mls/hr IVPB TITR [facility]; 

Protocol


Lactobacillus Acidophilus (Bacid -)  1 tab PO DAILY [facility]


   Last Admin: 12/03/18 09:39 Dose:  1 tab


Metoprolol Tartrate (Lopressor Injection -)  5 mg IVPUSH Q8H PRN


   PRN Reason: TACHYCARDIA


   Last Admin: 12/01/18 01:03 Dose:  5 mg


Metoprolol Tartrate (Lopressor -)  25 mg NGT BID [facility]


   Last Admin: 12/03/18 09:39 Dose:  25 mg


Pantoprazole Sodium (Protonix Iv)  40 mg IVPUSH BID [facility]


   Last Admin: 12/03/18 09:39 Dose:  40 mg


Phytonadione (Aqua Mephyton Injection -)  5 mg SQ DAILY [facility]


   Stop: 12/05/18 17:59


   Last Admin: 12/03/18 09:39 Dose:  5 mg


Rifaximin (Xifaxan -)  550 mg PO BID [facility]


   Last Admin: 12/03/18 09:40 Dose:  550 mg


Rocuronium Bromide (Zemuron -)  100 mg IV ONCE ONE


   Stop: 12/03/18 13:01


Sodium Bicarbonate (Sodium Bicarbonate -)  650 mg NGT DAILY [facility]


   Last Admin: 12/03/18 09:39 Dose:  650 mg


Thiamine HCl (Vitamin B1 -)  100 mg PO DAILY [facility]


   Last Admin: 12/03/18 09:39 Dose:  100 mg











Gen:  intubated, poorly responsive


Heart: RRR


Lung: scattered rhonchi


Abd: soft, nontender


Ext: + edema


  


 Laboratory Results - last 24 hr











  12/02/18 12/03/18 12/03/18





  13:20 05:30 05:30


 


WBC   


 


RBC   


 


Hgb   


 


Hct   


 


MCV   


 


MCH   


 


MCHC   


 


RDW   


 


Plt Count   


 


MPV   


 


Absolute Neuts (auto)   


 


Neutrophils %   


 


Lymphocytes %   


 


Monocytes %   


 


Eosinophils %   


 


Basophils %   


 


PT with INR   18.30 H 


 


INR   1.54 H 


 


PTT (Actin FS)   18.9 L 


 


Fibrinogen  323.0  D   233.0 L D


 


Sodium   


 


Potassium   


 


Chloride   


 


Carbon Dioxide   


 


Anion Gap   


 


BUN   


 


Creatinine   


 


Creat Clearance w eGFR   


 


Random Glucose   


 


Calcium   


 


Phosphorus   


 


Magnesium   


 


Total Bilirubin   


 


AST   


 


ALT   


 


Alkaline Phosphatase   


 


Total Protein   


 


Albumin   


 


Blood Type   


 


Antibody Screen   














  12/03/18 12/03/18 12/03/18





  05:30 05:30 05:30


 


WBC   4.6 


 


RBC   2.78 L 


 


Hgb   8.2 L 


 


Hct   25.3 L 


 


MCV   91.1 


 


MCH   29.6 


 


MCHC   32.5 


 


RDW   16.0 H 


 


Plt Count   146  D 


 


MPV   9.9 


 


Absolute Neuts (auto)   2.4 


 


Neutrophils %   66.4 


 


Lymphocytes %   24.5 


 


Monocytes %   5.1 


 


Eosinophils %   2.7  D 


 


Basophils %   1.3 


 


PT with INR   


 


INR   


 


PTT (Actin FS)   


 


Fibrinogen   


 


Sodium  142  


 


Potassium  4.8  


 


Chloride  117 H  


 


Carbon Dioxide  20 L  


 


Anion Gap  6 L  


 


BUN  39 H  


 


Creatinine  1.2  


 


Creat Clearance w eGFR  58.40  


 


Random Glucose  87  


 


Calcium  6.8 L*  


 


Phosphorus  3.2  


 


Magnesium  1.6 L  


 


Total Bilirubin  0.5  


 


AST  38 H  


 


ALT  23  


 


Alkaline Phosphatase  157 H  


 


Total Protein  8.6 H  


 


Albumin  1.0 L  


 


Blood Type    B POSITIVE


 


Antibody Screen    Negative




















ASSESSMENT AND PLAN:


Acute Hypoxic Respiratory Failure


Altered Mental Status


Metabolic Encephalopathy


Pneumonia


Acalculous Cholecystitis


Multiple Myeloma


Acute on Chronic Renal Failure


Metabolic Acidosis


LV Diastolic Dysfunction


Atrial Fibrillation


CAD


+Troponins likely Demand Ischemia


PAD


Hyperlipidemia


HTN


Anemia





-  Correct coagulopathy with FFP


-  monitor H/H


-  monitor urine output, creatinine


-  rate control


-  minimize sedation to assess mental status 


-  DVT/GI prophylaxis


-  continue discussions regarding goals of care


-  For Trach today  


-  continue ICU monitoring


-  poor overall prognosis for meaningful recovery: have recommended to family 

for compassionate extubation but they have deciding on Trach/PEG 








Dr Blum 


Critical care time spent in reviewing chart, evaluating patient and formulating 

plan 35 min

## 2018-12-03 NOTE — PN
Progress Note (short form)





- Note


Progress Note: 





Renal follow up for Hypercalcemia/HAM





Pt seen and examined in the ICU


on vent via ET tune


no overnight events








 Vital Signs











Temperature  99.2 F   12/03/18 10:00


 


Pulse Rate  102 H  12/03/18 10:00


 


Respiratory Rate  18   12/03/18 11:17


 


Blood Pressure  95/52 L  12/03/18 10:00


 


O2 Sat by Pulse Oximetry (%)  96   12/03/18 08:37








 Intake & Output











 11/30/18 12/01/18 12/02/18 12/03/18





 23:59 23:59 23:59 23:59


 


Intake Total 2170 1920 2660 570


 


Output Total 1000 1000 1650 400


 


Balance 1128 637 7365 170


 


Weight 99.5 kg 99.5 kg 100.879 kg 102.693 kg





NAD


trace sacral edema 


 CBC, BMP





 12/03/18 05:30 





 12/03/18 05:30 





 Current Medications





Acetaminophen (Tylenol -)  650 mg PO Q6H PRN


   PRN Reason: PAIN LEVEL 1 - 3


   Last Admin: 12/02/18 12:33 Dose:  650 mg


Allopurinol (Zyloprim -)  100 mg PO BID [facility]


   Last Admin: 12/03/18 09:39 Dose:  100 mg


Chlorhexidine Gluconate (Peridex -)  15 ml MM BID [facility]


   Last Admin: 12/03/18 10:57 Dose:  15 ml


Cholecalciferol (Vitamin D3 -)  1,000 unit PO DAILY [facility]


   Last Admin: 12/03/18 09:40 Dose:  1,000 unit


Diphenhydramine HCl (Benadryl Injection -)  25 mg IVPB ONCE PRN


   PRN Reason: DURING PLASMAPHERESIS


Levofloxacin (Levaquin 250 Mg Premixed Ivpb -)  250 mg in 50 mls @ 50 mls/hr 

IVPB DAILY [facility]; Protocol


   Last Admin: 12/03/18 09:38 Dose:  50 mls/hr


Fentanyl 500 mcg/ Dextrose  100 mls @ 5 mls/hr IVPB TITR [facility]; Protocol


   Last Admin: 12/02/18 21:43 Dose:  Not Given


Metronidazole (Flagyl 500mg Premixed Ivpb -)  500 mg in 100 mls @ 100 mls/hr 

IVPB Q8H-IV [facility]


   Last Admin: 12/03/18 10:56 Dose:  100 mls/hr


Lactobacillus Acidophilus (Bacid -)  1 tab PO DAILY [facility]


   Last Admin: 12/03/18 09:39 Dose:  1 tab


Metoprolol Tartrate (Lopressor Injection -)  5 mg IVPUSH Q8H PRN


   PRN Reason: TACHYCARDIA


   Last Admin: 12/01/18 01:03 Dose:  5 mg


Metoprolol Tartrate (Lopressor -)  25 mg NGT BID [facility]


   Last Admin: 12/03/18 09:39 Dose:  25 mg


Pantoprazole Sodium (Protonix Iv)  40 mg IVPUSH BID [facility]


   Last Admin: 12/03/18 09:39 Dose:  40 mg


Phytonadione (Aqua Mephyton Injection -)  5 mg SQ DAILY [facility]


   Stop: 12/05/18 17:59


   Last Admin: 12/03/18 09:39 Dose:  5 mg


Rifaximin (Xifaxan -)  550 mg PO BID [facility]


   Last Admin: 12/03/18 09:40 Dose:  550 mg


Sodium Bicarbonate (Sodium Bicarbonate -)  650 mg NGT DAILY [facility]


   Last Admin: 12/03/18 09:39 Dose:  650 mg


Thiamine HCl (Vitamin B1 -)  100 mg PO DAILY [facility]


   Last Admin: 12/03/18 09:39 Dose:  100 mg














78 year old gentleman with hx of CKD, Afib on A/C, CAD, CKD, Hypertension, 

Hyperlipidemia, PVD who presented with AMS/Confusion and found to have HAM.





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 

showed no stones or obstruction, UA w/o protein but UPCR ~0.5 indicating non-

albumin proteinuria)


#AMS 


#Newly diagnosed multiple myloma 


#Anemia 


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


#Hyperdense lesion on US of the kidney 


#Normal anion gap metabolic acidosis 


#Hyperkalemia (now resolved)





Renal function stable at this time


continue tube feeds and free water


pt is non-oliguric 


continue vent support


ICU follow up


for trach placement 








Ricky Chang DO

## 2018-12-04 LAB
ACANTHOCYTES BLD QL SMEAR: 0
ALBUMIN SERPL-MCNC: 1.4 G/DL (ref 3.4–5)
ALP SERPL-CCNC: 142 U/L (ref 45–117)
ALT SERPL-CCNC: 21 U/L (ref 13–61)
ANION GAP SERPL CALC-SCNC: 5 MMOL/L (ref 8–16)
ANISOCYTOSIS BLD QL: 0
APTT BLD: 28.9 SECONDS (ref 25.2–36.5)
ARTERIAL BLOOD GAS PCO2: 31.8 MMHG (ref 35–45)
ARTERIAL PATENCY WRIST A: POSITIVE
AST SERPL-CCNC: 36 U/L (ref 15–37)
BASE EXCESS BLDA CALC-SCNC: -2.8 MEQ/L (ref -2–2)
BASO STIPL BLD QL SMEAR: 0
BASOPHILS # BLD: 0.9 % (ref 0–2)
BILIRUB SERPL-MCNC: 0.8 MG/DL (ref 0.2–1)
BUN SERPL-MCNC: 38 MG/DL (ref 7–18)
CALCIUM SERPL-MCNC: 7.1 MG/DL (ref 8.5–10.1)
CHLORIDE SERPL-SCNC: 115 MMOL/L (ref 98–107)
CO2 SERPL-SCNC: 23 MMOL/L (ref 21–32)
CREAT SERPL-MCNC: 1.2 MG/DL (ref 0.55–1.3)
DACRYOCYTES BLD QL SMEAR: 0
DEPRECATED RDW RBC AUTO: 15.3 % (ref 11.9–15.9)
DOHLE BOD BLD QL SMEAR: 0
EOSINOPHIL # BLD: 1.8 % (ref 0–4.5)
GLUCOSE SERPL-MCNC: 72 MG/DL (ref 74–106)
HCT VFR BLD CALC: 26.4 % (ref 35.4–49)
HELMET CELLS BLD QL SMEAR: 0
HGB BLD-MCNC: 8.7 GM/DL (ref 11.7–16.9)
HOWELL-JOLLY BOD BLD QL SMEAR: 0
INR BLD: 1.47 (ref 0.83–1.09)
LYMPHOCYTES # BLD: 24.4 % (ref 8–40)
MACROCYTES BLD QL: 0
MAGNESIUM SERPL-MCNC: 1.9 MG/DL (ref 1.8–2.4)
MCH RBC QN AUTO: 29.4 PG (ref 25.7–33.7)
MCHC RBC AUTO-ENTMCNC: 32.8 G/DL (ref 32–35.9)
MCV RBC: 89.8 FL (ref 80–96)
MONOCYTES # BLD AUTO: 7.3 % (ref 3.8–10.2)
NEUTROPHILS # BLD: 65.6 % (ref 42.8–82.8)
OVALOCYTES BLD QL SMEAR: 0
PHOSPHATE SERPL-MCNC: 3.8 MG/DL (ref 2.5–4.9)
PLATELET # BLD AUTO: 148 K/MM3 (ref 134–434)
PLATELET BLD QL SMEAR: (no result)
PMV BLD: 9.3 FL (ref 7.5–11.1)
PO2 BLDA: 87.4 MMHG (ref 70–100)
POTASSIUM SERPLBLD-SCNC: 5 MMOL/L (ref 3.5–5.1)
PROT SERPL-MCNC: 8.9 G/DL (ref 6.4–8.2)
PT PNL PPP: 17.4 SEC (ref 9.7–13)
RBC # BLD AUTO: 2.94 M/MM3 (ref 4–5.6)
ROULEAUX BLD QL SMEAR: 0
SAO2 % BLDA: 96.6 % (ref 90–98.9)
SICKLE CELLS BLD QL SMEAR: 0
SODIUM SERPL-SCNC: 143 MMOL/L (ref 136–145)
TARGETS BLD QL SMEAR: 0
TOXIC GRANULES BLD QL SMEAR: 0
WBC # BLD AUTO: 3.3 K/MM3 (ref 4–10)
WBC NRBC COR # BLD: 2.89 K/MM3

## 2018-12-04 RX ADMIN — CHLORHEXIDINE GLUCONATE 0.12% ORAL RINSE SCH ML: 1.2 LIQUID ORAL at 21:01

## 2018-12-04 RX ADMIN — VITAMIN D, TAB 1000IU (100/BT) SCH UNIT: 25 TAB at 10:25

## 2018-12-04 RX ADMIN — PHYTONADIONE SCH MG: 10 INJECTION, EMULSION INTRAMUSCULAR; INTRAVENOUS; SUBCUTANEOUS at 10:25

## 2018-12-04 RX ADMIN — ALLOPURINOL SCH MG: 100 TABLET ORAL at 10:25

## 2018-12-04 RX ADMIN — Medication SCH MG: at 10:24

## 2018-12-04 RX ADMIN — PANTOPRAZOLE SODIUM SCH MG: 40 INJECTION, POWDER, FOR SOLUTION INTRAVENOUS at 10:25

## 2018-12-04 RX ADMIN — RIFAXIMIN SCH MG: 550 TABLET ORAL at 10:25

## 2018-12-04 RX ADMIN — Medication SCH MG: at 10:25

## 2018-12-04 RX ADMIN — CHLORHEXIDINE GLUCONATE 0.12% ORAL RINSE SCH ML: 1.2 LIQUID ORAL at 10:26

## 2018-12-04 RX ADMIN — PANTOPRAZOLE SODIUM SCH MG: 40 INJECTION, POWDER, FOR SOLUTION INTRAVENOUS at 21:01

## 2018-12-04 RX ADMIN — Medication SCH TAB: at 10:26

## 2018-12-04 RX ADMIN — METOPROLOL TARTRATE PRN MG: 5 INJECTION, SOLUTION INTRAVENOUS at 18:30

## 2018-12-04 RX ADMIN — METOPROLOL TARTRATE SCH MG: 25 TABLET, FILM COATED ORAL at 10:25

## 2018-12-04 RX ADMIN — RIFAXIMIN SCH MG: 550 TABLET ORAL at 21:01

## 2018-12-04 RX ADMIN — ALLOPURINOL SCH MG: 100 TABLET ORAL at 21:01

## 2018-12-04 RX ADMIN — METOPROLOL TARTRATE SCH MG: 25 TABLET, FILM COATED ORAL at 21:01

## 2018-12-04 NOTE — PN
Progress Note (short form)





- Note


Progress Note: 





Renal follow up for Hypercalcemia/HAM


 Vital Signs











Temperature  97.7 F   12/04/18 10:00


 


Pulse Rate  133 H  12/04/18 10:00


 


Respiratory Rate  21 H  12/04/18 11:26


 


Blood Pressure  106/78   12/04/18 10:00


 


O2 Sat by Pulse Oximetry (%)  99   12/04/18 11:20








 Intake & Output











 12/01/18 12/02/18 12/03/18 12/04/18





 23:59 23:59 23:59 23:59


 


Intake Total 1920 2660 1296 570


 


Output Total 1000 1650 950 800


 


Balance 920 1010 346 -230


 


Weight 99.5 kg 100.879 kg 102.693 kg 103.6 kg








 CBC, BMP





 12/04/18 05:30 





 12/04/18 05:30 





 Current Medications





Acetaminophen (Tylenol -)  650 mg PO Q6H PRN


   PRN Reason: PAIN LEVEL 1 - 3


   Last Admin: 12/02/18 12:33 Dose:  650 mg


Allopurinol (Zyloprim -)  100 mg PO BID [facility]


   Last Admin: 12/04/18 10:25 Dose:  100 mg


Chlorhexidine Gluconate (Peridex -)  15 ml MM BID [facility]


   Last Admin: 12/04/18 10:26 Dose:  15 ml


Cholecalciferol (Vitamin D3 -)  1,000 unit PO DAILY [facility]


   Last Admin: 12/04/18 10:25 Dose:  1,000 unit


Diphenhydramine HCl (Benadryl Injection -)  25 mg IVPB ONCE PRN


   PRN Reason: DURING PLASMAPHERESIS


Levofloxacin (Levaquin 250 Mg Premixed Ivpb -)  250 mg in 50 mls @ 50 mls/hr 

IVPB DAILY [facility]; Protocol


   Last Admin: 12/04/18 10:25 Dose:  50 mls/hr


Metronidazole (Flagyl 500mg Premixed Ivpb -)  500 mg in 100 mls @ 100 mls/hr 

IVPB Q8H-IV [facility]


   Last Admin: 12/04/18 10:25 Dose:  100 mls/hr


Lactobacillus Acidophilus (Bacid -)  1 tab PO DAILY [facility]


   Last Admin: 12/04/18 10:26 Dose:  1 tab


Metoprolol Tartrate (Lopressor Injection -)  5 mg IVPUSH Q8H PRN


   PRN Reason: TACHYCARDIA


   Last Admin: 12/01/18 01:03 Dose:  5 mg


Metoprolol Tartrate (Lopressor -)  25 mg NGT BID [facility]


   Last Admin: 12/04/18 10:25 Dose:  25 mg


Midazolam HCl (Versed -)  1 mg IVPUSH Q4H PRN


   PRN Reason: AGITATION


Pantoprazole Sodium (Protonix Iv)  40 mg IVPUSH BID [facility]


   Last Admin: 12/04/18 10:25 Dose:  40 mg


Phytonadione (Aqua Mephyton Injection -)  5 mg SQ DAILY [facility]


   Stop: 12/05/18 17:59


   Last Admin: 12/04/18 10:25 Dose:  5 mg


Rifaximin (Xifaxan -)  550 mg PO BID [facility]


   Last Admin: 12/04/18 10:25 Dose:  550 mg


Sodium Bicarbonate (Sodium Bicarbonate -)  650 mg NGT DAILY [facility]


   Last Admin: 12/04/18 10:25 Dose:  650 mg


Thiamine HCl (Vitamin B1 -)  100 mg PO DAILY [facility]


   Last Admin: 12/04/18 10:24 Dose:  100 mg














78 year old gentleman with hx of CKD, Afib on A/C, CAD, CKD, Hypertension, 

Hyperlipidemia, PVD who presented with AMS/Confusion and found to have HAM.





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 

showed no stones or obstruction, UA w/o protein but UPCR ~0.5 indicating non-

albumin proteinuria)


#AMS 


#Newly diagnosed multiple myloma 











Ricky Bernardo CASTRO

## 2018-12-04 NOTE — PN
Progress Note, Physician


Chief Complaint: 





Events noted


Remains on mechanical ventilation via trache


History of Present Illness: 





Patient was seen and examined in ICU. Remains on vent support. Chart was 

reviewed


AF with RVR


Post Tracheostomy








- Current Medication List


Current Medications: 


Active Medications





Acetaminophen (Tylenol -)  650 mg PO Q6H PRN


   PRN Reason: PAIN LEVEL 1 - 3


   Last Admin: 12/02/18 12:33 Dose:  650 mg


Allopurinol (Zyloprim -)  100 mg PO BID [facility]


   Last Admin: 12/04/18 10:25 Dose:  100 mg


Chlorhexidine Gluconate (Peridex -)  15 ml MM BID [facility]


   Last Admin: 12/04/18 10:26 Dose:  15 ml


Cholecalciferol (Vitamin D3 -)  1,000 unit PO DAILY [facility]


   Last Admin: 12/04/18 10:25 Dose:  1,000 unit


Diphenhydramine HCl (Benadryl Injection -)  25 mg IVPB ONCE PRN


   PRN Reason: DURING PLASMAPHERESIS


Levofloxacin (Levaquin 250 Mg Premixed Ivpb -)  250 mg in 50 mls @ 50 mls/hr 

IVPB DAILY [facility]; Protocol


   Last Admin: 12/04/18 10:25 Dose:  50 mls/hr


Metronidazole (Flagyl 500mg Premixed Ivpb -)  500 mg in 100 mls @ 100 mls/hr 

IVPB Q8H-IV [facility]


   Last Admin: 12/04/18 10:25 Dose:  100 mls/hr


Lactobacillus Acidophilus (Bacid -)  1 tab PO DAILY [facility]


   Last Admin: 12/04/18 10:26 Dose:  1 tab


Metoprolol Tartrate (Lopressor Injection -)  5 mg IVPUSH Q8H PRN


   PRN Reason: TACHYCARDIA


   Last Admin: 12/01/18 01:03 Dose:  5 mg


Metoprolol Tartrate (Lopressor -)  25 mg NGT BID [facility]


   Last Admin: 12/04/18 10:25 Dose:  25 mg


Midazolam HCl (Versed -)  1 mg IVPUSH Q4H PRN


   PRN Reason: AGITATION


Pantoprazole Sodium (Protonix Iv)  40 mg IVPUSH BID [facility]


   Last Admin: 12/04/18 10:25 Dose:  40 mg


Phytonadione (Aqua Mephyton Injection -)  5 mg SQ DAILY [facility]


   Stop: 12/05/18 17:59


   Last Admin: 12/04/18 10:25 Dose:  5 mg


Rifaximin (Xifaxan -)  550 mg PO BID [facility]


   Last Admin: 12/04/18 10:25 Dose:  550 mg


Sodium Bicarbonate (Sodium Bicarbonate -)  650 mg NGT DAILY [facility]


   Last Admin: 12/04/18 10:25 Dose:  650 mg


Thiamine HCl (Vitamin B1 -)  100 mg PO DAILY [facility]


   Last Admin: 12/04/18 10:24 Dose:  100 mg











- Objective


Vital Signs: 


 Vital Signs











Temperature  97.7 F   12/04/18 10:00


 


Pulse Rate  133 H  12/04/18 10:00


 


Respiratory Rate  21 H  12/04/18 11:26


 


Blood Pressure  106/78   12/04/18 10:00


 


O2 Sat by Pulse Oximetry (%)  99   12/04/18 11:20











Cardiovascular: Yes: Pulse Irregular, S1, S2


Respiratory: Yes: Diminished, Mechanically Ventilated


Gastrointestinal: Yes: Normal Bowel Sounds, Soft.  No: Tenderness


Edema: No


Labs: 


 CBC, BMP





 12/04/18 05:30 





 12/04/18 05:30 





 INR, PTT











INR  1.47  (0.83-1.09)  H  12/04/18  05:30    


 


Fibrinogen  338.0 mg/dL (238-498)  D 12/03/18  20:00    














- ....Imaging


Chest X-ray: Report Reviewed (LLL consolidation)





Problem List





- Problems


(1) Anemia


Code(s): D64.9 - ANEMIA, UNSPECIFIED   





(2) Hyperlipidemia


Code(s): E78.5 - HYPERLIPIDEMIA, UNSPECIFIED   


Qualifiers: 


   Hyperlipidemia type: pure hypercholesterolemia   Qualified Code(s): E78.00 - 

Pure hypercholesterolemia, unspecified; E78.0 - Pure hypercholesterolemia   





(3) Multiple myeloma


Code(s): C90.00 - MULTIPLE MYELOMA NOT HAVING ACHIEVED REMISSION   


Qualifiers: 


   Multiple myeloma remission status: not in remission   Qualified Code(s): 

C90.00 - Multiple myeloma not having achieved remission   





(4) Respiratory failure


Code(s): J96.90 - RESPIRATORY FAILURE, UNSP, UNSP W HYPOXIA OR HYPERCAPNIA   





(5) Sepsis


Code(s): A41.9 - SEPSIS, UNSPECIFIED ORGANISM   





(6) Toxic metabolic encephalopathy


Code(s): G92 - TOXIC ENCEPHALOPATHY   





(7) Acute-on-chronic kidney injury


Code(s): N17.9 - ACUTE KIDNEY FAILURE, UNSPECIFIED; N18.9 - CHRONIC KIDNEY 

DISEASE, UNSPECIFIED   


Qualifiers: 


   Acute renal failure type: unspecified   Chronic kidney disease stage: 

unspecified stage   Qualified Code(s): N17.9 - Acute kidney failure, unspecified

; N18.9 - Chronic kidney disease, unspecified   





(8) Demand ischemia


Code(s): I24.8 - OTHER FORMS OF ACUTE ISCHEMIC HEART DISEASE   





(9) History of ETOH abuse


Code(s): Z87.898 - PERSONAL HISTORY OF OTHER SPECIFIED CONDITIONS   





(10) Nonadherence to medication


Code(s): Z91.14 - PATIENT'S OTHER NONCOMPLIANCE WITH MEDICATION REGIMEN   





(11) Paraproteinemia


Code(s): D89.2 - HYPERGAMMAGLOBULINEMIA, UNSPECIFIED   





(12) Persistent atrial fibrillation


Code(s): I48.1 - PERSISTENT ATRIAL FIBRILLATION   





(13) Chronic thromboembolic disease


Code(s): I74.9 - EMBOLISM AND THROMBOSIS OF UNSPECIFIED ARTERY   





(14) Coronary artery disease


Code(s): I25.10 - ATHSCL HEART DISEASE OF NATIVE CORONARY ARTERY W/O ANG PCTRS 

  


Qualifiers: 


   Coronary Disease-Associated Artery/Lesion type: native artery   Native vs. 

transplanted heart: native heart   Associated angina: without angina   

Qualified Code(s): I25.10 - Atherosclerotic heart disease of native coronary 

artery without angina pectoris   





(15) Diastolic dysfunction without heart failure


Code(s): I51.9 - HEART DISEASE, UNSPECIFIED   





(16) HTN (hypertension)


Code(s): I10 - ESSENTIAL (PRIMARY) HYPERTENSION   


Qualifiers: 


   Hypertension type: essential hypertension   Qualified Code(s): I10 - 

Essential (primary) hypertension   





(17) Hypertrophic cardiomyopathy


Code(s): I42.2 - OTHER HYPERTROPHIC CARDIOMYOPATHY   





Assessment/Plan





1. Acute hypoxic respiratory failure, suspected aspiration pneumonia with 

sepsis syndrome, remains intubated now on trache


2. Toxic metabolic encephalopathy, rule out CVA


3. Acute on CKD


4. Paraproteinemia confirmed multiple myeloma


5. 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 

disease related to persistent atrial fibrillation 


6. CAD with evidence of demand ischemia, non obstructive CAD 


7. 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 

chronic class I-II NYHA classification LV failure, compensated/euvolemic


8. Persistent atrial fibrillation [id number] score of 6


9. HTN


10. Anemia/thrombocytopenia


11. Acalculous Cholecystitis


12. Ischemic hepatitis


13. Resolved Hypernatremia





PLAN:


1. Consider A/C therapy unless it is absolutely contraindicated considering his 

[id number] score of 6 and stroke risk, previously was on NOAC. In the interim 

Heparin gtt if not contraindicated


2. Continue Lopressor and titrate dose. May give Amiodarone for ventricular 

ectopies


3. Antibiotics coverage


4. Ventilator management as per the ICU team


5. ? PEG


6. ? goal of care and advanced directives need to be readdressed.





Guarded


Olvin Hobbs MD

## 2018-12-04 NOTE — PN
Physical Exam: 


SUBJECTIVE: Patient seen and examined in the ICU.  s/p trache yesterday w/o 

complication.  poorly responsive but now on fentanyl gtt. opens eyes to voice. 

No gross change in overall mental status.








OBJECTIVE:





 Vital Signs











 Period  Temp  Pulse  Resp  BP Sys/Varghese  Pulse Ox


 


 Last 24 Hr  97.7 F-99.8 F    14-26  /42-83  











GENERAL: +trache, poorly responsive off sedation but opens eyes to voice.


HEENT: NCAT PERRL sclera anicteric. nares patent, dry mucous membranes


NECK: Trachea midline. +trache in place 


LUNGS: bilateral diffuse rhonchi


HEART: reg rate and irregular rhythm. 


ABDOMEN: Soft, nondistended NT


EXTREMITIES: 2+ pulses, warm, well-perfused, no edema, scattered ulcers. 


NEUROLOGICAL: difficult to assess secondary to clinical condition. gag reflex 

does not appear to be present. opens eyes to voice. corneal reflex+


SKIN: Warm, dry, normal turgor, scattered ulcers on the legs














 Laboratory Results - last 24 hr











  12/03/18 12/03/18 12/03/18





  05:30 17:00 20:00


 


WBC    2.4 L


 


Corrected WBC (auto)   


 


RBC    2.34 L


 


Hgb    7.2 L


 


Hct    20.9 L D


 


MCV    89.3


 


MCH    31.0


 


MCHC    34.7


 


RDW    15.8


 


Plt Count    148


 


MPV    9.4


 


Absolute Neuts (auto)   


 


Neutrophils %   


 


Lymphocytes %   


 


Monocytes %   


 


Eosinophils %   


 


Basophils %   


 


Nucleated RBC %   


 


PT with INR   18.30 H 


 


INR   1.54 H 


 


PTT (Actin FS)   26.3 


 


Fibrinogen   


 


Anticoagulation Therapy   


 


Puncture Site   


 


ABG pH   


 


ABG pCO2 at Pt Temp   


 


ABG pO2 at Pt Temp   


 


ABG HCO3   


 


ABG O2 Sat (Measured)   


 


ABG O2 Content   


 


ABG Base Excess   


 


Chacho Test   


 


O2 Delivery Device   


 


Oxygen Flow Rate   


 


Vent Mode   


 


Vent Rate   


 


Mechanical Rate   


 


PEEP   


 


Pressure Support Vent   


 


Sodium   


 


Potassium   


 


Chloride   


 


Carbon Dioxide   


 


Anion Gap   


 


BUN   


 


Creatinine   


 


Creat Clearance w eGFR   


 


Random Glucose   


 


Calcium   


 


Phosphorus   


 


Magnesium   


 


Total Bilirubin   


 


AST   


 


ALT   


 


Alkaline Phosphatase   


 


Total Protein   


 


Albumin   


 


Blood Type  B POSITIVE  


 


Antibody Screen  Negative  


 


Crossmatch  See Detail  














  12/03/18 12/04/18 12/04/18





  20:00 05:30 05:30


 


WBC   3.3 L 


 


Corrected WBC (auto)   2.89 


 


RBC   2.94 L 


 


Hgb   8.7 L 


 


Hct   26.4 L D 


 


MCV   89.8 


 


MCH   29.4 


 


MCHC   32.8 


 


RDW   15.3 


 


Plt Count   148 


 


MPV   9.3 


 


Absolute Neuts (auto)   2.2 


 


Neutrophils %   65.6 


 


Lymphocytes %   24.4 


 


Monocytes %   7.3 


 


Eosinophils %   1.8 


 


Basophils %   0.9 


 


Nucleated RBC %   14 H* 


 


PT with INR    17.40 H


 


INR    1.47 H


 


PTT (Actin FS)    28.9


 


Fibrinogen  338.0  D  


 


Anticoagulation Therapy   


 


Puncture Site   


 


ABG pH   


 


ABG pCO2 at Pt Temp   


 


ABG pO2 at Pt Temp   


 


ABG HCO3   


 


ABG O2 Sat (Measured)   


 


ABG O2 Content   


 


ABG Base Excess   


 


Chacho Test   


 


O2 Delivery Device   


 


Oxygen Flow Rate   


 


Vent Mode   


 


Vent Rate   


 


Mechanical Rate   


 


PEEP   


 


Pressure Support Vent   


 


Sodium   


 


Potassium   


 


Chloride   


 


Carbon Dioxide   


 


Anion Gap   


 


BUN   


 


Creatinine   


 


Creat Clearance w eGFR   


 


Random Glucose   


 


Calcium   


 


Phosphorus   


 


Magnesium   


 


Total Bilirubin   


 


AST   


 


ALT   


 


Alkaline Phosphatase   


 


Total Protein   


 


Albumin   


 


Blood Type   


 


Antibody Screen   


 


Crossmatch   














  12/04/18 12/04/18





  05:30 05:52


 


WBC  


 


Corrected WBC (auto)  


 


RBC  


 


Hgb  


 


Hct  


 


MCV  


 


MCH  


 


MCHC  


 


RDW  


 


Plt Count  


 


MPV  


 


Absolute Neuts (auto)  


 


Neutrophils %  


 


Lymphocytes %  


 


Monocytes %  


 


Eosinophils %  


 


Basophils %  


 


Nucleated RBC %  


 


PT with INR  


 


INR  


 


PTT (Actin FS)  


 


Fibrinogen  


 


Anticoagulation Therapy   No Result Required.


 


Puncture Site   Right radial


 


ABG pH   7.43


 


ABG pCO2 at Pt Temp   31.8 L


 


ABG pO2 at Pt Temp   87.4  D


 


ABG HCO3   20.6 L


 


ABG O2 Sat (Measured)   96.6


 


ABG O2 Content   11.4 L


 


ABG Base Excess   -2.8 L


 


Chacho Test   Positive


 


O2 Delivery Device   Alcpiece


 


Oxygen Flow Rate   40%


 


Vent Mode   No Result Required.


 


Vent Rate   No Result Required.


 


Mechanical Rate   No Result Required.


 


PEEP   5.0


 


Pressure Support Vent   No Result Required.


 


Sodium  143 


 


Potassium  5.0 


 


Chloride  115 H 


 


Carbon Dioxide  23 


 


Anion Gap  5 L 


 


BUN  38 H 


 


Creatinine  1.2 


 


Creat Clearance w eGFR  58.40 


 


Random Glucose  72 L 


 


Calcium  7.1 L 


 


Phosphorus  3.8 


 


Magnesium  1.9 


 


Total Bilirubin  0.8 


 


AST  36 


 


ALT  21 


 


Alkaline Phosphatase  142 H 


 


Total Protein  8.9 H 


 


Albumin  1.4 L 


 


Blood Type  


 


Antibody Screen  


 


Crossmatch  








Active Medications











Generic Name Dose Route Start Last Admin





  Trade Name Freq  PRN Reason Stop Dose Admin


 


Acetaminophen  650 mg  11/10/18 14:09  12/02/18 12:33





  Tylenol -  PO   650 mg





  Q6H PRN   Administration





  PAIN LEVEL 1 - 3   





     





     





     


 


Allopurinol  100 mg  11/09/18 10:00  12/04/18 10:25





  Zyloprim -  PO   100 mg





  BID AVA   Administration





     





     





     





     


 


Chlorhexidine Gluconate  15 ml  11/10/18 23:45  12/04/18 10:26





  Peridex -  MM   15 ml





  BID AVA   Administration





     





     





     





     


 


Cholecalciferol  1,000 unit  11/18/18 10:00  12/04/18 10:25





  Vitamin D3 -  PO   1,000 unit





  DAILY AVA   Administration





     





     





     





     


 


Diphenhydramine HCl  25 mg  11/09/18 12:00  





  Benadryl Injection -  IVPB   





  ONCE PRN   





  DURING PLASMAPHERESIS   





     





     





     


 


Levofloxacin  250 mg in 50 mls @ 50 mls/hr  11/19/18 12:45  12/04/18 10:25





  Levaquin 250 Mg Premixed Ivpb -  IVPB   50 mls/hr





  DAILY AVA   Administration





     





     





  Protocol   





     


 


Metronidazole  500 mg in 100 mls @ 100 mls/hr  12/03/18 10:15  12/04/18 10:25





  Flagyl 500mg Premixed Ivpb -  IVPB   100 mls/hr





  Q8H-IV AVA   Administration





     





     





     





     


 


Lactobacillus Acidophilus  1 tab  11/25/18 10:00  12/04/18 10:26





  Bacid -  PO   1 tab





  DAILY AVA   Administration





     





     





     





     


 


Metoprolol Tartrate  5 mg  11/09/18 13:04  12/01/18 01:03





  Lopressor Injection -  IVPUSH   5 mg





  Q8H PRN   Administration





  TACHYCARDIA   





     





     





     


 


Metoprolol Tartrate  25 mg  11/29/18 11:54  12/04/18 10:25





  Lopressor -  NGT   25 mg





  BID AVA   Administration





     





     





     





     


 


Midazolam HCl  1 mg  12/04/18 11:38  





  Versed -  IVPUSH   





  Q4H PRN   





  AGITATION   





     





     





     


 


Pantoprazole Sodium  40 mg  11/29/18 22:00  12/04/18 10:25





  Protonix Iv  IVPUSH   40 mg





  BID AVA   Administration





     





     





     





     


 


Phytonadione  5 mg  12/02/18 18:00  12/04/18 10:25





  Aqua Mephyton Injection -  SQ  12/05/18 17:59  5 mg





  DAILY AVA   Administration





     





     





     





     


 


Rifaximin  550 mg  11/15/18 22:00  12/04/18 10:25





  Xifaxan -  PO   550 mg





  BID AVA   Administration





     





     





     





     


 


Sodium Bicarbonate  650 mg  12/01/18 10:00  12/04/18 10:25





  Sodium Bicarbonate -  NGT   650 mg





  DAILY AVA   Administration





     





     





     





     


 


Thiamine HCl  100 mg  10/30/18 22:12  12/04/18 10:24





  Vitamin B1 -  PO   100 mg





  DAILY AVA   Administration





     





     





     





     











ASSESSMENT/PLAN:


79 yo m PMH of A-Fib, Acute on chronic kidney injury, CAD w stents, 

hypercalcemia, medication non-adherence, paraproteinemia, thromboembolic disease

, diastolic heart dysfunction without failure, HTN, HLD, hypertrophic 

cardiomyopathy is here after a rapid response. He was on the floors when it was 

noticed that he has respiratory insufficiency and was desaturating, requiring 

ICU monitoring. GOC discussion ongoing. successful tracheostomy yesterday. 








GI: 


will need PEG placement, successful tracheostomy yesterday. 





metabolic encephalopathy 


-ammonia downtrending w/ rifaximin, 


s/p lactulose 


-LFTs elevated but downtrending. transamintitis most likely secondary to 

ischemic hepatits which is multifactorial including sepsis, episodes of 

hypotension and hyperviscosity syndrome. 





acalculous cholecystitis?


US shows thickened gallbladder wall, pericholecystic fluid, suspicious for 

acalculous cholecystitis. There was also mild dilatation of the CBD but that 

might be normal for age. 


-Spoke w/ IR, who feel image does not represent  acalculous cholecystitis and 

does not require any IR drainage 





ID: 


No fevers recorded. 


- Rhonchi heard on Lung auscultation


-RLL pneumonia


bcx 11/19/18, 12/3/18 neg 


off of ceftazidime


Vancomycin 


C diff neg 11/25/18


- c/w levaquin/flagyl. 


- ID on board


- c/w rifaximin for elevated ammonia level





Cardio: 


Afib -[id number] score of 6 


- Lopressor 25 mg BID PO


-IV metoprolol 5 mg PRN


- diastolic dysfunction + hypertrophic cardiomyopathy


- CAD with multiple stents


- Cardio consulted and on board.


off Xarelto 15 qd (renal dosing) while on heparin gtt. transfuse to maintain Hgb

>8.0 


s/p 1 u prbc 11/20/18 and 1 u prbc 11/22/18, Heparin with caution considering 

the dropping Hg, transfuse to maintain Hg equal or > 8.0, as outlined in prior 

notes ideally A/C therapy to be continued indefinitely unless it is absolutely 

contraindicated considering his [id number] score of 6 


- Sporadically goes in and out of V-Tach - Can give amio if v-tach is sustained 

and persistent. 


-Off heparin gtt, s/p 2 U prbcs w/ appropriate response. FOBT neg





Pulm: 


-successful tracheostomy yesterday.


-s/p 2 U FFP prior to trache yesterday


- Patient has b/l pleural effusions.


- No pneumothorax


- b/l diffuse rhonchi


- infiltrate on CXR: Abx- Substituting levaquin for ceftazidime


Vancomycin Redosed 11/19/18 


- c/w levaquin/flagyl. 





Renal: 


- Acute on Chronic kidney injury


HAM ATN vs. Cast nephropathy  (FeNa was 2.1% indicating tubular injury, US 

showed no stones or obstruction, UA w/o protein but UPCR ~0.5 indicating non-

albumin proteinuria)


- Renal consulted and on board. 


Hyperkalemia (r/o tumor lysis)


serum K is improved, s/p bicarb gtt


dc fenton 





Neuro: 


- Patient is not alert or oriented.


successful tracheostomy yesterday, poorly responsive but now on fentanyl gtt. 

opens eyes to voice


-dc fentanyl


-versed prn 


- Head CT showed no acute pathology


- Neuro consulted and on board. (Dr. Youngblood + Dr. phan) 


-ammonia stable in 60s w/ rifaximin, lactulose dcd 





Heme/Onc: 


- monitor H/H


- Will transfuse to keep hb > 8 


- Patient not receiving plasmapharesis today. 


- Paraproteinemia


- Patient fulfills new criteria  for multiple myeloma with free kappa/ free 

lambda light chain  ratio of 432 (>100 is diagnostic of myeloma) 


- Kappa/Lambda ratio > 100 consistent with Multiple myeloma


- M spike elevated elevated at 6.8 previously 4.7 


-steroids being held at this time 


transfuse to maintain Hg equal or > 8.0, as outlined in prior notes ideally A/C 

therapy to be continued indefinitely unless it is absolutely contraindicated 

considering his [id number] score of 6 


-H/H remains stable of heparin gtt. 


-fibrinogen nl


- HIT AB neg


-FOBT neg 





s/p 2 u FFP prior to procedure yesterday





Endo: 


- Parathyroid hormone WNL


- PTH-borderline low appropriate given hypercalcemia, PTHrP low





Prophylaxis: 


-Off heparin gtt, s/p prbcs w/ appropriate response. FOBT neg


transfuse to maintain Hg equal or > 8.0, as outlined in prior notes ideally A/C 

therapy to be continued indefinitely unless it is absolutely contraindicated 

considering his [id number] score of 6


-SCDs


- Protonix





F/E/N: 


F: of IVF


E: Will replete lytes PRN


N: tube feeds (low potassium feeds). with free water. bicarb tabs





Code Status: Full Code 


-GOC discussion ongoing


-have recommended to family for compassionate extubation but they have deciding 

on Trach/PEG 


successful tracheostomy yesterday. 





Dispo: 


Patient stable for transfer to floors. further care per primary team/PCP


poor overall prognosis for meaningful recovery


successful tracheostomy yesterday. 





Visit type





- Emergency Visit


Emergency Visit: Yes


ED Registration Date: 10/30/18


Care time: The patient presented to the Emergency Department on the above date 

and was hospitalized for further evaluation of their emergent condition.





- New Patient


This patient is new to me today: Yes


Date on this admission: 12/04/18





- Critical Care


Critical Care patient: Yes


Total Critical Care Time (in minutes): 36


Critical Care Statement: The care of this patient involved high complexity 

decision making to prevent further life threatening deterioration of the patient

's condition and/or to evaluate & treat vital organ system(s) failure or risk 

of failure.

## 2018-12-04 NOTE — PN
Teaching Attending Note


Name of Resident: Ganga Mccormick





ATTENDING PHYSICIAN STATEMENT





I saw and evaluated the patient.


I reviewed the resident's note and discussed the case with the resident.


I agree with the resident's findings and plan as documented.








SUBJECTIVE:


Patient seen and examined in the ICU.  


S/P Trach yesterday without incident. 








OBJECTIVE:





 


  


 Intake & Output











 12/01/18 12/02/18 12/03/18 12/04/18





 23:59 23:59 23:59 23:59


 


Intake Total 1920 2660 1296 570


 


Output Total 1000 1650 950 800


 


Balance 920 1010 346 -230


 


Weight 219 lb 5.759 oz 222 lb 6.4 oz 226 lb 6.4 oz 228 lb 6.4 oz











  


 Last Vital Signs











Temp Pulse Resp BP Pulse Ox


 


 97.7 F   85   18   83/56 L  99 


 


 12/04/18 10:00  12/04/18 12:00  12/04/18 12:00  12/04/18 12:00  12/04/18 11:20





Active Medications





Acetaminophen (Tylenol -)  650 mg PO Q6H PRN


   PRN Reason: PAIN LEVEL 1 - 3


   Last Admin: 12/02/18 12:33 Dose:  650 mg


Allopurinol (Zyloprim -)  100 mg PO BID AVA


   Last Admin: 12/04/18 10:25 Dose:  100 mg


Chlorhexidine Gluconate (Peridex -)  15 ml MM BID AVA


   Last Admin: 12/04/18 10:26 Dose:  15 ml


Cholecalciferol (Vitamin D3 -)  1,000 unit PO DAILY AVA


   Last Admin: 12/04/18 10:25 Dose:  1,000 unit


Diphenhydramine HCl (Benadryl Injection -)  25 mg IVPB ONCE PRN


   PRN Reason: DURING PLASMAPHERESIS


Levofloxacin (Levaquin 250 Mg Premixed Ivpb -)  250 mg in 50 mls @ 50 mls/hr 

IVPB DAILY AVA; Protocol


   Last Admin: 12/04/18 10:25 Dose:  50 mls/hr


Metronidazole (Flagyl 500mg Premixed Ivpb -)  500 mg in 100 mls @ 100 mls/hr 

IVPB Q8H-IV AVA


   Last Admin: 12/04/18 10:25 Dose:  100 mls/hr


Lactobacillus Acidophilus (Bacid -)  1 tab PO DAILY AVA


   Last Admin: 12/04/18 10:26 Dose:  1 tab


Metoprolol Tartrate (Lopressor Injection -)  5 mg IVPUSH Q8H PRN


   PRN Reason: TACHYCARDIA


   Last Admin: 12/01/18 01:03 Dose:  5 mg


Metoprolol Tartrate (Lopressor -)  25 mg NGT BID Replaced by [facility]


   Last Admin: 12/04/18 10:25 Dose:  25 mg


Midazolam HCl (Versed -)  1 mg IVPUSH Q4H PRN


   PRN Reason: AGITATION


Pantoprazole Sodium (Protonix Iv)  40 mg IVPUSH BID Replaced by [facility]


   Last Admin: 12/04/18 10:25 Dose:  40 mg


Phytonadione (Aqua Mephyton Injection -)  5 mg SQ DAILY Replaced by [facility]


   Stop: 12/05/18 17:59


   Last Admin: 12/04/18 10:25 Dose:  5 mg


Rifaximin (Xifaxan -)  550 mg PO BID Replaced by [facility]


   Last Admin: 12/04/18 10:25 Dose:  550 mg


Sodium Bicarbonate (Sodium Bicarbonate -)  650 mg NGT DAILY Replaced by [facility]


   Last Admin: 12/04/18 10:25 Dose:  650 mg


Thiamine HCl (Vitamin B1 -)  100 mg PO DAILY Replaced by [facility]


   Last Admin: 12/04/18 10:24 Dose:  100 mg











Gen: Trached, vented, poorly responsive


Heart: RRR


Lung: scattered rhonchi


Abd: soft, nontender


Ext: + edema


  


  Laboratory Results - last 24 hr











  12/03/18 12/03/18 12/03/18





  05:30 17:00 20:00


 


WBC    2.4 L


 


Corrected WBC (auto)   


 


RBC    2.34 L


 


Hgb    7.2 L


 


Hct    20.9 L D


 


MCV    89.3


 


MCH    31.0


 


MCHC    34.7


 


RDW    15.8


 


Plt Count    148


 


MPV    9.4


 


Absolute Neuts (auto)   


 


Neutrophils %   


 


Lymphocytes %   


 


Monocytes %   


 


Eosinophils %   


 


Basophils %   


 


Nucleated RBC %   


 


PT with INR   18.30 H 


 


INR   1.54 H 


 


PTT (Actin FS)   26.3 


 


Fibrinogen   


 


Anticoagulation Therapy   


 


Puncture Site   


 


ABG pH   


 


ABG pCO2 at Pt Temp   


 


ABG pO2 at Pt Temp   


 


ABG HCO3   


 


ABG O2 Sat (Measured)   


 


ABG O2 Content   


 


ABG Base Excess   


 


Chacho Test   


 


O2 Delivery Device   


 


Oxygen Flow Rate   


 


Vent Mode   


 


Vent Rate   


 


Mechanical Rate   


 


PEEP   


 


Pressure Support Vent   


 


Sodium   


 


Potassium   


 


Chloride   


 


Carbon Dioxide   


 


Anion Gap   


 


BUN   


 


Creatinine   


 


Creat Clearance w eGFR   


 


Random Glucose   


 


Calcium   


 


Phosphorus   


 


Magnesium   


 


Total Bilirubin   


 


AST   


 


ALT   


 


Alkaline Phosphatase   


 


Total Protein   


 


Albumin   


 


Blood Type  B POSITIVE  


 


Antibody Screen  Negative  


 


Crossmatch  See Detail  














  12/03/18 12/04/18 12/04/18





  20:00 05:30 05:30


 


WBC   3.3 L 


 


Corrected WBC (auto)   2.89 


 


RBC   2.94 L 


 


Hgb   8.7 L 


 


Hct   26.4 L D 


 


MCV   89.8 


 


MCH   29.4 


 


MCHC   32.8 


 


RDW   15.3 


 


Plt Count   148 


 


MPV   9.3 


 


Absolute Neuts (auto)   2.2 


 


Neutrophils %   65.6 


 


Lymphocytes %   24.4 


 


Monocytes %   7.3 


 


Eosinophils %   1.8 


 


Basophils %   0.9 


 


Nucleated RBC %   14 H* 


 


PT with INR    17.40 H


 


INR    1.47 H


 


PTT (Actin FS)    28.9


 


Fibrinogen  338.0  D  


 


Anticoagulation Therapy   


 


Puncture Site   


 


ABG pH   


 


ABG pCO2 at Pt Temp   


 


ABG pO2 at Pt Temp   


 


ABG HCO3   


 


ABG O2 Sat (Measured)   


 


ABG O2 Content   


 


ABG Base Excess   


 


Chacho Test   


 


O2 Delivery Device   


 


Oxygen Flow Rate   


 


Vent Mode   


 


Vent Rate   


 


Mechanical Rate   


 


PEEP   


 


Pressure Support Vent   


 


Sodium   


 


Potassium   


 


Chloride   


 


Carbon Dioxide   


 


Anion Gap   


 


BUN   


 


Creatinine   


 


Creat Clearance w eGFR   


 


Random Glucose   


 


Calcium   


 


Phosphorus   


 


Magnesium   


 


Total Bilirubin   


 


AST   


 


ALT   


 


Alkaline Phosphatase   


 


Total Protein   


 


Albumin   


 


Blood Type   


 


Antibody Screen   


 


Crossmatch   














  12/04/18 12/04/18





  05:30 05:52


 


WBC  


 


Corrected WBC (auto)  


 


RBC  


 


Hgb  


 


Hct  


 


MCV  


 


MCH  


 


MCHC  


 


RDW  


 


Plt Count  


 


MPV  


 


Absolute Neuts (auto)  


 


Neutrophils %  


 


Lymphocytes %  


 


Monocytes %  


 


Eosinophils %  


 


Basophils %  


 


Nucleated RBC %  


 


PT with INR  


 


INR  


 


PTT (Actin FS)  


 


Fibrinogen  


 


Anticoagulation Therapy   No Result Required.


 


Puncture Site   Right radial


 


ABG pH   7.43


 


ABG pCO2 at Pt Temp   31.8 L


 


ABG pO2 at Pt Temp   87.4  D


 


ABG HCO3   20.6 L


 


ABG O2 Sat (Measured)   96.6


 


ABG O2 Content   11.4 L


 


ABG Base Excess   -2.8 L


 


Chacho Test   Positive


 


O2 Delivery Device   Alcpiece


 


Oxygen Flow Rate   40%


 


Vent Mode   No Result Required.


 


Vent Rate   No Result Required.


 


Mechanical Rate   No Result Required.


 


PEEP   5.0


 


Pressure Support Vent   No Result Required.


 


Sodium  143 


 


Potassium  5.0 


 


Chloride  115 H 


 


Carbon Dioxide  23 


 


Anion Gap  5 L 


 


BUN  38 H 


 


Creatinine  1.2 


 


Creat Clearance w eGFR  58.40 


 


Random Glucose  72 L 


 


Calcium  7.1 L 


 


Phosphorus  3.8 


 


Magnesium  1.9 


 


Total Bilirubin  0.8 


 


AST  36 


 


ALT  21 


 


Alkaline Phosphatase  142 H 


 


Total Protein  8.9 H 


 


Albumin  1.4 L 


 


Blood Type  


 


Antibody Screen  


 


Crossmatch  











ASSESSMENT AND PLAN:


Acute Hypoxic Respiratory Failure: S/P Trach 


Altered Mental Status


Metabolic Encephalopathy


Pneumonia


Acalculous Cholecystitis


Multiple Myeloma


Acute on Chronic Renal Failure


Metabolic Acidosis


LV Diastolic Dysfunction


Atrial Fibrillation


CAD


+Troponins likely Demand Ischemia


PAD


Hyperlipidemia


HTN


Anemia





-  monitor H/H


-  monitor urine output, creatinine


-  rate control


-  DVT/GI prophylaxis


-  continue discussions regarding goals of care


-  poor overall prognosis for meaningful recovery: have recommended to family 

for compassionate extubation but they have deciding on Trach/PEG 


-  5S monitoring 





Dr Blum

## 2018-12-04 NOTE — PN
Progress Note, Physician





- Current Medication List


Current Medications: 


Active Medications





Acetaminophen (Tylenol -)  650 mg PO Q6H PRN


   PRN Reason: PAIN LEVEL 1 - 3


   Last Admin: 12/02/18 12:33 Dose:  650 mg


Allopurinol (Zyloprim -)  100 mg PO BID [facility]


   Last Admin: 12/03/18 21:13 Dose:  100 mg


Chlorhexidine Gluconate (Peridex -)  15 ml MM BID [facility]


   Last Admin: 12/03/18 21:12 Dose:  15 ml


Cholecalciferol (Vitamin D3 -)  1,000 unit PO DAILY [facility]


   Last Admin: 12/03/18 09:40 Dose:  1,000 unit


Diphenhydramine HCl (Benadryl Injection -)  25 mg IVPB ONCE PRN


   PRN Reason: DURING PLASMAPHERESIS


Levofloxacin (Levaquin 250 Mg Premixed Ivpb -)  250 mg in 50 mls @ 50 mls/hr 

IVPB DAILY [facility]; Protocol


   Last Admin: 12/03/18 09:38 Dose:  50 mls/hr


Fentanyl 500 mcg/ Dextrose  100 mls @ 5 mls/hr IVPB TITR [facility]; Protocol


   Last Admin: 12/03/18 22:49 Dose:  Not Given


Metronidazole (Flagyl 500mg Premixed Ivpb -)  500 mg in 100 mls @ 100 mls/hr 

IVPB Q8H-IV [facility]


   Last Admin: 12/04/18 01:23 Dose:  100 mls/hr


Lactobacillus Acidophilus (Bacid -)  1 tab PO DAILY [facility]


   Last Admin: 12/03/18 09:39 Dose:  1 tab


Metoprolol Tartrate (Lopressor Injection -)  5 mg IVPUSH Q8H PRN


   PRN Reason: TACHYCARDIA


   Last Admin: 12/01/18 01:03 Dose:  5 mg


Metoprolol Tartrate (Lopressor -)  25 mg NGT BID [facility]


   Last Admin: 12/03/18 21:14 Dose:  25 mg


Pantoprazole Sodium (Protonix Iv)  40 mg IVPUSH BID [facility]


   Last Admin: 12/03/18 21:12 Dose:  40 mg


Phytonadione (Aqua Mephyton Injection -)  5 mg SQ DAILY [facility]


   Stop: 12/05/18 17:59


   Last Admin: 12/03/18 09:39 Dose:  5 mg


Rifaximin (Xifaxan -)  550 mg PO BID [facility]


   Last Admin: 12/03/18 21:13 Dose:  550 mg


Sodium Bicarbonate (Sodium Bicarbonate -)  650 mg NGT DAILY [facility]


   Last Admin: 12/03/18 09:39 Dose:  650 mg


Thiamine HCl (Vitamin B1 -)  100 mg PO DAILY [facility]


   Last Admin: 12/03/18 09:39 Dose:  100 mg











- Objective


Vital Signs: 


 Vital Signs











Temperature  99.8 F H  12/04/18 06:00


 


Pulse Rate  116 H  12/04/18 08:00


 


Respiratory Rate  18   12/04/18 09:10


 


Blood Pressure  108/70   12/04/18 08:00


 


O2 Sat by Pulse Oximetry (%)  100   12/03/18 19:41











Cardiovascular: Yes: S1, S2


Respiratory: Yes: Regular, CTA Bilaterally


Gastrointestinal: Yes: Normal Bowel Sounds, Soft


Labs: 


 CBC, BMP





 12/04/18 05:30 





 12/04/18 05:30 





 INR, PTT











INR  1.47  (0.83-1.09)  H  12/04/18  05:30    


 


Fibrinogen  338.0 mg/dL (238-498)  D 12/03/18  20:00    














Problem List





- Problems


(1) Toxic metabolic encephalopathy


Code(s): G92 - TOXIC ENCEPHALOPATHY   





(2) Cellulitis


Code(s): L03.90 - CELLULITIS, UNSPECIFIED   


Qualifiers: 


   Site of cellulitis: extremity   Site of cellulitis of extremity: lower 

extremity   Laterality: right   Qualified Code(s): L03.115 - Cellulitis of 

right lower limb   





(3) Sepsis


Code(s): A41.9 - SEPSIS, UNSPECIFIED ORGANISM   





(4) Artery occlusion


Code(s): I70.90 - UNSPECIFIED ATHEROSCLEROSIS   





(5) Hypercalcemia


Code(s): E83.52 - HYPERCALCEMIA   





(6) Paroxysmal atrial fibrillation


Code(s): I48.0 - PAROXYSMAL ATRIAL FIBRILLATION   





(7) HAM (acute kidney injury)


Code(s): N17.9 - ACUTE KIDNEY FAILURE, UNSPECIFIED   





Assessment/Plan





(1) HAM (acute kidney injury)


Assessment/Plan: 


potassium now normal range- hyperkalemia improved


ultrasound hyperdense lesion noted in left renal cortex


Code(s): N17.9 - ACUTE KIDNEY FAILURE, UNSPECIFIED   





(2) Atrial fibrillation with RVR


Assessment/Plan: 


heparin drip --dc due to anemia


cardiac monitor


rate control with metoprolol








(3) Respiratory failure


Assessment/Plan: 


S/p Trach 


Vwnt setting per pulm


Code(s): J96.90 - RESPIRATORY FAILURE, UNSP, UNSP W HYPOXIA OR HYPERCAPNIA   





(4) Toxic metabolic encephalopathy


Assessment/Plan: 


Microbiology


S/p Rx for PNA


Code(s): G92 - TOXIC ENCEPHALOPATHY   





(5) Anemia


Assessment/Plan: 





-Hold Heparin


-Transfuse





(6) Nutrition


Assessment/Plan: 


Will need PEG

## 2018-12-05 LAB
ALBUMIN SERPL-MCNC: 1.2 G/DL (ref 3.4–5)
ALP SERPL-CCNC: 255 U/L (ref 45–117)
ALT SERPL-CCNC: 25 U/L (ref 13–61)
ANION GAP SERPL CALC-SCNC: 4 MMOL/L (ref 8–16)
AST SERPL-CCNC: 61 U/L (ref 15–37)
BASOPHILS # BLD: 0.5 % (ref 0–2)
BILIRUB SERPL-MCNC: 0.7 MG/DL (ref 0.2–1)
BUN SERPL-MCNC: 41 MG/DL (ref 7–18)
CALCIUM SERPL-MCNC: 7 MG/DL (ref 8.5–10.1)
CHLORIDE SERPL-SCNC: 118 MMOL/L (ref 98–107)
CO2 SERPL-SCNC: 21 MMOL/L (ref 21–32)
CREAT SERPL-MCNC: 1.3 MG/DL (ref 0.55–1.3)
DEPRECATED RDW RBC AUTO: 15.6 % (ref 11.9–15.9)
EOSINOPHIL # BLD: 1.7 % (ref 0–4.5)
GLUCOSE SERPL-MCNC: 132 MG/DL (ref 74–106)
HCT VFR BLD CALC: 24.8 % (ref 35.4–49)
HGB BLD-MCNC: 8.7 GM/DL (ref 11.7–16.9)
INR BLD: 1.77 (ref 0.83–1.09)
LYMPHOCYTES # BLD: 22.2 % (ref 8–40)
MAGNESIUM SERPL-MCNC: 1.8 MG/DL (ref 1.8–2.4)
MCH RBC QN AUTO: 31.6 PG (ref 25.7–33.7)
MCHC RBC AUTO-ENTMCNC: 35 G/DL (ref 32–35.9)
MCV RBC: 90.1 FL (ref 80–96)
MONOCYTES # BLD AUTO: 8.4 % (ref 3.8–10.2)
NEUTROPHILS # BLD: 67.2 % (ref 42.8–82.8)
PHOSPHATE SERPL-MCNC: 3.5 MG/DL (ref 2.5–4.9)
PLATELET # BLD AUTO: 174 K/MM3 (ref 134–434)
PMV BLD: 10.1 FL (ref 7.5–11.1)
POTASSIUM SERPLBLD-SCNC: 4.8 MMOL/L (ref 3.5–5.1)
PROT SERPL-MCNC: 9.1 G/DL (ref 6.4–8.2)
PT PNL PPP: 21 SEC (ref 9.7–13)
RBC # BLD AUTO: 2.75 M/MM3 (ref 4–5.6)
SODIUM SERPL-SCNC: 143 MMOL/L (ref 136–145)
WBC # BLD AUTO: 3.4 K/MM3 (ref 4–10)
WBC NRBC COR # BLD: 3.01 K/MM3

## 2018-12-05 RX ADMIN — Medication SCH MG: at 09:43

## 2018-12-05 RX ADMIN — METOPROLOL TARTRATE SCH MG: 25 TABLET, FILM COATED ORAL at 09:43

## 2018-12-05 RX ADMIN — Medication SCH TAB: at 09:42

## 2018-12-05 RX ADMIN — PANTOPRAZOLE SODIUM SCH MG: 40 INJECTION, POWDER, FOR SOLUTION INTRAVENOUS at 21:25

## 2018-12-05 RX ADMIN — PHYTONADIONE SCH MG: 10 INJECTION, EMULSION INTRAMUSCULAR; INTRAVENOUS; SUBCUTANEOUS at 09:44

## 2018-12-05 RX ADMIN — ALLOPURINOL SCH MG: 100 TABLET ORAL at 21:25

## 2018-12-05 RX ADMIN — ALLOPURINOL SCH MG: 100 TABLET ORAL at 09:42

## 2018-12-05 RX ADMIN — CHLORHEXIDINE GLUCONATE 0.12% ORAL RINSE SCH ML: 1.2 LIQUID ORAL at 09:44

## 2018-12-05 RX ADMIN — RIFAXIMIN SCH MG: 550 TABLET ORAL at 21:25

## 2018-12-05 RX ADMIN — METOPROLOL TARTRATE PRN MG: 5 INJECTION, SOLUTION INTRAVENOUS at 08:34

## 2018-12-05 RX ADMIN — PANTOPRAZOLE SODIUM SCH MG: 40 INJECTION, POWDER, FOR SOLUTION INTRAVENOUS at 09:42

## 2018-12-05 RX ADMIN — CHLORHEXIDINE GLUCONATE 0.12% ORAL RINSE SCH ML: 1.2 LIQUID ORAL at 21:25

## 2018-12-05 RX ADMIN — RIFAXIMIN SCH MG: 550 TABLET ORAL at 09:42

## 2018-12-05 RX ADMIN — METOPROLOL TARTRATE SCH MG: 25 TABLET, FILM COATED ORAL at 21:25

## 2018-12-05 RX ADMIN — VITAMIN D, TAB 1000IU (100/BT) SCH UNIT: 25 TAB at 09:43

## 2018-12-05 NOTE — PN
Teaching Attending Note


Name of Resident: Ganga Mccormick





ATTENDING PHYSICIAN STATEMENT





I saw and evaluated the patient.


I reviewed the resident's note and discussed the case with the resident.


I agree with the resident's findings and plan as documented.








SUBJECTIVE:





Pt seen and examined in the ICU. Vented, unresponsive. No pressors. Heart rates 

variable.





OBJECTIVE:





 Vital Signs











 Period  Temp  Pulse  Resp  BP Sys/Varghese  Pulse Ox


 


 Last 24 Hr  98.4 F-100.2 F  112-140  18-23  /64-98  








 Intake & Output











 12/02/18 12/03/18 12/04/18 12/05/18





 23:59 23:59 23:59 23:59


 


Intake Total 2660 1296 1175 940


 


Output Total 6402 073 2917 


 


Balance 1010 346 -25 940


 


Weight 100.879 kg 102.693 kg 103.6 kg 104.372 kg








Gen: vented, unresponsive


Heart: tachycardic, irregular


Lung: scattered rhonchi


Abd: soft, nontender


Ext: + edema





 CBC, BMP





 12/05/18 08:05 





 12/05/18 08:05 





Active Medications





Acetaminophen (Tylenol -)  650 mg PO Q6H PRN


   PRN Reason: PAIN LEVEL 1 - 3


   Last Admin: 12/02/18 12:33 Dose:  650 mg


Allopurinol (Zyloprim -)  [facility]ID [facility]


   Last Admin: 12/05/18 09:42 Dose:  100 mg


Chlorhexidine Gluconate (Peridex -)  15 ml MM BID [facility]


   Last Admin: 12/05/18 09:44 Dose:  15 ml


Cholecalciferol (Vitamin D3 -)  1,000 unit PO DAILY [facility]


   Last Admin: 12/05/18 09:43 Dose:  1,000 unit


Diphenhydramine HCl (Benadryl Injection -)  25 mg IVPB ONCE PRN


   PRN Reason: DURING PLASMAPHERESIS


Levofloxacin (Levaquin 250 Mg Premixed Ivpb -)  250 mg in 50 mls @ 50 mls/hr 

IVPB DAILY [facility]


   Last Admin: 12/05/18 09:43 Dose:  50 mls/hr


Metronidazole (Flagyl 500mg Premixed Ivpb -)  500 mg in 100 mls @ 100 mls/hr 

IVPB Q8H-IV [facility]


   Last Admin: 12/05/18 09:44 Dose:  100 mls/hr


Lactobacillus Acidophilus (Bacid -)  1 tab PO DAILY [facility]


   Last Admin: 12/05/18 09:42 Dose:  1 tab


Metoprolol Tartrate (Lopressor Injection -)  5 mg IVPUSH Q8H PRN


   PRN Reason: TACHYCARDIA


   Last Admin: 12/05/18 08:34 Dose:  5 mg


Metoprolol Tartrate (Lopressor -)  25 mg NGT [facility]ID [facility]


   Last Admin: 12/05/18 09:43 Dose:  25 mg


Midazolam HCl (Versed -)  1 mg IVPUSH Q4H PRN


   PRN Reason: AGITATION


Pantoprazole Sodium (Protonix Iv)  40 mg IVPUSH BID [facility]


   Last Admin: 12/05/18 09:42 Dose:  40 mg


Phytonadione (Aqua Mephyton Injection -)  5 mg SQ DAILY [facility]


   Stop: 12/05/18 17:59


   Last Admin: 12/05/18 09:44 Dose:  5 mg


Rifaximin (Xifaxan -)  550 mg PO BID [facility]


   Last Admin: 12/05/18 09:42 Dose:  550 mg


Sodium Bicarbonate (Sodium Bicarbonate -)  650 mg NGT [facility]ILY [facility]


   Last Admin: 12/05/18 09:43 Dose:  650 mg


Thiamine HCl (Vitamin B1 -)  100 mg PO DAILY [facility]


   Last Admin: 12/05/18 09:43 Dose:  100 mg








ASSESSMENT AND PLAN:


Acute Hypoxic Respiratory Failure s/p Tracheostomy


Altered Mental Status


Metabolic Encephalopathy


Pneumonia


Acalculous Cholecystitis


Multiple Myeloma


Acute on Chronic Renal Failure


Metabolic Acidosis


LV Diastolic Dysfunction


Atrial Fibrillation


CAD


+Troponins likely Demand Ischemia


PAD


Hyperlipidemia


HTN


Anemia





-  monitor H/H


-  antibiotics per ID, can likely d/c


-  monitor urine output, creatinine


-  rate control


-  holding anticoagulation as he has bled and dropped his H/H this admission


-  hold sedation to assess mental status 


-  DVT/GI prophylaxis


-  poor overall prognosis for meaningful recovery


-  can monitor on vent floor

## 2018-12-05 NOTE — PN
Progress Note, Physician





- Current Medication List


Current Medications: 


Active Medications





Acetaminophen (Tylenol -)  650 mg PO Q6H PRN


   PRN Reason: PAIN LEVEL 1 - 3


   Last Admin: 12/02/18 12:33 Dose:  650 mg


Allopurinol (Zyloprim -)  100 mg PO BID [facility]


   Last Admin: 12/05/18 09:42 Dose:  100 mg


Chlorhexidine Gluconate (Peridex -)  15 ml MM BID [facility]


   Last Admin: 12/05/18 09:44 Dose:  15 ml


Cholecalciferol (Vitamin D3 -)  1,000 unit PO DAILY [facility]


   Last Admin: 12/05/18 09:43 Dose:  1,000 unit


Diphenhydramine HCl (Benadryl Injection -)  25 mg IVPB ONCE PRN


   PRN Reason: DURING PLASMAPHERESIS


Levofloxacin (Levaquin 250 Mg Premixed Ivpb -)  250 mg in 50 mls @ 50 mls/hr 

IVPB DAILY [facility]; Protocol


   Last Admin: 12/05/18 09:43 Dose:  50 mls/hr


Metronidazole (Flagyl 500mg Premixed Ivpb -)  500 mg in 100 mls @ 100 mls/hr 

IVPB Q8H-IV [facility]


   Last Admin: 12/05/18 09:44 Dose:  100 mls/hr


Lactobacillus Acidophilus (Bacid -)  1 tab PO DAILY [facility]


   Last Admin: 12/05/18 09:42 Dose:  1 tab


Metoprolol Tartrate (Lopressor Injection -)  5 mg IVPUSH Q8H PRN


   PRN Reason: TACHYCARDIA


   Last Admin: 12/05/18 08:34 Dose:  5 mg


Metoprolol Tartrate (Lopressor -)  25 mg NGT BID [facility]


   Last Admin: 12/05/18 09:43 Dose:  25 mg


Midazolam HCl (Versed -)  1 mg IVPUSH Q4H PRN


   PRN Reason: AGITATION


Pantoprazole Sodium (Protonix Iv)  40 mg IVPUSH BID [facility]


   Last Admin: 12/05/18 09:42 Dose:  40 mg


Phytonadione (Aqua Mephyton Injection -)  5 mg SQ DAILY [facility]


   Stop: 12/05/18 17:59


   Last Admin: 12/05/18 09:44 Dose:  5 mg


Rifaximin (Xifaxan -)  550 mg PO BID [facility]


   Last Admin: 12/05/18 09:42 Dose:  550 mg


Sodium Bicarbonate (Sodium Bicarbonate -)  650 mg NGT DAILY [facility]


   Last Admin: 12/05/18 09:43 Dose:  650 mg


Thiamine HCl (Vitamin B1 -)  100 mg PO DAILY [facility]


   Last Admin: 12/05/18 09:43 Dose:  100 mg











- Objective


Vital Signs: 


 Vital Signs











Temperature  100.2 F H  12/05/18 10:00


 


Pulse Rate  124 H  12/05/18 10:00


 


Respiratory Rate  23 H  12/05/18 11:04


 


Blood Pressure  104/74   12/05/18 10:00


 


O2 Sat by Pulse Oximetry (%)  100   12/05/18 09:00











Cardiovascular: Yes: S1, S2


Respiratory: Yes: Mechanically Ventilated


Gastrointestinal: Yes: Normal Bowel Sounds, Soft


Labs: 


 CBC, BMP





 12/05/18 08:05 





 12/05/18 08:05 





 INR, PTT











INR  1.47  (0.83-1.09)  H  12/04/18  05:30    


 


Fibrinogen  338.0 mg/dL (238-498)  D 12/03/18  20:00    














Problem List





- Problems


(1) Toxic metabolic encephalopathy


Code(s): G92 - TOXIC ENCEPHALOPATHY   





(2) Cellulitis


Code(s): L03.90 - CELLULITIS, UNSPECIFIED   


Qualifiers: 


   Site of cellulitis: extremity   Site of cellulitis of extremity: lower 

extremity   Laterality: right   Qualified Code(s): L03.115 - Cellulitis of 

right lower limb   





(3) Sepsis


Code(s): A41.9 - SEPSIS, UNSPECIFIED ORGANISM   





(4) Artery occlusion


Code(s): I70.90 - UNSPECIFIED ATHEROSCLEROSIS   





(5) Hypercalcemia


Code(s): E83.52 - HYPERCALCEMIA   





(6) Paroxysmal atrial fibrillation


Code(s): I48.0 - PAROXYSMAL ATRIAL FIBRILLATION   





(7) HAM (acute kidney injury)


Code(s): N17.9 - ACUTE KIDNEY FAILURE, UNSPECIFIED   





Assessment/Plan





(1) HAM (acute kidney injury)


Assessment/Plan: 


potassium now normal range- hyperkalemia improved


ultrasound hyperdense lesion noted in left renal cortex


Code(s): N17.9 - ACUTE KIDNEY FAILURE, UNSPECIFIED   





(2) Atrial fibrillation with RVR


Assessment/Plan: 


heparin drip --dc due to anemia


cardiac monitor


rate control with metoprolol








(3) Respiratory failure


Assessment/Plan: 


S/p Trach 


Vwnt setting per pulm


Code(s): J96.90 - RESPIRATORY FAILURE, UNSP, UNSP W HYPOXIA OR HYPERCAPNIA   





(4) Toxic metabolic encephalopathy


Assessment/Plan: 


Microbiology


S/p Rx for PNA


Code(s): G92 - TOXIC ENCEPHALOPATHY   





(5) Anemia


Assessment/Plan: 





-Hold Heparin


-Transfuse





(6) Nutrition


Assessment/Plan: 


d/w dr lantin peg in am

## 2018-12-05 NOTE — PN
Physical Exam: 


SUBJECTIVE: Patient seen and examined in the ICU.  s/p trache 12/3/18 w/o 

complication.  poorly responsive. opens eyes to voice. No gross change in 

overall mental status.








OBJECTIVE:





 Vital Signs











 Period  Temp  Pulse  Resp  BP Sys/Varghese  Pulse Ox


 


 Last 24 Hr  97.7 F-99.7 F    14-26  /56-98  94-99











GENERAL: +trache, poorly responsive off sedation but opens eyes to voice.


HEENT: NCAT PERRL sclera anicteric. nares patent, dry mucous membranes


NECK: Trachea midline. +trache in place 


LUNGS: bilateral diffuse rhonchi


HEART: reg rate and irregular rhythm. 


ABDOMEN: Soft, nondistended NT


EXTREMITIES: 2+ pulses, warm, well-perfused, no edema, scattered ulcers. 


NEUROLOGICAL: difficult to assess secondary to clinical condition. gag reflex 

does not appear to be present. opens eyes to voice. corneal reflex+


SKIN: Warm, dry, normal turgor, scattered ulcers on the legs














 Laboratory Results - last 24 hr











  12/04/18





  05:30


 


Neutrophils % (Manual)  65.0


 


Band Neutrophils %  3.1


 


Lymphocytes % (Manual)  18.6


 


Monocytes % (Manual)  8  D


 


Eosinophils % (Manual)  0.0  D


 


Basophils % (Manual)  0.0


 


Myelocytes % (Man)  1  D


 


Promyelocytes % (Man)  0


 


Blast Cells % (Manual)  0


 


Metamyelocytes  1  D


 


Hypochromia  0


 


Toxic Granulation  0


 


Dohle Bodies  0


 


Platelet Estimate  Decreased


 


Polychromasia  0


 


Poikilocytosis  0


 


Basophilic Stippling  0


 


Anisocytosis  0


 


Microcytosis  0


 


Macrocytosis  0


 


Spherocytes  0


 


Sickle Cells  0


 


Target Cells  0


 


Tear Drop Cells  0


 


Ovalocytes  0


 


Stomatocytes  0


 


Helmet Cells  0


 


Green-Jolly Bodies  0


 


Cabot Rings  0


 


Jeanette Cells  0


 


Acanthocytes (Spur)  0


 


Rouleaux  0


 


Fragmented RBCs  0


 


Schistocytes  0








Active Medications











Generic Name Dose Route Start Last Admin





  Trade Name Freq  PRN Reason Stop Dose Admin


 


Acetaminophen  650 mg  11/10/18 14:09  12/02/18 12:33





  Tylenol -  PO   650 mg





  Q6H PRN   Administration





  PAIN LEVEL 1 - 3   





     





     





     


 


Allopurinol  100 mg  11/09/18 10:00  12/04/18 21:01





  Zyloprim -  PO   100 mg





  BID [facility]A   Administration





     





     





     





     


 


Chlorhexidine Gluconate  15 ml  11/10/18 23:45  12/04/18 21:01





  Peridex -  MM   15 ml





  BID [facility]   Administration





     





     





     





     


 


Cholecalciferol  1,000 unit  11/18/18 10:00  12/04/18 10:25





  Vitamin D3 -  PO   1,000 unit





  DAILY [facility]   Administration





     





     





     





     


 


Diphenhydramine HCl  25 mg  11/09/18 12:00  





  Benadryl Injection -  IVPB   





  ONCE PRN   





  DURING PLASMAPHERESIS   





     





     





     


 


Levofloxacin  250 mg in 50 mls @ 50 mls/hr  11/19/18 12:45  12/04/18 10:25





  Levaquin 250 Mg Premixed Ivpb -  IVPB   50 mls/hr





  DAILY [facility]   Administration





     





     





  Protocol   





     


 


Metronidazole  500 mg in 100 mls @ 100 mls/hr  12/03/18 10:15  12/05/18 02:00





  Flagyl 500mg Premixed Ivpb -  IVPB   100 mls/hr





  Q8H-IV [facility]   Administration





     





     





     





     


 


Lactobacillus Acidophilus  1 tab  11/25/18 10:00  12/04/18 10:26





  Bacid -  PO   1 tab





  DAILY [facility]   Administration





     





     





     





     


 


Metoprolol Tartrate  5 mg  11/09/18 13:04  12/04/18 18:30





  Lopressor Injection -  IVPUSH   5 mg





  Q8H PRN   Administration





  TACHYCARDIA   





     





     





     


 


Metoprolol Tartrate  25 mg  11/29/18 11:54  12/04/18 21:01





  Lopressor -  NGT   25 mg





  BID [facility]   Administration





     





     





     





     


 


Midazolam HCl  1 mg  12/04/18 11:38  





  Versed -  IVPUSH   





  Q4H PRN   





  AGITATION   





     





     





     


 


Pantoprazole Sodium  40 mg  11/29/18 22:00  12/04/18 21:01





  Protonix Iv  IVPUSH   40 mg





  BID [facility]   Administration





     





     





     





     


 


Phytonadione  5 mg  12/02/18 18:00  12/04/18 10:25





  Aqua Mephyton Injection -  SQ  12/05/18 17:59  5 mg





  DAILY [facility]   Administration





     





     





     





     


 


Rifaximin  550 mg  11/15/18 22:00  12/04/18 21:01





  Xifaxan -  PO   550 mg





  BID [facility]   Administration





     





     





     





     


 


Sodium Bicarbonate  650 mg  12/01/18 10:00  12/04/18 10:25





  Sodium Bicarbonate -  NGT   650 mg





  DAILY [facility]   Administration





     





     





     





     


 


Thiamine HCl  100 mg  10/30/18 22:12  12/04/18 10:24





  Vitamin B1 -  PO   100 mg





  DAILY [facility]   Administration





     





     





     





     











ASSESSMENT/PLAN:


79 yo m PMH of A-Fib, Acute on chronic kidney injury, CAD w stents, 

hypercalcemia, medication non-adherence, paraproteinemia, thromboembolic disease

, diastolic heart dysfunction without failure, HTN, HLD, hypertrophic 

cardiomyopathy is here after a rapid response. He was on the floors when it was 

noticed that he has respiratory insufficiency and was desaturating, requiring 

ICU monitoring. GOC discussion ongoing. successful tracheostomy 12/3/18 w/o 

complication








GI: 


will need PEG placement, successful tracheostomy 12/3/18 w/o complication





metabolic encephalopathy 


-ammonia downtrending w/ rifaximin, 


s/p lactulose 


-LFTs elevated but downtrending. transamintitis most likely secondary to 

ischemic hepatits which is multifactorial including sepsis, episodes of 

hypotension and hyperviscosity syndrome. 





acalculous cholecystitis?


US shows thickened gallbladder wall, pericholecystic fluid, suspicious for 

acalculous cholecystitis. There was also mild dilatation of the CBD but that 

might be normal for age. 


-Spoke w/ IR, who feel image does not represent  acalculous cholecystitis and 

does not require any IR drainage 





ID: 


No fevers recorded. 


- Rhonchi heard on Lung auscultation


-RLL pneumonia


bcx 11/19/18, 12/3/18 neg 


off of ceftazidime


Vancomycin 


C diff neg 11/25/18


- c/w levaquin/flagyl. 


- ID on board


- c/w rifaximin for elevated ammonia level





Cardio: 


Afib -[id number] score of 6 


- Lopressor 25 mg BID PO


-IV metoprolol 5 mg PRN


- diastolic dysfunction + hypertrophic cardiomyopathy


- CAD with multiple stents


- Cardio consulted and on board.


off Xarelto 15 qd (renal dosing) while on heparin gtt. transfuse to maintain Hgb

>8.0 


s/p 1 u prbc 11/20/18 and 1 u prbc 11/22/18, Heparin with caution considering 

the dropping Hg, transfuse to maintain Hg equal or > 8.0, as outlined in prior 

notes ideally A/C therapy to be continued indefinitely unless it is absolutely 

contraindicated considering his [id number] score of 6 


- Sporadically goes in and out of V-Tach - Can give amio if v-tach is sustained 

and persistent. 


-Off heparin gtt, s/p 2 U prbcs w/ appropriate response. FOBT neg


-will cont to hold heparin given multiple attempts to restart it w/ sudden drop 

in Hgb, thus requiring transfusions





Pulm: 


-successful tracheostomy 12/3/18


-s/p 2 U FFP prior to trache procedure


- Patient has b/l pleural effusions.


- No pneumothorax


- b/l diffuse rhonchi


- infiltrate on CXR: Abx- Substituting levaquin for ceftazidime


Vancomycin 


- c/w levaquin/flagyl. 





Renal: 


- Acute on Chronic kidney injury


HAM ATN vs. Cast nephropathy  (FeNa was 2.1% indicating tubular injury, US 

showed no stones or obstruction, UA w/o protein but UPCR ~0.5 indicating non-

albumin proteinuria)


- Renal consulted and on board. 


Hyperkalemia (r/o tumor lysis)


serum K is improved, s/p bicarb gtt


fenton dcd





Neuro: 


- Patient is not alert or oriented.


successful tracheostomy 12/3/18, poorly responsive, off fentanyl gtt. opens 

eyes to voice


-dc fentanyl


-versed prn 


- Head CT showed no acute pathology


- Neuro consulted and on board. (Dr. Youngblood + Dr. phan) 


-ammonia stable in 60s w/ rifaximin, lactulose dcd 





Heme/Onc: 


- monitor H/H


- Will transfuse to keep hb > 8 


- Patient not receiving plasmapharesis today. 


- Paraproteinemia


- Patient fulfills new criteria  for multiple myeloma with free kappa/ free 

lambda light chain  ratio of 432 (>100 is diagnostic of myeloma) 


- Kappa/Lambda ratio > 100 consistent with Multiple myeloma


- M spike elevated elevated at 6.8 previously 4.7 


-steroids being held at this time 


transfuse to maintain Hg equal or > 8.0, as outlined in prior notes ideally A/C 

therapy to be continued indefinitely unless it is absolutely contraindicated 

considering his [id number] score of 6 


-H/H remains stable of heparin gtt. 


-will cont to hold heparin given multiple attempts to restart it w/ sudden drop 

in Hgb, thus requiring transfusions


-fibrinogen nl


- HIT AB neg


-FOBT neg 





s/p 2 u FFP prior to procedure 





Endo: 


- Parathyroid hormone WNL


- PTH-borderline low appropriate given hypercalcemia, PTHrP low





Prophylaxis: 


-Off heparin gtt, s/p prbcs w/ appropriate response. FOBT neg


transfuse to maintain Hg equal or > 8.0, as outlined in prior notes ideally A/C 

therapy to be continued indefinitely unless it is absolutely contraindicated 

considering his [id number] score of 6


-will cont to hold heparin given multiple attempts to restart it w/ sudden drop 

in Hgb, thus requiring transfusions


-SCDs


- Protonix





F/E/N: 


F: of IVF


E: Will replete lytes PRN


N: tube feeds (low potassium feeds). with free water. bicarb tabs





Code Status: Full Code 


-GOC discussion ongoing


-have recommended to family for compassionate extubation but they have decided 

on Trach/PEG 


successful tracheostomy 12/3/18





Dispo: 


Patient stable for transfer to floors. further care per primary team/PCP


poor overall prognosis for meaningful recovery


successful tracheostomy 12/3/18. 





Visit type





- Emergency Visit


Emergency Visit: Yes


ED Registration Date: 10/30/18


Care time: The patient presented to the Emergency Department on the above date 

and was hospitalized for further evaluation of their emergent condition.





- New Patient


This patient is new to me today: Yes


Date on this admission: 12/05/18





- Critical Care


Critical Care patient: Yes


Total Critical Care Time (in minutes): 38


Critical Care Statement: The care of this patient involved high complexity 

decision making to prevent further life threatening deterioration of the patient

's condition and/or to evaluate & treat vital organ system(s) failure or risk 

of failure.

## 2018-12-06 LAB
ALBUMIN SERPL-MCNC: 1.4 G/DL (ref 3.4–5)
ALP SERPL-CCNC: 240 U/L (ref 45–117)
ALT SERPL-CCNC: 35 U/L (ref 13–61)
ANION GAP SERPL CALC-SCNC: 4 MMOL/L (ref 8–16)
ANISOCYTOSIS BLD QL: (no result)
APTT BLD: 25.9 SECONDS (ref 25.2–36.5)
AST SERPL-CCNC: 89 U/L (ref 15–37)
BASOPHILS # BLD: 0.5 % (ref 0–2)
BILIRUB SERPL-MCNC: 0.9 MG/DL (ref 0.2–1)
BUN SERPL-MCNC: 42 MG/DL (ref 7–18)
CALCIUM SERPL-MCNC: 7 MG/DL (ref 8.5–10.1)
CHLORIDE SERPL-SCNC: 118 MMOL/L (ref 98–107)
CO2 SERPL-SCNC: 22 MMOL/L (ref 21–32)
CREAT SERPL-MCNC: 1.3 MG/DL (ref 0.55–1.3)
DEPRECATED RDW RBC AUTO: 15.5 % (ref 11.9–15.9)
EOSINOPHIL # BLD: 1.7 % (ref 0–4.5)
GLUCOSE SERPL-MCNC: 98 MG/DL (ref 74–106)
HCT VFR BLD CALC: 24.2 % (ref 35.4–49)
HGB BLD-MCNC: 8 GM/DL (ref 11.7–16.9)
INR BLD: 1.69 (ref 0.83–1.09)
LYMPHOCYTES # BLD: 22.6 % (ref 8–40)
MACROCYTES BLD QL: (no result)
MAGNESIUM SERPL-MCNC: 1.8 MG/DL (ref 1.8–2.4)
MCH RBC QN AUTO: 29.8 PG (ref 25.7–33.7)
MCHC RBC AUTO-ENTMCNC: 33.2 G/DL (ref 32–35.9)
MCV RBC: 89.6 FL (ref 80–96)
MONOCYTES # BLD AUTO: 8 % (ref 3.8–10.2)
NEUTROPHILS # BLD: 67.2 % (ref 42.8–82.8)
PHOSPHATE SERPL-MCNC: 3.6 MG/DL (ref 2.5–4.9)
PLATELET # BLD AUTO: 155 K/MM3 (ref 134–434)
PLATELET BLD QL SMEAR: (no result)
PMV BLD: 9.2 FL (ref 7.5–11.1)
POTASSIUM SERPLBLD-SCNC: 4.7 MMOL/L (ref 3.5–5.1)
PROT SERPL-MCNC: 9.3 G/DL (ref 6.4–8.2)
PT PNL PPP: 20 SEC (ref 9.7–13)
RBC # BLD AUTO: 2.7 M/MM3 (ref 4–5.6)
SODIUM SERPL-SCNC: 144 MMOL/L (ref 136–145)
TARGETS BLD QL SMEAR: (no result)
WBC # BLD AUTO: 3.7 K/MM3 (ref 4–10)

## 2018-12-06 PROCEDURE — 0DH63UZ INSERTION OF FEEDING DEVICE INTO STOMACH, PERCUTANEOUS APPROACH: ICD-10-PCS | Performed by: INTERNAL MEDICINE

## 2018-12-06 RX ADMIN — RIFAXIMIN SCH MG: 550 TABLET ORAL at 09:44

## 2018-12-06 RX ADMIN — Medication SCH MG: at 09:45

## 2018-12-06 RX ADMIN — Medication SCH TAB: at 09:45

## 2018-12-06 RX ADMIN — CHLORHEXIDINE GLUCONATE 0.12% ORAL RINSE SCH ML: 1.2 LIQUID ORAL at 09:45

## 2018-12-06 RX ADMIN — PANTOPRAZOLE SODIUM SCH MG: 40 INJECTION, POWDER, FOR SOLUTION INTRAVENOUS at 09:45

## 2018-12-06 RX ADMIN — RANITIDINE HYDROCHLORIDE SCH MG: 150 SOLUTION ORAL at 22:11

## 2018-12-06 RX ADMIN — ALLOPURINOL SCH MG: 100 TABLET ORAL at 09:45

## 2018-12-06 RX ADMIN — ALLOPURINOL SCH MG: 100 TABLET ORAL at 22:11

## 2018-12-06 RX ADMIN — VITAMIN D, TAB 1000IU (100/BT) SCH UNIT: 25 TAB at 09:45

## 2018-12-06 RX ADMIN — RIFAXIMIN SCH MG: 550 TABLET ORAL at 22:11

## 2018-12-06 RX ADMIN — METOPROLOL TARTRATE SCH MG: 25 TABLET, FILM COATED ORAL at 09:44

## 2018-12-06 RX ADMIN — METOPROLOL TARTRATE SCH MG: 25 TABLET, FILM COATED ORAL at 22:11

## 2018-12-06 RX ADMIN — ACETAMINOPHEN PRN MG: 325 TABLET ORAL at 14:15

## 2018-12-06 NOTE — PN
Progress Note, Physician


Chief Complaint: 





patient seen and examined in ICU 


off pressor off sedation


vented ,s/p trach


unresponsive


feeding via NG tube





- Current Medication List


Current Medications: 


Active Medications





Acetaminophen (Tylenol -)  650 mg PO Q6H PRN


   PRN Reason: PAIN LEVEL 1 - 3


   Last Admin: 12/02/18 12:33 Dose:  650 mg


Allopurinol (Zyloprim -)  100 mg PO BID [facility]


   Last Admin: 12/06/18 09:45 Dose:  100 mg


Chlorhexidine Gluconate (Peridex -)  15 ml MM BID [facility]


   Last Admin: 12/06/18 09:45 Dose:  15 ml


Cholecalciferol (Vitamin D3 -)  1,000 unit PO DAILY [facility]


   Last Admin: 12/06/18 09:45 Dose:  1,000 unit


Diphenhydramine HCl (Benadryl Injection -)  25 mg IVPB ONCE PRN


   PRN Reason: DURING PLASMAPHERESIS


Lactobacillus Acidophilus (Bacid -)  1 tab PO DAILY [facility]


   Last Admin: 12/06/18 09:45 Dose:  1 tab


Metoprolol Tartrate (Lopressor Injection -)  5 mg IVPUSH Q8H PRN


   PRN Reason: TACHYCARDIA


   Last Admin: 12/05/18 08:34 Dose:  5 mg


Metoprolol Tartrate (Lopressor -)  25 mg NGT BID [facility]


   Last Admin: 12/06/18 09:44 Dose:  25 mg


Midazolam HCl (Versed -)  1 mg IVPUSH Q4H PRN


   PRN Reason: AGITATION


Pantoprazole Sodium (Protonix Iv)  40 mg IVPUSH BID [facility]


   Last Admin: 12/06/18 09:45 Dose:  40 mg


Rifaximin (Xifaxan -)  550 mg PO BID [facility]


   Last Admin: 12/06/18 09:44 Dose:  550 mg


Sodium Bicarbonate (Sodium Bicarbonate -)  650 mg NGT DAILY [facility]


   Last Admin: 12/06/18 09:45 Dose:  650 mg


Thiamine HCl (Vitamin B1 -)  100 mg PO DAILY [facility]


   Last Admin: 12/06/18 09:45 Dose:  100 mg











- Objective


Vital Signs: 


 Vital Signs











Temperature  98.1 F   12/06/18 10:00


 


Pulse Rate  127 H  12/06/18 10:00


 


Respiratory Rate  19   12/06/18 10:00


 


Blood Pressure  109/76   12/06/18 10:00


 


O2 Sat by Pulse Oximetry (%)  100   12/06/18 08:45











Constitutional: Yes: Calm


Neck: Yes: Other (trach)


Cardiovascular: Yes: S1, S2


Respiratory: Yes: CTA Bilaterally


Gastrointestinal: Yes: Normal Bowel Sounds, Soft


...Rectal Exam: Yes: Other (rectal tube)


Genitourinary: Yes: Garces Present


Edema: Yes


Labs: 


 CBC, BMP





 12/06/18 05:30 





 12/06/18 05:30 





 INR, PTT











INR  1.69  (0.83-1.09)  H  12/06/18  05:30    


 


Fibrinogen  338.0 mg/dL (238-498)  D 12/03/18  20:00    














Problem List





- Problems


(1) Elevated LFTs


Assessment/Plan: 


ultrasound of liver noted suggestive of acalculous cholecystitis, 


on rifaximin


Code(s): R94.5 - ABNORMAL RESULTS OF LIVER FUNCTION STUDIES   





(2) HAM (acute kidney injury)


Assessment/Plan: 


HAM vs ATN- 


iv calcium- repleted


potassium now normal range- hyperkalemia improved


on sodium bicarbonate 


Code(s): N17.9 - ACUTE KIDNEY FAILURE, UNSPECIFIED   





(3) Atrial fibrillation with RVR


Assessment/Plan: 


off AC because of bleeding h/h dropped


rate control with metoprolol








(4) Respiratory failure


Assessment/Plan: 


s/p trach 


off pressors and sedation


s/p levaquin and flagyl course


Code(s): J96.90 - RESPIRATORY FAILURE, UNSP, UNSP W HYPOXIA OR HYPERCAPNIA   





(5) Toxic metabolic encephalopathy


Assessment/Plan: 


s/p abx course


Code(s): G92 - TOXIC ENCEPHALOPATHY   





(6) Altered mental status


Assessment/Plan: 


plan for peg insertion  today


s/p trach


s/p dexamethasone - no improvement in mental status


s/p plasmapheresis


dvt ppx lovenox


newly diagnosed Multiple Myeloma- anemia paraproteinemia, 


Code(s): R41.82 - ALTERED MENTAL STATUS, UNSPECIFIED   





Assessment/Plan


plan for peg insertion today

## 2018-12-06 NOTE — PN
Progress Note, Physician


History of Present Illness: 


Poorly responsive on vent s/p tracheostomy, persistent afib on lopressor off 

heparin gtt due to Hgb decreases.





- Current Medication List


Current Medications: 


Active Medications





Acetaminophen (Tylenol -)  650 mg PO Q6H PRN


   PRN Reason: PAIN LEVEL 1 - 3


   Last Admin: 12/02/18 12:33 Dose:  650 mg


Allopurinol (Zyloprim -)  100 mg PO BID [facility]


   Last Admin: 12/06/18 09:45 Dose:  100 mg


Chlorhexidine Gluconate (Peridex -)  15 ml MM BID [facility]


   Last Admin: 12/06/18 09:45 Dose:  15 ml


Cholecalciferol (Vitamin D3 -)  1,000 unit PO DAILY [facility]


   Last Admin: 12/06/18 09:45 Dose:  1,000 unit


Diphenhydramine HCl (Benadryl Injection -)  25 mg IVPB ONCE PRN


   PRN Reason: DURING PLASMAPHERESIS


Lactobacillus Acidophilus (Bacid -)  1 tab PO DAILY [facility]


   Last Admin: 12/06/18 09:45 Dose:  1 tab


Metoprolol Tartrate (Lopressor Injection -)  5 mg IVPUSH Q8H PRN


   PRN Reason: TACHYCARDIA


   Last Admin: 12/05/18 08:34 Dose:  5 mg


Metoprolol Tartrate (Lopressor -)  25 mg NGT BID [facility]


   Last Admin: 12/06/18 09:44 Dose:  25 mg


Midazolam HCl (Versed -)  1 mg IVPUSH Q4H PRN


   PRN Reason: AGITATION


Pantoprazole Sodium (Protonix Iv)  40 mg IVPUSH BID [facility]


   Last Admin: 12/06/18 09:45 Dose:  40 mg


Rifaximin (Xifaxan -)  550 mg PO BID [facility]


   Last Admin: 12/06/18 09:44 Dose:  550 mg


Sodium Bicarbonate (Sodium Bicarbonate -)  650 mg NGT DAILY [facility]


   Last Admin: 12/06/18 09:45 Dose:  650 mg


Thiamine HCl (Vitamin B1 -)  100 mg PO DAILY [facility]


   Last Admin: 12/06/18 09:45 Dose:  100 mg











- Objective


Vital Signs: 


 Vital Signs











Temperature  98.1 F   12/06/18 10:00


 


Pulse Rate  127 H  12/06/18 10:00


 


Respiratory Rate  19   12/06/18 10:00


 


Blood Pressure  109/76   12/06/18 10:00


 


O2 Sat by Pulse Oximetry (%)  100   12/06/18 08:45











Constitutional: Yes: No Distress, Calm, Thin


Neck: Yes: Supple, Other (Tracheostomy)


Cardiovascular: Yes: Pulse Irregular


Respiratory: Yes: Mechanically Ventilated


Gastrointestinal: Yes: Soft, Hypoactive Bowel Sounds


Edema: Yes


Edema: LUE: 1+, RUE: 1+


Labs: 


 CBC, BMP





 12/06/18 05:30 





 12/06/18 05:30 





 INR, PTT











INR  1.69  (0.83-1.09)  H  12/06/18  05:30    


 


Fibrinogen  338.0 mg/dL (238-498)  D 12/03/18  20:00    














- ....Imaging


EKG: Report Reviewed (Tele: Afib)





Problem List





- Problems


(1) Atrial fibrillation with RVR


Code(s): I48.91 - UNSPECIFIED ATRIAL FIBRILLATION   





(2) Acute-on-chronic kidney injury


Code(s): N17.9 - ACUTE KIDNEY FAILURE, UNSPECIFIED; N18.9 - CHRONIC KIDNEY 

DISEASE, UNSPECIFIED   


Qualifiers: 


   Acute renal failure type: unspecified   Chronic kidney disease stage: 

unspecified stage   Qualified Code(s): N17.9 - Acute kidney failure, unspecified

; N18.9 - Chronic kidney disease, unspecified   





(3) Demand ischemia


Code(s): I24.8 - OTHER FORMS OF ACUTE ISCHEMIC HEART DISEASE   





(4) Hypercalcemia


Code(s): E83.52 - HYPERCALCEMIA   





(5) Nonadherence to medication


Code(s): Z91.14 - PATIENT'S OTHER NONCOMPLIANCE WITH MEDICATION REGIMEN   





(6) Paraproteinemia


Code(s): D89.2 - HYPERGAMMAGLOBULINEMIA, UNSPECIFIED   





(7) Anticoagulant long-term use


Code(s): Z79.01 - LONG TERM (CURRENT) USE OF ANTICOAGULANTS   





(8) Chronic thromboembolic disease


Code(s): I74.9 - EMBOLISM AND THROMBOSIS OF UNSPECIFIED ARTERY   





(9) Coronary artery disease


Code(s): I25.10 - ATHSCL HEART DISEASE OF NATIVE CORONARY ARTERY W/O ANG PCTRS 

  


Qualifiers: 


   Coronary Disease-Associated Artery/Lesion type: native artery   Native vs. 

transplanted heart: native heart   Associated angina: without angina   

Qualified Code(s): I25.10 - Atherosclerotic heart disease of native coronary 

artery without angina pectoris   





(10) Diastolic dysfunction without heart failure


Code(s): I51.9 - HEART DISEASE, UNSPECIFIED   





(11) HTN (hypertension)


Code(s): I10 - ESSENTIAL (PRIMARY) HYPERTENSION   


Qualifiers: 


   Hypertension type: essential hypertension   Qualified Code(s): I10 - 

Essential (primary) hypertension   





(12) Hypertrophic cardiomyopathy


Code(s): I42.2 - OTHER HYPERTROPHIC CARDIOMYOPATHY   





(13) Hyperlipidemia


Code(s): E78.5 - HYPERLIPIDEMIA, UNSPECIFIED   


Qualifiers: 


   Hyperlipidemia type: pure hypercholesterolemia   Qualified Code(s): E78.00 - 

Pure hypercholesterolemia, unspecified; E78.0 - Pure hypercholesterolemia   





(14) Multiple myeloma


Code(s): C90.00 - MULTIPLE MYELOMA NOT HAVING ACHIEVED REMISSION   


Qualifiers: 


   Multiple myeloma remission status: not in remission   Qualified Code(s): 

C90.00 - Multiple myeloma not having achieved remission   





(15) Acalculous cholecystitis


Code(s): K81.9 - CHOLECYSTITIS, UNSPECIFIED   





Assessment/Plan


R&LHc at [facility] 1/11/2017 showing nonobstructive CAD, severe LV apical 

hypertrophic cardiomyopathy, mildly elevated right sided pressures, Mynx 

deployed right CFA access site. Study is consistent with apical hypertrophy (

spade-like) variant of hypertrophic cardiomyopathy, planned for optimal medical 

therapy. 


Echocardiogram: 07/11/2018 Mod cLVH, severe DYANA, mod TR RVSP 50-60 mmHg, mod-

severe MR


Echocardiogram: 10/16/2018 Normal LV size with hyperdynamic LVEF 75%, mild BSH, 

normal RV size and fxn, severe LAE, mod-severe MR, mod TR





1. Acute hypoxic respiratory failure, suspected aspiration pneumonia with 

sepsis syndrome, remains intubated post tracheostomy


2. Toxic metabolic encephalopathy, rule out CVA


3. Acute on CKD


4. Paraproteinemia confirmed multiple myeloma


5. 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 

disease related to persistent atrial fibrillation 


6. CAD with evidence of demand ischemia, non obstructive CAD 


7. 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 

chronic class I-II NYHA classification LV failure, compensated/euvolemic


8. Persistent atrial fibrillation [id number] score of 6


9. HTN


10. Anemia/thrombocytopenia


11. Acalculous Cholecystitis


12. Ischemic hepatitis


13. Resolved Hypernatremia





PLAN:


1. Consider A/C therapy unless it is absolutely contraindicated considering his 

[id number] score of 6 and stroke risk, previously was on NOAC. In the interim 

consider Heparin gtt, albeit repeated Hgb drops on anticoagulation is major 

caveat.


2. Continue Lopressor 25 bid and titrate dose. 


3. Antibiotics coverage


4. Ventilator management as per the ICU team


5. ? PEG


6. ? goal of care and advanced directives need to be readdressed.

## 2018-12-06 NOTE — PN
Teaching Attending Note


Name of Resident: Ganga Mccormick





ATTENDING PHYSICIAN STATEMENT





I saw and evaluated the patient.


I reviewed the resident's note and discussed the case with the resident.


I agree with the resident's findings and plan as documented.








SUBJECTIVE:


Patient seen and examined in the ICU.  


No acute events overnight. 











OBJECTIVE:





 


  


  


 Intake & Output











 12/03/18 12/04/18 12/05/18 12/06/18





 23:59 23:59 23:59 23:59


 


Intake Total 1296 1175 2650 450


 


Output Total 950 1200  0


 


Balance 346 -25 2650 450


 


Weight 226 lb 6.4 oz 228 lb 6.4 oz 230 lb 1.6 oz 232 lb 4.8 oz











 Last Vital Signs











Temp Pulse Resp BP Pulse Ox


 


 98.1 F   127 H  24 H  109/76   100 


 


 12/06/18 10:00  12/06/18 10:00  12/06/18 11:15  12/06/18 10:00  12/06/18 09:00








Active Medications





Acetaminophen (Tylenol -)  650 mg PO Q6H PRN


   PRN Reason: PAIN LEVEL 1 - 3


   Last Admin: 12/02/18 12:33 Dose:  650 mg


Allopurinol (Zyloprim -)  100 mg PO BID [facility]


   Last Admin: 12/06/18 09:45 Dose:  100 mg


Chlorhexidine Gluconate (Peridex -)  15 ml MM BID [facility]


   Last Admin: 12/06/18 09:45 Dose:  15 ml


Cholecalciferol (Vitamin D3 -)  1,000 unit PO DAILY [facility]


   Last Admin: 12/06/18 09:45 Dose:  1,000 unit


Diphenhydramine HCl (Benadryl Injection -)  25 mg IVPB ONCE PRN


   PRN Reason: DURING PLASMAPHERESIS


Lactobacillus Acidophilus (Bacid -)  1 tab PO DAILY [facility]


   Last Admin: 12/06/18 09:45 Dose:  1 tab


Metoprolol Tartrate (Lopressor Injection -)  5 mg IVPUSH Q8H PRN


   PRN Reason: TACHYCARDIA


   Last Admin: 12/05/18 08:34 Dose:  5 mg


Metoprolol Tartrate (Lopressor -)  25 mg NGT BID [facility]


   Last Admin: 12/06/18 09:44 Dose:  25 mg


Midazolam HCl (Versed -)  1 mg IVPUSH Q4H PRN


   PRN Reason: AGITATION


Pantoprazole Sodium (Protonix Iv)  40 mg IVPUSH BID [facility]


   Last Admin: 12/06/18 09:45 Dose:  40 mg


Rifaximin (Xifaxan -)  550 mg PO BID [facility]


   Last Admin: 12/06/18 09:44 Dose:  550 mg


Sodium Bicarbonate (Sodium Bicarbonate -)  650 mg NGT DAILY [facility]


   Last Admin: 12/06/18 09:45 Dose:  650 mg


Thiamine HCl (Vitamin B1 -)  100 mg PO DAILY [facility]


   Last Admin: 12/06/18 09:45 Dose:  100 mg











Gen: Trached, vented, poorly responsive


Heart: RRR


Lung: scattered rhonchi


Abd: soft, nontender


Ext: + edema


  


  


 Laboratory Results - last 24 hr











  12/05/18 12/05/18 12/06/18





  08:05 16:00 05:30


 


WBC    3.7 L


 


RBC    2.70 L


 


Hgb    8.0 L


 


Hct    24.2 L


 


MCV    89.6


 


MCH    29.8


 


MCHC    33.2


 


RDW    15.5


 


Plt Count    155


 


MPV    9.2


 


Absolute Neuts (auto)    2.5


 


Neutrophils %    67.2


 


Neutrophils % (Manual)  57.8  


 


Band Neutrophils %  5.6  


 


Lymphocytes %    22.6


 


Lymphocytes % (Manual)  23.3  D  


 


Monocytes %    8.0


 


Monocytes % (Manual)  9  


 


Eosinophils %    1.7


 


Eosinophils % (Manual)  0.0  


 


Basophils %    0.5


 


Basophils % (Manual)  0.0  


 


Myelocytes % (Man)  0  D  


 


Promyelocytes % (Man)  0  


 


Blast Cells % (Manual)  0  


 


Nucleated RBC %    10 H


 


Metamyelocytes  3 H D  


 


PT with INR   21.00 H 


 


INR   1.77 H 


 


PTT (Actin FS)   


 


Sodium   


 


Potassium   


 


Chloride   


 


Carbon Dioxide   


 


Anion Gap   


 


BUN   


 


Creatinine   


 


Creat Clearance w eGFR   


 


Random Glucose   


 


Calcium   


 


Phosphorus   


 


Magnesium   


 


Total Bilirubin   


 


AST   


 


ALT   


 


Alkaline Phosphatase   


 


Total Protein   


 


Albumin   














  12/06/18 12/06/18





  05:30 05:30


 


WBC  


 


RBC  


 


Hgb  


 


Hct  


 


MCV  


 


MCH  


 


MCHC  


 


RDW  


 


Plt Count  


 


MPV  


 


Absolute Neuts (auto)  


 


Neutrophils %  


 


Neutrophils % (Manual)  


 


Band Neutrophils %  


 


Lymphocytes %  


 


Lymphocytes % (Manual)  


 


Monocytes %  


 


Monocytes % (Manual)  


 


Eosinophils %  


 


Eosinophils % (Manual)  


 


Basophils %  


 


Basophils % (Manual)  


 


Myelocytes % (Man)  


 


Promyelocytes % (Man)  


 


Blast Cells % (Manual)  


 


Nucleated RBC %  


 


Metamyelocytes  


 


PT with INR   20.00 H


 


INR   1.69 H


 


PTT (Actin FS)   25.9


 


Sodium  144 


 


Potassium  4.7 


 


Chloride  118 H 


 


Carbon Dioxide  22 


 


Anion Gap  4 L 


 


BUN  42 H 


 


Creatinine  1.3 


 


Creat Clearance w eGFR  53.25 


 


Random Glucose  98 


 


Calcium  7.0 L 


 


Phosphorus  3.6 


 


Magnesium  1.8 


 


Total Bilirubin  0.9 


 


AST  89 H 


 


ALT  35 


 


Alkaline Phosphatase  240 H 


 


Total Protein  9.3 H 


 


Albumin  1.4 L 











ASSESSMENT AND PLAN:


Acute Hypoxic Respiratory Failure: S/P Trach 


Altered Mental Status


Metabolic Encephalopathy


Pneumonia


Acalculous Cholecystitis


Multiple Myeloma


Acute on Chronic Renal Failure


Metabolic Acidosis


LV Diastolic Dysfunction


Atrial Fibrillation


CAD


+Troponins likely Demand Ischemia


PAD


Hyperlipidemia


HTN


Anemia





-  rate control


-  DVT/GI prophylaxis


-  D/C planning 





Dr Blum

## 2018-12-06 NOTE — PN
Physical Exam: 


SUBJECTIVE: Patient seen and examined in the ICU.  s/p trache 12/3/18 w/o 

complication.  poorly responsive. opens eyes to voice. No gross change in 

overall mental status. Receiving 2u FFP prior to PEG. 








OBJECTIVE:





 Vital Signs











 Period  Temp  Pulse  Resp  BP Sys/Varghese  Pulse Ox


 


 Last 24 Hr  98.3 F-100.2 F  109-140  18-27  100-114/58-81  100-100











GENERAL: +trache, poorly responsive off sedation but opens eyes to voice.


HEENT: NCAT PERRL sclera anicteric. nares patent, dry mucous membranes


NECK: Trachea midline. +trache in place 


LUNGS: bilateral diffuse rhonchi


HEART: reg rate and irregular rhythm. 


ABDOMEN: Soft, nondistended NT


EXTREMITIES: 2+ pulses, warm, well-perfused, no edema, scattered ulcers. 


NEUROLOGICAL: difficult to assess secondary to clinical condition. gag reflex 

does not appear to be present. opens eyes to voice. corneal reflex+


SKIN: Warm, dry, normal turgor, scattered ulcers on the legs














 Laboratory Results - last 24 hr











  12/05/18 12/05/18 12/05/18





  08:05 08:05 16:00


 


WBC  3.4 L  


 


Corrected WBC (auto)  3.01  


 


RBC  2.75 L  


 


Hgb  8.7 L  


 


Hct  24.8 L  


 


MCV  90.1  


 


MCH  31.6  


 


MCHC  35.0  


 


RDW  15.6  


 


Plt Count  174  


 


MPV  10.1  


 


Absolute Neuts (auto)  2.3  


 


Neutrophils %  67.2  


 


Neutrophils % (Manual)  57.8  


 


Band Neutrophils %  5.6  


 


Lymphocytes %  22.2  


 


Lymphocytes % (Manual)  23.3  D  


 


Monocytes %  8.4  


 


Monocytes % (Manual)  9  


 


Eosinophils %  1.7  


 


Eosinophils % (Manual)  0.0  


 


Basophils %  0.5  


 


Basophils % (Manual)  0.0  


 


Myelocytes % (Man)  0  D  


 


Promyelocytes % (Man)  0  


 


Blast Cells % (Manual)  0  


 


Nucleated RBC %  13 H*  


 


Metamyelocytes  3 H D  


 


PT with INR    21.00 H


 


INR    1.77 H


 


PTT (Actin FS)   


 


Sodium   143 


 


Potassium   4.8 


 


Chloride   118 H 


 


Carbon Dioxide   21 


 


Anion Gap   4 L 


 


BUN   41 H 


 


Creatinine   1.3 


 


Creat Clearance w eGFR   53.25 


 


Random Glucose   132 H 


 


Calcium   7.0 L 


 


Phosphorus   3.5 


 


Magnesium   1.8 


 


Total Bilirubin   0.7 


 


AST   61 H 


 


ALT   25 


 


Alkaline Phosphatase   255 H 


 


Total Protein   9.1 H 


 


Albumin   1.2 L 














  12/06/18 12/06/18 12/06/18





  05:30 05:30 05:30


 


WBC  3.7 L  


 


Corrected WBC (auto)   


 


RBC  2.70 L  


 


Hgb  8.0 L  


 


Hct  24.2 L  


 


MCV  89.6  


 


MCH  29.8  


 


MCHC  33.2  


 


RDW  15.5  


 


Plt Count  155  


 


MPV  9.2  


 


Absolute Neuts (auto)  2.5  


 


Neutrophils %  67.2  


 


Neutrophils % (Manual)   


 


Band Neutrophils %   


 


Lymphocytes %  22.6  


 


Lymphocytes % (Manual)   


 


Monocytes %  8.0  


 


Monocytes % (Manual)   


 


Eosinophils %  1.7  


 


Eosinophils % (Manual)   


 


Basophils %  0.5  


 


Basophils % (Manual)   


 


Myelocytes % (Man)   


 


Promyelocytes % (Man)   


 


Blast Cells % (Manual)   


 


Nucleated RBC %  10 H  


 


Metamyelocytes   


 


PT with INR    20.00 H


 


INR    1.69 H


 


PTT (Actin FS)    25.9


 


Sodium   144 


 


Potassium   4.7 


 


Chloride   118 H 


 


Carbon Dioxide   22 


 


Anion Gap   4 L 


 


BUN   42 H 


 


Creatinine   1.3 


 


Creat Clearance w eGFR   53.25 


 


Random Glucose   98 


 


Calcium   7.0 L 


 


Phosphorus   3.6 


 


Magnesium   1.8 


 


Total Bilirubin   0.9 


 


AST   89 H 


 


ALT   35 


 


Alkaline Phosphatase   240 H 


 


Total Protein   9.3 H 


 


Albumin   1.4 L 








Active Medications











Generic Name Dose Route Start Last Admin





  Trade Name Freq  PRN Reason Stop Dose Admin


 


Acetaminophen  650 mg  11/10/18 14:09  12/02/18 12:33





  Tylenol -  PO   650 mg





  Q6H PRN   Administration





  PAIN LEVEL 1 - 3   





     





     





     


 


Allopurinol  100 mg  11/09/18 10:00  12/05/18 21:25





  Zyloprim -  PO   100 mg





  BID AVA   Administration





     





     





     





     


 


Chlorhexidine Gluconate  15 ml  11/10/18 23:45  12/05/18 21:25





  Peridex -  MM   15 ml





  BID AVA   Administration





     





     





     





     


 


Cholecalciferol  1,000 unit  11/18/18 10:00  12/05/18 09:43





  Vitamin D3 -  PO   1,000 unit





  DAILY AVA   Administration





     





     





     





     


 


Diphenhydramine HCl  25 mg  11/09/18 12:00  





  Benadryl Injection -  IVPB   





  ONCE PRN   





  DURING PLASMAPHERESIS   





     





     





     


 


Lactobacillus Acidophilus  1 tab  11/25/18 10:00  12/05/18 09:42





  Bacid -  PO   1 tab





  DAILY AVA   Administration





     





     





     





     


 


Metoprolol Tartrate  5 mg  11/09/18 13:04  12/05/18 08:34





  Lopressor Injection -  IVPUSH   5 mg





  Q8H PRN   Administration





  TACHYCARDIA   





     





     





     


 


Metoprolol Tartrate  25 mg  11/29/18 11:54  12/05/18 21:25





  Lopressor -  NGT   25 mg





  BID AVA   Administration





     





     





     





     


 


Midazolam HCl  1 mg  12/04/18 11:38  





  Versed -  IVPUSH   





  Q4H PRN   





  AGITATION   





     





     





     


 


Pantoprazole Sodium  40 mg  11/29/18 22:00  12/05/18 21:25





  Protonix Iv  IVPUSH   40 mg





  BID AVA   Administration





     





     





     





     


 


Rifaximin  550 mg  11/15/18 22:00  12/05/18 21:25





  Xifaxan -  PO   550 mg





  BID AVA   Administration





     





     





     





     


 


Sodium Bicarbonate  650 mg  12/01/18 10:00  12/05/18 09:43





  Sodium Bicarbonate -  NGT   650 mg





  DAILY AVA   Administration





     





     





     





     


 


Thiamine HCl  100 mg  10/30/18 22:12  12/05/18 09:43





  Vitamin B1 -  PO   100 mg





  DAILY AVA   Administration





     





     





     





     











ASSESSMENT/PLAN:


77 yo m PMH of A-Fib, Acute on chronic kidney injury, CAD w stents, 

hypercalcemia, medication non-adherence, paraproteinemia, thromboembolic disease

, diastolic heart dysfunction without failure, HTN, HLD, hypertrophic 

cardiomyopathy is here after a rapid response. He was on the floors when it was 

noticed that he has respiratory insufficiency and was desaturating, requiring 

ICU monitoring. GOC discussion ongoing. successful tracheostomy 12/3/18 w/o 

complication. Receiving 2u FFP prior to PEG. 








GI: 


Receiving 2u FFP prior to PEG today, successful tracheostomy 12/3/18 w/o 

complication





metabolic encephalopathy 


-ammonia downtrending w/ rifaximin, 


s/p lactulose 


-LFTs elevated but downtrending. transamintitis most likely secondary to 

ischemic hepatits which is multifactorial including sepsis, episodes of 

hypotension and hyperviscosity syndrome. 





acalculous cholecystitis?


US shows thickened gallbladder wall, pericholecystic fluid, suspicious for 

acalculous cholecystitis. There was also mild dilatation of the CBD but that 

might be normal for age. 


-Spoke w/ IR, who feel image does not represent  acalculous cholecystitis and 

does not require any IR drainage 





ID: 


No fevers overnight


- Rhonchi heard on Lung auscultation


-RLL pneumonia - appears to have resolved


bcx 11/19/18, 12/3/18 neg 


off of ceftazidime


dc Vancomycin 


C diff neg 11/25/18


- dcd levaquin/flagyl. 


- ID on board


- c/w rifaximin for elevated ammonia level





Cardio: 


Afib -[id number] score of 6 


- Lopressor 25 mg BID PO


-IV metoprolol 5 mg PRN


- diastolic dysfunction + hypertrophic cardiomyopathy


- CAD with multiple stents


- Cardio consulted and on board.


off Xarelto 15 qd (renal dosing) while on heparin gtt. transfuse to maintain Hgb

>8.0 


s/p 1 u prbc 11/20/18 and 1 u prbc 11/22/18, Heparin with caution considering 

the dropping Hg, transfuse to maintain Hg equal or > 8.0, as outlined in prior 

notes ideally A/C therapy to be continued indefinitely unless it is absolutely 

contraindicated considering his [id number] score of 6 


- Sporadically goes in and out of V-Tach - Can give amio if v-tach is sustained 

and persistent. 


-Off heparin gtt, s/p 2 U prbcs w/ appropriate response. FOBT neg


-will cont to hold heparin given multiple attempts to restart it w/ sudden drop 

in Hgb likely due to a small GIB, thus requiring transfusions





Pulm: 


-successful tracheostomy 12/3/18


- b/l diffuse rhonchi


-dcd Vancomycin, levaquin/flagyl. 





Renal: 


- Acute on Chronic kidney injury


HAM ATN vs. Cast nephropathy  (FeNa was 2.1% indicating tubular injury, US 

showed no stones or obstruction, UA w/o protein but UPCR ~0.5 indicating non-

albumin proteinuria)


- Renal consulted and on board. 


Hyperkalemia (r/o tumor lysis)


serum K is improved, s/p bicarb gtt


fenton dcd





Neuro: 


- Patient is not alert or oriented.


successful tracheostomy 12/3/18, poorly responsive, off fentanyl gtt. opens 

eyes to voice


-versed prn 


- Head CT showed no acute pathology


- Neuro consulted and on board. (Dr. Youngblood + Dr. phan) 


-ammonia stable in 60s w/ rifaximin, lactulose dcd 





Heme/Onc: 


- monitor H/H


- Will transfuse to keep hb > 8 


- Patient not receiving plasmapharesis today. 


- Paraproteinemia


- Patient fulfills new criteria  for multiple myeloma with free kappa/ free 

lambda light chain  ratio of 432 (>100 is diagnostic of myeloma) 


- Kappa/Lambda ratio > 100 consistent with Multiple myeloma


- M spike elevated elevated at 6.8 previously 4.7 


-steroids being held at this time 


transfuse to maintain Hg equal or > 8.0, as outlined in prior notes ideally A/C 

therapy to be continued indefinitely unless it is absolutely contraindicated 

considering his [id number] score of 6 


-H/H remains stable of heparin gtt. 


-will cont to hold heparin given multiple attempts to restart it w/ sudden drop 

in Hgb likely due to a small GIB, thus requiring transfusions


-fibrinogen nl


- HIT AB neg


-FOBT neg  





Endo: 


- Parathyroid hormone WNL


- PTH-borderline low appropriate given hypercalcemia, PTHrP low





Prophylaxis: 


-Off heparin gtt, s/p prbcs w/ appropriate response. FOBT neg


transfuse to maintain Hg equal or > 8.0, as outlined in prior notes ideally A/C 

therapy to be continued indefinitely unless it is absolutely contraindicated 

considering his [id number] score of 6


-will cont to hold heparin given multiple attempts to restart it w/ sudden drop 

in Hgb likely due to a small GIB, thus requiring transfusions


-SCDs


- Protonix





F/E/N: 


F: of IVF


E: Will replete lytes PRN


N: tube feeds (low potassium feeds). with free water. bicarb tabs





Code Status: Full Code 


-GOC discussion ongoing


-have recommended to family for compassionate extubation but they have decided 

on Trach/PEG 


successful tracheostomy 12/3/18





Dispo: 


Patient stable for transfer to floors. further care per primary team/PCP


can start DC planning 


poor overall prognosis for meaningful recovery


successful tracheostomy 12/3/18. 


Receiving 2u FFP prior to PEG. 





Visit type





- Emergency Visit


Emergency Visit: Yes


ED Registration Date: 10/30/18


Care time: The patient presented to the Emergency Department on the above date 

and was hospitalized for further evaluation of their emergent condition.





- New Patient


This patient is new to me today: Yes


Date on this admission: 12/06/18





- Critical Care


Critical Care patient: Yes


Total Critical Care Time (in minutes): 37


Critical Care Statement: The care of this patient involved high complexity 

decision making to prevent further life threatening deterioration of the patient

's condition and/or to evaluate & treat vital organ system(s) failure or risk 

of failure.

## 2018-12-07 LAB
ALBUMIN SERPL-MCNC: 1.4 G/DL (ref 3.4–5)
ALP SERPL-CCNC: 258 U/L (ref 45–117)
ALT SERPL-CCNC: 42 U/L (ref 13–61)
ANION GAP SERPL CALC-SCNC: 5 MMOL/L (ref 8–16)
ANISOCYTOSIS BLD QL: (no result)
AST SERPL-CCNC: 98 U/L (ref 15–37)
BASOPHILS # BLD: 0.7 % (ref 0–2)
BILIRUB SERPL-MCNC: 1 MG/DL (ref 0.2–1)
BUN SERPL-MCNC: 46 MG/DL (ref 7–18)
CALCIUM SERPL-MCNC: 7 MG/DL (ref 8.5–10.1)
CHLORIDE SERPL-SCNC: 118 MMOL/L (ref 98–107)
CO2 SERPL-SCNC: 23 MMOL/L (ref 21–32)
CREAT SERPL-MCNC: 1.4 MG/DL (ref 0.55–1.3)
DEPRECATED RDW RBC AUTO: 15.8 % (ref 11.9–15.9)
EOSINOPHIL # BLD: 0.9 % (ref 0–4.5)
GLUCOSE SERPL-MCNC: 111 MG/DL (ref 74–106)
HCT VFR BLD CALC: 25.7 % (ref 35.4–49)
HGB BLD-MCNC: 8.3 GM/DL (ref 11.7–16.9)
LYMPHOCYTES # BLD: 22.6 % (ref 8–40)
MACROCYTES BLD QL: 0
MAGNESIUM SERPL-MCNC: 2 MG/DL (ref 1.8–2.4)
MCH RBC QN AUTO: 29.6 PG (ref 25.7–33.7)
MCHC RBC AUTO-ENTMCNC: 32.3 G/DL (ref 32–35.9)
MCV RBC: 91.8 FL (ref 80–96)
MONOCYTES # BLD AUTO: 8.3 % (ref 3.8–10.2)
NEUTROPHILS # BLD: 67.5 % (ref 42.8–82.8)
PHOSPHATE SERPL-MCNC: 4 MG/DL (ref 2.5–4.9)
PLATELET # BLD AUTO: 157 K/MM3 (ref 134–434)
PLATELET BLD QL SMEAR: (no result)
PMV BLD: 9.9 FL (ref 7.5–11.1)
POTASSIUM SERPLBLD-SCNC: 4.8 MMOL/L (ref 3.5–5.1)
PROT SERPL-MCNC: 9.4 G/DL (ref 6.4–8.2)
RBC # BLD AUTO: 2.8 M/MM3 (ref 4–5.6)
ROULEAUX BLD QL SMEAR: (no result)
SODIUM SERPL-SCNC: 146 MMOL/L (ref 136–145)
WBC # BLD AUTO: 5 K/MM3 (ref 4–10)
WBC NRBC COR # BLD: 4.13 K/MM3

## 2018-12-07 RX ADMIN — ALLOPURINOL SCH MG: 100 TABLET ORAL at 22:00

## 2018-12-07 RX ADMIN — Medication SCH MG: at 11:26

## 2018-12-07 RX ADMIN — RANITIDINE HYDROCHLORIDE SCH MG: 150 SOLUTION ORAL at 11:26

## 2018-12-07 RX ADMIN — RANITIDINE HYDROCHLORIDE SCH MG: 150 SOLUTION ORAL at 22:00

## 2018-12-07 RX ADMIN — RIFAXIMIN SCH MG: 550 TABLET ORAL at 11:25

## 2018-12-07 RX ADMIN — METOPROLOL TARTRATE SCH MG: 25 TABLET, FILM COATED ORAL at 22:00

## 2018-12-07 RX ADMIN — VITAMIN D, TAB 1000IU (100/BT) SCH UNIT: 25 TAB at 11:25

## 2018-12-07 RX ADMIN — Medication SCH TAB: at 11:25

## 2018-12-07 RX ADMIN — RIFAXIMIN SCH MG: 550 TABLET ORAL at 22:00

## 2018-12-07 RX ADMIN — METOPROLOL TARTRATE SCH MG: 25 TABLET, FILM COATED ORAL at 11:26

## 2018-12-07 RX ADMIN — Medication SCH MG: at 11:27

## 2018-12-07 RX ADMIN — ALLOPURINOL SCH MG: 100 TABLET ORAL at 11:26

## 2018-12-07 NOTE — PN
Progress Note, Physician


History of Present Illness: 


Unresponsive on vent s/p tracheostomy, persistent afib on lopressor off heparin 

gtt due to Hgb decreases. Underwent PEG placement yesterday.





- Current Medication List


Current Medications: 


Active Medications





Acetaminophen (Tylenol -)  650 mg NR Q6H PRN


   PRN Reason: PAIN LEVEL 1 - 3


Allopurinol (Zyloprim -)  100 mg PEG BID [facility]


   Last Admin: 12/07/18 11:26 Dose:  100 mg


Cholecalciferol (Vitamin D3 -)  1,000 unit NR DAILY [facility]


   Last Admin: 12/07/18 11:25 Dose:  1,000 unit


Diphenhydramine HCl (Benadryl Injection -)  25 mg IVPB ONCE PRN


   PRN Reason: DURING PLASMAPHERESIS


Lactobacillus Acidophilus (Bacid -)  1 tab PEG DAILY [facility]


   Last Admin: 12/07/18 11:25 Dose:  1 tab


Metoprolol Tartrate (Lopressor -)  25 mg PEG BID [facility]


   Last Admin: 12/07/18 11:26 Dose:  25 mg


Ranitidine HCl (Zantac Oral Solution -)  150 mg PEG BID [facility]


   Last Admin: 12/07/18 11:26 Dose:  150 mg


Rifaximin (Xifaxan -)  550 mg NR BID [facility]


   Last Admin: 12/07/18 11:25 Dose:  550 mg


Sodium Bicarbonate (Sodium Bicarbonate -)  650 mg PEG DAILY [facility]


   Last Admin: 12/07/18 11:27 Dose:  650 mg


Thiamine HCl (Vitamin B1 -)  100 mg NR DAILY [facility]


   Last Admin: 12/07/18 11:26 Dose:  100 mg











- Objective


Vital Signs: 


 Vital Signs











Temperature  99.8 F H  12/07/18 06:00


 


Pulse Rate  92 H  12/07/18 10:44


 


Respiratory Rate  29 H  12/07/18 10:30


 


Blood Pressure  140/80   12/07/18 10:00


 


O2 Sat by Pulse Oximetry (%)  98   12/07/18 10:44











Constitutional: Yes: No Distress, Calm, Thin


Neck: Yes: Supple


Cardiovascular: Yes: Pulse Irregular


Respiratory: Yes: Mechanically Ventilated


Gastrointestinal: Yes: Normal Bowel Sounds, Soft, Other (PEG in place)


Edema: Yes


Labs: 


 CBC, BMP





 12/07/18 06:00 





 12/07/18 06:00 





 INR, PTT











INR  1.69  (0.83-1.09)  H  12/06/18  05:30    


 


Fibrinogen  338.0 mg/dL (238-498)  D 12/03/18  20:00    














Problem List





- Problems


(1) Atrial fibrillation with RVR


Code(s): I48.91 - UNSPECIFIED ATRIAL FIBRILLATION   





(2) Acute-on-chronic kidney injury


Code(s): N17.9 - ACUTE KIDNEY FAILURE, UNSPECIFIED; N18.9 - CHRONIC KIDNEY 

DISEASE, UNSPECIFIED   


Qualifiers: 


   Acute renal failure type: unspecified   Chronic kidney disease stage: 

unspecified stage   Qualified Code(s): N17.9 - Acute kidney failure, unspecified

; N18.9 - Chronic kidney disease, unspecified   





(3) Demand ischemia


Code(s): I24.8 - OTHER FORMS OF ACUTE ISCHEMIC HEART DISEASE   





(4) Hypercalcemia


Code(s): E83.52 - HYPERCALCEMIA   





(5) Nonadherence to medication


Code(s): Z91.14 - PATIENT'S OTHER NONCOMPLIANCE WITH MEDICATION REGIMEN   





(6) Paraproteinemia


Code(s): D89.2 - HYPERGAMMAGLOBULINEMIA, UNSPECIFIED   





(7) Anticoagulant long-term use


Code(s): Z79.01 - LONG TERM (CURRENT) USE OF ANTICOAGULANTS   





(8) Chronic thromboembolic disease


Code(s): I74.9 - EMBOLISM AND THROMBOSIS OF UNSPECIFIED ARTERY   





(9) Coronary artery disease


Code(s): I25.10 - ATHSCL HEART DISEASE OF NATIVE CORONARY ARTERY W/O ANG PCTRS 

  


Qualifiers: 


   Coronary Disease-Associated Artery/Lesion type: native artery   Native vs. 

transplanted heart: native heart   Associated angina: without angina   

Qualified Code(s): I25.10 - Atherosclerotic heart disease of native coronary 

artery without angina pectoris   





(10) Diastolic dysfunction without heart failure


Code(s): I51.9 - HEART DISEASE, UNSPECIFIED   





(11) HTN (hypertension)


Code(s): I10 - ESSENTIAL (PRIMARY) HYPERTENSION   


Qualifiers: 


   Hypertension type: essential hypertension   Qualified Code(s): I10 - 

Essential (primary) hypertension   





(12) Hypertrophic cardiomyopathy


Code(s): I42.2 - OTHER HYPERTROPHIC CARDIOMYOPATHY   





(13) Hyperlipidemia


Code(s): E78.5 - HYPERLIPIDEMIA, UNSPECIFIED   


Qualifiers: 


   Hyperlipidemia type: pure hypercholesterolemia   Qualified Code(s): E78.00 - 

Pure hypercholesterolemia, unspecified; E78.0 - Pure hypercholesterolemia   





(14) Multiple myeloma


Code(s): C90.00 - MULTIPLE MYELOMA NOT HAVING ACHIEVED REMISSION   


Qualifiers: 


   Multiple myeloma remission status: not in remission   Qualified Code(s): 

C90.00 - Multiple myeloma not having achieved remission   





(15) Acalculous cholecystitis


Code(s): K81.9 - CHOLECYSTITIS, UNSPECIFIED   





Assessment/Plan


R&LHc at [facility] 1/11/2017 showing nonobstructive CAD, severe LV apical 

hypertrophic cardiomyopathy, mildly elevated right sided pressures, Mynx 

deployed right CFA access site. Study is consistent with apical hypertrophy (

spade-like) variant of hypertrophic cardiomyopathy, planned for optimal medical 

therapy. 


Echocardiogram: 07/11/2018 Mod cLVH, severe DYANA, mod TR RVSP 50-60 mmHg, mod-

severe MR


Echocardiogram: 10/16/2018 Normal LV size with hyperdynamic LVEF 75%, mild BSH, 

normal RV size and fxn, severe LAE, mod-severe MR, mod TR





1. Acute hypoxic respiratory failure, suspected aspiration pneumonia with 

sepsis syndrome, remains intubated post tracheostomy


2. Toxic metabolic encephalopathy, rule out CVA


3. Acute on CKD


4. Paraproteinemia confirmed multiple myeloma


5. 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 

disease related to persistent atrial fibrillation 


6. CAD with evidence of demand ischemia, non obstructive CAD 


7. 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 

chronic class I-II NYHA classification LV failure, compensated/euvolemic


8. Persistent atrial fibrillation [id number] score of 6


9. HTN


10. Anemia/thrombocytopenia


11. Acalculous Cholecystitis


12. Ischemic hepatitis


13. Resolved Hypernatremia





PLAN:


1. Consider A/C therapy unless it is absolutely contraindicated considering his 

[id number] score of 6 and stroke risk, previously was on NOAC. In the interim 

consider Heparin gtt, albeit repeated Hgb drops on anticoagulation is major 

caveat.


2. Continue Lopressor 25 bid and titrate dose. 


3. Antibiotics coverage


4. Ventilator management as per the ICU team


5. Enteral feeds per PEG


6. Placement to chronic vent facility

## 2018-12-07 NOTE — PN
Progress Note, Physician





- Current Medication List


Current Medications: 


Active Medications





Acetaminophen (Tylenol -)  650 mg NR Q6H PRN


   PRN Reason: PAIN LEVEL 1 - 3


Allopurinol (Zyloprim -)  100 mg PEG BID [facility]


   Last Admin: 12/06/18 22:11 Dose:  100 mg


Cholecalciferol (Vitamin D3 -)  1,000 unit NR DAILY [facility]


Diphenhydramine HCl (Benadryl Injection -)  25 mg IVPB ONCE PRN


   PRN Reason: DURING PLASMAPHERESIS


Lactobacillus Acidophilus (Bacid -)  1 tab PEG DAILY [facility]


Metoprolol Tartrate (Lopressor -)  25 mg PEG BID [facility]


   Last Admin: 12/06/18 22:11 Dose:  25 mg


Ranitidine HCl (Zantac Oral Solution -)  150 mg PEG BID [facility]


   Last Admin: 12/06/18 22:11 Dose:  150 mg


Rifaximin (Xifaxan -)  550 mg NR BID [facility]


   Last Admin: 12/06/18 22:11 Dose:  550 mg


Sodium Bicarbonate (Sodium Bicarbonate -)  650 mg PEG DAILY [facility]


Thiamine HCl (Vitamin B1 -)  100 mg NR DAILY [facility]











- Objective


Vital Signs: 


 Vital Signs











Temperature  99.8 F H  12/07/18 06:00


 


Pulse Rate  88   12/07/18 06:55


 


Respiratory Rate  22 H  12/07/18 06:50


 


Blood Pressure  122/73   12/07/18 06:00


 


O2 Sat by Pulse Oximetry (%)  99   12/07/18 06:55











Cardiovascular: Yes: S1, S2


Respiratory: Yes: Mechanically Ventilated


Gastrointestinal: Yes: Normal Bowel Sounds, Soft


Labs: 


 CBC, BMP





 12/07/18 06:00 





 12/07/18 06:00 





 INR, PTT











INR  1.69  (0.83-1.09)  H  12/06/18  05:30    


 


Fibrinogen  338.0 mg/dL (238-498)  D 12/03/18  20:00    














Problem List





- Problems


(1) Toxic metabolic encephalopathy


Code(s): G92 - TOXIC ENCEPHALOPATHY   





(2) Cellulitis


Code(s): L03.90 - CELLULITIS, UNSPECIFIED   


Qualifiers: 


   Site of cellulitis: extremity   Site of cellulitis of extremity: lower 

extremity   Laterality: right   Qualified Code(s): L03.115 - Cellulitis of 

right lower limb   





(3) Sepsis


Code(s): A41.9 - SEPSIS, UNSPECIFIED ORGANISM   





(4) Artery occlusion


Code(s): I70.90 - UNSPECIFIED ATHEROSCLEROSIS   





(5) Hypercalcemia


Code(s): E83.52 - HYPERCALCEMIA   





(6) Paroxysmal atrial fibrillation


Code(s): I48.0 - PAROXYSMAL ATRIAL FIBRILLATION   





(7) HAM (acute kidney injury)


Code(s): N17.9 - ACUTE KIDNEY FAILURE, UNSPECIFIED   





Assessment/Plan





(1) HAM (acute kidney injury)


Assessment/Plan: 


potassium now normal range- hyperkalemia improved


ultrasound hyperdense lesion noted in left renal cortex


Code(s): N17.9 - ACUTE KIDNEY FAILURE, UNSPECIFIED   





(2) Atrial fibrillation with RVR


Assessment/Plan: 


heparin drip --dc due to anemia


cardiac monitor


rate control with metoprolol








(3) Respiratory failure


Assessment/Plan: 


S/p Trach 


Vwnt setting per pulm


Code(s): J96.90 - RESPIRATORY FAILURE, UNSP, UNSP W HYPOXIA OR HYPERCAPNIA   





(4) Toxic metabolic encephalopathy


Assessment/Plan: 


Microbiology


S/p Rx for PNA


Code(s): G92 - TOXIC ENCEPHALOPATHY   





(5) Anemia


Assessment/Plan: 





-Hold Heparin


-Transfuse





(6) Nutrition


Assessment/Plan: 


peg in place

## 2018-12-07 NOTE — PN
Progress Note, Physician


History of Present Illness: 





PULMONARY





UNRESPONSIVE ON VENT SUPPORT AC MODE,TACHYPNEIC





- Current Medication List


Current Medications: 


Active Medications





Acetaminophen (Tylenol -)  650 mg NR Q6H PRN


   PRN Reason: PAIN LEVEL 1 - 3


Allopurinol (Zyloprim -)  100 mg PEG BID [facility]


   Last Admin: 12/07/18 11:26 Dose:  100 mg


Cholecalciferol (Vitamin D3 -)  1,000 unit NR DAILY [facility]


   Last Admin: 12/07/18 11:25 Dose:  1,000 unit


Diphenhydramine HCl (Benadryl Injection -)  25 mg IVPB ONCE PRN


   PRN Reason: DURING PLASMAPHERESIS


Lactobacillus Acidophilus (Bacid -)  1 tab PEG DAILY [facility]


   Last Admin: 12/07/18 11:25 Dose:  1 tab


Metoprolol Tartrate (Lopressor -)  25 mg PEG BID [facility]


   Last Admin: 12/07/18 11:26 Dose:  25 mg


Ranitidine HCl (Zantac Oral Solution -)  150 mg PEG BID [facility]


   Last Admin: 12/07/18 11:26 Dose:  150 mg


Rifaximin (Xifaxan -)  550 mg NR BID [facility]


   Last Admin: 12/07/18 11:25 Dose:  550 mg


Sodium Bicarbonate (Sodium Bicarbonate -)  650 mg PEG DAILY [facility]


   Last Admin: 12/07/18 11:27 Dose:  650 mg


Thiamine HCl (Vitamin B1 -)  100 mg NR DAILY [facility]


   Last Admin: 12/07/18 11:26 Dose:  100 mg











- Objective


Vital Signs: 


 Vital Signs











Temperature  99.8 F H  12/07/18 06:00


 


Pulse Rate  92 H  12/07/18 10:44


 


Respiratory Rate  29 H  12/07/18 10:30


 


Blood Pressure  140/80   12/07/18 10:00


 


O2 Sat by Pulse Oximetry (%)  98   12/07/18 10:44











Constitutional: Yes: Well Nourished, Other (UNRESPONSIVE)


Eyes: Yes: WNL


HENT: Yes: WNL


Neck: Yes: Supple (TRACH)


Cardiovascular: Yes: Regular Rate and Rhythm, S1, S2


Respiratory: Yes: Rhonchi (FEW SCATTERED ENE RHONCHI)


Gastrointestinal: Yes: Normal Bowel Sounds, Soft


Extremities: Yes: WNL


Edema: Yes


Labs: 


 CBC, BMP





 12/07/18 06:00 





 12/07/18 06:00 





 INR, PTT











INR  1.69  (0.83-1.09)  H  12/06/18  05:30    


 


Fibrinogen  338.0 mg/dL (238-498)  D 12/03/18  20:00    














Problem List





- Problems


(1) Acute hypoxemic respiratory failure


Code(s): J96.01 - ACUTE RESPIRATORY FAILURE WITH HYPOXIA   





(2) Acalculous cholecystitis


Code(s): K81.9 - CHOLECYSTITIS, UNSPECIFIED   





(3) Anemia


Code(s): D64.9 - ANEMIA, UNSPECIFIED   





(4) Atrial fibrillation with RVR


Code(s): I48.91 - UNSPECIFIED ATRIAL FIBRILLATION   





(5) Multiple myeloma


Code(s): C90.00 - MULTIPLE MYELOMA NOT HAVING ACHIEVED REMISSION   


Qualifiers: 


   Multiple myeloma remission status: not in remission   Qualified Code(s): 

C90.00 - Multiple myeloma not having achieved remission   





(6) Respiratory failure


Code(s): J96.90 - RESPIRATORY FAILURE, UNSP, UNSP W HYPOXIA OR HYPERCAPNIA   





(7) Sepsis


Code(s): A41.9 - SEPSIS, UNSPECIFIED ORGANISM   





(8) Toxic metabolic encephalopathy


Code(s): G92 - TOXIC ENCEPHALOPATHY   





(9) Acute-on-chronic kidney injury


Code(s): N17.9 - ACUTE KIDNEY FAILURE, UNSPECIFIED; N18.9 - CHRONIC KIDNEY 

DISEASE, UNSPECIFIED   


Qualifiers: 


   Acute renal failure type: unspecified   Chronic kidney disease stage: 

unspecified stage   Qualified Code(s): N17.9 - Acute kidney failure, unspecified

; N18.9 - Chronic kidney disease, unspecified   





(10) Persistent atrial fibrillation


Code(s): I48.1 - PERSISTENT ATRIAL FIBRILLATION   





(11) Coronary artery disease


Code(s): I25.10 - ATHSCL HEART DISEASE OF NATIVE CORONARY ARTERY W/O ANG PCTRS 

  


Qualifiers: 


   Coronary Disease-Associated Artery/Lesion type: native artery   Native vs. 

transplanted heart: native heart   Associated angina: without angina   

Qualified Code(s): I25.10 - Atherosclerotic heart disease of native coronary 

artery without angina pectoris   





(12) HTN (hypertension)


Code(s): I10 - ESSENTIAL (PRIMARY) HYPERTENSION   


Qualifiers: 


   Hypertension type: essential hypertension   Qualified Code(s): I10 - 

Essential (primary) hypertension   





(13) Pneumonia


Code(s): J18.9 - PNEUMONIA, UNSPECIFIED ORGANISM   





Assessment/Plan





ASSESSMENT AND PLAN:


Acute Hypoxic Respiratory Failure s/p Tracheostomy


Altered Mental Status


Metabolic Encephalopathy


Pneumonia


Acalculous Cholecystitis


Multiple Myeloma


Acute on Chronic Renal Failure


Metabolic Acidosis


LV Diastolic Dysfunction


Atrial Fibrillation


CAD


+Troponins likely Demand Ischemia


PAD


Hyperlipidemia


HTN


Anemia





-  monitor H/H


-  antibiotics per ID, can likely d/c


-  monitor urine output, creatinine


-  rate control


-  holding anticoagulation as he has bled and dropped his H/H this admission


-  hold sedation to assess mental status 


-  DVT/GI prophylaxis


-  poor overall prognosis for meaningful recovery





DR ACEVEDO

## 2018-12-07 NOTE — PN
Progress Note (short form)





- Note


Progress Note: 





Renal follow up for Hypercalcemia/HAM





Pt seen and examined at the bedside


on vent via trach, not responsive


no overnight events


on tube feeds


rectal tube in place 


 Vital Signs











Temperature  99.8 F H  12/07/18 06:00


 


Pulse Rate  92 H  12/07/18 10:44


 


Respiratory Rate  29 H  12/07/18 10:30


 


Blood Pressure  140/80   12/07/18 10:00


 


O2 Sat by Pulse Oximetry (%)  98   12/07/18 10:44








 Intake & Output











 12/04/18 12/05/18 12/06/18 12/07/18





 23:59 23:59 23:59 23:59


 


Intake Total 1175 2650 450 


 


Output Total 1200  0 


 


Balance -25 2650 450 


 


Weight 103.6 kg 104.372 kg 105.37 kg 








 CBC, BMP





 12/07/18 06:00 





 12/07/18 06:00 





 Current Medications





Acetaminophen (Tylenol -)  650 mg NR Q6H PRN


   PRN Reason: PAIN LEVEL 1 - 3


Allopurinol (Zyloprim -)  100 mg PEG BID [facility]


   Last Admin: 12/07/18 11:26 Dose:  100 mg


Cholecalciferol (Vitamin D3 -)  1,000 unit NR DAILY [facility]


   Last Admin: 12/07/18 11:25 Dose:  1,000 unit


Diphenhydramine HCl (Benadryl Injection -)  25 mg IVPB ONCE PRN


   PRN Reason: DURING PLASMAPHERESIS


Lactobacillus Acidophilus (Bacid -)  1 tab PEG DAILY [facility]


   Last Admin: 12/07/18 11:25 Dose:  1 tab


Metoprolol Tartrate (Lopressor -)  25 mg PEG BID [facility]


   Last Admin: 12/07/18 11:26 Dose:  25 mg


Ranitidine HCl (Zantac Oral Solution -)  150 mg PEG BID [facility]


   Last Admin: 12/07/18 11:26 Dose:  150 mg


Rifaximin (Xifaxan -)  550 mg NR BID [facility]


   Last Admin: 12/07/18 11:25 Dose:  550 mg


Sodium Bicarbonate (Sodium Bicarbonate -)  650 mg PEG DAILY [facility]


   Last Admin: 12/07/18 11:27 Dose:  650 mg


Thiamine HCl (Vitamin B1 -)  100 mg NR DAILY [facility]


   Last Admin: 12/07/18 11:26 Dose:  100 mg














78 year old gentleman with hx of CKD, Afib on A/C, CAD, CKD, Hypertension, 

Hyperlipidemia, PVD who presented with AMS/Confusion and found to have HAM.





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 

showed no stones or obstruction, UA w/o protein but UPCR ~0.5 indicating non-

albumin proteinuria)


#AMS 


#Newly diagnosed multiple myloma 


#Anemia 


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


#Hyperdense lesion on US of the kidney 


#Normal anion gap metabolic acidosis 


#Hyperkalemia (now resolved)





Renal function stable at this time


continue tube feeds 


trend electrolytes while in patient 


continue vent support


prognosis is poor 








Ricky Chang DO

## 2018-12-08 LAB
APPEARANCE UR: (no result)
BACTERIA #/AREA URNS HPF: (no result) /HPF
BASOPHILS # BLD: 0.5 % (ref 0–2)
BILIRUB UR STRIP.AUTO-MCNC: NEGATIVE MG/DL
COLOR UR: (no result)
DEPRECATED RDW RBC AUTO: 16 % (ref 11.9–15.9)
EOSINOPHIL # BLD: 0.6 % (ref 0–4.5)
EPITH CASTS URNS QL MICRO: (no result) /HPF
HCT VFR BLD CALC: 23.5 % (ref 35.4–49)
HGB BLD-MCNC: 8.1 GM/DL (ref 11.7–16.9)
KETONES UR QL STRIP: NEGATIVE
LEUKOCYTE ESTERASE UR QL STRIP.AUTO: (no result)
LYMPHOCYTES # BLD: 20.7 % (ref 8–40)
MCH RBC QN AUTO: 31.3 PG (ref 25.7–33.7)
MCHC RBC AUTO-ENTMCNC: 34.6 G/DL (ref 32–35.9)
MCV RBC: 90.6 FL (ref 80–96)
MONOCYTES # BLD AUTO: 8.3 % (ref 3.8–10.2)
MUCOUS THREADS URNS QL MICRO: (no result)
NEUTROPHILS # BLD: 69.9 % (ref 42.8–82.8)
NITRITE UR QL STRIP: POSITIVE
PH UR: 5 [PH] (ref 5–8)
PLATELET # BLD AUTO: 168 K/MM3 (ref 134–434)
PLATELET BLD QL SMEAR: ADEQUATE
PMV BLD: 10.4 FL (ref 7.5–11.1)
PROT UR QL STRIP: (no result)
PROT UR QL STRIP: NEGATIVE
RBC # BLD AUTO: 2.59 M/MM3 (ref 4–5.6)
SP GR UR: 1.02 (ref 1.01–1.03)
UROBILINOGEN UR STRIP-MCNC: NEGATIVE MG/DL (ref 0.2–1)
WBC # BLD AUTO: 6 K/MM3 (ref 4–10)

## 2018-12-08 RX ADMIN — Medication SCH MG: at 09:45

## 2018-12-08 RX ADMIN — Medication SCH TAB: at 09:46

## 2018-12-08 RX ADMIN — ACETAMINOPHEN PRN MG: 325 TABLET ORAL at 06:12

## 2018-12-08 RX ADMIN — RIFAXIMIN SCH MG: 550 TABLET ORAL at 09:46

## 2018-12-08 RX ADMIN — ALLOPURINOL SCH MG: 100 TABLET ORAL at 09:46

## 2018-12-08 RX ADMIN — ALLOPURINOL SCH MG: 100 TABLET ORAL at 22:16

## 2018-12-08 RX ADMIN — RANITIDINE HYDROCHLORIDE SCH MG: 150 SOLUTION ORAL at 22:12

## 2018-12-08 RX ADMIN — RIFAXIMIN SCH MG: 550 TABLET ORAL at 22:16

## 2018-12-08 RX ADMIN — VITAMIN D, TAB 1000IU (100/BT) SCH UNIT: 25 TAB at 09:45

## 2018-12-08 RX ADMIN — METOPROLOL TARTRATE SCH MG: 25 TABLET, FILM COATED ORAL at 09:46

## 2018-12-08 RX ADMIN — RANITIDINE HYDROCHLORIDE SCH MG: 150 SOLUTION ORAL at 09:46

## 2018-12-08 RX ADMIN — METOPROLOL TARTRATE SCH MG: 25 TABLET, FILM COATED ORAL at 22:12

## 2018-12-08 NOTE — PN
Progress Note, Physician





- Current Medication List


Current Medications: 


Active Medications





Acetaminophen (Tylenol -)  650 mg NR Q6H PRN


   PRN Reason: PAIN LEVEL 1 - 3


   Last Admin: 12/08/18 06:12 Dose:  650 mg


Allopurinol (Zyloprim -)  100 mg PEG BID [facility]


   Last Admin: 12/08/18 09:46 Dose:  100 mg


Cholecalciferol (Vitamin D3 -)  1,000 unit NR DAILY [facility]


   Last Admin: 12/08/18 09:45 Dose:  1,000 unit


Diphenhydramine HCl (Benadryl Injection -)  25 mg IVPB ONCE PRN


   PRN Reason: DURING PLASMAPHERESIS


Lactobacillus Acidophilus (Bacid -)  1 tab PEG DAILY [facility]


   Last Admin: 12/08/18 09:46 Dose:  1 tab


Metoprolol Tartrate (Lopressor -)  25 mg PEG BID [facility]


   Last Admin: 12/08/18 09:46 Dose:  25 mg


Ranitidine HCl (Zantac Oral Solution -)  150 mg PEG BID [facility]


   Last Admin: 12/08/18 09:46 Dose:  150 mg


Rifaximin (Xifaxan -)  550 mg NR BID [facility]


   Last Admin: 12/08/18 09:46 Dose:  550 mg


Sodium Bicarbonate (Sodium Bicarbonate -)  650 mg PEG DAILY [facility]


   Last Admin: 12/08/18 09:45 Dose:  650 mg


Thiamine HCl (Vitamin B1 -)  100 mg NR DAILY [facility]


   Last Admin: 12/08/18 09:45 Dose:  100 mg











- Objective


Vital Signs: 


 Vital Signs











Temperature  100.0 F H  12/08/18 10:00


 


Pulse Rate  96 H  12/08/18 10:00


 


Respiratory Rate  25 H  12/08/18 12:39


 


Blood Pressure  126/80   12/08/18 10:00


 


O2 Sat by Pulse Oximetry (%)  98   12/08/18 09:00











Cardiovascular: Yes: S1, S2


Respiratory: Yes: Mechanically Ventilated


Gastrointestinal: Yes: Normal Bowel Sounds, Soft


Labs: 


 CBC, BMP





 12/07/18 06:00 





 12/07/18 06:00 





 INR, PTT











INR  1.69  (0.83-1.09)  H  12/06/18  05:30    


 


Fibrinogen  338.0 mg/dL (238-498)  D 12/03/18  20:00    














Problem List





- Problems


(1) Toxic metabolic encephalopathy


Code(s): G92 - TOXIC ENCEPHALOPATHY   





(2) Cellulitis


Code(s): L03.90 - CELLULITIS, UNSPECIFIED   


Qualifiers: 


   Site of cellulitis: extremity   Site of cellulitis of extremity: lower 

extremity   Laterality: right   Qualified Code(s): L03.115 - Cellulitis of 

right lower limb   





(3) Sepsis


Code(s): A41.9 - SEPSIS, UNSPECIFIED ORGANISM   





(4) Artery occlusion


Code(s): I70.90 - UNSPECIFIED ATHEROSCLEROSIS   





(5) Hypercalcemia


Code(s): E83.52 - HYPERCALCEMIA   





(6) Paroxysmal atrial fibrillation


Code(s): I48.0 - PAROXYSMAL ATRIAL FIBRILLATION   





(7) AHM (acute kidney injury)


Code(s): N17.9 - ACUTE KIDNEY FAILURE, UNSPECIFIED   





Assessment/Plan





(1) HAM (acute kidney injury)


Assessment/Plan: 


potassium now normal range- hyperkalemia improved


ultrasound hyperdense lesion noted in left renal cortex


Code(s): N17.9 - ACUTE KIDNEY FAILURE, UNSPECIFIED   





(2) Atrial fibrillation with RVR


Assessment/Plan: 


heparin drip --dc due to anemia


cardiac monitor


rate control with metoprolol








(3) Respiratory failure


Assessment/Plan: 


S/p Trach 


Vwnt setting per pulm


Code(s): J96.90 - RESPIRATORY FAILURE, UNSP, UNSP W HYPOXIA OR HYPERCAPNIA   





(4) Toxic metabolic encephalopathy


Assessment/Plan: 


Microbiology


S/p Rx for PNA


Code(s): G92 - TOXIC ENCEPHALOPATHY   





(5) Anemia


Assessment/Plan: 





-Hold Heparin


-Transfuse





(6) Nutrition


Assessment/Plan: 


peg in place


feeding started





(7) Fever


Assessment/Plan: 


Cultures


cxr

## 2018-12-08 NOTE — PN
Progress Note, Physician


Chief Complaint: 





The patient examined in his bed. Poorly responsive. 


Vent supported. 


Maintains good urine output. 


Tube feeding in progress. 


History of Present Illness: 





This is a 78 year old gentleman with hx of CKD, Afib on A/C, CAD, CKD, 

Hypertension, Hyperlipidemia, PVD who presented with AMS/Confusion and found to 

have HAM. The patient is intubated and supported by mechanical vent. In ICU. 

Later a Tracheostomy placed, and now on mechanical vent support. 





- Current Medication List


Current Medications: 


Active Medications





Acetaminophen (Tylenol -)  650 mg NR Q6H PRN


   PRN Reason: PAIN LEVEL 1 - 3


   Last Admin: 12/08/18 06:12 Dose:  650 mg


Allopurinol (Zyloprim -)  100 mg PEG BID [facility]


   Last Admin: 12/08/18 09:46 Dose:  100 mg


Cholecalciferol (Vitamin D3 -)  1,000 unit NR DAILY [facility]


   Last Admin: 12/08/18 09:45 Dose:  1,000 unit


Diphenhydramine HCl (Benadryl Injection -)  25 mg IVPB ONCE PRN


   PRN Reason: DURING PLASMAPHERESIS


Lactobacillus Acidophilus (Bacid -)  1 tab PEG DAILY [facility]


   Last Admin: 12/08/18 09:46 Dose:  1 tab


Metoprolol Tartrate (Lopressor -)  25 mg PEG BID [facility]


   Last Admin: 12/08/18 09:46 Dose:  25 mg


Ranitidine HCl (Zantac Oral Solution -)  150 mg PEG BID [facility]


   Last Admin: 12/08/18 09:46 Dose:  150 mg


Rifaximin (Xifaxan -)  550 mg NR BID [facility]


   Last Admin: 12/08/18 09:46 Dose:  550 mg


Sodium Bicarbonate (Sodium Bicarbonate -)  650 mg PEG DAILY [facility]


   Last Admin: 12/08/18 09:45 Dose:  650 mg


Thiamine HCl (Vitamin B1 -)  100 mg NR DAILY [facility]


   Last Admin: 12/08/18 09:45 Dose:  100 mg











- Objective


Vital Signs: 


 Vital Signs











Temperature  100.0 F H  12/08/18 10:00


 


Pulse Rate  96 H  12/08/18 10:00


 


Respiratory Rate  24 H  12/08/18 10:00


 


Blood Pressure  126/80   12/08/18 10:00


 


O2 Sat by Pulse Oximetry (%)  98   12/08/18 09:00











Neck: Yes: Supple, Trachea Midline


Cardiovascular: Yes: Tachycardia, S1, S2


Respiratory: Yes: Diminished, Mechanically Ventilated, Other (tracheostomy)


Gastrointestinal: Yes: Soft


Genitourinary: No: Bladder Distention


Edema: No


Labs: 


 CBC, BMP





 12/07/18 06:00 





 12/07/18 06:00 





 INR, PTT











INR  1.69  (0.83-1.09)  H  12/06/18  05:30    


 


Fibrinogen  338.0 mg/dL (238-498)  D 12/03/18  20:00    














Problem List





- Problems


(1) Multiple myeloma


Code(s): C90.00 - MULTIPLE MYELOMA NOT HAVING ACHIEVED REMISSION   


Qualifiers: 


   Multiple myeloma remission status: not in remission   Qualified Code(s): 

C90.00 - Multiple myeloma not having achieved remission   





(2) HAM (acute kidney injury)


Code(s): N17.9 - ACUTE KIDNEY FAILURE, UNSPECIFIED   





(3) Altered mental status


Code(s): R41.82 - ALTERED MENTAL STATUS, UNSPECIFIED   





(4) Anemia


Code(s): D64.9 - ANEMIA, UNSPECIFIED   





(5) Atrial fibrillation with RVR


Code(s): I48.91 - UNSPECIFIED ATRIAL FIBRILLATION   





(6) Cellulitis


Code(s): L03.90 - CELLULITIS, UNSPECIFIED   


Qualifiers: 


   Site of cellulitis: extremity   Site of cellulitis of extremity: lower 

extremity   Laterality: right   Qualified Code(s): L03.115 - Cellulitis of 

right lower limb   





(7) Hyperlipidemia


Code(s): E78.5 - HYPERLIPIDEMIA, UNSPECIFIED   


Qualifiers: 


   Hyperlipidemia type: pure hypercholesterolemia   Qualified Code(s): E78.00 - 

Pure hypercholesterolemia, unspecified; E78.0 - Pure hypercholesterolemia   





(8) Sepsis


Code(s): A41.9 - SEPSIS, UNSPECIFIED ORGANISM   





(9) Hypercalcemia


Code(s): E83.52 - HYPERCALCEMIA   





Assessment/Plan








78 year old gentleman with hx of CKD, Afib on A/C, CAD, CKD, Hypertension, 

Hyperlipidemia, PVD who presented with AMS/Confusion and found to have HAM.











HAM ATN vs. Cast nephropathy  (FeNa was 2.1% indicating tubular injury, US 

showed no stones or obstruction.





AMS 





Newly diagnosed multiple myloma 





Anemia 





Hypercalcemia of Malignancy 





Hyperdense lesion on US of the kidney 





On Sodium Bicarb supplements. Tendency for Hypernatremia. Will D/c the Bicarb. 


Will increase water through the tube feeding. 








Leeann Pérez MD

## 2018-12-08 NOTE — PN
Progress Note (short form)





- Note


Progress Note: 


Chief Complaint: Events noted, notes reviewed, on trach with overall no change 

in neurological status





History of Present Illness: 


Seen and examined. Events noted, notes reviewed, on trach with overall no 

change in neurological status





[facility] coronary angiography performed 1/11/2017 revealed non-obstructive CAD, 

severe LV apical hypertrophic cardiomyopathy, mildly elevated right sided 

pressures/study is consistent with apical hypertrophy (spade-like) variant of 

hypertrophic cardiomyopathy





Echocardiography dated 07/11/2018 Mod cLVH, severe DYANA, moderate TR RVSP 50-60 

mmHg, moderate-severe MR





Echocardiography dated 10/16/2018 Normal LV size with hyperdynamic LVEF 75%, 

mild BSH, normal RV size and function, severe LAE, moderate-severe MR, mod TR


 





- Current Medication List


 


 Current Medications





Acetaminophen (Tylenol -)  650 mg NR Q6H PRN


   PRN Reason: PAIN LEVEL 1 - 3


   Last Admin: 12/08/18 06:12 Dose:  650 mg


Allopurinol (Zyloprim -)  100 mg PEG BID [facility]


   Last Admin: 12/08/18 09:46 Dose:  100 mg


Cholecalciferol (Vitamin D3 -)  1,000 unit NR DAILY [facility]


   Last Admin: 12/08/18 09:45 Dose:  1,000 unit


Diphenhydramine HCl (Benadryl Injection -)  25 mg IVPB ONCE PRN


   PRN Reason: DURING PLASMAPHERESIS


Lactobacillus Acidophilus (Bacid -)  1 tab PEG DAILY [facility]


   Last Admin: 12/08/18 09:46 Dose:  1 tab


Metoprolol Tartrate (Lopressor -)  25 mg PEG BID [facility]


   Last Admin: 12/08/18 09:46 Dose:  25 mg


Ranitidine HCl (Zantac Oral Solution -)  150 mg PEG BID [facility]


   Last Admin: 12/08/18 09:46 Dose:  150 mg


Rifaximin (Xifaxan -)  550 mg NR BID [facility]


   Last Admin: 12/08/18 09:46 Dose:  550 mg


Sodium Bicarbonate (Sodium Bicarbonate -)  650 mg PEG DAILY [facility]


   Last Admin: 12/08/18 09:45 Dose:  650 mg


Thiamine HCl (Vitamin B1 -)  100 mg NR DAILY [facility]


   Last Admin: 12/08/18 09:45 Dose:  100 mg





- Objective


Vital Signs: 


 


 Last Vital Signs











Temp Pulse Resp BP Pulse Ox


 


 100.0 F H  96 H  24 H  126/80   98 


 


 12/08/18 10:00  12/08/18 10:00  12/08/18 10:00  12/08/18 10:00  12/08/18 09:00








 Intake & Output











 12/05/18 12/06/18 12/07/18 12/08/18





 23:59 23:59 23:59 23:59


 


Intake Total 2650 450 600 


 


Output Total  0  


 


Balance 2650 450 600 


 


Weight 230 lb 1.6 oz 232 lb 4.8 oz  











Neck: Supple Negative JVD No Bruit


Cardiovascular: S1 S2 Irregularly Irregular Grade 2/6 Systolic Murmur Apical


Chest: Scattered Rhonchi Bilaterally


Gastrointestinal: Soft Benign Normal Bowel Sounds


Ext: No Edema 





Labs: 


 


 CBC, BMP





 12/07/18 06:00 





 12/07/18 06:00 








Assessment/Plan





ASSESSMENT:





1. Chronic respiratory failure/post trach in a patient with history of acute 

hypoxic respiratory failure, aspiration pneumonia with resolved sepsis syndrome


2. Toxic metabolic encephelopathy, overall no change in status 


3. 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


4. Paraproteinemia confirmed multiple myeloma


5. 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 

disease related to persistent atrial fibrillation with [id number] score of 6


6. CAD with evidence of demand ischemic injury, non obstructive CAD on R&c 

coronary angiogrpahy angina pectoris


7. 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 

chronic class I-II NYHA classification LV failure, compensated/euvolemic


8. Persistent atrial fibrillation [id number] score of 6 currently on no A/C 

therapy


9. HTN


10. Anemia/thrombocytopenia


11. Acalculous Cholecystitis


12. Ischemic hepatitis


13. Hypernatremia





PLAN:





1. Ideally patient should be A/C unless it is absolutely contraindicated 

considering his [id number] score of 6 


2. Monitor Hg and transfuse to maintain Hg equal or > 8.0


3. Continue Lopressor, hemodynamics permitting 


4. Antibiotics as per the primary team


5. Ventilator management as per the pulmonary team 


6. As outlined in prior notes overall poor prognosis











Armand Mckenzie MD

## 2018-12-08 NOTE — PN
Progress Note (short form)





- Note


Progress Note: 


Vented on AC Mode, 40% via Trach. 


Low grade temp. 


Poorly responsive. 











OBJECTIVE:





 


  


  


  


 Intake & Output











 12/05/18 12/06/18 12/07/18 12/08/18





 23:59 23:59 23:59 23:59


 


Intake Total 2650 450 600 


 


Output Total  0  


 


Balance 2650 450 600 


 


Weight 230 lb 1.6 oz 232 lb 4.8 oz  











 Last Vital Signs











Temp Pulse Resp BP Pulse Ox


 


 100.0 F H  96 H  24 H  126/80   98 


 


 12/08/18 10:00  12/08/18 10:00  12/08/18 10:00  12/08/18 10:00  12/08/18 09:00








Active Medications





Acetaminophen (Tylenol -)  650 mg NR Q6H PRN


   PRN Reason: PAIN LEVEL 1 - 3


   Last Admin: 12/08/18 06:12 Dose:  650 mg


Allopurinol (Zyloprim -)  100 mg PEG BID [facility]


   Last Admin: 12/08/18 09:46 Dose:  100 mg


Cholecalciferol (Vitamin D3 -)  1,000 unit NR DAILY [facility]


   Last Admin: 12/08/18 09:45 Dose:  1,000 unit


Diphenhydramine HCl (Benadryl Injection -)  25 mg IVPB ONCE PRN


   PRN Reason: DURING PLASMAPHERESIS


Lactobacillus Acidophilus (Bacid -)  1 tab PEG DAILY [facility]


   Last Admin: 12/08/18 09:46 Dose:  1 tab


Metoprolol Tartrate (Lopressor -)  25 mg PEG BID [facility]


   Last Admin: 12/08/18 09:46 Dose:  25 mg


Ranitidine HCl (Zantac Oral Solution -)  150 mg PEG BID [facility]


   Last Admin: 12/08/18 09:46 Dose:  150 mg


Rifaximin (Xifaxan -)  550 mg NR BID [facility]


   Last Admin: 12/08/18 09:46 Dose:  550 mg


Sodium Bicarbonate (Sodium Bicarbonate -)  650 mg PEG DAILY [facility]


   Last Admin: 12/08/18 09:45 Dose:  650 mg


Thiamine HCl (Vitamin B1 -)  100 mg NR DAILY [facility]


   Last Admin: 12/08/18 09:45 Dose:  100 mg








Gen: Trached, vented, poorly responsive


Heart: RRR


Lung: scattered rhonchi


Abd: soft, nontender


Ext: + edema


  


  


  


 Laboratory Results - last 24 hr











  12/07/18





  06:00


 


WBC  5.0


 


Corrected WBC (auto)  4.13


 


Neutrophils % (Manual)  66.3


 


Band Neutrophils %  1.1


 


Lymphocytes % (Manual)  16.8


 


Monocytes % (Manual)  6


 


Eosinophils % (Manual)  2.1  D


 


Basophils % (Manual)  1.0


 


Myelocytes % (Man)  2


 


Promyelocytes % (Man)  0


 


Blast Cells % (Manual)  0


 


Nucleated RBC %  21 H*


 


Metamyelocytes  1  D


 


Hypochromia  0


 


Platelet Estimate  Decreased


 


Polychromasia  1+


 


Poikilocytosis  0


 


Anisocytosis  1+


 


Microcytosis  0


 


Macrocytosis  0


 


Rouleaux  1+











ASSESSMENT AND PLAN:


Acute Hypoxic Respiratory Failure: S/P Trach 


Altered Mental Status


Metabolic Encephalopathy


Pneumonia


Acalculous Cholecystitis


Multiple Myeloma


Acute on Chronic Renal Failure


Metabolic Acidosis


LV Diastolic Dysfunction


Atrial Fibrillation


CAD


+Troponins likely Demand Ischemia


PAD


Hyperlipidemia


HTN


Anemia





-  Not a good candidate for wean due to poor mental status 


-  rate control


-  DVT/GI prophylaxis


-  D/C planning 





Dr Blum

## 2018-12-09 LAB
ALBUMIN SERPL-MCNC: 1.3 G/DL (ref 3.4–5)
ALP SERPL-CCNC: 215 U/L (ref 45–117)
ALT SERPL-CCNC: 50 U/L (ref 13–61)
ANION GAP SERPL CALC-SCNC: 6 MMOL/L (ref 8–16)
ANISOCYTOSIS BLD QL: (no result)
AST SERPL-CCNC: 122 U/L (ref 15–37)
BASOPHILS # BLD: 0.5 % (ref 0–2)
BILIRUB SERPL-MCNC: 1.2 MG/DL (ref 0.2–1)
BUN SERPL-MCNC: 63 MG/DL (ref 7–18)
CALCIUM SERPL-MCNC: 6.7 MG/DL (ref 8.5–10.1)
CHLORIDE SERPL-SCNC: 124 MMOL/L (ref 98–107)
CO2 SERPL-SCNC: 22 MMOL/L (ref 21–32)
CREAT SERPL-MCNC: 1.6 MG/DL (ref 0.55–1.3)
DACRYOCYTES BLD QL SMEAR: (no result)
DEPRECATED RDW RBC AUTO: 16.5 % (ref 11.9–15.9)
EOSINOPHIL # BLD: 0.6 % (ref 0–4.5)
GLUCOSE SERPL-MCNC: 120 MG/DL (ref 74–106)
HCT VFR BLD CALC: 24.4 % (ref 35.4–49)
HGB BLD-MCNC: 7.7 GM/DL (ref 11.7–16.9)
LYMPHOCYTES # BLD: 22.4 % (ref 8–40)
MACROCYTES BLD QL: (no result)
MCH RBC QN AUTO: 29.6 PG (ref 25.7–33.7)
MCHC RBC AUTO-ENTMCNC: 31.8 G/DL (ref 32–35.9)
MCV RBC: 93.1 FL (ref 80–96)
MONOCYTES # BLD AUTO: 7.9 % (ref 3.8–10.2)
NEUTROPHILS # BLD: 68.6 % (ref 42.8–82.8)
PLATELET # BLD AUTO: 161 K/MM3 (ref 134–434)
PLATELET BLD QL SMEAR: NORMAL
PMV BLD: 10.6 FL (ref 7.5–11.1)
POTASSIUM SERPLBLD-SCNC: 5.3 MMOL/L (ref 3.5–5.1)
PROT SERPL-MCNC: 9.3 G/DL (ref 6.4–8.2)
RBC # BLD AUTO: 2.62 M/MM3 (ref 4–5.6)
ROULEAUX BLD QL SMEAR: (no result)
SODIUM SERPL-SCNC: 152 MMOL/L (ref 136–145)
WBC # BLD AUTO: 5.4 K/MM3 (ref 4–10)

## 2018-12-09 RX ADMIN — DEXTROSE AND SODIUM CHLORIDE SCH MLS/HR: 5; .33 INJECTION, SOLUTION INTRAVENOUS at 20:27

## 2018-12-09 RX ADMIN — ACETAMINOPHEN PRN MG: 325 TABLET ORAL at 02:23

## 2018-12-09 RX ADMIN — METOPROLOL TARTRATE SCH MG: 25 TABLET, FILM COATED ORAL at 21:43

## 2018-12-09 RX ADMIN — Medication SCH MG: at 11:07

## 2018-12-09 RX ADMIN — ACETAMINOPHEN PRN MG: 325 TABLET ORAL at 17:22

## 2018-12-09 RX ADMIN — Medication SCH TAB: at 11:06

## 2018-12-09 RX ADMIN — RANITIDINE HYDROCHLORIDE SCH MG: 150 SOLUTION ORAL at 21:43

## 2018-12-09 RX ADMIN — METOPROLOL TARTRATE SCH MG: 25 TABLET, FILM COATED ORAL at 11:07

## 2018-12-09 RX ADMIN — RIFAXIMIN SCH MG: 550 TABLET ORAL at 21:43

## 2018-12-09 RX ADMIN — ALLOPURINOL SCH MG: 100 TABLET ORAL at 11:06

## 2018-12-09 RX ADMIN — RANITIDINE HYDROCHLORIDE SCH MG: 150 SOLUTION ORAL at 11:06

## 2018-12-09 RX ADMIN — ALLOPURINOL SCH MG: 100 TABLET ORAL at 21:43

## 2018-12-09 RX ADMIN — VITAMIN D, TAB 1000IU (100/BT) SCH UNIT: 25 TAB at 11:07

## 2018-12-09 RX ADMIN — RIFAXIMIN SCH MG: 550 TABLET ORAL at 11:07

## 2018-12-09 RX ADMIN — Medication SCH: at 11:01

## 2018-12-09 NOTE — PN
Progress Note, Physician





- Current Medication List


Current Medications: 


Active Medications





Acetaminophen (Tylenol -)  650 mg NR Q6H PRN


   PRN Reason: PAIN LEVEL 1 - 3


   Last Admin: 12/09/18 02:23 Dose:  650 mg


Allopurinol (Zyloprim -)  100 mg PEG BID [facility]


   Last Admin: 12/09/18 11:06 Dose:  100 mg


Cholecalciferol (Vitamin D3 -)  1,000 unit NR DAILY [facility]


   Last Admin: 12/09/18 11:07 Dose:  1,000 unit


Diphenhydramine HCl (Benadryl Injection -)  25 mg IVPB ONCE PRN


   PRN Reason: DURING PLASMAPHERESIS


Dextrose/Sodium Chloride (D5-1/3ns -)  500 mls @ 42 mls/hr IV ASDIR [facility]


Lactobacillus Acidophilus (Bacid -)  1 tab PEG DAILY [facility]


   Last Admin: 12/09/18 11:06 Dose:  1 tab


Metoprolol Tartrate (Lopressor -)  25 mg PEG BID [facility]


   Last Admin: 12/09/18 11:07 Dose:  25 mg


Ranitidine HCl (Zantac Oral Solution -)  150 mg PEG BID [facility]


   Last Admin: 12/09/18 11:06 Dose:  150 mg


Rifaximin (Xifaxan -)  550 mg NR BID [facility]


   Last Admin: 12/09/18 11:07 Dose:  550 mg


Thiamine HCl (Vitamin B1 -)  100 mg NR DAILY [facility]


   Last Admin: 12/09/18 11:07 Dose:  100 mg











- Objective


Vital Signs: 


 Vital Signs











Temperature  98.6 F   12/09/18 11:00


 


Pulse Rate  110 H  12/09/18 11:00


 


Respiratory Rate  26 H  12/09/18 12:11


 


Blood Pressure  123/75   12/09/18 11:00


 


O2 Sat by Pulse Oximetry (%)  98   12/08/18 20:47











Cardiovascular: Yes: S1, S2


Respiratory: Yes: Mechanically Ventilated


Gastrointestinal: Yes: Normal Bowel Sounds, Soft


Labs: 


 CBC, BMP





 12/09/18 07:30 





 12/09/18 08:50 





 INR, PTT











INR  1.69  (0.83-1.09)  H  12/06/18  05:30    


 


Fibrinogen  338.0 mg/dL (238-498)  D 12/03/18  20:00    














Problem List





- Problems


(1) Toxic metabolic encephalopathy


Code(s): G92 - TOXIC ENCEPHALOPATHY   





(2) Cellulitis


Code(s): L03.90 - CELLULITIS, UNSPECIFIED   


Qualifiers: 


   Site of cellulitis: extremity   Site of cellulitis of extremity: lower 

extremity   Laterality: right   Qualified Code(s): L03.115 - Cellulitis of 

right lower limb   





(3) Sepsis


Code(s): A41.9 - SEPSIS, UNSPECIFIED ORGANISM   





(4) Artery occlusion


Code(s): I70.90 - UNSPECIFIED ATHEROSCLEROSIS   





(5) Hypercalcemia


Code(s): E83.52 - HYPERCALCEMIA   





(6) Paroxysmal atrial fibrillation


Code(s): I48.0 - PAROXYSMAL ATRIAL FIBRILLATION   





(7) HAM (acute kidney injury)


Code(s): N17.9 - ACUTE KIDNEY FAILURE, UNSPECIFIED   





Assessment/Plan





(1) HAM (acute kidney injury)


Assessment/Plan: 


potassium now normal range- hyperkalemia improved


ultrasound hyperdense lesion noted in left renal cortex


ivf per renal


Code(s): N17.9 - ACUTE KIDNEY FAILURE, UNSPECIFIED   





(2) Atrial fibrillation with RVR


Assessment/Plan: 


heparin drip --dc due to anemia


cardiac monitor


rate control with metoprolol








(3) Respiratory failure


Assessment/Plan: 


S/p Trach 


Vwnt setting per pulm


Code(s): J96.90 - RESPIRATORY FAILURE, UNSP, UNSP W HYPOXIA OR HYPERCAPNIA   





(4) Toxic metabolic encephalopathy


Assessment/Plan: 


Microbiology


S/p Rx for PNA


Code(s): G92 - TOXIC ENCEPHALOPATHY   





(5) Anemia


Assessment/Plan: 


-PRBC


-Hold Heparin


-Transfuse





(6) Nutrition


Assessment/Plan: 


peg in place


feeding started





(7) Fever


Assessment/Plan: 


Cultures


 Microbiology











 12/08/18 18:40 Blood Culture - Preliminary





 Blood - Peripheral Venous    Pending Organism


 


 12/03/18 10:27 Blood Culture - Final





 Blood - Peripheral Venous    NO GROWTH AFTER 5 DAYS INCUBATION


 


 12/03/18 10:33 Blood Culture - Final





 Blood - Peripheral Venous    NO GROWTH AFTER 5 DAYS INCUBATION








ID follow up


cxr

## 2018-12-09 NOTE — PN
Progress Note, Physician


Chief Complaint: 





The patient examined in his bed. Poorly responsive. 


Vent supported. 


Maintains good urine output. Tube feeding in progress. 


History of Present Illness: 





This is a 78 year old gentleman with hx of CKD, Afib on A/C, CAD, CKD, 

Hypertension, Hyperlipidemia, PVD who presented with AMS/Confusion and found to 

have HAM. The patient is intubated and supported by mechanical vent. In ICU. 


Later a Tracheostomy placed, and now on mechanical vent support. 





G tube feeding in progress





- Current Medication List


Current Medications: 


Active Medications





Acetaminophen (Tylenol -)  650 mg NR Q6H PRN


   PRN Reason: PAIN LEVEL 1 - 3


   Last Admin: 12/09/18 02:23 Dose:  650 mg


Allopurinol (Zyloprim -)  100 mg PEG BID [facility]


   Last Admin: 12/08/18 22:16 Dose:  100 mg


Cholecalciferol (Vitamin D3 -)  1,000 unit NR DAILY [facility]


   Last Admin: 12/08/18 09:45 Dose:  1,000 unit


Diphenhydramine HCl (Benadryl Injection -)  25 mg IVPB ONCE PRN


   PRN Reason: DURING PLASMAPHERESIS


Dextrose/Sodium Chloride (D5-1/3ns -)  500 mls @ 42 mls/hr IV ASDIR [facility]


Lactobacillus Acidophilus (Bacid -)  1 tab PEG DAILY [facility]


   Last Admin: 12/08/18 09:46 Dose:  1 tab


Metoprolol Tartrate (Lopressor -)  25 mg PEG BID [facility]


   Last Admin: 12/08/18 22:12 Dose:  25 mg


Ranitidine HCl (Zantac Oral Solution -)  150 mg PEG BID [facility]


   Last Admin: 12/08/18 22:12 Dose:  150 mg


Rifaximin (Xifaxan -)  550 mg NR BID [facility]


   Last Admin: 12/08/18 22:16 Dose:  550 mg


Thiamine HCl (Vitamin B1 -)  100 mg NR DAILY [facility]


   Last Admin: 12/08/18 09:45 Dose:  100 mg











- Objective


Vital Signs: 


 Vital Signs











Temperature  98.5 F   12/09/18 06:01


 


Pulse Rate  90   12/09/18 06:01


 


Respiratory Rate  25 H  12/09/18 09:15


 


Blood Pressure  121/75   12/09/18 06:01


 


O2 Sat by Pulse Oximetry (%)  98   12/08/18 20:47











Constitutional: Yes: No Distress


Cardiovascular: Yes: Tachycardia, Pulse Irregular, S1, S2


Respiratory: Yes: CTA Bilaterally, Mechanically Ventilated


Gastrointestinal: Yes: Normal Bowel Sounds, Soft


Edema: Yes


Edema: LUE: Trace, RUE: Trace


Labs: 


 CBC, BMP





 12/09/18 07:30 





 12/09/18 08:50 





 INR, PTT











INR  1.69  (0.83-1.09)  H  12/06/18  05:30    


 


Fibrinogen  338.0 mg/dL (238-498)  D 12/03/18  20:00    














Problem List





- Problems


(1) Multiple myeloma


Code(s): C90.00 - MULTIPLE MYELOMA NOT HAVING ACHIEVED REMISSION   


Qualifiers: 


   Qualified Code(s): C90.00 - Multiple myeloma not having achieved remission   





(2) HAM (acute kidney injury)


Code(s): N17.9 - ACUTE KIDNEY FAILURE, UNSPECIFIED   





(3) Altered mental status


Code(s): R41.82 - ALTERED MENTAL STATUS, UNSPECIFIED   





(4) Anemia


Code(s): D64.9 - ANEMIA, UNSPECIFIED   





(5) Atrial fibrillation with RVR


Code(s): I48.91 - UNSPECIFIED ATRIAL FIBRILLATION   





(6) Cellulitis


Code(s): L03.90 - CELLULITIS, UNSPECIFIED   


Qualifiers: 


   Qualified Code(s): L03.115 - Cellulitis of right lower limb   





(7) Hyperlipidemia


Code(s): E78.5 - HYPERLIPIDEMIA, UNSPECIFIED   


Qualifiers: 


   Qualified Code(s): E78.00 - Pure hypercholesterolemia, unspecified; E78.0 - 

Pure hypercholesterolemia   





(8) Sepsis


Code(s): A41.9 - SEPSIS, UNSPECIFIED ORGANISM   





(9) Hypercalcemia


Code(s): E83.52 - HYPERCALCEMIA   





Assessment/Plan








78 year old gentleman with hx of CKD, Afib on A/C, CAD, CKD, Hypertension, 

Hyperlipidemia, PVD who presented with AMS/Confusion and found to have HAM.











HAM ...Azotemia stable, except for some Prerenal worsening.





Hypernatremia, also related to free water deficit. Will give some cautious IV 

fluid supplements. 





Newly diagnosed multiple myloma 





Anemia 





Hypocalcemia...but the corrected Calcium is within acceptable range. Will not 

order Calcium supplements. 





Hyperdense lesion on US of the kidney 





SodaBirb discontinued. 





IV fluid ordered. 


Leeann Pérez MD

## 2018-12-09 NOTE — PN
Progress Note (short form)





- Note


Progress Note: 


Chief Complaint: Events noted, notes reviewed, remains on trach/ventilator with 

overall no change in neurological status





History of Present Illness: 


Seen and examined. Events noted, notes reviewed, remains on trach/ventilator 

with overall no change in neurological status





&[facility] coronary angiography performed 1/11/2017 revealed non-obstructive CAD, 

severe LV apical hypertrophic cardiomyopathy, mildly elevated right sided 

pressures/study is consistent with apical hypertrophy (spade-like) variant of 

hypertrophic cardiomyopathy





Echocardiography dated 07/11/2018 Mod cLVH, severe DYANA, moderate TR RVSP 50-60 

mmHg, moderate-severe MR





Echocardiography dated 10/16/2018 Normal LV size with hyperdynamic LVEF 75%, 

mild BSH, normal RV size and function, severe LAE, moderate-severe MR, mod TR


 





- Current Medication List


 


 Current Medications





Acetaminophen (Tylenol -)  650 mg NR Q6H PRN


   PRN Reason: PAIN LEVEL 1 - 3


   Last Admin: 12/09/18 02:23 Dose:  650 mg


Allopurinol (Zyloprim -)  100 mg PEG BID [facility]


   Last Admin: 12/08/18 22:16 Dose:  100 mg


Cholecalciferol (Vitamin D3 -)  1,000 unit NR DAILY [facility]


   Last Admin: 12/08/18 09:45 Dose:  1,000 unit


Diphenhydramine HCl (Benadryl Injection -)  25 mg IVPB ONCE PRN


   PRN Reason: DURING PLASMAPHERESIS


Lactobacillus Acidophilus (Bacid -)  1 tab PEG DAILY [facility]


   Last Admin: 12/08/18 09:46 Dose:  1 tab


Metoprolol Tartrate (Lopressor -)  25 mg PEG BID [facility]


   Last Admin: 12/08/18 22:12 Dose:  25 mg


Ranitidine HCl (Zantac Oral Solution -)  150 mg PEG BID [facility]


   Last Admin: 12/08/18 22:12 Dose:  150 mg


Rifaximin (Xifaxan -)  550 mg NR BID [facility]


   Last Admin: 12/08/18 22:16 Dose:  550 mg


Thiamine HCl (Vitamin B1 -)  100 mg NR DAILY [facility]


   Last Admin: 12/08/18 09:45 Dose:  100 mg








- Objective


Vital Signs: 


 


 Last Vital Signs











Temp Pulse Resp BP Pulse Ox


 


 98.5 F   90   25 H  121/75   98 


 


 12/09/18 06:01  12/09/18 06:01  12/09/18 09:15  12/09/18 06:01  12/08/18 20:47








 Intake & Output











 12/06/18 12/07/18 12/08/18 12/09/18





 23:59 23:59 23:59 23:59


 


Intake Total 450 600 600 540


 


Output Total 0  1100 


 


Balance 450 600 -500 540


 


Weight 232 lb 4.8 oz   











Neck: Supple Negative JVD No Bruit


Cardiovascular: S1 S2 Irregularly Irregular Grade 2/6 Systolic Murmur Apical


Chest: Scattered Rhonchi Bilaterally


Gastrointestinal: Soft Benign Normal Bowel Sounds


Ext: No Edema 





Labs: 


 


 CBC, BMP





 12/09/18 07:30 





 12/09/18 08:50 








Assessment/Plan





ASSESSMENT:





1. Chronic respiratory failure/post trach in a patient with history of acute 

hypoxic respiratory failure, aspiration pneumonia with resolved sepsis syndrome


2. Toxic metabolic encephelopathy, overall no change in status 


3. 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


4. Paraproteinemia confirmed multiple myeloma


5. 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 

disease related to persistent atrial fibrillation with [id number] score of 6


6. CAD with evidence of demand ischemic injury, non obstructive CAD on [facility] 

coronary angiogrpahy angina pectoris


7. 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 

chronic class I-II NYHA classification LV failure, compensated/euvolemic


8. Persistent atrial fibrillation [id number] score of 6 currently on no A/C 

therapy


9. HTN


10. Anemia/thrombocytopenia, dropping H/H


11. Acalculous Cholecystitis


12. Ischemic hepatitis


13. Hypernatremia





PLAN:





1. Ideally patient should be A/C unless it is absolutely contraindicated 

considering his [id number] score of 6 


2. Transfuse to maintain Hg equal or > 8.0, as per primary team 


3. Continue Lopressor, hemodynamics permitting 


4. Correction of Hypernatremia, recommend D5 1/3 NS 


5. Ventilator management as per the pulmonary team 


6. As outlined in prior notes overall poor prognosis











Armand Mckenzie MD

## 2018-12-09 NOTE — PN
Progress Note (short form)





- Note


Progress Note: 


Vented on AC Mode, 40% via Trach. 


Poorly responsive. 


No change in overall condition. 








OBJECTIVE:





 


 Intake & Output











 12/06/18 12/07/18 12/08/18 12/09/18





 23:59 23:59 23:59 23:59


 


Intake Total 450 600 600 540


 


Output Total 0  1100 


 


Balance 450 600 -500 540


 


Weight 232 lb 4.8 oz   








 Last Vital Signs











Temp Pulse Resp BP Pulse Ox


 


 98.6 F   110 H  26 H  123/75   98 


 


 12/09/18 11:00  12/09/18 11:00  12/09/18 11:00  12/09/18 11:00  12/08/18 20:47











Active Medications





Acetaminophen (Tylenol -)  650 mg NR Q6H PRN


   PRN Reason: PAIN LEVEL 1 - 3


   Last Admin: 12/09/18 02:23 Dose:  650 mg


Allopurinol (Zyloprim -)  100 mg PEG BID [facility]


   Last Admin: 12/09/18 11:06 Dose:  100 mg


Cholecalciferol (Vitamin D3 -)  1,000 unit NR DAILY [facility]


   Last Admin: 12/09/18 11:07 Dose:  1,000 unit


Diphenhydramine HCl (Benadryl Injection -)  25 mg IVPB ONCE PRN


   PRN Reason: DURING PLASMAPHERESIS


Dextrose/Sodium Chloride (D5-1/3ns -)  500 mls @ 42 mls/hr IV ASDIR [facility]


Lactobacillus Acidophilus (Bacid -)  1 tab PEG DAILY [facility]


   Last Admin: 12/09/18 11:06 Dose:  1 tab


Metoprolol Tartrate (Lopressor -)  25 mg PEG BID [facility]


   Last Admin: 12/09/18 11:07 Dose:  25 mg


Ranitidine HCl (Zantac Oral Solution -)  150 mg PEG BID [facility]


   Last Admin: 12/09/18 11:06 Dose:  150 mg


Rifaximin (Xifaxan -)  550 mg NR BID [facility]


   Last Admin: 12/09/18 11:07 Dose:  550 mg


Thiamine HCl (Vitamin B1 -)  100 mg NR DAILY [facility]


   Last Admin: 12/09/18 11:07 Dose:  100 mg





Gen: Trached, vented, poorly responsive


Heart: RRR


Lung: scattered rhonchi


Abd: soft, nontender


Ext: + edema


  


  


  


  Laboratory Results - last 24 hr











  12/08/18 12/08/18 12/09/18





  17:00 18:49 07:30


 


WBC   6.0  5.4


 


RBC   2.59 L  2.62 L


 


Hgb   8.1 L  7.7 L


 


Hct   23.5 L  24.4 L


 


MCV   90.6  93.1


 


MCH   31.3  29.6


 


MCHC   34.6  31.8 L


 


RDW   16.0 H  16.5 H


 


Plt Count   168  161


 


MPV   10.4  10.6


 


Absolute Neuts (auto)   4.2  3.7


 


Neutrophils %   69.9  68.6


 


Neutrophils % (Manual)   62.0 


 


Band Neutrophils %   9.0 


 


Lymphocytes %   20.7  22.4


 


Lymphocytes % (Manual)   23.0  D 


 


Monocytes %   8.3  7.9


 


Monocytes % (Manual)   4 


 


Eosinophils %   0.6  0.6


 


Eosinophils % (Manual)   0.0  D 


 


Basophils %   0.5  0.5


 


Basophils % (Manual)   0.0 


 


Nucleated RBC %   6 H  3 H


 


Metamyelocytes   2  D 


 


Platelet Estimate   Adequate 


 


Sodium   


 


Potassium   


 


Chloride   


 


Carbon Dioxide   


 


Anion Gap   


 


BUN   


 


Creatinine   


 


Creat Clearance w eGFR   


 


Random Glucose   


 


Calcium   


 


Total Bilirubin   


 


AST   


 


ALT   


 


Alkaline Phosphatase   


 


Total Protein   


 


Albumin   


 


Urine Color  Richelle  


 


Urine Appearance  Cloudy  


 


Urine pH  5.0  


 


Ur Specific Gravity  1.018  


 


Urine Protein  2+ H  


 


Urine Glucose (UA)  Negative  


 


Urine Ketones  Negative  


 


Urine Blood  3+ H  


 


Urine Nitrite  Positive  


 


Urine Bilirubin  Negative  


 


Urine Urobilinogen  Negative  


 


Ur Leukocyte Esterase  3+ H  


 


Urine WBC (Auto)  420  


 


Urine RBC (Auto)  63  


 


Ur Epithelial Cells  Rare  


 


Urine Bacteria  Many  


 


Urine Mucus  Rare  














  12/09/18





  08:50


 


WBC 


 


RBC 


 


Hgb 


 


Hct 


 


MCV 


 


MCH 


 


MCHC 


 


RDW 


 


Plt Count 


 


MPV 


 


Absolute Neuts (auto) 


 


Neutrophils % 


 


Neutrophils % (Manual) 


 


Band Neutrophils % 


 


Lymphocytes % 


 


Lymphocytes % (Manual) 


 


Monocytes % 


 


Monocytes % (Manual) 


 


Eosinophils % 


 


Eosinophils % (Manual) 


 


Basophils % 


 


Basophils % (Manual) 


 


Nucleated RBC % 


 


Metamyelocytes 


 


Platelet Estimate 


 


Sodium  152 H


 


Potassium  5.3 H


 


Chloride  124 H


 


Carbon Dioxide  22


 


Anion Gap  6 L


 


BUN  63 H


 


Creatinine  1.6 H


 


Creat Clearance w eGFR  41.90


 


Random Glucose  120 H


 


Calcium  6.7 L*


 


Total Bilirubin  1.2 H


 


AST  122 H


 


ALT  50


 


Alkaline Phosphatase  215 H


 


Total Protein  9.3 H


 


Albumin  1.3 L


 


Urine Color 


 


Urine Appearance 


 


Urine pH 


 


Ur Specific Gravity 


 


Urine Protein 


 


Urine Glucose (UA) 


 


Urine Ketones 


 


Urine Blood 


 


Urine Nitrite 


 


Urine Bilirubin 


 


Urine Urobilinogen 


 


Ur Leukocyte Esterase 


 


Urine WBC (Auto) 


 


Urine RBC (Auto) 


 


Ur Epithelial Cells 


 


Urine Bacteria 


 


Urine Mucus 














ASSESSMENT AND PLAN:


Acute Hypoxic Respiratory Failure: S/P Trach 


Altered Mental Status


Metabolic Encephalopathy


Pneumonia


Acalculous Cholecystitis


Multiple Myeloma


Acute on Chronic Renal Failure


Metabolic Acidosis


LV Diastolic Dysfunction


Atrial Fibrillation


CAD


+Troponins likely Demand Ischemia


PAD


Hyperlipidemia


HTN


Anemia





-  Not a good candidate for wean due to poor mental status 


-  rate control


-  DVT/GI prophylaxis


-  D/C planning 





Dr Blum

## 2018-12-10 LAB
ALBUMIN SERPL-MCNC: 1.1 G/DL (ref 3.4–5)
ALP SERPL-CCNC: 223 U/L (ref 45–117)
ALT SERPL-CCNC: 54 U/L (ref 13–61)
ANION GAP SERPL CALC-SCNC: 5 MMOL/L (ref 8–16)
ANISOCYTOSIS BLD QL: (no result)
AST SERPL-CCNC: 142 U/L (ref 15–37)
BASOPHILS # BLD: 0.4 % (ref 0–2)
BASOPHILS # BLD: 0.7 % (ref 0–2)
BILIRUB SERPL-MCNC: 1 MG/DL (ref 0.2–1)
BUN SERPL-MCNC: 63 MG/DL (ref 7–18)
CALCIUM SERPL-MCNC: 6.3 MG/DL (ref 8.5–10.1)
CHLORIDE SERPL-SCNC: 125 MMOL/L (ref 98–107)
CO2 SERPL-SCNC: 22 MMOL/L (ref 21–32)
CREAT SERPL-MCNC: 1.6 MG/DL (ref 0.55–1.3)
DEPRECATED RDW RBC AUTO: 16.3 % (ref 11.9–15.9)
DEPRECATED RDW RBC AUTO: 16.5 % (ref 11.9–15.9)
EOSINOPHIL # BLD: 1 % (ref 0–4.5)
EOSINOPHIL # BLD: 1.1 % (ref 0–4.5)
GLUCOSE SERPL-MCNC: 143 MG/DL (ref 74–106)
HCT VFR BLD CALC: 20.5 % (ref 35.4–49)
HCT VFR BLD CALC: 21.7 % (ref 35.4–49)
HGB BLD-MCNC: 6.8 GM/DL (ref 11.7–16.9)
HGB BLD-MCNC: 7.1 GM/DL (ref 11.7–16.9)
LYMPHOCYTES # BLD: 22.4 % (ref 8–40)
LYMPHOCYTES # BLD: 24.7 % (ref 8–40)
MACROCYTES BLD QL: (no result)
MCH RBC QN AUTO: 29.4 PG (ref 25.7–33.7)
MCH RBC QN AUTO: 31.8 PG (ref 25.7–33.7)
MCHC RBC AUTO-ENTMCNC: 31.6 G/DL (ref 32–35.9)
MCHC RBC AUTO-ENTMCNC: 34.5 G/DL (ref 32–35.9)
MCV RBC: 92 FL (ref 80–96)
MCV RBC: 93.2 FL (ref 80–96)
MONOCYTES # BLD AUTO: 5.7 % (ref 3.8–10.2)
MONOCYTES # BLD AUTO: 6.6 % (ref 3.8–10.2)
NEUTROPHILS # BLD: 66.9 % (ref 42.8–82.8)
NEUTROPHILS # BLD: 70.5 % (ref 42.8–82.8)
PLATELET # BLD AUTO: 132 K/MM3 (ref 134–434)
PLATELET # BLD AUTO: 145 K/MM3 (ref 134–434)
PLATELET BLD QL SMEAR: (no result)
PLATELET BLD QL SMEAR: ADEQUATE
PMV BLD: 10.1 FL (ref 7.5–11.1)
PMV BLD: 10.2 FL (ref 7.5–11.1)
POTASSIUM SERPLBLD-SCNC: 4.7 MMOL/L (ref 3.5–5.1)
PROT SERPL-MCNC: 8.4 G/DL (ref 6.4–8.2)
RBC # BLD AUTO: 2.22 M/MM3 (ref 4–5.6)
RBC # BLD AUTO: 2.32 M/MM3 (ref 4–5.6)
ROULEAUX BLD QL SMEAR: (no result)
SODIUM SERPL-SCNC: 152 MMOL/L (ref 136–145)
WBC # BLD AUTO: 4.7 K/MM3 (ref 4–10)
WBC # BLD AUTO: 4.9 K/MM3 (ref 4–10)

## 2018-12-10 RX ADMIN — ALLOPURINOL SCH MG: 100 TABLET ORAL at 10:04

## 2018-12-10 RX ADMIN — DEXTROSE AND SODIUM CHLORIDE SCH MLS/HR: 5; .33 INJECTION, SOLUTION INTRAVENOUS at 23:36

## 2018-12-10 RX ADMIN — RANITIDINE HYDROCHLORIDE SCH MG: 150 SOLUTION ORAL at 10:03

## 2018-12-10 RX ADMIN — METOPROLOL TARTRATE SCH MG: 25 TABLET, FILM COATED ORAL at 22:17

## 2018-12-10 RX ADMIN — ACETAMINOPHEN PRN MG: 160 SOLUTION ORAL at 13:29

## 2018-12-10 RX ADMIN — CEFEPIME HYDROCHLORIDE SCH MLS/HR: 2 INJECTION, POWDER, FOR SOLUTION INTRAVENOUS at 12:45

## 2018-12-10 RX ADMIN — DEXTROSE AND SODIUM CHLORIDE SCH MLS/HR: 5; .33 INJECTION, SOLUTION INTRAVENOUS at 08:15

## 2018-12-10 RX ADMIN — RANITIDINE HYDROCHLORIDE SCH MG: 150 SOLUTION ORAL at 22:16

## 2018-12-10 RX ADMIN — ALLOPURINOL SCH MG: 100 TABLET ORAL at 22:17

## 2018-12-10 RX ADMIN — RIFAXIMIN SCH MG: 550 TABLET ORAL at 10:18

## 2018-12-10 RX ADMIN — ACETAMINOPHEN PRN MG: 160 SOLUTION ORAL at 00:28

## 2018-12-10 RX ADMIN — ACETAMINOPHEN PRN MG: 160 SOLUTION ORAL at 06:26

## 2018-12-10 RX ADMIN — VANCOMYCIN HYDROCHLORIDE SCH MLS/HR: 1 INJECTION, POWDER, LYOPHILIZED, FOR SOLUTION INTRAVENOUS at 10:02

## 2018-12-10 RX ADMIN — Medication SCH MG: at 10:03

## 2018-12-10 RX ADMIN — Medication SCH TAB: at 10:04

## 2018-12-10 RX ADMIN — ACETAMINOPHEN PRN MG: 160 SOLUTION ORAL at 22:16

## 2018-12-10 RX ADMIN — CEFEPIME HYDROCHLORIDE SCH MLS/HR: 2 INJECTION, POWDER, FOR SOLUTION INTRAVENOUS at 22:18

## 2018-12-10 RX ADMIN — DEXTROSE AND SODIUM CHLORIDE SCH: 5; .33 INJECTION, SOLUTION INTRAVENOUS at 08:10

## 2018-12-10 RX ADMIN — METOPROLOL TARTRATE SCH MG: 25 TABLET, FILM COATED ORAL at 10:50

## 2018-12-10 RX ADMIN — VITAMIN D, TAB 1000IU (100/BT) SCH UNIT: 25 TAB at 10:03

## 2018-12-10 RX ADMIN — DEXTROSE AND SODIUM CHLORIDE SCH: 5; .33 INJECTION, SOLUTION INTRAVENOUS at 13:02

## 2018-12-10 RX ADMIN — RIFAXIMIN SCH MG: 550 TABLET ORAL at 22:17

## 2018-12-10 NOTE — PN
Progress Note (short form)





- Note


Progress Note: 





PROGRESS NOTE FOR HEMATOLOGY/ONCOLOGY


Patient seen and examined by me at bedside


Patient trached and pegged 


Patient continues to have fevers and requires blood transfusions 








 Vital Signs








Temperature  101.8 F H  12/10/18 15:00


 


Pulse Rate  110 H  12/10/18 15:00


 


Respiratory Rate  18   12/10/18 15:00


 


Blood Pressure  133/71   12/10/18 15:00


 


O2 Sat by Pulse Oximetry (%)  98   12/10/18 09:54

















GENERAL: Trach and unresponsive 


LUNGS: Decreased breath sounds throughout


HEART: Tachycardic rate and irregular rhythm. 


ABDOMEN: Soft, nondistended, normoactive bowel sounds 


EXTREMITIES: (+) edema and anasarca 


NEUROLOGICAL: Cannot assess secondary to clinical condition. 





 Laboratory Results 





 12/10/18 06:25 





 12/10/18 06:25 











ASSESSMENT AND PLAN:





Patient is a 78 year old male who presented to the hospital after being found 

unresponsive by his neighbors.  Patient had AMS, HAM, hypercalcemia and 

admitted for further monitoring and management. 





Problem List:


Altered mental status


Toxic metabolic encephalopathy


Multiple Myeloma 


Coagulopathy 


Hypercalcemia likely from malignancy


HAM on CKD


History of alcohol abuse


HTN


HLD


CAD 


Afib 


Diastolic Dysfunction


Troponinemia


Hypertrophic cardiomypoathy (apical)


sepsis secondary to RLL pneumonia


acute hypoxemic respiratory failure


Vitamin D deficiency 





Plan:


-Fevers continue to persist, despite tylenol and motrin.  Likely related to 

central nervous system


-Will order Aleve and 50mg IVPB Hydrocortisone 


-Transfuse 1 unit of PRBC

## 2018-12-10 NOTE — PN
Progress Note, Physician


History of Present Illness: 





  Now with persistant fever


  + BC  grp D strep v. enterococcus


  Minimally resposive


  


  


 





 


 


 





- Current Medication List


Current Medications: 


Active Medications





Acetaminophen (Tylenol Oral Solution -)  650 mg PEG Q6H PRN


   PRN Reason: PAIN LEVEL 1 - 3


   Last Admin: 12/10/18 06:26 Dose:  650 mg


Allopurinol (Zyloprim -)  100 mg PEG BID [facility]


   Last Admin: 12/09/18 21:43 Dose:  100 mg


Cholecalciferol (Vitamin D3 -)  1,000 unit NR DAILY [facility]


   Last Admin: 12/09/18 11:07 Dose:  1,000 unit


Diphenhydramine HCl (Benadryl Injection -)  25 mg IVPB ONCE PRN


   PRN Reason: DURING PLASMAPHERESIS


Dextrose/Sodium Chloride (D5-1/3ns -)  500 mls @ 42 mls/hr IV ASDIR [facility]


   Last Admin: 12/10/18 08:15 Dose:  42 mls/hr


Vancomycin HCl 1,000 mg/ (Dextrose)  250 mls @ 200 mls/hr IVPB Q24H [facility]; 

Protocol


Lactobacillus Acidophilus (Bacid -)  1 tab PEG DAILY [facility]


   Last Admin: 12/09/18 11:06 Dose:  1 tab


Metoprolol Tartrate (Lopressor -)  25 mg PEG BID [facility]


   Last Admin: 12/09/18 21:43 Dose:  25 mg


Ranitidine HCl (Zantac Oral Solution -)  150 mg PEG BID [facility]


   Last Admin: 12/09/18 21:43 Dose:  150 mg


Rifaximin (Xifaxan -)  550 mg NR BID [facility]


   Last Admin: 12/09/18 21:43 Dose:  550 mg


Thiamine HCl (Vitamin B1 -)  100 mg NR DAILY [facility]


   Last Admin: 12/09/18 11:07 Dose:  100 mg











- Objective


Vital Signs: 


 Vital Signs











Temperature  102.4 F H  12/10/18 09:13


 


Pulse Rate  103 H  12/10/18 08:00


 


Respiratory Rate  19   12/10/18 08:00


 


Blood Pressure  108/74   12/10/18 08:00


 


O2 Sat by Pulse Oximetry (%)  98   12/08/18 20:47











Constitutional: Yes: No Distress


Cardiovascular: Yes: Regular Rate and Rhythm, S1, S2


Respiratory: Yes: Mechanically Ventilated


Gastrointestinal: Yes: Normal Bowel Sounds, Soft.  No: Tenderness


Edema: Yes


Labs: 


 CBC, BMP





 12/10/18 06:25 





 12/10/18 06:25 





 INR, PTT











INR  1.69  (0.83-1.09)  H  12/06/18  05:30    


 


Fibrinogen  338.0 mg/dL (238-498)  D 12/03/18  20:00    














Assessment/Plan


Respiratory failure   S/P tracheostomy


 Fever      +BC   grp D strep v. Enterococcus


 Toxic metabolic encephalopathy


 Renal failure


 Myeloma


 Hyperviscosity syndrome


 Diarrhea


 


 


  


Await BC  result


Will re-culture


Empiric vancomycin/ cefepime


Prognosis poor

## 2018-12-10 NOTE — PN
Progress Note (short form)





- Note


Progress Note: 





Renal follow up for Hypercalcemia/HAM





Pt seen and examined at the bedside


on vent via trach, not responsive


making urine in diaper as per nurse  


 Vital Signs











Temperature  101.8 F H  12/10/18 15:00


 


Pulse Rate  110 H  12/10/18 15:00


 


Respiratory Rate  18   12/10/18 15:00


 


Blood Pressure  133/71   12/10/18 15:00


 


O2 Sat by Pulse Oximetry (%)  98   12/10/18 09:54








 Intake & Output











 12/07/18 12/08/18 12/09/18 12/10/18





 23:59 23:59 23:59 23:59


 


Intake Total 600 600 540 990


 


Output Total  1100  


 


Balance 600 -500 540 990











NAD 


trace upper extremity and sacral edema 


 CBC, BMP





 12/10/18 15:15 





 12/10/18 06:25 





 Current Medications





Acetaminophen (Tylenol Oral Solution -)  650 mg PEG Q6H PRN


   PRN Reason: PAIN LEVEL 1 - 3


   Last Admin: 12/10/18 13:29 Dose:  650 mg


Allopurinol (Zyloprim -)  100 mg PEG BID [facility]


   Last Admin: 12/10/18 10:04 Dose:  100 mg


Cholecalciferol (Vitamin D3 -)  1,000 unit NR DAILY [facility]


   Last Admin: 12/10/18 10:03 Dose:  1,000 unit


Diphenhydramine HCl (Benadryl Injection -)  25 mg IVPB ONCE PRN


   PRN Reason: DURING PLASMAPHERESIS


Dextrose/Sodium Chloride (D5-1/3ns -)  500 mls @ 42 mls/hr IV ASDIR [facility]


   Last Admin: 12/10/18 13:02 Dose:  Not Given


Vancomycin HCl (Vancomycin (Pre-Docked))  1,000 mg in 250 mls @ 200 mls/hr IVPB 

DAILY [facility]; Protocol


   Last Admin: 12/10/18 10:02 Dose:  200 mls/hr


Cefepime HCl 2 gm/ Dextrose  100 mls @ 200 mls/hr IVPB BID [facility]; Protocol


   Last Admin: 12/10/18 12:45 Dose:  200 mls/hr


Lactobacillus Acidophilus (Bacid -)  1 tab PEG DAILY [facility]


   Last Admin: 12/10/18 10:04 Dose:  1 tab


Metoprolol Tartrate (Lopressor -)  25 mg PEG BID [facility]


   Last Admin: 12/10/18 10:50 Dose:  25 mg


Ranitidine HCl (Zantac Oral Solution -)  150 mg PEG BID [facility]


   Last Admin: 12/10/18 10:03 Dose:  150 mg


Rifaximin (Xifaxan -)  550 mg NR BID [facility]


   Last Admin: 12/10/18 10:18 Dose:  550 mg


Thiamine HCl (Vitamin B1 -)  100 mg NR DAILY [facility]


   Last Admin: 12/10/18 10:03 Dose:  100 mg











78 year old gentleman with hx of CKD, Afib on A/C, CAD, CKD, Hypertension, 

Hyperlipidemia, PVD who presented with AMS/Confusion and found to have HAM.





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 

showed no stones or obstruction, UA w/o protein but UPCR ~0.5 indicating non-

albumin proteinuria)


#AMS 


#Newly diagnosed multiple myloma 


#Anemia 


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


#Hyperdense lesion on US of the kidney 


#Normal anion gap metabolic acidosis 


#Hyperkalemia (now resolved)





Renal function and serum na worsening


continue 1/3 NS


will increase free water with tube feeds


check bladder scan to r/o urinary retention 


Trend renal function and electrolytes  








Ricky Chang DO

## 2018-12-10 NOTE — PN
Progress Note, Physician


History of Present Illness: 


Unresponsive on vent s/p tracheostomy, persistent afib on lopressor off heparin 

gtt due to Hgb decreases. Post PEG placement, spiking fevers, abx adjusted.





- Current Medication List


Current Medications: 


Active Medications





Acetaminophen (Tylenol Oral Solution -)  650 mg PEG Q6H PRN


   PRN Reason: PAIN LEVEL 1 - 3


   Last Admin: 12/10/18 06:26 Dose:  650 mg


Allopurinol (Zyloprim -)  100 mg PEG BID [facility]


   Last Admin: 12/10/18 10:04 Dose:  100 mg


Cholecalciferol (Vitamin D3 -)  1,000 unit NR DAILY [facility]


   Last Admin: 12/10/18 10:03 Dose:  1,000 unit


Diphenhydramine HCl (Benadryl Injection -)  25 mg IVPB ONCE PRN


   PRN Reason: DURING PLASMAPHERESIS


Dextrose/Sodium Chloride (D5-1/3ns -)  500 mls @ 42 mls/hr IV ASDIR [facility]


   Last Admin: 12/10/18 08:15 Dose:  42 mls/hr


Vancomycin HCl (Vancomycin (Pre-Docked))  1,000 mg in 250 mls @ 200 mls/hr IVPB 

DAILY [facility]; Protocol


   Last Admin: 12/10/18 10:02 Dose:  200 mls/hr


Cefepime HCl 2 gm/ Dextrose  100 mls @ 200 mls/hr IVPB BID [facility]; Protocol


Lactobacillus Acidophilus (Bacid -)  1 tab PEG DAILY [facility]


   Last Admin: 12/10/18 10:04 Dose:  1 tab


Metoprolol Tartrate (Lopressor -)  25 mg PEG BID [facility]


   Last Admin: 12/10/18 10:50 Dose:  25 mg


Ranitidine HCl (Zantac Oral Solution -)  150 mg PEG BID [facility]


   Last Admin: 12/10/18 10:03 Dose:  150 mg


Rifaximin (Xifaxan -)  550 mg NR BID [facility]


   Last Admin: 12/10/18 10:18 Dose:  550 mg


Thiamine HCl (Vitamin B1 -)  100 mg NR DAILY [facility]


   Last Admin: 12/10/18 10:03 Dose:  100 mg











- Objective


Vital Signs: 


 Vital Signs











Temperature  102.8 F H  12/10/18 10:21


 


Pulse Rate  114 H  12/10/18 10:21


 


Respiratory Rate  22 H  12/10/18 10:21


 


Blood Pressure  109/68   12/10/18 10:21


 


O2 Sat by Pulse Oximetry (%)  98   12/10/18 09:54











Constitutional: Yes: No Distress, Calm, Thin


Neck: Yes: Other (Chronic tracheostomy)


Cardiovascular: Yes: Pulse Irregular


Respiratory: Yes: Mechanically Ventilated


Gastrointestinal: Yes: Soft, Hypoactive Bowel Sounds


Edema: No


Labs: 


 CBC, BMP





 12/10/18 06:25 





 12/10/18 06:25 





 INR, PTT











INR  1.69  (0.83-1.09)  H  12/06/18  05:30    


 


Fibrinogen  338.0 mg/dL (238-498)  D 12/03/18  20:00    














Problem List





- Problems


(1) Atrial fibrillation with RVR


Code(s): I48.91 - UNSPECIFIED ATRIAL FIBRILLATION   





(2) Acute-on-chronic kidney injury


Code(s): N17.9 - ACUTE KIDNEY FAILURE, UNSPECIFIED; N18.9 - CHRONIC KIDNEY 

DISEASE, UNSPECIFIED   


Qualifiers: 


   Acute renal failure type: unspecified   Chronic kidney disease stage: 

unspecified stage   Qualified Code(s): N17.9 - Acute kidney failure, unspecified

; N18.9 - Chronic kidney disease, unspecified   





(3) Demand ischemia


Code(s): I24.8 - OTHER FORMS OF ACUTE ISCHEMIC HEART DISEASE   





(4) Hypercalcemia


Code(s): E83.52 - HYPERCALCEMIA   





(5) Nonadherence to medication


Code(s): Z91.14 - PATIENT'S OTHER NONCOMPLIANCE WITH MEDICATION REGIMEN   





(6) Paraproteinemia


Code(s): D89.2 - HYPERGAMMAGLOBULINEMIA, UNSPECIFIED   





(7) Anticoagulant long-term use


Code(s): Z79.01 - LONG TERM (CURRENT) USE OF ANTICOAGULANTS   





(8) Chronic thromboembolic disease


Code(s): I74.9 - EMBOLISM AND THROMBOSIS OF UNSPECIFIED ARTERY   





(9) Coronary artery disease


Code(s): I25.10 - ATHSCL HEART DISEASE OF NATIVE CORONARY ARTERY W/O ANG PCTRS 

  


Qualifiers: 


   Coronary Disease-Associated Artery/Lesion type: native artery   Native vs. 

transplanted heart: native heart   Associated angina: without angina   

Qualified Code(s): I25.10 - Atherosclerotic heart disease of native coronary 

artery without angina pectoris   





(10) Diastolic dysfunction without heart failure


Code(s): I51.9 - HEART DISEASE, UNSPECIFIED   





(11) HTN (hypertension)


Code(s): I10 - ESSENTIAL (PRIMARY) HYPERTENSION   


Qualifiers: 


   Hypertension type: essential hypertension   Qualified Code(s): I10 - 

Essential (primary) hypertension   





(12) Hypertrophic cardiomyopathy


Code(s): I42.2 - OTHER HYPERTROPHIC CARDIOMYOPATHY   





(13) Hyperlipidemia


Code(s): E78.5 - HYPERLIPIDEMIA, UNSPECIFIED   


Qualifiers: 


   Hyperlipidemia type: pure hypercholesterolemia   Qualified Code(s): E78.00 - 

Pure hypercholesterolemia, unspecified; E78.0 - Pure hypercholesterolemia   





(14) Multiple myeloma


Code(s): C90.00 - MULTIPLE MYELOMA NOT HAVING ACHIEVED REMISSION   


Qualifiers: 


   Multiple myeloma remission status: not in remission   Qualified Code(s): 

C90.00 - Multiple myeloma not having achieved remission   





(15) Acalculous cholecystitis


Code(s): K81.9 - CHOLECYSTITIS, UNSPECIFIED   





Assessment/Plan


R&LHc at [facility] 1/11/2017 showing nonobstructive CAD, severe LV apical 

hypertrophic cardiomyopathy, mildly elevated right sided pressures, Mynx 

deployed right CFA access site. Study is consistent with apical hypertrophy (

spade-like) variant of hypertrophic cardiomyopathy, planned for optimal medical 

therapy. 


Echocardiogram: 07/11/2018 Mod cLVH, severe DYANA, mod TR RVSP 50-60 mmHg, mod-

severe MR


Echocardiogram: 10/16/2018 Normal LV size with hyperdynamic LVEF 75%, mild BSH, 

normal RV size and fxn, severe LAE, mod-severe MR, mod TR





1. Chronic respiratory failure/post trach in a patient with history of acute 

hypoxic respiratory failure, aspiration pneumonia with resolved sepsis syndrome


2. Toxic metabolic encephelopathy, overall no change in status 


3. 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


4. Paraproteinemia confirmed multiple myeloma


5. 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 

disease related to persistent atrial fibrillation with [id number] score of 6


6. CAD with evidence of demand ischemic injury, non obstructive CAD on R&LHc 

coronary angiogrpahy angina pectoris


7. 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 

chronic class I-II NYHA classification LV failure, compensated/euvolemic


8. Persistent atrial fibrillation [id number] score of 6 currently on no A/C 

therapy


9. HTN


10. Anemia/thrombocytopenia, dropping H/H


11. Acalculous Cholecystitis


12. Ischemic hepatitis


13. Hypernatremia


14. Fever, +BC   grp D strep v. Enterococcus





PLAN:





1. Ideally patient should be A/C unless it is absolutely contraindicated 

considering his [id number] score of 6 


2. Transfuse to maintain Hg equal or > 8.0, as per primary team 


3. Continue Lopressor 25 bid, hemodynamics permitting 


4. Correction of Hypernatremia with D5 1/3 NS 


5. Ventilator management as per the pulmonary team 


6. Empiric vancomycin/cefepime per C&S

## 2018-12-10 NOTE — PN
Progress Note, Physician


History of Present Illness: 





pulmonary





poorly responsive on vent support ac mode,febrile t 102.8





- Current Medication List


Current Medications: 


Active Medications





Acetaminophen (Tylenol Oral Solution -)  650 mg PEG Q6H PRN


   PRN Reason: PAIN LEVEL 1 - 3


   Last Admin: 12/10/18 06:26 Dose:  650 mg


Allopurinol (Zyloprim -)  100 mg PEG BID [facility]


   Last Admin: 12/10/18 10:04 Dose:  100 mg


Cholecalciferol (Vitamin D3 -)  1,000 unit NR DAILY [facility]


   Last Admin: 12/10/18 10:03 Dose:  1,000 unit


Diphenhydramine HCl (Benadryl Injection -)  25 mg IVPB ONCE PRN


   PRN Reason: DURING PLASMAPHERESIS


Dextrose/Sodium Chloride (D5-1/3ns -)  500 mls @ 42 mls/hr IV ASDIR [facility]


   Last Admin: 12/10/18 08:15 Dose:  42 mls/hr


Vancomycin HCl (Vancomycin (Pre-Docked))  1,000 mg in 250 mls @ 200 mls/hr IVPB 

DAILY [facility]; Protocol


   Last Admin: 12/10/18 10:02 Dose:  200 mls/hr


Cefepime HCl 2 gm/ Dextrose  100 mls @ 200 mls/hr IVPB BID [facility]; Protocol


Lactobacillus Acidophilus (Bacid -)  1 tab PEG DAILY [facility]


   Last Admin: 12/10/18 10:04 Dose:  1 tab


Metoprolol Tartrate (Lopressor -)  25 mg PEG BID [facility]


   Last Admin: 12/10/18 10:50 Dose:  25 mg


Ranitidine HCl (Zantac Oral Solution -)  150 mg PEG BID [facility]


   Last Admin: 12/10/18 10:03 Dose:  150 mg


Rifaximin (Xifaxan -)  550 mg NR BID [facility]


   Last Admin: 12/10/18 10:18 Dose:  550 mg


Thiamine HCl (Vitamin B1 -)  100 mg NR DAILY [facility]


   Last Admin: 12/10/18 10:03 Dose:  100 mg











- Objective


Vital Signs: 


 Vital Signs











Temperature  102.8 F H  12/10/18 10:21


 


Pulse Rate  114 H  12/10/18 10:21


 


Respiratory Rate  22 H  12/10/18 10:21


 


Blood Pressure  109/68   12/10/18 10:21


 


O2 Sat by Pulse Oximetry (%)  98   12/10/18 09:54











Constitutional: Yes: Well Nourished, Other (poorly responsive)


Eyes: Yes: WNL


HENT: Yes: WNL


Neck: Yes: Supple (trach)


Cardiovascular: Yes: Pulse Irregular, S1, S2


Respiratory: Yes: Rhonchi (scattered bi rhonchi)


Gastrointestinal: Yes: Normal Bowel Sounds, Soft


Extremities: Yes: WNL


Edema: No


Labs: 


 CBC, BMP





 12/10/18 06:25 





 12/10/18 06:25 





 INR, PTT











INR  1.69  (0.83-1.09)  H  12/06/18  05:30    


 


Fibrinogen  338.0 mg/dL (238-498)  D 12/03/18  20:00    














Problem List





- Problems


(1) Acute hypoxemic respiratory failure


Code(s): J96.01 - ACUTE RESPIRATORY FAILURE WITH HYPOXIA   





(2) Acalculous cholecystitis


Code(s): K81.9 - CHOLECYSTITIS, UNSPECIFIED   





(3) Anemia


Code(s): D64.9 - ANEMIA, UNSPECIFIED   





(4) Atrial fibrillation with RVR


Code(s): I48.91 - UNSPECIFIED ATRIAL FIBRILLATION   





(5) Multiple myeloma


Code(s): C90.00 - MULTIPLE MYELOMA NOT HAVING ACHIEVED REMISSION   


Qualifiers: 


   Multiple myeloma remission status: not in remission   Qualified Code(s): 

C90.00 - Multiple myeloma not having achieved remission   





(6) Respiratory failure


Code(s): J96.90 - RESPIRATORY FAILURE, UNSP, UNSP W HYPOXIA OR HYPERCAPNIA   





(7) Sepsis


Code(s): A41.9 - SEPSIS, UNSPECIFIED ORGANISM   





(8) Toxic metabolic encephalopathy


Code(s): G92 - TOXIC ENCEPHALOPATHY   





(9) Acute-on-chronic kidney injury


Code(s): N17.9 - ACUTE KIDNEY FAILURE, UNSPECIFIED; N18.9 - CHRONIC KIDNEY 

DISEASE, UNSPECIFIED   


Qualifiers: 


   Acute renal failure type: unspecified   Chronic kidney disease stage: 

unspecified stage   Qualified Code(s): N17.9 - Acute kidney failure, unspecified

; N18.9 - Chronic kidney disease, unspecified   





(10) Persistent atrial fibrillation


Code(s): I48.1 - PERSISTENT ATRIAL FIBRILLATION   





(11) Coronary artery disease


Code(s): I25.10 - ATHSCL HEART DISEASE OF NATIVE CORONARY ARTERY W/O ANG PCTRS 

  


Qualifiers: 


   Coronary Disease-Associated Artery/Lesion type: native artery   Native vs. 

transplanted heart: native heart   Associated angina: without angina   

Qualified Code(s): I25.10 - Atherosclerotic heart disease of native coronary 

artery without angina pectoris   





(12) HTN (hypertension)


Code(s): I10 - ESSENTIAL (PRIMARY) HYPERTENSION   


Qualifiers: 


   Hypertension type: essential hypertension   Qualified Code(s): I10 - 

Essential (primary) hypertension   





(13) Pneumonia


Code(s): J18.9 - PNEUMONIA, UNSPECIFIED ORGANISM   





Assessment/Plan





ASSESSMENT AND PLAN:


Acute Hypoxic Respiratory Failure s/p Tracheostomy


Altered Mental Status


Metabolic Encephalopathy


Pneumonia


Acalculous Cholecystitis


Multiple Myeloma


Acute on Chronic Renal Failure


Metabolic Acidosis


LV Diastolic Dysfunction


Atrial Fibrillation


CAD


+Troponins likely Demand Ischemia


PAD


Hyperlipidemia


HTN


Anemia





-  monitor H/H


-  antibiotics per ID


-  monitor urine output, creatinine


-  rate control


-  holding anticoagulation as he has bled and dropped his H/H this admission


-  hold sedation to assess mental status 


-  DVT/GI prophylaxis


-  poor overall prognosis for meaningful recovery


- normal transfusion threshold





DR ACEVEDO

## 2018-12-10 NOTE — PN
Teaching Attending Note


Name of Resident: Sylvia Tejada





ATTENDING PHYSICIAN STATEMENT





I saw and evaluated the patient.


I reviewed the resident's note and discussed the case with the resident.


I agree with the resident's findings and plan as documented.

















ASSESSMENT AND PLAN:


77 y/o with myeloma, altered mental status, renal insufficiency, s/p trach


poor neurological status


now febrile, anemic


trial of aleve/ hydrocortisione ? central fevers 


PRBC transfusion





overaall poor prognisis


goals of care discussion with family

## 2018-12-10 NOTE — PN
Progress Note, Physician


Chief Complaint: 





patient seen and examined temp 102.8


on vent unresponsive


h/h dropped didnot get prbc overnight and yesterday bc of persistently elevated 

temperatures








- Current Medication List


Current Medications: 


Active Medications





Acetaminophen (Tylenol Oral Solution -)  650 mg PEG Q6H PRN


   PRN Reason: PAIN LEVEL 1 - 3


   Last Admin: 12/10/18 06:26 Dose:  650 mg


Allopurinol (Zyloprim -)  100 mg PEG BID [facility]


   Last Admin: 12/10/18 10:04 Dose:  100 mg


Cholecalciferol (Vitamin D3 -)  1,000 unit NR DAILY [facility]


   Last Admin: 12/10/18 10:03 Dose:  1,000 unit


Diphenhydramine HCl (Benadryl Injection -)  25 mg IVPB ONCE PRN


   PRN Reason: DURING PLASMAPHERESIS


Dextrose/Sodium Chloride (D5-1/3ns -)  500 mls @ 42 mls/hr IV ASDIR [facility]


   Last Admin: 12/10/18 08:15 Dose:  42 mls/hr


Vancomycin HCl (Vancomycin (Pre-Docked))  1,000 mg in 250 mls @ 200 mls/hr IVPB 

DAILY [facility]; Protocol


   Last Admin: 12/10/18 10:02 Dose:  200 mls/hr


Cefepime HCl 2 gm/ Dextrose  100 mls @ 200 mls/hr IVPB BID [facility]; Protocol


Lactobacillus Acidophilus (Bacid -)  1 tab PEG DAILY [facility]


   Last Admin: 12/10/18 10:04 Dose:  1 tab


Metoprolol Tartrate (Lopressor -)  25 mg PEG BID [facility]


   Last Admin: 12/10/18 10:50 Dose:  25 mg


Ranitidine HCl (Zantac Oral Solution -)  150 mg PEG BID [facility]


   Last Admin: 12/10/18 10:03 Dose:  150 mg


Rifaximin (Xifaxan -)  550 mg NR BID [facility]


   Last Admin: 12/10/18 10:18 Dose:  550 mg


Thiamine HCl (Vitamin B1 -)  100 mg NR DAILY [facility]


   Last Admin: 12/10/18 10:03 Dose:  100 mg











- Objective


Vital Signs: 


 Vital Signs











Temperature  102.8 F H  12/10/18 10:21


 


Pulse Rate  114 H  12/10/18 10:21


 


Respiratory Rate  22 H  12/10/18 10:21


 


Blood Pressure  109/68   12/10/18 10:21


 


O2 Sat by Pulse Oximetry (%)  98   12/10/18 09:54











Constitutional: Yes: Calm


Neck: Yes: Other (trach)


Cardiovascular: Yes: S1, S2


Respiratory: Yes: Mechanically Ventilated


Gastrointestinal: Yes: Normal Bowel Sounds, Soft, Other ( g tube)


Labs: 


 CBC, BMP





 12/10/18 06:25 





 12/10/18 06:25 





 INR, PTT











INR  1.69  (0.83-1.09)  H  12/06/18  05:30    


 


Fibrinogen  338.0 mg/dL (238-498)  D 12/03/18  20:00    














Problem List





- Problems


(1) Anemia


Assessment/Plan: 


persistent fever despite antipyretic


alfredo get heme eval for anemia


multiple myeloma 


AC stopped bc of drop in h/h


Code(s): D64.9 - ANEMIA, UNSPECIFIED   





(2) Elevated LFTs


Assessment/Plan: 


ultrasound of liver noted suggestive of acalculous cholecystitis, 


on rifaximin


Code(s): R94.5 - ABNORMAL RESULTS OF LIVER FUNCTION STUDIES   





(3) HAM (acute kidney injury)


Assessment/Plan: 


HAM vs ATN- 





potassium now normal range- hyperkalemia improved


on sodium bicarbonate 


Code(s): N17.9 - ACUTE KIDNEY FAILURE, UNSPECIFIED   





(4) Atrial fibrillation with RVR


Assessment/Plan: 


off AC because of bleeding h/h dropped


rate control with metoprolol








(5) Respiratory failure


Assessment/Plan: 


s/p trach 


off pressors and sedation- now out of icu on med surg floor


on cefepime and vanco


Code(s): J96.90 - RESPIRATORY FAILURE, UNSP, UNSP W HYPOXIA OR HYPERCAPNIA   





(6) Toxic metabolic encephalopathy


Assessment/Plan: 


spiking fever


positive blood culture


Microbiology





12/08/18 18:40   Blood - Peripheral Venous   Blood Culture - Preliminary


                              Group D Strep Or Entero Coccus





on emperic iv abx





Code(s): G92 - TOXIC ENCEPHALOPATHY

## 2018-12-11 LAB
ALBUMIN SERPL-MCNC: 1.1 G/DL (ref 3.4–5)
ALP SERPL-CCNC: 248 U/L (ref 45–117)
ALT SERPL-CCNC: 62 U/L (ref 13–61)
ANION GAP SERPL CALC-SCNC: 3 MMOL/L (ref 8–16)
ANISOCYTOSIS BLD QL: (no result)
AST SERPL-CCNC: 140 U/L (ref 15–37)
BASOPHILS # BLD: 0.8 % (ref 0–2)
BILIRUB SERPL-MCNC: 0.8 MG/DL (ref 0.2–1)
BUN SERPL-MCNC: 67 MG/DL (ref 7–18)
CALCIUM SERPL-MCNC: 6.1 MG/DL (ref 8.5–10.1)
CHLORIDE SERPL-SCNC: 123 MMOL/L (ref 98–107)
CO2 SERPL-SCNC: 24 MMOL/L (ref 21–32)
CREAT SERPL-MCNC: 1.6 MG/DL (ref 0.55–1.3)
DEPRECATED RDW RBC AUTO: 16.3 % (ref 11.9–15.9)
EOSINOPHIL # BLD: 0.5 % (ref 0–4.5)
GLUCOSE SERPL-MCNC: 152 MG/DL (ref 74–106)
HCT VFR BLD CALC: 24.4 % (ref 35.4–49)
HGB BLD-MCNC: 8.4 GM/DL (ref 11.7–16.9)
LYMPHOCYTES # BLD: 14.8 % (ref 8–40)
MACROCYTES BLD QL: (no result)
MCH RBC QN AUTO: 31.9 PG (ref 25.7–33.7)
MCHC RBC AUTO-ENTMCNC: 34.6 G/DL (ref 32–35.9)
MCV RBC: 92.3 FL (ref 80–96)
MONOCYTES # BLD AUTO: 5.8 % (ref 3.8–10.2)
NEUTROPHILS # BLD: 78.1 % (ref 42.8–82.8)
OVALOCYTES BLD QL SMEAR: (no result)
PLATELET # BLD AUTO: 155 K/MM3 (ref 134–434)
PLATELET BLD QL SMEAR: ADEQUATE
PMV BLD: 11 FL (ref 7.5–11.1)
POTASSIUM SERPLBLD-SCNC: 5 MMOL/L (ref 3.5–5.1)
PROT SERPL-MCNC: 8.8 G/DL (ref 6.4–8.2)
RBC # BLD AUTO: 2.64 M/MM3 (ref 4–5.6)
SODIUM SERPL-SCNC: 149 MMOL/L (ref 136–145)
WBC # BLD AUTO: 6.7 K/MM3 (ref 4–10)

## 2018-12-11 RX ADMIN — RANITIDINE HYDROCHLORIDE SCH MG: 150 SOLUTION ORAL at 21:50

## 2018-12-11 RX ADMIN — DEXTROSE AND SODIUM CHLORIDE SCH MLS/HR: 5; .33 INJECTION, SOLUTION INTRAVENOUS at 16:06

## 2018-12-11 RX ADMIN — CEFEPIME HYDROCHLORIDE SCH MLS/HR: 2 INJECTION, POWDER, FOR SOLUTION INTRAVENOUS at 10:53

## 2018-12-11 RX ADMIN — Medication SCH MG: at 10:55

## 2018-12-11 RX ADMIN — CEFTRIAXONE SCH MLS/HR: 1 INJECTION, POWDER, FOR SOLUTION INTRAMUSCULAR; INTRAVENOUS at 12:55

## 2018-12-11 RX ADMIN — RIFAXIMIN SCH MG: 550 TABLET ORAL at 10:55

## 2018-12-11 RX ADMIN — VANCOMYCIN HYDROCHLORIDE SCH: 1 INJECTION, POWDER, LYOPHILIZED, FOR SOLUTION INTRAVENOUS at 11:30

## 2018-12-11 RX ADMIN — ALLOPURINOL SCH MG: 100 TABLET ORAL at 21:50

## 2018-12-11 RX ADMIN — METOPROLOL TARTRATE SCH MG: 25 TABLET, FILM COATED ORAL at 21:50

## 2018-12-11 RX ADMIN — Medication SCH TAB: at 10:55

## 2018-12-11 RX ADMIN — ALLOPURINOL SCH MG: 100 TABLET ORAL at 10:55

## 2018-12-11 RX ADMIN — VITAMIN D, TAB 1000IU (100/BT) SCH UNIT: 25 TAB at 10:55

## 2018-12-11 RX ADMIN — DAPTOMYCIN SCH MLS/HR: 50 INJECTION, POWDER, LYOPHILIZED, FOR SOLUTION INTRAVENOUS at 16:03

## 2018-12-11 RX ADMIN — METOPROLOL TARTRATE SCH MG: 25 TABLET, FILM COATED ORAL at 10:55

## 2018-12-11 RX ADMIN — RIFAXIMIN SCH MG: 550 TABLET ORAL at 21:50

## 2018-12-11 RX ADMIN — RANITIDINE HYDROCHLORIDE SCH MG: 150 SOLUTION ORAL at 10:55

## 2018-12-11 NOTE — PN
Progress Note, Physician


Chief Complaint: 





Events noted


Moved to floor


s/p tracheostomy and PEG


History of Present Illness: 





Patient was seen and examined. Remains on vent support via trache. Chart was 

reviewed





- Current Medication List


Current Medications: 


Active Medications





Acetaminophen (Tylenol Oral Solution -)  650 mg PEG Q6H PRN


   PRN Reason: PAIN LEVEL 1 - 3


   Last Admin: 12/10/18 22:16 Dose:  650 mg


Allopurinol (Zyloprim -)  100 mg PEG BID [facility]


   Last Admin: 12/11/18 10:55 Dose:  100 mg


Cholecalciferol (Vitamin D3 -)  1,000 unit NR DAILY [facility]


   Last Admin: 12/11/18 10:55 Dose:  1,000 unit


Diphenhydramine HCl (Benadryl Injection -)  25 mg IVPB ONCE PRN


   PRN Reason: DURING PLASMAPHERESIS


Dextrose/Sodium Chloride (D5-1/3ns -)  500 mls @ 42 mls/hr IV ASDIR [facility]


   Last Admin: 12/10/18 23:36 Dose:  42 mls/hr


Daptomycin 600 mg/ Sodium (Chloride)  50 mls @ 50 mls/hr IVPB DAILY [facility]; 

Protocol


Ceftriaxone Sodium 1 gm/ (Dextrose)  50 mls @ 200 mls/hr IVPB DAILY [facility]; 

Protocol


   Last Admin: 12/11/18 12:55 Dose:  200 mls/hr


Lactobacillus Acidophilus (Bacid -)  1 tab PEG DAILY [facility]


   Last Admin: 12/11/18 10:55 Dose:  1 tab


Metoprolol Tartrate (Lopressor -)  25 mg PEG BID [facility]


   Last Admin: 12/11/18 10:55 Dose:  25 mg


Ranitidine HCl (Zantac Oral Solution -)  150 mg PEG BID [facility]


   Last Admin: 12/11/18 10:55 Dose:  150 mg


Rifaximin (Xifaxan -)  550 mg NR BID [facility]


   Last Admin: 12/11/18 10:55 Dose:  550 mg


Thiamine HCl (Vitamin B1 -)  100 mg NR DAILY [facility]


   Last Admin: 12/11/18 10:55 Dose:  100 mg











- Objective


Vital Signs: 


 Vital Signs











Temperature  99.1 F   12/11/18 06:00


 


Pulse Rate  100 H  12/11/18 12:25


 


Respiratory Rate  21 H  12/11/18 11:10


 


Blood Pressure  116/53 L  12/11/18 06:00


 


O2 Sat by Pulse Oximetry (%)  99   12/11/18 12:25











Neck: Yes: Supple


Cardiovascular: Yes: Pulse Irregular, S1, S2


Respiratory: Yes: Diminished, Mechanically Ventilated


Gastrointestinal: Yes: Normal Bowel Sounds, Soft.  No: Tenderness


Edema: No


Labs: 


 CBC, BMP





 12/11/18 06:30 





 12/11/18 06:30 





 








Problem List





- Problems


(1) Anemia


Code(s): D64.9 - ANEMIA, UNSPECIFIED   





(2) Hyperlipidemia


Code(s): E78.5 - HYPERLIPIDEMIA, UNSPECIFIED   


Qualifiers: 


   Hyperlipidemia type: pure hypercholesterolemia   Qualified Code(s): E78.00 - 

Pure hypercholesterolemia, unspecified; E78.0 - Pure hypercholesterolemia   





(3) Multiple myeloma


Code(s): C90.00 - MULTIPLE MYELOMA NOT HAVING ACHIEVED REMISSION   


Qualifiers: 


   Multiple myeloma remission status: not in remission   Qualified Code(s): 

C90.00 - Multiple myeloma not having achieved remission   





(4) Respiratory failure


Code(s): J96.90 - RESPIRATORY FAILURE, UNSP, UNSP W HYPOXIA OR HYPERCAPNIA   





(5) Sepsis


Code(s): A41.9 - SEPSIS, UNSPECIFIED ORGANISM   





(6) Toxic metabolic encephalopathy


Code(s): G92 - TOXIC ENCEPHALOPATHY   





(7) Acute-on-chronic kidney injury


Code(s): N17.9 - ACUTE KIDNEY FAILURE, UNSPECIFIED; N18.9 - CHRONIC KIDNEY 

DISEASE, UNSPECIFIED   


Qualifiers: 


   Acute renal failure type: unspecified   Chronic kidney disease stage: 

unspecified stage   Qualified Code(s): N17.9 - Acute kidney failure, unspecified

; N18.9 - Chronic kidney disease, unspecified   





(8) Demand ischemia


Code(s): I24.8 - OTHER FORMS OF ACUTE ISCHEMIC HEART DISEASE   





(9) History of ETOH abuse


Code(s): Z87.898 - PERSONAL HISTORY OF OTHER SPECIFIED CONDITIONS   





(10) Nonadherence to medication


Code(s): Z91.14 - PATIENT'S OTHER NONCOMPLIANCE WITH MEDICATION REGIMEN   





(11) Paraproteinemia


Code(s): D89.2 - HYPERGAMMAGLOBULINEMIA, UNSPECIFIED   





(12) Persistent atrial fibrillation


Code(s): I48.1 - PERSISTENT ATRIAL FIBRILLATION   





(13) Chronic thromboembolic disease


Code(s): I74.9 - EMBOLISM AND THROMBOSIS OF UNSPECIFIED ARTERY   





(14) Coronary artery disease


Code(s): I25.10 - ATHSCL HEART DISEASE OF NATIVE CORONARY ARTERY W/O ANG PCTRS 

  


Qualifiers: 


   Coronary Disease-Associated Artery/Lesion type: native artery   Native vs. 

transplanted heart: native heart   Associated angina: without angina   

Qualified Code(s): I25.10 - Atherosclerotic heart disease of native coronary 

artery without angina pectoris   





(15) Diastolic dysfunction without heart failure


Code(s): I51.9 - HEART DISEASE, UNSPECIFIED   





(16) HTN (hypertension)


Code(s): I10 - ESSENTIAL (PRIMARY) HYPERTENSION   


Qualifiers: 


   Hypertension type: essential hypertension   Qualified Code(s): I10 - 

Essential (primary) hypertension   





(17) Hypertrophic cardiomyopathy


Code(s): I42.2 - OTHER HYPERTROPHIC CARDIOMYOPATHY   





Assessment/Plan





1. Chronic respiratory failure/post trach in a patient with history of acute 

hypoxic respiratory failure, aspiration pneumonia with resolved sepsis syndrome


2. Toxic metabolic encephelopathy


3. 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


4. Paraproteinemia confirmed multiple myeloma


5. 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 

disease related to persistent atrial fibrillation with [id number] score of 6


6. CAD with evidence of demand ischemic injury, non obstructive CAD


7. 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 

chronic class I-II NYHA classification LV failure, compensated/euvolemic


8. Persistent atrial fibrillation [id number] score of 6 currently on no A/C 

therapy


9. HTN


10. Anemia/thrombocytopenia


11. Acalculous Cholecystitis


12. Ischemic hepatitis


13. Hypernatremia


14. Sepsis





PLAN:


1. Ideally patient should be A/C unless it is absolutely contraindicated 

considering his [id number] score of 6 


2. Transfuse as needed


3. Continue Lopressor 25 mg BID


4. Correct NA


5. Ventilator management via tracheostomy


6. Empiric antibiotic coverage





Supportive care


Olvin Hobbs MD

## 2018-12-11 NOTE — PN
Progress Note (short form)





- Note


Progress Note: 





Renal follow up for Hypercalcemia/HAM





Pt seen and examined at the bedside


on vent


not responsive


no fevers today but was febrile throughout the day yesterday 


making urine 


on IVF 





 Vital Signs











Temperature  99.1 F   12/11/18 06:00


 


Pulse Rate  100 H  12/11/18 12:25


 


Respiratory Rate  21 H  12/11/18 11:10


 


Blood Pressure  116/53 L  12/11/18 06:00


 


O2 Sat by Pulse Oximetry (%)  99   12/11/18 12:25








 Intake & Output











 12/08/18 12/09/18 12/10/18 12/11/18





 23:59 23:59 23:59 23:59


 


Intake Total  1684


 


Output Total 1100   


 


Balance - 1684








NAD


+ upper extremity edema


folely in place 





 CBC, BMP





 12/11/18 06:30 





 12/11/18 06:30 





 Current Medications





Acetaminophen (Tylenol Oral Solution -)  650 mg PEG Q6H PRN


   PRN Reason: PAIN LEVEL 1 - 3


   Last Admin: 12/10/18 22:16 Dose:  650 mg


Allopurinol (Zyloprim -)  100 mg PEG BID [facility]


   Last Admin: 12/11/18 10:55 Dose:  100 mg


Cholecalciferol (Vitamin D3 -)  1,000 unit NR DAILY [facility]


   Last Admin: 12/11/18 10:55 Dose:  1,000 unit


Diphenhydramine HCl (Benadryl Injection -)  25 mg IVPB ONCE PRN


   PRN Reason: DURING PLASMAPHERESIS


Dextrose/Sodium Chloride (D5-1/3ns -)  500 mls @ 42 mls/hr IV ASDIR [facility]


   Last Admin: 12/10/18 23:36 Dose:  42 mls/hr


Daptomycin 600 mg/ Sodium (Chloride)  50 mls @ 50 mls/hr IVPB DAILY [facility]; 

Protocol


Ceftriaxone Sodium 1 gm/ (Dextrose)  50 mls @ 200 mls/hr IVPB DAILY [facility]; 

Protocol


   Last Admin: 12/11/18 12:55 Dose:  200 mls/hr


Lactobacillus Acidophilus (Bacid -)  1 tab PEG DAILY [facility]


   Last Admin: 12/11/18 10:55 Dose:  1 tab


Metoprolol Tartrate (Lopressor -)  25 mg PEG BID [facility]


   Last Admin: 12/11/18 10:55 Dose:  25 mg


Ranitidine HCl (Zantac Oral Solution -)  150 mg PEG BID [facility]


   Last Admin: 12/11/18 10:55 Dose:  150 mg


Rifaximin (Xifaxan -)  550 mg NR BID [facility]


   Last Admin: 12/11/18 10:55 Dose:  550 mg


Thiamine HCl (Vitamin B1 -)  100 mg NR DAILY [facility]


   Last Admin: 12/11/18 10:55 Dose:  100 mg














78 year old gentleman with hx of CKD, Afib on A/C, CAD, CKD, Hypertension, 

Hyperlipidemia, PVD who presented with AMS/Confusion and found to have HAM.





#HAM secondary to ATN now resolved but with secondary injury with peak Cr of 

1.6 now 


#AMS 


#Newly diagnosed multiple myloma 


#Anemia 


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


#Hyperdense lesion on US of the kidney 


#Normal anion gap metabolic acidosis 


#Hyperkalemia (now resolved)





Renal function unchanged in the last 24 hours


Bladder scan showed no signs of retention 


will change IVF to 1/2 NS and increase free water with tube feeds to 50cc per 

hour


Trend renal function and electrolyte


heme/onc following


continue vent support


prognosis is guarded 





Ricky Cahng DO

## 2018-12-11 NOTE — PN
Progress Note (short form)





- Note


Progress Note: 


Vented on AC Mode, 40% via Trach. 


Poorly responsive. 


No change in overall condition. 








OBJECTIVE:





 


  


 Intake & Output











 12/08/18 12/09/18 12/10/18 12/11/18





 23:59 23:59 23:59 23:59


 


Intake Total  1684


 


Output Total 1100   


 


Balance - 1684








 Last Vital Signs











Temp Pulse Resp BP Pulse Ox


 


 99.1 F   114 H  21 H  116/53 L  99 


 


 12/11/18 06:00  12/11/18 06:00  12/11/18 06:56  12/11/18 06:00  12/10/18 22:49








Active Medications





Acetaminophen (Tylenol Oral Solution -)  650 mg PEG Q6H PRN


   PRN Reason: PAIN LEVEL 1 - 3


   Last Admin: 12/10/18 22:16 Dose:  650 mg


Allopurinol (Zyloprim -)  100 mg PEG BID [facility]


   Last Admin: 12/11/18 10:55 Dose:  100 mg


Cholecalciferol (Vitamin D3 -)  1,000 unit NR DAILY [facility]


   Last Admin: 12/11/18 10:55 Dose:  1,000 unit


Diphenhydramine HCl (Benadryl Injection -)  25 mg IVPB ONCE PRN


   PRN Reason: DURING PLASMAPHERESIS


Dextrose/Sodium Chloride (D5-1/3ns -)  500 mls @ 42 mls/hr IV ASDIR [facility]


   Last Admin: 12/10/18 23:36 Dose:  42 mls/hr


Cefepime HCl 2 gm/ Dextrose  100 mls @ 200 mls/hr IVPB BID [facility]; Protocol


   Last Admin: 12/11/18 10:53 Dose:  200 mls/hr


Daptomycin 600 mg/ Sodium (Chloride)  50 mls @ 50 mls/hr IVPB DAILY [facility]; 

Protocol


Lactobacillus Acidophilus (Bacid -)  1 tab PEG DAILY [facility]


   Last Admin: 12/11/18 10:55 Dose:  1 tab


Metoprolol Tartrate (Lopressor -)  25 mg PEG BID [facility]


   Last Admin: 12/11/18 10:55 Dose:  25 mg


Ranitidine HCl (Zantac Oral Solution -)  150 mg PEG BID [facility]


   Last Admin: 12/11/18 10:55 Dose:  150 mg


Rifaximin (Xifaxan -)  550 mg NR BID [facility]


   Last Admin: 12/11/18 10:55 Dose:  550 mg


Thiamine HCl (Vitamin B1 -)  100 mg NR DAILY [facility]


   Last Admin: 12/11/18 10:55 Dose:  100 mg








Gen: Trached, vented, poorly responsive


Heart: RRR


Lung: scattered rhonchi


Abd: soft, nontender


Ext: + edema


  


  


  


  


 Laboratory Results - last 24 hr











  12/09/18 12/10/18 12/10/18





  15:47 06:25 15:15


 


WBC    4.7


 


RBC    2.22 L


 


Hgb    7.1 L


 


Hct    20.5 L


 


MCV    92.0


 


MCH    31.8


 


MCHC    34.5


 


RDW    16.5 H


 


Plt Count    145


 


MPV    10.2


 


Absolute Neuts (auto)    3.3


 


Neutrophils %    70.5


 


Neutrophils % (Manual)   61.0  60.0


 


Band Neutrophils %   10.5  18.0


 


Lymphocytes %    22.4


 


Lymphocytes % (Manual)   20.0  14.0  D


 


Monocytes %    5.7


 


Monocytes % (Manual)   4  D  1 L


 


Eosinophils %    1.0


 


Eosinophils % (Manual)   1.1  D  4.0  D


 


Basophils %    0.4


 


Basophils % (Manual)   0.0  0.0


 


Myelocytes % (Man)   0  D 


 


Promyelocytes % (Man)   0 


 


Blast Cells % (Manual)   0 


 


Nucleated RBC %    9 H


 


Metamyelocytes   0  D 


 


Hypochromia   0 


 


Platelet Estimate   Decreased  Adequate


 


Platelet Comment   Present 


 


Polychromasia   2+ 


 


Poikilocytosis   0 


 


Anisocytosis   2+ 


 


Microcytosis   1+ 


 


Macrocytosis   2+ 


 


Rouleaux   1+ 


 


Sodium   


 


Potassium   


 


Chloride   


 


Carbon Dioxide   


 


Anion Gap   


 


BUN   


 


Creatinine   


 


Creat Clearance w eGFR   


 


Random Glucose   


 


Calcium   


 


Total Bilirubin   


 


AST   


 


ALT   


 


Alkaline Phosphatase   


 


Total Protein   


 


Albumin   


 


Blood Type  B POSITIVE  


 


Antibody Screen  Negative  


 


Crossmatch  See Detail  














  12/11/18 12/11/18





  06:30 06:30


 


WBC  6.7 


 


RBC  2.64 L 


 


Hgb  8.4 L 


 


Hct  24.4 L D 


 


MCV  92.3 


 


MCH  31.9 


 


MCHC  34.6 


 


RDW  16.3 H 


 


Plt Count  155 


 


MPV  11.0 


 


Absolute Neuts (auto)  5.3 


 


Neutrophils %  78.1 


 


Neutrophils % (Manual)  


 


Band Neutrophils %  


 


Lymphocytes %  14.8  D 


 


Lymphocytes % (Manual)  


 


Monocytes %  5.8 


 


Monocytes % (Manual)  


 


Eosinophils %  0.5 


 


Eosinophils % (Manual)  


 


Basophils %  0.8 


 


Basophils % (Manual)  


 


Myelocytes % (Man)  


 


Promyelocytes % (Man)  


 


Blast Cells % (Manual)  


 


Nucleated RBC %  4 H 


 


Metamyelocytes  


 


Hypochromia  


 


Platelet Estimate  


 


Platelet Comment  


 


Polychromasia  


 


Poikilocytosis  


 


Anisocytosis  


 


Microcytosis  


 


Macrocytosis  


 


Rouleaux  


 


Sodium   149 H


 


Potassium   5.0


 


Chloride   123 H


 


Carbon Dioxide   24


 


Anion Gap   3 L


 


BUN   67 H


 


Creatinine   1.6 H


 


Creat Clearance w eGFR   41.90


 


Random Glucose   152 H


 


Calcium   6.1 L*


 


Total Bilirubin   0.8


 


AST   140 H


 


ALT   62 H


 


Alkaline Phosphatase   248 H


 


Total Protein   8.8 H


 


Albumin   1.1 L


 


Blood Type  


 


Antibody Screen  


 


Crossmatch  











ASSESSMENT AND PLAN:


Acute Hypoxic Respiratory Failure: S/P Trach 


Altered Mental Status


Metabolic Encephalopathy


Pneumonia


Acalculous Cholecystitis


Multiple Myeloma


Acute on Chronic Renal Failure


Metabolic Acidosis


LV Diastolic Dysfunction


Atrial Fibrillation


CAD


+Troponins likely Demand Ischemia


PAD


Hyperlipidemia


HTN


Anemia





-  Not a good candidate for wean due to poor mental status 


-  rate control


-  DVT/GI prophylaxis


-  Ideally should have compassionate wean due to overall condition 





Dr Blum

## 2018-12-11 NOTE — PN
Progress Note, Physician


Chief Complaint: 





IN BED


NOT RESPONDING TO VERBAL STIMULI





- Current Medication List


Current Medications: 


Active Medications





Acetaminophen (Tylenol Oral Solution -)  650 mg PEG Q6H PRN


   PRN Reason: PAIN LEVEL 1 - 3


   Last Admin: 12/10/18 22:16 Dose:  650 mg


Allopurinol (Zyloprim -)  100 mg PEG BID [facility]


   Last Admin: 12/11/18 10:55 Dose:  100 mg


Cholecalciferol (Vitamin D3 -)  1,000 unit NR DAILY [facility]


   Last Admin: 12/11/18 10:55 Dose:  1,000 unit


Diphenhydramine HCl (Benadryl Injection -)  25 mg IVPB ONCE PRN


   PRN Reason: DURING PLASMAPHERESIS


Dextrose/Sodium Chloride (D5-1/3ns -)  500 mls @ 42 mls/hr IV ASDIR [facility]


   Last Admin: 12/10/18 23:36 Dose:  42 mls/hr


Ceftriaxone Sodium 1 gm/ (Dextrose)  50 mls @ 200 mls/hr IVPB DAILY [facility]; 

Protocol


   Last Admin: 12/11/18 12:55 Dose:  200 mls/hr


Daptomycin 600 mg/ Sodium (Chloride)  50 mls @ 50 mls/hr IVPB DAILY@1400 [facility]; 

Protocol


Lactobacillus Acidophilus (Bacid -)  1 tab PEG DAILY [facility]


   Last Admin: 12/11/18 10:55 Dose:  1 tab


Metoprolol Tartrate (Lopressor -)  25 mg PEG BID [facility]


   Last Admin: 12/11/18 10:55 Dose:  25 mg


Ranitidine HCl (Zantac Oral Solution -)  150 mg PEG BID [facility]


   Last Admin: 12/11/18 10:55 Dose:  150 mg


Rifaximin (Xifaxan -)  550 mg NR BID [facility]


   Last Admin: 12/11/18 10:55 Dose:  550 mg


Thiamine HCl (Vitamin B1 -)  100 mg NR DAILY [facility]


   Last Admin: 12/11/18 10:55 Dose:  100 mg











- Objective


Vital Signs: 


 Vital Signs











Temperature  99.3 F   12/11/18 13:45


 


Pulse Rate  102 H  12/11/18 13:45


 


Respiratory Rate  20   12/11/18 13:45


 


Blood Pressure  114/83   12/11/18 13:45


 


O2 Sat by Pulse Oximetry (%)  99   12/11/18 12:25











Constitutional: Yes: Mild Distress


Eyes: Yes: Other


HENT: Yes: Other


Neck: Yes: Other (TRACHEOSTOMY)


Cardiovascular: Yes: Pulse Irregular


Respiratory: Yes: Mechanically Ventilated


Gastrointestinal: Yes: Soft


Genitourinary: Yes: Incontinence


Musculoskeletal: Yes: Muscle Weakness


Extremities: Yes: Other


Edema: No


Integumentary: Yes: Other


Wound/Incision: Yes: Dressing Dry and Intact


Neurological: Yes: Confusion, Unresponsive, Weakness


...Motor Strength: LUE, LLE, RUE, RLE


Psychiatric: Yes: Other


Labs: 


 CBC, BMP





 12/11/18 06:30 





 12/11/18 06:30 





 INR, PTT











INR  1.69  (0.83-1.09)  H  12/06/18  05:30    


 


Fibrinogen  338.0 mg/dL (238-498)  D 12/03/18  20:00    














Problem List





- Problems


(1) HAM (acute kidney injury)


Code(s): N17.9 - ACUTE KIDNEY FAILURE, UNSPECIFIED   





(2) Atrial fibrillation with RVR


Code(s): I48.91 - UNSPECIFIED ATRIAL FIBRILLATION   





(3) Hyperlipidemia


Code(s): E78.5 - HYPERLIPIDEMIA, UNSPECIFIED   


Qualifiers: 


   Hyperlipidemia type: pure hypercholesterolemia   Qualified Code(s): E78.00 - 

Pure hypercholesterolemia, unspecified; E78.0 - Pure hypercholesterolemia   





(4) Hypothermia


Code(s): T68.XXXA - HYPOTHERMIA, INITIAL ENCOUNTER   


Qualifiers: 


   Encounter type: initial encounter   Qualified Code(s): T68.XXXA - Hypothermia

, initial encounter   





(5) Acute-on-chronic kidney injury


Code(s): N17.9 - ACUTE KIDNEY FAILURE, UNSPECIFIED; N18.9 - CHRONIC KIDNEY 

DISEASE, UNSPECIFIED   


Qualifiers: 


   Acute renal failure type: unspecified   Chronic kidney disease stage: 

unspecified stage   Qualified Code(s): N17.9 - Acute kidney failure, unspecified

; N18.9 - Chronic kidney disease, unspecified   





(6) Generalized weakness


Code(s): R53.1 - WEAKNESS   





(7) History of ETOH abuse


Code(s): Z87.898 - PERSONAL HISTORY OF OTHER SPECIFIED CONDITIONS   





(8) Hypercalcemia


Code(s): E83.52 - HYPERCALCEMIA   





(9) Hypertrophic cardiomyopathy


Code(s): I42.2 - OTHER HYPERTROPHIC CARDIOMYOPATHY   





(10) Toxic metabolic encephalopathy


Code(s): G92 - TOXIC ENCEPHALOPATHY   





(11) Sepsis


Code(s): A41.9 - SEPSIS, UNSPECIFIED ORGANISM   





(12) Respiratory failure


Code(s): J96.90 - RESPIRATORY FAILURE, UNSP, UNSP W HYPOXIA OR HYPERCAPNIA   





Assessment/Plan





NOW VENT DEPENDENT WITH TRACHEOSTOMY


EVENTS AND NOTES REVIEWED


IV DAPTMYCIN AND CEFTRIAXONE CONTINUE PER ID


DVT PROPHYLAXIS


WILL NEED SNF


WOUND CARE

## 2018-12-12 LAB
ANION GAP SERPL CALC-SCNC: 6 MMOL/L (ref 8–16)
BUN SERPL-MCNC: 60 MG/DL (ref 7–18)
CALCIUM SERPL-MCNC: 6 MG/DL (ref 8.5–10.1)
CHLORIDE SERPL-SCNC: 124 MMOL/L (ref 98–107)
CO2 SERPL-SCNC: 22 MMOL/L (ref 21–32)
CREAT SERPL-MCNC: 1.3 MG/DL (ref 0.55–1.3)
GLUCOSE SERPL-MCNC: 132 MG/DL (ref 74–106)
POTASSIUM SERPLBLD-SCNC: 4.8 MMOL/L (ref 3.5–5.1)
SODIUM SERPL-SCNC: 151 MMOL/L (ref 136–145)

## 2018-12-12 RX ADMIN — DAPTOMYCIN SCH MLS/HR: 50 INJECTION, POWDER, LYOPHILIZED, FOR SOLUTION INTRAVENOUS at 16:02

## 2018-12-12 RX ADMIN — RANITIDINE HYDROCHLORIDE SCH MG: 150 SOLUTION ORAL at 22:47

## 2018-12-12 RX ADMIN — DEXTROSE MONOHYDRATE SCH MLS/HR: 50 INJECTION, SOLUTION INTRAVENOUS at 16:03

## 2018-12-12 RX ADMIN — Medication SCH TAB: at 11:41

## 2018-12-12 RX ADMIN — Medication SCH MG: at 11:41

## 2018-12-12 RX ADMIN — CEFTRIAXONE SCH MLS/HR: 1 INJECTION, POWDER, FOR SOLUTION INTRAMUSCULAR; INTRAVENOUS at 11:38

## 2018-12-12 RX ADMIN — RANITIDINE HYDROCHLORIDE SCH MG: 150 SOLUTION ORAL at 11:40

## 2018-12-12 RX ADMIN — ALLOPURINOL SCH MG: 100 TABLET ORAL at 22:46

## 2018-12-12 RX ADMIN — VITAMIN D, TAB 1000IU (100/BT) SCH UNIT: 25 TAB at 11:40

## 2018-12-12 RX ADMIN — RIFAXIMIN SCH MG: 550 TABLET ORAL at 23:35

## 2018-12-12 RX ADMIN — METOPROLOL TARTRATE SCH MG: 25 TABLET, FILM COATED ORAL at 22:45

## 2018-12-12 RX ADMIN — RIFAXIMIN SCH MG: 550 TABLET ORAL at 11:41

## 2018-12-12 RX ADMIN — DEXTROSE AND SODIUM CHLORIDE SCH MLS/HR: 5; .33 INJECTION, SOLUTION INTRAVENOUS at 04:55

## 2018-12-12 RX ADMIN — ALLOPURINOL SCH MG: 100 TABLET ORAL at 11:40

## 2018-12-12 RX ADMIN — METOPROLOL TARTRATE SCH MG: 25 TABLET, FILM COATED ORAL at 11:41

## 2018-12-12 NOTE — PN
Progress Note, Physician


History of Present Illness: 





pulmonary





no change,on vent support ac mode,- resp distress,unresponsive





- Current Medication List


Current Medications: 


Active Medications





Acetaminophen (Tylenol Oral Solution -)  650 mg PEG Q6H PRN


   PRN Reason: PAIN LEVEL 1 - 3


   Last Admin: 12/10/18 22:16 Dose:  650 mg


Allopurinol (Zyloprim -)  100 mg PEG BID [facility]


   Last Admin: 12/12/18 11:40 Dose:  100 mg


Cholecalciferol (Vitamin D3 -)  1,000 unit NR DAILY [facility]


   Last Admin: 12/12/18 11:40 Dose:  1,000 unit


Diphenhydramine HCl (Benadryl Injection -)  25 mg IVPB ONCE PRN


   PRN Reason: DURING PLASMAPHERESIS


Dextrose/Sodium Chloride (D5-1/3ns -)  500 mls @ 42 mls/hr IV ASDIR [facility]


   Last Admin: 12/12/18 04:55 Dose:  42 mls/hr


Ceftriaxone Sodium 1 gm/ (Dextrose)  50 mls @ 200 mls/hr IVPB DAILY [facility]; 

Protocol


   Last Admin: 12/12/18 11:38 Dose:  200 mls/hr


Daptomycin 600 mg/ Sodium (Chloride)  50 mls @ 50 mls/hr IVPB DAILY@1400 [facility]; 

Protocol


   Last Admin: 12/11/18 16:03 Dose:  50 mls/hr


Lactobacillus Acidophilus (Bacid -)  1 tab PEG DAILY [facility]


   Last Admin: 12/12/18 11:41 Dose:  1 tab


Metoprolol Tartrate (Lopressor -)  25 mg PEG BID [facility]


   Last Admin: 12/12/18 11:41 Dose:  25 mg


Ranitidine HCl (Zantac Oral Solution -)  150 mg PEG BID [facility]


   Last Admin: 12/12/18 11:40 Dose:  150 mg


Rifaximin (Xifaxan -)  550 mg NR BID [facility]


   Last Admin: 12/12/18 11:41 Dose:  550 mg


Thiamine HCl (Vitamin B1 -)  100 mg NR DAILY [facility]


   Last Admin: 12/12/18 11:41 Dose:  100 mg











- Objective


Vital Signs: 


 Vital Signs











Temperature  98.7 F   12/12/18 05:00


 


Pulse Rate  94 H  12/12/18 11:44


 


Respiratory Rate  24 H  12/12/18 10:44


 


Blood Pressure  127/73   12/12/18 05:00


 


O2 Sat by Pulse Oximetry (%)  97   12/12/18 11:44











Constitutional: Yes: Well Nourished, Other ( unresponsive)


Eyes: Yes: WNL


HENT: Yes: WNL


Neck: Yes: Supple (trach)


Cardiovascular: Yes: Pulse Irregular, S1, S2


Respiratory: Yes: Rhonchi (scattered rhonchi)


Gastrointestinal: Yes: Normal Bowel Sounds, Soft


Extremities: Yes: WNL


Edema: No


Labs: 


 CBC, BMP





 12/11/18 06:30 





 12/12/18 11:55 





 INR, PTT











INR  1.69  (0.83-1.09)  H  12/06/18  05:30    


 


Fibrinogen  338.0 mg/dL (238-498)  D 12/03/18  20:00    














Problem List





- Problems


(1) Acute hypoxemic respiratory failure


Code(s): J96.01 - ACUTE RESPIRATORY FAILURE WITH HYPOXIA   





(2) Acalculous cholecystitis


Code(s): K81.9 - CHOLECYSTITIS, UNSPECIFIED   





(3) Anemia


Code(s): D64.9 - ANEMIA, UNSPECIFIED   





(4) Atrial fibrillation with RVR


Code(s): I48.91 - UNSPECIFIED ATRIAL FIBRILLATION   





(5) Multiple myeloma


Code(s): C90.00 - MULTIPLE MYELOMA NOT HAVING ACHIEVED REMISSION   


Qualifiers: 


   Multiple myeloma remission status: not in remission   Qualified Code(s): 

C90.00 - Multiple myeloma not having achieved remission   





(6) Respiratory failure


Code(s): J96.90 - RESPIRATORY FAILURE, UNSP, UNSP W HYPOXIA OR HYPERCAPNIA   





(7) Sepsis


Code(s): A41.9 - SEPSIS, UNSPECIFIED ORGANISM   





(8) Toxic metabolic encephalopathy


Code(s): G92 - TOXIC ENCEPHALOPATHY   





(9) Acute-on-chronic kidney injury


Code(s): N17.9 - ACUTE KIDNEY FAILURE, UNSPECIFIED; N18.9 - CHRONIC KIDNEY 

DISEASE, UNSPECIFIED   


Qualifiers: 


   Acute renal failure type: unspecified   Chronic kidney disease stage: 

unspecified stage   Qualified Code(s): N17.9 - Acute kidney failure, unspecified

; N18.9 - Chronic kidney disease, unspecified   





(10) Persistent atrial fibrillation


Code(s): I48.1 - PERSISTENT ATRIAL FIBRILLATION   





(11) Coronary artery disease


Code(s): I25.10 - ATHSCL HEART DISEASE OF NATIVE CORONARY ARTERY W/O ANG PCTRS 

  


Qualifiers: 


   Coronary Disease-Associated Artery/Lesion type: native artery   Native vs. 

transplanted heart: native heart   Associated angina: without angina   

Qualified Code(s): I25.10 - Atherosclerotic heart disease of native coronary 

artery without angina pectoris   





(12) HTN (hypertension)


Code(s): I10 - ESSENTIAL (PRIMARY) HYPERTENSION   


Qualifiers: 


   Hypertension type: essential hypertension   Qualified Code(s): I10 - 

Essential (primary) hypertension   





(13) Pneumonia


Code(s): J18.9 - PNEUMONIA, UNSPECIFIED ORGANISM   





Assessment/Plan





ASSESSMENT AND PLAN:


Acute Hypoxic Respiratory Failure s/p Tracheostomy


Altered Mental Status


Metabolic Encephalopathy


Pneumonia


Acalculous Cholecystitis


Multiple Myeloma


Acute on Chronic Renal Failure


Metabolic Acidosis


LV Diastolic Dysfunction


Atrial Fibrillation


CAD


+Troponins likely Demand Ischemia


PAD


Hyperlipidemia


HTN


Anemia


Bacteremia





-  monitor H/H


-  antibiotics per ID


-  monitor urine output, creatinine


-  rate control


-  holding anticoagulation as he has bled and dropped his H/H this admission


-  DVT/GI prophylaxis


-  poor overall prognosis for meaningful recovery


- normal transfusion threshold





DR ACEVEDO

## 2018-12-12 NOTE — PN
Progress Note (short form)





- Note


Progress Note: 





Renal follow up for Hypercalcemia/HAM





Pt seen and examined at the bedside


remains unresponsive


making urine into diaper 


 Vital Signs











Temperature  98.7 F   12/12/18 05:00


 


Pulse Rate  94 H  12/12/18 11:44


 


Respiratory Rate  24 H  12/12/18 10:44


 


Blood Pressure  127/73   12/12/18 05:00


 


O2 Sat by Pulse Oximetry (%)  97   12/12/18 11:44








 Initial Vital Signs











Temp Pulse Resp BP Pulse Ox


 


 96.9 F L  130 H  20   103/82   96 


 


 10/30/18 16:36  10/30/18 16:36  10/30/18 16:36  10/30/18 16:36  10/30/18 16:36








NAD


not responsive


on vent via trach 





 CBC, BMP





 12/11/18 06:30 





 12/11/18 06:30 














78 year old gentleman with hx of CKD, Afib on A/C, CAD, CKD, Hypertension, 

Hyperlipidemia, PVD who presented with AMS/Confusion and found to have HAM.





#HAM secondary to ATN now resolved but with secondary injury with peak Cr of 

1.6 now 


#AMS 


#Newly diagnosed multiple myloma 


#Anemia 


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


#Hyperdense lesion on US of the kidney 


#Normal anion gap metabolic acidosis 


#Hyperkalemia (now resolved)





Todays labs pending 


will keep on deep IVF for now


continue free water with tube feeds


overall prognosis is guarded 





Ricky Chang DO

## 2018-12-12 NOTE — PN
Progress Note, Physician


History of Present Illness: 


Temps down


 Unresponsive


 BC VRE


  


 





 


 


 





- Current Medication List


Current Medications: 


Active Medications





Acetaminophen (Tylenol Oral Solution -)  650 mg PEG Q6H PRN


   PRN Reason: PAIN LEVEL 1 - 3


   Last Admin: 12/10/18 22:16 Dose:  650 mg


Allopurinol (Zyloprim -)  100 mg PEG BID [facility]


   Last Admin: 12/12/18 11:40 Dose:  100 mg


Cholecalciferol (Vitamin D3 -)  1,000 unit NR DAILY [facility]


   Last Admin: 12/12/18 11:40 Dose:  1,000 unit


Diphenhydramine HCl (Benadryl Injection -)  25 mg IVPB ONCE PRN


   PRN Reason: DURING PLASMAPHERESIS


Dextrose/Sodium Chloride (D5-1/3ns -)  500 mls @ 42 mls/hr IV ASDIR [facility]


   Last Admin: 12/12/18 04:55 Dose:  42 mls/hr


Ceftriaxone Sodium 1 gm/ (Dextrose)  50 mls @ 200 mls/hr IVPB DAILY [facility]; 

Protocol


   Last Admin: 12/12/18 11:38 Dose:  200 mls/hr


Daptomycin 600 mg/ Sodium (Chloride)  50 mls @ 50 mls/hr IVPB DAILY@1400 [facility]; 

Protocol


   Last Admin: 12/11/18 16:03 Dose:  50 mls/hr


Lactobacillus Acidophilus (Bacid -)  1 tab PEG DAILY [facility]


   Last Admin: 12/12/18 11:41 Dose:  1 tab


Metoprolol Tartrate (Lopressor -)  25 mg PEG BID [facility]


   Last Admin: 12/12/18 11:41 Dose:  25 mg


Ranitidine HCl (Zantac Oral Solution -)  150 mg PEG BID [facility]


   Last Admin: 12/12/18 11:40 Dose:  150 mg


Rifaximin (Xifaxan -)  550 mg NR BID [facility]


   Last Admin: 12/12/18 11:41 Dose:  550 mg


Thiamine HCl (Vitamin B1 -)  100 mg NR DAILY [facility]


   Last Admin: 12/12/18 11:41 Dose:  100 mg











- Objective


Vital Signs: 


 Vital Signs











Temperature  98.7 F   12/12/18 05:00


 


Pulse Rate  94 H  12/12/18 11:44


 


Respiratory Rate  24 H  12/12/18 10:44


 


Blood Pressure  127/73   12/12/18 05:00


 


O2 Sat by Pulse Oximetry (%)  97   12/12/18 11:44











Constitutional: Yes: No Distress


Eyes: Yes: Conjunctiva Clear


Cardiovascular: Yes: Regular Rate and Rhythm, S1, S2


Respiratory: Yes: Mechanically Ventilated


Gastrointestinal: Yes: Normal Bowel Sounds, Soft.  No: Tenderness


Edema: Yes


Labs: 


 CBC, BMP





 12/11/18 06:30 





 12/11/18 06:30 





 INR, PTT











INR  1.69  (0.83-1.09)  H  12/06/18  05:30    


 


Fibrinogen  338.0 mg/dL (238-498)  D 12/03/18  20:00    














Assessment/Plan


Respiratory failure   S/P tracheostomy


 Fever      +BC   VRE


 Toxic metabolic encephalopathy


 Renal failure


 Myeloma


 Hyperviscosity syndrome


 Diarrhea


 


 


 Continue daptomycin/ ceftriaxone


 Repeat BC am


 Contact precautions

## 2018-12-12 NOTE — PN
Progress Note, Physician


History of Present Illness: 


Unresponsive on vent s/p tracheostomy, persistent afib on lopressor off heparin 

gtt due to Hgb decreases. Afebrile, enteral feeds.





- Current Medication List


Current Medications: 


Active Medications





Acetaminophen (Tylenol Oral Solution -)  650 mg PEG Q6H PRN


   PRN Reason: PAIN LEVEL 1 - 3


   Last Admin: 12/10/18 22:16 Dose:  650 mg


Allopurinol (Zyloprim -)  [facility]


   Last Admin: 12/11/18 21:50 Dose:  100 mg


Cholecalciferol (Vitamin D3 -)  1,000 unit NR DAILY [facility]


   Last Admin: 12/11/18 10:55 Dose:  1,000 unit


Diphenhydramine HCl (Benadryl Injection -)  25 mg IVPB ONCE PRN


   PRN Reason: DURING PLASMAPHERESIS


Dextrose/Sodium Chloride (D5-1/3ns -)  500 mls @ 42 mls/hr IV ASDIR [facility]


   Last Admin: 12/12/18 04:55 Dose:  42 mls/hr


Ceftriaxone Sodium 1 gm/ (Dextrose)  50 mls @ 200 mls/hr IVPB DAILY [facility]; 

Protocol


   Last Admin: 12/11/18 12:55 Dose:  200 mls/hr


Daptomycin 600 mg/ Sodium (Chloride)  50 mls @ 50 mls/hr IVPB DAILY@1400 [facility]; 

Protocol


   Last Admin: 12/11/18 16:03 Dose:  50 mls/hr


Lactobacillus Acidophilus (Bacid -)  1 tab PEG DAILY [facility]


   Last Admin: 12/11/18 10:55 Dose:  1 tab


Metoprolol Tartrate (Lopressor -)  25 mg PEG BID [facility]


   Last Admin: 12/11/18 21:50 Dose:  25 mg


Ranitidine HCl (Zantac Oral Solution -)  150 mg PEG BID [facility]


   Last Admin: 12/11/18 21:50 Dose:  150 mg


Rifaximin (Xifaxan -)  550 mg NR BID [facility]


   Last Admin: 12/11/18 21:50 Dose:  550 mg


Thiamine HCl (Vitamin B1 -)  100 mg NR DAILY [facility]


   Last Admin: 12/11/18 10:55 Dose:  100 mg











- Objective


Vital Signs: 


 Vital Signs











Temperature  98.7 F   12/12/18 05:00


 


Pulse Rate  98 H  12/12/18 06:30


 


Respiratory Rate  26 H  12/12/18 06:35


 


Blood Pressure  127/73   12/12/18 05:00


 


O2 Sat by Pulse Oximetry (%)  98   12/12/18 06:30











Constitutional: Yes: No Distress, Calm


Neck: Yes: Other (Tracheostomy)


Cardiovascular: Yes: Pulse Irregular


Respiratory: Yes: Mechanically Ventilated, Rhonchi


Gastrointestinal: Yes: Normal Bowel Sounds, Soft, Other (PEG)


Edema: Yes


Labs: 


 CBC, BMP





 12/11/18 06:30 





 12/11/18 06:30 





 INR, PTT











INR  1.69  (0.83-1.09)  H  12/06/18  05:30    


 


Fibrinogen  338.0 mg/dL (238-498)  D 12/03/18  20:00    














Problem List





- Problems


(1) Atrial fibrillation with RVR


Code(s): I48.91 - UNSPECIFIED ATRIAL FIBRILLATION   





(2) Acute-on-chronic kidney injury


Code(s): N17.9 - ACUTE KIDNEY FAILURE, UNSPECIFIED; N18.9 - CHRONIC KIDNEY 

DISEASE, UNSPECIFIED   


Qualifiers: 


   Acute renal failure type: unspecified   Chronic kidney disease stage: 

unspecified stage   Qualified Code(s): N17.9 - Acute kidney failure, unspecified

; N18.9 - Chronic kidney disease, unspecified   





(3) Demand ischemia


Code(s): I24.8 - OTHER FORMS OF ACUTE ISCHEMIC HEART DISEASE   





(4) Hypercalcemia


Code(s): E83.52 - HYPERCALCEMIA   





(5) Nonadherence to medication


Code(s): Z91.14 - PATIENT'S OTHER NONCOMPLIANCE WITH MEDICATION REGIMEN   





(6) Paraproteinemia


Code(s): D89.2 - HYPERGAMMAGLOBULINEMIA, UNSPECIFIED   





(7) Anticoagulant long-term use


Code(s): Z79.01 - LONG TERM (CURRENT) USE OF ANTICOAGULANTS   





(8) Chronic thromboembolic disease


Code(s): I74.9 - EMBOLISM AND THROMBOSIS OF UNSPECIFIED ARTERY   





(9) Coronary artery disease


Code(s): I25.10 - ATHSCL HEART DISEASE OF NATIVE CORONARY ARTERY W/O ANG PCTRS 

  


Qualifiers: 


   Coronary Disease-Associated Artery/Lesion type: native artery   Native vs. 

transplanted heart: native heart   Associated angina: without angina   

Qualified Code(s): I25.10 - Atherosclerotic heart disease of native coronary 

artery without angina pectoris   





(10) Diastolic dysfunction without heart failure


Code(s): I51.9 - HEART DISEASE, UNSPECIFIED   





(11) HTN (hypertension)


Code(s): I10 - ESSENTIAL (PRIMARY) HYPERTENSION   


Qualifiers: 


   Hypertension type: essential hypertension   Qualified Code(s): I10 - 

Essential (primary) hypertension   





(12) Hypertrophic cardiomyopathy


Code(s): I42.2 - OTHER HYPERTROPHIC CARDIOMYOPATHY   





(13) Hyperlipidemia


Code(s): E78.5 - HYPERLIPIDEMIA, UNSPECIFIED   


Qualifiers: 


   Hyperlipidemia type: pure hypercholesterolemia   Qualified Code(s): E78.00 - 

Pure hypercholesterolemia, unspecified; E78.0 - Pure hypercholesterolemia   





(14) Multiple myeloma


Code(s): C90.00 - MULTIPLE MYELOMA NOT HAVING ACHIEVED REMISSION   


Qualifiers: 


   Multiple myeloma remission status: not in remission   Qualified Code(s): 

C90.00 - Multiple myeloma not having achieved remission   





(15) Acalculous cholecystitis


Code(s): K81.9 - CHOLECYSTITIS, UNSPECIFIED   





Assessment/Plan


R&LHc at [facility] 1/11/2017 showing nonobstructive CAD, severe LV apical 

hypertrophic cardiomyopathy, mildly elevated right sided pressures, Mynx 

deployed right CFA access site. Study is consistent with apical hypertrophy (

spade-like) variant of hypertrophic cardiomyopathy, planned for optimal medical 

therapy. 


Echocardiogram: 07/11/2018 Mod cLVH, severe DYANA, mod TR RVSP 50-60 mmHg, mod-

severe MR


Echocardiogram: 10/16/2018 Normal LV size with hyperdynamic LVEF 75%, mild BSH, 

normal RV size and fxn, severe LAE, mod-severe MR, mod TR





1. Chronic respiratory failure/post trach in a patient with history of acute 

hypoxic respiratory failure, aspiration pneumonia with resolved sepsis syndrome


2. Toxic metabolic encephelopathy


3. 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


4. Paraproteinemia confirmed multiple myeloma


5. 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 

disease related to persistent atrial fibrillation with [id number] score of 6


6. CAD with evidence of demand ischemic injury, non obstructive CAD


7. 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 

chronic class I-II NYHA classification LV failure, compensated/euvolemic


8. Persistent atrial fibrillation [id number] score of 6 currently on no A/C 

therapy


9. HTN


10. Anemia/thrombocytopenia


11. Acalculous Cholecystitis


12. Ischemic hepatitis


13. Hypernatremia improving


14. Group D Strep/Enterococcus bacteremia





PLAN:


1. Ideally patient should be A/C unless it is absolutely contraindicated 

considering his [id number] score of 6, once Hgb stable


2. Transfuse as needed to maintain Hgb>8.0


3. Continue Lopressor 25 mg BID


4. Correct NA


5. Ventilator management via tracheostomy


6. Empiric antibiotic coverage


7. Enteral feeds

## 2018-12-12 NOTE — PN
Progress Note, Physician


Chief Complaint: 





UNRESPONSIVE


IN BED





- Current Medication List


Current Medications: 


Active Medications





Acetaminophen (Tylenol Oral Solution -)  650 mg PEG Q6H PRN


   PRN Reason: PAIN LEVEL 1 - 3


   Last Admin: 12/10/18 22:16 Dose:  650 mg


Allopurinol (Zyloprim -)  100 mg PEG BID [facility]


   Last Admin: 12/11/18 21:50 Dose:  100 mg


Cholecalciferol (Vitamin D3 -)  1,000 unit NR DAILY [facility]


   Last Admin: 12/11/18 10:55 Dose:  1,000 unit


Diphenhydramine HCl (Benadryl Injection -)  25 mg IVPB ONCE PRN


   PRN Reason: DURING PLASMAPHERESIS


Dextrose/Sodium Chloride (D5-1/3ns -)  500 mls @ 42 mls/hr IV ASDIR [facility]


   Last Admin: 12/12/18 04:55 Dose:  42 mls/hr


Ceftriaxone Sodium 1 gm/ (Dextrose)  50 mls @ 200 mls/hr IVPB DAILY [facility]; 

Protocol


   Last Admin: 12/11/18 12:55 Dose:  200 mls/hr


Daptomycin 600 mg/ Sodium (Chloride)  50 mls @ 50 mls/hr IVPB DAILY@1400 [facility]; 

Protocol


   Last Admin: 12/11/18 16:03 Dose:  50 mls/hr


Lactobacillus Acidophilus (Bacid -)  1 tab PEG DAILY [facility]


   Last Admin: 12/11/18 10:55 Dose:  1 tab


Metoprolol Tartrate (Lopressor -)  25 mg PEG BID [facility]


   Last Admin: 12/11/18 21:50 Dose:  25 mg


Ranitidine HCl (Zantac Oral Solution -)  150 mg PEG BID [facility]


   Last Admin: 12/11/18 21:50 Dose:  150 mg


Rifaximin (Xifaxan -)  550 mg NR BID [facility]


   Last Admin: 12/11/18 21:50 Dose:  550 mg


Thiamine HCl (Vitamin B1 -)  100 mg NR DAILY [facility]


   Last Admin: 12/11/18 10:55 Dose:  100 mg











- Objective


Vital Signs: 


 Vital Signs











Temperature  98.7 F   12/12/18 05:00


 


Pulse Rate  98 H  12/12/18 06:30


 


Respiratory Rate  26 H  12/12/18 06:35


 


Blood Pressure  127/73   12/12/18 05:00


 


O2 Sat by Pulse Oximetry (%)  98   12/12/18 06:30











Constitutional: Yes: Moderate Distress


Cardiovascular: Yes: Pulse Irregular


Respiratory: Yes: Other (TRACHEOSTOMY)


Gastrointestinal: Yes: Soft


Musculoskeletal: Yes: Muscle Weakness


Labs: 


 CBC, BMP





 12/11/18 06:30 





 12/11/18 06:30 





 INR, PTT











INR  1.69  (0.83-1.09)  H  12/06/18  05:30    


 


Fibrinogen  338.0 mg/dL (238-498)  D 12/03/18  20:00    














Problem List





- Problems


(1) HAM (acute kidney injury)


Code(s): N17.9 - ACUTE KIDNEY FAILURE, UNSPECIFIED   





(2) Atrial fibrillation with RVR


Code(s): I48.91 - UNSPECIFIED ATRIAL FIBRILLATION   





(3) Hyperlipidemia


Code(s): E78.5 - HYPERLIPIDEMIA, UNSPECIFIED   


Qualifiers: 


   Hyperlipidemia type: pure hypercholesterolemia   Qualified Code(s): E78.00 - 

Pure hypercholesterolemia, unspecified; E78.0 - Pure hypercholesterolemia   





(4) Hypothermia


Code(s): T68.XXXA - HYPOTHERMIA, INITIAL ENCOUNTER   


Qualifiers: 


   Encounter type: initial encounter   Qualified Code(s): T68.XXXA - Hypothermia

, initial encounter   





(5) Acute-on-chronic kidney injury


Code(s): N17.9 - ACUTE KIDNEY FAILURE, UNSPECIFIED; N18.9 - CHRONIC KIDNEY 

DISEASE, UNSPECIFIED   


Qualifiers: 


   Acute renal failure type: unspecified   Chronic kidney disease stage: 

unspecified stage   Qualified Code(s): N17.9 - Acute kidney failure, unspecified

; N18.9 - Chronic kidney disease, unspecified   





(6) Generalized weakness


Code(s): R53.1 - WEAKNESS   





(7) History of ETOH abuse


Code(s): Z87.898 - PERSONAL HISTORY OF OTHER SPECIFIED CONDITIONS   





(8) Hypercalcemia


Code(s): E83.52 - HYPERCALCEMIA   





(9) Hypertrophic cardiomyopathy


Code(s): I42.2 - OTHER HYPERTROPHIC CARDIOMYOPATHY   





(10) Toxic metabolic encephalopathy


Code(s): G92 - TOXIC ENCEPHALOPATHY   





(11) Sepsis


Code(s): A41.9 - SEPSIS, UNSPECIFIED ORGANISM   





(12) Respiratory failure


Code(s): J96.90 - RESPIRATORY FAILURE, UNSP, UNSP W HYPOXIA OR HYPERCAPNIA   





Assessment/Plan





NOW VENT DEPENDENT WITH TRACHEOSTOMY


EVENTS AND NOTES REVIEWED


IV DAPTMYCIN AND CEFTRIAXONE CONTINUE PER ID


DVT PROPHYLAXIS


WILL NEED SNF/LTAC


WOUND CARE

## 2018-12-13 VITALS — DIASTOLIC BLOOD PRESSURE: 79 MMHG | TEMPERATURE: 98.5 F | SYSTOLIC BLOOD PRESSURE: 128 MMHG | HEART RATE: 86 BPM

## 2018-12-13 LAB — OSMOLALITY SERPL: 344 MOSM/KG (ref 278–305)

## 2018-12-13 RX ADMIN — VITAMIN D, TAB 1000IU (100/BT) SCH UNIT: 25 TAB at 10:29

## 2018-12-13 RX ADMIN — METOPROLOL TARTRATE SCH MG: 25 TABLET, FILM COATED ORAL at 10:29

## 2018-12-13 RX ADMIN — RIFAXIMIN SCH MG: 550 TABLET ORAL at 10:28

## 2018-12-13 RX ADMIN — Medication SCH TAB: at 10:28

## 2018-12-13 RX ADMIN — ALLOPURINOL SCH MG: 100 TABLET ORAL at 10:28

## 2018-12-13 RX ADMIN — RANITIDINE HYDROCHLORIDE SCH MG: 150 SOLUTION ORAL at 10:28

## 2018-12-13 RX ADMIN — Medication SCH MG: at 10:29

## 2018-12-13 RX ADMIN — DEXTROSE MONOHYDRATE SCH MLS/HR: 50 INJECTION, SOLUTION INTRAVENOUS at 06:43

## 2018-12-13 RX ADMIN — CEFTRIAXONE SCH MLS/HR: 1 INJECTION, POWDER, FOR SOLUTION INTRAMUSCULAR; INTRAVENOUS at 10:29

## 2018-12-13 NOTE — DS
Physical Examination


Vital Signs: 


 Vital Signs











Temperature  98.5 F   12/13/18 09:54


 


Pulse Rate  86   12/13/18 09:54


 


Respiratory Rate  14   12/13/18 09:54


 


Blood Pressure  128/79   12/13/18 09:54


 


O2 Sat by Pulse Oximetry (%)  97   12/12/18 19:11











Constitutional: Yes: Mild Distress


Cardiovascular: Yes: Pulse Irregular


Respiratory: Yes: Mechanically Ventilated (TRACHEOSTOMY)


Gastrointestinal: Yes: Soft (GTUBE)


Musculoskeletal: Yes: Muscle Weakness


Integumentary: Yes: Pressure Ulcer


Wound/Incision: Yes: Dressing Dry and Intact


Neurological: Yes: Unresponsive


Labs: 


 CBC, BMP





 12/11/18 06:30 





 12/12/18 11:55 











Discharge Summary


Reason For Visit: ACUTE KIDNEY INJURY/CELLULITIS/HYPOTHERMIA


Current Active Problems





HAM (acute kidney injury) (Acute)


Acalculous cholecystitis (Acute)


Acute hypoxemic respiratory failure (Acute)


Altered mental status (Acute)


Anemia (Acute)


Atrial fibrillation with RVR (Acute)


Cellulitis (Acute)


Elevated LFTs (Acute)


Hyperlipidemia (Acute)


Hypothermia (Acute)


Multiple myeloma (Acute)


Pneumonia (Acute)


Respiratory failure (Acute)


Sepsis (Acute)


Toxic metabolic encephalopathy (Acute)








Procedures: Principal: CT SCAN


Hospital Course: 





ADMITTED FOR RESP DISTRESS, INTUBATED NEEDING TRACHEOSTOMY AND NOW NEEDING 

PLACEMENT AS A FUNCTIONAL QUADRIPLEGIA 


TREATED ON ANTIBIOTICS, BLOOD PRESSURE SUPPORT, ICU CARE WITH FULL CODE STATUS.


Condition: Poor





- Instructions


Diet, Activity, Other Instructions: 


GTUBE


ABX PER LTAC/ID


Disposition: SKILLED NURSING FACILITY





- Home Medications


Comprehensive Discharge Medication List: 


Ambulatory Orders





Atorvastatin Ca [Lipitor] 40 mg PO HS #30 tablet 07/12/18 


Rivaroxaban [Xarelto -] 20 mg PO DAILY@1800 #30 tablet 07/12/18 


Carvedilol [Coreg -] 6.25 mg PO BID #60 tablet 10/17/18 


Allopurinol [Zyloprim -] 100 mg PEG BID  tablet 12/12/18 


Cefepime [Maxipime (Restricted To Id) -] 2 gm IVPB BID  vial 12/12/18 


Ceftriaxone [Rocephin -] 1 gm IVPB DAILY  vial 12/12/18 


Cholecalciferol (Vitamin D3) [Vitamin D3 -] 1,000 unit NR DAILY  tab 12/12/18 


Daptomycin [Cubicin  (Restricted To Id) -] 600 mg IVPB DAILY@1400  vial 12/12/ 18 


Lactobacillus Acidophilus [Bacid -] 1 tab PEG DAILY  tab 12/12/18 


Metoprolol Tartrate [Lopressor -] 25 mg PEG BID  tablet 12/12/18 


Ranitidine Oral Solution [Zantac Oral Solution -] 150 mg PEG BID  cup 12/12/18 


Rifaximin [Xifaxan -] 550 mg NR BID  tablet 12/12/18

## 2024-06-17 NOTE — DS
Physical Examination


Vital Signs: 


 Vital Signs











Temperature  97.8 F   10/17/18 13:57


 


Pulse Rate  81   10/17/18 13:57


 


Respiratory Rate  18   10/17/18 13:57


 


Blood Pressure  105/66   10/17/18 13:57


 


O2 Sat by Pulse Oximetry (%)  97   10/17/18 09:00











Constitutional: Yes: No Distress


Eyes: Yes: WNL


HENT: Yes: WNL


Neck: Yes: WNL


Cardiovascular: Yes: Pulse Irregular


Respiratory: Yes: WNL


Gastrointestinal: Yes: WNL


Renal/: Yes: WNL


Musculoskeletal: Yes: WNL


Extremities: Yes: WNL


Edema: No


Peripheral Pulses WNL: Yes


Integumentary: Yes: WNL


Wound/Incision: Yes: Clean/Dry


Neurological: Yes: WNL


...Motor Strength: WNL


Psychiatric: Yes: WNL


Labs: 


 CBC, BMP





 10/17/18 06:45 





 10/17/18 06:30 











Discharge Summary


Reason For Visit: ACUTE RENAL FAILURE WEAKNESS


Current Active Problems





Acute-on-chronic kidney injury (Acute)


Generalized weakness (Acute)


History of ETOH abuse (Acute)


Hypercalcemia (Acute)


Hypoalbuminemia (Acute)


Paraproteinemia (Acute)


Vasovagal near syncope (Acute)








Procedures: Principal: VASCULAR WORKUP


Hospital Course: 





ADMITTED WORKUP NEGATIVE FOR VASCULAR STUDY, CARDIO EVAL COMPLETE, F/U AS 

OUTPATIENT


Condition: Guarded





- Instructions


Diet, Activity, Other Instructions: 


LOW SALT


SEE DR MORENO IN 1 WEEK


Referrals: 


Kareen Moreno MD [Primary Care Provider] - 


Disposition: VNS/HOME HEALTH CARE





- Home Medications


Comprehensive Discharge Medication List: 


Ambulatory Orders





Atorvastatin Ca [Lipitor] 40 mg PO HS #30 tablet 07/12/18 


Carvedilol [Coreg -] 6.25 mg PO BID #60 tablet 07/12/18 


Rivaroxaban [Xarelto -] 20 mg PO DAILY@1800 #30 tablet 07/12/18 


Carvedilol [Coreg -] 6.25 mg PO BID #60 tablet 10/17/18
regular